# Patient Record
Sex: MALE | Race: WHITE | NOT HISPANIC OR LATINO | URBAN - METROPOLITAN AREA
[De-identification: names, ages, dates, MRNs, and addresses within clinical notes are randomized per-mention and may not be internally consistent; named-entity substitution may affect disease eponyms.]

---

## 2018-03-10 ENCOUNTER — INPATIENT (INPATIENT)
Facility: HOSPITAL | Age: 63
LOS: 33 days | Discharge: NURSING, REHAB OR LONG-TERM CARE | DRG: 207 | End: 2018-04-13
Attending: HOSPITALIST | Admitting: HOSPITALIST
Payer: COMMERCIAL

## 2018-03-10 VITALS
RESPIRATION RATE: 16 BRPM | HEART RATE: 83 BPM | HEIGHT: 69 IN | DIASTOLIC BLOOD PRESSURE: 89 MMHG | SYSTOLIC BLOOD PRESSURE: 158 MMHG | WEIGHT: 164.91 LBS | OXYGEN SATURATION: 96 % | TEMPERATURE: 99 F

## 2018-03-10 DIAGNOSIS — A41.9 SEPSIS, UNSPECIFIED ORGANISM: ICD-10-CM

## 2018-03-10 DIAGNOSIS — Z98.890 OTHER SPECIFIED POSTPROCEDURAL STATES: Chronic | ICD-10-CM

## 2018-03-10 LAB
ALBUMIN SERPL ELPH-MCNC: 2.9 G/DL — LOW (ref 3.3–5.2)
ALP SERPL-CCNC: 87 U/L — SIGNIFICANT CHANGE UP (ref 40–120)
ALT FLD-CCNC: 19 U/L — SIGNIFICANT CHANGE UP
ANION GAP SERPL CALC-SCNC: 13 MMOL/L — SIGNIFICANT CHANGE UP (ref 5–17)
APPEARANCE UR: CLEAR — SIGNIFICANT CHANGE UP
AST SERPL-CCNC: 24 U/L — SIGNIFICANT CHANGE UP
BACTERIA # UR AUTO: ABNORMAL
BASOPHILS # BLD AUTO: 0 K/UL — SIGNIFICANT CHANGE UP (ref 0–0.2)
BASOPHILS NFR BLD AUTO: 0.3 % — SIGNIFICANT CHANGE UP (ref 0–2)
BILIRUB SERPL-MCNC: 0.2 MG/DL — LOW (ref 0.4–2)
BILIRUB UR-MCNC: NEGATIVE — SIGNIFICANT CHANGE UP
BUN SERPL-MCNC: 22 MG/DL — HIGH (ref 8–20)
CALCIUM SERPL-MCNC: 9.5 MG/DL — SIGNIFICANT CHANGE UP (ref 8.6–10.2)
CHLORIDE SERPL-SCNC: 99 MMOL/L — SIGNIFICANT CHANGE UP (ref 98–107)
CO2 SERPL-SCNC: 28 MMOL/L — SIGNIFICANT CHANGE UP (ref 22–29)
COLOR SPEC: YELLOW — SIGNIFICANT CHANGE UP
COMMENT - URINE: SIGNIFICANT CHANGE UP
CREAT SERPL-MCNC: 0.43 MG/DL — LOW (ref 0.5–1.3)
DIFF PNL FLD: ABNORMAL
EOSINOPHIL # BLD AUTO: 0.4 K/UL — SIGNIFICANT CHANGE UP (ref 0–0.5)
EOSINOPHIL NFR BLD AUTO: 5.4 % — HIGH (ref 0–5)
EPI CELLS # UR: SIGNIFICANT CHANGE UP
GLUCOSE SERPL-MCNC: 145 MG/DL — HIGH (ref 70–115)
GLUCOSE UR QL: NEGATIVE MG/DL — SIGNIFICANT CHANGE UP
HCT VFR BLD CALC: 36.7 % — LOW (ref 42–52)
HGB BLD-MCNC: 11.3 G/DL — LOW (ref 14–18)
KETONES UR-MCNC: NEGATIVE — SIGNIFICANT CHANGE UP
LACTATE BLDV-MCNC: 0.7 MMOL/L — SIGNIFICANT CHANGE UP (ref 0.5–2)
LACTATE BLDV-MCNC: 0.7 MMOL/L — SIGNIFICANT CHANGE UP (ref 0.5–2)
LEUKOCYTE ESTERASE UR-ACNC: ABNORMAL
LYMPHOCYTES # BLD AUTO: 1 K/UL — SIGNIFICANT CHANGE UP (ref 1–4.8)
LYMPHOCYTES # BLD AUTO: 13.1 % — LOW (ref 20–55)
MCHC RBC-ENTMCNC: 28.5 PG — SIGNIFICANT CHANGE UP (ref 27–31)
MCHC RBC-ENTMCNC: 30.8 G/DL — LOW (ref 32–36)
MCV RBC AUTO: 92.4 FL — SIGNIFICANT CHANGE UP (ref 80–94)
MONOCYTES # BLD AUTO: 0.7 K/UL — SIGNIFICANT CHANGE UP (ref 0–0.8)
MONOCYTES NFR BLD AUTO: 9 % — SIGNIFICANT CHANGE UP (ref 3–10)
NEUTROPHILS # BLD AUTO: 5.3 K/UL — SIGNIFICANT CHANGE UP (ref 1.8–8)
NEUTROPHILS NFR BLD AUTO: 71.7 % — SIGNIFICANT CHANGE UP (ref 37–73)
NITRITE UR-MCNC: POSITIVE
PH UR: 7 — SIGNIFICANT CHANGE UP (ref 5–8)
PLATELET # BLD AUTO: 281 K/UL — SIGNIFICANT CHANGE UP (ref 150–400)
POTASSIUM SERPL-MCNC: 4.4 MMOL/L — SIGNIFICANT CHANGE UP (ref 3.5–5.3)
POTASSIUM SERPL-SCNC: 4.4 MMOL/L — SIGNIFICANT CHANGE UP (ref 3.5–5.3)
PROCALCITONIN SERPL-MCNC: 0.28 NG/ML — HIGH (ref 0–0.04)
PROT SERPL-MCNC: 7 G/DL — SIGNIFICANT CHANGE UP (ref 6.6–8.7)
PROT UR-MCNC: 15 MG/DL
RBC # BLD: 3.97 M/UL — LOW (ref 4.6–6.2)
RBC # FLD: 16.1 % — HIGH (ref 11–15.6)
RBC CASTS # UR COMP ASSIST: SIGNIFICANT CHANGE UP /HPF (ref 0–4)
SODIUM SERPL-SCNC: 140 MMOL/L — SIGNIFICANT CHANGE UP (ref 135–145)
SP GR SPEC: 1 — LOW (ref 1.01–1.02)
UROBILINOGEN FLD QL: NEGATIVE MG/DL — SIGNIFICANT CHANGE UP
WBC # BLD: 7.4 K/UL — SIGNIFICANT CHANGE UP (ref 4.8–10.8)
WBC # FLD AUTO: 7.4 K/UL — SIGNIFICANT CHANGE UP (ref 4.8–10.8)
WBC UR QL: SIGNIFICANT CHANGE UP

## 2018-03-10 PROCEDURE — 71045 X-RAY EXAM CHEST 1 VIEW: CPT | Mod: 26

## 2018-03-10 PROCEDURE — 99223 1ST HOSP IP/OBS HIGH 75: CPT | Mod: GC

## 2018-03-10 PROCEDURE — 74177 CT ABD & PELVIS W/CONTRAST: CPT | Mod: 26

## 2018-03-10 PROCEDURE — 99285 EMERGENCY DEPT VISIT HI MDM: CPT

## 2018-03-10 RX ORDER — SODIUM CHLORIDE 9 MG/ML
500 INJECTION INTRAMUSCULAR; INTRAVENOUS; SUBCUTANEOUS ONCE
Qty: 0 | Refills: 0 | Status: COMPLETED | OUTPATIENT
Start: 2018-03-10 | End: 2018-03-10

## 2018-03-10 RX ORDER — PIPERACILLIN AND TAZOBACTAM 4; .5 G/20ML; G/20ML
3.38 INJECTION, POWDER, LYOPHILIZED, FOR SOLUTION INTRAVENOUS ONCE
Qty: 0 | Refills: 0 | Status: COMPLETED | OUTPATIENT
Start: 2018-03-10 | End: 2018-03-10

## 2018-03-10 RX ORDER — SODIUM CHLORIDE 9 MG/ML
1000 INJECTION, SOLUTION INTRAVENOUS
Qty: 0 | Refills: 0 | Status: COMPLETED | OUTPATIENT
Start: 2018-03-10 | End: 2018-03-10

## 2018-03-10 RX ORDER — ENOXAPARIN SODIUM 100 MG/ML
40 INJECTION SUBCUTANEOUS EVERY 24 HOURS
Qty: 0 | Refills: 0 | Status: DISCONTINUED | OUTPATIENT
Start: 2018-03-10 | End: 2018-03-11

## 2018-03-10 RX ORDER — CEFTRIAXONE 500 MG/1
1 INJECTION, POWDER, FOR SOLUTION INTRAMUSCULAR; INTRAVENOUS ONCE
Qty: 0 | Refills: 0 | Status: DISCONTINUED | OUTPATIENT
Start: 2018-03-10 | End: 2018-03-10

## 2018-03-10 RX ADMIN — SODIUM CHLORIDE 1000 MILLILITER(S): 9 INJECTION, SOLUTION INTRAVENOUS at 21:52

## 2018-03-10 RX ADMIN — PIPERACILLIN AND TAZOBACTAM 200 GRAM(S): 4; .5 INJECTION, POWDER, LYOPHILIZED, FOR SOLUTION INTRAVENOUS at 21:52

## 2018-03-10 RX ADMIN — SODIUM CHLORIDE 1000 MILLILITER(S): 9 INJECTION, SOLUTION INTRAVENOUS at 23:57

## 2018-03-10 RX ADMIN — SODIUM CHLORIDE 1000 MILLILITER(S): 9 INJECTION INTRAMUSCULAR; INTRAVENOUS; SUBCUTANEOUS at 21:52

## 2018-03-10 NOTE — ED CLERICAL - NS ED CLERK UNITS
MON /2GUL MON  Trach???/2GUL MON  TRACH/2GUL MON  TRACH/6TWR 6TWR/TRACH MON/ TRACH/5TWR 5TWR/5215-01 MON/ TRACH

## 2018-03-10 NOTE — ED ADULT NURSE REASSESSMENT NOTE - NS ED NURSE REASSESS COMMENT FT1
Assumed care of pt @ 1930. repeat rectal temp completed by previous RN and MD. per previous RN, pt placed on [place] [facility] [place]. will complete full assessment when able.

## 2018-03-10 NOTE — H&P ADULT - HISTORY OF PRESENT ILLNESS
63y/o male with pmh of PMH Transverse myelitis, Paraplegia, trach/peg, colostomy coming from [facility] presents with fever for 3 days.      Patient denies any cough, sob, runny nose, vomiting, diarrhea, abdominal pain, pain with urination.      Patient signed CPR form on 2/18/18,     Patient eats via tube feeding,. 63y/o male with pmh of PMH Transverse myelitis, Paraplegia, trach/peg, colostomy, HTN, coming from [facility] presents with fever for 3 days.      Patient denies any cough, sob, runny nose, vomiting, diarrhea, abdominal pain, pain with urination.    Patient signed MOLST REQUESTING CPR on 2/18/18.    Patient eats via tube feeding,.      Originally at [facility] in   7months ago, developed Acute transverse myelitis.    Heart Attack, stent placed, check up after.  He was given a tetanus shot, then couple days ago he developed lower extremity weakness, paralysis; ultimately developed ulcers.      [facility]--> [facility] (kept him for a few weeks) --> wife couldn't take care of him;    He is getting frequent uti's, anemia reequiring transfusions.    101 fever at nursing home today (Count includes the [facility]) 63y/o male with pmh of PMH Transverse myelitis, Paraplegia, trach/peg, colostomy, HTN, coming from [facility] presents with fever for 3 days.      Patient denies any cough, sob, runny nose, vomiting, diarrhea, abdominal pain, pain with urination.    Patient signed MOLST REQUESTING CPR on 2/18/18.    Patient eats via tube feeding,.      Originally at [facility] in   7months ago, developed Acute transverse myelitis.    Heart Attack, stent placed, check up after.  He was given a tetanus shot, then couple days ago he developed lower extremity weakness, paralysis; ultimately developed ulcers.      [facility]--> [facility] (kept him for a few weeks) --> wife couldn't take care of him;    He is getting frequent uti's, anemia reequiring transfusions.    101 fever at nursing home today ([facility])

## 2018-03-10 NOTE — ED PROVIDER NOTE - OBJECTIVE STATEMENT
62 year old male with PMh transverse myelitis, paraplegia, trach/peg, colostomy, htn presents with fever. As per the wife, the pt has had recurrent fevers over the past several months due to recurrent UTIs and C diff colitis, however this fever began several days ago with a Tmax 102. The pt denies cough, SOB, chest pain, vomiting, abd pain.

## 2018-03-10 NOTE — H&P ADULT - ASSESSMENT
63y/o male with pmh of PMH Transverse myelitis, Paraplegia, trach/peg, colostomy coming from [facility] presents with fever for 3 days. 63y/o male with pmh of PMH Transverse myelitis, DM2, HTN, Paraplegia, trach/peg, colostomy coming from [facility] presents with fever for 3 days.

## 2018-03-10 NOTE — H&P ADULT - PROBLEM SELECTOR PLAN 6
Patient has a known recent history of cdiff infection;  from february - march;  patient was on a regimen of metronidazole 500mg [id number]  3/5-3/14 Patient has a known recent history of cdiff infection;  from february - march;  patient was on a regimen of metronidazole 500mg [id number]  3/5-3/14  Pending cdiff toxin testing;

## 2018-03-10 NOTE — ED PROVIDER NOTE - PROGRESS NOTE DETAILS
CT exam with IV contrast is deemed to be medically necessary ATTENDING MD Santos: patient now meeting SIRS criteria. Has met all of code sepsis criteria except antibiotics and fluid bolus. initiating fluid bolus. WIll admit to hospitalist. CT negative. This is the time sepsis recognized.

## 2018-03-10 NOTE — H&P ADULT - NSHPLABSRESULTS_GEN_ALL_CORE
Lab Results:  CBC  CBC Full  -  ( 10 Mar 2018 17:15 )  WBC Count : 7.4 K/uL  Hemoglobin : 11.3 g/dL  Hematocrit : 36.7 %  Platelet Count - Automated : 281 K/uL  Mean Cell Volume : 92.4 fl  Mean Cell Hemoglobin : 28.5 pg  Mean Cell Hemoglobin Concentration : 30.8 g/dL  Auto Neutrophil # : 5.3 K/uL  Auto Lymphocyte # : 1.0 K/uL  Auto Monocyte # : 0.7 K/uL  Auto Eosinophil # : 0.4 K/uL  Auto Basophil # : 0.0 K/uL  Auto Neutrophil % : 71.7 %  Auto Lymphocyte % : 13.1 %  Auto Monocyte % : 9.0 %  Auto Eosinophil % : 5.4 %  Auto Basophil % : 0.3 %                            11.3   7.4   )-----------( 281      ( 10 Mar 2018 17:15 )             36.7     03-10    140  |  99  |  22.0<H>  ----------------------------<  145<H>  4.4   |  28.0  |  0.43<L>    Ca    9.5      10 Mar 2018 17:15    TPro  7.0  /  Alb  2.9<L>  /  TBili  0.2<L>  /  DBili  x   /  AST  24  /  ALT  19  /  AlkPhos  87  03-10    LIVER FUNCTIONS - ( 10 Mar 2018 17:15 )  Alb: 2.9 g/dL / Pro: 7.0 g/dL / ALK PHOS: 87 U/L / ALT: 19 U/L / AST: 24 U/L / GGT: x             Urinalysis Basic - ( 10 Mar 2018 19:16 )    Color: Yellow / Appearance: Clear / S.005 / pH: x  Gluc: x / Ketone: Negative  / Bili: Negative / Urobili: Negative mg/dL   Blood: x / Protein: 15 mg/dL / Nitrite: Positive   Leuk Esterase: Moderate / RBC: 0-2 /HPF / WBC 3-5   Sq Epi: x / Non Sq Epi: Occasional / Bacteria: Few

## 2018-03-10 NOTE — ED ADULT NURSE REASSESSMENT NOTE - NS ED NURSE REASSESS COMMENT FT1
pt status unchanged, refer to flowsheet and chart, pt safety maintained, pt hemodynamically stable, pending urine and repeat temp

## 2018-03-10 NOTE — H&P ADULT - PROBLEM SELECTOR PLAN 2
Restart patient on ophthalmic eye care with refresh lacrilube (white petroleum mieral oil);  dry eye syndromne Restart patient on ophthalmic eye care with refresh lacrilube (white petroleum mieral oil);  dry eye syndrome

## 2018-03-10 NOTE — ED ADULT NURSE REASSESSMENT NOTE - NS ED NURSE REASSESS COMMENT FT1
pt positioned for comfort, in no apparent distress or discomfort. pt to be admitted, awaiting admitting MD kamara. will continue to monitor.

## 2018-03-10 NOTE — H&P ADULT - NSHPREVIEWOFSYSTEMS_GEN_ALL_CORE
REVIEW OF SYSTEMS:    CONSTITUTIONAL:    POSITIVE FEVERS;    EYES/ENT: No throat pain   NECK: No pain or stiffness  PULMONOLOGY: No cough, wheezing, hemoptysis; No shortness of breath  CARDIOVASCULAR: No chest pain  GASTROINTESTINAL: No abdominal pain. No nausea, vomiting, or hematemesis;   GENITOURINARY: No dysuria, frequency or hematuria  NEUROLOGICAL: No numbness or weakness  DERMATOLOGY: No itching, burning, rashes, or lesions

## 2018-03-10 NOTE — H&P ADULT - PROBLEM SELECTOR PLAN 1
Admit to any floor, monitored bed and , under care of   peg tube feedings;  Patient admitted for likely Sepsis in the setting of fever, tachycardia, known previous use of antibiotics, previous cdiff infections.  Pending CBC, CMP, Mag, PHos, UA, Urine Culture, EKG Admit to any floor, monitored bed and , under care of   peg tube feedings;  Activity Bedrest;  Patient admitted for likely Sepsis in the setting of fever, tachycardia, known previous use of antibiotics, previous cdiff infections.  Pending CBC, CMP, Mag, PHos, UA, Urine Culture, EKG;  >2liters O2 via NC, keep O2 sat >92%  Cxray, CT chest;    ID consult;    Avoid any antidiarrhea agents.    Metronidazole 500mg po tid  10-14 days.    Florastor 250mg bid

## 2018-03-10 NOTE — H&P ADULT - NSHPSOURCEINFOTX_GEN_ALL_CORE
Wife  Courtney Vegas [phone]  (Emergency6 Contact) Wife  Courtney Brennan [phone]  (Emergency6 Contact) Wife  Courtney Brennan [phone]  (Emergency Contact)

## 2018-03-10 NOTE — ED ADULT NURSE REASSESSMENT NOTE - NS ED NURSE REASSESS COMMENT FT1
pt awake and alert, chronic trach and nonverbal lying comfortably in bed. pt placed on monitor, sinus tach @ 109, maintaining 02 @ 97% on trach. wound vac noted to sacral area, site clean dry and intact. colostomy bag in place, stoma pink, surrounding area clean dry and intact. pt has beltrán in place prior to arrival to ED. beltrán draining yellow urine without any difficulty. pt remains on brett joseph per MD. pt in no apparent distress or discomfort, will continue to monitor.

## 2018-03-10 NOTE — H&P ADULT - NSHPPHYSICALEXAM_GEN_ALL_CORE
PHYSICAL EXAM:  Vital Signs Last 24 Hrs  T(C): 36.8 (10 Mar 2018 21:51), Max: 37.2 (10 Mar 2018 15:15)  T(F): 98.3 (10 Mar 2018 21:51), Max: 99 (10 Mar 2018 15:15)    CURRENTLY AFEBRILE  HR: 109 (10 Mar 2018 21:51) (83 - 110)       TACHYCARDIC  BP: 140/78 (10 Mar 2018 21:51) (140/78 - 158/89)  RR: 18 (10 Mar 2018 21:51) (16 - 18)  SpO2: 97% (10 Mar 2018 21:51) (96% - 97%)    GENERAL: NAD, well-groomed, well-developed  HEAD:  Atraumatic, Normocephalic  OPTHOLMOLOGY:  EOMI, PERRLA, conjunctiva and sclera clear  ENMT: No tonsillar erythema, exudates, or enlargement; Moist mucous membranes, Good dentition, No lesions  NECK: Supple, No JVD, Normal thyroid  NEUROLOGY:  Alert & Oriented X3, Good concentration; Motor Strength 5/5 B/L upper and lower extremities; DTRs 2+ intact and symmetric  PULMONLOGY: Clear to percussion bilaterally; No rales, rhonchi, wheezing, or rubs  CARDIOLOGY: S1, S2;  Regular rate and rhythm; No murmurs, rubs, or gallops  GASTROENTEROLOGY: Soft, Nontender, Nondistended; Bowel sounds present;   COLOSTOMY BAG PRESENT IN LOWER ABDOMEN;  STOOL IS BROWN, LOOSE;    EXTREMITIES:  2+ Peripheral Pulses, No clubbing, cyanosis, or edema;  ULCER B/W 4TH AND 5TH TOES, MEDIAL 4TH TOES;     LYMPH: No lymphadenopathy noted  DERMATOLOGY:  SKIN WARM, DRY; PHYSICAL EXAM:  Vital Signs Last 24 Hrs  T(C): 36.8 (10 Mar 2018 21:51), Max: 37.2 (10 Mar 2018 15:15)  T(F): 98.3 (10 Mar 2018 21:51), Max: 99 (10 Mar 2018 15:15)    CURRENTLY AFEBRILE  HR: 109 (10 Mar 2018 21:51) (83 - 110)       TACHYCARDIC  BP: 140/78 (10 Mar 2018 21:51) (140/78 - 158/89)  RR: 18 (10 Mar 2018 21:51) (16 - 18)  SpO2: 97% (10 Mar 2018 21:51) (96% - 97%)    GENERAL: NAD, well-groomed, well-developed  HEAD:  Atraumatic, Normocephalic  OPTHOLMOLOGY:  EOMI, PERRLA, conjunctiva and sclera clear  ENMT: No tonsillar erythema, exudates, or enlargement; DRY ORAL mucous membranes, Good dentition, No lesions  NECK: Supple, No JVD, Normal thyroid  NEUROLOGY:  Alert & Oriented X3, Good concentration; Motor Strength 5/5 B/L upper and lower extremities; DTRs 2+ intact and symmetric  PULMONLOGY: Clear to percussion bilaterally; No rales, rhonchi, wheezing, or rubs  CARDIOLOGY: S1, S2;  Regular rate and rhythm; No murmurs, rubs, or gallops  GASTROENTEROLOGY: Soft, Nontender, Nondistended; Bowel sounds present;   COLOSTOMY BAG PRESENT IN LOWER ABDOMEN;  STOOL IS BROWN, LOOSE;    EXTREMITIES:  2+ Peripheral Pulses, No clubbing, cyanosis, or edema;  ULCER B/W 4TH AND 5TH TOES, MEDIAL 4TH TOES,  BY PAD;   LYMPH: No lymphadenopathy noted  DERMATOLOGY:  SKIN WARM, DRY;  SACRAL ULCERS PRESENT ON ADMISSION; PHYSICAL EXAM:  Vital Signs Last 24 Hrs  T(C): 36.8 (10 Mar 2018 21:51), Max: 37.2 (10 Mar 2018 15:15)  T(F): 98.3 (10 Mar 2018 21:51), Max: 99 (10 Mar 2018 15:15)    CURRENTLY AFEBRILE  HR: 109 (10 Mar 2018 21:51) (83 - 110)       TACHYCARDIC  BP: 140/78 (10 Mar 2018 21:51) (140/78 - 158/89)  RR: 18 (10 Mar 2018 21:51) (16 - 18)  SpO2: 97% (10 Mar 2018 21:51) (96% - 97%)    GENERAL: NAD, well-groomed, well-developed  HEAD:  Atraumatic, Normocephalic  OPTHOLMOLOGY:  EOMI, PERRLA, conjunctiva and sclera clear  ENMT: No tonsillar erythema, exudates, or enlargement; DRY ORAL mucous membranes, Good dentition, No lesions  NECK: Supple, No JVD, Normal thyroid  NEUROLOGY:  Alert & Oriented X3, Good concentration; Motor Strength 5/5 B/L upper and lower extremities; DTRs 2+ intact and symmetric  PULMONLOGY: Clear to percussion bilaterally; No rales, rhonchi, wheezing, or rubs  CARDIOLOGY: S1, S2;  Regular rate and rhythm; No murmurs, rubs, or gallops  GASTROENTEROLOGY: Soft, Nontender, Nondistended; Bowel sounds present;   COLOSTOMY BAG PRESENT IN LOWER ABDOMEN;  STOOL IS BROWN, LOOSE;    EXTREMITIES:  2+ Peripheral Pulses, No clubbing, cyanosis, or edema;  ULCER B/W 4TH AND 5TH TOES, MEDIAL 4TH TOES,  BY PAD;   LYMPH: No lymphadenopathy noted  DERMATOLOGY:  SKIN WARM, DRY;  SACRAL ULCERS PRESENT ON ADMISSION, covered by packing and wound vac;

## 2018-03-10 NOTE — H&P ADULT - NSHPOUTPATIENTPROVIDERS_GEN_ALL_CORE
[facility]  Dr.Dinesh Bronson  [phone] [facility]  Dr.Dinesh Bronson  [phone]

## 2018-03-11 DIAGNOSIS — A41.9 SEPSIS, UNSPECIFIED ORGANISM: ICD-10-CM

## 2018-03-11 DIAGNOSIS — R50.9 FEVER, UNSPECIFIED: ICD-10-CM

## 2018-03-11 DIAGNOSIS — Z29.9 ENCOUNTER FOR PROPHYLACTIC MEASURES, UNSPECIFIED: ICD-10-CM

## 2018-03-11 DIAGNOSIS — E11.9 TYPE 2 DIABETES MELLITUS WITHOUT COMPLICATIONS: ICD-10-CM

## 2018-03-11 DIAGNOSIS — G37.3 ACUTE TRANSVERSE MYELITIS IN DEMYELINATING DISEASE OF CENTRAL NERVOUS SYSTEM: ICD-10-CM

## 2018-03-11 DIAGNOSIS — I10 ESSENTIAL (PRIMARY) HYPERTENSION: ICD-10-CM

## 2018-03-11 DIAGNOSIS — Z86.19 PERSONAL HISTORY OF OTHER INFECTIOUS AND PARASITIC DISEASES: ICD-10-CM

## 2018-03-11 DIAGNOSIS — L98.429 NON-PRESSURE CHRONIC ULCER OF BACK WITH UNSPECIFIED SEVERITY: ICD-10-CM

## 2018-03-11 DIAGNOSIS — G83.9 PARALYTIC SYNDROME, UNSPECIFIED: ICD-10-CM

## 2018-03-11 LAB
C DIFF BY PCR RESULT: SIGNIFICANT CHANGE UP
C DIFF TOX GENS STL QL NAA+PROBE: SIGNIFICANT CHANGE UP
CORTIS F PM SERPL-MCNC: 17.1 UG/DL — HIGH (ref 2.7–10.5)
GLUCOSE BLDC GLUCOMTR-MCNC: 140 MG/DL — HIGH (ref 70–99)
GLUCOSE BLDC GLUCOMTR-MCNC: 185 MG/DL — HIGH (ref 70–99)
GLUCOSE BLDC GLUCOMTR-MCNC: 186 MG/DL — HIGH (ref 70–99)
GLUCOSE BLDC GLUCOMTR-MCNC: 209 MG/DL — HIGH (ref 70–99)
HBA1C BLD-MCNC: 5.6 % — SIGNIFICANT CHANGE UP (ref 4–5.6)
OB PNL STL: NEGATIVE — SIGNIFICANT CHANGE UP

## 2018-03-11 PROCEDURE — 99233 SBSQ HOSP IP/OBS HIGH 50: CPT | Mod: GC

## 2018-03-11 PROCEDURE — 99223 1ST HOSP IP/OBS HIGH 75: CPT

## 2018-03-11 RX ORDER — ALPRAZOLAM 0.25 MG
0.5 TABLET ORAL THREE TIMES A DAY
Qty: 0 | Refills: 0 | Status: DISCONTINUED | OUTPATIENT
Start: 2018-03-11 | End: 2018-03-11

## 2018-03-11 RX ORDER — ASCORBIC ACID 60 MG
500 TABLET,CHEWABLE ORAL DAILY
Qty: 0 | Refills: 0 | Status: DISCONTINUED | OUTPATIENT
Start: 2018-03-11 | End: 2018-04-13

## 2018-03-11 RX ORDER — VANCOMYCIN HCL 1 G
500 VIAL (EA) INTRAVENOUS EVERY 6 HOURS
Qty: 0 | Refills: 0 | Status: DISCONTINUED | OUTPATIENT
Start: 2018-03-11 | End: 2018-03-11

## 2018-03-11 RX ORDER — ASPIRIN/CALCIUM CARB/MAGNESIUM 324 MG
81 TABLET ORAL DAILY
Qty: 0 | Refills: 0 | Status: DISCONTINUED | OUTPATIENT
Start: 2018-03-11 | End: 2018-03-11

## 2018-03-11 RX ORDER — ASPIRIN/CALCIUM CARB/MAGNESIUM 324 MG
1 TABLET ORAL
Qty: 0 | Refills: 0 | COMMUNITY

## 2018-03-11 RX ORDER — DEXTROSE 50 % IN WATER 50 %
25 SYRINGE (ML) INTRAVENOUS ONCE
Qty: 0 | Refills: 0 | Status: DISCONTINUED | OUTPATIENT
Start: 2018-03-11 | End: 2018-04-13

## 2018-03-11 RX ORDER — OMEPRAZOLE 10 MG/1
0 CAPSULE, DELAYED RELEASE ORAL
Qty: 0 | Refills: 0 | COMMUNITY

## 2018-03-11 RX ORDER — HEPARIN SODIUM 5000 [USP'U]/ML
5000 INJECTION INTRAVENOUS; SUBCUTANEOUS EVERY 8 HOURS
Qty: 0 | Refills: 0 | Status: DISCONTINUED | OUTPATIENT
Start: 2018-03-11 | End: 2018-04-13

## 2018-03-11 RX ORDER — SODIUM CHLORIDE 9 MG/ML
1000 INJECTION, SOLUTION INTRAVENOUS
Qty: 0 | Refills: 0 | Status: DISCONTINUED | OUTPATIENT
Start: 2018-03-11 | End: 2018-04-13

## 2018-03-11 RX ORDER — ETIDRONATE DISODIUM 400 MG/1
2 TABLET ORAL
Qty: 0 | Refills: 0 | COMMUNITY

## 2018-03-11 RX ORDER — PANTOPRAZOLE SODIUM 20 MG/1
40 TABLET, DELAYED RELEASE ORAL
Qty: 0 | Refills: 0 | Status: DISCONTINUED | OUTPATIENT
Start: 2018-03-11 | End: 2018-03-11

## 2018-03-11 RX ORDER — PANTOPRAZOLE SODIUM 20 MG/1
40 TABLET, DELAYED RELEASE ORAL
Qty: 0 | Refills: 0 | Status: DISCONTINUED | OUTPATIENT
Start: 2018-03-11 | End: 2018-04-13

## 2018-03-11 RX ORDER — BACLOFEN 100 %
10 POWDER (GRAM) MISCELLANEOUS
Qty: 0 | Refills: 0 | COMMUNITY

## 2018-03-11 RX ORDER — CLOPIDOGREL BISULFATE 75 MG/1
75 TABLET, FILM COATED ORAL DAILY
Qty: 0 | Refills: 0 | Status: DISCONTINUED | OUTPATIENT
Start: 2018-03-11 | End: 2018-04-13

## 2018-03-11 RX ORDER — OXYCODONE HYDROCHLORIDE 5 MG/1
10 TABLET ORAL EVERY 6 HOURS
Qty: 0 | Refills: 0 | Status: DISCONTINUED | OUTPATIENT
Start: 2018-03-11 | End: 2018-03-18

## 2018-03-11 RX ORDER — CLOPIDOGREL BISULFATE 75 MG/1
75 TABLET, FILM COATED ORAL DAILY
Qty: 0 | Refills: 0 | Status: DISCONTINUED | OUTPATIENT
Start: 2018-03-11 | End: 2018-03-11

## 2018-03-11 RX ORDER — INSULIN GLARGINE 100 [IU]/ML
10 INJECTION, SOLUTION SUBCUTANEOUS AT BEDTIME
Qty: 0 | Refills: 0 | Status: DISCONTINUED | OUTPATIENT
Start: 2018-03-11 | End: 2018-03-11

## 2018-03-11 RX ORDER — OXYCODONE HYDROCHLORIDE 5 MG/1
1 TABLET ORAL
Qty: 0 | Refills: 0 | COMMUNITY

## 2018-03-11 RX ORDER — DEXTROSE 50 % IN WATER 50 %
12.5 SYRINGE (ML) INTRAVENOUS ONCE
Qty: 0 | Refills: 0 | Status: DISCONTINUED | OUTPATIENT
Start: 2018-03-11 | End: 2018-04-13

## 2018-03-11 RX ORDER — OMEPRAZOLE 10 MG/1
1 CAPSULE, DELAYED RELEASE ORAL
Qty: 0 | Refills: 0 | COMMUNITY

## 2018-03-11 RX ORDER — BACLOFEN 100 %
10 POWDER (GRAM) MISCELLANEOUS
Qty: 0 | Refills: 0 | Status: DISCONTINUED | OUTPATIENT
Start: 2018-03-11 | End: 2018-04-13

## 2018-03-11 RX ORDER — INSULIN GLARGINE 100 [IU]/ML
15 INJECTION, SOLUTION SUBCUTANEOUS AT BEDTIME
Qty: 0 | Refills: 0 | Status: DISCONTINUED | OUTPATIENT
Start: 2018-03-11 | End: 2018-03-11

## 2018-03-11 RX ORDER — ASPIRIN/CALCIUM CARB/MAGNESIUM 324 MG
81 TABLET ORAL DAILY
Qty: 0 | Refills: 0 | Status: DISCONTINUED | OUTPATIENT
Start: 2018-03-11 | End: 2018-04-13

## 2018-03-11 RX ORDER — INSULIN HUMAN 100 [IU]/ML
INJECTION, SOLUTION SUBCUTANEOUS EVERY 6 HOURS
Qty: 0 | Refills: 0 | Status: DISCONTINUED | OUTPATIENT
Start: 2018-03-11 | End: 2018-04-13

## 2018-03-11 RX ORDER — ASCORBIC ACID 60 MG
500 TABLET,CHEWABLE ORAL DAILY
Qty: 0 | Refills: 0 | Status: DISCONTINUED | OUTPATIENT
Start: 2018-03-11 | End: 2018-03-11

## 2018-03-11 RX ORDER — BACLOFEN 100 %
10 POWDER (GRAM) MISCELLANEOUS
Qty: 0 | Refills: 0 | Status: DISCONTINUED | OUTPATIENT
Start: 2018-03-11 | End: 2018-03-11

## 2018-03-11 RX ORDER — GLUCAGON INJECTION, SOLUTION 0.5 MG/.1ML
1 INJECTION, SOLUTION SUBCUTANEOUS ONCE
Qty: 0 | Refills: 0 | Status: DISCONTINUED | OUTPATIENT
Start: 2018-03-11 | End: 2018-04-13

## 2018-03-11 RX ORDER — METRONIDAZOLE 500 MG
500 TABLET ORAL EVERY 8 HOURS
Qty: 0 | Refills: 0 | Status: DISCONTINUED | OUTPATIENT
Start: 2018-03-11 | End: 2018-03-11

## 2018-03-11 RX ORDER — ALPRAZOLAM 0.25 MG
0.5 TABLET ORAL THREE TIMES A DAY
Qty: 0 | Refills: 0 | Status: DISCONTINUED | OUTPATIENT
Start: 2018-03-11 | End: 2018-03-16

## 2018-03-11 RX ORDER — METRONIDAZOLE 500 MG
500 TABLET ORAL EVERY 8 HOURS
Qty: 0 | Refills: 0 | Status: DISCONTINUED | OUTPATIENT
Start: 2018-03-11 | End: 2018-03-12

## 2018-03-11 RX ADMIN — INSULIN GLARGINE 10 UNIT(S): 100 INJECTION, SOLUTION SUBCUTANEOUS at 21:39

## 2018-03-11 RX ADMIN — HEPARIN SODIUM 5000 UNIT(S): 5000 INJECTION INTRAVENOUS; SUBCUTANEOUS at 06:50

## 2018-03-11 RX ADMIN — HEPARIN SODIUM 5000 UNIT(S): 5000 INJECTION INTRAVENOUS; SUBCUTANEOUS at 13:35

## 2018-03-11 RX ADMIN — OXYCODONE HYDROCHLORIDE 10 MILLIGRAM(S): 5 TABLET ORAL at 20:05

## 2018-03-11 RX ADMIN — INSULIN HUMAN 2: 100 INJECTION, SOLUTION SUBCUTANEOUS at 13:35

## 2018-03-11 RX ADMIN — INSULIN HUMAN 1: 100 INJECTION, SOLUTION SUBCUTANEOUS at 18:14

## 2018-03-11 RX ADMIN — Medication 500 MILLIGRAM(S): at 21:37

## 2018-03-11 RX ADMIN — Medication 10 MILLIGRAM(S): at 18:14

## 2018-03-11 RX ADMIN — Medication 5 MILLILITER(S): at 13:32

## 2018-03-11 RX ADMIN — Medication 500 MILLIGRAM(S): at 13:33

## 2018-03-11 RX ADMIN — Medication 0.5 MILLIGRAM(S): at 13:33

## 2018-03-11 RX ADMIN — Medication 1 APPLICATION(S): at 21:36

## 2018-03-11 RX ADMIN — OXYCODONE HYDROCHLORIDE 10 MILLIGRAM(S): 5 TABLET ORAL at 13:34

## 2018-03-11 RX ADMIN — Medication 500 MILLIGRAM(S): at 06:50

## 2018-03-11 RX ADMIN — CLOPIDOGREL BISULFATE 75 MILLIGRAM(S): 75 TABLET, FILM COATED ORAL at 13:32

## 2018-03-11 RX ADMIN — HEPARIN SODIUM 5000 UNIT(S): 5000 INJECTION INTRAVENOUS; SUBCUTANEOUS at 21:37

## 2018-03-11 RX ADMIN — OXYCODONE HYDROCHLORIDE 10 MILLIGRAM(S): 5 TABLET ORAL at 21:08

## 2018-03-11 RX ADMIN — Medication 81 MILLIGRAM(S): at 13:33

## 2018-03-11 RX ADMIN — PANTOPRAZOLE SODIUM 40 MILLIGRAM(S): 20 TABLET, DELAYED RELEASE ORAL at 16:42

## 2018-03-11 RX ADMIN — Medication 0.5 MILLIGRAM(S): at 16:42

## 2018-03-11 RX ADMIN — OXYCODONE HYDROCHLORIDE 10 MILLIGRAM(S): 5 TABLET ORAL at 14:54

## 2018-03-11 NOTE — PROGRESS NOTE ADULT - PROBLEM SELECTOR PROBLEM 3
Type 2 diabetes mellitus without complication, with long-term current use of insulin Skin ulcer of sacrum, unspecified ulcer stage

## 2018-03-11 NOTE — PROGRESS NOTE ADULT - PROBLEM SELECTOR PLAN 4
CONTINUE HOME MEDICATIONS -pt with noted sequela of trach/ peg/ colostomy in place  -c/w supportive care   -c/w baclofen  -c/w oxycdone prn for chronic pain   -c/w glucerna peg feeding to goal

## 2018-03-11 NOTE — CHART NOTE - NSCHARTNOTEFT_GEN_A_CORE
HUI Note - pt spouse req to spk to HUI - Francois (spouse of pt) requesting that someone sit with pt all night because he is afraid he is going to stop breathing (pt was trached and is now breathing on his own) explained the reasons why a pt would be assigned a 1-1. [facility] gave spouse the list of private hires. pt has bell - he can not push button but can hit bell - RN aware. pt agrees he is more anxious than anything and will keep calm.

## 2018-03-11 NOTE — PROGRESS NOTE ADULT - PROBLEM SELECTOR PROBLEM 5
History of Clostridium difficile infection Type 2 diabetes mellitus without complication, with long-term current use of insulin

## 2018-03-11 NOTE — PROGRESS NOTE ADULT - SUBJECTIVE AND OBJECTIVE BOX
Patient is a 62y old  Male who presents with a chief complaint of fevers (10 Mar 2018 23:26)      INTERVAL HPI/OVERNIGHT EVENTS:  62y  Male    HEMATOLOGIC:  aspirin  chewable 81 milliGRAM(s) Oral daily  heparin  Injectable 5000 Unit(s) SubCutaneous every 8 hours    ENDO/METABOLIC:  dextrose 50% Injectable 12.5 Gram(s) IV Push once  dextrose 50% Injectable 25 Gram(s) IV Push once  dextrose 50% Injectable 25 Gram(s) IV Push once  glucagon  Injectable 1 milliGRAM(s) IntraMuscular once PRN  insulin glargine Injectable (LANTUS) 10 Unit(s) SubCutaneous at bedtime  insulin regular  human corrective regimen sliding scale   SubCutaneous every 6 hours    IV FLUID/NUTRITION:  ascorbic acid 500 milliGRAM(s) Oral daily  dextrose 5%. 1000 milliLiter(s) IV Continuous <Continuous>  multivitamin   Solution 5 milliLiter(s) Oral daily    Vital Signs Last 24 Hrs  T(C): 36.9 (11 Mar 2018 07:10), Max: 37.9 (11 Mar 2018 03:53)  T(F): 98.5 (11 Mar 2018 07:10), Max: 100.2 (11 Mar 2018 03:53)  HR: 110 (11 Mar 2018 07:10) (83 - 112)  BP: 125/73 (11 Mar 2018 07:10) (125/73 - 158/89)  RR: 20 (11 Mar 2018 07:10) (16 - 20)  SpO2: 96% (11 Mar 2018 07:10) (96% - 98%)        REVIEW OF SYSTEMS:  No further findings on ros;        PHYSICAL EXAM:  Vital Signs Last 24 Hrs  T(C): 36.9 (11 Mar 2018 07:10), Max: 37.9 (11 Mar 2018 03:53)  T(F): 98.5 (11 Mar 2018 07:10), Max: 100.2 (11 Mar 2018 03:53)  HR: 110 (11 Mar 2018 07:10) (83 - 112)  BP: 125/73 (11 Mar 2018 07:10) (125/73 - 158/89)  RR: 20 (11 Mar 2018 07:10) (16 - 20)  SpO2: 96% (11 Mar 2018 07:10) (96% - 98%)      GENERAL: NAD, well-groomed, well-developed  	HEENT: NCAT, EOMI, PERRLA, conjunctiva and sclera clear; DRY ORAL mucous membranes,   	NECK: Supple, No Supraclavicular/submandibular lymphadenopathy;   	NEUROLOGY:  Alert & Oriented X3, Good concentration; Motor Strength 5/5 B/L upper and lower extremities; =  	PULMONLOGY: CTAB; No rales, rhonchi, wheezing, or rubs  	CARDIOLOGY: S1, S2;  Regular rate and rhythm; No murmurs, rubs, or gallops  	GASTROENTEROLOGY: Soft, Nontender, Nondistended; Bowel sounds present;   COLOSTOMY BAG PRESENT IN LOWER ABDOMEN;  STOOL IS BROWN, watery;    	EXTREMITIES:  2+ Peripheral Pulses DP, No clubbing, cyanosis, or edema;  ULCER B/W 4TH AND 5TH TOES, MEDIAL 4TH TOES,  BY PAD;     DERMATOLOGY:  SKIN WARM, DRY;  same SACRAL ULCERS PRESENT ON ADMISSION, covered by packing and       wound vac;      LABS:   CBC Full  -  ( 10 Mar 2018 17:15 )  WBC Count : 7.4 K/uL  Hemoglobin : 11.3 g/dL  Hematocrit : 36.7 %  Platelet Count - Automated : 281 K/uL  Mean Cell Volume : 92.4 fl  Mean Cell Hemoglobin : 28.5 pg  Mean Cell Hemoglobin Concentration : 30.8 g/dL  Auto Neutrophil # : 5.3 K/uL  Auto Lymphocyte # : 1.0 K/uL  Auto Monocyte # : 0.7 K/uL  Auto Eosinophil # : 0.4 K/uL  Auto Basophil # : 0.0 K/uL  Auto Neutrophil % : 71.7 %  Auto Lymphocyte % : 13.1 %  Auto Monocyte % : 9.0 %  Auto Eosinophil % : 5.4 %  Auto Basophil % : 0.3 %               11.3   7.4   )-----------( 281      ( 10 Mar 2018 17:15 )             36.7     03-10    140  |  99  |  22.0<H>  ----------------------------<  145<H>  4.4   |  28.0  |  0.43<L>    Ca    9.5      10 Mar 2018 17:15    TPro  7.0  /  Alb  2.9<L>  /  TBili  0.2<L>  /  DBili  x   /  AST  24  /  ALT  19  /  AlkPhos  87  03-10    LIVER FUNCTIONS - ( 10 Mar 2018 17:15 )  Alb: 2.9 g/dL / Pro: 7.0 g/dL / ALK PHOS: 87 U/L / ALT: 19 U/L / AST: 24 U/L / GGT: x             Urinalysis Basic - ( 10 Mar 2018 19:16 )    Color: Yellow / Appearance: Clear / S.005 / pH: x  Gluc: x / Ketone: Negative  / Bili: Negative / Urobili: Negative mg/dL   Blood: x / Protein: 15 mg/dL / Nitrite: Positive   Leuk Esterase: Moderate / RBC: 0-2 /HPF / WBC 3-5   Sq Epi: x / Non Sq Epi: Occasional / Bacteria: Few      Albumin, Serum: 2.9 g/dL (03-10 @ 17:15)    LIVER FUNCTIONS - ( 10 Mar 2018 17:15 )  Alb: 2.9 g/dL / Pro: 7.0 g/dL / ALK PHOS: 87 U/L / ALT: 19 U/L / AST: 24 U/L / GGT: x Patient is a 62y old  Male who presents with a chief complaint of fevers (10 Mar 2018 23:26)      INTERVAL HPI/OVERNIGHT EVENTS:  Patient seen and examined at bedside. No acute events overnight. Patient reports he is upset and wants to leave.     HEMATOLOGIC:  aspirin  chewable 81 milliGRAM(s) Oral daily  heparin  Injectable 5000 Unit(s) SubCutaneous every 8 hours    ENDO/METABOLIC:  dextrose 50% Injectable 12.5 Gram(s) IV Push once  dextrose 50% Injectable 25 Gram(s) IV Push once  dextrose 50% Injectable 25 Gram(s) IV Push once  glucagon  Injectable 1 milliGRAM(s) IntraMuscular once PRN  insulin glargine Injectable (LANTUS) 10 Unit(s) SubCutaneous at bedtime  insulin regular  human corrective regimen sliding scale   SubCutaneous every 6 hours    IV FLUID/NUTRITION:  ascorbic acid 500 milliGRAM(s) Oral daily  dextrose 5%. 1000 milliLiter(s) IV Continuous <Continuous>  multivitamin   Solution 5 milliLiter(s) Oral daily    Vital Signs Last 24 Hrs  T(C): 36.9 (11 Mar 2018 07:10), Max: 37.9 (11 Mar 2018 03:53)  T(F): 98.5 (11 Mar 2018 07:10), Max: 100.2 (11 Mar 2018 03:53)  HR: 110 (11 Mar 2018 07:10) (83 - 112)  BP: 125/73 (11 Mar 2018 07:10) (125/73 - 158/89)  RR: 20 (11 Mar 2018 07:10) (16 - 20)  SpO2: 96% (11 Mar 2018 07:10) (96% - 98%)        REVIEW OF SYSTEMS:  No further findings on ros;        PHYSICAL EXAM:  Vital Signs Last 24 Hrs  T(C): 36.9 (11 Mar 2018 07:10), Max: 37.9 (11 Mar 2018 03:53)  T(F): 98.5 (11 Mar 2018 07:10), Max: 100.2 (11 Mar 2018 03:53)  HR: 110 (11 Mar 2018 07:10) (83 - 112)  BP: 125/73 (11 Mar 2018 07:10) (125/73 - 158/89)  RR: 20 (11 Mar 2018 07:10) (16 - 20)  SpO2: 96% (11 Mar 2018 07:10) (96% - 98%)      GENERAL: NAD, well-groomed, well-developed  	HEENT: NCAT, EOMI, PERRLA, conjunctiva and sclera clear; DRY ORAL mucous membranes,   	NECK: Supple, No Supraclavicular/submandibular lymphadenopathy;   	NEUROLOGY:  Alert & Oriented X3, Good concentration; Motor Strength 5/5 B/L upper and lower extremities; =  	PULMONLOGY: CTAB; No rales, rhonchi, wheezing, or rubs  	CARDIOLOGY: S1, S2;  Regular rate and rhythm; No murmurs, rubs, or gallops  	GASTROENTEROLOGY: Soft, Nontender, Nondistended; Bowel sounds present;   COLOSTOMY BAG PRESENT IN LOWER ABDOMEN;  STOOL IS BROWN, watery;    	EXTREMITIES:  2+ Peripheral Pulses DP, No clubbing, cyanosis, or edema;  ULCER B/W 4TH AND 5TH TOES, MEDIAL 4TH TOES,  BY PAD;     DERMATOLOGY:  SKIN WARM, DRY;  same SACRAL ULCERS PRESENT ON ADMISSION, covered by packing and       wound vac;      LABS:   CBC Full  -  ( 10 Mar 2018 17:15 )  WBC Count : 7.4 K/uL  Hemoglobin : 11.3 g/dL  Hematocrit : 36.7 %  Platelet Count - Automated : 281 K/uL  Mean Cell Volume : 92.4 fl  Mean Cell Hemoglobin : 28.5 pg  Mean Cell Hemoglobin Concentration : 30.8 g/dL  Auto Neutrophil # : 5.3 K/uL  Auto Lymphocyte # : 1.0 K/uL  Auto Monocyte # : 0.7 K/uL  Auto Eosinophil # : 0.4 K/uL  Auto Basophil # : 0.0 K/uL  Auto Neutrophil % : 71.7 %  Auto Lymphocyte % : 13.1 %  Auto Monocyte % : 9.0 %  Auto Eosinophil % : 5.4 %  Auto Basophil % : 0.3 %               11.3   7.4   )-----------( 281      ( 10 Mar 2018 17:15 )             36.7     03-10    140  |  99  |  22.0<H>  ----------------------------<  145<H>  4.4   |  28.0  |  0.43<L>    Ca    9.5      10 Mar 2018 17:15    TPro  7.0  /  Alb  2.9<L>  /  TBili  0.2<L>  /  DBili  x   /  AST  24  /  ALT  19  /  AlkPhos  87  03-10    LIVER FUNCTIONS - ( 10 Mar 2018 17:15 )  Alb: 2.9 g/dL / Pro: 7.0 g/dL / ALK PHOS: 87 U/L / ALT: 19 U/L / AST: 24 U/L / GGT: x             Urinalysis Basic - ( 10 Mar 2018 19:16 )    Color: Yellow / Appearance: Clear / S.005 / pH: x  Gluc: x / Ketone: Negative  / Bili: Negative / Urobili: Negative mg/dL   Blood: x / Protein: 15 mg/dL / Nitrite: Positive   Leuk Esterase: Moderate / RBC: 0-2 /HPF / WBC 3-5   Sq Epi: x / Non Sq Epi: Occasional / Bacteria: Few      Albumin, Serum: 2.9 g/dL (03-10 @ 17:15)    LIVER FUNCTIONS - ( 10 Mar 2018 17:15 )  Alb: 2.9 g/dL / Pro: 7.0 g/dL / ALK PHOS: 87 U/L / ALT: 19 U/L / AST: 24 U/L / GGT: x Patient is a 62y old  Male who presents with a chief complaint of fevers (10 Mar 2018 23:26)      INTERVAL HPI/OVERNIGHT EVENTS:  Patient seen and examined at bedside. No acute events overnight. Patient reports he is upset and wants to leave. He mouths words, with his trach in place. Difficult to understand him at times for this reason. He is unable to communicate via writing due to hx hand weakness. Pt is unhappy with the staff and wants to go back to the NH, d/w him in regards to the current plan of care and need to rule out infectious source prior to sending him back. He understands this but remains upset about being in the hospital. Called his wife and d/w her via the phone in regards to the plan of care which she is in agreement for and reports that the pt can become very agitated and stubborn in a new environment. Patient denies chest pain, SOB, abd pain, N/V, fever, chills, dysuria or any other complaints. All remainder ROS negative.     HEMATOLOGIC:  aspirin  chewable 81 milliGRAM(s) Oral daily  heparin  Injectable 5000 Unit(s) SubCutaneous every 8 hours    ENDO/METABOLIC:  dextrose 50% Injectable 12.5 Gram(s) IV Push once  dextrose 50% Injectable 25 Gram(s) IV Push once  dextrose 50% Injectable 25 Gram(s) IV Push once  glucagon  Injectable 1 milliGRAM(s) IntraMuscular once PRN  insulin glargine Injectable (LANTUS) 10 Unit(s) SubCutaneous at bedtime  insulin regular  human corrective regimen sliding scale   SubCutaneous every 6 hours    IV FLUID/NUTRITION:  ascorbic acid 500 milliGRAM(s) Oral daily  dextrose 5%. 1000 milliLiter(s) IV Continuous <Continuous>  multivitamin   Solution 5 milliLiter(s) Oral daily    Vital Signs Last 24 Hrs  T(C): 36.9 (11 Mar 2018 07:10), Max: 37.9 (11 Mar 2018 03:53)  T(F): 98.5 (11 Mar 2018 07:10), Max: 100.2 (11 Mar 2018 03:53)  HR: 110 (11 Mar 2018 07:10) (83 - 112)  BP: 125/73 (11 Mar 2018 07:10) (125/73 - 158/89)  RR: 20 (11 Mar 2018 07:10) (16 - 20)  SpO2: 96% (11 Mar 2018 07:10) (96% - 98%)        REVIEW OF SYSTEMS:  No further findings on ros;        PHYSICAL EXAM:  Vital Signs Last 24 Hrs  T(C): 36.9 (11 Mar 2018 07:10), Max: 37.9 (11 Mar 2018 03:53)  T(F): 98.5 (11 Mar 2018 07:10), Max: 100.2 (11 Mar 2018 03:53)  HR: 110 (11 Mar 2018 07:10) (83 - 112)  BP: 125/73 (11 Mar 2018 07:10) (125/73 - 158/89)  RR: 20 (11 Mar 2018 07:10) (16 - 20)  SpO2: 96% (11 Mar 2018 07:10) (96% - 98%)      GENERAL: NAD, well-groomed, well-developed  	HEENT: NCAT, EOMI, PERRLA, conjunctiva and sclera clear; dry mucous membranes, trach collar in place no secretions noted   	NECK: Supple, No Supraclavicular/submandibular lymphadenopathy;   	NEUROLOGY:  Alert & Oriented x 3   	PULMONLOGY: CTA b/l No rales, rhonchi, wheezing, or rubs  	CARDIOLOGY: S1, S2;  sinus tachycardia, No murmurs, rubs, or gallops  	GASTROENTEROLOGY: Soft, Nontender, Nondistended; Bowel sounds present;   COLOSTOMY BAG PRESENT IN LOWER ABDOMEN;  STOOL IS BROWN, watery;    	EXTREMITIES:  2+ Peripheral Pulses DP, No clubbing, cyanosis, or edema;  ULCER B/W 4TH AND 5TH TOES, MEDIAL 4TH TOES,  BY PAD;     Skin: sacral ulcer with wound vac in place, leaking       LABS:   CBC Full  -  ( 10 Mar 2018 17:15 )  WBC Count : 7.4 K/uL  Hemoglobin : 11.3 g/dL  Hematocrit : 36.7 %  Platelet Count - Automated : 281 K/uL  Mean Cell Volume : 92.4 fl  Mean Cell Hemoglobin : 28.5 pg  Mean Cell Hemoglobin Concentration : 30.8 g/dL  Auto Neutrophil # : 5.3 K/uL  Auto Lymphocyte # : 1.0 K/uL  Auto Monocyte # : 0.7 K/uL  Auto Eosinophil # : 0.4 K/uL  Auto Basophil # : 0.0 K/uL  Auto Neutrophil % : 71.7 %  Auto Lymphocyte % : 13.1 %  Auto Monocyte % : 9.0 %  Auto Eosinophil % : 5.4 %  Auto Basophil % : 0.3 %               11.3   7.4   )-----------( 281      ( 10 Mar 2018 17:15 )             36.7     03-10    140  |  99  |  22.0<H>  ----------------------------<  145<H>  4.4   |  28.0  |  0.43<L>    Ca    9.5      10 Mar 2018 17:15    TPro  7.0  /  Alb  2.9<L>  /  TBili  0.2<L>  /  DBili  x   /  AST  24  /  ALT  19  /  AlkPhos  87  03-10    LIVER FUNCTIONS - ( 10 Mar 2018 17:15 )  Alb: 2.9 g/dL / Pro: 7.0 g/dL / ALK PHOS: 87 U/L / ALT: 19 U/L / AST: 24 U/L / GGT: x             Urinalysis Basic - ( 10 Mar 2018 19:16 )    Color: Yellow / Appearance: Clear / S.005 / pH: x  Gluc: x / Ketone: Negative  / Bili: Negative / Urobili: Negative mg/dL   Blood: x / Protein: 15 mg/dL / Nitrite: Positive   Leuk Esterase: Moderate / RBC: 0-2 /HPF / WBC 3-5   Sq Epi: x / Non Sq Epi: Occasional / Bacteria: Few      Albumin, Serum: 2.9 g/dL (03-10 @ 17:15)    LIVER FUNCTIONS - ( 10 Mar 2018 17:15 )  Alb: 2.9 g/dL / Pro: 7.0 g/dL / ALK PHOS: 87 U/L / ALT: 19 U/L / AST: 24 U/L / GGT: x           Imaging studies"  no new studies

## 2018-03-11 NOTE — PROGRESS NOTE ADULT - PROBLEM SELECTOR PLAN 1
Patient afebrile this morning;    Patient ultimately tested negative for cdiff;  Oral Vancomycin discontinued;  ID consult note appreciated, pending f/u of cultures; -unclear etiology at this time, tx for UTI with rocephin finished at NH on 3/11 and ongoing tx for C diff to be completed on 3/14  -pt afebrile   -episode of hypothermia   -no leukocytosis  -slight elevation in procalcitonin   -f/u Bcx   -Ua with no pyuria in setting of chronic beltrán no evidence of UTI but could have been partially tx unclear at this time   -beltrán catheter changed in the ER   -will remain off of abx at this time  -f/u esr/crp   -sacral wound with vac in place d/w NH and RNs change wound vac q 2 days and wound is healing well, unclear who is the surgeon who performed the debridement. Will obtain further information via facility in the am.   -c/w florastor 250mg po bid

## 2018-03-11 NOTE — PROGRESS NOTE ADULT - PROBLEM SELECTOR PLAN 3
Pending A1c, HISS;  Restart home regimen of Lantus  lantus 15 units sq at bed, sliding scale; Known sacral ulcer, covered by wound vac;    pending wound care consult;  -d/w RN at the [facility] and wound vac is being changed by nursing q2 days, and wound healing well   -will c/w wound care

## 2018-03-11 NOTE — CONSULT NOTE ADULT - ASSESSMENT
63 y/o M with h/o Transverse myelitis, Trach, PEG, NH resident here with episode of fever at nursing facility

## 2018-03-11 NOTE — PROGRESS NOTE ADULT - PROBLEM SELECTOR PLAN 2
Restart patient on ophthalmic eye care with refresh lacrilube (white petroleum mieral oil);  dry eye syndrome Patient has a known recent history of cdiff infection;  from february - march;  patient was on a regimen of metronidazole 500mg [id number]  3/5-3/14  cdiff toxin testing is negative   -will finish course

## 2018-03-11 NOTE — CONSULT NOTE ADULT - SUBJECTIVE AND OBJECTIVE BOX
[facility]  INFECTIOUS DISEASES AND INTERNAL MEDICINE at [place]  =======================================================  Shantanu Garcia MD  Diplomates American Board of Internal Medicine and Infectious Diseases  =======================================================      [facility]-[id number]  [facility]   History obtained from the chart, minimal communication     CC: Patient is a 62y old  Male who presents with a chief complaint of fevers (10 Mar 2018 23:26)    63y/o  Male with h/o Transverse myelitis, Trach, PEG, NH resident. Patient was sent in from NH with episode of fever and to r/o C diff per the chart. In the ER patient was afebrile, no leukocytosis, CT A/P with no infectious findings. Patient was started on PO Vancomycin for C diff. C diff is neagtive so PO Vancomycin discontinued. ID input requested.       Past Medical & Surgical Hx:  Essential hypertension  Paralysis  Transverse myelitis  History of tracheostomy      Social Hx:  Unable to obtain, No meaningful communication       FAMILY HISTORY:  No pertinent family history in first degree relatives      Allergies  No Known Allergies      Antibiotics:  vancomycin    Solution 500 milliGRAM(s) Oral every 6 hours       REVIEW OF SYSTEMS:  Unable to obtain due to patient's medical condition      Physical Exam:  Vital Signs Last 24 Hrs  T(C): 36.9 (11 Mar 2018 07:10), Max: 37.9 (11 Mar 2018 03:53)  T(F): 98.5 (11 Mar 2018 07:10), Max: 100.2 (11 Mar 2018 03:53)  HR: 110 (11 Mar 2018 07:10) (83 - 112)  BP: 125/73 (11 Mar 2018 07:10) (125/73 - 158/89)  RR: 20 (11 Mar 2018 07:10) (16 - 20)  SpO2: 96% (11 Mar 2018 07:10) (96% - 98%)  Height (cm): 175.26 (03-10 @ 15:15)  Weight (kg): 74.8 (03-10 @ 15:15)  BMI (kg/m2): 24.4 (03-10 @ 15:15)  BSA (m2): 1.9 (03-10 @ 15:15)      GEN: NAD, lethargic   HEENT: normocephalic and atraumatic.   NECK: Supple.  LUNGS: Clear to auscultation.  HEART: Regular rate and rhythm with murmur.  ABDOMEN: Soft, nontender, and nondistended.  Positive bowel sounds.  + Colostomy  : No CVA tenderness  EXTREMITIES: Without any edema.  MSK: No joint swelling  NEUROLOGIC: no meaningful communication  PSYCHIATRIC: no meaningful communication  SKIN: Decub ulcer with wound vac      Labs:  03-10    140  |  99  |  22.0<H>  ----------------------------<  145<H>  4.4   |  28.0  |  0.43<L>    Ca    9.5      10 Mar 2018 17:15    TPro  7.0  /  Alb  2.9<L>  /  TBili  0.2<L>  /  DBili  x   /  AST  24  /  ALT  19  /  AlkPhos  87  03-10                          11.3   7.4   )-----------( 281      ( 10 Mar 2018 17:15 )             36.7     Urinalysis Basic - ( 10 Mar 2018 19:16 )    Color: Yellow / Appearance: Clear / S.005 / pH: x  Gluc: x / Ketone: Negative  / Bili: Negative / Urobili: Negative mg/dL   Blood: x / Protein: 15 mg/dL / Nitrite: Positive   Leuk Esterase: Moderate / RBC: 0-2 /HPF / WBC 3-5   Sq Epi: x / Non Sq Epi: Occasional / Bacteria: Few      LIVER FUNCTIONS - ( 10 Mar 2018 17:15 )  Alb: 2.9 g/dL / Pro: 7.0 g/dL / ALK PHOS: 87 U/L / ALT: 19 U/L / AST: 24 U/L / GGT: x             Procalcitonin, Serum (03.10.18 @ 22:17)    Procalcitonin, Serum: 0.28: Procalcitonin (PCT) Interpretation (ng/mL) - Diagnosis of systemic  bacterial infection/sepsis  PCT < 0.5: Systemic infection (sepsis) is not likely and risk for  progression to severe systemic infection is low. Local bacterial  infection is possible. If early sepsis is suspected clinically, PCT  should be re-assessed in 6-24 hours.  PCT >/= 0.5 but < 2.0: Systemic infection (sepsis) is possible, but other  conditions are known to elevate PCT as well. Moderate risk for  progression to severe systemic infection. The patient should be closely  monitored both clinically and by re-assessing PCT within 6-24 hours.  PCT >/= 2.0 but < 10.0: Systemic infection (sepsis) is likely, unless  other causes are known. High risk of progression to severe systemic  infection (severe sepsis/septic shock).  PCT >/= 10.0: Important systemic inflammatory response, almost  exclusively due to severe bacterial sepsis or septic shock. High  likelihood of severe sepsis or septic shock. ng/mL          EXAM:  CT ABDOMEN AND PELVIS IC                        PROCEDURE DATE:  03/10/2018    INTERPRETATION:  CT ABDOMEN AND PELVIS WITH CONTRAST  INDICATION: . Fever and diarrhea.  TECHNIQUE: Contrast enhanced CT of the abdomen and pelvis.   90 mL of Omnipaque 350 contrast material was injected IV.  COMPARISON: None.  FINDINGS:  Lower Thorax: No consolidation or effusion. Bibasilar atelectasis.  Liver: No suspicious lesions.      Biliary: No dilatation.      Spleen: No suspicious lesions.      Pancreas: No inflammatory changes or ductal dilatation.      Adrenals: Normal.      Kidneys: No hydronephrosis or solid mass.      Vessels: Normal caliber.      GI tract: No evidence of small bowel obstruction. No wall thickening or  inflammatory changes. Percutaneous gastrostomy tube noted normal appendix.  Peritoneum/retroperitoneum and mesentery: No free air. No organized fluid   collection. No adenopathy.      Pelvic organs/Bladder: No pelvic masses. Iniguez catheter noted in the bladder.  Abdominal wall: Unremarkable.  Bones and soft tissues: No destructive lesion. Heterotopic ossification   seen around the left hip.  IMPRESSION:  No specific findings to account for the patient's symptoms.

## 2018-03-11 NOTE — PROGRESS NOTE ADULT - ASSESSMENT
61y/o male with pmh of PMH Transverse myelitis, DM2, HTN, Paraplegia, trach/peg, colostomy coming from [facility] presents with fever for 3 days. 63 y/o male with hx of transverse myelitis w/ Paraplegia, trach/peg, colostomy in place, recently with fevers at the NH and empirically tx with rocephin and finished course on 3/11/18 for presumed uti also on flagyl for c. diff to finish course on 3/14/18, currently p/w NH for recurrent fevers with tmax: 101. In the ER pt noted to be tachycardic, hypothermic, unclear etiology of prior fevers at the NH. Pt with no leukocytosis, slight elevation in procalcitonin and ID consulted. Recommended to remain off of additional abx at this time and continue to monitor the patient. CT A/P with no infectious findings, no respiratory complaints and cxr negative for any findings. UA from [place] with no pyuria to suggest UTI, beltrán catheter was changed in the er. Repeat C. Diff negative. Awaiting bcx results, could be partially tx infection due to outpt abx but unclear source at this time, sacral region with wound vac in place.

## 2018-03-11 NOTE — PROGRESS NOTE ADULT - PROBLEM SELECTOR PLAN 5
Patient has a known recent history of cdiff infection;  from february - march;  patient was on a regimen of metronidazole 500mg [id number]  3/5-3/14  cdiff toxin testing is negative on this admission; HBA1c: 5,6   -fs stable  -pt takes lantus 10 u sq qhs at nh for now will remain off of lantus   -c/w HSS  -c/w accuchecks ACHS TID   -c/w hypoglycemia protocol

## 2018-03-11 NOTE — PROGRESS NOTE ADULT - PROBLEM SELECTOR PLAN 6
Known sacral ulcer, covered by wound vac;    pending wound care consult;  surgical wound eval pending; -bp stable  -not on any medications

## 2018-03-12 ENCOUNTER — TRANSCRIPTION ENCOUNTER (OUTPATIENT)
Age: 63
End: 2018-03-12

## 2018-03-12 LAB
ANION GAP SERPL CALC-SCNC: 13 MMOL/L — SIGNIFICANT CHANGE UP (ref 5–17)
BASOPHILS # BLD AUTO: 0 K/UL — SIGNIFICANT CHANGE UP (ref 0–0.2)
BASOPHILS NFR BLD AUTO: 0.2 % — SIGNIFICANT CHANGE UP (ref 0–2)
BUN SERPL-MCNC: 17 MG/DL — SIGNIFICANT CHANGE UP (ref 8–20)
CALCIUM SERPL-MCNC: 9.2 MG/DL — SIGNIFICANT CHANGE UP (ref 8.6–10.2)
CHLORIDE SERPL-SCNC: 100 MMOL/L — SIGNIFICANT CHANGE UP (ref 98–107)
CO2 SERPL-SCNC: 28 MMOL/L — SIGNIFICANT CHANGE UP (ref 22–29)
CREAT SERPL-MCNC: 0.53 MG/DL — SIGNIFICANT CHANGE UP (ref 0.5–1.3)
EOSINOPHIL # BLD AUTO: 0.5 K/UL — SIGNIFICANT CHANGE UP (ref 0–0.5)
EOSINOPHIL NFR BLD AUTO: 6.4 % — HIGH (ref 0–5)
GLUCOSE BLDC GLUCOMTR-MCNC: 166 MG/DL — HIGH (ref 70–99)
GLUCOSE BLDC GLUCOMTR-MCNC: 176 MG/DL — HIGH (ref 70–99)
GLUCOSE BLDC GLUCOMTR-MCNC: 204 MG/DL — HIGH (ref 70–99)
GLUCOSE BLDC GLUCOMTR-MCNC: 214 MG/DL — HIGH (ref 70–99)
GLUCOSE BLDC GLUCOMTR-MCNC: 286 MG/DL — HIGH (ref 70–99)
GLUCOSE SERPL-MCNC: 184 MG/DL — HIGH (ref 70–115)
HCT VFR BLD CALC: 34.5 % — LOW (ref 42–52)
HGB BLD-MCNC: 10.5 G/DL — LOW (ref 14–18)
LYMPHOCYTES # BLD AUTO: 1.2 K/UL — SIGNIFICANT CHANGE UP (ref 1–4.8)
LYMPHOCYTES # BLD AUTO: 15.1 % — LOW (ref 20–55)
MAGNESIUM SERPL-MCNC: 1.7 MG/DL — SIGNIFICANT CHANGE UP (ref 1.6–2.6)
MCHC RBC-ENTMCNC: 28.2 PG — SIGNIFICANT CHANGE UP (ref 27–31)
MCHC RBC-ENTMCNC: 30.4 G/DL — LOW (ref 32–36)
MCV RBC AUTO: 92.7 FL — SIGNIFICANT CHANGE UP (ref 80–94)
MONOCYTES # BLD AUTO: 0.6 K/UL — SIGNIFICANT CHANGE UP (ref 0–0.8)
MONOCYTES NFR BLD AUTO: 7.7 % — SIGNIFICANT CHANGE UP (ref 3–10)
NEUTROPHILS # BLD AUTO: 5.6 K/UL — SIGNIFICANT CHANGE UP (ref 1.8–8)
NEUTROPHILS NFR BLD AUTO: 70.2 % — SIGNIFICANT CHANGE UP (ref 37–73)
PHOSPHATE SERPL-MCNC: 3.7 MG/DL — SIGNIFICANT CHANGE UP (ref 2.4–4.7)
PLATELET # BLD AUTO: 358 K/UL — SIGNIFICANT CHANGE UP (ref 150–400)
POTASSIUM SERPL-MCNC: 4.4 MMOL/L — SIGNIFICANT CHANGE UP (ref 3.5–5.3)
POTASSIUM SERPL-SCNC: 4.4 MMOL/L — SIGNIFICANT CHANGE UP (ref 3.5–5.3)
PROCALCITONIN SERPL-MCNC: 0.14 NG/ML — HIGH (ref 0–0.04)
RBC # BLD: 3.72 M/UL — LOW (ref 4.6–6.2)
RBC # FLD: 16.4 % — HIGH (ref 11–15.6)
SODIUM SERPL-SCNC: 141 MMOL/L — SIGNIFICANT CHANGE UP (ref 135–145)
WBC # BLD: 8 K/UL — SIGNIFICANT CHANGE UP (ref 4.8–10.8)
WBC # FLD AUTO: 8 K/UL — SIGNIFICANT CHANGE UP (ref 4.8–10.8)

## 2018-03-12 PROCEDURE — 99232 SBSQ HOSP IP/OBS MODERATE 35: CPT

## 2018-03-12 PROCEDURE — 99233 SBSQ HOSP IP/OBS HIGH 50: CPT | Mod: GC

## 2018-03-12 RX ORDER — ACETAMINOPHEN 500 MG
650 TABLET ORAL ONCE
Qty: 0 | Refills: 0 | Status: COMPLETED | OUTPATIENT
Start: 2018-03-12 | End: 2018-03-12

## 2018-03-12 RX ORDER — INSULIN GLARGINE 100 [IU]/ML
5 INJECTION, SOLUTION SUBCUTANEOUS AT BEDTIME
Qty: 0 | Refills: 0 | Status: DISCONTINUED | OUTPATIENT
Start: 2018-03-12 | End: 2018-03-15

## 2018-03-12 RX ADMIN — Medication 0.5 MILLIGRAM(S): at 17:54

## 2018-03-12 RX ADMIN — INSULIN HUMAN 1: 100 INJECTION, SOLUTION SUBCUTANEOUS at 00:17

## 2018-03-12 RX ADMIN — INSULIN HUMAN 2: 100 INJECTION, SOLUTION SUBCUTANEOUS at 05:48

## 2018-03-12 RX ADMIN — PANTOPRAZOLE SODIUM 40 MILLIGRAM(S): 20 TABLET, DELAYED RELEASE ORAL at 08:47

## 2018-03-12 RX ADMIN — Medication 0.5 MILLIGRAM(S): at 08:47

## 2018-03-12 RX ADMIN — Medication 500 MILLIGRAM(S): at 05:45

## 2018-03-12 RX ADMIN — Medication 81 MILLIGRAM(S): at 08:47

## 2018-03-12 RX ADMIN — Medication 10 MILLIGRAM(S): at 05:45

## 2018-03-12 RX ADMIN — HEPARIN SODIUM 5000 UNIT(S): 5000 INJECTION INTRAVENOUS; SUBCUTANEOUS at 23:41

## 2018-03-12 RX ADMIN — Medication 650 MILLIGRAM(S): at 09:17

## 2018-03-12 RX ADMIN — OXYCODONE HYDROCHLORIDE 10 MILLIGRAM(S): 5 TABLET ORAL at 04:17

## 2018-03-12 RX ADMIN — Medication 650 MILLIGRAM(S): at 08:47

## 2018-03-12 RX ADMIN — INSULIN HUMAN 1: 100 INJECTION, SOLUTION SUBCUTANEOUS at 17:55

## 2018-03-12 RX ADMIN — Medication 500 MILLIGRAM(S): at 08:47

## 2018-03-12 RX ADMIN — HEPARIN SODIUM 5000 UNIT(S): 5000 INJECTION INTRAVENOUS; SUBCUTANEOUS at 05:45

## 2018-03-12 RX ADMIN — Medication 5 MILLILITER(S): at 17:54

## 2018-03-12 RX ADMIN — OXYCODONE HYDROCHLORIDE 10 MILLIGRAM(S): 5 TABLET ORAL at 09:17

## 2018-03-12 RX ADMIN — OXYCODONE HYDROCHLORIDE 10 MILLIGRAM(S): 5 TABLET ORAL at 08:47

## 2018-03-12 RX ADMIN — OXYCODONE HYDROCHLORIDE 10 MILLIGRAM(S): 5 TABLET ORAL at 17:54

## 2018-03-12 RX ADMIN — CLOPIDOGREL BISULFATE 75 MILLIGRAM(S): 75 TABLET, FILM COATED ORAL at 08:47

## 2018-03-12 RX ADMIN — INSULIN HUMAN 3: 100 INJECTION, SOLUTION SUBCUTANEOUS at 23:42

## 2018-03-12 RX ADMIN — INSULIN GLARGINE 5 UNIT(S): 100 INJECTION, SOLUTION SUBCUTANEOUS at 23:41

## 2018-03-12 RX ADMIN — OXYCODONE HYDROCHLORIDE 10 MILLIGRAM(S): 5 TABLET ORAL at 02:06

## 2018-03-12 RX ADMIN — Medication 10 MILLIGRAM(S): at 17:54

## 2018-03-12 NOTE — SPEECH LANGUAGE PATHOLOGY EVALUATION - DESCRIBE:
pt unable to effectively use upper extremities therefore used head, oral, and eye movements to achieve this score

## 2018-03-12 NOTE — PROGRESS NOTE ADULT - PROBLEM SELECTOR PLAN 4
-Known Transverse Myelitis for last 7months;    -pt with noted sequela of trach/ peg/ colostomy in place  -c/w supportive care   -c/w baclofen  -c/w oxycdone prn for chronic pain   -c/w glucerna peg feeding to goal

## 2018-03-12 NOTE — DISCHARGE NOTE ADULT - CARE PROVIDER_API CALL
Daryl Bronson (JOSE L), Geriatric Medicine; Internal Medicine  [address] [place] [address]  Phone: [phone]  Fax: [phone]

## 2018-03-12 NOTE — PROGRESS NOTE ADULT - SUBJECTIVE AND OBJECTIVE BOX
[place]    Patient is a 62y old  Male who presents with a cc of fevers (10 Mar 2018 23:26)      INTERVAL HPI/OVERNIGHT EVENTS:  Patient seen and examined at bedside. No acute events overnight.    Communicate with patient by head nods and deciphering his mouthed words.    Patient is stooling, voiding with no difficulty.  This morning he reports HA and neck pain.    Neck pain he reports is not positional, not affected by raising incline of head up or down.    He reports he is not hungry, tolerating his feeds with no difficulty;   reports that his mouth is no longer dry.         Patient denies chest pain, SOB, abd pain, N/V, fever, dysuria or any other complaints.    TELE- Patient is not on tele/;      MEDICATIONS  (STANDING):  ascorbic acid 500 milliGRAM(s) Oral daily  aspirin  chewable 81 milliGRAM(s) Oral daily  baclofen 10 milliGRAM(s) Oral two times a day  clopidogrel Tablet 75 milliGRAM(s) Oral daily  dextrose 5%. 1000 milliLiter(s) (50 mL/Hr) IV Continuous <Continuous>  dextrose 50% Injectable 12.5 Gram(s) IV Push once  dextrose 50% Injectable 25 Gram(s) IV Push once  dextrose 50% Injectable 25 Gram(s) IV Push once  heparin  Injectable 5000 Unit(s) SubCutaneous every 8 hours  insulin regular  human corrective regimen sliding scale   SubCutaneous every 6 hours  metroNIDAZOLE    Tablet 500 milliGRAM(s) Oral every 8 hours  multivitamin   Solution 5 milliLiter(s) Oral daily  pantoprazole   Suspension 40 milliGRAM(s) Enteral Tube before breakfast  petrolatum Ophthalmic Ointment 1 Application(s) Both EYES at bedtime    MEDICATIONS  (PRN):  ALPRAZolam 0.5 milliGRAM(s) Oral three times a day PRN anxiety  glucagon  Injectable 1 milliGRAM(s) IntraMuscular once PRN Glucose <70 milliGRAM(s)/deciLiter  oxyCODONE    IR 10 milliGRAM(s) Oral every 6 hours PRN Moderate Pain (4 - 6)      Vital Signs Last 24 Hrs  Vital Signs Last 24 Hrs  T(C): 36.6 (12 Mar 2018 05:43), Max: 37.1 (11 Mar 2018 15:49)  T(F): 97.8 (12 Mar 2018 05:43), Max: 98.8 (11 Mar 2018 15:49)    NORMOTENSIVE  HR: 102 (12 Mar 2018 05:43) (101 - 110)                                  MILDLY TACHYCARDIC  BP: 158/97 (12 Mar 2018 05:43) (125/73 - 158/97)                     HYPERTENSIVE  RR: 18 (12 Mar 2018 05:43) (18 - 21)  SpO2: 98% (12 Mar 2018 05:43) (96% - 99%)      REVIEW OF SYSTEMS:  No further findings on ros;        PHYSICAL EXAM:  Vital Signs Last 24 Hrs  T(C): 36.9 (11 Mar 2018 07:10), Max: 37.9 (11 Mar 2018 03:53)  T(F): 98.5 (11 Mar 2018 07:10), Max: 100.2 (11 Mar 2018 03:53)  HR: 110 (11 Mar 2018 07:10) (83 - 112)  BP: 125/73 (11 Mar 2018 07:10) (125/73 - 158/89)  RR: 20 (11 Mar 2018 07:10) (16 - 20)  SpO2: 96% (11 Mar 2018 07:10) (96% - 98%)      GENERAL: NAD, well-groomed, well-developed  	HEENT: NCAT, EOMI, PERRLA, 3mm b/l;  conjunctiva and sclera clear; moist mucous membranes, no thrush; trach collar in place no secretions   	NECK: Trach is in place with humidified air;  Neck Supple, No Supraclavicular/submandibular lymphadenopathy;   	NEUROLOGY:  Alert & Oriented x 3;  paralyzed in b/l lower ext, and left upper ext;      Sensation inact in face and b/l upper ext to light touch, mild deficits in b/l thigh/legs, complete loss of sensation in the dorsum of b/l feet;       CN2-12 grossly intact;     	PULMONLOGY: CTAB, No rales, rhonchi, wheezing, or rubs  	CARDIOLOGY: S1, S2;  sinus tachycardia, No murmurs, rubs, or gallops  	GASTROENTEROLOGY: Soft, Nontender, Nondistended; Bowel sounds present;   COLOSTOMY BAG PRESENT IN LOWER ABDOMEN;  stool watery brown;      :  Beltrán in place;  Urine is amaya coored; not clouded;       EXTREMITIES:  2+ Peripheral Pulses DP, No clubbing, cyanosis, or edema;  ULCER B/W 4TH AND 5TH TOES,      SKIN: sacral ulcer visualized, 6x3cm, circular, stage 3 ulcer; freshly cleaned yesterday; tissue pink;              Erythematous patch in the Right Chest, 8x4cm (patient reports is a chronic rash), nontender;        LABS:    CBC Full  -  ( 10 Mar 2018 17:15 )  WBC Count : 7.4 K/uL  Hemoglobin : 11.3 g/dL                 NORMOCYTIC ANEMIA  Hematocrit : 36.7 %  Platelet Count - Automated : 281 K/uL  Mean Cell Volume : 92.4 fl  Mean Cell Hemoglobin : 28.5 pg  Mean Cell Hemoglobin Concentration : 30.8 g/dL  Auto Neutrophil # : 5.3 K/uL  Auto Lymphocyte # : 1.0 K/uL  Auto Monocyte # : 0.7 K/uL  Auto Eosinophil # : 0.4 K/uL  Auto Basophil # : 0.0 K/uL  Auto Neutrophil % : 71.7 %  Auto Lymphocyte % : 13.1 %  Auto Monocyte % : 9.0 %  Auto Eosinophil % : 5.4 %  Auto Basophil % : 0.3 %                            11.3   7.4   )-----------( 281      ( 10 Mar 2018 17:15 )             36.7     03-10    140  |  99  |  22.0<H>  ----------------------------<  145<H>  4.4   |  28.0  |  0.43<L>    Ca    9.5      10 Mar 2018 17:15    TPro  7.0  /  Alb  2.9<L>  /  TBili  0.2<L>  /  DBili  x   /  AST  24  /  ALT  19  /  AlkPhos  87  03-10    LIVER FUNCTIONS - ( 10 Mar 2018 17:15 )  Alb: 2.9 g/dL / Pro: 7.0 g/dL / ALK PHOS: 87 U/L / ALT: 19 U/L / AST: 24 U/L / GGT: x             Urinalysis Basic - ( 10 Mar 2018 19:16 )    Color: Yellow / Appearance: Clear / S.005 / pH: x  Gluc: x / Ketone: Negative  / Bili: Negative / Urobili: Negative mg/dL   Blood: x / Protein: 15 mg/dL / Nitrite: Positive   Leuk Esterase: Moderate / RBC: 0-2 /HPF / WBC 3-5   Sq Epi: x / Non Sq Epi: Occasional / Bacteria: Few        MICROBIOLOGY/CULTURES:  Culture Results:   50,000 - 99,000 CFU/mL Gram Negative Rods Identification and      BACTERIURIA in setting of Chronic beltrán, replaced on presentation to hospital  susceptibility to follow. (03-10 @ 19:16)      RADIOLOGY RESULTS:     CT Abdomen  GI tract: No evidence of small bowel obstruction. No wall thickening or   inflammatory changes. Percutaneous gastrostomy tube noted normal appendix.    Peritoneum/retroperitoneum and mesentery: No free air. No organized fluid   collection. No adenopathy.        Pelvic organs/Bladder: No pelvic masses. Beltrán catheter noted in the   bladder.    Abdominal wall: Unremarkable.  Bones and soft tissues: No destructive lesion. Heterotopic ossification   seen around the left hip. [place]    Patient is a 62y old  Male who presents with a cc of fevers (10 Mar 2018 23:26)      INTERVAL HPI/OVERNIGHT EVENTS:  Patient seen and examined at bedside. No acute events overnight.    Communicate with patient by head nods and deciphering his mouthed words.    Patient is stooling, voiding with no difficulty.  This morning he reports HA and neck pain.    Neck pain he reports is not positional, not affected by raising incline of head up or down.    He reports he is not hungry, tolerating his feeds with no difficulty;   reports that his mouth is no longer dry.         Patient denies chest pain, SOB, abd pain, N/V, fever, dysuria or any other complaints.    TELE- Patient is not on tele/;      MEDICATIONS  (STANDING):  ascorbic acid 500 milliGRAM(s) Oral daily  aspirin  chewable 81 milliGRAM(s) Oral daily  baclofen 10 milliGRAM(s) Oral two times a day  clopidogrel Tablet 75 milliGRAM(s) Oral daily  dextrose 5%. 1000 milliLiter(s) (50 mL/Hr) IV Continuous <Continuous>  dextrose 50% Injectable 12.5 Gram(s) IV Push once  dextrose 50% Injectable 25 Gram(s) IV Push once  dextrose 50% Injectable 25 Gram(s) IV Push once  heparin  Injectable 5000 Unit(s) SubCutaneous every 8 hours  insulin regular  human corrective regimen sliding scale   SubCutaneous every 6 hours  metroNIDAZOLE    Tablet 500 milliGRAM(s) Oral every 8 hours  multivitamin   Solution 5 milliLiter(s) Oral daily  pantoprazole   Suspension 40 milliGRAM(s) Enteral Tube before breakfast  petrolatum Ophthalmic Ointment 1 Application(s) Both EYES at bedtime    MEDICATIONS  (PRN):  ALPRAZolam 0.5 milliGRAM(s) Oral three times a day PRN anxiety  glucagon  Injectable 1 milliGRAM(s) IntraMuscular once PRN Glucose <70 milliGRAM(s)/deciLiter  oxyCODONE    IR 10 milliGRAM(s) Oral every 6 hours PRN Moderate Pain (4 - 6)      Vital Signs Last 24 Hrs  Vital Signs Last 24 Hrs  T(C): 36.6 (12 Mar 2018 05:43), Max: 37.1 (11 Mar 2018 15:49)  T(F): 97.8 (12 Mar 2018 05:43), Max: 98.8 (11 Mar 2018 15:49)    NORMOTENSIVE  HR: 102 (12 Mar 2018 05:43) (101 - 110)                                  MILDLY TACHYCARDIC  BP: 158/97 (12 Mar 2018 05:43) (125/73 - 158/97)                     HYPERTENSIVE  RR: 18 (12 Mar 2018 05:43) (18 - 21)  SpO2: 98% (12 Mar 2018 05:43) (96% - 99%)      REVIEW OF SYSTEMS:  No further findings on ros;        PHYSICAL EXAM:  Vital Signs Last 24 Hrs  T(C): 36.9 (11 Mar 2018 07:10), Max: 37.9 (11 Mar 2018 03:53)  T(F): 98.5 (11 Mar 2018 07:10), Max: 100.2 (11 Mar 2018 03:53)  HR: 110 (11 Mar 2018 07:10) (83 - 112)  BP: 125/73 (11 Mar 2018 07:10) (125/73 - 158/89)  RR: 20 (11 Mar 2018 07:10) (16 - 20)  SpO2: 96% (11 Mar 2018 07:10) (96% - 98%)      GENERAL: NAD, well-groomed, well-developed  	HEENT: NCAT, EOMI, PERRLA, 3mm b/l;  conjunctiva and sclera clear; moist mucous membranes, no thrush; trach collar in place no secretions   	NECK: Trach is in place with humidified air;  Neck Supple, No Supraclavicular/submandibular lymphadenopathy;   	NEUROLOGY:  Alert & Oriented x 3;  paralyzed in b/l lower ext, and left upper ext;      Sensation inact in face and b/l upper ext to light touch, mild deficits in b/l thigh/legs, complete loss of sensation in the dorsum of b/l feet;       CN2-12 grossly intact;     	PULMONLOGY: CTAB, No rales, rhonchi, wheezing, or rubs  	CARDIOLOGY: S1, S2;  sinus tachycardia, No murmurs, rubs, or gallops  	GASTROENTEROLOGY: Soft, Nontender, Nondistended; Bowel sounds present;   COLOSTOMY BAG PRESENT IN LOWER ABDOMEN;  stool watery brown;      :  Beltrán in place;  Urine is amaya coored; not clouded;       EXTREMITIES:  2+ Peripheral Pulses DP, No clubbing, cyanosis, or edema;  ULCER B/W 4TH AND 5TH TOES,      SKIN: sacral ulcer visualized, 6x3cm, circular, stage 3 ulcer; freshly cleaned yesterday; tissue pink;              Erythematous patch in the Right Chest, 8x4cm (patient reports is a chronic rash), nontender;      I/O  - @ 07:01  -  12 @ 06:57  --------------------------------------------------------  IN: 2220 mL / OUT: 2725 mL / NET: -505 mL      Free Water =250cc every 6 hours    24hour feed settings 1200cc,, Glucerna 12.5 tricia, feed rate 80cc/hr;      weight=74kg  IG=7451=1.5cc/kg/hr    LABS:    CBC Full  -  ( 10 Mar 2018 17:15 )  WBC Count : 7.4 K/uL  Hemoglobin : 11.3 g/dL                 NORMOCYTIC ANEMIA  Hematocrit : 36.7 %  Platelet Count - Automated : 281 K/uL  Mean Cell Volume : 92.4 fl  Mean Cell Hemoglobin : 28.5 pg  Mean Cell Hemoglobin Concentration : 30.8 g/dL  Auto Neutrophil # : 5.3 K/uL  Auto Lymphocyte # : 1.0 K/uL  Auto Monocyte # : 0.7 K/uL  Auto Eosinophil # : 0.4 K/uL  Auto Basophil # : 0.0 K/uL  Auto Neutrophil % : 71.7 %  Auto Lymphocyte % : 13.1 %  Auto Monocyte % : 9.0 %  Auto Eosinophil % : 5.4 %  Auto Basophil % : 0.3 %                            11.3   7.4   )-----------( 281      ( 10 Mar 2018 17:15 )             36.7     03-10    140  |  99  |  22.0<H>  ----------------------------<  145<H>  4.4   |  28.0  |  0.43<L>    Ca    9.5      10 Mar 2018 17:15    TPro  7.0  /  Alb  2.9<L>  /  TBili  0.2<L>  /  DBili  x   /  AST  24  /  ALT  19  /  AlkPhos  87  03-10    LIVER FUNCTIONS - ( 10 Mar 2018 17:15 )  Alb: 2.9 g/dL / Pro: 7.0 g/dL / ALK PHOS: 87 U/L / ALT: 19 U/L / AST: 24 U/L / GGT: x             Urinalysis Basic - ( 10 Mar 2018 19:16 )    Color: Yellow / Appearance: Clear / S.005 / pH: x  Gluc: x / Ketone: Negative  / Bili: Negative / Urobili: Negative mg/dL   Blood: x / Protein: 15 mg/dL / Nitrite: Positive   Leuk Esterase: Moderate / RBC: 0-2 /HPF / WBC 3-5   Sq Epi: x / Non Sq Epi: Occasional / Bacteria: Few        MICROBIOLOGY/CULTURES:  Culture Results:   50,000 - 99,000 CFU/mL Gram Negative Rods Identification and      BACTERIURIA in setting of Chronic beltrán, replaced on presentation to hospital  susceptibility to follow. (03-10 @ 19:16)      RADIOLOGY RESULTS:     CT Abdomen  GI tract: No evidence of small bowel obstruction. No wall thickening or   inflammatory changes. Percutaneous gastrostomy tube noted normal appendix.    Peritoneum/retroperitoneum and mesentery: No free air. No organized fluid   collection. No adenopathy.        Pelvic organs/Bladder: No pelvic masses. Beltrán catheter noted in the   bladder.    Abdominal wall: Unremarkable.  Bones and soft tissues: No destructive lesion. Heterotopic ossification   seen around the left hip. [place]    Patient is a 62y old  Male who presents with a cc of fevers (10 Mar 2018 23:26)      INTERVAL HPI/OVERNIGHT EVENTS:  Patient seen and examined at bedside. No acute events overnight. Communication with patient via by head nods and deciphering his mouthed words.    Patient is stooling, voiding with no difficulty. This morning he reports HA and neck pain.  Neck pain he reports is not positional, not affected by raising incline of head up or down.  He reports he is not hungry, tolerating his feeds with no difficulty; reports that his mouth is no longer dry.  Patient denies chest pain, SOB, abd pain, N/V, fever, dysuria or any other complaints.    TELE- Patient is not on tele/;      MEDICATIONS  (STANDING):  ascorbic acid 500 milliGRAM(s) Oral daily  aspirin  chewable 81 milliGRAM(s) Oral daily  baclofen 10 milliGRAM(s) Oral two times a day  clopidogrel Tablet 75 milliGRAM(s) Oral daily  dextrose 5%. 1000 milliLiter(s) (50 mL/Hr) IV Continuous <Continuous>  dextrose 50% Injectable 12.5 Gram(s) IV Push once  dextrose 50% Injectable 25 Gram(s) IV Push once  dextrose 50% Injectable 25 Gram(s) IV Push once  heparin  Injectable 5000 Unit(s) SubCutaneous every 8 hours  insulin regular  human corrective regimen sliding scale   SubCutaneous every 6 hours  metroNIDAZOLE    Tablet 500 milliGRAM(s) Oral every 8 hours  multivitamin   Solution 5 milliLiter(s) Oral daily  pantoprazole   Suspension 40 milliGRAM(s) Enteral Tube before breakfast  petrolatum Ophthalmic Ointment 1 Application(s) Both EYES at bedtime    MEDICATIONS  (PRN):  ALPRAZolam 0.5 milliGRAM(s) Oral three times a day PRN anxiety  glucagon  Injectable 1 milliGRAM(s) IntraMuscular once PRN Glucose <70 milliGRAM(s)/deciLiter  oxyCODONE    IR 10 milliGRAM(s) Oral every 6 hours PRN Moderate Pain (4 - 6)      Vital Signs Last 24 Hrs  Vital Signs Last 24 Hrs  T(C): 36.6 (12 Mar 2018 05:43), Max: 37.1 (11 Mar 2018 15:49)  T(F): 97.8 (12 Mar 2018 05:43), Max: 98.8 (11 Mar 2018 15:49)    NORMOTENSIVE  HR: 102 (12 Mar 2018 05:43) (101 - 110)                                  MILDLY TACHYCARDIC  BP: 158/97 (12 Mar 2018 05:43) (125/73 - 158/97)                     HYPERTENSIVE  RR: 18 (12 Mar 2018 05:43) (18 - 21)  SpO2: 98% (12 Mar 2018 05:43) (96% - 99%)      REVIEW OF SYSTEMS:  No further findings on ros;        PHYSICAL EXAM:  Vital Signs Last 24 Hrs  T(C): 36.9 (11 Mar 2018 07:10), Max: 37.9 (11 Mar 2018 03:53)  T(F): 98.5 (11 Mar 2018 07:10), Max: 100.2 (11 Mar 2018 03:53)  HR: 110 (11 Mar 2018 07:10) (83 - 112)  BP: 125/73 (11 Mar 2018 07:10) (125/73 - 158/89)  RR: 20 (11 Mar 2018 07:10) (16 - 20)  SpO2: 96% (11 Mar 2018 07:10) (96% - 98%)      GENERAL: NAD, well-groomed, well-developed  	HEENT: NCAT, EOMI, PERRLA, 3mm b/l;  conjunctiva and sclera clear; moist mucous membranes, no thrush; trach collar in place no secretions   	NECK: Trach is in place with humidified air;  Neck Supple, No Supraclavicular/submandibular lymphadenopathy;   	NEUROLOGY:  Alert & Oriented x 3;  paralyzed in b/l lower ext, and left upper ext;      Sensation inact in face and b/l upper ext to light touch, mild deficits in b/l thigh/legs, complete loss of sensation in the dorsum of b/l feet;       CN2-12 grossly intact;   	PULMONLOGY: CTAB, No rales, rhonchi, wheezing, or rubs  	CARDIOLOGY: S1, S2;  sinus tachycardia, No murmurs, rubs, or gallops  	GASTROENTEROLOGY: Soft, Nontender, Nondistended; Bowel sounds present;   COLOSTOMY BAG PRESENT IN LOWER ABDOMEN;  stool watery brown;      :  Beltrán in place;  Urine is amaya coored; not clouded;       EXTREMITIES:  2+ Peripheral Pulses DP, No clubbing, cyanosis, or edema;  ULCER B/W 4TH AND 5TH TOES,      SKIN: sacral ulcer visualized, 6x3cm, circular, stage 3 ulcer; freshly cleaned yesterday; tissue pink;              Erythematous patch in the Right Chest, 8x4cm (patient reports is a chronic rash), nontender;  no evidence of infection noted     I/O   @ 07:01  -  12 @ 06:57  --------------------------------------------------------  IN: 2220 mL / OUT: 2725 mL / NET: -505 mL      Free Water =250cc every 6 hours    24hour feed settings 1200cc,, Glucerna 12.5 tricia, feed rate 80cc/hr;      weight=74kg  RI=4639=1.5cc/kg/hr    LABS:    CBC Full  -  ( 10 Mar 2018 17:15 )  WBC Count : 7.4 K/uL  Hemoglobin : 11.3 g/dL                 NORMOCYTIC ANEMIA  Hematocrit : 36.7 %  Platelet Count - Automated : 281 K/uL  Mean Cell Volume : 92.4 fl  Mean Cell Hemoglobin : 28.5 pg  Mean Cell Hemoglobin Concentration : 30.8 g/dL  Auto Neutrophil # : 5.3 K/uL  Auto Lymphocyte # : 1.0 K/uL  Auto Monocyte # : 0.7 K/uL  Auto Eosinophil # : 0.4 K/uL  Auto Basophil # : 0.0 K/uL  Auto Neutrophil % : 71.7 %  Auto Lymphocyte % : 13.1 %  Auto Monocyte % : 9.0 %  Auto Eosinophil % : 5.4 %  Auto Basophil % : 0.3 %                            11.3   7.4   )-----------( 281      ( 10 Mar 2018 17:15 )             36.7     03-10    140  |  99  |  22.0<H>  ----------------------------<  145<H>  4.4   |  28.0  |  0.43<L>    Ca    9.5      10 Mar 2018 17:15    TPro  7.0  /  Alb  2.9<L>  /  TBili  0.2<L>  /  DBili  x   /  AST  24  /  ALT  19  /  AlkPhos  87  03-10    LIVER FUNCTIONS - ( 10 Mar 2018 17:15 )  Alb: 2.9 g/dL / Pro: 7.0 g/dL / ALK PHOS: 87 U/L / ALT: 19 U/L / AST: 24 U/L / GGT: x             Urinalysis Basic - ( 10 Mar 2018 19:16 )    Color: Yellow / Appearance: Clear / S.005 / pH: x  Gluc: x / Ketone: Negative  / Bili: Negative / Urobili: Negative mg/dL   Blood: x / Protein: 15 mg/dL / Nitrite: Positive   Leuk Esterase: Moderate / RBC: 0-2 /HPF / WBC 3-5   Sq Epi: x / Non Sq Epi: Occasional / Bacteria: Few        MICROBIOLOGY/CULTURES:  Culture Results:   50,000 - 99,000 CFU/mL Gram Negative Rods Identification and      BACTERIURIA in setting of Chronic beltrán, replaced on presentation to hospital  susceptibility to follow. (03-10 @ 19:16)      RADIOLOGY RESULTS:     CT Abdomen  GI tract: No evidence of small bowel obstruction. No wall thickening or   inflammatory changes. Percutaneous gastrostomy tube noted normal appendix.    Peritoneum/retroperitoneum and mesentery: No free air. No organized fluid   collection. No adenopathy.        Pelvic organs/Bladder: No pelvic masses. Beltrán catheter noted in the   bladder.    Abdominal wall: Unremarkable.  Bones and soft tissues: No destructive lesion. Heterotopic ossification   seen around the left hip.

## 2018-03-12 NOTE — DISCHARGE NOTE ADULT - OTHER SIGNIFICANT FINDINGS
10.5   8.0   )-----------( 358      ( 12 Mar 2018 06:56 )             34.5       Clostridium difficile Toxin by PCR (03.11.18 @ 04:25)    C Diff by PCR Result: NotDetec         EXAM:  CT ABDOMEN AND PELVIS IC                        PROCEDURE DATE:  03/10/2018    INTERPRETATION:  CT ABDOMEN AND PELVIS WITH CONTRAST  INDICATION: . Fever and diarrhea.  TECHNIQUE: Contrast enhanced CT of the abdomen and pelvis.   90 mL of Omnipaque 350 contrast material was injected IV.  COMPARISON: None.  FINDINGS:  Lower Thorax: No consolidation or effusion. Bibasilar atelectasis.  Liver: No suspicious lesions.      Biliary: No dilatation.      Spleen: No suspicious lesions.      Pancreas: No inflammatory changes or ductal dilatation.      Adrenals: Normal.      Kidneys: No hydronephrosis or solid mass.      Vessels: Normal caliber.      GI tract: No evidence of small bowel obstruction. No wall thickening or  inflammatory changes. Percutaneous gastrostomy tube noted normal appendix.  Peritoneum/retroperitoneum and mesentery: No free air. No organized fluid   collection. No adenopathy.      Pelvic organs/Bladder: No pelvic masses. Iniguez catheter noted in the   bladder.  Abdominal wall: Unremarkable.  Bones and soft tissues: No destructive lesion. Heterotopic ossification   seen around the left hip.  IMPRESSION:  No specific findings to account for the patient's symptoms.  JANEEN DOMINGUEZ M.D., ATTENDING RADIOLOGIST

## 2018-03-12 NOTE — SPEECH LANGUAGE PATHOLOGY EVALUATION - SLP DIAGNOSIS
Expressive and receptive language skills judged to be WFL. Pt able to communicate needs/wants via head nods and by mouthing words

## 2018-03-12 NOTE — PROGRESS NOTE ADULT - ASSESSMENT
61 y/o male with PMHof transverse myelitis w/ Paraplegia, trach/peg, colostomy in place, recently with fevers at the NH and empirically tx with rocephin and finished course on 3/11/18 for presumed uti also on flagyl for c. diff to finish course on 3/14/18, currently p/w NH for recurrent fevers with tmax: 101.     In the ER pt noted to be tachycardic, hypothermic, unclear etiology of prior fevers at the NH. Pt with no leukocytosis, slight elevation in procalcitonin and ID consulted.     Recommended to remain off of additional abx at this time and continue to monitor the patient. CT A/P with no infectious findings, no respiratory complaints and cxr negative for any findings. UA from Beltrán with no pyuria to suggest UTI, beltrán catheter was changed in the er. Repeat C. Diff negative. Awaiting bcx results, could be partially tx infection due to outpt abx but unclear source at this time, sacral region with wound vac in place. 63 y/o male with PMHof transverse myelitis w/ Paraplegia, trach/peg, colostomy in place, recently with fevers at the NH and empirically tx with rocephin and was to finish course on 3/11/18 for presumed uti also on flagyl for c. diff to finish course on 3/14/18, currently p/w NH for recurrent fevers with tmax: 101. In the ER pt noted to be tachycardic, hypothermic, unclear etiology of prior fevers at the NH. Pt with no leukocytosis, slight elevation in procalcitonin and ID consulted. Recommended to remain off of additional abx at this time and continue to monitor the patient. CT A/P with no infectious findings, no respiratory complaints and cxr negative for any findings. UA from Beltrán with no pyuria to suggest UTI, beltrán catheter was changed in the er. Repeat C. Diff negative. Awaiting bcx results, could be partially tx infection due to outpt abx but unclear source at this time, sacral region with wound vac in place does not look infected.

## 2018-03-12 NOTE — CONSULT NOTE ADULT - SUBJECTIVE AND OBJECTIVE BOX
: 61 y/o M paraplegic w/ Transverse myelitis with stage IV sacral decubitus wound admitted for fever or unknown origins. Pt admitted from [facility] presents with fever for 3 days. Pt has pain in back but denies any recent drainage. Per records and conversation with medicine, no evidence of drainage, erythema or signs of infection    All other review of systems negative except per HPI    Patient History:    Past Medical History:  Essential hypertension    Paralysis    Transverse myelitis.     Past Surgical History:  History of tracheostomy.  PEG    Home Medications:  aspirin 81 mg oral tablet: 1 tab(s) by gastrostomy tube once a day (11 Mar 2018 11:46)  baclofen 10 mg oral tablet: 10 milligram(s) by gastrostomy tube 2 times a day (11 Mar 2018 11:46)  etidronate 400 mg oral tablet: 2 tab(s) by gastrostomy tube 2 times a day (11 Mar 2018 11:46)  fentanyl topical 25 mcg/hr transdermal film, extended release (obsolete): transdermal every 72 hours (11 Mar 2018 14:50)  ferrous sulfate 220 mg/5 mL (44 mg elemental iron) oral elixir: 7.5 milliliter(s) orally once a day (11 Mar 2018 11:46)  Lacri-Lube S.O.P. ophthalmic ointment: 1 application to each affected eye once a day (at bedtime) (11 Mar 2018 11:46)  Lantus 100 units/mL subcutaneous solution: 15 unit(s) subcutaneous once a day (at bedtime) (11 Mar 2018 11:46)  nitroglycerin 0.4 mg sublingual spray: 1 spray(s) sublingual every 5 minutes x3 doses, As Needed (11 Mar 2018 11:46)  omeprazole 40 mg oral powder for reconstitution: 1  by gastrostomy tube once a day (11 Mar 2018 11:46)  oxyCODONE 10 mg oral tablet: 1 tab(s) by gastrostomy tube every 6 hours, As Needed (11 Mar 2018 11:46)  Plavix 75 mg oral tablet: 1 tab(s) by gastrostomy tube once a day (11 Mar 2018 11:46)  simethicone 80 mg oral tablet, chewable: 1 tab(s) orally 4 times a day (after meals and at bedtime) (11 Mar 2018 11:46)  Vitamin C 500 mg oral tablet: 1 tab(s) orally once a day (11 Mar 2018 11:46)  Xanax 0.5 mg oral tablet: 1 tab(s) by gastrostomy tube every 8 hours, As Needed (11 Mar 2018 11:46)    Allergies    No Known Allergies    Intolerances      FH Noncontributory  SH: Noncontributory    Vital Signs Last 24 Hrs  T(C): 36.6 (12 Mar 2018 05:43), Max: 36.6 (12 Mar 2018 05:43)  T(F): 97.8 (12 Mar 2018 05:43), Max: 97.8 (12 Mar 2018 05:43)  HR: 104 (12 Mar 2018 11:00) (102 - 104)  BP: 142/76 (12 Mar 2018 11:00) (142/76 - 158/97)  BP(mean): --  RR: 18 (12 Mar 2018 11:00) (18 - 18)  SpO2: 95% (12 Mar 2018 11:00) (95% - 98%)    NAD, Awake and alert, WDWN  Head NCAT, EOMI  Neck supple: Tracheostomy in place  Chest expansion symmetric  Lungs CTAB  RRR, S1S2nl  Abd soft, ND, NTTP, PEG tube in place site c/d/i  Back: [id number] Stage IV sacral decubitus ulcer, well granulated with scant fibrinous exudate, no pus, or erythema, undermining

## 2018-03-12 NOTE — PROGRESS NOTE ADULT - PROBLEM SELECTOR PLAN 3
Known sacral ulcer, covered by pad;  tissue is pink, healing with no difficulty;   -wound care clearning and dressing change every 2 days;    -will c/w wound care Known sacral ulcer stage 3 healing well   -c/w wound care cleaning and dressing change every 2 days;    -wound vac removed and needs to be replaced  -Surgery consulted for wound vac placement

## 2018-03-12 NOTE — DISCHARGE NOTE ADULT - PATIENT PORTAL LINK FT
You can access the [facility] Patient Portal, offered by [facility], by registering with the following website: [email or web address]

## 2018-03-12 NOTE — DISCHARGE NOTE ADULT - HOSPITAL COURSE
63 y/o male with PMHof transverse myelitis w/ Paraplegia, trach/peg, colostomy in place, recently with fevers at the NH and empirically tx with Rocephin and was to finish course on 3/11/18 for presumed uti also on flagyl for c. diff to finish course on 3/14/18, currently p/w NH for recurrent fevers with tmax: 101. In the ER pt noted to be tachycardic, hypothermic, unclear etiology of prior fevers at the NH. Pt with no leukocytosis, slight elevation in procalcitonin and ID consulted. Recommended to remain off of additional abx at this time and continue to monitor the patient. CT A/P with no infectious findings, no respiratory complaints and cxr negative for any findings. UA from [facility] with no pyuria with bacterial growth but no representative for bacteriuria and likely due to contamination. [facility] catheter was changed in the er. Repeat C. Diff negative. Preliminary BCx were negative, we are awaiting for final results. Sacral region with wound vac in place which does not look infected and has been managed by surgery while in the hospital. Patient has been afebrile for >24 hrs and is medically optimized for discharge if no acute events are reported.    45 min spent coordinating this discharge 62M presented from the nursing home with a three day history of fevers. The patient was admitted to the intensive care unit for further management. Antibiotics were initiated for sepsis and pneumonia. He was continued on treatment for Cdiff which was initiated in the nursing home. The patient was continued on ventilator support. The patient was seen by Infectious Disease in consultation and the patient monitored off antibiotic therapy. The patient was seen by Surgery in consultation for wound care of the decubitus ulcer. Repeat laboratory results noted leukocytosis and antibiotics were initiated for urinary tract infection. The patient was seen by Pulmonary in consultation and continued monitoring. CT of the chest noted a right sided pneumonia and antibiotics were adjusted. The patient’s condition decompensated and he required initiation of ventilator support. The patient was transferred to the intensive care unit for further management of acute on chronic respiratory failure. The patient had improvement in his condition. The gastrostomy tube was noted to be dislodged and the patient was seen by Gastroenterology in consultation for replacement of the tube. The patient was continued on tube feeds and completed the course of antibiotics and remained afebrile. The patient was followed by Pulmonary and continued on ventilator support.    35 minutes total time 62M presented from the nursing home with a three day history of fevers. The patient was admitted to the intensive care unit for further management. Antibiotics were initiated for sepsis and pneumonia. He was continued on treatment for Cdiff which was initiated in the nursing home. The patient was continued on ventilator support. The patient was seen by Infectious Disease in consultation and the patient monitored off antibiotic therapy. The patient was seen by Surgery in consultation for wound care of the decubitus ulcer. Repeat laboratory results noted leukocytosis and antibiotics were initiated for urinary tract infection. The patient was seen by Pulmonary in consultation and continued monitoring. CT of the chest noted a right sided pneumonia and antibiotics were adjusted. The patient’s condition decompensated and he required initiation of ventilator support. The patient was transferred to the intensive care unit for further management of acute on chronic respiratory failure. The patient had improvement in his condition. The gastrostomy tube was noted to be dislodged and the patient was seen by Gastroenterology in consultation for replacement of the tube. The patient was continued on tube feeds and completed the course of antibiotics and remained afebrile. The patient was followed by Pulmonary and continued on ventilator support.

## 2018-03-12 NOTE — DISCHARGE NOTE ADULT - PLAN OF CARE
. The course of antibiotics was completed. Follow up with your primary care physician at the nursing home for further management. Close blood pressure monitoring. Continue with wound care for the decubitus as per facility policy. Continue on gastrostomy tube feeds. Insulin coverage. Close glucose monitoring.

## 2018-03-12 NOTE — DISCHARGE NOTE ADULT - SECONDARY DIAGNOSIS.
Essential hypertension Paralysis Type 2 diabetes mellitus without complication, with long-term current use of insulin

## 2018-03-12 NOTE — CONSULT NOTE ADULT - ASSESSMENT
63 y/o M with noninfected Stage IV sacral decubitus wound noninfected, nontoxic, hemodynamically nl  -Wound care consult for vac appliance and care during this admission  -Can perform daily Wet to dry dressing until then  -Discussed with Dr. Rodriguez and Dr. Quinones

## 2018-03-12 NOTE — DISCHARGE NOTE ADULT - CARE PLAN
Principal Discharge DX:	Fever  Secondary Diagnosis:	Essential hypertension  Secondary Diagnosis:	Paralysis  Secondary Diagnosis:	Type 2 diabetes mellitus without complication, with long-term current use of insulin Principal Discharge DX:	Fever  Goal:	.  Assessment and plan of treatment:	The course of antibiotics was completed. Follow up with your primary care physician at the nursing home for further management.  Secondary Diagnosis:	Essential hypertension  Assessment and plan of treatment:	Close blood pressure monitoring.  Secondary Diagnosis:	Paralysis  Assessment and plan of treatment:	Continue with wound care for the decubitus as per facility policy. Continue on gastrostomy tube feeds.  Secondary Diagnosis:	Type 2 diabetes mellitus without complication, with long-term current use of insulin  Assessment and plan of treatment:	Insulin coverage. Close glucose monitoring.

## 2018-03-12 NOTE — PROGRESS NOTE ADULT - PROBLEM SELECTOR PLAN 1
-Afebrile the last 24hours;   -patient will be complete with his treatment for Cdiff on 3/14 Wednesday;    -Metronidazole regimen 500mg q8 hrs  3/5-3/14;    -Trend procalcitonin;  no clear source for his episode of fever;    -UA indicative of bacteruria, but no pyuria;  beltrán replaced on presentation in the ED;   -Sacral wound is healing, wound cleaned every 2 days;    -Rocephin Treatment for UTI finished at NH on 3/11   -Blood culture results still pending;     -c/w florastor 250mg po bid unclear etiology, may be partially tx UTI given at the facility pt was on rocephin and also was on flagyl to complete course on 3/14/18  -Afebrile in the last 24hours  -no leukocytosis    -procalcitonin trending down    -C. Diff negative   -flagyl discontinued for now will obtain information from the facility in regards to positive C. Diff or ppx measure as to why the patient was placed on flagyl. If positive result then will restart medication to finish course. If not will maintain off of flagyl.   -UA indicative of bacteruria, but no pyuria;  beltrán replaced on presentation in the ED  -Sacral wound is healing no evidence of infection   -Blood culture results preliminary negative awaiting final results   -c/w florastor 250mg po bid  -ID f/u noted and appreciated and d/w Dr. Gomez the above. Will maintain off of abx and monitor the patient.

## 2018-03-12 NOTE — PROGRESS NOTE ADULT - PROBLEM SELECTOR PLAN 2
Known recent history of cdiff infection, february - march;    -regimen of metronidazole 500mg [id number]  3/5-3/14  -cdiff toxin testing is negative on presentation to ED;  finish the course on Wednesday 3/14 Known recent history of cdiff infection, february - march;    -regimen of metronidazole 500mg [id number]  3/5-3/14  -cdiff toxin testing is negative on presentation to ED;  finish the course on Wednesday 3/14  -will obtain information in regards to if positive result vs ppx tx from the NH as stated above

## 2018-03-12 NOTE — DISCHARGE NOTE ADULT - MEDICATION SUMMARY - MEDICATIONS TO TAKE
I will START or STAY ON the medications listed below when I get home from the hospital:    aspirin 81 mg oral tablet  -- 1 tab(s) by gastrostomy tube once a day  -- Indication: For CAD    oxyCODONE 10 mg oral tablet  -- 1 tab(s) by gastrostomy tube every 6 hours, As Needed  -- Indication: For Pain    fentanyl topical 25 mcg/hr transdermal film, extended release (obsolete)  -- transdermal every 72 hours  -- Indication: For Pain    acetaminophen 325 mg oral tablet  -- 2 tab(s) by gastrostomy tube every 8 hours, As Needed  -- Indication: For Pain    Milk of Magnesia 8% oral suspension  -- 30 milliliter(s) by gastrostomy tube prn, As Needed  -- Indication: For Constipation    nitroglycerin 0.4 mg sublingual spray  -- 1 spray(s) under tongue every 5 minutes x3 doses, As Needed  -- Indication: For Chest pain    heparin 5000 units/mL injectable solution  -- 5000 unit(s) subcutaneous every 8 hours  -- Indication: For Prophylaxis    HumaLOG 100 units/mL injectable solution  -- unit(s) injectable every 6 hours  sliding scale  -- Indication: For DM    Lantus 100 units/mL subcutaneous solution  -- 15 unit(s) subcutaneous once a day (at bedtime)  -- Indication: For DM    Plavix 75 mg oral tablet  -- 1 tab(s) by gastrostomy tube once a day  -- Indication: For CAD    Xanax 0.5 mg oral tablet  -- 1 tab(s) by gastrostomy tube every 8 hours, As Needed  -- Indication: For Anxiety    nystatin 100,000 units/g topical powder  -- Apply on skin to affected area 2 times a day  -- Indication: For Rash    nystatin 100,000 units/g topical cream  -- Apply on skin to affected area 2 times a day  -- Indication: For Rash    ferrous sulfate 220 mg/5 mL (44 mg elemental iron) oral elixir  -- 7.5 milliliter(s) by gastrostomy tube once a day  -- Indication: For Supplement    bisacodyl 10 mg rectal suppository  -- 1 suppository(ies) rectally prn, As Needed  -- Indication: For Constipation    Fleet Enema 7 g-19 g rectal enema  -- 133 milliliter(s) rectally prn, As Needed  -- Indication: For Constipation    simethicone 80 mg oral tablet, chewable  -- 1 tab(s) by gastrostomy tube 4 times a day (after meals and at bedtime)  -- Indication: For Gas    baclofen 10 mg oral tablet  -- 10 milligram(s) by gastrostomy tube 2 times a day  -- Indication: For Spasicity    Artificial Tears ophthalmic solution  -- 1 drop(s) to each affected eye every 4 hours while awake, As Needed  -- Indication: For Eye drop    Lacri-Lube S.O.P. ophthalmic ointment  -- 1 application to each affected eye once a day (at bedtime)  -- Indication: For Dry eyes    lactobacillus acidophilus oral capsule  -- 1 cap(s) by gastrostomy tube 2 times a day  -- Indication: For Prophylaxis    omeprazole 40 mg oral powder for reconstitution  -- 1  by gastrostomy tube once a day  -- Indication: For Gastritis    Therapeutic Multiple Vitamins with Minerals oral tablet  -- 1 tab(s) by gastrostomy tube once a day  -- Indication: For Supplement    Vitamin C 500 mg oral tablet  -- 1 tab(s) by gastrostomy tube once a day  -- Indication: For Supplement

## 2018-03-12 NOTE — PROGRESS NOTE ADULT - ASSESSMENT
63y/o male with pmh of PMH Transverse myelitis, Paraplegia, trach/peg, colostomy, HTN, [facility]  C DIFF NEG WILL D/C FLAGYL  BLOOD CX NEG SO FAR OFF ABX   AFEBRILE  HERE.    CT ABD NEG NO OBVIOUS PNEUMONIA AT BASES  WILL FOLLOW UP

## 2018-03-12 NOTE — PROGRESS NOTE ADULT - PROBLEM SELECTOR PLAN 5
HBA1c: 5,6   -fs stable  -pt takes lantus 10 u sq qhs at nh for now will remain off of lantus   -c/w HSS  -c/w accuchecks [facility] TID   -c/w hypoglycemia protocol HBA1c: 5,6   -fs stable  -will restart lantus 5 u sq qhs   -c/w HSS  -c/w accuchecks [facility] TID   -c/w hypoglycemia protocol

## 2018-03-12 NOTE — PROGRESS NOTE ADULT - PROBLEM SELECTOR PLAN 6
-bp stable  -bp mildly elevated this morning, but patient agitated because of headache and neck pain;  reposition neck and give patient tylenol for pain;  brief ice pack for neck pain; no swelling visualized on neck; -bp stable  -bp mildly elevated this morning likely from agitation

## 2018-03-12 NOTE — SPEECH LANGUAGE PATHOLOGY EVALUATION - SLP GENERAL OBSERVATIONS
Pt alert , awake Ox3 (grossly for time) +trach with trach collar in place, cuff deflated Pt alert , awake Ox3 (grossly for time) +trach #6 with trach collar in place, cuff deflated

## 2018-03-13 LAB
-  AMIKACIN: SIGNIFICANT CHANGE UP
-  AZTREONAM: SIGNIFICANT CHANGE UP
-  CEFEPIME: SIGNIFICANT CHANGE UP
-  CEFTAZIDIME: SIGNIFICANT CHANGE UP
-  CIPROFLOXACIN: SIGNIFICANT CHANGE UP
-  GENTAMICIN: SIGNIFICANT CHANGE UP
-  IMIPENEM: SIGNIFICANT CHANGE UP
-  LEVOFLOXACIN: SIGNIFICANT CHANGE UP
-  MEROPENEM: SIGNIFICANT CHANGE UP
-  PIPERACILLIN/TAZOBACTAM: SIGNIFICANT CHANGE UP
-  TOBRAMYCIN: SIGNIFICANT CHANGE UP
APPEARANCE UR: CLEAR — SIGNIFICANT CHANGE UP
BACTERIA # UR AUTO: ABNORMAL
BASOPHILS # BLD AUTO: 0 K/UL — SIGNIFICANT CHANGE UP (ref 0–0.2)
BASOPHILS NFR BLD AUTO: 0.1 % — SIGNIFICANT CHANGE UP (ref 0–2)
BILIRUB UR-MCNC: NEGATIVE — SIGNIFICANT CHANGE UP
COLOR SPEC: YELLOW — SIGNIFICANT CHANGE UP
CULTURE RESULTS: SIGNIFICANT CHANGE UP
DIFF PNL FLD: NEGATIVE — SIGNIFICANT CHANGE UP
EOSINOPHIL # BLD AUTO: 0.3 K/UL — SIGNIFICANT CHANGE UP (ref 0–0.5)
EOSINOPHIL NFR BLD AUTO: 1.6 % — SIGNIFICANT CHANGE UP (ref 0–6)
EPI CELLS # UR: SIGNIFICANT CHANGE UP
GLUCOSE BLDC GLUCOMTR-MCNC: 192 MG/DL — HIGH (ref 70–99)
GLUCOSE BLDC GLUCOMTR-MCNC: 198 MG/DL — HIGH (ref 70–99)
GLUCOSE BLDC GLUCOMTR-MCNC: 199 MG/DL — HIGH (ref 70–99)
GLUCOSE UR QL: NEGATIVE MG/DL — SIGNIFICANT CHANGE UP
HCT VFR BLD CALC: 36.9 % — LOW (ref 42–52)
HCT VFR BLD CALC: 37.2 % — LOW (ref 42–52)
HGB BLD-MCNC: 11 G/DL — LOW (ref 14–18)
HGB BLD-MCNC: 11.3 G/DL — LOW (ref 14–18)
KETONES UR-MCNC: NEGATIVE — SIGNIFICANT CHANGE UP
LEUKOCYTE ESTERASE UR-ACNC: ABNORMAL
LYMPHOCYTES # BLD AUTO: 1.2 K/UL — SIGNIFICANT CHANGE UP (ref 1–4.8)
LYMPHOCYTES # BLD AUTO: 7 % — LOW (ref 20–55)
MCHC RBC-ENTMCNC: 28 PG — SIGNIFICANT CHANGE UP (ref 27–31)
MCHC RBC-ENTMCNC: 28.7 PG — SIGNIFICANT CHANGE UP (ref 27–31)
MCHC RBC-ENTMCNC: 29.8 G/DL — LOW (ref 32–36)
MCHC RBC-ENTMCNC: 30.4 G/DL — LOW (ref 32–36)
MCV RBC AUTO: 93.9 FL — SIGNIFICANT CHANGE UP (ref 80–94)
MCV RBC AUTO: 94.4 FL — HIGH (ref 80–94)
METHOD TYPE: SIGNIFICANT CHANGE UP
MONOCYTES # BLD AUTO: 1.1 K/UL — HIGH (ref 0–0.8)
MONOCYTES NFR BLD AUTO: 6.6 % — SIGNIFICANT CHANGE UP (ref 3–10)
NEUTROPHILS # BLD AUTO: 14.1 K/UL — HIGH (ref 1.8–8)
NEUTROPHILS NFR BLD AUTO: 84.4 % — HIGH (ref 37–73)
NITRITE UR-MCNC: NEGATIVE — SIGNIFICANT CHANGE UP
ORGANISM # SPEC MICROSCOPIC CNT: SIGNIFICANT CHANGE UP
ORGANISM # SPEC MICROSCOPIC CNT: SIGNIFICANT CHANGE UP
PH UR: 7 — SIGNIFICANT CHANGE UP (ref 5–8)
PLATELET # BLD AUTO: 473 K/UL — HIGH (ref 150–400)
PLATELET # BLD AUTO: 473 K/UL — HIGH (ref 150–400)
PROCALCITONIN SERPL-MCNC: 0.21 NG/ML — HIGH (ref 0–0.04)
PROT UR-MCNC: NEGATIVE MG/DL — SIGNIFICANT CHANGE UP
RBC # BLD: 3.93 M/UL — LOW (ref 4.6–6.2)
RBC # BLD: 3.94 M/UL — LOW (ref 4.6–6.2)
RBC # FLD: 16.8 % — HIGH (ref 11–15.6)
RBC # FLD: 16.9 % — HIGH (ref 11–15.6)
RBC CASTS # UR COMP ASSIST: SIGNIFICANT CHANGE UP /HPF (ref 0–4)
SP GR SPEC: 1.01 — SIGNIFICANT CHANGE UP (ref 1.01–1.02)
SPECIMEN SOURCE: SIGNIFICANT CHANGE UP
UROBILINOGEN FLD QL: NEGATIVE MG/DL — SIGNIFICANT CHANGE UP
WBC # BLD: 15.1 K/UL — HIGH (ref 4.8–10.8)
WBC # BLD: 16.7 K/UL — HIGH (ref 4.8–10.8)
WBC # FLD AUTO: 15.1 K/UL — HIGH (ref 4.8–10.8)
WBC # FLD AUTO: 16.7 K/UL — HIGH (ref 4.8–10.8)
WBC UR QL: SIGNIFICANT CHANGE UP

## 2018-03-13 PROCEDURE — 99232 SBSQ HOSP IP/OBS MODERATE 35: CPT

## 2018-03-13 PROCEDURE — 71045 X-RAY EXAM CHEST 1 VIEW: CPT | Mod: 26

## 2018-03-13 PROCEDURE — 99233 SBSQ HOSP IP/OBS HIGH 50: CPT | Mod: GC

## 2018-03-13 RX ORDER — SACCHAROMYCES BOULARDII 250 MG
250 POWDER IN PACKET (EA) ORAL
Qty: 0 | Refills: 0 | Status: DISCONTINUED | OUTPATIENT
Start: 2018-03-13 | End: 2018-03-26

## 2018-03-13 RX ORDER — ACETAMINOPHEN 500 MG
650 TABLET ORAL ONCE
Qty: 0 | Refills: 0 | Status: COMPLETED | OUTPATIENT
Start: 2018-03-13 | End: 2018-03-13

## 2018-03-13 RX ORDER — ACETAMINOPHEN 500 MG
650 TABLET ORAL EVERY 6 HOURS
Qty: 0 | Refills: 0 | Status: DISCONTINUED | OUTPATIENT
Start: 2018-03-13 | End: 2018-04-13

## 2018-03-13 RX ORDER — CEFEPIME 1 G/1
INJECTION, POWDER, FOR SOLUTION INTRAMUSCULAR; INTRAVENOUS
Qty: 0 | Refills: 0 | Status: DISCONTINUED | OUTPATIENT
Start: 2018-03-13 | End: 2018-03-15

## 2018-03-13 RX ORDER — CEFEPIME 1 G/1
1000 INJECTION, POWDER, FOR SOLUTION INTRAMUSCULAR; INTRAVENOUS ONCE
Qty: 0 | Refills: 0 | Status: COMPLETED | OUTPATIENT
Start: 2018-03-13 | End: 2018-03-13

## 2018-03-13 RX ORDER — CEFEPIME 1 G/1
1000 INJECTION, POWDER, FOR SOLUTION INTRAMUSCULAR; INTRAVENOUS EVERY 12 HOURS
Qty: 0 | Refills: 0 | Status: DISCONTINUED | OUTPATIENT
Start: 2018-03-14 | End: 2018-03-15

## 2018-03-13 RX ADMIN — PANTOPRAZOLE SODIUM 40 MILLIGRAM(S): 20 TABLET, DELAYED RELEASE ORAL at 06:41

## 2018-03-13 RX ADMIN — CLOPIDOGREL BISULFATE 75 MILLIGRAM(S): 75 TABLET, FILM COATED ORAL at 13:17

## 2018-03-13 RX ADMIN — CEFEPIME 100 MILLIGRAM(S): 1 INJECTION, POWDER, FOR SOLUTION INTRAMUSCULAR; INTRAVENOUS at 17:58

## 2018-03-13 RX ADMIN — INSULIN HUMAN 1: 100 INJECTION, SOLUTION SUBCUTANEOUS at 06:42

## 2018-03-13 RX ADMIN — INSULIN HUMAN 1: 100 INJECTION, SOLUTION SUBCUTANEOUS at 16:59

## 2018-03-13 RX ADMIN — Medication 250 MILLIGRAM(S): at 17:58

## 2018-03-13 RX ADMIN — Medication 10 MILLIGRAM(S): at 06:41

## 2018-03-13 RX ADMIN — OXYCODONE HYDROCHLORIDE 10 MILLIGRAM(S): 5 TABLET ORAL at 09:18

## 2018-03-13 RX ADMIN — Medication 650 MILLIGRAM(S): at 22:40

## 2018-03-13 RX ADMIN — Medication 650 MILLIGRAM(S): at 14:15

## 2018-03-13 RX ADMIN — OXYCODONE HYDROCHLORIDE 10 MILLIGRAM(S): 5 TABLET ORAL at 17:47

## 2018-03-13 RX ADMIN — Medication 81 MILLIGRAM(S): at 13:17

## 2018-03-13 RX ADMIN — HEPARIN SODIUM 5000 UNIT(S): 5000 INJECTION INTRAVENOUS; SUBCUTANEOUS at 21:45

## 2018-03-13 RX ADMIN — HEPARIN SODIUM 5000 UNIT(S): 5000 INJECTION INTRAVENOUS; SUBCUTANEOUS at 13:17

## 2018-03-13 RX ADMIN — OXYCODONE HYDROCHLORIDE 10 MILLIGRAM(S): 5 TABLET ORAL at 16:57

## 2018-03-13 RX ADMIN — Medication 5 MILLILITER(S): at 13:17

## 2018-03-13 RX ADMIN — Medication 650 MILLIGRAM(S): at 13:17

## 2018-03-13 RX ADMIN — Medication 500 MILLIGRAM(S): at 13:17

## 2018-03-13 RX ADMIN — Medication 10 MILLIGRAM(S): at 16:59

## 2018-03-13 RX ADMIN — Medication 0.5 MILLIGRAM(S): at 21:45

## 2018-03-13 RX ADMIN — OXYCODONE HYDROCHLORIDE 10 MILLIGRAM(S): 5 TABLET ORAL at 01:57

## 2018-03-13 RX ADMIN — OXYCODONE HYDROCHLORIDE 10 MILLIGRAM(S): 5 TABLET ORAL at 10:10

## 2018-03-13 RX ADMIN — Medication 1 APPLICATION(S): at 21:46

## 2018-03-13 RX ADMIN — HEPARIN SODIUM 5000 UNIT(S): 5000 INJECTION INTRAVENOUS; SUBCUTANEOUS at 06:41

## 2018-03-13 RX ADMIN — Medication 650 MILLIGRAM(S): at 01:59

## 2018-03-13 RX ADMIN — Medication 650 MILLIGRAM(S): at 21:45

## 2018-03-13 NOTE — PROGRESS NOTE ADULT - SUBJECTIVE AND OBJECTIVE BOX
[place]    Patient is a 62y old  Male who presents with a cc of fevers (10 Mar 2018 23:26)      INTERVAL HPI/OVERNIGHT EVENTS:  Patient seen and examined at bedside. No acute events overnight. Communication with patient via by head nods and deciphering his mouthed words.    Patient is stooling, voiding with no difficulty. This morning he reports HA and neck pain.  Neck pain he reports is not positional, not affected by raising incline of head up or down.  He reports he is not hungry, tolerating his feeds with no difficulty; reports that his mouth is no longer dry.  Patient denies chest pain, SOB, abd pain, N/V, fever, dysuria or any other complaints.    TELE- Patient is not on tele/;      MEDICATIONS  (STANDING):  ascorbic acid 500 milliGRAM(s) Oral daily  aspirin  chewable 81 milliGRAM(s) Oral daily  baclofen 10 milliGRAM(s) Oral two times a day  clopidogrel Tablet 75 milliGRAM(s) Oral daily  dextrose 5%. 1000 milliLiter(s) (50 mL/Hr) IV Continuous <Continuous>  dextrose 50% Injectable 12.5 Gram(s) IV Push once  dextrose 50% Injectable 25 Gram(s) IV Push once  dextrose 50% Injectable 25 Gram(s) IV Push once  heparin  Injectable 5000 Unit(s) SubCutaneous every 8 hours  insulin glargine Injectable (LANTUS) 5 Unit(s) SubCutaneous at bedtime  insulin regular  human corrective regimen sliding scale   SubCutaneous every 6 hours  multivitamin   Solution 5 milliLiter(s) Oral daily  pantoprazole   Suspension 40 milliGRAM(s) Enteral Tube before breakfast  petrolatum Ophthalmic Ointment 1 Application(s) Both EYES at bedtime    MEDICATIONS  (PRN):  ALPRAZolam 0.5 milliGRAM(s) Oral three times a day PRN anxiety  glucagon  Injectable 1 milliGRAM(s) IntraMuscular once PRN Glucose <70 milliGRAM(s)/deciLiter  oxyCODONE    IR 10 milliGRAM(s) Oral every 6 hours PRN Moderate Pain (4 - 6)        Vital Signs Last 24 Hrs  Vital Signs Last 24 Hrs  T(C): 36.7 (13 Mar 2018 06:32), Max: 36.7 (13 Mar 2018 06:32)  T(F): 98.1 (13 Mar 2018 06:32), Max: 98.1 (13 Mar 2018 06:32)  HR: 116 (13 Mar 2018 06:32) (102 - 116)           Tachycardic  BP: 138/80 (13 Mar 2018 06:32) (138/80 - 144/76)  RR: 18 (13 Mar 2018 06:32) (18 - 18)  SpO2: 97% (13 Mar 2018 06:32) (95% - 97%)    REVIEW OF SYSTEMS:  No further findings on ros;        PHYSICAL EXAM:    GENERAL: NAD, well-groomed, well-developed  	HEENT: NCAT, EOMI, PERRLA, 3mm b/l; moist mucous membranes, no thrush; trach collar in place   	NECK:  Neck Supple, No Supraclavicular/submandibular lymphadenopathy;   	NEUROLOGY:  Alert & Oriented x 3;  paralyzed in b/l lower ext, and left upper ext;     	PULMONLOGY: mild coarse breath sounds b/l anterior lung, No rales, rhonchi, wheezing, or rubs  	CARDIOLOGY: S1, S2;  sinus tachycardia, No murmurs, rubs, or gallops  	GASTROENTEROLOGY: Soft, Nontender, Nondistended; Bowel sounds present;     COLOSTOMY BAG PRESENT IN LOWER ABDOMEN;  stool watery brown;      :  Iniguez in place;  Urine is amaya colored; not clouded;       EXTREMITIES:  2+ Peripheral Pulses DP, No clubbing, cyanosis, or edema;      SKIN: known sacral ulcer ; Erythematous patch in the Right Chest, 8x4cm (patient reports is a chronic rash), nontender;  no evidence of infection        noted     I/O    03-12 @ 07:01  -  03-13 @ 07:00  --------------------------------------------------------  IN: 560 mL / OUT: 1900 mL / NET: -1340 mL    Free Water =250cc every 6 hours    24hour feed settings 1200cc,, Glucerna 12.5 tricia, feed rate 80cc/hr;      weight=74kg  SN=[id number]/74/24=     cc/kg/hr    LABS:  Lab Results:  CBC  CBC Full  -  ( 12 Mar 2018 06:56 )  WBC Count : 8.0 K/uL  Hemoglobin : 10.5 g/dL  Hematocrit : 34.5 %  Platelet Count - Automated : 358 K/uL  Mean Cell Volume : 92.7 fl  Mean Cell Hemoglobin : 28.2 pg  Mean Cell Hemoglobin Concentration : 30.4 g/dL  Auto Neutrophil # : 5.6 K/uL  Auto Lymphocyte # : 1.2 K/uL  Auto Monocyte # : 0.6 K/uL  Auto Eosinophil # : 0.5 K/uL  Auto Basophil # : 0.0 K/uL  Auto Neutrophil % : 70.2 %  Auto Lymphocyte % : 15.1 %  Auto Monocyte % : 7.7 %  Auto Eosinophil % : 6.4 %               10.5   8.0   )-----------( 358      ( 12 Mar 2018 06:56 )             34.5     03-12    141  |  100  |  17.0  ----------------------------<  184<H>  4.4   |  28.0  |  0.53    Ca    9.2      12 Mar 2018 06:56  Phos  3.7     03-12  Mg     1.7     03-12      MICROBIOLOGY/CULTURES:  Culture Results:   Culture in progress (03-11 @ 04:25)  Culture Results:   50,000 - 99,000 CFU/mL Gram Negative Rods Identification and  susceptibility to follow.  Culture in progress (03-10 @ 19:16)  Culture Results:   No growth at 48 hours (03-10 @ 17:17)  Culture Results:   No growth at 48 hours (03-10 @ 17:16)    RADIOLOGY RESULTS:     CT Abdomen  GI tract: No evidence of small bowel obstruction. No wall thickening or   inflammatory changes. Percutaneous gastrostomy tube noted normal appendix.    Peritoneum/retroperitoneum and mesentery: No free air. No organized fluid   collection. No adenopathy.        Pelvic organs/Bladder: No pelvic masses. Iniguez catheter noted in the   bladder.    Abdominal wall: Unremarkable.  Bones and soft tissues: No destructive lesion. Heterotopic ossification   seen around the left hip. Patient is a 62y old  Male who presents with a cc of fevers (10 Mar 2018 23:26)      INTERVAL HPI/OVERNIGHT EVENTS:  Patient seen and examined at bedside. No acute events overnight. Communication with patient via by head nods and deciphering his mouthed words.  Patient is stooling, voiding with no difficulty. This morning he reports HA and neck pain.  Neck pain he reports is not positional, not affected by raising incline of head up or down.  He reports he is not hungry, tolerating his feeds with no difficulty; reports that his mouth is no longer dry.  He continues to voice that he wants to leave. Patient denies chest pain, SOB, abd pain, N/V, fever, dysuria or any other complaints.      MEDICATIONS  (STANDING):  ascorbic acid 500 milliGRAM(s) Oral daily  aspirin  chewable 81 milliGRAM(s) Oral daily  baclofen 10 milliGRAM(s) Oral two times a day  clopidogrel Tablet 75 milliGRAM(s) Oral daily  dextrose 5%. 1000 milliLiter(s) (50 mL/Hr) IV Continuous <Continuous>  dextrose 50% Injectable 12.5 Gram(s) IV Push once  dextrose 50% Injectable 25 Gram(s) IV Push once  dextrose 50% Injectable 25 Gram(s) IV Push once  heparin  Injectable 5000 Unit(s) SubCutaneous every 8 hours  insulin glargine Injectable (LANTUS) 5 Unit(s) SubCutaneous at bedtime  insulin regular  human corrective regimen sliding scale   SubCutaneous every 6 hours  multivitamin   Solution 5 milliLiter(s) Oral daily  pantoprazole   Suspension 40 milliGRAM(s) Enteral Tube before breakfast  petrolatum Ophthalmic Ointment 1 Application(s) Both EYES at bedtime    MEDICATIONS  (PRN):  ALPRAZolam 0.5 milliGRAM(s) Oral three times a day PRN anxiety  glucagon  Injectable 1 milliGRAM(s) IntraMuscular once PRN Glucose <70 milliGRAM(s)/deciLiter  oxyCODONE    IR 10 milliGRAM(s) Oral every 6 hours PRN Moderate Pain (4 - 6)        Vital Signs Last 24 Hrs  Vital Signs Last 24 Hrs  T(C): 36.7 (13 Mar 2018 06:32), Max: 36.7 (13 Mar 2018 06:32)  T(F): 98.1 (13 Mar 2018 06:32), Max: 98.1 (13 Mar 2018 06:32)  HR: 116 (13 Mar 2018 06:32) (102 - 116)           Tachycardic  BP: 138/80 (13 Mar 2018 06:32) (138/80 - 144/76)  RR: 18 (13 Mar 2018 06:32) (18 - 18)  SpO2: 97% (13 Mar 2018 06:32) (95% - 97%)    REVIEW OF SYSTEMS:  No further findings on ros;        PHYSICAL EXAM:    GENERAL: NAD, well-groomed, well-developed  	HEENT: NCAT, EOMI, PERRLA, 3mm b/l; moist mucous membranes, no thrush; trach collar in place   	NECK:  Neck Supple, No Supraclavicular/submandibular lymphadenopathy;   	NEUROLOGY:  Alert & Oriented x 3;  paralyzed in b/l lower ext, and left upper ext;     	PULMONLOGY: mild coarse breath sounds b/l anterior lung, No rales, rhonchi, wheezing, or rubs  	CARDIOLOGY: S1, S2;  sinus tachycardia, No murmurs, rubs, or gallops  	GASTROENTEROLOGY: Soft, Nontender, Nondistended; Bowel sounds present;     COLOSTOMY BAG PRESENT IN LOWER ABDOMEN;  stool watery brown;      :  Iniguez in place;  Urine is amaya colored; not clouded;       EXTREMITIES:  2+ Peripheral Pulses DP, No clubbing, cyanosis, or edema;      SKIN: known sacral ulcer ; Erythematous patch in the Right Chest, 8x4cm (patient reports is a chronic rash), nontender;  no evidence of infection        noted     I/O    03-12 @ 07:01  -  03-13 @ 07:00  --------------------------------------------------------  IN: 560 mL / OUT: 1900 mL / NET: -1340 mL    Free Water =250cc every 6 hours    24hour feed settings 1200cc,, Glucerna 12.5 tricia, feed rate 80cc/hr;      weight=74kg  ED=[id number]/74/24=     cc/kg/hr    LABS:  Lab Results:  CBC  CBC Full  -  ( 12 Mar 2018 06:56 )  WBC Count : 8.0 K/uL  Hemoglobin : 10.5 g/dL  Hematocrit : 34.5 %  Platelet Count - Automated : 358 K/uL  Mean Cell Volume : 92.7 fl  Mean Cell Hemoglobin : 28.2 pg  Mean Cell Hemoglobin Concentration : 30.4 g/dL  Auto Neutrophil # : 5.6 K/uL  Auto Lymphocyte # : 1.2 K/uL  Auto Monocyte # : 0.6 K/uL  Auto Eosinophil # : 0.5 K/uL  Auto Basophil # : 0.0 K/uL  Auto Neutrophil % : 70.2 %  Auto Lymphocyte % : 15.1 %  Auto Monocyte % : 7.7 %  Auto Eosinophil % : 6.4 %               10.5   8.0   )-----------( 358      ( 12 Mar 2018 06:56 )             34.5     03-12    141  |  100  |  17.0  ----------------------------<  184<H>  4.4   |  28.0  |  0.53    Ca    9.2      12 Mar 2018 06:56  Phos  3.7     03-12  Mg     1.7     03-12      MICROBIOLOGY/CULTURES:  Culture Results:   Culture in progress (03-11 @ 04:25)  Culture Results:   50,000 - 99,000 CFU/mL Gram Negative Rods Identification and  susceptibility to follow.  Culture in progress (03-10 @ 19:16)  Culture Results:   No growth at 48 hours (03-10 @ 17:17)  Culture Results:   No growth at 48 hours (03-10 @ 17:16)    RADIOLOGY RESULTS:  CT Abdomen  GI tract: No evidence of small bowel obstruction. No wall thickening or   inflammatory changes. Percutaneous gastrostomy tube noted normal appendix.    Peritoneum/retroperitoneum and mesentery: No free air. No organized fluid   collection. No adenopathy.        Pelvic organs/Bladder: No pelvic masses. Iniguez catheter noted in the   bladder.    Abdominal wall: Unremarkable.  Bones and soft tissues: No destructive lesion. Heterotopic ossification   seen around the left hip.

## 2018-03-13 NOTE — PROGRESS NOTE ADULT - ASSESSMENT
61 y/o male with PMHof transverse myelitis w/ Paraplegia, trach/peg, colostomy in place, recently with fevers at the NH and empirically tx with rocephin and was to finish course on 3/11/18 for presumed uti also on flagyl for c. diff to finish course on 3/14/18, currently p/w NH for recurrent fevers with tmax: 101. In the ER pt noted to be tachycardic, hypothermic, unclear etiology of prior fevers at the NH. Pt with no leukocytosis, slight elevation in procalcitonin and ID consulted. Recommended to remain off of additional abx at this time and continue to monitor the patient. CT A/P with no infectious findings, no respiratory complaints and cxr negative for any findings. UA from [facility] with no pyuria to suggest UTI, [facility] catheter was changed in the er.     -Repeat C. Diff negative; contacted patient's nursing home and medical facility where he was hospitalized, but couldn't get any specific records saying when he tested positive for cdiff;  patient's oral metro regimen discontinued;   -Stool cultures pending;     -3/10 Blood Cultures no growth >48hours;  -sacral ulcer with clean margins, wound vac kit at bedside, will be replaced today; 63 y/o male with PMHof transverse myelitis w/ Paraplegia, trach/peg, colostomy in place, recently with fevers at the NH and empirically tx with rocephin and was to finish course on 3/11/18 for presumed uti also on flagyl for c. diff to finish course on 3/14/18, currently p/w NH for recurrent fevers with tmax: 101. In the ER pt noted to be tachycardic, hypothermic, unclear etiology of prior fevers at the NH but concern for partially tx uti. Pt with no leukocytosis, slight elevation in procalcitonin and ID consulted. Recommended to remain off of additional abx and continue to monitor the patient. CT A/P with no infectious findings, no respiratory complaints and cxr negative for any findings. UA from [facility] with no pyuria to suggest UTI, [facility] catheter was changed in the er. Today with uptrending leukocytosis and prior U cx resulting with carbapenem resistant pseudomonas so abx started with cefepime. Prior Bcx resulted negative. Repeat pan cx ordered.

## 2018-03-13 NOTE — PROGRESS NOTE ADULT - PROBLEM SELECTOR PLAN 3
Known sacral ulcer stage 3 healing well   -c/w wound care cleaning and dressing change every 2 days;    -wound vac removed and needs to be replaced  -Surgery consulted for wound vac placement Known sacral ulcer stage 3 healing well   -c/w wound care cleaning and dressing change every 2 days;    -wound vac removed and needs to be replaced  -Surgery/ wound care RN consulted for wound vac placement

## 2018-03-13 NOTE — DIETITIAN INITIAL EVALUATION ADULT. - PROBLEM SELECTOR PLAN 2
Restart patient on ophthalmic eye care with refresh lacrilube (white petroleum mieral oil);  dry eye syndrome

## 2018-03-13 NOTE — PROGRESS NOTE ADULT - ASSESSMENT
61y/o male with pmh of PMH Transverse myelitis, Paraplegia, trach/peg, colostomy, HTN, [facility]  C DIFF NEG   WILL D/C FLAGYL  BLOOD CX NEG SO FAR OFF ABX   AFEBRILE  HERE.    CT ABD NEG NO OBVIOUS PNEUMONIA AT BASES  SACRAL DECUBITUS CLEAN  WILL CONTINUE TO OBSERVE OFF ABX

## 2018-03-13 NOTE — PROVIDER CONTACT NOTE (OTHER) - SITUATION
per surgical team they are not placing wound vac; wet to dry dressing until wound care nurse assesses patient. Left message on Enio's (wound care nurse) voice mail.

## 2018-03-13 NOTE — PROGRESS NOTE ADULT - ATTENDING COMMENTS
Note addended where needed Dispo: Patient to return to [facility] when stable, PT consulted for return.     Note addended where needed

## 2018-03-13 NOTE — PROGRESS NOTE ADULT - PROBLEM SELECTOR PLAN 2
Known recent history of cdiff infection, february - march;    -regimen of metronidazole 500mg [id number]  3/5-3/14;  stopped early on 3/12;   tested negative for cdiff during this hospitalization; Known recent history of cdiff infection, february - march;    -regimen of metronidazole 500mg [id number] was started 3/5 and stopped early on 3/12;   tested negative for cdiff during this hospitalization  -c/w probiotic while on abx to prevent future episodes

## 2018-03-13 NOTE — PROGRESS NOTE ADULT - PROBLEM SELECTOR PLAN 5
HBA1c: 5,6   -fs stable  -Patient on lantus 5 u sq qhs yesterday;  increase dose to 7units lantus tonight;      ss  2un/       /1un  214/      /176  -c/w HSS  -c/w accuchecks ACHS TID   -c/w hypoglycemia protocol HBA1c: 5,6   -fs stable  -c/w lantus 5 u sq qhs    -c/w HSS  -c/w accuchecks ACHS TID   -c/w hypoglycemia protocol

## 2018-03-13 NOTE — PROGRESS NOTE ADULT - PROBLEM SELECTOR PLAN 1
Afebrile, WBC wnl;    unclear etiology, may be partially tx UTI given at the facility pt was on rocephin and also was on flagyl to complete course on 3/14/18   -C. Diff negative   -flagyl discontinued for now will obtain information from the facility in regards to positive C. Diff or ppx measure as to why the patient was placed on flagyl. If positive result then will restart medication to finish course. If not will maintain off of flagyl.   -UA indicative of bacteruria, but no pyuria;  beltrán replaced on presentation in the ED  -Sacral wound is healing no evidence of infection   -Blood culture results preliminary negative awaiting final results   -c/w florastor 250mg po bid  -ID f/u noted and appreciated and d/w Dr. Gomez the above. Will maintain off of abx and monitor the patient. -Afebrile since admission    -leukocytosis trended up to 16 and procalcitonin increase   unclear etiology initially but now though to be secondary to partially tx UTI given at the facility pt was on rocephin for uti and flagyl for c. diff      -C. Diff negative and flagyl was discontinued   -UA indicative of bacteruria, but no pyuria;  beltrán replaced on presentation in the ED but U cx resulted with carbapenem resistant pseudomonas so empiric tx with cefepime initiated   -Sacral wound is healing no evidence of infection, d/w RN and ACS to have wound vac placed   -Blood culture negative    -c/w florastor 250mg po bid  -ID f/u noted and appreciated and d/w Dr. Gomez the above plan of care.

## 2018-03-13 NOTE — PROGRESS NOTE ADULT - SUBJECTIVE AND OBJECTIVE BOX
KAMAR BELLAMY is a 62y Male with HPI:  61y/o male with pmh of PMH Transverse myelitis, Paraplegia, trach/peg, colostomy, HTN, coming from [facility] presents with fever for 3 days.      Patient denies any cough, sob, runny nose, vomiting, diarrhea, abdominal pain, pain with urination.    Patient signed [facility] REQUESTING CPR on 2/18/18.    Patient eats via tube feeding,.      Originally at [facility] in   7months ago, developed Acute transverse myelitis.    Heart Attack, stent placed, check up after.  He was given a tetanus shot, then couple days ago he developed lower extremity weakness, paralysis; ultimately developed ulcers.      [facility]--> [facility] (kept him for a few weeks) --> wife couldn't take care of him;    He is getting frequent uti's, anemia reequiring transfusions.    101 fever at [facility]   BLOOD CX SO FAR NEG  C DIFF NEG  DECUBITUS ULCER CLEAN      Allergies:  No Known Allergies      Medications:  ALPRAZolam 0.5 milliGRAM(s) Oral three times a day PRN  ascorbic acid 500 milliGRAM(s) Oral daily  aspirin  chewable 81 milliGRAM(s) Oral daily  baclofen 10 milliGRAM(s) Oral two times a day  clopidogrel Tablet 75 milliGRAM(s) Oral daily  dextrose 5%. 1000 milliLiter(s) IV Continuous <Continuous>  dextrose 50% Injectable 12.5 Gram(s) IV Push once  dextrose 50% Injectable 25 Gram(s) IV Push once  dextrose 50% Injectable 25 Gram(s) IV Push once  glucagon  Injectable 1 milliGRAM(s) IntraMuscular once PRN  heparin  Injectable 5000 Unit(s) SubCutaneous every 8 hours  insulin regular  human corrective regimen sliding scale   SubCutaneous every 6 hours  metroNIDAZOLE    Tablet 500 milliGRAM(s) Oral every 8 hours  multivitamin   Solution 5 milliLiter(s) Oral daily  oxyCODONE    IR 10 milliGRAM(s) Oral every 6 hours PRN  pantoprazole   Suspension 40 milliGRAM(s) Enteral Tube before breakfast  petrolatum Ophthalmic Ointment 1 Application(s) Both EYES at bedtime      ANTIBIOTICS:         Review of Systems: - Negative except as mentioned above     Physical Exam:   Vital Signs Last 24 Hrs  T(C): 36.9 (13 Mar 2018 09:13), Max: 36.9 (13 Mar 2018 09:13)  T(F): 98.4 (13 Mar 2018 09:13), Max: 98.4 (13 Mar 2018 09:13)  HR: 115 (13 Mar 2018 09:13) (102 - 116)  BP: 136/80 (13 Mar 2018 09:13) (136/80 - 144/76)  BP(mean): --  RR: 20 (13 Mar 2018 09:13) (18 - 20)  SpO2: 96% (13 Mar 2018 09:13) (95% - 97%)    GEN: NAD, pleasant  HEENT: normocephalic and atraumatic. EOMI. JAMESON...  NECK: Supple. No carotid bruits.  No lymphadenopathy or thyromegaly.  LUNGS: Clear to auscultation.  HEART: Regular rate and rhythm without murmur.  ABDOMEN: Soft, nontender, and nondistended.  Positive bowel sounds.  No hepatosplenomegaly was noted.  NO REBOUND NO GUARDING  EXTREMITIES: Without any cyanosis, clubbing, rash, lesions or edema.  NEUROLOGIC: Cranial nerves II through XII are grossly intact.    SKIN: No ulceration or induration present.      Labs:                        11.3   15.1  )-----------( 473      ( 13 Mar 2018 08:28 )             37.2

## 2018-03-13 NOTE — DIETITIAN INITIAL EVALUATION ADULT. - PERTINENT LABORATORY DATA
03-12 Na141 mmol/L Glu 184 mg/dL<H> K+ 4.4 mmol/L Cr  0.53 mg/dL BUN 17.0 mg/dL Phos 3.7 mg/dL Alb n/a   PAB n/a

## 2018-03-13 NOTE — DIETITIAN INITIAL EVALUATION ADULT. - PROBLEM SELECTOR PLAN 1
Admit to any floor, monitored bed and , under care of   peg tube feedings;  Activity Bedrest;  Patient admitted for likely Sepsis in the setting of fever, tachycardia, known previous use of antibiotics, previous cdiff infections.  Pending CBC, CMP, Mag, PHos, UA, Urine Culture, EKG;  >2liters O2 via NC, keep O2 sat >92%  Cxray, CT chest;    ID consult;    Avoid any antidiarrhea agents.    Metronidazole 500mg po tid  10-14 days.    Florastor 250mg bid

## 2018-03-14 LAB
ANION GAP SERPL CALC-SCNC: 10 MMOL/L — SIGNIFICANT CHANGE UP (ref 5–17)
BASE EXCESS BLDA CALC-SCNC: 7.5 MMOL/L — HIGH (ref -2–2)
BASOPHILS # BLD AUTO: 0 K/UL — SIGNIFICANT CHANGE UP (ref 0–0.2)
BASOPHILS NFR BLD AUTO: 0.1 % — SIGNIFICANT CHANGE UP (ref 0–2)
BLOOD GAS COMMENTS ARTERIAL: SIGNIFICANT CHANGE UP
BUN SERPL-MCNC: 16 MG/DL — SIGNIFICANT CHANGE UP (ref 8–20)
CALCIUM SERPL-MCNC: 9.4 MG/DL — SIGNIFICANT CHANGE UP (ref 8.6–10.2)
CHLORIDE SERPL-SCNC: 100 MMOL/L — SIGNIFICANT CHANGE UP (ref 98–107)
CO2 SERPL-SCNC: 28 MMOL/L — SIGNIFICANT CHANGE UP (ref 22–29)
CREAT SERPL-MCNC: 0.49 MG/DL — LOW (ref 0.5–1.3)
CRP SERPL-MCNC: 9.8 MG/DL — HIGH (ref 0–0.4)
CULTURE RESULTS: SIGNIFICANT CHANGE UP
EOSINOPHIL # BLD AUTO: 0.4 K/UL — SIGNIFICANT CHANGE UP (ref 0–0.5)
EOSINOPHIL NFR BLD AUTO: 2.3 % — SIGNIFICANT CHANGE UP (ref 0–5)
GAS PNL BLDA: SIGNIFICANT CHANGE UP
GLUCOSE BLDC GLUCOMTR-MCNC: 180 MG/DL — HIGH (ref 70–99)
GLUCOSE BLDC GLUCOMTR-MCNC: 184 MG/DL — HIGH (ref 70–99)
GLUCOSE BLDC GLUCOMTR-MCNC: 205 MG/DL — HIGH (ref 70–99)
GLUCOSE BLDC GLUCOMTR-MCNC: 218 MG/DL — HIGH (ref 70–99)
GLUCOSE BLDC GLUCOMTR-MCNC: 219 MG/DL — HIGH (ref 70–99)
GLUCOSE SERPL-MCNC: 198 MG/DL — HIGH (ref 70–115)
HCO3 BLDA-SCNC: 31 MMOL/L — HIGH (ref 20–26)
HCT VFR BLD CALC: 36.2 % — LOW (ref 42–52)
HGB BLD-MCNC: 11.2 G/DL — LOW (ref 14–18)
HOROWITZ INDEX BLDA+IHG-RTO: SIGNIFICANT CHANGE UP
LYMPHOCYTES # BLD AUTO: 1 K/UL — SIGNIFICANT CHANGE UP (ref 1–4.8)
LYMPHOCYTES # BLD AUTO: 6.4 % — LOW (ref 20–55)
MCHC RBC-ENTMCNC: 29 PG — SIGNIFICANT CHANGE UP (ref 27–31)
MCHC RBC-ENTMCNC: 30.9 G/DL — LOW (ref 32–36)
MCV RBC AUTO: 93.8 FL — SIGNIFICANT CHANGE UP (ref 80–94)
MONOCYTES # BLD AUTO: 1.1 K/UL — HIGH (ref 0–0.8)
MONOCYTES NFR BLD AUTO: 6.6 % — SIGNIFICANT CHANGE UP (ref 3–10)
NEUTROPHILS # BLD AUTO: 13.8 K/UL — HIGH (ref 1.8–8)
NEUTROPHILS NFR BLD AUTO: 84.3 % — HIGH (ref 37–73)
PCO2 BLDA: 50 MMHG — HIGH (ref 35–45)
PH BLDA: 7.43 — SIGNIFICANT CHANGE UP (ref 7.35–7.45)
PLATELET # BLD AUTO: 468 K/UL — HIGH (ref 150–400)
PO2 BLDA: 73 MMHG — LOW (ref 83–108)
POTASSIUM SERPL-MCNC: 4.8 MMOL/L — SIGNIFICANT CHANGE UP (ref 3.5–5.3)
POTASSIUM SERPL-SCNC: 4.8 MMOL/L — SIGNIFICANT CHANGE UP (ref 3.5–5.3)
RBC # BLD: 3.86 M/UL — LOW (ref 4.6–6.2)
RBC # FLD: 17.2 % — HIGH (ref 11–15.6)
SAO2 % BLDA: 95 % — SIGNIFICANT CHANGE UP (ref 95–99)
SODIUM SERPL-SCNC: 138 MMOL/L — SIGNIFICANT CHANGE UP (ref 135–145)
SPECIMEN SOURCE: SIGNIFICANT CHANGE UP
WBC # BLD: 16.4 K/UL — HIGH (ref 4.8–10.8)
WBC # FLD AUTO: 16.4 K/UL — HIGH (ref 4.8–10.8)

## 2018-03-14 PROCEDURE — 99222 1ST HOSP IP/OBS MODERATE 55: CPT

## 2018-03-14 PROCEDURE — 99233 SBSQ HOSP IP/OBS HIGH 50: CPT | Mod: GC

## 2018-03-14 RX ORDER — ALBUTEROL 90 UG/1
2.5 AEROSOL, METERED ORAL EVERY 6 HOURS
Qty: 0 | Refills: 0 | Status: DISCONTINUED | OUTPATIENT
Start: 2018-03-14 | End: 2018-04-13

## 2018-03-14 RX ORDER — FUROSEMIDE 40 MG
20 TABLET ORAL ONCE
Qty: 0 | Refills: 0 | Status: COMPLETED | OUTPATIENT
Start: 2018-03-14 | End: 2018-03-14

## 2018-03-14 RX ADMIN — Medication 650 MILLIGRAM(S): at 18:01

## 2018-03-14 RX ADMIN — OXYCODONE HYDROCHLORIDE 10 MILLIGRAM(S): 5 TABLET ORAL at 23:00

## 2018-03-14 RX ADMIN — INSULIN HUMAN 1: 100 INJECTION, SOLUTION SUBCUTANEOUS at 23:39

## 2018-03-14 RX ADMIN — Medication 500 MILLIGRAM(S): at 09:13

## 2018-03-14 RX ADMIN — Medication 10 MILLIGRAM(S): at 17:13

## 2018-03-14 RX ADMIN — Medication 81 MILLIGRAM(S): at 09:13

## 2018-03-14 RX ADMIN — OXYCODONE HYDROCHLORIDE 10 MILLIGRAM(S): 5 TABLET ORAL at 09:13

## 2018-03-14 RX ADMIN — HEPARIN SODIUM 5000 UNIT(S): 5000 INJECTION INTRAVENOUS; SUBCUTANEOUS at 22:03

## 2018-03-14 RX ADMIN — CLOPIDOGREL BISULFATE 75 MILLIGRAM(S): 75 TABLET, FILM COATED ORAL at 09:13

## 2018-03-14 RX ADMIN — OXYCODONE HYDROCHLORIDE 10 MILLIGRAM(S): 5 TABLET ORAL at 02:00

## 2018-03-14 RX ADMIN — Medication 0.5 MILLIGRAM(S): at 09:13

## 2018-03-14 RX ADMIN — Medication 650 MILLIGRAM(S): at 07:19

## 2018-03-14 RX ADMIN — INSULIN HUMAN 1: 100 INJECTION, SOLUTION SUBCUTANEOUS at 17:12

## 2018-03-14 RX ADMIN — INSULIN HUMAN 2: 100 INJECTION, SOLUTION SUBCUTANEOUS at 01:16

## 2018-03-14 RX ADMIN — Medication 0.5 MILLIGRAM(S): at 17:13

## 2018-03-14 RX ADMIN — HEPARIN SODIUM 5000 UNIT(S): 5000 INJECTION INTRAVENOUS; SUBCUTANEOUS at 12:16

## 2018-03-14 RX ADMIN — Medication 20 MILLIGRAM(S): at 18:07

## 2018-03-14 RX ADMIN — HEPARIN SODIUM 5000 UNIT(S): 5000 INJECTION INTRAVENOUS; SUBCUTANEOUS at 06:17

## 2018-03-14 RX ADMIN — OXYCODONE HYDROCHLORIDE 10 MILLIGRAM(S): 5 TABLET ORAL at 22:03

## 2018-03-14 RX ADMIN — OXYCODONE HYDROCHLORIDE 10 MILLIGRAM(S): 5 TABLET ORAL at 15:09

## 2018-03-14 RX ADMIN — Medication 1 APPLICATION(S): at 22:03

## 2018-03-14 RX ADMIN — OXYCODONE HYDROCHLORIDE 10 MILLIGRAM(S): 5 TABLET ORAL at 10:07

## 2018-03-14 RX ADMIN — Medication 650 MILLIGRAM(S): at 17:13

## 2018-03-14 RX ADMIN — Medication 650 MILLIGRAM(S): at 06:53

## 2018-03-14 RX ADMIN — INSULIN GLARGINE 5 UNIT(S): 100 INJECTION, SOLUTION SUBCUTANEOUS at 23:39

## 2018-03-14 RX ADMIN — Medication 10 MILLIGRAM(S): at 06:17

## 2018-03-14 RX ADMIN — OXYCODONE HYDROCHLORIDE 10 MILLIGRAM(S): 5 TABLET ORAL at 16:00

## 2018-03-14 RX ADMIN — CEFEPIME 100 MILLIGRAM(S): 1 INJECTION, POWDER, FOR SOLUTION INTRAMUSCULAR; INTRAVENOUS at 06:22

## 2018-03-14 RX ADMIN — INSULIN HUMAN 2: 100 INJECTION, SOLUTION SUBCUTANEOUS at 12:14

## 2018-03-14 RX ADMIN — Medication 5 MILLILITER(S): at 09:15

## 2018-03-14 RX ADMIN — INSULIN HUMAN 2: 100 INJECTION, SOLUTION SUBCUTANEOUS at 06:39

## 2018-03-14 RX ADMIN — CEFEPIME 100 MILLIGRAM(S): 1 INJECTION, POWDER, FOR SOLUTION INTRAMUSCULAR; INTRAVENOUS at 17:12

## 2018-03-14 RX ADMIN — Medication 0.5 MILLIGRAM(S): at 22:03

## 2018-03-14 RX ADMIN — PANTOPRAZOLE SODIUM 40 MILLIGRAM(S): 20 TABLET, DELAYED RELEASE ORAL at 06:17

## 2018-03-14 RX ADMIN — OXYCODONE HYDROCHLORIDE 10 MILLIGRAM(S): 5 TABLET ORAL at 01:07

## 2018-03-14 RX ADMIN — Medication 250 MILLIGRAM(S): at 17:13

## 2018-03-14 RX ADMIN — Medication 250 MILLIGRAM(S): at 06:17

## 2018-03-14 RX ADMIN — INSULIN GLARGINE 5 UNIT(S): 100 INJECTION, SOLUTION SUBCUTANEOUS at 01:08

## 2018-03-14 NOTE — PROGRESS NOTE ADULT - SUBJECTIVE AND OBJECTIVE BOX
Patient is a 62y old  Male who presents with a cc of fevers (10 Mar 2018 23:26)      Patient seen and examined at bedside. No acute events overnight. Communication with patient via by head nods and deciphering his mouthed words.  Patient states he is feeling "not good", but was unable to elaborate further.  he denied pain      MEDICATIONS  (STANDING):  ascorbic acid 500 milliGRAM(s) Oral daily  aspirin  chewable 81 milliGRAM(s) Oral daily  baclofen 10 milliGRAM(s) Oral two times a day  cefepime  IVPB      cefepime  IVPB 1000 milliGRAM(s) IV Intermittent every 12 hours  clopidogrel Tablet 75 milliGRAM(s) Oral daily  dextrose 5%. 1000 milliLiter(s) (50 mL/Hr) IV Continuous <Continuous>  dextrose 50% Injectable 12.5 Gram(s) IV Push once  dextrose 50% Injectable 25 Gram(s) IV Push once  dextrose 50% Injectable 25 Gram(s) IV Push once  heparin  Injectable 5000 Unit(s) SubCutaneous every 8 hours  insulin glargine Injectable (LANTUS) 5 Unit(s) SubCutaneous at bedtime  insulin regular  human corrective regimen sliding scale   SubCutaneous every 6 hours  multivitamin   Solution 5 milliLiter(s) Oral daily  pantoprazole   Suspension 40 milliGRAM(s) Enteral Tube before breakfast  petrolatum Ophthalmic Ointment 1 Application(s) Both EYES at bedtime  saccharomyces boulardii 250 milliGRAM(s) Oral two times a day    MEDICATIONS  (PRN):  acetaminophen   Tablet. 650 milliGRAM(s) Oral every 6 hours PRN Moderate Pain (4 - 6) and headache  ALPRAZolam 0.5 milliGRAM(s) Oral three times a day PRN anxiety  glucagon  Injectable 1 milliGRAM(s) IntraMuscular once PRN Glucose <70 milliGRAM(s)/deciLiter  oxyCODONE    IR 10 milliGRAM(s) Oral every 6 hours PRN Moderate Pain (4 - 6)      Vital Signs Last 24 Hrs  T(C): 37.2 (14 Mar 2018 06:12), Max: 37.2 (14 Mar 2018 06:12)  T(F): 99 (14 Mar 2018 06:12), Max: 99 (14 Mar 2018 06:12)  HR: 120 (14 Mar 2018 06:12) (104 - 120)  BP: 157/96 (14 Mar 2018 06:12) (132/78 - 159/91)  BP(mean): --  RR: 20 (13 Mar 2018 23:33) (20 - 21)  SpO2: 99% (14 Mar 2018 06:12) (93% - 99%)    I&O's Detail    13 Mar 2018 07:01  -  14 Mar 2018 07:00  --------------------------------------------------------  IN:    Enteral Tube Flush: 1000 mL    Glucerna: 1280 mL    IV PiggyBack: 50 mL  Total IN: 2330 mL    OUT:    Colostomy: 150 mL    Indwelling Catheter - Urethral: 1250 mL  Total OUT: 1400 mL    Total NET: 930 mL                                11.2   16.4  )-----------( 468      ( 14 Mar 2018 06:34 )             36.2     03-14    138  |  100  |  16.0  ----------------------------<  198<H>  4.8   |  28.0  |  0.49<L>    Ca    9.4      14 Mar 2018 06:34            CAPILLARY BLOOD GLUCOSE      POCT Blood Glucose.: 219 mg/dL (14 Mar 2018 06:30)  POCT Blood Glucose.: 218 mg/dL (14 Mar 2018 01:04)  POCT Blood Glucose.: 192 mg/dL (13 Mar 2018 16:53)  POCT Blood Glucose.: 199 mg/dL (13 Mar 2018 11:40)            PHYSICAL EXAM:   Gen: adult male lying supine in NAD  HEENT: NCAT, EOMI, PERRLA, 3mm b/l; moist mucous membranes, no thrush; trach collar in place   Neck:  Supple, No Supraclavicular/submandibular lymphadenopathy;   Neuro:  Alert & Oriented x 3;  paralyzed in b/l lower ext, and left upper ext;    Resp: coarse breath sounds b/l  CV: S1, S2;  sinus tachycardia, No murmurs appreciated  GI: Soft, Nontender, Nondistended; Bowel sounds present;    COLOSTOMY BAG PRESENT IN LOWER ABDOMEN;  stool watery brown;    :  Iniguez in place;  Urine is amaya colored; not clouded;    Ext:  2+ Peripheral Pulses DP, No clubbing, cyanosis, or edema;   Skin:  Erythematous violacious patch in the Right Chest, nontender Patient is a 62y old  Male who presents with a cc of fevers (10 Mar 2018 23:26)      Patient seen and examined at bedside. No acute events overnight. Communication with patient via by head nods and deciphering his mouthed words.  Patient states he is feeling "not good", but was unable to elaborate further. Patient  just kept saying he does not feel well. Patient denies chest pain, SOB, abd pain, N/V, fever, chills or any other complaints. All remainder ROS negative.       MEDICATIONS  (STANDING):  ascorbic acid 500 milliGRAM(s) Oral daily  aspirin  chewable 81 milliGRAM(s) Oral daily  baclofen 10 milliGRAM(s) Oral two times a day  cefepime  IVPB      cefepime  IVPB 1000 milliGRAM(s) IV Intermittent every 12 hours  clopidogrel Tablet 75 milliGRAM(s) Oral daily  dextrose 5%. 1000 milliLiter(s) (50 mL/Hr) IV Continuous <Continuous>  dextrose 50% Injectable 12.5 Gram(s) IV Push once  dextrose 50% Injectable 25 Gram(s) IV Push once  dextrose 50% Injectable 25 Gram(s) IV Push once  heparin  Injectable 5000 Unit(s) SubCutaneous every 8 hours  insulin glargine Injectable (LANTUS) 5 Unit(s) SubCutaneous at bedtime  insulin regular  human corrective regimen sliding scale   SubCutaneous every 6 hours  multivitamin   Solution 5 milliLiter(s) Oral daily  pantoprazole   Suspension 40 milliGRAM(s) Enteral Tube before breakfast  petrolatum Ophthalmic Ointment 1 Application(s) Both EYES at bedtime  saccharomyces boulardii 250 milliGRAM(s) Oral two times a day    MEDICATIONS  (PRN):  acetaminophen   Tablet. 650 milliGRAM(s) Oral every 6 hours PRN Moderate Pain (4 - 6) and headache  ALPRAZolam 0.5 milliGRAM(s) Oral three times a day PRN anxiety  glucagon  Injectable 1 milliGRAM(s) IntraMuscular once PRN Glucose <70 milliGRAM(s)/deciLiter  oxyCODONE    IR 10 milliGRAM(s) Oral every 6 hours PRN Moderate Pain (4 - 6)      Vital Signs Last 24 Hrs  T(C): 37.2 (14 Mar 2018 06:12), Max: 37.2 (14 Mar 2018 06:12)  T(F): 99 (14 Mar 2018 06:12), Max: 99 (14 Mar 2018 06:12)  HR: 120 (14 Mar 2018 06:12) (104 - 120)  BP: 157/96 (14 Mar 2018 06:12) (132/78 - 159/91)  BP(mean): --  RR: 20 (13 Mar 2018 23:33) (20 - 21)  SpO2: 99% (14 Mar 2018 06:12) (93% - 99%)    I&O's Detail    13 Mar 2018 07:01  -  14 Mar 2018 07:00  --------------------------------------------------------  IN:    Enteral Tube Flush: 1000 mL    Glucerna: 1280 mL    IV PiggyBack: 50 mL  Total IN: 2330 mL    OUT:    Colostomy: 150 mL    Indwelling Catheter - Urethral: 1250 mL  Total OUT: 1400 mL    Total NET: 930 mL                                11.2   16.4  )-----------( 468      ( 14 Mar 2018 06:34 )             36.2     03-14    138  |  100  |  16.0  ----------------------------<  198<H>  4.8   |  28.0  |  0.49<L>    Ca    9.4      14 Mar 2018 06:34      CAPILLARY BLOOD GLUCOSE  POCT Blood Glucose.: 219 mg/dL (14 Mar 2018 06:30)  POCT Blood Glucose.: 218 mg/dL (14 Mar 2018 01:04)  POCT Blood Glucose.: 192 mg/dL (13 Mar 2018 16:53)  POCT Blood Glucose.: 199 mg/dL (13 Mar 2018 11:40)            PHYSICAL EXAM:   Gen: adult male lying supine in NAD  HEENT: NCAT, EOMI, PERRLA, 3mm b/l; moist mucous membranes, no thrush; trach collar in place   Neck:  Supple, No Supraclavicular/submandibular lymphadenopathy;   Neuro:  Alert & Oriented x 3;  paralyzed in b/l lower ext, and left upper ext;    Resp: coarse breath sounds b/l  CV: S1, S2;  sinus tachycardia, No murmurs appreciated  GI: Soft, Nontender, Nondistended; Bowel sounds present;    colostomy bag with brown stool, peg in place     :  Iniguez in place     Ext:  2+ Peripheral Pulses DP, No clubbing, cyanosis, or edema;   Skin:  Erythematous violaceous patch in the Right Chest, nontender

## 2018-03-14 NOTE — CONSULT NOTE ADULT - SUBJECTIVE AND OBJECTIVE BOX
PULMONARY CONSULT NOTE      KAMAR BELLAMY  MRN-[id number]    Patient is a 62y old  Male who presents with a chief complaint of fevers (12 Mar 2018 18:28)      HISTORY OF PRESENT ILLNESS:  63y/o male with pmh of PMH Transverse myelitis, Paraplegia, trach/peg, colostomy, CAD s/p stent, frequent UTI, HTN, coming from [facility] with fever for 3 days.  It is unclear whether he requires nocturnal vent support as he has been doing well off the vent on TC alone since admission on 3/10/18. He is awake and alert and tries to answer questions but unable to phonate.    MEDICATIONS  (STANDING):  ascorbic acid 500 milliGRAM(s) Oral daily  aspirin  chewable 81 milliGRAM(s) Oral daily  baclofen 10 milliGRAM(s) Oral two times a day  cefepime  IVPB      cefepime  IVPB 1000 milliGRAM(s) IV Intermittent every 12 hours  clopidogrel Tablet 75 milliGRAM(s) Oral daily  dextrose 5%. 1000 milliLiter(s) (50 mL/Hr) IV Continuous <Continuous>  dextrose 50% Injectable 12.5 Gram(s) IV Push once  dextrose 50% Injectable 25 Gram(s) IV Push once  dextrose 50% Injectable 25 Gram(s) IV Push once  heparin  Injectable 5000 Unit(s) SubCutaneous every 8 hours  insulin glargine Injectable (LANTUS) 5 Unit(s) SubCutaneous at bedtime  insulin regular  human corrective regimen sliding scale   SubCutaneous every 6 hours  multivitamin   Solution 5 milliLiter(s) Oral daily  pantoprazole   Suspension 40 milliGRAM(s) Enteral Tube before breakfast  petrolatum Ophthalmic Ointment 1 Application(s) Both EYES at bedtime  saccharomyces boulardii 250 milliGRAM(s) Oral two times a day      MEDICATIONS  (PRN):  acetaminophen   Tablet. 650 milliGRAM(s) Oral every 6 hours PRN Moderate Pain (4 - 6) and headache  ALPRAZolam 0.5 milliGRAM(s) Oral three times a day PRN anxiety  glucagon  Injectable 1 milliGRAM(s) IntraMuscular once PRN Glucose <70 milliGRAM(s)/deciLiter  oxyCODONE    IR 10 milliGRAM(s) Oral every 6 hours PRN Moderate Pain (4 - 6)      Allergies    No Known Allergies    Intolerances        PAST MEDICAL & SURGICAL HISTORY:  Essential hypertension  Paralysis  Transverse myelitis  History of tracheostomy      FAMILY HISTORY:  No pertinent family history in first degree relatives      SOCIAL HISTORY  Smoking History: Not a smoker    REVIEW OF SYSTEMS: Unable to obtain as he cannot phonate    Vital Signs Last 24 Hrs  T(C): 36.9 (14 Mar 2018 12:35), Max: 37.2 (14 Mar 2018 06:12)  T(F): 98.5 (14 Mar 2018 12:35), Max: 99 (14 Mar 2018 06:12)  HR: 109 (14 Mar 2018 12:35) (104 - 120)  BP: 141/88 (14 Mar 2018 12:35) (132/78 - 159/91)  BP(mean): --  RR: 19 (14 Mar 2018 12:35) (19 - 21)  SpO2: 96% (14 Mar 2018 12:35) (93% - 99%)    PHYSICAL EXAMINATION:    GENERAL: The patient is a well-developed, well-nourished male in no apparent distress.     HEENT: Head is normocephalic and atraumatic. Extraocular muscles are intact. Mucous membranes are moist.     NECK: Supple. + trach.    LUNGS: Mild b/l rhonchi otherwise CTA without wheezing or rales. Respirations unlabored    HEART: Regular rate and rhythm without murmur.    ABDOMEN: Soft, nontender, and nondistended.  No hepatosplenomegaly is noted.    EXTREMITIES: Without any cyanosis, clubbing, rash, lesions or edema.    NEUROLOGIC: LE paraplegia.      LABS:                        11.2   16.4  )-----------( 468      ( 14 Mar 2018 06:34 )             36.2     03-14    138  |  100  |  16.0  ----------------------------<  198<H>  4.8   |  28.0  |  0.49<L>    Ca    9.4      14 Mar 2018 06:34        Urinalysis Basic - ( 13 Mar 2018 17:28 )    Color: Yellow / Appearance: Clear / S.010 / pH: x  Gluc: x / Ketone: Negative  / Bili: Negative / Urobili: Negative mg/dL   Blood: x / Protein: Negative mg/dL / Nitrite: Negative   Leuk Esterase: Trace / RBC: 0-2 /HPF / WBC 3-5   Sq Epi: x / Non Sq Epi: Occasional / Bacteria: Occasional      Procalcitonin, Serum: 0.21 ng/mL (18 @ 13:36)  Procalcitonin, Serum: 0.14 ng/mL (18 @ 07:26)      RADIOLOGY & ADDITIONAL STUDIES:       EXAM:  XR CHEST PORTABLE URGENT 1V                          PROCEDURE DATE:  2018          INTERPRETATION:  CHEST AP PORTABLE:    History: r.o pna. Fever.    Date and time of exam: 3/13/2018 3:19 PM.    Technique: A single AP view of the chest was obtained.    Comparison exam: 3/10/2018 6:10 PM.    Findings:  The lungs are clear. No evidence of parenchymal infiltrate. The heart and   mediastinum are unremarkable. Again noted is a tracheostomy tube..    Impression:  Clear lungs.      GINNA LEÓN M.D., ATTENDING RADIOLOGIST  This document has been electronically signed. Mar 13 2018  4:05PM PULMONARY CONSULT NOTE      KAMAR BELLAMY  MRN-[id number]    Patient is a 62y old  Male who presents with a chief complaint of fevers (12 Mar 2018 18:28)      HISTORY OF PRESENT ILLNESS:  63y/o male with pmh of PMH Transverse myelitis, Paraplegia, trach/peg, colostomy, CAD s/p stent, frequent UTI, HTN, coming from [facility] with fever for 3 days.  It is unclear whether he requires nocturnal vent support as he has been doing well off the vent on TC alone since admission on 3/10/18. He is awake and alert and tries to answer questions but unable to phonate.    MEDICATIONS  (STANDING):  ascorbic acid 500 milliGRAM(s) Oral daily  aspirin  chewable 81 milliGRAM(s) Oral daily  baclofen 10 milliGRAM(s) Oral two times a day  cefepime  IVPB      cefepime  IVPB 1000 milliGRAM(s) IV Intermittent every 12 hours  clopidogrel Tablet 75 milliGRAM(s) Oral daily  dextrose 5%. 1000 milliLiter(s) (50 mL/Hr) IV Continuous <Continuous>  dextrose 50% Injectable 12.5 Gram(s) IV Push once  dextrose 50% Injectable 25 Gram(s) IV Push once  dextrose 50% Injectable 25 Gram(s) IV Push once  heparin  Injectable 5000 Unit(s) SubCutaneous every 8 hours  insulin glargine Injectable (LANTUS) 5 Unit(s) SubCutaneous at bedtime  insulin regular  human corrective regimen sliding scale   SubCutaneous every 6 hours  multivitamin   Solution 5 milliLiter(s) Oral daily  pantoprazole   Suspension 40 milliGRAM(s) Enteral Tube before breakfast  petrolatum Ophthalmic Ointment 1 Application(s) Both EYES at bedtime  saccharomyces boulardii 250 milliGRAM(s) Oral two times a day      MEDICATIONS  (PRN):  acetaminophen   Tablet. 650 milliGRAM(s) Oral every 6 hours PRN Moderate Pain (4 - 6) and headache  ALPRAZolam 0.5 milliGRAM(s) Oral three times a day PRN anxiety  glucagon  Injectable 1 milliGRAM(s) IntraMuscular once PRN Glucose <70 milliGRAM(s)/deciLiter  oxyCODONE    IR 10 milliGRAM(s) Oral every 6 hours PRN Moderate Pain (4 - 6)      Allergies    No Known Allergies    Intolerances        PAST MEDICAL & SURGICAL HISTORY:  Essential hypertension  Paralysis  Transverse myelitis  History of tracheostomy      FAMILY HISTORY:  No pertinent family history in first degree relatives      SOCIAL HISTORY  Smoking History: Not a smoker; denies smoking hx    REVIEW OF SYSTEMS: Unable to obtain as he cannot phonate    Vital Signs Last 24 Hrs  T(C): 36.9 (14 Mar 2018 12:35), Max: 37.2 (14 Mar 2018 06:12)  T(F): 98.5 (14 Mar 2018 12:35), Max: 99 (14 Mar 2018 06:12)  HR: 109 (14 Mar 2018 12:35) (104 - 120)  BP: 141/88 (14 Mar 2018 12:35) (132/78 - 159/91)  BP(mean): --  RR: 19 (14 Mar 2018 12:35) (19 - 21)  SpO2: 96% (14 Mar 2018 12:35) (93% - 99%)    PHYSICAL EXAMINATION:    GENERAL: The patient is a well-developed, well-nourished male in no apparent distress.     HEENT: Head is normocephalic and atraumatic. Extraocular muscles are intact. Mucous membranes are moist.     NECK: Supple. + trach.    LUNGS: Mild b/l rhonchi otherwise CTA without wheezing or rales. Respirations unlabored    HEART: Regular rate and rhythm without murmur.    ABDOMEN: Soft, nontender, and nondistended.  No hepatosplenomegaly is noted.    EXTREMITIES: Without any cyanosis, clubbing, rash, lesions or edema.    NEUROLOGIC:  LE and UE paraplegia; can move hands      LABS:                        11.2   16.4  )-----------( 468      ( 14 Mar 2018 06:34 )             36.2     03-14    138  |  100  |  16.0  ----------------------------<  198<H>  4.8   |  28.0  |  0.49<L>    Ca    9.4      14 Mar 2018 06:34        Urinalysis Basic - ( 13 Mar 2018 17:28 )    Color: Yellow / Appearance: Clear / S.010 / pH: x  Gluc: x / Ketone: Negative  / Bili: Negative / Urobili: Negative mg/dL   Blood: x / Protein: Negative mg/dL / Nitrite: Negative   Leuk Esterase: Trace / RBC: 0-2 /HPF / WBC 3-5   Sq Epi: x / Non Sq Epi: Occasional / Bacteria: Occasional      Procalcitonin, Serum: 0.21 ng/mL (18 @ 13:36)  Procalcitonin, Serum: 0.14 ng/mL (18 @ 07:26)      RADIOLOGY & ADDITIONAL STUDIES:       EXAM:  XR CHEST PORTABLE URGENT 1V                          PROCEDURE DATE:  2018          INTERPRETATION:  CHEST AP PORTABLE:    History: r.o pna. Fever.    Date and time of exam: 3/13/2018 3:19 PM.    Technique: A single AP view of the chest was obtained.    Comparison exam: 3/10/2018 6:10 PM.    Findings:  The lungs are clear. No evidence of parenchymal infiltrate. The heart and   mediastinum are unremarkable. Again noted is a tracheostomy tube..    Impression:  Clear lungs.      GINNA LEÓN M.D., ATTENDING RADIOLOGIST  This document has been electronically signed. Mar 13 2018  4:05PM

## 2018-03-14 NOTE — PROGRESS NOTE ADULT - PROBLEM SELECTOR PLAN 3
Known sacral ulcer stage 3 healing well   -c/w wound care cleaning and dressing change every 2 days;    -wound vac removed and needs to be replaced  -Surgery/ wound care RN consulted for wound vac placement Known sacral ulcer stage 3 healing well   -c/w wound care cleaning and dressing change q bid   -wound vac removed and needs to be replaced but as per wound care RN should c/w dakins bid x 1 week and then consider placing wound vac back on b/c of greenish serous fluid   -wound care consult noted and appreciated and d/w wound care RN the plan of care

## 2018-03-14 NOTE — PROGRESS NOTE ADULT - PROBLEM SELECTOR PLAN 6
-bp stable  -bp mildly elevated this morning likely from agitation -bp stable  -bp mildly elevated  intermittently but likely from agitation

## 2018-03-14 NOTE — PROGRESS NOTE ADULT - ASSESSMENT
61y/o male with pmh of PMH Transverse myelitis, Paraplegia, trach/peg, colostomy, HTN, coming from [facility] presents with fever for 3 days  C DIFF NEG    FLAGYL D/C'D  BLOOD CX NEG SO FAR OFF ABX   AFEBRILE  HERE.    CT ABD NEG NO OBVIOUS PNEUMONIA AT BASES  SACRAL DECUBITUS CLEAN  URINE CX 50-90K PSEUDOMONAS UNCLEAR SIGNIFICANCE   BUT WITH ELEVATED WBC AGREE WITH SHORT COURSE OF IV ABX WILL FOLLOW UP   D/W DR MARCELO

## 2018-03-14 NOTE — CONSULT NOTE ADULT - ASSESSMENT
Transverse myelitis  Paraplegia  Chronic trach   Unclear if requires ventilatory support  Chronic hypoxia    Plan:    Complete abx  TC O2 - titrate as tolerates  GI/DVT prophylaxis  Check ABG to evaluate long term need for vent  Albuterol as needed  Trach care  Pulm hygiene    D/w medicine Transverse myelitis  Paraplegia  Chronic trach   Unclear if requires ventilatory support  Chronic hypoxia  Leukocytosis    Plan:    Complete abx  TC O2 - titrate as tolerates  GI/DVT prophylaxis  Check ABG to evaluate long term need for vent  Albuterol as needed  Trach care  Follow wbc  Pulm hygiene    D/w medicine

## 2018-03-14 NOTE — PROGRESS NOTE ADULT - PROBLEM SELECTOR PLAN 2
Known recent history of cdiff infection, february - march;    -regimen of metronidazole 500mg [id number] was started 3/5 and stopped early on 3/12;   tested negative for cdiff during this hospitalization  -c/w probiotic while on abx to prevent future episodes

## 2018-03-14 NOTE — PROGRESS NOTE ADULT - SUBJECTIVE AND OBJECTIVE BOX
KAMAR BELLAMY is a 62y Male with HPI:  63y/o male with pmh of PMH Transverse myelitis, Paraplegia, trach/peg, colostomy, HTN, coming from [facility] presents with fever for 3 days.      Patient denies any cough, sob, runny nose, vomiting, diarrhea, abdominal pain, pain with urination.    Patient signed [facility] REQUESTING CPR on 2/18/18.    Patient eats via tube feeding,.      Originally at [facility] in   7months ago, developed Acute transverse myelitis.    Heart Attack, stent placed, check up after.  He was given a tetanus shot, then couple days ago he developed lower extremity weakness, paralysis; ultimately developed ulcers.      [facility]--> [facility] (kept him for a few weeks) --> wife couldn't take care of him;    He is getting frequent uti's, anemia reequiring transfusions.    101 fever at [facility]   BLOOD CX SO FAR NEG  C DIFF NEG  DECUBITUS ULCER CLEAN  URINE CX WITH 50-90K CRE       Allergies:  No Known Allergies      Medications:  ALPRAZolam 0.5 milliGRAM(s) Oral three times a day PRN  ascorbic acid 500 milliGRAM(s) Oral daily  aspirin  chewable 81 milliGRAM(s) Oral daily  baclofen 10 milliGRAM(s) Oral two times a day  clopidogrel Tablet 75 milliGRAM(s) Oral daily  dextrose 5%. 1000 milliLiter(s) IV Continuous <Continuous>  dextrose 50% Injectable 12.5 Gram(s) IV Push once  dextrose 50% Injectable 25 Gram(s) IV Push once  dextrose 50% Injectable 25 Gram(s) IV Push once  glucagon  Injectable 1 milliGRAM(s) IntraMuscular once PRN  heparin  Injectable 5000 Unit(s) SubCutaneous every 8 hours  insulin regular  human corrective regimen sliding scale   SubCutaneous every 6 hours  metroNIDAZOLE    Tablet 500 milliGRAM(s) Oral every 8 hours  multivitamin   Solution 5 milliLiter(s) Oral daily  oxyCODONE    IR 10 milliGRAM(s) Oral every 6 hours PRN  pantoprazole   Suspension 40 milliGRAM(s) Enteral Tube before breakfast  petrolatum Ophthalmic Ointment 1 Application(s) Both EYES at bedtime      ANTIBIOTICS:         Review of Systems: - Negative except as mentioned above     Physical Exam:   Vital Signs Last 24 Hrs  T(C): 37.2 (14 Mar 2018 06:12), Max: 37.2 (14 Mar 2018 06:12)  T(F): 99 (14 Mar 2018 06:12), Max: 99 (14 Mar 2018 06:12)  HR: 120 (14 Mar 2018 06:12) (104 - 120)  BP: 157/96 (14 Mar 2018 06:12) (132/78 - 159/91)  BP(mean): --  RR: 20 (13 Mar 2018 23:33) (20 - 21)  SpO2: 99% (14 Mar 2018 06:12) (93% - 99%)    GEN: NAD, pleasant  HEENT: normocephalic and atraumatic. EOMI. JAMESON...  NECK: Supple. No carotid bruits.  No lymphadenopathy or thyromegaly.  LUNGS: Clear to auscultation.  HEART: Regular rate and rhythm without murmur.  ABDOMEN: Soft, nontender, and nondistended.  Positive bowel sounds.  No hepatosplenomegaly was noted.  NO REBOUND NO GUARDING  EXTREMITIES: Without any cyanosis, clubbing, rash, lesions or edema.  NEUROLOGIC: Cranial nerves II through XII are grossly intact.    SKIN: No ulceration or induration present.      Labs:                                  11.2   16.4  )-----------( 468      ( 14 Mar 2018 06:34 )             36.2

## 2018-03-14 NOTE — PROGRESS NOTE ADULT - ASSESSMENT
61 y/o male with PMHof transverse myelitis w/ Paraplegia, trach/peg, colostomy in place, recently with fevers at the NH and empirically tx with rocephin and was to finish course on 3/11/18 for presumed uti also on flagyl for c. diff to finish course on 3/14/18, currently p/w NH for recurrent fevers with tmax: 101. In the ER pt noted to be tachycardic, hypothermic, unclear etiology of prior fevers at the NH but concern for partially tx uti. Pt with no leukocytosis, slight elevation in procalcitonin and ID consulted. Recommended to remain off of additional abx and continue to monitor the patient. CT A/P with no infectious findings, no respiratory complaints and cxr negative for any findings. UA from [facility] with no pyuria to suggest UTI, [place] catheter was changed in the er. Today with uptrending leukocytosis and prior U cx resulting with carbapenem resistant pseudomonas so abx started with cefepime. Prior Bcx resulted negative. Repeat pan cx ordered.

## 2018-03-14 NOTE — PROGRESS NOTE ADULT - PROBLEM SELECTOR PLAN 1
-Afebrile since admission    -leukocytosis trended up to 16 and procalcitonin increase   unclear etiology initially but now though to be secondary to partially tx UTI given at the facility pt was on rocephin for uti and flagyl for c. diff      -C. Diff negative and flagyl was discontinued   -UA indicative of bacteruria, but no pyuria;  beltrán replaced on presentation in the ED but U cx resulted with carbapenem resistant pseudomonas so empiric tx with cefepime initiated   -Sacral wound is healing no evidence of infection, d/w RN and ACS to have wound vac placed   -Blood culture negative    -c/w florastor 250mg po bid  -ID f/u noted and appreciated and d/w Dr. Gomez the above plan of care. -Afebrile since admission    -leukocytosis trended up to 16 and persists today and procalcitonin increased unclear etiology initially but now thought to be secondary to partially tx UTI given at the facility pt was on rocephin for uti and flagyl for c. diff     -C. Diff negative and flagyl was discontinued   -UA indicative of bacteruria, but no pyuria;  beltrán replaced on presentation in the ED but U cx resulted with carbapenem resistant pseudomonas so empiric tx with cefepime initiated   -Sacral wound is healing no evidence of infection  -Blood culture negative    -c/w florastor 250mg po bid  -c/w cefepime 1g IVPB Q12hrs D#2   -ID f/u noted and appreciated and d/w Dr. Gomez the above plan of care.

## 2018-03-14 NOTE — ADVANCED PRACTICE NURSE CONSULT - RECOMMEDATIONS
Wound care recommendation:  1. clean the wound with normal saline and pad dry,  2. apply 3M no sting barrier film to periwound skin,  3. pack the wound with 1/4 strength Dakin's solution moist gauze,   4. apply abdominal pad or foam dressing to cover the wound site.   5. recommended dressing change twice a day.   Discussed pt's wound status and wound care recommendation with YARA Casey and Dr Rajput, please contact wound care nurse if further follow up is needed.

## 2018-03-14 NOTE — PROGRESS NOTE ADULT - PROBLEM SELECTOR PLAN 5
HBA1c: 5,6   -fs stable  -c/w lantus 5 u sq qhs    -c/w HSS  -c/w accuchecks ACHS TID   -c/w hypoglycemia protocol

## 2018-03-14 NOTE — PROGRESS NOTE ADULT - PROBLEM SELECTOR PLAN 4
-Known Transverse Myelitis for last 7months;    -pt with noted sequela of trach/ peg/ colostomy in place  -c/w supportive care   -c/w baclofen  -c/w oxycdone prn for chronic pain   -c/w glucerna peg feeding to goal -Known Transverse Myelitis   -pt with noted sequela of trach/ peg/ colostomy in place  -c/w supportive care   -c/w baclofen  -c/w oxycdone prn for chronic pain   -c/w glucerna peg feeding to goal

## 2018-03-14 NOTE — ADVANCED PRACTICE NURSE CONSULT - ASSESSMENT
Patient is 63 y/o male, on trache, alert. Pt able to respond to commands with nodding the heads. Pt had indwelling beltrán and colostomy. Pt needs full assistance to turn and reposition. Reviewed pt's medical record, pt is paraplegic, pt at high risk for skin breakdown and pressure injury, last Alfredo score was 12. Assessed pt's wound with nursing students, open wound noted at sacrum area, wound measured 8cm (L) x5cm (W) x1cm (D), 100% wound bed covered with red granulation tissue, wound bed draining large amount of greenish purulent wound exudate, no slough, no eschar noted at wound site, periwound skin intact, possible wound infection.   Finding: possible infected stage 4 pressure injury noted at sacrum.

## 2018-03-15 DIAGNOSIS — D72.829 ELEVATED WHITE BLOOD CELL COUNT, UNSPECIFIED: ICD-10-CM

## 2018-03-15 LAB
-  AMPICILLIN: SIGNIFICANT CHANGE UP
-  CIPROFLOXACIN: SIGNIFICANT CHANGE UP
-  NITROFURANTOIN: SIGNIFICANT CHANGE UP
-  TETRACYCLINE: SIGNIFICANT CHANGE UP
-  VANCOMYCIN: SIGNIFICANT CHANGE UP
ANISOCYTOSIS BLD QL: SLIGHT — SIGNIFICANT CHANGE UP
C DIFF BY PCR RESULT: SIGNIFICANT CHANGE UP
C DIFF TOX GENS STL QL NAA+PROBE: SIGNIFICANT CHANGE UP
CULTURE RESULTS: SIGNIFICANT CHANGE UP
CULTURE RESULTS: SIGNIFICANT CHANGE UP
EOSINOPHIL NFR BLD AUTO: 1 % — SIGNIFICANT CHANGE UP (ref 0–6)
GLUCOSE BLDC GLUCOMTR-MCNC: 187 MG/DL — HIGH (ref 70–99)
GLUCOSE BLDC GLUCOMTR-MCNC: 202 MG/DL — HIGH (ref 70–99)
GLUCOSE BLDC GLUCOMTR-MCNC: 212 MG/DL — HIGH (ref 70–99)
GLUCOSE BLDC GLUCOMTR-MCNC: 246 MG/DL — HIGH (ref 70–99)
HCT VFR BLD CALC: 37.4 % — LOW (ref 42–52)
HGB BLD-MCNC: 11.6 G/DL — LOW (ref 14–18)
HYPOCHROMIA BLD QL: SLIGHT — SIGNIFICANT CHANGE UP
LACTATE BLDV-MCNC: 1.5 MMOL/L — SIGNIFICANT CHANGE UP (ref 0.5–2)
LYMPHOCYTES # BLD AUTO: 5 % — LOW (ref 20–55)
MACROCYTES BLD QL: SLIGHT — SIGNIFICANT CHANGE UP
MCHC RBC-ENTMCNC: 28.9 PG — SIGNIFICANT CHANGE UP (ref 27–31)
MCHC RBC-ENTMCNC: 31 G/DL — LOW (ref 32–36)
MCV RBC AUTO: 93 FL — SIGNIFICANT CHANGE UP (ref 80–94)
METHOD TYPE: SIGNIFICANT CHANGE UP
MICROCYTES BLD QL: SLIGHT — SIGNIFICANT CHANGE UP
MONOCYTES NFR BLD AUTO: 5 % — SIGNIFICANT CHANGE UP (ref 3–10)
NEUTROPHILS NFR BLD AUTO: 87 % — HIGH (ref 37–73)
NEUTS BAND # BLD: 2 % — SIGNIFICANT CHANGE UP (ref 0–8)
PLAT MORPH BLD: NORMAL — SIGNIFICANT CHANGE UP
PLATELET # BLD AUTO: 476 K/UL — HIGH (ref 150–400)
POIKILOCYTOSIS BLD QL AUTO: SLIGHT — SIGNIFICANT CHANGE UP
RBC # BLD: 4.02 M/UL — LOW (ref 4.6–6.2)
RBC # FLD: 16.8 % — HIGH (ref 11–15.6)
RBC BLD AUTO: ABNORMAL
SPECIMEN SOURCE: SIGNIFICANT CHANGE UP
SPECIMEN SOURCE: SIGNIFICANT CHANGE UP
WBC # BLD: 21.6 K/UL — HIGH (ref 4.8–10.8)
WBC # FLD AUTO: 21.6 K/UL — HIGH (ref 4.8–10.8)

## 2018-03-15 PROCEDURE — 93010 ELECTROCARDIOGRAM REPORT: CPT

## 2018-03-15 PROCEDURE — 71045 X-RAY EXAM CHEST 1 VIEW: CPT | Mod: 26

## 2018-03-15 PROCEDURE — 71250 CT THORAX DX C-: CPT | Mod: 26

## 2018-03-15 PROCEDURE — 74176 CT ABD & PELVIS W/O CONTRAST: CPT | Mod: 26

## 2018-03-15 PROCEDURE — 99233 SBSQ HOSP IP/OBS HIGH 50: CPT | Mod: GC

## 2018-03-15 PROCEDURE — 99233 SBSQ HOSP IP/OBS HIGH 50: CPT

## 2018-03-15 RX ORDER — ACETAMINOPHEN 500 MG
650 TABLET ORAL EVERY 6 HOURS
Qty: 0 | Refills: 0 | Status: DISCONTINUED | OUTPATIENT
Start: 2018-03-15 | End: 2018-04-13

## 2018-03-15 RX ORDER — INSULIN GLARGINE 100 [IU]/ML
7 INJECTION, SOLUTION SUBCUTANEOUS AT BEDTIME
Qty: 0 | Refills: 0 | Status: DISCONTINUED | OUTPATIENT
Start: 2018-03-15 | End: 2018-03-19

## 2018-03-15 RX ORDER — MEROPENEM 1 G/30ML
1000 INJECTION INTRAVENOUS EVERY 8 HOURS
Qty: 0 | Refills: 0 | Status: DISCONTINUED | OUTPATIENT
Start: 2018-03-15 | End: 2018-03-19

## 2018-03-15 RX ORDER — CEFEPIME 1 G/1
1000 INJECTION, POWDER, FOR SOLUTION INTRAMUSCULAR; INTRAVENOUS EVERY 12 HOURS
Qty: 0 | Refills: 0 | Status: DISCONTINUED | OUTPATIENT
Start: 2018-03-15 | End: 2018-03-15

## 2018-03-15 RX ORDER — IBUPROFEN 200 MG
400 TABLET ORAL EVERY 6 HOURS
Qty: 0 | Refills: 0 | Status: DISCONTINUED | OUTPATIENT
Start: 2018-03-15 | End: 2018-03-16

## 2018-03-15 RX ORDER — ACETAMINOPHEN 500 MG
1000 TABLET ORAL ONCE
Qty: 0 | Refills: 0 | Status: COMPLETED | OUTPATIENT
Start: 2018-03-15 | End: 2018-03-15

## 2018-03-15 RX ADMIN — Medication 250 MILLIGRAM(S): at 17:29

## 2018-03-15 RX ADMIN — Medication 1 APPLICATION(S): at 23:29

## 2018-03-15 RX ADMIN — Medication 0.5 MILLIGRAM(S): at 15:04

## 2018-03-15 RX ADMIN — Medication 500 MILLIGRAM(S): at 11:53

## 2018-03-15 RX ADMIN — Medication 650 MILLIGRAM(S): at 18:32

## 2018-03-15 RX ADMIN — Medication 0.5 MILLIGRAM(S): at 05:14

## 2018-03-15 RX ADMIN — Medication 0.5 MILLIGRAM(S): at 23:30

## 2018-03-15 RX ADMIN — OXYCODONE HYDROCHLORIDE 10 MILLIGRAM(S): 5 TABLET ORAL at 05:14

## 2018-03-15 RX ADMIN — OXYCODONE HYDROCHLORIDE 10 MILLIGRAM(S): 5 TABLET ORAL at 23:30

## 2018-03-15 RX ADMIN — OXYCODONE HYDROCHLORIDE 10 MILLIGRAM(S): 5 TABLET ORAL at 06:10

## 2018-03-15 RX ADMIN — Medication 400 MILLIGRAM(S): at 23:30

## 2018-03-15 RX ADMIN — Medication 5 MILLILITER(S): at 11:53

## 2018-03-15 RX ADMIN — MEROPENEM 100 MILLIGRAM(S): 1 INJECTION INTRAVENOUS at 17:23

## 2018-03-15 RX ADMIN — Medication 400 MILLIGRAM(S): at 17:06

## 2018-03-15 RX ADMIN — HEPARIN SODIUM 5000 UNIT(S): 5000 INJECTION INTRAVENOUS; SUBCUTANEOUS at 13:33

## 2018-03-15 RX ADMIN — PANTOPRAZOLE SODIUM 40 MILLIGRAM(S): 20 TABLET, DELAYED RELEASE ORAL at 05:14

## 2018-03-15 RX ADMIN — OXYCODONE HYDROCHLORIDE 10 MILLIGRAM(S): 5 TABLET ORAL at 15:23

## 2018-03-15 RX ADMIN — HEPARIN SODIUM 5000 UNIT(S): 5000 INJECTION INTRAVENOUS; SUBCUTANEOUS at 23:29

## 2018-03-15 RX ADMIN — Medication 81 MILLIGRAM(S): at 11:53

## 2018-03-15 RX ADMIN — INSULIN GLARGINE 7 UNIT(S): 100 INJECTION, SOLUTION SUBCUTANEOUS at 23:29

## 2018-03-15 RX ADMIN — Medication 10 MILLIGRAM(S): at 17:29

## 2018-03-15 RX ADMIN — INSULIN HUMAN 1: 100 INJECTION, SOLUTION SUBCUTANEOUS at 17:47

## 2018-03-15 RX ADMIN — Medication 250 MILLIGRAM(S): at 05:14

## 2018-03-15 RX ADMIN — MEROPENEM 100 MILLIGRAM(S): 1 INJECTION INTRAVENOUS at 23:29

## 2018-03-15 RX ADMIN — CEFEPIME 100 MILLIGRAM(S): 1 INJECTION, POWDER, FOR SOLUTION INTRAMUSCULAR; INTRAVENOUS at 05:14

## 2018-03-15 RX ADMIN — Medication 10 MILLIGRAM(S): at 05:14

## 2018-03-15 RX ADMIN — INSULIN HUMAN 2: 100 INJECTION, SOLUTION SUBCUTANEOUS at 11:53

## 2018-03-15 RX ADMIN — INSULIN HUMAN 2: 100 INJECTION, SOLUTION SUBCUTANEOUS at 23:30

## 2018-03-15 RX ADMIN — ALBUTEROL 2.5 MILLIGRAM(S): 90 AEROSOL, METERED ORAL at 03:27

## 2018-03-15 RX ADMIN — INSULIN HUMAN 2: 100 INJECTION, SOLUTION SUBCUTANEOUS at 05:26

## 2018-03-15 RX ADMIN — HEPARIN SODIUM 5000 UNIT(S): 5000 INJECTION INTRAVENOUS; SUBCUTANEOUS at 05:14

## 2018-03-15 RX ADMIN — OXYCODONE HYDROCHLORIDE 10 MILLIGRAM(S): 5 TABLET ORAL at 16:00

## 2018-03-15 RX ADMIN — Medication 650 MILLIGRAM(S): at 11:00

## 2018-03-15 RX ADMIN — CLOPIDOGREL BISULFATE 75 MILLIGRAM(S): 75 TABLET, FILM COATED ORAL at 11:53

## 2018-03-15 NOTE — PROGRESS NOTE ADULT - PROBLEM SELECTOR PLAN 1
-Afebrile and hemodynamically stablesince admission        3/13 patient had uptrending leukocytosis and prior U cx with carbapenem resistant pseudomonas, prompting initiation of cefepime.  Cefepime Antibiotic Day 3  -unclear etiology initially but now thought to be secondary to partially tx UTI given at the facility pt was on rocephin for uti and flagyl for c. diff     -C. Diff negative and flagyl was discontinued   -UA indicative of bacteruria, but no pyuria;  beltrán replaced on presentation in the ED but U cx resulted with carbapenem resistant pseudomonas so empiric tx with cefepime initiated   -Sacral wound is healing no evidence of infection  -Blood culture negative    -c/w florastor 250mg po bid  -c/w cefepime 1g IVPB Q12hrs D#2   -ID f/u noted and appreciated and d/w Dr. Gomez the above plan of care. -Initial fever at NH with unclear etiology concern for partially tx UTI thereafter no true fever but low grade 100.2 today unclear if multiple infections at this time including partially treated UTI/ sacral infection and possible lung/abd infectious source  -On 3/13 patient had up trending leukocytosis to 16 and today up to 21   -prior U cx with carbapenem resistant pseudomonas, prompting initiation of cefepime.  -repeat cxr with findings of marked elevation of right hemidiaphragm versus subpulmonic effusion. Subdiaphragmatic air filled bowel noted . findings new compared to prior 3/10/2018 exam  -f/u ct chest and ct abd/pelvis for better evaluation of alternate sites of infection, prior ct abd negative and prior cxr negative    -c/w Cefepime Antibiotic Day 3  -beltrán replaced on presentation in the ED   -Sacral wound is healing no evidence of infection but wound drainage changed from serosanguinous to greenish -pt was on wound vac prior to arrival but will now be using dakins bid to cleanse wound for one week prior to placing wound vac back   -Blood culture negative    -repeat Bcx from 3/13 testing   -C diff negative but repeat ordered and denied by lab b/c formed   -c/w florastor 250mg po bid  -c/w cefepime 1g IVPB Q12hrs D#2   -ID f/u noted and appreciated and d/w Dr. Gomez the above plan of care.

## 2018-03-15 NOTE — PROGRESS NOTE ADULT - PROBLEM SELECTOR PLAN 3
Known sacral ulcer stage 3 healing well   -c/w wound care cleaning and dressing change q bid   -wound vac removed and needs to be replaced but as per wound care RN should c/w dakins bid x 1 week and then consider placing wound vac back on b/c of greenish serous fluid   -wound care consult noted and appreciated and d/w wound care RN the plan of care Known sacral ulcer stage 3 healing well but now with changed drainage from serosanguinous to greenish in color; unclear if infectious   -c/w wound care cleaning and dressing change q bid with dakins   -wound vac removed and needs to be replaced but as per wound care RN should c/w dakins bid x 1 week and then consider placing wound vac back on thereafter  -wound care consult noted and appreciated and d/w wound care RN the plan of care

## 2018-03-15 NOTE — PROGRESS NOTE ADULT - SUBJECTIVE AND OBJECTIVE BOX
[place]    Patient is a 62y old  Male who presents with a cc of fevers (10 Mar 2018 23:26)    LAST 24HRS/THIS AM  Patient seen and examined at bedside. No acute events overnight.     Communication with patient via by head nods and deciphering his mouthed words.    Patient denies chest pain, SOB, abd pain, N/V, fever, chills or any other complaints.   He says there is no neck pain this am.      MEDICATIONS  (STANDING):  ascorbic acid 500 milliGRAM(s) Oral daily  aspirin  chewable 81 milliGRAM(s) Oral daily  baclofen 10 milliGRAM(s) Oral two times a day  cefepime  IVPB      cefepime  IVPB 1000 milliGRAM(s) IV Intermittent every 12 hours     started on 3/13;   Antibiotic Day 3  clopidogrel Tablet 75 milliGRAM(s) Oral daily  dextrose 5%. 1000 milliLiter(s) (50 mL/Hr) IV Continuous <Continuous>  dextrose 50% Injectable 12.5 Gram(s) IV Push once  dextrose 50% Injectable 25 Gram(s) IV Push once  dextrose 50% Injectable 25 Gram(s) IV Push once  heparin  Injectable 5000 Unit(s) SubCutaneous every 8 hours  insulin glargine Injectable (LANTUS) 5 Unit(s) SubCutaneous at bedtime  insulin regular  human corrective regimen sliding scale   SubCutaneous every 6 hours  multivitamin   Solution 5 milliLiter(s) Oral daily  pantoprazole   Suspension 40 milliGRAM(s) Enteral Tube before breakfast  petrolatum Ophthalmic Ointment 1 Application(s) Both EYES at bedtime  saccharomyces boulardii 250 milliGRAM(s) Oral two times a day    MEDICATIONS  (PRN):  acetaminophen   Tablet. 650 milliGRAM(s) Oral every 6 hours PRN Moderate Pain (4 - 6) and headache  ALBUTerol    0.083% 2.5 milliGRAM(s) Nebulizer every 6 hours PRN Shortness of Breath and/or Wheezing  ALPRAZolam 0.5 milliGRAM(s) Oral three times a day PRN anxiety  glucagon  Injectable 1 milliGRAM(s) IntraMuscular once PRN Glucose <70 milliGRAM(s)/deciLiter  oxyCODONE    IR 10 milliGRAM(s) Oral every 6 hours PRN Moderate Pain (4 - 6)      Vital Signs Last 24 Hrs  T(C): 37.2 (14 Mar 2018 06:12), Max: 37.2 (14 Mar 2018 06:12)  T(F): 99 (14 Mar 2018 06:12), Max: 99 (14 Mar 2018 06:12)  HR: 120 (14 Mar 2018 06:12) (104 - 120)  BP: 157/96 (14 Mar 2018 06:12) (132/78 - 159/91)  BP(mean): --  RR: 20 (13 Mar 2018 23:33) (20 - 21)  SpO2: 99% (14 Mar 2018 06:12) (93% - 99%)    I&O's Detail    13 Mar 2018 07:01  -  14 Mar 2018 07:00  --------------------------------------------------------  IN:    Enteral Tube Flush: 1000 mL    Glucerna: 1280 mL    IV PiggyBack: 50 mL  Total IN: 2330 mL    OUT:    Colostomy: 150 mL    Indwelling Catheter - Urethral: 1250 mL  Total OUT: 1400 mL    Total NET: 930 mL                                11.2   16.4  )-----------( 468      ( 14 Mar 2018 06:34 )             36.2     03-14    138  |  100  |  16.0  ----------------------------<  198<H>  4.8   |  28.0  |  0.49<L>    Ca    9.4      14 Mar 2018 06:34      CAPILLARY BLOOD GLUCOSE  POCT Blood Glucose.: 219 mg/dL (14 Mar 2018 06:30)  POCT Blood Glucose.: 218 mg/dL (14 Mar 2018 01:04)  POCT Blood Glucose.: 192 mg/dL (13 Mar 2018 16:53)  POCT Blood Glucose.: 199 mg/dL (13 Mar 2018 11:40)            PHYSICAL EXAM:   Gen: adult male lying supine in NAD  HEENT: NCAT, EOMI, PERRLA, 3mm b/l; moist mucous membranes, no thrush; trach collar in place   Neck:  Supple, No Supraclavicular/submandibular lymphadenopathy;   Neuro:  Alert & Oriented x 3;  paralyzed in b/l lower ext, and left upper ext;    Resp: coarse breath sounds b/l  CV: S1, S2;  sinus tachycardia, No murmurs appreciated  GI: Soft, Nontender, Nondistended; Bowel sounds present;    colostomy bag with brown stool, peg in place     :  Iniguez in place     Ext:  2+ Peripheral Pulses DP, No clubbing, cyanosis, or edema;   Skin:  Erythematous violaceous patch in the Right Chest, nontender [place]    Patient is a 62y old  Male who presents with a cc of fevers (10 Mar 2018 23:26)    LAST 24HRS/THIS AM  Patient seen and examined at bedside. No acute events overnight.     Communication with patient via by head nods and deciphering his mouthed words.  Pt reports that he is feeling worse, he does not know what it is but he feels he needs to be on his ventilator.   Patient denies chest pain, SOB, abd pain, N/V, fever, chills or any other complaints.   He says there is no neck pain this am.      MEDICATIONS  (STANDING):  ascorbic acid 500 milliGRAM(s) Oral daily  aspirin  chewable 81 milliGRAM(s) Oral daily  baclofen 10 milliGRAM(s) Oral two times a day  cefepime  IVPB      cefepime  IVPB 1000 milliGRAM(s) IV Intermittent every 12 hours     started on 3/13;   Antibiotic Day 3  clopidogrel Tablet 75 milliGRAM(s) Oral daily  dextrose 5%. 1000 milliLiter(s) (50 mL/Hr) IV Continuous <Continuous>  dextrose 50% Injectable 12.5 Gram(s) IV Push once  dextrose 50% Injectable 25 Gram(s) IV Push once  dextrose 50% Injectable 25 Gram(s) IV Push once  heparin  Injectable 5000 Unit(s) SubCutaneous every 8 hours  insulin glargine Injectable (LANTUS) 5 Unit(s) SubCutaneous at bedtime  insulin regular  human corrective regimen sliding scale   SubCutaneous every 6 hours  multivitamin   Solution 5 milliLiter(s) Oral daily  pantoprazole   Suspension 40 milliGRAM(s) Enteral Tube before breakfast  petrolatum Ophthalmic Ointment 1 Application(s) Both EYES at bedtime  saccharomyces boulardii 250 milliGRAM(s) Oral two times a day    MEDICATIONS  (PRN):  acetaminophen   Tablet. 650 milliGRAM(s) Oral every 6 hours PRN Moderate Pain (4 - 6) and headache  ALBUTerol    0.083% 2.5 milliGRAM(s) Nebulizer every 6 hours PRN Shortness of Breath and/or Wheezing  ALPRAZolam 0.5 milliGRAM(s) Oral three times a day PRN anxiety  glucagon  Injectable 1 milliGRAM(s) IntraMuscular once PRN Glucose <70 milliGRAM(s)/deciLiter  oxyCODONE    IR 10 milliGRAM(s) Oral every 6 hours PRN Moderate Pain (4 - 6)      Vital Signs Last 24 Hrs  T(C): 37.7 (15 Mar 2018 13:09), Max: 37.9 (15 Mar 2018 10:41)  T(F): 99.9 (15 Mar 2018 13:09), Max: 100.2 (15 Mar 2018 10:41)  HR: 122 (15 Mar 2018 12:47) (98 - 133)  BP: 157/86 (15 Mar 2018 07:43) (146/86 - 162/67)  BP(mean): --  RR: 19 (15 Mar 2018 07:43) (18 - 20)  SpO2: 96% (15 Mar 2018 12:47) (93% - 98%)Vital Signs Last 24 Hrs    I&O's Detail    13 Mar 2018 07:01  -  14 Mar 2018 07:00  --------------------------------------------------------  IN:    Enteral Tube Flush: 1000 mL    Glucerna: 1280 mL    IV PiggyBack: 50 mL  Total IN: 2330 mL    OUT:    Colostomy: 150 mL    Indwelling Catheter - Urethral: 1250 mL  Total OUT: 1400 mL    Total NET: 930 mL                                11.2   16.4  )-----------( 468      ( 14 Mar 2018 06:34 )             36.2     03-14    138  |  100  |  16.0  ----------------------------<  198<H>  4.8   |  28.0  |  0.49<L>    Ca    9.4      14 Mar 2018 06:34      CAPILLARY BLOOD GLUCOSE  POCT Blood Glucose.: 219 mg/dL (14 Mar 2018 06:30)  POCT Blood Glucose.: 218 mg/dL (14 Mar 2018 01:04)  POCT Blood Glucose.: 192 mg/dL (13 Mar 2018 16:53)  POCT Blood Glucose.: 199 mg/dL (13 Mar 2018 11:40)            PHYSICAL EXAM:   Gen: adult male lying supine in NAD  HEENT: NCAT, EOMI, PERRLA, 3mm b/l; moist mucous membranes, no thrush; trach collar in place   Neck:  Supple, No Supraclavicular/submandibular lymphadenopathy;   Neuro:  Alert & Oriented x 3;  paralyzed in b/l lower ext, and left upper ext;    Resp: coarse breath sounds b/l  CV: S1, S2;  sinus tachycardia, No murmurs appreciated  GI: Soft, Nontender, Nondistended; Bowel sounds present;    colostomy bag with brown stool, peg in place     :  Iniguez in place     Ext:  No clubbing, cyanosis, or edema;   Skin:  Erythematous violaceous patch in the Right Chest, nontender

## 2018-03-15 NOTE — PROGRESS NOTE ADULT - PROBLEM SELECTOR PLAN 4
-Known Transverse Myelitis   -pt with noted sequela of trach/ peg/ colostomy in place  -c/w supportive care   -c/w baclofen  -c/w oxycdone prn for chronic pain   -c/w glucerna peg feeding to goal -Known Transverse Myelitis   -pt with noted sequela of trach/ peg/ colostomy in place  -c/w supportive care   -c/w baclofen  -c/w oxycdone prn for chronic pain   -c/w glucerna peg feeding to goal  -pt usually on trach collar but at times may need ventilator at night, today with increasing sob and placed on ventilator as per vent settings at the facility   -pulm consulted

## 2018-03-15 NOTE — PROGRESS NOTE ADULT - ASSESSMENT
Transverse myelitis  Paraplegia  Chronic trach   Unclear if requires ventilatory support  Chronic hypoxia  Worsening leukocytosis  Tachycardia  ABG with mild chronic hypercarbia with normal pH    Plan:    Complete abx  TC O2 - titrate as tolerates  GI/DVT prophylaxis  Albuterol as needed  Trach care  Follow wbc  Pulm hygiene  Check EKG  Place back on vent AC 12/500/40%/+5 to start and evaluate response    D/w medicine Transverse myelitis  Paraplegia  Chronic trach   Unclear if requires ventilatory support  Chronic hypoxia  Worsening leukocytosis  Tachycardia  ABG with mild chronic hypercarbia with normal pH    Plan:    Complete abx  TC O2 - titrate as tolerates  GI/DVT prophylaxis  Albuterol as needed  Trach care  Follow wbc  Pulm hygiene  Check EKG  Place back on vent AC 12/500/40%/+5 to start and evaluate response  Repeat CXR with elevated R hemidiaphragm vs effusion vs developing infiltrate; consider chest and abd CT for further evaluation    D/w medicine

## 2018-03-15 NOTE — PROGRESS NOTE ADULT - PROBLEM SELECTOR PLAN 5
HBA1c: 5,6   -fs stable  -c/w lantus 5 u sq qhs    -c/w HSS  -c/w accuchecks ACHS TID   -c/w hypoglycemia protocol HBA1c: 5,6   -fs stable  -c/w increased lantus to 7 u sq qhs   -c/w HSS  -c/w accuchecks ACHS TID   -c/w hypoglycemia protocol

## 2018-03-15 NOTE — PROGRESS NOTE ADULT - ASSESSMENT
61 y/o male with PMHof transverse myelitis w/ Paraplegia, trach/peg, colostomy in place, recently with fevers at the NH and empirically tx with rocephin and was to finish course on 3/11/18 for presumed uti also on flagyl for c. diff to finish course on 3/14/18, currently p/w NH for recurrent fevers with tmax: 101. In the ER pt noted to be tachycardic, hypothermic, unclear etiology of prior fevers at the NH but concern for partially tx uti. Pt with no leukocytosis, slight elevation in procalcitonin and ID consulted. Recommended to remain off of additional abx and continue to monitor the patient. CT A/P with no infectious findings, no respiratory complaints and cxr negative for any findings. UA from Beltrán with no pyuria to suggest UTI, beltrán catheter was changed in the ER.    3/13 patient had uptrending leukocytosis and prior U cx with carbapenem resistant pseudomonas, prompting initiation of cefepime.  Cefepime Antibiotic Day 3     Prior Bcx resulted negative. Repeat pan cx ordered. 63 y/o male with PMHof transverse myelitis w/ Paraplegia, trach/peg, colostomy in place, recently with fevers at the NH and empirically tx with rocephin and was to finish course on 3/11/18 for presumed uti also on flagyl for c. diff to finish course on 3/14/18, currently p/w NH for recurrent fevers with tmax: 101. In the ER pt noted to be tachycardic, hypothermic, unclear etiology of prior fevers at the NH b/c pt did not spike any fevers in the hospital initially; there was clinical concern for partially tx uti. Pt with initially no leukocytosis then up trended on 3/13 to 16 and today 21, slight elevation in procalcitonin and ID continues to follow the pt. Initially recommended to remain off of abx and continue to monitor the patient but given current up trending leukocytosis was empirically placed on cefepime for positive U cx for carbapenem resistant pseudomona uti 50-90,000, B cx negative; initial ct abd/pelvis and cxr negative, C. Diff negative. Also clinical concern that sacral stage 3 wound although tissue does not look infected, was initially draining serosanguinous fluid and now greenish in color with concern for pseudomonas Repeat pan cx sent, cxr showing marked elevation of right hemidiaphragm versus subpulmonic effusion and subdiaphragmatic air filled bowel noted which are new from prior studies. Ct chest and repeat ct abd/pelvis ordered. 61 y/o male with PMHof transverse myelitis w/ Paraplegia, trach/peg, colostomy in place, recently with fevers at the NH and empirically tx with rocephin and was to finish course on 3/11/18 for presumed uti also on flagyl for c. diff to finish course on 3/14/18, currently p/w NH for recurrent fevers with tmax: 101. In the ER pt noted to be tachycardic, hypothermic, unclear etiology of prior fevers at the NH b/c pt did not spike any fevers in the hospital initially; there was clinical concern for partially tx uti. Pt with initially no leukocytosis then up trended on 3/13 to 16 and today 21, slight elevation in procalcitonin and ID continues to follow the pt. Initially recommended to remain off of abx and continue to monitor the patient but given current up trending leukocytosis was empirically placed on cefepime for positive U cx for carbapenem resistant pseudomona uti 50-90,000, B cx negative; initial ct abd/pelvis and cxr negative, C. Diff negative. Also clinical concern that sacral stage 3 wound although tissue does not look infected, was initially draining serosanguinous fluid and now greenish in color with concern for pseudomonas Repeat pan cx sent, cxr showing marked elevation of right hemidiaphragm versus subpulmonic effusion and subdiaphragmatic air filled bowel noted which are new from prior studies. Ct chest and repeat ct abd/pelvis ordered.  Pulm consulted b/c pt requiring vent support.

## 2018-03-15 NOTE — PROGRESS NOTE ADULT - SUBJECTIVE AND OBJECTIVE BOX
PULMONARY PROGRESS NOTE      KAMAR BELLAMY  MRN-[id number]    Patient is a 62y old  Male who presents with a chief complaint of fevers (12 Mar 2018 18:28)      INTERVAL HPI/OVERNIGHT EVENTS:    Patient appears comfortable but tachycardic on monitor  C/o SOB; no cough    MEDICATIONS  (STANDING):  ascorbic acid 500 milliGRAM(s) Oral daily  aspirin  chewable 81 milliGRAM(s) Oral daily  baclofen 10 milliGRAM(s) Oral two times a day  cefepime  IVPB      cefepime  IVPB 1000 milliGRAM(s) IV Intermittent every 12 hours  clopidogrel Tablet 75 milliGRAM(s) Oral daily  dextrose 5%. 1000 milliLiter(s) (50 mL/Hr) IV Continuous <Continuous>  dextrose 50% Injectable 12.5 Gram(s) IV Push once  dextrose 50% Injectable 25 Gram(s) IV Push once  dextrose 50% Injectable 25 Gram(s) IV Push once  heparin  Injectable 5000 Unit(s) SubCutaneous every 8 hours  insulin glargine Injectable (LANTUS) 5 Unit(s) SubCutaneous at bedtime  insulin regular  human corrective regimen sliding scale   SubCutaneous every 6 hours  multivitamin   Solution 5 milliLiter(s) Oral daily  pantoprazole   Suspension 40 milliGRAM(s) Enteral Tube before breakfast  petrolatum Ophthalmic Ointment 1 Application(s) Both EYES at bedtime  saccharomyces boulardii 250 milliGRAM(s) Oral two times a day      MEDICATIONS  (PRN):  acetaminophen   Tablet. 650 milliGRAM(s) Oral every 6 hours PRN Moderate Pain (4 - 6) and headache  ALBUTerol    0.083% 2.5 milliGRAM(s) Nebulizer every 6 hours PRN Shortness of Breath and/or Wheezing  ALPRAZolam 0.5 milliGRAM(s) Oral three times a day PRN anxiety  glucagon  Injectable 1 milliGRAM(s) IntraMuscular once PRN Glucose <70 milliGRAM(s)/deciLiter  oxyCODONE    IR 10 milliGRAM(s) Oral every 6 hours PRN Moderate Pain (4 - 6)      Allergies    No Known Allergies    Intolerances        PAST MEDICAL & SURGICAL HISTORY:  Essential hypertension  Paralysis  Transverse myelitis  History of tracheostomy        REVIEW OF SYSTEMS: Unable to obtain as unable to phonate though does c/o SOB    Vital Signs Last 24 Hrs  T(C): 37.1 (15 Mar 2018 07:43), Max: 37.2 (14 Mar 2018 17:01)  T(F): 98.8 (15 Mar 2018 07:43), Max: 98.9 (14 Mar 2018 17:01)  HR: 126 (15 Mar 2018 07:43) (98 - 126)  BP: 157/86 (15 Mar 2018 07:43) (141/88 - 162/67)  BP(mean): --  RR: 19 (15 Mar 2018 07:43) (18 - 20)  SpO2: 93% (15 Mar 2018 07:43) (93% - 98%)    PHYSICAL EXAMINATION:    GENERAL: The patient is awake and alert in no apparent distress.     HEENT: Head is normocephalic and atraumatic. Extraocular muscles are intact. Mucous membranes are moist.    NECK: Supple. + trach    LUNGS: Clear to auscultation without wheezing, rales or rhonchi; respirations unlabored    HEART: Tachycardia without murmur.    ABDOMEN: Soft, nontender, and nondistended.  + PEG    EXTREMITIES: Without any cyanosis, clubbing, rash, lesions or edema.    NEUROLOGIC: LE and UE paraplegia; can move hands    LABS:                        11.6   21.6  )-----------( 476      ( 15 Mar 2018 08:44 )             37.4     03-14    138  |  100  |  16.0  ----------------------------<  198<H>  4.8   |  28.0  |  0.49<L>    Ca    9.4      14 Mar 2018 06:34        Urinalysis Basic - ( 13 Mar 2018 17:28 )    Color: Yellow / Appearance: Clear / S.010 / pH: x  Gluc: x / Ketone: Negative  / Bili: Negative / Urobili: Negative mg/dL   Blood: x / Protein: Negative mg/dL / Nitrite: Negative   Leuk Esterase: Trace / RBC: 0-2 /HPF / WBC 3-5   Sq Epi: x / Non Sq Epi: Occasional / Bacteria: Occasional      ABG - ( 14 Mar 2018 22:05 )  pH: 7.43  /  pCO2: 50    /  pO2: 73    / HCO3: 31    / Base Excess: 7.5   /  SaO2: 95          Procalcitonin, Serum: 0.21 ng/mL (18 @ 13:36)      MICROBIOLOGY:    Culture - Urine (18 @ 17:27)    Specimen Source: .Urine Clean Catch (Midstream)    Culture Results:   10,000 - 49,000 CFU/mL Gram Negative Rods Identification and  susceptibility to follow.  50,000 - 99,000 CFU/mL Enterococcus species Identification and  susceptibility to follow.  Culture in progress        RADIOLOGY & ADDITIONAL STUDIES:       EXAM:  XR CHEST PORTABLE URGENT 1V                          PROCEDURE DATE:  2018          INTERPRETATION:  CHEST AP PORTABLE:    History: r.o pna. Fever.    Date and time of exam: 3/13/2018 3:19 PM.    Technique: A single AP view of the chest was obtained.    Comparison exam: 3/10/2018 6:10 PM.    Findings:  The lungs are clear. No evidence of parenchymal infiltrate. The heart and   mediastinum are unremarkable. Again noted is a tracheostomy tube..    Impression:  Clear lungs.        GINNA LEÓN M.D., ATTENDING RADIOLOGIST  This document has been electronically signed. Mar 13 2018  4:05PM

## 2018-03-15 NOTE — PROGRESS NOTE ADULT - PROBLEM SELECTOR PLAN 2
Known recent history of cdiff infection, february - march;    -regimen of metronidazole 500mg [id number] was started 3/5 and stopped early on 3/12;   tested negative for cdiff during this hospitalization  -c/w probiotic while on abx to prevent future episodes Known recent history of cdiff infection, february - march;    -regimen of metronidazole 500mg [id number] was started 3/5 and stopped early on 3/12;   tested negative for cdiff during this hospitalization but given leukocytosis up trending to 21 concern for recurrence after being placed on abx   -c. diff sent but denied by lab to be performed   -c/w probiotic while on abx for prevention

## 2018-03-16 DIAGNOSIS — J96.21 ACUTE AND CHRONIC RESPIRATORY FAILURE WITH HYPOXIA: ICD-10-CM

## 2018-03-16 DIAGNOSIS — G93.40 ENCEPHALOPATHY, UNSPECIFIED: ICD-10-CM

## 2018-03-16 LAB
-  AMIKACIN: SIGNIFICANT CHANGE UP
-  AZTREONAM: SIGNIFICANT CHANGE UP
-  CEFEPIME: SIGNIFICANT CHANGE UP
-  CEFTAZIDIME: SIGNIFICANT CHANGE UP
-  CIPROFLOXACIN: SIGNIFICANT CHANGE UP
-  GENTAMICIN: SIGNIFICANT CHANGE UP
-  IMIPENEM: SIGNIFICANT CHANGE UP
-  LEVOFLOXACIN: SIGNIFICANT CHANGE UP
-  MEROPENEM: SIGNIFICANT CHANGE UP
-  PIPERACILLIN/TAZOBACTAM: SIGNIFICANT CHANGE UP
-  TOBRAMYCIN: SIGNIFICANT CHANGE UP
ALBUMIN SERPL ELPH-MCNC: 2.4 G/DL — LOW (ref 3.3–5.2)
ALP SERPL-CCNC: 72 U/L — SIGNIFICANT CHANGE UP (ref 40–120)
ALT FLD-CCNC: 13 U/L — SIGNIFICANT CHANGE UP
ANION GAP SERPL CALC-SCNC: 11 MMOL/L — SIGNIFICANT CHANGE UP (ref 5–17)
ANION GAP SERPL CALC-SCNC: 13 MMOL/L — SIGNIFICANT CHANGE UP (ref 5–17)
AST SERPL-CCNC: 14 U/L — SIGNIFICANT CHANGE UP
BASOPHILS # BLD AUTO: 0 K/UL — SIGNIFICANT CHANGE UP (ref 0–0.2)
BASOPHILS # BLD AUTO: 0 K/UL — SIGNIFICANT CHANGE UP (ref 0–0.2)
BASOPHILS NFR BLD AUTO: 0.1 % — SIGNIFICANT CHANGE UP (ref 0–2)
BASOPHILS NFR BLD AUTO: 0.1 % — SIGNIFICANT CHANGE UP (ref 0–2)
BILIRUB SERPL-MCNC: 0.5 MG/DL — SIGNIFICANT CHANGE UP (ref 0.4–2)
BUN SERPL-MCNC: 25 MG/DL — HIGH (ref 8–20)
BUN SERPL-MCNC: 27 MG/DL — HIGH (ref 8–20)
CALCIUM SERPL-MCNC: 8.8 MG/DL — SIGNIFICANT CHANGE UP (ref 8.6–10.2)
CALCIUM SERPL-MCNC: 8.9 MG/DL — SIGNIFICANT CHANGE UP (ref 8.6–10.2)
CHLORIDE SERPL-SCNC: 89 MMOL/L — LOW (ref 98–107)
CHLORIDE SERPL-SCNC: 91 MMOL/L — LOW (ref 98–107)
CO2 SERPL-SCNC: 26 MMOL/L — SIGNIFICANT CHANGE UP (ref 22–29)
CO2 SERPL-SCNC: 27 MMOL/L — SIGNIFICANT CHANGE UP (ref 22–29)
CREAT SERPL-MCNC: 1.07 MG/DL — SIGNIFICANT CHANGE UP (ref 0.5–1.3)
CREAT SERPL-MCNC: 1.08 MG/DL — SIGNIFICANT CHANGE UP (ref 0.5–1.3)
CRP SERPL-MCNC: 20 MG/DL — HIGH (ref 0–0.4)
CULTURE RESULTS: SIGNIFICANT CHANGE UP
EOSINOPHIL # BLD AUTO: 0 K/UL — SIGNIFICANT CHANGE UP (ref 0–0.5)
EOSINOPHIL # BLD AUTO: 0 K/UL — SIGNIFICANT CHANGE UP (ref 0–0.5)
EOSINOPHIL NFR BLD AUTO: 0 % — SIGNIFICANT CHANGE UP (ref 0–5)
EOSINOPHIL NFR BLD AUTO: 0.2 % — SIGNIFICANT CHANGE UP (ref 0–5)
GAS PNL BLDA: SIGNIFICANT CHANGE UP
GAS PNL BLDA: SIGNIFICANT CHANGE UP
GLUCOSE BLDC GLUCOMTR-MCNC: 178 MG/DL — HIGH (ref 70–99)
GLUCOSE BLDC GLUCOMTR-MCNC: 180 MG/DL — HIGH (ref 70–99)
GLUCOSE BLDC GLUCOMTR-MCNC: 185 MG/DL — HIGH (ref 70–99)
GLUCOSE BLDC GLUCOMTR-MCNC: 248 MG/DL — HIGH (ref 70–99)
GLUCOSE SERPL-MCNC: 222 MG/DL — HIGH (ref 70–115)
GLUCOSE SERPL-MCNC: 225 MG/DL — HIGH (ref 70–115)
GRAM STN FLD: SIGNIFICANT CHANGE UP
HCT VFR BLD CALC: 31.6 % — LOW (ref 42–52)
HCT VFR BLD CALC: 32.5 % — LOW (ref 42–52)
HGB BLD-MCNC: 10.3 G/DL — LOW (ref 14–18)
HGB BLD-MCNC: 9.9 G/DL — LOW (ref 14–18)
LACTATE BLDV-MCNC: 0.7 MMOL/L — SIGNIFICANT CHANGE UP (ref 0.5–2)
LYMPHOCYTES # BLD AUTO: 0.4 K/UL — LOW (ref 1–4.8)
LYMPHOCYTES # BLD AUTO: 1 K/UL — SIGNIFICANT CHANGE UP (ref 1–4.8)
LYMPHOCYTES # BLD AUTO: 1.8 % — LOW (ref 20–55)
LYMPHOCYTES # BLD AUTO: 4.2 % — LOW (ref 20–55)
MCHC RBC-ENTMCNC: 28.5 PG — SIGNIFICANT CHANGE UP (ref 27–31)
MCHC RBC-ENTMCNC: 28.9 PG — SIGNIFICANT CHANGE UP (ref 27–31)
MCHC RBC-ENTMCNC: 31.3 G/DL — LOW (ref 32–36)
MCHC RBC-ENTMCNC: 31.7 G/DL — LOW (ref 32–36)
MCV RBC AUTO: 91 FL — SIGNIFICANT CHANGE UP (ref 80–94)
MCV RBC AUTO: 91.1 FL — SIGNIFICANT CHANGE UP (ref 80–94)
METHOD TYPE: SIGNIFICANT CHANGE UP
MONOCYTES # BLD AUTO: 1.8 K/UL — HIGH (ref 0–0.8)
MONOCYTES # BLD AUTO: 1.9 K/UL — HIGH (ref 0–0.8)
MONOCYTES NFR BLD AUTO: 7.8 % — SIGNIFICANT CHANGE UP (ref 3–10)
MONOCYTES NFR BLD AUTO: 7.9 % — SIGNIFICANT CHANGE UP (ref 3–10)
NEUTROPHILS # BLD AUTO: 19.7 K/UL — HIGH (ref 1.8–8)
NEUTROPHILS # BLD AUTO: 22.2 K/UL — HIGH (ref 1.8–8)
NEUTROPHILS NFR BLD AUTO: 87.3 % — HIGH (ref 37–73)
NEUTROPHILS NFR BLD AUTO: 89.8 % — HIGH (ref 37–73)
ORGANISM # SPEC MICROSCOPIC CNT: SIGNIFICANT CHANGE UP
PLATELET # BLD AUTO: 409 K/UL — HIGH (ref 150–400)
PLATELET # BLD AUTO: 424 K/UL — HIGH (ref 150–400)
POTASSIUM SERPL-MCNC: 4.6 MMOL/L — SIGNIFICANT CHANGE UP (ref 3.5–5.3)
POTASSIUM SERPL-MCNC: 5 MMOL/L — SIGNIFICANT CHANGE UP (ref 3.5–5.3)
POTASSIUM SERPL-SCNC: 4.6 MMOL/L — SIGNIFICANT CHANGE UP (ref 3.5–5.3)
POTASSIUM SERPL-SCNC: 5 MMOL/L — SIGNIFICANT CHANGE UP (ref 3.5–5.3)
PROCALCITONIN SERPL-MCNC: 1.03 NG/ML — HIGH (ref 0–0.04)
PROT SERPL-MCNC: 6.5 G/DL — LOW (ref 6.6–8.7)
RBC # BLD: 3.47 M/UL — LOW (ref 4.6–6.2)
RBC # BLD: 3.57 M/UL — LOW (ref 4.6–6.2)
RBC # FLD: 17.2 % — HIGH (ref 11–15.6)
RBC # FLD: 17.2 % — HIGH (ref 11–15.6)
SODIUM SERPL-SCNC: 127 MMOL/L — LOW (ref 135–145)
SODIUM SERPL-SCNC: 130 MMOL/L — LOW (ref 135–145)
SPECIMEN SOURCE: SIGNIFICANT CHANGE UP
SPECIMEN SOURCE: SIGNIFICANT CHANGE UP
WBC # BLD: 22.6 K/UL — HIGH (ref 4.8–10.8)
WBC # BLD: 24.7 K/UL — HIGH (ref 4.8–10.8)
WBC # FLD AUTO: 22.6 K/UL — HIGH (ref 4.8–10.8)
WBC # FLD AUTO: 24.7 K/UL — HIGH (ref 4.8–10.8)

## 2018-03-16 PROCEDURE — 99291 CRITICAL CARE FIRST HOUR: CPT

## 2018-03-16 PROCEDURE — 70450 CT HEAD/BRAIN W/O DYE: CPT | Mod: 26

## 2018-03-16 PROCEDURE — 93010 ELECTROCARDIOGRAM REPORT: CPT

## 2018-03-16 PROCEDURE — 99233 SBSQ HOSP IP/OBS HIGH 50: CPT | Mod: GC

## 2018-03-16 PROCEDURE — 71045 X-RAY EXAM CHEST 1 VIEW: CPT | Mod: 26

## 2018-03-16 PROCEDURE — 99233 SBSQ HOSP IP/OBS HIGH 50: CPT

## 2018-03-16 RX ORDER — SODIUM CHLORIDE 9 MG/ML
1000 INJECTION, SOLUTION INTRAVENOUS ONCE
Qty: 0 | Refills: 0 | Status: COMPLETED | OUTPATIENT
Start: 2018-03-16 | End: 2018-03-16

## 2018-03-16 RX ORDER — LACTOBACILLUS ACIDOPHILUS 100MM CELL
1 CAPSULE ORAL
Qty: 0 | Refills: 0 | COMMUNITY

## 2018-03-16 RX ORDER — NALOXONE HYDROCHLORIDE 4 MG/.1ML
0.4 SPRAY NASAL ONCE
Qty: 0 | Refills: 0 | Status: DISCONTINUED | OUTPATIENT
Start: 2018-03-16 | End: 2018-03-16

## 2018-03-16 RX ORDER — SODIUM CHLORIDE 9 MG/ML
1000 INJECTION INTRAMUSCULAR; INTRAVENOUS; SUBCUTANEOUS ONCE
Qty: 0 | Refills: 0 | Status: COMPLETED | OUTPATIENT
Start: 2018-03-16 | End: 2018-03-16

## 2018-03-16 RX ORDER — VANCOMYCIN HCL 1 G
1250 VIAL (EA) INTRAVENOUS EVERY 12 HOURS
Qty: 0 | Refills: 0 | Status: DISCONTINUED | OUTPATIENT
Start: 2018-03-16 | End: 2018-03-17

## 2018-03-16 RX ORDER — LIDOCAINE 4 G/100G
1 CREAM TOPICAL DAILY
Qty: 0 | Refills: 0 | Status: COMPLETED | OUTPATIENT
Start: 2018-03-16 | End: 2018-03-18

## 2018-03-16 RX ORDER — SODIUM CHLORIDE 9 MG/ML
1000 INJECTION INTRAMUSCULAR; INTRAVENOUS; SUBCUTANEOUS
Qty: 0 | Refills: 0 | Status: DISCONTINUED | OUTPATIENT
Start: 2018-03-16 | End: 2018-03-18

## 2018-03-16 RX ORDER — FENTANYL CITRATE 50 UG/ML
25 INJECTION INTRAVENOUS ONCE
Qty: 0 | Refills: 0 | Status: DISCONTINUED | OUTPATIENT
Start: 2018-03-16 | End: 2018-03-16

## 2018-03-16 RX ORDER — CHLORHEXIDINE GLUCONATE 213 G/1000ML
15 SOLUTION TOPICAL
Qty: 0 | Refills: 0 | Status: DISCONTINUED | OUTPATIENT
Start: 2018-03-16 | End: 2018-04-13

## 2018-03-16 RX ORDER — IPRATROPIUM/ALBUTEROL SULFATE 18-103MCG
3 AEROSOL WITH ADAPTER (GRAM) INHALATION EVERY 6 HOURS
Qty: 0 | Refills: 0 | Status: COMPLETED | OUTPATIENT
Start: 2018-03-16 | End: 2018-03-17

## 2018-03-16 RX ORDER — SODIUM CHLORIDE 9 MG/ML
300 INJECTION, SOLUTION INTRAVENOUS ONCE
Qty: 0 | Refills: 0 | Status: DISCONTINUED | OUTPATIENT
Start: 2018-03-16 | End: 2018-03-16

## 2018-03-16 RX ADMIN — OXYCODONE HYDROCHLORIDE 10 MILLIGRAM(S): 5 TABLET ORAL at 21:42

## 2018-03-16 RX ADMIN — Medication 400 MILLIGRAM(S): at 06:19

## 2018-03-16 RX ADMIN — MEROPENEM 100 MILLIGRAM(S): 1 INJECTION INTRAVENOUS at 21:54

## 2018-03-16 RX ADMIN — FENTANYL CITRATE 25 MICROGRAM(S): 50 INJECTION INTRAVENOUS at 23:12

## 2018-03-16 RX ADMIN — Medication 250 MILLIGRAM(S): at 05:53

## 2018-03-16 RX ADMIN — Medication 166.67 MILLIGRAM(S): at 17:57

## 2018-03-16 RX ADMIN — LIDOCAINE 1 PATCH: 4 CREAM TOPICAL at 16:46

## 2018-03-16 RX ADMIN — Medication 1 DROP(S): at 21:44

## 2018-03-16 RX ADMIN — Medication 3 MILLILITER(S): at 21:10

## 2018-03-16 RX ADMIN — HEPARIN SODIUM 5000 UNIT(S): 5000 INJECTION INTRAVENOUS; SUBCUTANEOUS at 21:45

## 2018-03-16 RX ADMIN — HEPARIN SODIUM 5000 UNIT(S): 5000 INJECTION INTRAVENOUS; SUBCUTANEOUS at 14:00

## 2018-03-16 RX ADMIN — Medication 650 MILLIGRAM(S): at 21:45

## 2018-03-16 RX ADMIN — SODIUM CHLORIDE 4000 MILLILITER(S): 9 INJECTION INTRAMUSCULAR; INTRAVENOUS; SUBCUTANEOUS at 11:24

## 2018-03-16 RX ADMIN — Medication 5 MILLILITER(S): at 17:52

## 2018-03-16 RX ADMIN — HEPARIN SODIUM 5000 UNIT(S): 5000 INJECTION INTRAVENOUS; SUBCUTANEOUS at 05:53

## 2018-03-16 RX ADMIN — MEROPENEM 100 MILLIGRAM(S): 1 INJECTION INTRAVENOUS at 16:39

## 2018-03-16 RX ADMIN — INSULIN HUMAN 1: 100 INJECTION, SOLUTION SUBCUTANEOUS at 16:47

## 2018-03-16 RX ADMIN — FENTANYL CITRATE 25 MICROGRAM(S): 50 INJECTION INTRAVENOUS at 23:27

## 2018-03-16 RX ADMIN — CHLORHEXIDINE GLUCONATE 15 MILLILITER(S): 213 SOLUTION TOPICAL at 19:59

## 2018-03-16 RX ADMIN — SODIUM CHLORIDE 2000 MILLILITER(S): 9 INJECTION, SOLUTION INTRAVENOUS at 13:00

## 2018-03-16 RX ADMIN — Medication 500 MILLIGRAM(S): at 16:38

## 2018-03-16 RX ADMIN — PANTOPRAZOLE SODIUM 40 MILLIGRAM(S): 20 TABLET, DELAYED RELEASE ORAL at 05:53

## 2018-03-16 RX ADMIN — Medication 10 MILLIGRAM(S): at 05:52

## 2018-03-16 RX ADMIN — OXYCODONE HYDROCHLORIDE 10 MILLIGRAM(S): 5 TABLET ORAL at 00:25

## 2018-03-16 RX ADMIN — INSULIN HUMAN 1: 100 INJECTION, SOLUTION SUBCUTANEOUS at 23:54

## 2018-03-16 RX ADMIN — OXYCODONE HYDROCHLORIDE 10 MILLIGRAM(S): 5 TABLET ORAL at 20:51

## 2018-03-16 RX ADMIN — Medication 3 MILLILITER(S): at 08:20

## 2018-03-16 RX ADMIN — Medication 81 MILLIGRAM(S): at 16:38

## 2018-03-16 RX ADMIN — Medication 10 MILLIGRAM(S): at 17:56

## 2018-03-16 RX ADMIN — Medication 250 MILLIGRAM(S): at 18:04

## 2018-03-16 RX ADMIN — MEROPENEM 100 MILLIGRAM(S): 1 INJECTION INTRAVENOUS at 05:53

## 2018-03-16 RX ADMIN — SODIUM CHLORIDE 100 MILLILITER(S): 9 INJECTION INTRAMUSCULAR; INTRAVENOUS; SUBCUTANEOUS at 21:44

## 2018-03-16 RX ADMIN — CLOPIDOGREL BISULFATE 75 MILLIGRAM(S): 75 TABLET, FILM COATED ORAL at 16:39

## 2018-03-16 RX ADMIN — INSULIN HUMAN 1: 100 INJECTION, SOLUTION SUBCUTANEOUS at 06:06

## 2018-03-16 RX ADMIN — Medication 166.67 MILLIGRAM(S): at 05:53

## 2018-03-16 RX ADMIN — Medication 3 MILLILITER(S): at 13:42

## 2018-03-16 RX ADMIN — INSULIN GLARGINE 7 UNIT(S): 100 INJECTION, SOLUTION SUBCUTANEOUS at 21:45

## 2018-03-16 RX ADMIN — INSULIN HUMAN 1: 100 INJECTION, SOLUTION SUBCUTANEOUS at 18:42

## 2018-03-16 NOTE — CHART NOTE - NSCHARTNOTEFT_GEN_A_CORE
Rapid Response PGY 2 Note      PGY1    PGY2   PGY3 Dr. Francis  Patient is a  61 y/o male with PMHof transverse myelitis w/ Paraplegia, trach/peg, colostomy in place, presented with fevers of unknown origin.    Rapid response team called because patient was unresponsive had clear secretions from his nose with no air exchange, positive pulse.      Allergies  No Known Allergies      PAST MEDICAL & SURGICAL HISTORY:  Essential hypertension  Paralysis  Transverse myelitis  History of tracheostomy      Vital Signs Last 24 Hrs  T(C): 37.6 (16 Mar 2018 08:18), Max: 40.1 (15 Mar 2018 22:45)  T(F): 99.7 (16 Mar 2018 08:18), Max: 104.2 (15 Mar 2018 22:45)  HR: 118 (16 Mar 2018 08:18) (115 - 138)  BP: 139/85 (16 Mar 2018 08:18) (114/73 - 141/84)  RR: 18 (16 Mar 2018 08:18) (14 - 18)  SpO2: 96% (16 Mar 2018 08:20) (94% - 99%)      PHYSICAL EXAM:   Gen: adult male lying supine in NAD  HEENT: NCAT, EOMI, PERRLA, 3mm b/l; moist mucous membranes, no thrush; trach collar in place   Neck:  Supple, No Supraclavicular/submandibular lymphadenopathy;   Neuro:  Unresponsive;  paralyzed in b/l lower ext, and left upper ext;    Resp: once ventilator on, patient had rales and crackles on right, course dull breath sounds on left;     CV: S1, S2;  sinus tachycardia, No murmurs appreciated  GI: Soft, Nontender, Nondistended; Bowel sounds present;    colostomy bag with brown stool, peg in place     :  Iniguez in place     Ext:  No clubbing, cyanosis, or edema;   Skin:  Erythematous violaceous patch in the Right Chest, nontender      03-15 @ 07:01  -  03-16 @ 07:00  --------------------------------------------------------  IN: 1200 mL / OUT: 400 mL / NET: 800 mL                              10.3   22.6  )-----------( 409      ( 16 Mar 2018 08:28 )             32.5     03-16    127<L>  |  89<L>  |  25.0<H>  ----------------------------<  225<H>  4.6   |  27.0  |  1.08    Ca    8.9      16 Mar 2018 08:01    TPro  6.5<L>  /  Alb  2.4<L>  /  TBili  0.5  /  DBili  x   /  AST  14  /  ALT  13  /  AlkPhos  72  03-16    ABG - ( 16 Mar 2018 11:08 )  pH: 7.39  /  pCO2: 49    /  pO2: 80    / HCO3: 28    / Base Excess: 3.8   /  SaO2: 96                   LIVER FUNCTIONS - ( 16 Mar 2018 08:01 )  Alb: 2.4 g/dL / Pro: 6.5 g/dL / ALK PHOS: 72 U/L / ALT: 13 U/L / AST: 14 U/L / GGT: x                  Vital Signs Last 24 Hrs*       Assessment- Patient is a  61 y/o male with PMHof transverse myelitis w/ Paraplegia, trach/peg, colostomy in place, presented with fevers of unknown origin.    Rapid response team called because patient was unresponsive had clear secretions from his nose with no air exchange, positive pulse.      Plan-  Patient put back on ventilator, stat abg, cmp, cbc, stat portable cxray, stat ct head, ekg sinus tachycardia.  Concern for patients rapid respiratory decompensation, while on meropenem on vancomycin, with new onset fevers overnight, CT chest overnight indicative of Right sided PNA.    Ordered stat abg,     Discussed with Attending  and ICU [facility]IN[address] Code [place] PGY 2 Note  PGY1    PGY2   PGY3 Dr. Francis    Patient is a  61 y/o male with PMHof transverse myelitis w/ Paraplegia, trach/peg, colostomy in place, presented with fevers of unknown origin.    Rapid response team called because patient was unresponsive had clear secretions from his nose with no air exchange, positive pulse.      Allergies  No Known Allergies      PAST MEDICAL & SURGICAL HISTORY:  Essential hypertension  Paralysis  Transverse myelitis  History of tracheostomy      Vital Signs Last 24 Hrs  T(C): 37.6 (16 Mar 2018 08:18), Max: 40.1 (15 Mar 2018 22:45)  T(F): 99.7 (16 Mar 2018 08:18), Max: 104.2 (15 Mar 2018 22:45)  HR: 118 (16 Mar 2018 08:18) (115 - 138)  BP: 139/85 (16 Mar 2018 08:18) (114/73 - 141/84)  RR: 18 (16 Mar 2018 08:18) (14 - 18)  SpO2: 96% (16 Mar 2018 08:20) (94% - 99%)      PHYSICAL EXAM:  Gen: adult male lying supine in NAD  HEENT: NCAT, EOMI, PERRLA, 3mm b/l; moist mucous membranes, no thrush; trach collar in place   Neck:  Supple, No Supraclavicular/submandibular lymphadenopathy;   Neuro:  Unresponsive;  paralyzed in b/l lower ext, and left upper ext;    Resp: once ventilator on, patient had rales and crackles on right, course dull breath sounds on left;     CV: S1, S2;  sinus tachycardia, No murmurs appreciated  GI: Soft, Nontender, Nondistended; Bowel sounds present;    colostomy bag with brown stool, peg in place     :  Iniguez in place     Ext:  No clubbing, cyanosis, or edema;   Skin:  Erythematous violaceous patch in the Right Chest, nontender      03-15 @ 07:01  -  03-16 @ 07:00  --------------------------------------------------------  IN: 1200 mL / OUT: 400 mL / NET: 800 mL                          10.3   22.6  )-----------( 409      ( 16 Mar 2018 08:28 )             32.5     03-16    127<L>  |  89<L>  |  25.0<H>  ----------------------------<  225<H>  4.6   |  27.0  |  1.08    Ca    8.9      16 Mar 2018 08:01    TPro  6.5<L>  /  Alb  2.4<L>  /  TBili  0.5  /  DBili  x   /  AST  14  /  ALT  13  /  AlkPhos  72  03-16    ABG - ( 16 Mar 2018 11:08 )  pH: 7.39  /  pCO2: 49    /  pO2: 80    / HCO3: 28    / Base Excess: 3.8   /  SaO2: 96           LIVER FUNCTIONS - ( 16 Mar 2018 08:01 )  Alb: 2.4 g/dL / Pro: 6.5 g/dL / ALK PHOS: 72 U/L / ALT: 13 U/L / AST: 14 U/L / GGT: x              Assessment- Patient is a  61 y/o male with PMHof transverse myelitis w/ Paraplegia, trach/peg, colostomy in place, presented with fevers of unknown origin.    Sepsis team called because patient was unresponsive had clear secretions from his nose with no air exchange, positive pulse.      Plan-  Patient put back on ventilator, stat abg, cmp, cbc, stat portable cxray, stat ct head, ekg sinus tachycardia.  Concern for patients rapid respiratory decompensation, while on meropenem on vancomycin, with new onset fevers overnight, CT chest overnight indicative of Right sided PNA.    Ordered stat abg, Patient    Discussed with Attending  and ICU [address]

## 2018-03-16 NOTE — PROGRESS NOTE ADULT - ATTENDING COMMENTS
61 y/o male with PMHof transverse myelitis w/ Paraplegia, trach/peg, colostomy in place, recently with fevers at the NH and empirically tx with rocephin and was to finish course on 3/11/18 for presumed uti also on flagyl for c. diff to finish course on 3/14/18, currently p/w NH for recurrent fevers with tmax: 101. In the ER pt noted to be tachycardic, hypothermic, unclear etiology of prior fevers at the NH b/c pt did not spike any fevers in the hospital initially; there was clinical concern for partially tx uti. Pt with initially no leukocytosis then up trended on 3/13 to 16 and today 21, slight elevation in procalcitonin and ID continues to follow the pt. Initially recommended to remain off of abx and continue to monitor the patient but given current up trending leukocytosis was empirically placed on cefepime for positive U cx for carbapenem resistant pseudomona uti 50-90,000, B cx negative; initial ct abd/pelvis and cxr negative, C. Diff negative. Also clinical concern that sacral stage 3 wound although tissue does not look infected, was initially draining serosanguinous fluid and now greenish in color with concern for pseudomonas, Repeat pan cx sent, cxr showing marked elevation of right hemidiaphragm versus subpulmonic effusion and subdiaphragmatic air filled bowel noted which are new from prior studies. Pulm consulted b/c pt requiring vent support due to acute respiratory failure with hypoxia and and pt remained on the vent now for over 48 hrs. Overnight patient had persisting fevers tmax: 103, Lactate wnl, CT scan chest showed extensive pna on the right side and abx coverage expanded to vanco and meropenem. ID continues to follow the pt and recommended to f/u sputum and if MRSA negative then to d/c vanco. When patient was examined during rounds pt found in the bed, unresponsive, excessive secretions was not on the ventilator. Called rapid response for unresponsiveness and acute respiratory failure with hypoxia. Placed the pt back on the ventilator with noted abg with resp alkalosis and slight hypoxia with borderline low pa02. Patient suctioned and stat labs completed. VS with low bp and fs stable, given NS bolus 1 l x 1 dose. CXR with no new changes from overnight ct chest. CT head completed and negative for any findings. ICU consulted and d/w the ICU the plan of care. Patient transferred to the ICU for further management and care. All care to be resumed as per the ICU team please call the medicine team when and if the patient is down graded.

## 2018-03-16 NOTE — PROGRESS NOTE ADULT - SUBJECTIVE AND OBJECTIVE BOX
KAMAR BELLAMY is a 62y Male with HPI:  61y/o male with pmh of PMH Transverse myelitis, Paraplegia, trach/peg, colostomy, HTN, coming from [facility] presents with fever for 3 days.       .    Patient signed [facility] REQUESTING CPR on 2/18/18.    Patient eats via tube feeding,.      Originally at [facility] in   7months ago, developed Acute transverse myelitis.    Heart Attack, stent placed, check up after.  He was given a tetanus shot, then couple days ago he developed lower extremity weakness, paralysis; ultimately developed ulcers.      [facility]--> [facility] (kept him for a few weeks) --> wife couldn't take care of him;    He is getting frequent uti's, anemia reequiring transfusions.    101 fever at [facility]   BLOOD CX SO FAR NEG  C DIFF NEG  DECUBITUS ULCER CLEAN  URINE CX WITH 50-90K CRE   PT WITH CONTINUED FEVERS AND CT SCAN DONE WHICH SHOW EXTENSIVE PNEUMONIA   ABX CHANGED TO MERREM VANCO      Allergies:  No Known Allergies      Medications:  ALPRAZolam 0.5 milliGRAM(s) Oral three times a day PRN  ascorbic acid 500 milliGRAM(s) Oral daily  aspirin  chewable 81 milliGRAM(s) Oral daily  baclofen 10 milliGRAM(s) Oral two times a day  clopidogrel Tablet 75 milliGRAM(s) Oral daily  dextrose 5%. 1000 milliLiter(s) IV Continuous <Continuous>  dextrose 50% Injectable 12.5 Gram(s) IV Push once  dextrose 50% Injectable 25 Gram(s) IV Push once  dextrose 50% Injectable 25 Gram(s) IV Push once  glucagon  Injectable 1 milliGRAM(s) IntraMuscular once PRN  heparin  Injectable 5000 Unit(s) SubCutaneous every 8 hours  insulin regular  human corrective regimen sliding scale   SubCutaneous every 6 hours  metroNIDAZOLE    Tablet 500 milliGRAM(s) Oral every 8 hours  multivitamin   Solution 5 milliLiter(s) Oral daily  oxyCODONE    IR 10 milliGRAM(s) Oral every 6 hours PRN  pantoprazole   Suspension 40 milliGRAM(s) Enteral Tube before breakfast  petrolatum Ophthalmic Ointment 1 Application(s) Both EYES at bedtime      ANTIBIOTICS:         Review of Systems: - Negative except as mentioned above     Physical Exam:     Vital Signs Last 24 Hrs  T(C): 37.6 (16 Mar 2018 08:18), Max: 40.1 (15 Mar 2018 22:45)  T(F): 99.7 (16 Mar 2018 08:18), Max: 104.2 (15 Mar 2018 22:45)  HR: 118 (16 Mar 2018 08:18) (115 - 138)  BP: 139/85 (16 Mar 2018 08:18) (114/73 - 141/84)  BP(mean): --  ABP: --  ABP(mean): --  RR: 18 (16 Mar 2018 08:18) (14 - 18)  SpO2: 98% (16 Mar 2018 08:18) (94% - 99%)      GEN: NAD, pleasant  HEENT: normocephalic and atraumatic. EOMI. JAMESON... TRACH  NECK: Supple. No carotid bruits.  No lymphadenopathy or thyromegaly.  LUNGS: Clear to auscultation.  HEART: Regular rate and rhythm without murmur.  ABDOMEN: Soft, nontender, and nondistended.  Positive bowel sounds.  No hepatosplenomegaly was noted.  NO REBOUND NO GUARDING  EXTREMITIES: Without any cyanosis, clubbing, rash, lesions or edema.  NEUROLOGIC: Cranial nerves II through XII are grossly intact.    SKIN: No ulceration or induration present.      Labs:                                                      10.3   22.6  )-----------( 409      ( 16 Mar 2018 08:28 )             32.5   03-16    127<L>  |  89<L>  |  25.0<H>  ----------------------------<  225<H>  4.6   |  27.0  |  1.08    Ca    8.9      16 Mar 2018 08:01    TPro  6.5<L>  /  Alb  2.4<L>  /  TBili  0.5  /  DBili  x   /  AST  14  /  ALT  13  /  AlkPhos  72  03-16

## 2018-03-16 NOTE — CHART NOTE - NSCHARTNOTEFT_GEN_A_CORE
CT Scan: There is extensive   consolidation in the right lung. There is some patchy infiltrate in the   right upper lobe with dense right lower lobe and right middle lobe   consolidation.     Vanco added to Meropenem for MRSA coverage.   Pt continued to have fever spikes at night.  Ibuprofen added to alternate with Tylenol.   Case discussed with Dr. Miller and in agreement with plan.  Alex, PGY 3

## 2018-03-16 NOTE — PROGRESS NOTE ADULT - PROBLEM SELECTOR PLAN 2
Known recent history of cdiff infection, february - march;    -regimen of metronidazole 500mg [id number] was started 3/5 and stopped early on 3/12;   tested negative for cdiff during this hospitalization but given leukocytosis up trending to 21 concern for recurrence after being placed on abx   -c. diff sent but denied by lab to be performed   -c/w probiotic while on abx for prevention

## 2018-03-16 NOTE — PROGRESS NOTE ADULT - ASSESSMENT
63 y/o male with PMHof transverse myelitis w/ Paraplegia, trach/peg, colostomy in place, recently with fevers at the NH and empirically tx with rocephin and was to finish course on 3/11/18 for presumed uti also on flagyl for c. diff to finish course on 3/14/18, currently p/w NH for recurrent fevers with tmax: 101. In the ER pt noted to be tachycardic, hypothermic, unclear etiology of prior fevers at the NH b/c pt did not spike any fevers in the hospital initially; there was clinical concern for partially tx uti. Pt with initially no leukocytosis then up trended on 3/13 to 16 and today 21, slight elevation in procalcitonin and ID continues to follow the pt. Initially recommended to remain off of abx and continue to monitor the patient but given current up trending leukocytosis was empirically placed on cefepime for positive U cx for carbapenem resistant pseudomona uti 50-90,000, B cx negative; initial ct abd/pelvis and cxr negative, C. Diff negative. Also clinical concern that sacral stage 3 wound although tissue does not look infected, was initially draining serosanguinous fluid and now greenish in color with concern for pseudomonas Repeat pan cx sent, cxr showing marked elevation of right hemidiaphragm versus subpulmonic effusion and subdiaphragmatic air filled bowel noted which are new from prior studies. .  Pulm consulted b/c pt requiring vent support.         Overnight patient had persisting fevers, Lactate wnl, CT scan chest showed extensive pna, patient now on Vanco and meropenem for extended coverage. 61 y/o male with PMHof transverse myelitis w/ Paraplegia, trach/peg, colostomy in place, recently with fevers at the NH and empirically tx with rocephin and was to finish course on 3/11/18 for presumed uti also on flagyl for c. diff to finish course on 3/14/18, currently p/w NH for recurrent fevers with tmax: 101. In the ER pt noted to be tachycardic, hypothermic, unclear etiology of prior fevers at the NH b/c pt did not spike any fevers in the hospital initially; there was clinical concern for partially tx uti. Pt with initially no leukocytosis then up trended on 3/13 to 16 and today 21, slight elevation in procalcitonin and ID continues to follow the pt. Initially recommended to remain off of abx and continue to monitor the patient but given current up trending leukocytosis was empirically placed on cefepime for positive U cx for carbapenem resistant pseudomona uti 50-90,000, B cx negative; initial ct abd/pelvis and cxr negative, C. Diff negative. Also clinical concern that sacral stage 3 wound although tissue does not look infected, was initially draining serosanguinous fluid and now greenish in color with concern for pseudomonas Repeat pan cx sent, cxr showing marked elevation of right hemidiaphragm versus subpulmonic effusion and subdiaphragmatic air filled bowel noted which are new from prior studies. .  Pulm consulted b/c pt requiring vent support.         Overnight patient had persisting fevers, Lactate wnl, CT scan chest showed extensive pna, patient now on Vanco and meropenem for extended coverage.  Pending procalcitonin, crp. 61 y/o male with PMHof transverse myelitis w/ Paraplegia, trach/peg, colostomy in place, recently with fevers at the NH and empirically tx with rocephin and was to finish course on 3/11/18 for presumed uti also on flagyl for c. diff to finish course on 3/14/18, currently p/w NH for recurrent fevers with tmax: 101. In the ER pt noted to be tachycardic, hypothermic, unclear etiology of prior fevers at the NH b/c pt did not spike any fevers in the hospital initially; there was clinical concern for partially tx uti. Pt with initially no leukocytosis then up trended on 3/13 to 16 and today 21, slight elevation in procalcitonin and ID continues to follow the pt. Initially recommended to remain off of abx and continue to monitor the patient but given current up trending leukocytosis was empirically placed on cefepime for positive U cx for carbapenem resistant pseudomona uti 50-90,000, B cx negative; initial ct abd/pelvis and cxr negative, C. Diff negative. Also clinical concern that sacral stage 3 wound although tissue does not look infected, was initially draining serosanguinous fluid and now greenish in color with concern for pseudomonas Repeat pan cx sent, cxr showing marked elevation of right hemidiaphragm versus subpulmonic effusion and subdiaphragmatic air filled bowel noted which are new from prior studies. .  Pulm consulted b/c pt requiring vent support.         Overnight patient had persisting fevers, Lactate wnl, CT scan chest showed extensive pna, patient now on Vanco and meropenem for extended coverage.  Pending procalcitonin, crp.  Duoneb [id number] standing order for the next 24 hours. 63 y/o male with PMHof transverse myelitis w/ Paraplegia, trach/peg, colostomy in place, recently with fevers at the NH and empirically tx with rocephin and was to finish course on 3/11/18 for presumed uti also on flagyl for c. diff to finish course on 3/14/18, currently p/w NH for recurrent fevers with tmax: 101. In the ER pt noted to be tachycardic, hypothermic, unclear etiology of prior fevers at the NH b/c pt did not spike any fevers in the hospital initially; there was clinical concern for partially tx uti. Pt with initially no leukocytosis then up trended on 3/13 to 16 and today 21, slight elevation in procalcitonin and ID continues to follow the pt. Initially recommended to remain off of abx and continue to monitor the patient but given current up trending leukocytosis was empirically placed on cefepime for positive U cx for carbapenem resistant pseudomona uti 50-90,000, B cx negative; initial ct abd/pelvis and cxr negative, C. Diff negative. Also clinical concern that sacral stage 3 wound although tissue does not look infected, was initially draining serosanguinous fluid and now greenish in color with concern for pseudomonas Repeat pan cx sent, cxr showing marked elevation of right hemidiaphragm versus subpulmonic effusion and subdiaphragmatic air filled bowel noted which are new from prior studies. Pulm consulted b/c pt requiring vent support and pt remained on the vent now for over 48 hrs. Overnight patient had persisting fevers, Lactate wnl, CT scan chest showed extensive pna, patient now on Vanco and meropenem for extended coverage.

## 2018-03-16 NOTE — PROGRESS NOTE ADULT - SUBJECTIVE AND OBJECTIVE BOX
PULMONARY PROGRESS NOTE      KAMAR BELLAMYMILES-[id number]    Patient is a 62y old  Male who presents with a chief complaint of fevers (12 Mar 2018 18:28)      INTERVAL HPI/OVERNIGHT EVENTS:  trnasferred to ICU fro progressive respiratory failure  now vented in NAD  unable to communicate    MEDICATIONS  (STANDING):  ALBUTerol/ipratropium for Nebulization 3 milliLiter(s) Nebulizer every 6 hours  ascorbic acid 500 milliGRAM(s) Oral daily  aspirin  chewable 81 milliGRAM(s) Oral daily  baclofen 10 milliGRAM(s) Oral two times a day  clopidogrel Tablet 75 milliGRAM(s) Oral daily  dextrose 5%. 1000 milliLiter(s) (50 mL/Hr) IV Continuous <Continuous>  dextrose 50% Injectable 12.5 Gram(s) IV Push once  dextrose 50% Injectable 25 Gram(s) IV Push once  dextrose 50% Injectable 25 Gram(s) IV Push once  heparin  Injectable 5000 Unit(s) SubCutaneous every 8 hours  insulin glargine Injectable (LANTUS) 7 Unit(s) SubCutaneous at bedtime  insulin regular  human corrective regimen sliding scale   SubCutaneous every 6 hours  lidocaine   Patch 1 Patch Transdermal daily  lidocaine   Patch 1 Patch Transdermal daily  meropenem  IVPB 1000 milliGRAM(s) IV Intermittent every 8 hours  multiple electrolytes Injection Type 1 Bolus 1000 milliLiter(s) IV Bolus once  multivitamin   Solution 5 milliLiter(s) Oral daily  naloxone Injectable 0.4 milliGRAM(s) IV Push once  pantoprazole   Suspension 40 milliGRAM(s) Enteral Tube before breakfast  petrolatum Ophthalmic Ointment 1 Application(s) Both EYES at bedtime  saccharomyces boulardii 250 milliGRAM(s) Oral two times a day  sodium chloride 0.9% Bolus 1000 milliLiter(s) IV Bolus once  vancomycin  IVPB 1250 milliGRAM(s) IV Intermittent every 12 hours      MEDICATIONS  (PRN):  acetaminophen   Tablet 650 milliGRAM(s) Oral every 6 hours PRN For Temp greater than 38 C (100.4 F)  acetaminophen   Tablet. 650 milliGRAM(s) Oral every 6 hours PRN Moderate Pain (4 - 6) and headache  ALBUTerol    0.083% 2.5 milliGRAM(s) Nebulizer every 6 hours PRN Shortness of Breath and/or Wheezing  ALPRAZolam 0.5 milliGRAM(s) Oral three times a day PRN anxiety  glucagon  Injectable 1 milliGRAM(s) IntraMuscular once PRN Glucose <70 milliGRAM(s)/deciLiter  ibuprofen  Suspension 400 milliGRAM(s) Enteral Tube every 6 hours PRN temp >100.4  oxyCODONE    IR 10 milliGRAM(s) Oral every 6 hours PRN Moderate Pain (4 - 6)      Allergies    No Known Allergies    Intolerances        PAST MEDICAL & SURGICAL HISTORY:  Essential hypertension  Paralysis  Transverse myelitis  History of tracheostomy      SOCIAL HISTORY  Smoking History:       REVIEW OF SYSTEMS:    unavailable    Vital Signs Last 24 Hrs  T(C): 38.9 (16 Mar 2018 11:44), Max: 40.1 (15 Mar 2018 22:45)  T(F): 102 (16 Mar 2018 11:44), Max: 104.2 (15 Mar 2018 22:45)  HR: 110 (16 Mar 2018 13:43) (110 - 138)  BP: 110/77 (16 Mar 2018 13:05) (101/58 - 141/84)  BP(mean): 89 (16 Mar 2018 13:05) (73 - 94)  RR: 18 (16 Mar 2018 13:05) (14 - 22)  SpO2: 94% (16 Mar 2018 13:43) (94% - 99%)      03-15 @ 07:01  -  03-16 @ 07:00  --------------------------------------------------------  IN: 1200 mL / OUT: 400 mL / NET: 800 mL        Mode: AC/ CMV (Assist Control/ Continuous Mandatory Ventilation), RR (machine): 18, TV (machine): 500, FiO2: 60, PEEP: 5, MAP: 12, PIP: 29    T(C): 38.9 (03-16-18 @ 11:44), Max: 40.1 (03-15-18 @ 22:45)  HR: 110 (03-16-18 @ 13:43) (110 - 138)  BP: 110/77 (03-16-18 @ 13:05) (101/58 - 141/84)  RR: 18 (03-16-18 @ 13:05) (14 - 22)  SpO2: 94% (03-16-18 @ 13:43) (94% - 99%)  Wt(kg): --    PHYSICAL EXAMINATION:    GENERAL: The patient is awake and alert in no apparent distress.     HEENT: Head is normocephalic and atraumatic. Extraocular muscles are intact. Mucous membranes are moist.    NECK: Supple. trach    LUNGS: Clear to auscultation without wheezing, rales or rhonchi; respirations unlabored    HEART: Regular rate and rhythm without murmur.    ABDOMEN: Soft, nontender, and nondistended.      EXTREMITIES: Without any cyanosis, clubbing, rash, lesions or edema.    NEUROLOGIC: Grossly intact.    LABS:                        9.9    24.7  )-----------( 424      ( 16 Mar 2018 11:35 )             31.6     03-16    130<L>  |  91<L>  |  27.0<H>  ----------------------------<  222<H>  5.0   |  26.0  |  1.07    Ca    8.8      16 Mar 2018 11:35    TPro  6.5<L>  /  Alb  2.4<L>  /  TBili  0.5  /  DBili  x   /  AST  14  /  ALT  13  /  AlkPhos  72  03-16        ABG - ( 16 Mar 2018 11:08 )  pH: 7.39  /  pCO2: 49    /  pO2: 80    / HCO3: 28    / Base Excess: 3.8   /  SaO2: 96                          Procalcitonin, Serum: 1.03 ng/mL (03-16-18 @ 08:01)      MICROBIOLOGY:    RADIOLOGY & ADDITIONAL STUDIES:  < from: CT Head No Cont (03.16.18 @ 12:22) >     EXAM:  CT BRAIN                          PROCEDURE DATE:  03/16/2018          INTERPRETATION:  CLINICAL HISTORY: Alteration of consciousness, rapid   response    COMPARISON: None.    TECHNIQUE: Noncontrast CT of the head. Multiplanar reformations are   submitted.    FINDINGS:  There is periventricular and subcortical white matter hypodensity without   mass effect, nonspecific, likely representing mild chronic microvascular   ischemic changes. There is no compelling evidence for an acute   transcortical infarction. There is no evidence of mass, mass effect,   midline shift or extra-axial fluid collection. The lateral ventricles and   cortical sulci are age-appropriate in size and configuration. The orbits,   mastoid air cells and visualizedparanasal sinuses are unremarkable. The   calvarium is intact.    IMPRESSION:  Mild chronic microvascular changes without evidence of an   acute transcortical infarction or hemorrhage. MR is a more sensitive   imaging modality for the evaluation of an acute infarction.                 MABLE SÁNCHEZ M.D., ATTENDING RADIOLOGIST  This document has been electronically signed. Mar 16 2018 12:13PM    < end of copied text >  < from: Xray Chest 1 View-PORTABLE IMMEDIATE (03.16.18 @ 11:40) >     EXAM:  XR CHEST PORTABLE IMMED 1V                          PROCEDURE DATE:  03/16/2018          INTERPRETATION:  CHEST AP PORTABLE:    History: rapid response.     Date and time of exam: 3/16/2018 11:15 AM.    Technique: A single AP view of the chest was obtained.    Comparison exam: 3/15/2018 10:50 AM.    Findings:  There is now an acute infiltrate in the right mid and lower lung not seen   on the prior day. Findings suggest aspiration. A tracheostomy tube is   noted. The left lung remains clear. The heart and mediastinum   unremarkable..    Impression:  There is a new right lung infiltrate suggesting aspiration..                GINNA LÓEN M.D., ATTENDING RADIOLOGIST  This document has been electronically signed. Mar 16 2018 11:41AM    < end of copied text >

## 2018-03-16 NOTE — PROGRESS NOTE ADULT - PROBLEM SELECTOR PLAN 5
HBA1c: 5,6   -fs stable  -c/w increased lantus to 7 u sq qhs   -c/w HSS  -c/w accuchecks ACHS TID   -c/w hypoglycemia protocol

## 2018-03-16 NOTE — PROGRESS NOTE ADULT - ASSESSMENT
Acute on chronic resp failure secondary to aspiration or HCAP  prognosis dismal    Plan:  -vent   -Abx per ID  -per micu team

## 2018-03-16 NOTE — PROGRESS NOTE ADULT - PROBLEM SELECTOR PLAN 4
-Known Transverse Myelitis   -pt with noted sequela of trach/ peg/ colostomy in place  -c/w supportive care   -c/w baclofen  -c/w oxycdone prn for chronic pain   -c/w glucerna peg feeding to goal  -pt usually on trach collar but at times may need ventilator at night, today with increasing sob and placed on ventilator as per vent settings at the facility   -pulm consulted -Known Transverse Myelitis   -pt with noted sequela of trach/ peg/ colostomy in place  -c/w supportive care   -c/w baclofen  -c/w oxycdone prn for chronic pain   -c/w glucerna peg feeding to goal  -pt usually on trach collar but at times but has needed vent support for the past 2 days   -pulm consulted

## 2018-03-16 NOTE — PROGRESS NOTE ADULT - SUBJECTIVE AND OBJECTIVE BOX
[place]    Patient is a 62y old  Male who presents with a cc of fevers (10 Mar 2018 23:26)    LAST 24HRS/THIS AM  Patient seen and examined at bedside.    Overnight patient had fevers, CT scan chest showed extensive pna, patient now on Vanco and meropenem for extended coverage.      Communication with patient via by head nods and deciphering his mouthed words.    Patient denies chest pain, SOB, abd pain, N/V, fever, chills or any other complaints.       Vital Signs Last 24 Hrs  T(C): 38.9 (16 Mar 2018 06:19), Max: 40.1 (15 Mar 2018 22:45)  T(F): 102.1 (16 Mar 2018 06:19), Max: 104.2 (15 Mar 2018 22:45)          FEBRILE  HR: 128 (16 Mar 2018 04:03) (115 - 138)  BP: 114/73 (15 Mar 2018 23:44) (114/73 - 157/86)  RR: 18 (15 Mar 2018 23:44) (14 - 19)  SpO2: 99% (16 Mar 2018 04:03) (93% - 99%)        PHYSICAL EXAM:   Gen: adult male lying supine in NAD  HEENT: NCAT, EOMI, PERRLA, 3mm b/l; moist mucous membranes, no thrush; trach collar in place   Neck:  Supple, No Supraclavicular/submandibular lymphadenopathy;   Neuro:  Alert & Oriented x 3;  paralyzed in b/l lower ext, and left upper ext;    Resp: coarse breath sounds b/l, right upper load increased rales and crackles in posterior lung;   CV: S1, S2;  sinus tachycardia, No murmurs appreciated  GI: Soft, Nontender, Nondistended; Bowel sounds present;    colostomy bag with brown stool, peg in place     :  Iniguez in place     Ext:  No clubbing, cyanosis, or edema;   Skin:  Erythematous violaceous patch in the Right Chest, nontender            I&O's Detail    03-14 @ 07:01  -  03-15 @ 07:00  --------------------------------------------------------  IN: 1250 mL / OUT: 1050 mL / NET: 200 mL    03-15 @ 07:01  -  03-16 @ 06:38  --------------------------------------------------------  IN: 1200 mL / OUT: 400 mL / NET: 800 mL    14 Mar 2018 07:01  -  15 Mar 2018 07:00  --------------------------------------------------------  IN:    Enteral Tube Flush: 710 mL    Free Water: 250 mL    Glucerna: 240 mL    Solution: 50 mL  Total IN: 1250 mL    OUT:    Colostomy: 600 mL    Indwelling Catheter - Urethral: 450 mL  Total OUT: 1050 mL    Total NET: 200 mL      15 Mar 2018 07:01  -  16 Mar 2018 06:39  --------------------------------------------------------      Lab Results:  CBC Full  -  ( 15 Mar 2018 08:44 )  WBC Count : 21.6 K/uL                               Inc Leukocytosis  Hemoglobin : 11.6 g/dL                              Normocytic Anemia  Hematocrit : 37.4 %  Platelet Count - Automated : 476 K/uL  Mean Cell Volume : 93.0 fl  Mean Cell Hemoglobin : 28.9 pg  Mean Cell Hemoglobin Concentration : 31.0 g/dL  Auto Neutrophil % : 87.0 %  Auto Lymphocyte % : 5.0 %  Auto Monocyte % : 5.0 %  Auto Eosinophil % : 1.0 %                        11.6   21.6  )-----------( 476      ( 15 Mar 2018 08:44 )             37.4       ABG - ( 14 Mar 2018 22:05 )  pH: 7.43  /  pCO2: 50    /  pO2: 73    / HCO3: 31    / Base Excess: 7.5   /  SaO2: 95          MICROBIOLOGY/CULTURES:  Culture Results:   50,000 - 99,000 CFU/mL Enterococcus faecium (vancomycin resistant)  10,000 - 49,000 CFU/mL Gram Negative Rods Identification and  susceptibility to follow.  Culture in progress (03-13 @ 17:27)  Culture Results:   No growth at 48 hours (03-13 @ 15:07)  Culture Results:   No growth at 48 hours (03-13 @ 15:07)  Culture Results:   No enteric pathogens isolated.  (Stool culture examined for Salmonella,  Shigella, Campylobacter, Aeromonas, Plesiomonas,  Vibrio, E.coli O157 and Yersinia) (03-11 @ 04:25)  Culture Results:   50,000 - 99,000 CFU/mL Pseudomonas aeruginosa (Carbapenem Resistant)  .  TYPE: (C=Critical, N=Notification, A=Abnormal) C  TESTS:  _ MDRO  DATE/TIME CALLED: _ 03/13/2018 15:20:04  CALLED TO: Johnathan vera  READ BACK (2 Patient Identifiers)(Y/N): _ y  READ BACK VALUES (Y/N): _ y  CALLED BY: Johnathan hinojosa  send a copy to ic and logistics (03-10 @ 19:16)  Culture Results:   No growth at 5 days. (03-10 @ 17:17)  Culture Results:   No growth at 5 days. (03-10 @ 17:16)      RADIOLOGY RESULTS:  CT chest & Ct Abd  Small free-flowing right pleural effusionThere is linear atelectasis at the left lung base. There is extensive consolidation in the right lung. There is some patchy infiltrate in the right upper lobe with dense right lower lobe and right middle lobe consolidation. No evidence of pneumothorax. No pulmonary nodules identified. There is a small free-flowing right pleural effusion. There is elevation of the right diaphragm.    Degenerative changes of the spine and old right clavicular fracture;  Cholelithiasis; PEG tube in the stomach;  Left lower quadrant loop colostomy [place]    Patient is a 62y old  Male who presents with a cc of fevers (10 Mar 2018 23:26)    LAST 24HRS/THIS AM  Patient seen and examined at bedside.    Overnight patient had fevers, CT scan chest showed extensive pna, patient now on Vanco and meropenem for extended coverage.   Patient complaining of neck and upper back pain. Right and left sided chest pain and sob.         Communication with patient via by head nods and deciphering his mouthed words.    Patient denies abd pain, N/V.      Vital Signs Last 24 Hrs  T(C): 38.9 (16 Mar 2018 06:19), Max: 40.1 (15 Mar 2018 22:45)  T(F): 102.1 (16 Mar 2018 06:19), Max: 104.2 (15 Mar 2018 22:45)          FEBRILE, currently 102.1  HR: 128 (16 Mar 2018 04:03) (115 - 138)  BP: 114/73 (15 Mar 2018 23:44) (114/73 - 157/86)  RR: 18 (15 Mar 2018 23:44) (14 - 19)  SpO2: 99% (16 Mar 2018 04:03) (93% - 99%)        PHYSICAL EXAM:   Gen: adult male lying supine in NAD  HEENT: NCAT, EOMI, PERRLA, 3mm b/l; moist mucous membranes, no thrush; trach collar in place   Neck:  Supple, No Supraclavicular/submandibular lymphadenopathy;   Neuro:  Alert & Oriented x 3;  paralyzed in b/l lower ext, and left upper ext;    Resp: coarse breath sounds b/l, right upper load increased rales and crackles in posterior lung;   CV: S1, S2;  sinus tachycardia, No murmurs appreciated  GI: Soft, Nontender, Nondistended; Bowel sounds present;    colostomy bag with brown stool, peg in place     :  Iniguez in place     Ext:  No clubbing, cyanosis, or edema;   Skin:  Erythematous violaceous patch in the Right Chest, nontender            I&O's Detail    03-14 @ 07:01  -  03-15 @ 07:00  --------------------------------------------------------  IN: 1250 mL / OUT: 1050 mL / NET: 200 mL    03-15 @ 07:01  -  03-16 @ 06:38  --------------------------------------------------------  IN: 1200 mL / OUT: 400 mL / NET: 800 mL    14 Mar 2018 07:01  -  15 Mar 2018 07:00  --------------------------------------------------------  IN:    Enteral Tube Flush: 710 mL    Free Water: 250 mL    Glucerna: 240 mL    Solution: 50 mL  Total IN: 1250 mL    OUT:    Colostomy: 600 mL    Indwelling Catheter - Urethral: 450 mL  Total OUT: 1050 mL    Total NET: 200 mL      15 Mar 2018 07:01  -  16 Mar 2018 06:39  --------------------------------------------------------      Lab Results:  CBC Full  -  ( 15 Mar 2018 08:44 )  WBC Count : 21.6 K/uL                               Inc Leukocytosis  Hemoglobin : 11.6 g/dL                              Normocytic Anemia  Hematocrit : 37.4 %  Platelet Count - Automated : 476 K/uL  Mean Cell Volume : 93.0 fl  Mean Cell Hemoglobin : 28.9 pg  Mean Cell Hemoglobin Concentration : 31.0 g/dL  Auto Neutrophil % : 87.0 %  Auto Lymphocyte % : 5.0 %  Auto Monocyte % : 5.0 %  Auto Eosinophil % : 1.0 %                        11.6   21.6  )-----------( 476      ( 15 Mar 2018 08:44 )             37.4       ABG - ( 14 Mar 2018 22:05 )  pH: 7.43  /  pCO2: 50    /  pO2: 73    / HCO3: 31    / Base Excess: 7.5   /  SaO2: 95          MICROBIOLOGY/CULTURES:  Culture Results:   50,000 - 99,000 CFU/mL Enterococcus faecium (vancomycin resistant)  10,000 - 49,000 CFU/mL Gram Negative Rods Identification and  susceptibility to follow.  Culture in progress (03-13 @ 17:27)  Culture Results:   No growth at 48 hours (03-13 @ 15:07)  Culture Results:   No growth at 48 hours (03-13 @ 15:07)  Culture Results:   No enteric pathogens isolated.  (Stool culture examined for Salmonella,  Shigella, Campylobacter, Aeromonas, Plesiomonas,  Vibrio, E.coli O157 and Yersinia) (03-11 @ 04:25)  Culture Results:   50,000 - 99,000 CFU/mL Pseudomonas aeruginosa (Carbapenem Resistant)  .  TYPE: (C=Critical, N=Notification, A=Abnormal) C  TESTS:  _ MDRO  DATE/TIME CALLED: _ 03/13/2018 15:20:04  CALLED TO: Johnathan vera  READ BACK (2 Patient Identifiers)(Y/N): _ y  READ BACK VALUES (Y/N): _ y  CALLED BY: _ kb  send a copy to ic and logistics (03-10 @ 19:16)  Culture Results:   No growth at 5 days. (03-10 @ 17:17)  Culture Results:   No growth at 5 days. (03-10 @ 17:16)      RADIOLOGY RESULTS:  CT chest & Ct Abd  Small free-flowing right pleural effusionThere is linear atelectasis at the left lung base. There is extensive consolidation in the right lung. There is some patchy infiltrate in the right upper lobe with dense right lower lobe and right middle lobe consolidation. No evidence of pneumothorax. No pulmonary nodules identified. There is a small free-flowing right pleural effusion. There is elevation of the right diaphragm.    Degenerative changes of the spine and old right clavicular fracture;  Cholelithiasis; PEG tube in the stomach;  Left lower quadrant loop colostomy

## 2018-03-16 NOTE — PROGRESS NOTE ADULT - ASSESSMENT
61y/o male with pmh of PMH Transverse myelitis, Paraplegia, trach/peg, colostomy, HTN, coming from [facility] presents with fever for 3 days  C DIFF NEG    FLAGYL D/C'D  BLOOD CX NEG SO FAR OFF ABX   AFEBRILE  HERE.    CT ABD NEG   SACRAL DECUBITUS CLEAN  URINE CX 50-90K PSEUDOMONAS UNCLEAR SIGNIFICANCE MAY BE COLONIZED WITH CRE    WBC INCREASED CT SCAN OF CHEST WITH EXTENSIVE PNEUMONIA  NOW ON MERREM /VANCO CHECK SPUTUM CX IF NEG MRSA CAN D/.C VANCO

## 2018-03-16 NOTE — CONSULT NOTE ADULT - ASSESSMENT
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

## 2018-03-16 NOTE — PROGRESS NOTE ADULT - PROBLEM SELECTOR PLAN 1
-Initial fever at NH with unclear etiology concern for partially tx UTI thereafter no true fever but low grade 100.2 today unclear if multiple infections at this time including partially treated UTI/ sacral infection and possible lung/abd infectious source  -On 3/13 patient had up trending leukocytosis to 16 and today up to 21   -prior U cx with carbapenem resistant pseudomonas, prompting initiation of cefepime.  -repeat cxr with findings of marked elevation of right hemidiaphragm versus subpulmonic effusion. Subdiaphragmatic air filled bowel noted . findings new compared to prior 3/10/2018 exam  -f/u ct chest and ct abd/pelvis for better evaluation of alternate sites of infection, prior ct abd negative and prior cxr negative    -beltrán replaced on presentation in the ED   -Sacral wound is healing no evidence of infection but wound drainage changed from serosanguinous to greenish -pt was on wound vac prior to arrival but will now be using dakins bid to cleanse wound for one week prior to placing wound vac back   -Blood culture negative    -repeat Bcx from 3/13 testing   -C diff negative but repeat ordered and denied by lab b/c formed   -c/w florastor 250mg po bid  -c/w cefepime 1g IVPB Q12hrs D#2   -ID f/u noted and appreciated and d/w Dr. Gomez the above plan of care.

## 2018-03-16 NOTE — CONSULT NOTE ADULT - SUBJECTIVE AND OBJECTIVE BOX
Pt is a 62 YOM h/o chronic trach/PEG/colostomy since an episode of transverse myelitis years ago.  He has h/o multiple infections, some of them being  resistant as well as recent Cdiff.  He was admitted with fevers.  He was treated for possible PNA.  He was doing better off ABx over last few days.  As per RN/doctor he was awake, quadraperetic and chronically bedbound, but was able to communicate well and was awake. He had been on trach collar during day and vent nocturnally at  but for last 3 days has been able to tolerate trach collar ATC until this afternoon. Today RRT called when Dr Rajput found pt to be unresponsive in bed.  Pt had normal accucheck and was given Narcan since he is on chronic narcotic without response.  Upon my arrival pt was unresponsive.  Found to have rectal temp 102.  He was tachycardic and his SBP where borderline in 90s.  Pt was taken to head CT which did not demonstrate stroke at this time.  Pt was upgraded to ICU.  He was not found to be hypercarbic and is now on full vent support.  Code sepsis had been called and pt antibiotics which were restarted today were upgraded to meropenum and vanco.  He had 50-90,000 VRE in urine.   He was pan cultured today.  Patient is a 62y old  Male who presents with a chief complaint of fevers (12 Mar 2018 18:28)      BRIEF HOSPITAL COURSE: as above    Events last 24 hours: unresponsive, septic, PNA    PAST MEDICAL & SURGICAL HISTORY:  Essential hypertension  Paralysis  Transverse myelitis  History of tracheostomy      Review of Systems:  Pt is unable to answer questions in ROS    Medications:  meropenem  IVPB 1000 milliGRAM(s) IV Intermittent every 8 hours  vancomycin  IVPB 1250 milliGRAM(s) IV Intermittent every 12 hours      ALBUTerol    0.083% 2.5 milliGRAM(s) Nebulizer every 6 hours PRN  ALBUTerol/ipratropium for Nebulization 3 milliLiter(s) Nebulizer every 6 hours    acetaminophen   Tablet 650 milliGRAM(s) Oral every 6 hours PRN  acetaminophen   Tablet. 650 milliGRAM(s) Oral every 6 hours PRN  ALPRAZolam 0.5 milliGRAM(s) Oral three times a day PRN  baclofen 10 milliGRAM(s) Oral two times a day  ibuprofen  Suspension 400 milliGRAM(s) Enteral Tube every 6 hours PRN  oxyCODONE    IR 10 milliGRAM(s) Oral every 6 hours PRN      aspirin  chewable 81 milliGRAM(s) Oral daily  clopidogrel Tablet 75 milliGRAM(s) Oral daily  heparin  Injectable 5000 Unit(s) SubCutaneous every 8 hours    pantoprazole   Suspension 40 milliGRAM(s) Enteral Tube before breakfast      dextrose 50% Injectable 12.5 Gram(s) IV Push once  dextrose 50% Injectable 25 Gram(s) IV Push once  dextrose 50% Injectable 25 Gram(s) IV Push once  glucagon  Injectable 1 milliGRAM(s) IntraMuscular once PRN  insulin glargine Injectable (LANTUS) 7 Unit(s) SubCutaneous at bedtime  insulin regular  human corrective regimen sliding scale   SubCutaneous every 6 hours    ascorbic acid 500 milliGRAM(s) Oral daily  dextrose 5%. 1000 milliLiter(s) IV Continuous <Continuous>  multivitamin   Solution 5 milliLiter(s) Oral daily      lidocaine   Patch 1 Patch Transdermal daily  lidocaine   Patch 1 Patch Transdermal daily  petrolatum Ophthalmic Ointment 1 Application(s) Both EYES at bedtime    naloxone Injectable 0.4 milliGRAM(s) IV Push once  saccharomyces boulardii 250 milliGRAM(s) Oral two times a day      Mode: AC/ CMV (Assist Control/ Continuous Mandatory Ventilation)  RR (machine): 181  TV (machine): 500  FiO2: 60  PEEP: 5  MAP: 11  PIP: 25      ICU Vital Signs Last 24 Hrs  T(C): 37.7 (16 Mar 2018 16:23), Max: 40.1 (15 Mar 2018 22:45)  T(F): 99.9 (16 Mar 2018 16:23), Max: 104.2 (15 Mar 2018 22:45)  HR: 109 (16 Mar 2018 16:23) (109 - 132)  BP: 122/83 (16 Mar 2018 16:00) (94/63 - 139/85)  BP(mean): 97 (16 Mar 2018 16:00) (73 - 97)  ABP: --  ABP(mean): --  RR: 21 (16 Mar 2018 16:00) (14 - 22)  SpO2: 94% (16 Mar 2018 16:23) (94% - 99%)      ABG - ( 16 Mar 2018 11:08 )  pH: 7.39  /  pCO2: 49    /  pO2: 80    / HCO3: 28    / Base Excess: 3.8   /  SaO2: 96          LABS:                        9.9    24.7  )-----------( 424      ( 16 Mar 2018 11:35 )             31.6     03-16    130<L>  |  91<L>  |  27.0<H>  ----------------------------<  222<H>  5.0   |  26.0  |  1.07    Ca    8.8      16 Mar 2018 11:35    TPro  6.5<L>  /  Alb  2.4<L>  /  TBili  0.5  /  DBili  x   /  AST  14  /  ALT  13  /  AlkPhos  72  03-16          CAPILLARY BLOOD GLUCOSE      POCT Blood Glucose.: 180 mg/dL (16 Mar 2018 16:43)        CULTURES:  C Diff by PCR Result: NotDetec (03-15 @ 17:37)  Culture Results:   50,000 - 99,000 CFU/mL Enterococcus faecium (vancomycin resistant)  10,000 - 49,000 CFU/mL Gram Negative Rods Identification and  susceptibility to follow.  Culture in progress (03-13 @ 17:27)  Culture Results:   No growth at 48 hours (03-13 @ 15:07)  Culture Results:   No growth at 48 hours (03-13 @ 15:07)  C Diff by PCR Result: NotDetec (03-11 @ 04:25)  Culture Results:   No enteric pathogens isolated.  (Stool culture examined for Salmonella,  Shigella, Campylobacter, Aeromonas, Plesiomonas,  Vibrio, E.coli O157 and Yersinia) (03-11 @ 04:25)  Culture Results:   50,000 - 99,000 CFU/mL Pseudomonas aeruginosa (Carbapenem Resistant)  .  TYPE: (C=Critical, N=Notification, A=Abnormal) C  TESTS:  _ MDRO  DATE/TIME CALLED: _ 03/13/2018 15:20:04  CALLED TO: Johnathan vera  READ BACK (2 Patient Identifiers)(Y/N): _ y  READ BACK VALUES (Y/N): _ y  CALLED BY: Johnathan hinojosa  send a copy to Minutta and Cellays (03-10 @ 19:16)  Culture Results:   No growth at 5 days. (03-10 @ 17:17)  Culture Results:   No growth at 5 days. (03-10 @ 17:16)      Physical Examination:    General: Unresponsive    HEENT: Pupils right 4mm, left 3mm both reactive to light.      PULM: diminished at bases bilaterally, thick green significant sputum production    CVS: Tachycardic/sinus, no murmurs, rubs, or gallops    ABD: Soft, nondistended,  PEG tube in place, Colostomy pink with brown stool present    EXT: No edema    SKIN;  Stage III-IV sacral decub large approx 5x5 clean base no active drainage    SKIN: Warm and well perfused, no rashes noted.    RADIOLOGY: images reviewed    CRITICAL CARE TIME SPENT: 65 min

## 2018-03-16 NOTE — PROGRESS NOTE ADULT - PROBLEM SELECTOR PLAN 3
Known sacral ulcer stage 3 healing well but now with changed drainage from serosanguinous to greenish in color; unclear if infectious   -c/w wound care cleaning and dressing change q bid with dakins   -wound vac removed and needs to be replaced but as per wound care RN should c/w dakins bid x 1 week and then consider placing wound vac back on thereafter  -wound care consult noted and appreciated and d/w wound care RN the plan of care

## 2018-03-17 DIAGNOSIS — J18.9 PNEUMONIA, UNSPECIFIED ORGANISM: ICD-10-CM

## 2018-03-17 LAB
ANION GAP SERPL CALC-SCNC: 11 MMOL/L — SIGNIFICANT CHANGE UP (ref 5–17)
BASOPHILS # BLD AUTO: 0 K/UL — SIGNIFICANT CHANGE UP (ref 0–0.2)
BASOPHILS NFR BLD AUTO: 0.1 % — SIGNIFICANT CHANGE UP (ref 0–2)
BUN SERPL-MCNC: 31 MG/DL — HIGH (ref 8–20)
CALCIUM SERPL-MCNC: 8.4 MG/DL — LOW (ref 8.6–10.2)
CHLORIDE SERPL-SCNC: 91 MMOL/L — LOW (ref 98–107)
CO2 SERPL-SCNC: 27 MMOL/L — SIGNIFICANT CHANGE UP (ref 22–29)
CREAT SERPL-MCNC: 0.81 MG/DL — SIGNIFICANT CHANGE UP (ref 0.5–1.3)
EOSINOPHIL # BLD AUTO: 0.1 K/UL — SIGNIFICANT CHANGE UP (ref 0–0.5)
EOSINOPHIL NFR BLD AUTO: 0.6 % — SIGNIFICANT CHANGE UP (ref 0–5)
GLUCOSE BLDC GLUCOMTR-MCNC: 176 MG/DL — HIGH (ref 70–99)
GLUCOSE BLDC GLUCOMTR-MCNC: 186 MG/DL — HIGH (ref 70–99)
GLUCOSE BLDC GLUCOMTR-MCNC: 193 MG/DL — HIGH (ref 70–99)
GLUCOSE BLDC GLUCOMTR-MCNC: 238 MG/DL — HIGH (ref 70–99)
GLUCOSE SERPL-MCNC: 205 MG/DL — HIGH (ref 70–115)
HCT VFR BLD CALC: 28.8 % — LOW (ref 42–52)
HGB BLD-MCNC: 8.9 G/DL — LOW (ref 14–18)
LYMPHOCYTES # BLD AUTO: 0.6 K/UL — LOW (ref 1–4.8)
LYMPHOCYTES # BLD AUTO: 3.9 % — LOW (ref 20–55)
MAGNESIUM SERPL-MCNC: 1.9 MG/DL — SIGNIFICANT CHANGE UP (ref 1.6–2.6)
MCHC RBC-ENTMCNC: 28 PG — SIGNIFICANT CHANGE UP (ref 27–31)
MCHC RBC-ENTMCNC: 30.9 G/DL — LOW (ref 32–36)
MCV RBC AUTO: 90.6 FL — SIGNIFICANT CHANGE UP (ref 80–94)
MONOCYTES # BLD AUTO: 1.2 K/UL — HIGH (ref 0–0.8)
MONOCYTES NFR BLD AUTO: 7.2 % — SIGNIFICANT CHANGE UP (ref 3–10)
NEUTROPHILS # BLD AUTO: 14.6 K/UL — HIGH (ref 1.8–8)
NEUTROPHILS NFR BLD AUTO: 87.8 % — HIGH (ref 37–73)
ORGANISM # SPEC MICROSCOPIC CNT: SIGNIFICANT CHANGE UP
ORGANISM # SPEC MICROSCOPIC CNT: SIGNIFICANT CHANGE UP
PHOSPHATE SERPL-MCNC: 2.7 MG/DL — SIGNIFICANT CHANGE UP (ref 2.4–4.7)
PLATELET # BLD AUTO: 437 K/UL — HIGH (ref 150–400)
POTASSIUM SERPL-MCNC: 4.6 MMOL/L — SIGNIFICANT CHANGE UP (ref 3.5–5.3)
POTASSIUM SERPL-SCNC: 4.6 MMOL/L — SIGNIFICANT CHANGE UP (ref 3.5–5.3)
RBC # BLD: 3.18 M/UL — LOW (ref 4.6–6.2)
RBC # FLD: 16.9 % — HIGH (ref 11–15.6)
SODIUM SERPL-SCNC: 129 MMOL/L — LOW (ref 135–145)
VANCOMYCIN TROUGH SERPL-MCNC: 23.1 UG/ML — HIGH (ref 10–20)
WBC # BLD: 16.6 K/UL — HIGH (ref 4.8–10.8)
WBC # FLD AUTO: 16.6 K/UL — HIGH (ref 4.8–10.8)

## 2018-03-17 PROCEDURE — 99291 CRITICAL CARE FIRST HOUR: CPT

## 2018-03-17 RX ORDER — ACETAMINOPHEN 500 MG
1000 TABLET ORAL ONCE
Qty: 0 | Refills: 0 | Status: COMPLETED | OUTPATIENT
Start: 2018-03-17 | End: 2018-03-17

## 2018-03-17 RX ORDER — MAGNESIUM SULFATE 500 MG/ML
2 VIAL (ML) INJECTION ONCE
Qty: 0 | Refills: 0 | Status: COMPLETED | OUTPATIENT
Start: 2018-03-17 | End: 2018-03-17

## 2018-03-17 RX ADMIN — OXYCODONE HYDROCHLORIDE 10 MILLIGRAM(S): 5 TABLET ORAL at 14:53

## 2018-03-17 RX ADMIN — OXYCODONE HYDROCHLORIDE 10 MILLIGRAM(S): 5 TABLET ORAL at 06:20

## 2018-03-17 RX ADMIN — OXYCODONE HYDROCHLORIDE 10 MILLIGRAM(S): 5 TABLET ORAL at 14:52

## 2018-03-17 RX ADMIN — LIDOCAINE 1 PATCH: 4 CREAM TOPICAL at 13:26

## 2018-03-17 RX ADMIN — Medication 166.67 MILLIGRAM(S): at 17:45

## 2018-03-17 RX ADMIN — OXYCODONE HYDROCHLORIDE 10 MILLIGRAM(S): 5 TABLET ORAL at 15:07

## 2018-03-17 RX ADMIN — Medication 500 MILLIGRAM(S): at 13:25

## 2018-03-17 RX ADMIN — Medication 250 MILLIGRAM(S): at 06:20

## 2018-03-17 RX ADMIN — LIDOCAINE 1 PATCH: 4 CREAM TOPICAL at 03:06

## 2018-03-17 RX ADMIN — INSULIN HUMAN 2: 100 INJECTION, SOLUTION SUBCUTANEOUS at 06:20

## 2018-03-17 RX ADMIN — MEROPENEM 100 MILLIGRAM(S): 1 INJECTION INTRAVENOUS at 15:04

## 2018-03-17 RX ADMIN — INSULIN HUMAN 1: 100 INJECTION, SOLUTION SUBCUTANEOUS at 13:26

## 2018-03-17 RX ADMIN — Medication 10 MILLIGRAM(S): at 06:20

## 2018-03-17 RX ADMIN — CHLORHEXIDINE GLUCONATE 15 MILLILITER(S): 213 SOLUTION TOPICAL at 17:45

## 2018-03-17 RX ADMIN — Medication 250 MILLIGRAM(S): at 17:45

## 2018-03-17 RX ADMIN — HEPARIN SODIUM 5000 UNIT(S): 5000 INJECTION INTRAVENOUS; SUBCUTANEOUS at 21:41

## 2018-03-17 RX ADMIN — Medication 650 MILLIGRAM(S): at 15:39

## 2018-03-17 RX ADMIN — MEROPENEM 100 MILLIGRAM(S): 1 INJECTION INTRAVENOUS at 21:41

## 2018-03-17 RX ADMIN — PANTOPRAZOLE SODIUM 40 MILLIGRAM(S): 20 TABLET, DELAYED RELEASE ORAL at 06:20

## 2018-03-17 RX ADMIN — MEROPENEM 100 MILLIGRAM(S): 1 INJECTION INTRAVENOUS at 06:20

## 2018-03-17 RX ADMIN — Medication 400 MILLIGRAM(S): at 03:52

## 2018-03-17 RX ADMIN — CLOPIDOGREL BISULFATE 75 MILLIGRAM(S): 75 TABLET, FILM COATED ORAL at 13:25

## 2018-03-17 RX ADMIN — OXYCODONE HYDROCHLORIDE 10 MILLIGRAM(S): 5 TABLET ORAL at 22:18

## 2018-03-17 RX ADMIN — INSULIN HUMAN 1: 100 INJECTION, SOLUTION SUBCUTANEOUS at 17:45

## 2018-03-17 RX ADMIN — Medication 50 GRAM(S): at 09:32

## 2018-03-17 RX ADMIN — Medication 10 MILLIGRAM(S): at 17:45

## 2018-03-17 RX ADMIN — Medication 5 MILLILITER(S): at 13:26

## 2018-03-17 RX ADMIN — Medication 166.67 MILLIGRAM(S): at 06:20

## 2018-03-17 RX ADMIN — OXYCODONE HYDROCHLORIDE 10 MILLIGRAM(S): 5 TABLET ORAL at 21:41

## 2018-03-17 RX ADMIN — INSULIN GLARGINE 7 UNIT(S): 100 INJECTION, SOLUTION SUBCUTANEOUS at 21:41

## 2018-03-17 RX ADMIN — OXYCODONE HYDROCHLORIDE 10 MILLIGRAM(S): 5 TABLET ORAL at 06:53

## 2018-03-17 RX ADMIN — HEPARIN SODIUM 5000 UNIT(S): 5000 INJECTION INTRAVENOUS; SUBCUTANEOUS at 06:21

## 2018-03-17 RX ADMIN — Medication 81 MILLIGRAM(S): at 13:25

## 2018-03-17 RX ADMIN — CHLORHEXIDINE GLUCONATE 15 MILLILITER(S): 213 SOLUTION TOPICAL at 06:12

## 2018-03-17 RX ADMIN — Medication 3 MILLILITER(S): at 03:45

## 2018-03-17 RX ADMIN — SODIUM CHLORIDE 100 MILLILITER(S): 9 INJECTION INTRAMUSCULAR; INTRAVENOUS; SUBCUTANEOUS at 22:19

## 2018-03-17 RX ADMIN — HEPARIN SODIUM 5000 UNIT(S): 5000 INJECTION INTRAVENOUS; SUBCUTANEOUS at 15:04

## 2018-03-17 RX ADMIN — OXYCODONE HYDROCHLORIDE 10 MILLIGRAM(S): 5 TABLET ORAL at 16:07

## 2018-03-17 RX ADMIN — SODIUM CHLORIDE 100 MILLILITER(S): 9 INJECTION INTRAMUSCULAR; INTRAVENOUS; SUBCUTANEOUS at 06:41

## 2018-03-17 NOTE — PROGRESS NOTE ADULT - SUBJECTIVE AND OBJECTIVE BOX
Patient is a 62y old  Male who presents with a chief complaint of fevers (12 Mar 2018 18:28)      BRIEF HOSPITAL COURSE:  61 yo male, PMHx chronic trach/PEG/colostomy since an episode of transverse myelitis years ago.  He has h/o multiple infections, some of them being  resistant as well as recent Cdiff.  He was admitted with fevers, and was treated for possible PNA.  RRT called 3/16/18 when pt was found to be unresponsive in bed.  Pt had normal accucheck and was given Narcan since he is on chronic narcotic without response. Found to have rectal temp 102, tachycardic, with SBP in 90's.  CT head negative for acute CVA. Pt was upgraded to ICU on full ventilatory support.    Events last 24 hours: still having fevers, hemodynamics improved, unable to wean off vent as pt had hypoxic episode     PAST MEDICAL & SURGICAL HISTORY:  Essential hypertension  Paralysis  Transverse myelitis  History of tracheostomy      Review of Systems:  Cannot be obtained pt trached to vent       Medications:  meropenem  IVPB 1000 milliGRAM(s) IV Intermittent every 8 hours  vancomycin  IVPB 1250 milliGRAM(s) IV Intermittent every 12 hours      ALBUTerol    0.083% 2.5 milliGRAM(s) Nebulizer every 6 hours PRN    acetaminophen   Tablet 650 milliGRAM(s) Oral every 6 hours PRN  acetaminophen   Tablet. 650 milliGRAM(s) Oral every 6 hours PRN  baclofen 10 milliGRAM(s) Oral two times a day  oxyCODONE    IR 10 milliGRAM(s) Oral every 6 hours PRN      aspirin  chewable 81 milliGRAM(s) Oral daily  clopidogrel Tablet 75 milliGRAM(s) Oral daily  heparin  Injectable 5000 Unit(s) SubCutaneous every 8 hours    pantoprazole   Suspension 40 milliGRAM(s) Enteral Tube before breakfast      dextrose 50% Injectable 12.5 Gram(s) IV Push once  dextrose 50% Injectable 25 Gram(s) IV Push once  dextrose 50% Injectable 25 Gram(s) IV Push once  glucagon  Injectable 1 milliGRAM(s) IntraMuscular once PRN  insulin glargine Injectable (LANTUS) 7 Unit(s) SubCutaneous at bedtime  insulin regular  human corrective regimen sliding scale   SubCutaneous every 6 hours    ascorbic acid 500 milliGRAM(s) Oral daily  dextrose 5%. 1000 milliLiter(s) IV Continuous <Continuous>  multivitamin   Solution 5 milliLiter(s) Oral daily  sodium chloride 0.9%. 1000 milliLiter(s) IV Continuous <Continuous>      artificial  tears Solution 1 Drop(s) Both EYES every 4 hours PRN  chlorhexidine 0.12% Liquid 15 milliLiter(s) Swish and Spit two times a day  lidocaine   Patch 1 Patch Transdermal daily  lidocaine   Patch 1 Patch Transdermal daily  petrolatum Ophthalmic Ointment 1 Application(s) Both EYES at bedtime    saccharomyces boulardii 250 milliGRAM(s) Oral two times a day      Mode: CPAP with PS  FiO2: 50  PEEP: 5  PS: 12  MAP: 8  PIP: 18      ICU Vital Signs Last 24 Hrs  T(C): 37.1 (17 Mar 2018 12:00), Max: 38.7 (17 Mar 2018 04:00)  T(F): 98.8 (17 Mar 2018 12:00), Max: 101.7 (17 Mar 2018 04:00)  HR: 100 (17 Mar 2018 16:24) (90 - 122)  BP: 119/72 (17 Mar 2018 14:00) (74/45 - 150/77)  BP(mean): 91 (17 Mar 2018 14:00) (55 - 106)  ABP: --  ABP(mean): --  RR: 27 (17 Mar 2018 14:00) (13 - 28)  SpO2: 99% (17 Mar 2018 16:24) (76% - 100%)      ABG - ( 16 Mar 2018 11:08 )  pH: 7.39  /  pCO2: 49    /  pO2: 80    / HCO3: 28    / Base Excess: 3.8   /  SaO2: 96                  I&O's Detail    16 Mar 2018 07:01  -  17 Mar 2018 07:00  --------------------------------------------------------  IN:    Enteral Tube Flush: 50 mL    Free Water: 400 mL    Glucerna: 900 mL    Sodium Chloride 0.9% IV Bolus: 1000 mL    sodium chloride 0.9%.: 1900 mL    Solution: 100 mL    Solution: 200 mL    Solution: 500 mL  Total IN: 5050 mL    OUT:    Indwelling Catheter - Urethral: 1310 mL  Total OUT: 1310 mL    Total NET: 3740 mL      17 Mar 2018 07:01  -  17 Mar 2018 17:58  --------------------------------------------------------  IN:    sodium chloride 0.9%.: 800 mL    Solution: 50 mL  Total IN: 850 mL    OUT:    Indwelling Catheter - Urethral: 1600 mL  Total OUT: 1600 mL    Total NET: -750 mL            LABS:                        8.9    16.6  )-----------( 437      ( 17 Mar 2018 05:34 )             28.8     03-17    129<L>  |  91<L>  |  31.0<H>  ----------------------------<  205<H>  4.6   |  27.0  |  0.81    Ca    8.4<L>      17 Mar 2018 05:34  Phos  2.7     03-17  Mg     1.9     03-17    TPro  6.5<L>  /  Alb  2.4<L>  /  TBili  0.5  /  DBili  x   /  AST  14  /  ALT  13  /  AlkPhos  72  03-16          CAPILLARY BLOOD GLUCOSE      POCT Blood Glucose.: 186 mg/dL (17 Mar 2018 17:24)        CULTURES:  Culture Results:   Moderate Gram Negative Rods Identification and susceptibility to follow.  Culture in progress (03-16 @ 17:57)  C Diff by PCR Result: Joslyn (03-15 @ 17:37)  Culture Results:   50,000 - 99,000 CFU/mL Enterococcus faecium (vancomycin resistant)  10,000 - 49,000 CFU/mL Pseudomonas aeruginosa (Carbapenem Resistant)  (known)  send a copy to iwona mcgowan (03-13 @ 17:27)  Culture Results:   No growth at 48 hours (03-13 @ 15:07)  Culture Results:   No growth at 48 hours (03-13 @ 15:07)  C Diff by PCR Result: Joslyn (03-11 @ 04:25)  Culture Results:   No enteric pathogens isolated.  (Stool culture examined for Salmonella,  Shigella, Campylobacter, Aeromonas, Plesiomonas,  Vibrio, E.coli O157 and Yersinia) (03-11 @ 04:25)  Culture Results:   50,000 - 99,000 CFU/mL Pseudomonas aeruginosa (Carbapenem Resistant)  .  TYPE: (C=Critical, N=Notification, A=Abnormal) C  TESTS:  _ MDRO  DATE/TIME CALLED: _ 03/13/2018 15:20:04  CALLED TO: Johnathan vera  READ BACK (2 Patient Identifiers)(Y/N): _ y  READ BACK VALUES (Y/N): _ y  CALLED BY: Johnathan hinojosa  send a copy to  and logistics (03-10 @ 19:16)      Physical Examination:    General: No acute distress.      HEENT: Pupils equal, reactive to light.  Symmetric.    PULM: Coarse to auscultation bilaterally, moderate thick sputum production    CVS: Regular rate and rhythm, no murmurs, rubs, or gallops    ABD: Soft, nondistended, nontender, normoactive bowel sounds, no masses    EXT: contracted    SKIN: Warm and well perfused, stage IV sacrum.    NEURO: Alert, mouthing words, plegic and contracted at baseline     RADIOLOGY:   < from: CT Head No Cont (03.16.18 @ 12:22) >  FINDINGS:  There is periventricular and subcortical white matter hypodensity without   mass effect, nonspecific, likely representing mild chronic microvascular   ischemic changes. There is no compelling evidence for an acute   transcortical infarction. There is no evidence of mass, mass effect,   midline shift or extra-axial fluid collection. The lateral ventricles and   cortical sulci are age-appropriate in size and configuration. The orbits,   mastoid air cells and visualizedparanasal sinuses are unremarkable. The   calvarium is intact.    IMPRESSION:  Mild chronic microvascular changes without evidence of an   acute transcortical infarction or hemorrhage. MR is a more sensitive   imaging modality for the evaluation of an acute infarction.                 MABLE SÁNCHEZ M.D., ATTENDING RADIOLOGIST  This document has been electronically signed. Mar 16 2018 12:13PM    < end of copied text >  < from: Xray Chest 1 View-PORTABLE IMMEDIATE (03.16.18 @ 11:40) >    Findings:  There is now an acute infiltrate in the right mid and lower lung not seen   on the prior day. Findings suggest aspiration. A tracheostomy tube is   noted. The left lung remains clear. The heart and mediastinum   unremarkable..    Impression:  There is a new right lung infiltrate suggesting aspiration..                GINNA LEÓN M.D., ATTENDING RADIOLOGIST  This document has been electronically signed. Mar 16 2018 11:41AM    < end of copied text >    CRITICAL CARE TIME SPENT: 60

## 2018-03-17 NOTE — PROGRESS NOTE ADULT - PROBLEM SELECTOR PLAN 1
Related to aspiration. Usually on trach collar, with vent overnight, trialed on spontaneous breathing today but had episode of hypoxemia   pt was lavaged and suctioned and large amount of sputum cleared  Continue on ventilatory support, SBTs as tolerated with goal to get down to trach collar   HOB > 30 deg due to high risk for recurrent aspiration events

## 2018-03-17 NOTE — PROGRESS NOTE ADULT - ASSESSMENT
63 yo male, PMHx chronic trach/PEG/colostomy since an episode of transverse myelitis years ago, admitted with fevers, likely aspiration PNA, with RRT for episode of altered mental status, hypoxemic respiratory failure.

## 2018-03-17 NOTE — PROGRESS NOTE ADULT - SUBJECTIVE AND OBJECTIVE BOX
Patient is a 62y old  Male who presents with a chief complaint of fevers (12 Mar 2018 18:28)      BRIEF HOSPITAL COURSE: 63 yo male, PMHx chronic trach/PEG/colostomy since an episode of transverse myelitis years ago.  He has h/o multiple infections, some of them being  resistant as well as recent Cdiff.  He was admitted with fevers.  He was treated for possible PNA.  He was doing better off ABx over last few days.  As per RN/doctor he was awake, quadraperetic and chronically bedbound, but was able to communicate well and was awake. He had been on trach collar during day and vent nocturnally at  but for last 3 days has been able to tolerate trach collar ATC until this afternoon. Today RRT called when Dr Rajput found pt to be unresponsive in bed.  Pt had normal accucheck and was given Narcan since he is on chronic narcotic without response.  Upon my arrival pt was unresponsive.  Found to have rectal temp 102.  He was tachycardic and his SBP where borderline in 90s.  Pt was taken to head CT which did not demonstrate stroke at this time.  Pt was upgraded to ICU.  He was not found to be hypercarbic and is now on full vent support.  Code sepsis had been called and pt antibiotics which were restarted today were upgraded to meropenum and vanco.  He had 50-90,000 VRE in urine.   He was pan cultured today.    Events last 24 hours: ***    PAST MEDICAL & SURGICAL HISTORY:  Essential hypertension  Paralysis  Transverse myelitis  History of tracheostomy      Review of Systems:  CONSTITUTIONAL: No fever, chills, or fatigue  EYES: No eye pain, visual disturbances, or discharge  ENMT:  No difficulty hearing, tinnitus, vertigo; No sinus or throat pain  NECK: No pain or stiffness  RESPIRATORY: No cough, wheezing, chills or hemoptysis; No shortness of breath  CARDIOVASCULAR: No chest pain, palpitations, dizziness, or leg swelling  GASTROINTESTINAL: No abdominal or epigastric pain. No nausea, vomiting, or hematemesis; No diarrhea or constipation. No melena or hematochezia.  GENITOURINARY: No dysuria, frequency, hematuria, or incontinence  NEUROLOGICAL: No headaches, memory loss, loss of strength, numbness, or tremors  SKIN: No itching, burning, rashes, or lesions   MUSCULOSKELETAL: No joint pain or swelling; No muscle, back, or extremity pain  PSYCHIATRIC: No depression, anxiety, mood swings, or difficulty sleeping      Medications:  meropenem  IVPB 1000 milliGRAM(s) IV Intermittent every 8 hours  vancomycin  IVPB 1250 milliGRAM(s) IV Intermittent every 12 hours      ALBUTerol    0.083% 2.5 milliGRAM(s) Nebulizer every 6 hours PRN  ALBUTerol/ipratropium for Nebulization 3 milliLiter(s) Nebulizer every 6 hours    acetaminophen   Tablet 650 milliGRAM(s) Oral every 6 hours PRN  acetaminophen   Tablet. 650 milliGRAM(s) Oral every 6 hours PRN  baclofen 10 milliGRAM(s) Oral two times a day  oxyCODONE    IR 10 milliGRAM(s) Oral every 6 hours PRN      aspirin  chewable 81 milliGRAM(s) Oral daily  clopidogrel Tablet 75 milliGRAM(s) Oral daily  heparin  Injectable 5000 Unit(s) SubCutaneous every 8 hours    pantoprazole   Suspension 40 milliGRAM(s) Enteral Tube before breakfast      dextrose 50% Injectable 12.5 Gram(s) IV Push once  dextrose 50% Injectable 25 Gram(s) IV Push once  dextrose 50% Injectable 25 Gram(s) IV Push once  glucagon  Injectable 1 milliGRAM(s) IntraMuscular once PRN  insulin glargine Injectable (LANTUS) 7 Unit(s) SubCutaneous at bedtime  insulin regular  human corrective regimen sliding scale   SubCutaneous every 6 hours    ascorbic acid 500 milliGRAM(s) Oral daily  dextrose 5%. 1000 milliLiter(s) IV Continuous <Continuous>  multivitamin   Solution 5 milliLiter(s) Oral daily  sodium chloride 0.9%. 1000 milliLiter(s) IV Continuous <Continuous>      artificial  tears Solution 1 Drop(s) Both EYES every 4 hours PRN  chlorhexidine 0.12% Liquid 15 milliLiter(s) Swish and Spit two times a day  lidocaine   Patch 1 Patch Transdermal daily  lidocaine   Patch 1 Patch Transdermal daily  petrolatum Ophthalmic Ointment 1 Application(s) Both EYES at bedtime    saccharomyces boulardii 250 milliGRAM(s) Oral two times a day      Mode: AC/ CMV (Assist Control/ Continuous Mandatory Ventilation)  RR (machine): 18  TV (machine): 500  FiO2: 30  PEEP: 7  MAP: 11  PIP: 25      ICU Vital Signs Last 24 Hrs  T(C): 38.4 (17 Mar 2018 00:00), Max: 38.9 (16 Mar 2018 06:19)  T(F): 101.1 (17 Mar 2018 00:00), Max: 102.1 (16 Mar 2018 06:19)  HR: 115 (17 Mar 2018 00:08) (90 - 128)  BP: 117/73 (17 Mar 2018 00:00) (73/45 - 139/85)  BP(mean): 88 (17 Mar 2018 00:00) (54 - 97)  ABP: --  ABP(mean): --  RR: 19 (17 Mar 2018 00:00) (18 - 22)  SpO2: 99% (17 Mar 2018 00:08) (94% - 100%)      ABG - ( 16 Mar 2018 11:08 )  pH: 7.39  /  pCO2: 49    /  pO2: 80    / HCO3: 28    / Base Excess: 3.8   /  SaO2: 96                  I&O's Detail    15 Mar 2018 07:01  -  16 Mar 2018 07:00  --------------------------------------------------------  IN:    Enteral Tube Flush: 180 mL    Free Water: 750 mL    Glucerna: 120 mL    Solution: 100 mL    Solution: 50 mL  Total IN: 1200 mL    OUT:    Colostomy: 100 mL    Indwelling Catheter - Urethral: 300 mL  Total OUT: 400 mL    Total NET: 800 mL      16 Mar 2018 07:01  -  17 Mar 2018 02:46  --------------------------------------------------------  IN:    Free Water: 400 mL    Glucerna: 540 mL    Sodium Chloride 0.9% IV Bolus: 1000 mL    sodium chloride 0.9%.: 1300 mL    Solution: 150 mL    Solution: 250 mL  Total IN: 3640 mL    OUT:    Indwelling Catheter - Urethral: 735 mL  Total OUT: 735 mL    Total NET: 2905 mL            LABS:                        9.9    24.7  )-----------( 424      ( 16 Mar 2018 11:35 )             31.6     03-16    130<L>  |  91<L>  |  27.0<H>  ----------------------------<  222<H>  5.0   |  26.0  |  1.07    Ca    8.8      16 Mar 2018 11:35    TPro  6.5<L>  /  Alb  2.4<L>  /  TBili  0.5  /  DBili  x   /  AST  14  /  ALT  13  /  AlkPhos  72  03-16          CAPILLARY BLOOD GLUCOSE      POCT Blood Glucose.: 193 mg/dL (16 Mar 2018 23:51)        CULTURES:  C Diff by PCR Result: [place] (03-15 @ 17:37)  Culture Results:   50,000 - 99,000 CFU/mL Enterococcus faecium (vancomycin resistant)  10,000 - 49,000 CFU/mL Pseudomonas aeruginosa (Carbapenem Resistant)  (known)  send a copy to iwona mcgowan (03-13 @ 17:27)  Culture Results:   No growth at 48 hours (03-13 @ 15:07)  Culture Results:   No growth at 48 hours (03-13 @ 15:07)  C Diff by PCR Result: [place] (03-11 @ 04:25)  Culture Results:   No enteric pathogens isolated.  (Stool culture examined for Salmonella,  Shigella, Campylobacter, Aeromonas, Plesiomonas,  Vibrio, E.coli O157 and Yersinia) (03-11 @ 04:25)  Culture Results:   50,000 - 99,000 CFU/mL Pseudomonas aeruginosa (Carbapenem Resistant)  .  TYPE: (C=Critical, N=Notification, A=Abnormal) C  TESTS:  _ MDRO  DATE/TIME CALLED: _ 03/13/2018 15:20:04  CALLED TO: Johnathan vera  READ BACK (2 Patient Identifiers)(Y/N): _ y  READ BACK VALUES (Y/N): _ y  CALLED BY: _ kb  send a copy to [employer] (03-10 @ 19:16)  Culture Results:   No growth at 5 days. (03-10 @ 17:17)  Culture Results:   No growth at 5 days. (03-10 @ 17:16)      Physical Examination:    General: No acute distress.      HEENT: Pupils equal, reactive to light.  Symmetric.    PULM: Clear to auscultation bilaterally, no significant sputum production    CVS: Regular rate and rhythm, no murmurs, rubs, or gallops    ABD: Soft, nondistended, nontender, normoactive bowel sounds, no masses    EXT: No edema, nontender    SKIN: Warm and well perfused, no rashes noted.    NEURO: Alert, oriented, interactive, nonfocal    RADIOLOGY: ***    CRITICAL CARE TIME SPENT: I have spent *** minutes of critical care time evaluating and treating this patient, including reviewing charts, labs, imagining studies, and collaborating with interdisciplinary team. Patient is a 62y old  Male who presents with a chief complaint of fevers (12 Mar 2018 18:28)      BRIEF HOSPITAL COURSE: 63 yo male, PMHx chronic trach/PEG/colostomy since an episode of transverse myelitis years ago.  He has h/o multiple infections, some of them being  resistant as well as recent Cdiff.  He was admitted with fevers, and was treated for possible PNA.  RRT called 3/16/18 when pt was found to be unresponsive in bed.  Pt had normal accucheck and was given Narcan since he is on chronic narcotic without response. Found to have rectal temp 102, tachycardic, with SBP in 90's.  CT head negative for acute CVA. Pt was upgraded to ICU on full ventilatory support. CODE SEPSIS was called and antibiotics were upgraded to meropenum and vanco.  He had 50-90,000 VRE in urine.     Events last 24 hours: ***    PAST MEDICAL & SURGICAL HISTORY:  Essential hypertension  Paralysis  Transverse myelitis  History of tracheostomy      Review of Systems:  CONSTITUTIONAL: No fever, chills, or fatigue  EYES: No eye pain, visual disturbances, or discharge  ENMT:  No difficulty hearing, tinnitus, vertigo; No sinus or throat pain  NECK: No pain or stiffness  RESPIRATORY: No cough, wheezing, chills or hemoptysis; No shortness of breath  CARDIOVASCULAR: No chest pain, palpitations, dizziness, or leg swelling  GASTROINTESTINAL: No abdominal or epigastric pain. No nausea, vomiting, or hematemesis; No diarrhea or constipation. No melena or hematochezia.  GENITOURINARY: No dysuria, frequency, hematuria, or incontinence  NEUROLOGICAL: No headaches, memory loss, loss of strength, numbness, or tremors  SKIN: No itching, burning, rashes, or lesions   MUSCULOSKELETAL: No joint pain or swelling; No muscle, back, or extremity pain  PSYCHIATRIC: No depression, anxiety, mood swings, or difficulty sleeping      Medications:  meropenem  IVPB 1000 milliGRAM(s) IV Intermittent every 8 hours  vancomycin  IVPB 1250 milliGRAM(s) IV Intermittent every 12 hours      ALBUTerol    0.083% 2.5 milliGRAM(s) Nebulizer every 6 hours PRN  ALBUTerol/ipratropium for Nebulization 3 milliLiter(s) Nebulizer every 6 hours    acetaminophen   Tablet 650 milliGRAM(s) Oral every 6 hours PRN  acetaminophen   Tablet. 650 milliGRAM(s) Oral every 6 hours PRN  baclofen 10 milliGRAM(s) Oral two times a day  oxyCODONE    IR 10 milliGRAM(s) Oral every 6 hours PRN      aspirin  chewable 81 milliGRAM(s) Oral daily  clopidogrel Tablet 75 milliGRAM(s) Oral daily  heparin  Injectable 5000 Unit(s) SubCutaneous every 8 hours    pantoprazole   Suspension 40 milliGRAM(s) Enteral Tube before breakfast      dextrose 50% Injectable 12.5 Gram(s) IV Push once  dextrose 50% Injectable 25 Gram(s) IV Push once  dextrose 50% Injectable 25 Gram(s) IV Push once  glucagon  Injectable 1 milliGRAM(s) IntraMuscular once PRN  insulin glargine Injectable (LANTUS) 7 Unit(s) SubCutaneous at bedtime  insulin regular  human corrective regimen sliding scale   SubCutaneous every 6 hours    ascorbic acid 500 milliGRAM(s) Oral daily  dextrose 5%. 1000 milliLiter(s) IV Continuous <Continuous>  multivitamin   Solution 5 milliLiter(s) Oral daily  sodium chloride 0.9%. 1000 milliLiter(s) IV Continuous <Continuous>      artificial  tears Solution 1 Drop(s) Both EYES every 4 hours PRN  chlorhexidine 0.12% Liquid 15 milliLiter(s) Swish and Spit two times a day  lidocaine   Patch 1 Patch Transdermal daily  lidocaine   Patch 1 Patch Transdermal daily  petrolatum Ophthalmic Ointment 1 Application(s) Both EYES at bedtime    saccharomyces boulardii 250 milliGRAM(s) Oral two times a day      Mode: AC/ CMV (Assist Control/ Continuous Mandatory Ventilation)  RR (machine): 18  TV (machine): 500  FiO2: 30  PEEP: 7  MAP: 11  PIP: 25      ICU Vital Signs Last 24 Hrs  T(C): 38.4 (17 Mar 2018 00:00), Max: 38.9 (16 Mar 2018 06:19)  T(F): 101.1 (17 Mar 2018 00:00), Max: 102.1 (16 Mar 2018 06:19)  HR: 115 (17 Mar 2018 00:08) (90 - 128)  BP: 117/73 (17 Mar 2018 00:00) (73/45 - 139/85)  BP(mean): 88 (17 Mar 2018 00:00) (54 - 97)  ABP: --  ABP(mean): --  RR: 19 (17 Mar 2018 00:00) (18 - 22)  SpO2: 99% (17 Mar 2018 00:08) (94% - 100%)      ABG - ( 16 Mar 2018 11:08 )  pH: 7.39  /  pCO2: 49    /  pO2: 80    / HCO3: 28    / Base Excess: 3.8   /  SaO2: 96                  I&O's Detail    15 Mar 2018 07:01  -  16 Mar 2018 07:00  --------------------------------------------------------  IN:    Enteral Tube Flush: 180 mL    Free Water: 750 mL    Glucerna: 120 mL    Solution: 100 mL    Solution: 50 mL  Total IN: 1200 mL    OUT:    Colostomy: 100 mL    Indwelling Catheter - Urethral: 300 mL  Total OUT: 400 mL    Total NET: 800 mL      16 Mar 2018 07:01  -  17 Mar 2018 02:46  --------------------------------------------------------  IN:    Free Water: 400 mL    Glucerna: 540 mL    Sodium Chloride 0.9% IV Bolus: 1000 mL    sodium chloride 0.9%.: 1300 mL    Solution: 150 mL    Solution: 250 mL  Total IN: 3640 mL    OUT:    Indwelling Catheter - Urethral: 735 mL  Total OUT: 735 mL    Total NET: 2905 mL            LABS:                        9.9    24.7  )-----------( 424      ( 16 Mar 2018 11:35 )             31.6     03-16    130<L>  |  91<L>  |  27.0<H>  ----------------------------<  222<H>  5.0   |  26.0  |  1.07    Ca    8.8      16 Mar 2018 11:35    TPro  6.5<L>  /  Alb  2.4<L>  /  TBili  0.5  /  DBili  x   /  AST  14  /  ALT  13  /  AlkPhos  72  03-16          CAPILLARY BLOOD GLUCOSE      POCT Blood Glucose.: 193 mg/dL (16 Mar 2018 23:51)        CULTURES:  C Diff by PCR Result: NotDetec (03-15 @ 17:37)  Culture Results:   50,000 - 99,000 CFU/mL Enterococcus faecium (vancomycin resistant)  10,000 - 49,000 CFU/mL Pseudomonas aeruginosa (Carbapenem Resistant)  (known)  send a copy to iwona mcgowan (03-13 @ 17:27)  Culture Results:   No growth at 48 hours (03-13 @ 15:07)  Culture Results:   No growth at 48 hours (03-13 @ 15:07)  C Diff by PCR Result: NotDetec (03-11 @ 04:25)  Culture Results:   No enteric pathogens isolated.  (Stool culture examined for Salmonella,  Shigella, Campylobacter, Aeromonas, Plesiomonas,  Vibrio, E.coli O157 and Yersinia) (03-11 @ 04:25)  Culture Results:   50,000 - 99,000 CFU/mL Pseudomonas aeruginosa (Carbapenem Resistant)  .  TYPE: (C=Critical, N=Notification, A=Abnormal) C  TESTS:  _ MDRO  DATE/TIME CALLED: _ 03/13/2018 15:20:04  CALLED TO: Johnathan vera  READ BACK (2 Patient Identifiers)(Y/N): _ y  READ BACK VALUES (Y/N): _ y  CALLED BY: _ kb  send a copy to  and [facility] (03-10 @ 19:16)  Culture Results:   No growth at 5 days. (03-10 @ 17:17)  Culture Results:   No growth at 5 days. (03-10 @ 17:16)      Physical Examination:    General: No acute distress.      HEENT: Pupils equal, reactive to light.  Symmetric.    PULM: Clear to auscultation bilaterally, no significant sputum production    CVS: Regular rate and rhythm, no murmurs, rubs, or gallops    ABD: Soft, nondistended, nontender, normoactive bowel sounds, no masses    EXT: No edema, nontender    SKIN: Warm and well perfused, no rashes noted.    NEURO: Alert, oriented, interactive, nonfocal    RADIOLOGY: ***    CRITICAL CARE TIME SPENT: I have spent *** minutes of critical care time evaluating and treating this patient, including reviewing charts, labs, imagining studies, and collaborating with interdisciplinary team. Patient is a 62y old  Male who presents with a chief complaint of fevers (12 Mar 2018 18:28)      BRIEF HOSPITAL COURSE: 61 yo male, PMHx chronic trach/PEG/colostomy since an episode of transverse myelitis years ago.  He has h/o multiple infections, some of them being  resistant as well as recent Cdiff.  He was admitted with fevers, and was treated for possible PNA.  RRT called 3/16/18 when pt was found to be unresponsive in bed.  Pt had normal accucheck and was given Narcan since he is on chronic narcotic without response. Found to have rectal temp 102, tachycardic, with SBP in 90's.  CT head negative for acute CVA. Pt was upgraded to ICU on full ventilatory support. CODE SEPSIS was called and antibiotics were upgraded to meropenum and vanco. Found to have 50-90,000 VRE in urine.     Events last 24 hours: On vent overnight, mental status improved. Awake and communicative, complains of dry eyes and neck pain. Febrile Tmax 101.7'F overnight, sinus tachycardia 110-120's.    PAST MEDICAL & SURGICAL HISTORY:  Essential hypertension  Paralysis  Transverse myelitis  History of tracheostomy      Review of Systems:  CONSTITUTIONAL: No fever, chills, or fatigue  EYES: +DRY EYES  ENMT:  No difficulty hearing, tinnitus, vertigo; No sinus or throat pain  NECK: No pain or stiffness  RESPIRATORY: No cough, wheezing, chills or hemoptysis; No shortness of breath  CARDIOVASCULAR: No chest pain, palpitations, dizziness, or leg swelling  GASTROINTESTINAL: No abdominal or epigastric pain. No nausea, vomiting, or hematemesis; No diarrhea or constipation. No melena or hematochezia.  GENITOURINARY: No dysuria, frequency, hematuria, or incontinence  NEUROLOGICAL: No headaches, memory loss, loss of strength, numbness, or tremors  SKIN: No itching, burning, rashes, or lesions   MUSCULOSKELETAL: +NECK PAIN  PSYCHIATRIC: No depression, anxiety, mood swings, or difficulty sleeping      Medications:  meropenem  IVPB 1000 milliGRAM(s) IV Intermittent every 8 hours  vancomycin  IVPB 1250 milliGRAM(s) IV Intermittent every 12 hours  ALBUTerol    0.083% 2.5 milliGRAM(s) Nebulizer every 6 hours PRN  ALBUTerol/ipratropium for Nebulization 3 milliLiter(s) Nebulizer every 6 hours  acetaminophen   Tablet 650 milliGRAM(s) Oral every 6 hours PRN  acetaminophen   Tablet. 650 milliGRAM(s) Oral every 6 hours PRN  baclofen 10 milliGRAM(s) Oral two times a day  oxyCODONE    IR 10 milliGRAM(s) Oral every 6 hours PRN  aspirin  chewable 81 milliGRAM(s) Oral daily  clopidogrel Tablet 75 milliGRAM(s) Oral daily  heparin  Injectable 5000 Unit(s) SubCutaneous every 8 hours  pantoprazole   Suspension 40 milliGRAM(s) Enteral Tube before breakfast  dextrose 50% Injectable 12.5 Gram(s) IV Push once  dextrose 50% Injectable 25 Gram(s) IV Push once  dextrose 50% Injectable 25 Gram(s) IV Push once  glucagon  Injectable 1 milliGRAM(s) IntraMuscular once PRN  insulin glargine Injectable (LANTUS) 7 Unit(s) SubCutaneous at bedtime  insulin regular  human corrective regimen sliding scale   SubCutaneous every 6 hours  ascorbic acid 500 milliGRAM(s) Oral daily  dextrose 5%. 1000 milliLiter(s) IV Continuous <Continuous>  multivitamin   Solution 5 milliLiter(s) Oral daily  sodium chloride 0.9%. 1000 milliLiter(s) IV Continuous <Continuous>  artificial  tears Solution 1 Drop(s) Both EYES every 4 hours PRN  chlorhexidine 0.12% Liquid 15 milliLiter(s) Swish and Spit two times a day  lidocaine   Patch 1 Patch Transdermal daily  lidocaine   Patch 1 Patch Transdermal daily  petrolatum Ophthalmic Ointment 1 Application(s) Both EYES at bedtime  saccharomyces boulardii 250 milliGRAM(s) Oral two times a day      Mode: AC/ CMV (Assist Control/ Continuous Mandatory Ventilation)  RR (machine): 18  TV (machine): 500  FiO2: 30  PEEP: 7  MAP: 11  PIP: 25      ICU Vital Signs Last 24 Hrs  T(C): 38.4 (17 Mar 2018 00:00), Max: 38.9 (16 Mar 2018 06:19)  T(F): 101.1 (17 Mar 2018 00:00), Max: 102.1 (16 Mar 2018 06:19)  HR: 115 (17 Mar 2018 00:08) (90 - 128)  BP: 117/73 (17 Mar 2018 00:00) (73/45 - 139/85)  BP(mean): 88 (17 Mar 2018 00:00) (54 - 97)  ABP: --  ABP(mean): --  RR: 19 (17 Mar 2018 00:00) (18 - 22)  SpO2: 99% (17 Mar 2018 00:08) (94% - 100%)      ABG - ( 16 Mar 2018 11:08 )  pH: 7.39  /  pCO2: 49    /  pO2: 80    / HCO3: 28    / Base Excess: 3.8   /  SaO2: 96                  I&O's Detail    15 Mar 2018 07:01  -  16 Mar 2018 07:00  --------------------------------------------------------  IN:    Enteral Tube Flush: 180 mL    Free Water: 750 mL    Glucerna: 120 mL    Solution: 100 mL    Solution: 50 mL  Total IN: 1200 mL    OUT:    Colostomy: 100 mL    Indwelling Catheter - Urethral: 300 mL  Total OUT: 400 mL    Total NET: 800 mL      16 Mar 2018 07:01  -  17 Mar 2018 02:46  --------------------------------------------------------  IN:    Free Water: 400 mL    Glucerna: 540 mL    Sodium Chloride 0.9% IV Bolus: 1000 mL    sodium chloride 0.9%.: 1300 mL    Solution: 150 mL    Solution: 250 mL  Total IN: 3640 mL    OUT:    Indwelling Catheter - Urethral: 735 mL  Total OUT: 735 mL    Total NET: 2905 mL            LABS:                        9.9    24.7  )-----------( 424      ( 16 Mar 2018 11:35 )             31.6     03-16    130<L>  |  91<L>  |  27.0<H>  ----------------------------<  222<H>  5.0   |  26.0  |  1.07    Ca    8.8      16 Mar 2018 11:35    TPro  6.5<L>  /  Alb  2.4<L>  /  TBili  0.5  /  DBili  x   /  AST  14  /  ALT  13  /  AlkPhos  72  03-16          CAPILLARY BLOOD GLUCOSE      POCT Blood Glucose.: 193 mg/dL (16 Mar 2018 23:51)        CULTURES:  C Diff by PCR Result: NotDet[place]ona (03-15 @ 17:37)  Culture Results:   50,000 - 99,000 CFU/mL Enterococcus faecium (vancomycin resistant)  10,000 - 49,000 CFU/mL Pseudomonas aeruginosa (Carbapenem Resistant)  (known)  send a copy to iwona mcgowan (03-13 @ 17:27)  Culture Results:   No growth at 48 hours (03-13 @ 15:07)  Culture Results:   No growth at 48 hours (03-13 @ 15:07)  C Diff by PCR Result: NotDetec (03-11 @ 04:25)  Culture Results:   No enteric pathogens isolated.  (Stool culture examined for Salmonella,  Shigella, Campylobacter, Aeromonas, Plesiomonas,  Vibrio, E.coli O157 and Yersinia) (03-11 @ 04:25)  Culture Results:   50,000 - 99,000 CFU/mL Pseudomonas aeruginosa (Carbapenem Resistant)  .  TYPE: (C=Critical, N=Notification, A=Abnormal) C  TESTS:  _ MDRO  DATE/TIME CALLED: _ 03/13/2018 15:20:04  CALLED TO: _ anna vera  READ BACK (2 Patient Identifiers)(Y/N): _ y  READ BACK VALUES (Y/N): _ y  CALLED BY: _ ashish  send a copy to [employer] and [email or web address] (03-10 @ 19:16)  Culture Results:   No growth at 5 days. (03-10 @ 17:17)  Culture Results:   No growth at 5 days. (03-10 @ 17:16)      Physical Examination:    General: No acute distress.      HEENT: Pupils equal, reactive to light.  Symmetric.    PULM: On vent. Clear to auscultation bilaterally, no significant sputum production    CVS: Sinus tachycardia, no murmurs, rubs, or gallops    ABD: Soft, nondistended, nontender, normoactive bowel sounds, no masses. PEG site c/d/i; ostomy with stool in bag    EXT: No edema, nontender    SKIN: Warm and well perfused, no rashes noted.    NEURO: Alert, oriented, interactive, nonfocal    RADIOLOGY: < from: CT Head No Cont (03.16.18 @ 12:22) >  EXAM:  CT BRAIN                          PROCEDURE DATE:  03/16/2018          INTERPRETATION:  CLINICAL HISTORY: Alteration of consciousness, rapid   response    COMPARISON: None.    TECHNIQUE: Noncontrast CT of the head. Multiplanar reformations are   submitted.    FINDINGS:  There is periventricular and subcortical white matter hypodensity without   mass effect, nonspecific, likely representing mild chronic microvascular   ischemic changes. There is no compelling evidence for an acute   transcortical infarction. There is no evidence of mass, mass effect,   midline shift or extra-axial fluid collection. The lateral ventricles and   cortical sulci are age-appropriate in size and configuration. The orbits,   mastoid air cells and visualizedparanasal sinuses are unremarkable. The   calvarium is intact.    IMPRESSION:  Mild chronic microvascular changes without evidence of an   acute transcortical infarction or hemorrhage. MR is a more sensitive   imaging modality for the evaluation of an acute infarction.     MABLE SÁNCHEZ M.D., ATTENDING RADIOLOGIST  This document has been electronically signed. Mar 16 2018 12:13PM    < end of copied text >      < from: Xray Chest 1 View-PORTABLE IMMEDIATE (03.16.18 @ 11:40) >  EXAM:  XR CHEST PORTABLE IMMED 1V                          PROCEDURE DATE:  03/16/2018          INTERPRETATION:  CHEST AP PORTABLE:    History: rapid response.     Date and time of exam: 3/16/2018 11:15 AM.    Technique: A single AP view of the chest was obtained.    Comparison exam: 3/15/2018 10:50 AM.    Findings:  There is now an acute infiltrate in the right mid and lower lung not seen   on the prior day. Findings suggest aspiration. A tracheostomy tube is   noted. The left lung remains clear. The heart and mediastinum   unremarkable..    Impression:  There is a new right lung infiltrate suggesting aspiration..    GINNA LEÓN M.D., ATTENDING RADIOLOGIST  This document has been electronically signed. Mar 16 2018 11:41AM    < end of copied text >      CRITICAL CARE TIME SPENT: I have spent 30 minutes of critical care time evaluating and treating this patient, including reviewing charts, labs, imagining studies, and collaborating with interdisciplinary team. Patient is a 62y old  Male who presents with a chief complaint of fevers (12 Mar 2018 18:28)      BRIEF HOSPITAL COURSE: 63 yo male, PMHx chronic trach/PEG/colostomy since an episode of transverse myelitis years ago.  He has h/o multiple infections, some of them being  resistant as well as recent Cdiff.  He was admitted with fevers, and was treated for possible PNA.  RRT called 3/16/18 when pt was found to be unresponsive in bed.  Pt had normal accucheck and was given Narcan since he is on chronic narcotic without response. Found to have rectal temp 102, tachycardic, with SBP in 90's.  CT head negative for acute CVA. Pt was upgraded to ICU on full ventilatory support. CODE SEPSIS was called and antibiotics were upgraded to meropenum and vanco. Found to have 50-90,000 VRE in urine.     Events last 24 hours: On vent overnight, mental status improved. Awake and communicative, complains of dry eyes and neck pain. Febrile Tmax 101.7'F overnight, sinus tachycardia 110-120's.    PAST MEDICAL & SURGICAL HISTORY:  Essential hypertension  Paralysis  Transverse myelitis  History of tracheostomy      Review of Systems:  CONSTITUTIONAL: No fever, chills, or fatigue  EYES: +DRY EYES  ENMT:  No difficulty hearing, tinnitus, vertigo; No sinus or throat pain  NECK: No pain or stiffness  RESPIRATORY: No cough, wheezing, chills or hemoptysis; No shortness of breath  CARDIOVASCULAR: No chest pain, palpitations, dizziness, or leg swelling  GASTROINTESTINAL: No abdominal or epigastric pain. No nausea, vomiting, or hematemesis; No diarrhea or constipation. No melena or hematochezia.  GENITOURINARY: No dysuria, frequency, hematuria, or incontinence  NEUROLOGICAL: No headaches, memory loss, loss of strength, numbness, or tremors  SKIN: No itching, burning, rashes, or lesions   MUSCULOSKELETAL: +NECK PAIN  PSYCHIATRIC: No depression, anxiety, mood swings, or difficulty sleeping      Medications:  meropenem  IVPB 1000 milliGRAM(s) IV Intermittent every 8 hours  vancomycin  IVPB 1250 milliGRAM(s) IV Intermittent every 12 hours  ALBUTerol    0.083% 2.5 milliGRAM(s) Nebulizer every 6 hours PRN  ALBUTerol/ipratropium for Nebulization 3 milliLiter(s) Nebulizer every 6 hours  acetaminophen   Tablet 650 milliGRAM(s) Oral every 6 hours PRN  acetaminophen   Tablet. 650 milliGRAM(s) Oral every 6 hours PRN  baclofen 10 milliGRAM(s) Oral two times a day  oxyCODONE    IR 10 milliGRAM(s) Oral every 6 hours PRN  aspirin  chewable 81 milliGRAM(s) Oral daily  clopidogrel Tablet 75 milliGRAM(s) Oral daily  heparin  Injectable 5000 Unit(s) SubCutaneous every 8 hours  pantoprazole   Suspension 40 milliGRAM(s) Enteral Tube before breakfast  dextrose 50% Injectable 12.5 Gram(s) IV Push once  dextrose 50% Injectable 25 Gram(s) IV Push once  dextrose 50% Injectable 25 Gram(s) IV Push once  glucagon  Injectable 1 milliGRAM(s) IntraMuscular once PRN  insulin glargine Injectable (LANTUS) 7 Unit(s) SubCutaneous at bedtime  insulin regular  human corrective regimen sliding scale   SubCutaneous every 6 hours  ascorbic acid 500 milliGRAM(s) Oral daily  dextrose 5%. 1000 milliLiter(s) IV Continuous <Continuous>  multivitamin   Solution 5 milliLiter(s) Oral daily  sodium chloride 0.9%. 1000 milliLiter(s) IV Continuous <Continuous>  artificial  tears Solution 1 Drop(s) Both EYES every 4 hours PRN  chlorhexidine 0.12% Liquid 15 milliLiter(s) Swish and Spit two times a day  lidocaine   Patch 1 Patch Transdermal daily  lidocaine   Patch 1 Patch Transdermal daily  petrolatum Ophthalmic Ointment 1 Application(s) Both EYES at bedtime  saccharomyces boulardii 250 milliGRAM(s) Oral two times a day      Mode: AC/ CMV (Assist Control/ Continuous Mandatory Ventilation)  RR (machine): 18  TV (machine): 500  FiO2: 30  PEEP: 7  MAP: 11  PIP: 25      ICU Vital Signs Last 24 Hrs  T(C): 38.4 (17 Mar 2018 00:00), Max: 38.9 (16 Mar 2018 06:19)  T(F): 101.1 (17 Mar 2018 00:00), Max: 102.1 (16 Mar 2018 06:19)  HR: 115 (17 Mar 2018 00:08) (90 - 128)  BP: 117/73 (17 Mar 2018 00:00) (73/45 - 139/85)  BP(mean): 88 (17 Mar 2018 00:00) (54 - 97)  ABP: --  ABP(mean): --  RR: 19 (17 Mar 2018 00:00) (18 - 22)  SpO2: 99% (17 Mar 2018 00:08) (94% - 100%)      ABG - ( 16 Mar 2018 11:08 )  pH: 7.39  /  pCO2: 49    /  pO2: 80    / HCO3: 28    / Base Excess: 3.8   /  SaO2: 96                  I&O's Detail    15 Mar 2018 07:01  -  16 Mar 2018 07:00  --------------------------------------------------------  IN:    Enteral Tube Flush: 180 mL    Free Water: 750 mL    Glucerna: 120 mL    Solution: 100 mL    Solution: 50 mL  Total IN: 1200 mL    OUT:    Colostomy: 100 mL    Indwelling Catheter - Urethral: 300 mL  Total OUT: 400 mL    Total NET: 800 mL      16 Mar 2018 07:01  -  17 Mar 2018 02:46  --------------------------------------------------------  IN:    Free Water: 400 mL    Glucerna: 540 mL    Sodium Chloride 0.9% IV Bolus: 1000 mL    sodium chloride 0.9%.: 1300 mL    Solution: 150 mL    Solution: 250 mL  Total IN: 3640 mL    OUT:    Indwelling Catheter - Urethral: 735 mL  Total OUT: 735 mL    Total NET: 2905 mL            LABS:                        9.9    24.7  )-----------( 424      ( 16 Mar 2018 11:35 )             31.6     03-16    130<L>  |  91<L>  |  27.0<H>  ----------------------------<  222<H>  5.0   |  26.0  |  1.07    Ca    8.8      16 Mar 2018 11:35    TPro  6.5<L>  /  Alb  2.4<L>  /  TBili  0.5  /  DBili  x   /  AST  14  /  ALT  13  /  AlkPhos  72  03-16          CAPILLARY BLOOD GLUCOSE      POCT Blood Glucose.: 193 mg/dL (16 Mar 2018 23:51)        CULTURES:  C Diff by PCR Result: NotDeteiwona (03-15 @ 17:37)  Culture Results:   50,000 - 99,000 CFU/mL Enterococcus faecium (vancomycin resistant)  10,000 - 49,000 CFU/mL Pseudomonas aeruginosa (Carbapenem Resistant)  (known)  send a copy to iwona mcgowan (03-13 @ 17:27)  Culture Results:   No growth at 48 hours (03-13 @ 15:07)  Culture Results:   No growth at 48 hours (03-13 @ 15:07)  C Diff by PCR Result: NotDetec (03-11 @ 04:25)  Culture Results:   No enteric pathogens isolated.  (Stool culture examined for Salmonella,  Shigella, Campylobacter, Aeromonas, Plesiomonas,  Vibrio, E.coli O157 and Yersinia) (03-11 @ 04:25)  Culture Results:   50,000 - 99,000 CFU/mL Pseudomonas aeruginosa (Carbapenem Resistant)  .  TYPE: (C=Critical, N=Notification, A=Abnormal) C  TESTS:  _ MDRO  DATE/TIME CALLED: _ 03/13/2018 15:20:04  CALLED TO: _ anna vera  READ BACK (2 Patient Identifiers)(Y/N): _ y  READ BACK VALUES (Y/N): _ y  CALLED BY: _ ashish  send a copy to [facility] [facility] [employer] (03-10 @ 19:16)  Culture Results:   No growth at 5 days. (03-10 @ 17:17)  Culture Results:   No growth at 5 days. (03-10 @ 17:16)      Physical Examination:    General: No acute distress.      HEENT: Pupils equal, reactive to light.  Symmetric.    PULM: On vent. Clear to auscultation bilaterally, no significant sputum production    CVS: Sinus tachycardia, no murmurs, rubs, or gallops    ABD: Soft, nondistended, nontender, normoactive bowel sounds, no masses. PEG site c/d/i; ostomy with stool in bag    EXT: No edema, nontender    SKIN: Warm and well perfused, no rashes noted.    NEURO: Alert, oriented, interactive, plegic at baseline, extremities contracted    RADIOLOGY: < from: CT Head No Cont (03.16.18 @ 12:22) >  EXAM:  CT BRAIN                          PROCEDURE DATE:  03/16/2018          INTERPRETATION:  CLINICAL HISTORY: Alteration of consciousness, rapid   response    COMPARISON: None.    TECHNIQUE: Noncontrast CT of the head. Multiplanar reformations are   submitted.    FINDINGS:  There is periventricular and subcortical white matter hypodensity without   mass effect, nonspecific, likely representing mild chronic microvascular   ischemic changes. There is no compelling evidence for an acute   transcortical infarction. There is no evidence of mass, mass effect,   midline shift or extra-axial fluid collection. The lateral ventricles and   cortical sulci are age-appropriate in size and configuration. The orbits,   mastoid air cells and visualizedparanasal sinuses are unremarkable. The   calvarium is intact.    IMPRESSION:  Mild chronic microvascular changes without evidence of an   acute transcortical infarction or hemorrhage. MR is a more sensitive   imaging modality for the evaluation of an acute infarction.     MABLE SÁNCHEZ M.D., ATTENDING RADIOLOGIST  This document has been electronically signed. Mar 16 2018 12:13PM    < end of copied text >      < from: Xray Chest 1 View-PORTABLE IMMEDIATE (03.16.18 @ 11:40) >  EXAM:  XR CHEST PORTABLE IMMED 1V                          PROCEDURE DATE:  03/16/2018          INTERPRETATION:  CHEST AP PORTABLE:    History: rapid response.     Date and time of exam: 3/16/2018 11:15 AM.    Technique: A single AP view of the chest was obtained.    Comparison exam: 3/15/2018 10:50 AM.    Findings:  There is now an acute infiltrate in the right mid and lower lung not seen   on the prior day. Findings suggest aspiration. A tracheostomy tube is   noted. The left lung remains clear. The heart and mediastinum   unremarkable..    Impression:  There is a new right lung infiltrate suggesting aspiration..    GINNA LEÓN M.D., ATTENDING RADIOLOGIST  This document has been electronically signed. Mar 16 2018 11:41AM    < end of copied text >      CRITICAL CARE TIME SPENT: I have spent 30 minutes of critical care time evaluating and treating this patient, including reviewing charts, labs, imagining studies, and collaborating with interdisciplinary team.

## 2018-03-17 NOTE — PROGRESS NOTE ADULT - ASSESSMENT
63 yo male, PMHx chronic trach/PEG/colostomy since an episode of transverse myelitis years ago, admitted with fevers, likely aspiration PNA, with RRT for episode of altered mental status, hypoxemic respiratory failure.        Hypoxemic Respiratory Failure- Related to aspiration. Usually on trach collar, however on vent for episode of hypoxemia related to underlying pneumonia. Continue on ventilatory support, and augment vent settings to maintain SPO2 > 90% and pH > 7.35. Low tidal volume lung protective ventilation strategy. VAP ppx, PPI stress ulcer ppx. Bronchodilators. HOB > 30 deg due to high risk for recurrent aspiration events    Pneumonia- Likely aspiration PNA, Persistently febrile 101.7'F leukocytosis downtrending, continue meropenem and vancomycin for HCAP due to propensity for resistant organisms    Encephalopathy- Likely related to sepsis, hypoxia, now improved. CT head neg for acute CVA. Continue to monitor

## 2018-03-18 LAB
-  AMIKACIN: SIGNIFICANT CHANGE UP
-  AMPICILLIN/SULBACTAM: SIGNIFICANT CHANGE UP
-  CEFEPIME: SIGNIFICANT CHANGE UP
-  CEFTAZIDIME: SIGNIFICANT CHANGE UP
-  CIPROFLOXACIN: SIGNIFICANT CHANGE UP
-  GENTAMICIN: SIGNIFICANT CHANGE UP
-  LEVOFLOXACIN: SIGNIFICANT CHANGE UP
-  MEROPENEM: SIGNIFICANT CHANGE UP
-  TOBRAMYCIN: SIGNIFICANT CHANGE UP
ANION GAP SERPL CALC-SCNC: 11 MMOL/L — SIGNIFICANT CHANGE UP (ref 5–17)
BUN SERPL-MCNC: 22 MG/DL — HIGH (ref 8–20)
CALCIUM SERPL-MCNC: 8.7 MG/DL — SIGNIFICANT CHANGE UP (ref 8.6–10.2)
CHLORIDE SERPL-SCNC: 99 MMOL/L — SIGNIFICANT CHANGE UP (ref 98–107)
CO2 SERPL-SCNC: 28 MMOL/L — SIGNIFICANT CHANGE UP (ref 22–29)
CREAT SERPL-MCNC: 0.51 MG/DL — SIGNIFICANT CHANGE UP (ref 0.5–1.3)
CULTURE RESULTS: SIGNIFICANT CHANGE UP
CULTURE RESULTS: SIGNIFICANT CHANGE UP
GLUCOSE BLDC GLUCOMTR-MCNC: 164 MG/DL — HIGH (ref 70–99)
GLUCOSE BLDC GLUCOMTR-MCNC: 188 MG/DL — HIGH (ref 70–99)
GLUCOSE BLDC GLUCOMTR-MCNC: 193 MG/DL — HIGH (ref 70–99)
GLUCOSE BLDC GLUCOMTR-MCNC: 205 MG/DL — HIGH (ref 70–99)
GLUCOSE BLDC GLUCOMTR-MCNC: 226 MG/DL — HIGH (ref 70–99)
GLUCOSE SERPL-MCNC: 203 MG/DL — HIGH (ref 70–115)
HCT VFR BLD CALC: 30.8 % — LOW (ref 42–52)
HGB BLD-MCNC: 9.4 G/DL — LOW (ref 14–18)
MAGNESIUM SERPL-MCNC: 2.1 MG/DL — SIGNIFICANT CHANGE UP (ref 1.6–2.6)
MCHC RBC-ENTMCNC: 28.2 PG — SIGNIFICANT CHANGE UP (ref 27–31)
MCHC RBC-ENTMCNC: 30.5 G/DL — LOW (ref 32–36)
MCV RBC AUTO: 92.5 FL — SIGNIFICANT CHANGE UP (ref 80–94)
METHOD TYPE: SIGNIFICANT CHANGE UP
PHOSPHATE SERPL-MCNC: 2.3 MG/DL — LOW (ref 2.4–4.7)
PLATELET # BLD AUTO: 450 K/UL — HIGH (ref 150–400)
POTASSIUM SERPL-MCNC: 4.7 MMOL/L — SIGNIFICANT CHANGE UP (ref 3.5–5.3)
POTASSIUM SERPL-SCNC: 4.7 MMOL/L — SIGNIFICANT CHANGE UP (ref 3.5–5.3)
RBC # BLD: 3.33 M/UL — LOW (ref 4.6–6.2)
RBC # FLD: 16.7 % — HIGH (ref 11–15.6)
SODIUM SERPL-SCNC: 138 MMOL/L — SIGNIFICANT CHANGE UP (ref 135–145)
SPECIMEN SOURCE: SIGNIFICANT CHANGE UP
SPECIMEN SOURCE: SIGNIFICANT CHANGE UP
WBC # BLD: 18.8 K/UL — HIGH (ref 4.8–10.8)
WBC # FLD AUTO: 18.8 K/UL — HIGH (ref 4.8–10.8)

## 2018-03-18 PROCEDURE — 71045 X-RAY EXAM CHEST 1 VIEW: CPT | Mod: 26,76

## 2018-03-18 PROCEDURE — 99291 CRITICAL CARE FIRST HOUR: CPT

## 2018-03-18 RX ORDER — ALPRAZOLAM 0.25 MG
0.5 TABLET ORAL EVERY 8 HOURS
Qty: 0 | Refills: 0 | Status: DISCONTINUED | OUTPATIENT
Start: 2018-03-18 | End: 2018-03-23

## 2018-03-18 RX ADMIN — LIDOCAINE 1 PATCH: 4 CREAM TOPICAL at 11:43

## 2018-03-18 RX ADMIN — OXYCODONE HYDROCHLORIDE 10 MILLIGRAM(S): 5 TABLET ORAL at 21:00

## 2018-03-18 RX ADMIN — Medication 1 DROP(S): at 20:57

## 2018-03-18 RX ADMIN — PANTOPRAZOLE SODIUM 40 MILLIGRAM(S): 20 TABLET, DELAYED RELEASE ORAL at 05:34

## 2018-03-18 RX ADMIN — MEROPENEM 100 MILLIGRAM(S): 1 INJECTION INTRAVENOUS at 05:34

## 2018-03-18 RX ADMIN — INSULIN HUMAN 1: 100 INJECTION, SOLUTION SUBCUTANEOUS at 23:56

## 2018-03-18 RX ADMIN — Medication 250 MILLIGRAM(S): at 17:44

## 2018-03-18 RX ADMIN — OXYCODONE HYDROCHLORIDE 10 MILLIGRAM(S): 5 TABLET ORAL at 05:35

## 2018-03-18 RX ADMIN — HEPARIN SODIUM 5000 UNIT(S): 5000 INJECTION INTRAVENOUS; SUBCUTANEOUS at 21:00

## 2018-03-18 RX ADMIN — MEROPENEM 100 MILLIGRAM(S): 1 INJECTION INTRAVENOUS at 14:48

## 2018-03-18 RX ADMIN — LIDOCAINE 1 PATCH: 4 CREAM TOPICAL at 00:40

## 2018-03-18 RX ADMIN — Medication 5 MILLILITER(S): at 11:44

## 2018-03-18 RX ADMIN — ALBUTEROL 2.5 MILLIGRAM(S): 90 AEROSOL, METERED ORAL at 08:45

## 2018-03-18 RX ADMIN — Medication 0.5 MILLIGRAM(S): at 17:44

## 2018-03-18 RX ADMIN — OXYCODONE HYDROCHLORIDE 10 MILLIGRAM(S): 5 TABLET ORAL at 05:50

## 2018-03-18 RX ADMIN — LIDOCAINE 1 PATCH: 4 CREAM TOPICAL at 23:32

## 2018-03-18 RX ADMIN — OXYCODONE HYDROCHLORIDE 10 MILLIGRAM(S): 5 TABLET ORAL at 20:07

## 2018-03-18 RX ADMIN — INSULIN HUMAN 2: 100 INJECTION, SOLUTION SUBCUTANEOUS at 17:44

## 2018-03-18 RX ADMIN — OXYCODONE HYDROCHLORIDE 10 MILLIGRAM(S): 5 TABLET ORAL at 12:32

## 2018-03-18 RX ADMIN — Medication 500 MILLIGRAM(S): at 11:44

## 2018-03-18 RX ADMIN — Medication 62.5 MILLIMOLE(S): at 11:43

## 2018-03-18 RX ADMIN — Medication 81 MILLIGRAM(S): at 11:44

## 2018-03-18 RX ADMIN — INSULIN HUMAN 2: 100 INJECTION, SOLUTION SUBCUTANEOUS at 12:32

## 2018-03-18 RX ADMIN — HEPARIN SODIUM 5000 UNIT(S): 5000 INJECTION INTRAVENOUS; SUBCUTANEOUS at 05:34

## 2018-03-18 RX ADMIN — Medication 10 MILLIGRAM(S): at 17:43

## 2018-03-18 RX ADMIN — SODIUM CHLORIDE 100 MILLILITER(S): 9 INJECTION INTRAMUSCULAR; INTRAVENOUS; SUBCUTANEOUS at 05:34

## 2018-03-18 RX ADMIN — INSULIN HUMAN 1: 100 INJECTION, SOLUTION SUBCUTANEOUS at 00:06

## 2018-03-18 RX ADMIN — ALBUTEROL 2.5 MILLIGRAM(S): 90 AEROSOL, METERED ORAL at 21:04

## 2018-03-18 RX ADMIN — HEPARIN SODIUM 5000 UNIT(S): 5000 INJECTION INTRAVENOUS; SUBCUTANEOUS at 14:48

## 2018-03-18 RX ADMIN — CHLORHEXIDINE GLUCONATE 15 MILLILITER(S): 213 SOLUTION TOPICAL at 05:32

## 2018-03-18 RX ADMIN — Medication 10 MILLIGRAM(S): at 05:35

## 2018-03-18 RX ADMIN — MEROPENEM 100 MILLIGRAM(S): 1 INJECTION INTRAVENOUS at 21:00

## 2018-03-18 RX ADMIN — INSULIN GLARGINE 7 UNIT(S): 100 INJECTION, SOLUTION SUBCUTANEOUS at 21:00

## 2018-03-18 RX ADMIN — INSULIN HUMAN 1: 100 INJECTION, SOLUTION SUBCUTANEOUS at 05:35

## 2018-03-18 RX ADMIN — CHLORHEXIDINE GLUCONATE 15 MILLILITER(S): 213 SOLUTION TOPICAL at 17:44

## 2018-03-18 RX ADMIN — CLOPIDOGREL BISULFATE 75 MILLIGRAM(S): 75 TABLET, FILM COATED ORAL at 11:44

## 2018-03-18 RX ADMIN — OXYCODONE HYDROCHLORIDE 10 MILLIGRAM(S): 5 TABLET ORAL at 13:32

## 2018-03-18 RX ADMIN — Medication 250 MILLIGRAM(S): at 05:34

## 2018-03-18 NOTE — PROGRESS NOTE ADULT - PROBLEM SELECTOR PLAN 4
long standing   continue supportive care for all ADLs  pt previously had been able to move arms and no longer can   Palliative care consult

## 2018-03-18 NOTE — PROGRESS NOTE ADULT - PROBLEM SELECTOR PLAN 3
improved to baseline per family    pt responsive communicating by mouthing words  pt appears frustrated and anxious, was on Xanax at home which had not been given in some days will resume

## 2018-03-18 NOTE — PROGRESS NOTE ADULT - SUBJECTIVE AND OBJECTIVE BOX
Patient is a 62y old  Male who presents with a chief complaint of fevers (12 Mar 2018 18:28)      BRIEF HOSPITAL COURSE: 63 yo male, PMHx chronic trach/PEG/colostomy since an episode of transverse myelitis years ago.  He has h/o multiple infections, some of them being  resistant as well as recent Cdiff.  He was admitted with fevers, and was treated for possible PNA.  RRT called 3/16/18 when pt was found to be unresponsive in bed.  Pt had normal accucheck and was given Narcan since he is on chronic narcotic without response. Found to have rectal temp 102, tachycardic, with SBP in 90's.  CT head negative for acute CVA. Pt was upgraded to ICU on full ventilatory support.    Events last 24 hours: defervesced, on AC vent overnight    PAST MEDICAL & SURGICAL HISTORY:  Essential hypertension  Paralysis  Transverse myelitis  History of tracheostomy      Review of Systems: able to mouth words, keeps saying "help" but denies pain, diff breathing, anxiety or anything we can help him with   CONSTITUTIONAL: No fever, chills, or fatigue  EYES: No eye pain, visual disturbances, or discharge  ENMT:  No difficulty hearing, tinnitus, vertigo; No sinus or throat pain  NECK: No pain or stiffness  RESPIRATORY: No cough, wheezing, chills or hemoptysis; No shortness of breath  CARDIOVASCULAR: No chest pain, palpitations, dizziness, or leg swelling  GASTROINTESTINAL: No abdominal or epigastric pain. No nausea, vomiting, or hematemesis; No diarrhea or constipation. No melena or hematochezia.  GENITOURINARY: No dysuria, frequency, hematuria, or incontinence  NEUROLOGICAL: No headaches, memory loss, loss of strength, numbness, or tremors  SKIN: No itching, burning, rashes, or lesions   MUSCULOSKELETAL: No joint pain or swelling; No muscle, back, or extremity pain  PSYCHIATRIC: No depression, anxiety, mood swings, or difficulty sleeping      Medications:  meropenem  IVPB 1000 milliGRAM(s) IV Intermittent every 8 hours      ALBUTerol    0.083% 2.5 milliGRAM(s) Nebulizer every 6 hours PRN    acetaminophen   Tablet 650 milliGRAM(s) Oral every 6 hours PRN  acetaminophen   Tablet. 650 milliGRAM(s) Oral every 6 hours PRN  ALPRAZolam 0.5 milliGRAM(s) Oral every 8 hours PRN  baclofen 10 milliGRAM(s) Oral two times a day  oxyCODONE    IR 10 milliGRAM(s) Oral every 6 hours PRN      aspirin  chewable 81 milliGRAM(s) Oral daily  clopidogrel Tablet 75 milliGRAM(s) Oral daily  heparin  Injectable 5000 Unit(s) SubCutaneous every 8 hours    pantoprazole   Suspension 40 milliGRAM(s) Enteral Tube before breakfast      dextrose 50% Injectable 12.5 Gram(s) IV Push once  dextrose 50% Injectable 25 Gram(s) IV Push once  dextrose 50% Injectable 25 Gram(s) IV Push once  glucagon  Injectable 1 milliGRAM(s) IntraMuscular once PRN  insulin glargine Injectable (LANTUS) 7 Unit(s) SubCutaneous at bedtime  insulin regular  human corrective regimen sliding scale   SubCutaneous every 6 hours    ascorbic acid 500 milliGRAM(s) Oral daily  dextrose 5%. 1000 milliLiter(s) IV Continuous <Continuous>  multivitamin   Solution 5 milliLiter(s) Oral daily      artificial  tears Solution 1 Drop(s) Both EYES every 4 hours PRN  chlorhexidine 0.12% Liquid 15 milliLiter(s) Swish and Spit two times a day  petrolatum Ophthalmic Ointment 1 Application(s) Both EYES at bedtime    saccharomyces boulardii 250 milliGRAM(s) Oral two times a day      Mode: AC/ CMV (Assist Control/ Continuous Mandatory Ventilation)  RR (machine): 12  TV (machine): 500  FiO2: 60  PEEP: 5  MAP: 10  PIP: 29      ICU Vital Signs Last 24 Hrs  T(C): 37.5 (18 Mar 2018 12:00), Max: 37.5 (18 Mar 2018 12:00)  T(F): 99.5 (18 Mar 2018 12:00), Max: 99.5 (18 Mar 2018 12:00)  HR: 113 (18 Mar 2018 12:30) (92 - 113)  BP: 124/67 (18 Mar 2018 12:30) (117/72 - 191/93)  BP(mean): 89 (18 Mar 2018 12:30) (88 - 133)  ABP: --  ABP(mean): --  RR: 19 (18 Mar 2018 12:30) (13 - 27)  SpO2: 93% (18 Mar 2018 12:30) (90% - 100%)          I&O's Detail    17 Mar 2018 07:01  -  18 Mar 2018 07:00  --------------------------------------------------------  IN:    Enteral Tube Flush: 50 mL    Glucerna: 960 mL    sodium chloride 0.9%: 2300 mL    Solution: 100 mL  Total IN: 3410 mL    OUT:    Indwelling Catheter - Urethral: 2795 mL  Total OUT: 2795 mL    Total NET: 615 mL      18 Mar 2018 07:01  -  18 Mar 2018 13:48  --------------------------------------------------------  IN:    Enteral Tube Flush: 150 mL    Glucerna: 180 mL    Solution: 250 mL  Total IN: 580 mL    OUT:    Indwelling Catheter - Urethral: 745 mL  Total OUT: 745 mL    Total NET: -165 mL            LABS:                        9.4    18.8  )-----------( 450      ( 18 Mar 2018 05:43 )             30.8     03-18    138  |  99  |  22.0<H>  ----------------------------<  203<H>  4.7   |  28.0  |  0.51    Ca    8.7      18 Mar 2018 05:43  Phos  2.3     03-18  Mg     2.1     03-18            CAPILLARY BLOOD GLUCOSE      POCT Blood Glucose.: 226 mg/dL (18 Mar 2018 11:40)        CULTURES:  Culture Results:   Moderate Gram Negative Rods Identification and susceptibility to follow.  Culture in progress (03-16 @ 17:57)  Culture Results:   No growth at 48 hours (03-16 @ 11:35)  Culture Results:   No growth at 48 hours (03-16 @ 11:35)  Culture Results:   No growth at 48 hours (03-15 @ 22:10)  Culture Results:   No growth at 48 hours (03-15 @ 22:10)  C Diff by PCR Result: NotDeteiwona (03-15 @ 17:37)  Culture Results:   50,000 - 99,000 CFU/mL Enterococcus faecium (vancomycin resistant)  10,000 - 49,000 CFU/mL Pseudomonas aeruginosa (Carbapenem Resistant)  (known)  send a copy to iwona mcgowan (03-13 @ 17:27)  Culture Results:   No growth at 48 hours (03-13 @ 15:07)  Culture Results:   No growth at 48 hours (03-13 @ 15:07)      Physical Examination:    General: No acute distress.      HEENT: Pupils equal, reactive to light.  Symmetric.    PULM: Coarse to auscultation bilaterally, moderate thick sputum production from around trach site     CVS: Regular rate and rhythm, no murmurs, rubs, or gallops    ABD: Soft, nondistended, nontender, normoactive bowel sounds, ostomy functioning, PEG in place    EXT: contracted    SKIN: Warm and well perfused, sacral wound clean based, no slough    NEURO: Alert, mouthing words, plegic and contracted at baseline       RADIOLOGY: < from: Xray Chest 1 View- PORTABLE-Routine (03.18.18 @ 04:51) >  FINDINGS:    LINES/TUBES: Unchanged tracheostomy tube.  LUNGS/PLEURA: Small right pleural effusion with basilar consolidation. No   pneumothorax.  MEDIASTINUM: Heart size cannot adequately be assessed on this projection.  OTHER: None.    IMPRESSION:     Small right pleural effusion with basilar consolidation.                 GT LOPEZ M.D., ATTENDING RADIOLOGIST  This document has been electronically signed. Mar 18 2018  9:58AM    < end of copied text >      CRITICAL CARE TIME SPENT: 60

## 2018-03-18 NOTE — PROGRESS NOTE ADULT - SUBJECTIVE AND OBJECTIVE BOX
Patient is a 62y old  Male who presents with a chief complaint of fevers (12 Mar 2018 18:28)      BRIEF HOSPITAL COURSE: 61 yo male, PMHx chronic trach/PEG/colostomy since an episode of transverse myelitis years ago.  He has h/o multiple infections, some of them being  resistant as well as recent Cdiff.  He was admitted with fevers, and was treated for possible PNA.  RRT called 3/16/18 when pt was found to be unresponsive in bed.  Pt had normal accucheck and was given Narcan since he is on chronic narcotic without response. Found to have rectal temp 102, tachycardic, with SBP in 90's.  CT head negative for acute CVA. Pt was upgraded to ICU on full ventilatory support. CODE SEPSIS was called and antibiotics were upgraded to meropenum and vanco. Found to have 50-90,000 VRE in urine.     Events last 24 hours: Had episode of hypoxemia secondary to mucous plug during day, no further episodes of hypoxemia overnight. On full vent support overnight. Afebrile    PAST MEDICAL & SURGICAL HISTORY:  Essential hypertension  Paralysis  Transverse myelitis  History of tracheostomy      Review of Systems:  CONSTITUTIONAL: No fever, chills, or fatigue  EYES: No eye pain, visual disturbances, or discharge  ENMT:  No difficulty hearing, tinnitus, vertigo; No sinus or throat pain  NECK: ++PAIN  RESPIRATORY: No cough, wheezing, chills or hemoptysis; No shortness of breath  CARDIOVASCULAR: No chest pain, palpitations, dizziness, or leg swelling  GASTROINTESTINAL: No abdominal or epigastric pain. No nausea, vomiting, or hematemesis; No diarrhea or constipation. No melena or hematochezia.  GENITOURINARY: No dysuria, frequency, hematuria, or incontinence  NEUROLOGICAL: No headaches, memory loss, loss of strength, numbness, or tremors  SKIN: No itching, burning, rashes, or lesions   MUSCULOSKELETAL: No joint pain or swelling; No muscle, back, or extremity pain  PSYCHIATRIC: No depression, anxiety, mood swings, or difficulty sleeping      Medications:  meropenem  IVPB 1000 milliGRAM(s) IV Intermittent every 8 hours  ALBUTerol    0.083% 2.5 milliGRAM(s) Nebulizer every 6 hours PRN  acetaminophen   Tablet 650 milliGRAM(s) Oral every 6 hours PRN  acetaminophen   Tablet. 650 milliGRAM(s) Oral every 6 hours PRN  baclofen 10 milliGRAM(s) Oral two times a day  oxyCODONE    IR 10 milliGRAM(s) Oral every 6 hours PRN  aspirin  chewable 81 milliGRAM(s) Oral daily  clopidogrel Tablet 75 milliGRAM(s) Oral daily  heparin  Injectable 5000 Unit(s) SubCutaneous every 8 hours  pantoprazole   Suspension 40 milliGRAM(s) Enteral Tube before breakfast  dextrose 50% Injectable 12.5 Gram(s) IV Push once  dextrose 50% Injectable 25 Gram(s) IV Push once  dextrose 50% Injectable 25 Gram(s) IV Push once  glucagon  Injectable 1 milliGRAM(s) IntraMuscular once PRN  insulin glargine Injectable (LANTUS) 7 Unit(s) SubCutaneous at bedtime  insulin regular  human corrective regimen sliding scale   SubCutaneous every 6 hours  ascorbic acid 500 milliGRAM(s) Oral daily  dextrose 5%. 1000 milliLiter(s) IV Continuous <Continuous>  multivitamin   Solution 5 milliLiter(s) Oral daily  sodium chloride 0.9%. 1000 milliLiter(s) IV Continuous <Continuous>  artificial  tears Solution 1 Drop(s) Both EYES every 4 hours PRN  chlorhexidine 0.12% Liquid 15 milliLiter(s) Swish and Spit two times a day  lidocaine   Patch 1 Patch Transdermal daily  lidocaine   Patch 1 Patch Transdermal daily  petrolatum Ophthalmic Ointment 1 Application(s) Both EYES at bedtime  saccharomyces boulardii 250 milliGRAM(s) Oral two times a day      Mode: AC/ CMV (Assist Control/ Continuous Mandatory Ventilation)  RR (machine): 12  TV (machine): 500  FiO2: 30  PEEP: 5  MAP: 8  PIP: 27      ICU Vital Signs Last 24 Hrs  T(C): 37.1 (18 Mar 2018 00:00), Max: 38.7 (17 Mar 2018 04:00)  T(F): 98.8 (18 Mar 2018 00:00), Max: 101.7 (17 Mar 2018 04:00)  HR: 102 (18 Mar 2018 02:00) (93 - 118)  BP: 133/77 (18 Mar 2018 02:00) (88/52 - 153/82)  BP(mean): 98 (18 Mar 2018 02:00) (63 - 111)  ABP: --  ABP(mean): --  RR: 19 (18 Mar 2018 02:00) (13 - 28)  SpO2: 97% (18 Mar 2018 02:00) (76% - 99%)      ABG - ( 16 Mar 2018 11:08 )  pH: 7.39  /  pCO2: 49    /  pO2: 80    / HCO3: 28    / Base Excess: 3.8   /  SaO2: 96                  I&O's Detail    16 Mar 2018 07:01  -  17 Mar 2018 07:00  --------------------------------------------------------  IN:    Enteral Tube Flush: 50 mL    Free Water: 400 mL    Glucerna: 900 mL    Sodium Chloride 0.9% IV Bolus: 1000 mL    sodium chloride 0.9%.: 1900 mL    Solution: 200 mL    Solution: 500 mL    Solution: 100 mL  Total IN: 5050 mL    OUT:    Indwelling Catheter - Urethral: 1310 mL  Total OUT: 1310 mL    Total NET: 3740 mL      17 Mar 2018 07:01  -  18 Mar 2018 03:50  --------------------------------------------------------  IN:    Glucerna: 720 mL    sodium chloride 0.9%.: 1900 mL    Solution: 100 mL  Total IN: 2720 mL    OUT:    Indwelling Catheter - Urethral: 2550 mL  Total OUT: 2550 mL    Total NET: 170 mL            LABS:                        8.9    16.6  )-----------( 437      ( 17 Mar 2018 05:34 )             28.8     03-17    129<L>  |  91<L>  |  31.0<H>  ----------------------------<  205<H>  4.6   |  27.0  |  0.81    Ca    8.4<L>      17 Mar 2018 05:34  Phos  2.7     03-17  Mg     1.9     03-17    TPro  6.5<L>  /  Alb  2.4<L>  /  TBili  0.5  /  DBili  x   /  AST  14  /  ALT  13  /  AlkPhos  72  03-16          CAPILLARY BLOOD GLUCOSE      POCT Blood Glucose.: 188 mg/dL (17 Mar 2018 23:59)        CULTURES:  Culture Results:   Moderate Gram Negative Rods Identification and susceptibility to follow.  Culture in progress (03-16 @ 17:57)  Culture Results:   No growth at 48 hours (03-15 @ 22:10)  Culture Results:   No growth at 48 hours (03-15 @ 22:10)  C Diff by PCR Result: NotDetec (03-15 @ 17:37)  Culture Results:   50,000 - 99,000 CFU/mL Enterococcus faecium (vancomycin resistant)  10,000 - 49,000 CFU/mL Pseudomonas aeruginosa (Carbapenem Resistant)  (known)  send a copy to iwona mcgowan (03-13 @ 17:27)  Culture Results:   No growth at 48 hours (03-13 @ 15:07)  Culture Results:   No growth at 48 hours (03-13 @ 15:07)  C Diff by PCR Result: NotDetec (03-11 @ 04:25)  Culture Results:   No enteric pathogens isolated.  (Stool culture examined for Salmonella,  Shigella, Campylobacter, Aeromonas, Plesiomonas,  Vibrio, E.coli O157 and Yersinia) (03-11 @ 04:25)      Physical Examination:    General: No acute distress.      HEENT: Pupils equal, reactive to light.  Symmetric.    PULM: On vent. Clear to auscultation bilaterally, no significant sputum production    CVS: Sinus tachycardia, no murmurs, rubs, or gallops    ABD: Soft, nondistended, nontender, normoactive bowel sounds, no masses. PEG site c/d/i; ostomy with stool in bag    EXT: No edema, nontender, plegic, contracted    SKIN: Warm and well perfused, no rashes noted.    NEURO: Alert, oriented, interactive, able to make needs known by mouthing words, plegic, contracted    RADIOLOGY: CXR improved aeration of RLL    I have spent 30 minutes evaluating and treating this patient, including reviewing charts, labs, imagining studies, and collaborating with interdisciplinary team.

## 2018-03-18 NOTE — PROGRESS NOTE ADULT - PROBLEM SELECTOR PLAN 2
continue Meropenem, vanco d/c for high trough   f/u sputum cultures official report- prelim moderate GNR  no fever since yesterday morning

## 2018-03-18 NOTE — PROGRESS NOTE ADULT - PROBLEM SELECTOR PLAN 1
Related to aspiration.   AC vent overnight  periods of desaturation linked to mucus plugging events while on SBTs, hasn't been able to maintain to go to trach collar as yet   Continue to wean down to trach collar as tolerated with vent back up at night   HOB > 30 deg due to high risk for recurrent aspiration events

## 2018-03-18 NOTE — PROGRESS NOTE ADULT - ASSESSMENT
61 yo male, PMHx chronic trach/PEG/colostomy since an episode of transverse myelitis years ago, admitted with fevers, likely aspiration PNA, with RRT for episode of altered mental status, hypoxemic respiratory failure.        Hypoxemic Respiratory Failure- Related to aspiration, mucous plug. On full vent support overnight, SBT and wean to trach collar as tolerated in AM d/w RT. Augment vent settings to maintain SPO2 > 90% and pH > 7.35. Low tidal volume lung protective ventilation strategy. VAP ppx, PPI stress ulcer ppx. Bronchodilators, chest PT. HOB > 30 deg due to high risk for recurrent aspiration events    Pneumonia- Likely aspiration PNA, Afebrile overnight, leukocytosis downtrending, continue meropenem and vancomycin for HCAP due to propensity for resistant organisms    Encephalopathy- Likely related to sepsis, hypoxia, now improved. CT head neg for acute CVA. Continue to monitor

## 2018-03-19 DIAGNOSIS — R53.2 FUNCTIONAL QUADRIPLEGIA: ICD-10-CM

## 2018-03-19 DIAGNOSIS — Z51.5 ENCOUNTER FOR PALLIATIVE CARE: ICD-10-CM

## 2018-03-19 LAB
ANION GAP SERPL CALC-SCNC: 6 MMOL/L — SIGNIFICANT CHANGE UP (ref 5–17)
BUN SERPL-MCNC: 19 MG/DL — SIGNIFICANT CHANGE UP (ref 8–20)
CALCIUM SERPL-MCNC: 8.7 MG/DL — SIGNIFICANT CHANGE UP (ref 8.6–10.2)
CHLORIDE SERPL-SCNC: 97 MMOL/L — LOW (ref 98–107)
CO2 SERPL-SCNC: 32 MMOL/L — HIGH (ref 22–29)
CREAT SERPL-MCNC: 0.48 MG/DL — LOW (ref 0.5–1.3)
CULTURE RESULTS: SIGNIFICANT CHANGE UP
GLUCOSE BLDC GLUCOMTR-MCNC: 181 MG/DL — HIGH (ref 70–99)
GLUCOSE BLDC GLUCOMTR-MCNC: 190 MG/DL — HIGH (ref 70–99)
GLUCOSE BLDC GLUCOMTR-MCNC: 221 MG/DL — HIGH (ref 70–99)
GLUCOSE BLDC GLUCOMTR-MCNC: 225 MG/DL — HIGH (ref 70–99)
GLUCOSE SERPL-MCNC: 174 MG/DL — HIGH (ref 70–115)
HCT VFR BLD CALC: 28.5 % — LOW (ref 42–52)
HGB BLD-MCNC: 8.6 G/DL — LOW (ref 14–18)
MAGNESIUM SERPL-MCNC: 1.8 MG/DL — SIGNIFICANT CHANGE UP (ref 1.6–2.6)
MCHC RBC-ENTMCNC: 28 PG — SIGNIFICANT CHANGE UP (ref 27–31)
MCHC RBC-ENTMCNC: 30.2 G/DL — LOW (ref 32–36)
MCV RBC AUTO: 92.8 FL — SIGNIFICANT CHANGE UP (ref 80–94)
ORGANISM # SPEC MICROSCOPIC CNT: SIGNIFICANT CHANGE UP
ORGANISM # SPEC MICROSCOPIC CNT: SIGNIFICANT CHANGE UP
PHOSPHATE SERPL-MCNC: 2.2 MG/DL — LOW (ref 2.4–4.7)
PLATELET # BLD AUTO: 392 K/UL — SIGNIFICANT CHANGE UP (ref 150–400)
POTASSIUM SERPL-MCNC: 4.8 MMOL/L — SIGNIFICANT CHANGE UP (ref 3.5–5.3)
POTASSIUM SERPL-SCNC: 4.8 MMOL/L — SIGNIFICANT CHANGE UP (ref 3.5–5.3)
RBC # BLD: 3.07 M/UL — LOW (ref 4.6–6.2)
RBC # FLD: 16.6 % — HIGH (ref 11–15.6)
SODIUM SERPL-SCNC: 135 MMOL/L — SIGNIFICANT CHANGE UP (ref 135–145)
SPECIMEN SOURCE: SIGNIFICANT CHANGE UP
WBC # BLD: 13.3 K/UL — HIGH (ref 4.8–10.8)
WBC # FLD AUTO: 13.3 K/UL — HIGH (ref 4.8–10.8)

## 2018-03-19 PROCEDURE — 99233 SBSQ HOSP IP/OBS HIGH 50: CPT

## 2018-03-19 PROCEDURE — 99291 CRITICAL CARE FIRST HOUR: CPT

## 2018-03-19 PROCEDURE — 99254 IP/OBS CNSLTJ NEW/EST MOD 60: CPT

## 2018-03-19 RX ORDER — AMPICILLIN SODIUM AND SULBACTAM SODIUM 250; 125 MG/ML; MG/ML
3 INJECTION, POWDER, FOR SUSPENSION INTRAMUSCULAR; INTRAVENOUS EVERY 6 HOURS
Qty: 0 | Refills: 0 | Status: COMPLETED | OUTPATIENT
Start: 2018-03-19 | End: 2018-03-28

## 2018-03-19 RX ORDER — AMPICILLIN SODIUM AND SULBACTAM SODIUM 250; 125 MG/ML; MG/ML
INJECTION, POWDER, FOR SUSPENSION INTRAMUSCULAR; INTRAVENOUS
Qty: 0 | Refills: 0 | Status: COMPLETED | OUTPATIENT
Start: 2018-03-19 | End: 2018-03-28

## 2018-03-19 RX ORDER — INSULIN GLARGINE 100 [IU]/ML
10 INJECTION, SOLUTION SUBCUTANEOUS AT BEDTIME
Qty: 0 | Refills: 0 | Status: DISCONTINUED | OUTPATIENT
Start: 2018-03-19 | End: 2018-03-27

## 2018-03-19 RX ORDER — AMPICILLIN SODIUM AND SULBACTAM SODIUM 250; 125 MG/ML; MG/ML
3 INJECTION, POWDER, FOR SUSPENSION INTRAMUSCULAR; INTRAVENOUS ONCE
Qty: 0 | Refills: 0 | Status: COMPLETED | OUTPATIENT
Start: 2018-03-19 | End: 2018-03-19

## 2018-03-19 RX ADMIN — ALBUTEROL 2.5 MILLIGRAM(S): 90 AEROSOL, METERED ORAL at 10:05

## 2018-03-19 RX ADMIN — ALBUTEROL 2.5 MILLIGRAM(S): 90 AEROSOL, METERED ORAL at 15:14

## 2018-03-19 RX ADMIN — AMPICILLIN SODIUM AND SULBACTAM SODIUM 200 GRAM(S): 250; 125 INJECTION, POWDER, FOR SUSPENSION INTRAMUSCULAR; INTRAVENOUS at 15:03

## 2018-03-19 RX ADMIN — Medication 10 MILLIGRAM(S): at 05:36

## 2018-03-19 RX ADMIN — CHLORHEXIDINE GLUCONATE 15 MILLILITER(S): 213 SOLUTION TOPICAL at 17:47

## 2018-03-19 RX ADMIN — Medication 250 MILLIGRAM(S): at 17:47

## 2018-03-19 RX ADMIN — Medication 500 MILLIGRAM(S): at 12:54

## 2018-03-19 RX ADMIN — HEPARIN SODIUM 5000 UNIT(S): 5000 INJECTION INTRAVENOUS; SUBCUTANEOUS at 05:34

## 2018-03-19 RX ADMIN — Medication 5 MILLILITER(S): at 12:54

## 2018-03-19 RX ADMIN — Medication 0.5 MILLIGRAM(S): at 21:01

## 2018-03-19 RX ADMIN — CHLORHEXIDINE GLUCONATE 15 MILLILITER(S): 213 SOLUTION TOPICAL at 05:23

## 2018-03-19 RX ADMIN — Medication 0.5 MILLIGRAM(S): at 08:56

## 2018-03-19 RX ADMIN — HEPARIN SODIUM 5000 UNIT(S): 5000 INJECTION INTRAVENOUS; SUBCUTANEOUS at 21:06

## 2018-03-19 RX ADMIN — Medication 0.5 MILLIGRAM(S): at 01:38

## 2018-03-19 RX ADMIN — INSULIN GLARGINE 10 UNIT(S): 100 INJECTION, SOLUTION SUBCUTANEOUS at 21:06

## 2018-03-19 RX ADMIN — HEPARIN SODIUM 5000 UNIT(S): 5000 INJECTION INTRAVENOUS; SUBCUTANEOUS at 12:55

## 2018-03-19 RX ADMIN — Medication 250 MILLIGRAM(S): at 05:36

## 2018-03-19 RX ADMIN — INSULIN HUMAN 2: 100 INJECTION, SOLUTION SUBCUTANEOUS at 12:55

## 2018-03-19 RX ADMIN — INSULIN HUMAN 1: 100 INJECTION, SOLUTION SUBCUTANEOUS at 05:35

## 2018-03-19 RX ADMIN — Medication 81 MILLIGRAM(S): at 12:54

## 2018-03-19 RX ADMIN — Medication 10 MILLIGRAM(S): at 17:48

## 2018-03-19 RX ADMIN — INSULIN HUMAN 1: 100 INJECTION, SOLUTION SUBCUTANEOUS at 17:46

## 2018-03-19 RX ADMIN — CLOPIDOGREL BISULFATE 75 MILLIGRAM(S): 75 TABLET, FILM COATED ORAL at 12:54

## 2018-03-19 RX ADMIN — Medication 1 APPLICATION(S): at 21:13

## 2018-03-19 RX ADMIN — AMPICILLIN SODIUM AND SULBACTAM SODIUM 200 GRAM(S): 250; 125 INJECTION, POWDER, FOR SUSPENSION INTRAMUSCULAR; INTRAVENOUS at 21:06

## 2018-03-19 RX ADMIN — INSULIN HUMAN 2: 100 INJECTION, SOLUTION SUBCUTANEOUS at 23:44

## 2018-03-19 RX ADMIN — MEROPENEM 100 MILLIGRAM(S): 1 INJECTION INTRAVENOUS at 05:34

## 2018-03-19 RX ADMIN — PANTOPRAZOLE SODIUM 40 MILLIGRAM(S): 20 TABLET, DELAYED RELEASE ORAL at 05:36

## 2018-03-19 RX ADMIN — Medication 1 DROP(S): at 21:07

## 2018-03-19 RX ADMIN — AMPICILLIN SODIUM AND SULBACTAM SODIUM 200 GRAM(S): 250; 125 INJECTION, POWDER, FOR SUSPENSION INTRAMUSCULAR; INTRAVENOUS at 09:39

## 2018-03-19 NOTE — PROGRESS NOTE ADULT - ASSESSMENT
61 yo male, PMHx chronic trach/PEG/colostomy since an episode of transverse myelitis years ago, admitted with fevers, Acinetobacter PNA, with RRT for episode of altered mental status, hypoxemic respiratory failure

## 2018-03-19 NOTE — CONSULT NOTE ADULT - PROBLEM SELECTOR PROBLEM 3
Functional quadriplegia
Sepsis, due to unspecified organism
History of Clostridium difficile infection

## 2018-03-19 NOTE — CONSULT NOTE ADULT - PROBLEM SELECTOR RECOMMENDATION 3
-full assist  -PT, engage OT as patient is able to minimally move his right hand  - speech therapy to help patient with mouthing words and communication  -find ways to help with communication for patient - even simple things that he cannot do like clicking a call bell, ask for assistance with other mechanisms for folks with functional quadriplegia.
pan culture  ID following note appreciated  Phyllis and Vanco for now
Stool for C Diff is negative

## 2018-03-19 NOTE — CONSULT NOTE ADULT - SUBJECTIVE AND OBJECTIVE BOX
HPI: 62M with PMH as listed admitted 3/10 from [facility] with fever.     PERTINENT PMH REVIEWED: Yes     PAST MEDICAL & SURGICAL HISTORY:  Essential hypertension  Paralysis  Transverse myelitis  History of tracheostomy    SOCIAL HISTORY:                                     Admitted from:  home      Surrogate - Francois Amin     FAMILY HISTORY:  No pertinent family history in first degree relatives    Baseline ADLs (prior to admission):  Independent/ Dependent      Allergies    No Known Allergies    Present Symptoms:     Dyspnea: 0 1 2 3   Nausea/Vomiting: Yes No  Anxiety:  Yes No  Depression: Yes No  Fatigue: Yes No  Loss of appetite: Yes No    Pain:             Character-            Duration-            Effect-            Factors-            Frequency-            Location-            Severity-    Review of Systems: Reviewed                     Negative:                     Positive:  Unable to obtain due to poor mentation   All others negative    MEDICATIONS  (STANDING):  ampicillin/sulbactam  IVPB      ampicillin/sulbactam  IVPB 3 Gram(s) IV Intermittent every 6 hours  ascorbic acid 500 milliGRAM(s) Oral daily  aspirin  chewable 81 milliGRAM(s) Oral daily  baclofen 10 milliGRAM(s) Oral two times a day  chlorhexidine 0.12% Liquid 15 milliLiter(s) Swish and Spit two times a day  clopidogrel Tablet 75 milliGRAM(s) Oral daily  dextrose 5%. 1000 milliLiter(s) (50 mL/Hr) IV Continuous <Continuous>  dextrose 50% Injectable 12.5 Gram(s) IV Push once  dextrose 50% Injectable 25 Gram(s) IV Push once  dextrose 50% Injectable 25 Gram(s) IV Push once  heparin  Injectable 5000 Unit(s) SubCutaneous every 8 hours  insulin glargine Injectable (LANTUS) 10 Unit(s) SubCutaneous at bedtime  insulin regular  human corrective regimen sliding scale   SubCutaneous every 6 hours  multivitamin   Solution 5 milliLiter(s) Oral daily  pantoprazole   Suspension 40 milliGRAM(s) Enteral Tube before breakfast  petrolatum Ophthalmic Ointment 1 Application(s) Both EYES at bedtime  saccharomyces boulardii 250 milliGRAM(s) Oral two times a day    MEDICATIONS  (PRN):  acetaminophen   Tablet 650 milliGRAM(s) Oral every 6 hours PRN For Temp greater than 38 C (100.4 F)  acetaminophen   Tablet. 650 milliGRAM(s) Oral every 6 hours PRN Moderate Pain (4 - 6) and headache  ALBUTerol    0.083% 2.5 milliGRAM(s) Nebulizer every 6 hours PRN Shortness of Breath and/or Wheezing  ALPRAZolam 0.5 milliGRAM(s) Oral every 8 hours PRN anxiety/agitation  artificial  tears Solution 1 Drop(s) Both EYES every 4 hours PRN Dry Eyes  glucagon  Injectable 1 milliGRAM(s) IntraMuscular once PRN Glucose <70 milliGRAM(s)/deciLiter    PHYSICAL EXAM:    Vital Signs Last 24 Hrs  T(C): 37 (19 Mar 2018 11:00), Max: 37.6 (18 Mar 2018 16:00)  T(F): 98.6 (19 Mar 2018 11:00), Max: 99.7 (18 Mar 2018 16:00)  HR: 95 (19 Mar 2018 13:03) (81 - 109)  BP: 126/72 (19 Mar 2018 13:00) (104/56 - 156/88)  BP(mean): 94 (19 Mar 2018 13:00) (75 - 112)  RR: 17 (19 Mar 2018 13:00) (12 - 32)  SpO2: 100% (19 Mar 2018 13:03) (92% - 100%)    General: alert  oriented x ____ lethargic agitated                  cachexia  nonverbal  coma    Karnofsky:  %    HEENT: normal  dry mouth  ET tube/trach    Lungs: comfortable tachypnea/labored breathing  excessive secretions    CV: normal  tachycardia    GI: normal  distended  tender  no BS               PEG/NG/OG tube  constipation  last BM:     : normal  incontinent  oliguria/anuria  beltrán    MSK: normal  weakness  edema             ambulatory  bedbound/wheelchair bound    Skin: normal  pressure ulcers- Stage_____  no rash    LABS:                      8.6    13.3  )-----------( 392      ( 19 Mar 2018 05:23 )             28.5     03-19    135  |  97<L>  |  19.0  ----------------------------<  174<H>  4.8   |  32.0<H>  |  0.48<L>    Ca    8.7      19 Mar 2018 05:23  Phos  2.2     03-19  Mg     1.8     03-19          I&O's Summary    18 Mar 2018 07:01  -  19 Mar 2018 07:00  --------------------------------------------------------  IN: 1735 mL / OUT: 1910 mL / NET: -175 mL    19 Mar 2018 07:01  -  19 Mar 2018 13:24  --------------------------------------------------------  IN: 540 mL / OUT: 630 mL / NET: -90 mL        RADIOLOGY & ADDITIONAL STUDIES:    ADVANCE DIRECTIVES:   DNR YES NO  Completed on:                     MOLST  YES NO   Completed on:  Living Will  YES NO   Completed on: HPI: 62M with PMH as listed admitted 3/10 from [facility] with fever found to have pneumonia on CT chest. code sepsis/RRT 3/16, found unresponsive, febrile, and hypoxic/hypercapneic respiratory failure requiring full vent support and transfer to ICU level care. He is now being treated for recurrent aspiration and acinetobacter in sputum.     PERTINENT PMH REVIEWED: Yes     PAST MEDICAL & SURGICAL HISTORY:  Essential hypertension  Paralysis  Transverse myelitis - 7 months   History of tracheostomy/ PEG    SOCIAL HISTORY:  non smoker                                    Admitted from:  home      Surrogate - Francois Amin - wife     FAMILY HISTORY:  No pertinent family history in first degree relatives    Baseline ADLs (prior to admission):  Dependent      Allergies    No Known Allergies    Present Symptoms:     Dyspnea: 0  Nausea/Vomiting: No  Anxiety:  No  Depression: No  Fatigue: Yes   Loss of appetite: No    Pain: none             Character-            Duration-            Effect-            Factors-            Frequency-            Location-            Severity-    Review of Systems: Reviewed                                Positive: no chest pain   All others negative    MEDICATIONS  (STANDING):  ampicillin/sulbactam  IVPB      ampicillin/sulbactam  IVPB 3 Gram(s) IV Intermittent every 6 hours  ascorbic acid 500 milliGRAM(s) Oral daily  aspirin  chewable 81 milliGRAM(s) Oral daily  baclofen 10 milliGRAM(s) Oral two times a day  chlorhexidine 0.12% Liquid 15 milliLiter(s) Swish and Spit two times a day  clopidogrel Tablet 75 milliGRAM(s) Oral daily  dextrose 5%. 1000 milliLiter(s) (50 mL/Hr) IV Continuous <Continuous>  dextrose 50% Injectable 12.5 Gram(s) IV Push once  dextrose 50% Injectable 25 Gram(s) IV Push once  dextrose 50% Injectable 25 Gram(s) IV Push once  heparin  Injectable 5000 Unit(s) SubCutaneous every 8 hours  insulin glargine Injectable (LANTUS) 10 Unit(s) SubCutaneous at bedtime  insulin regular  human corrective regimen sliding scale   SubCutaneous every 6 hours  multivitamin   Solution 5 milliLiter(s) Oral daily  pantoprazole   Suspension 40 milliGRAM(s) Enteral Tube before breakfast  petrolatum Ophthalmic Ointment 1 Application(s) Both EYES at bedtime  saccharomyces boulardii 250 milliGRAM(s) Oral two times a day    MEDICATIONS  (PRN):  acetaminophen   Tablet 650 milliGRAM(s) Oral every 6 hours PRN For Temp greater than 38 C (100.4 F)  acetaminophen   Tablet. 650 milliGRAM(s) Oral every 6 hours PRN Moderate Pain (4 - 6) and headache  ALBUTerol    0.083% 2.5 milliGRAM(s) Nebulizer every 6 hours PRN Shortness of Breath and/or Wheezing  ALPRAZolam 0.5 milliGRAM(s) Oral every 8 hours PRN anxiety/agitation  artificial  tears Solution 1 Drop(s) Both EYES every 4 hours PRN Dry Eyes  glucagon  Injectable 1 milliGRAM(s) IntraMuscular once PRN Glucose <70 milliGRAM(s)/deciLiter    PHYSICAL EXAM:    Vital Signs Last 24 Hrs  T(C): 37 (19 Mar 2018 11:00), Max: 37.6 (18 Mar 2018 16:00)  T(F): 98.6 (19 Mar 2018 11:00), Max: 99.7 (18 Mar 2018 16:00)  HR: 95 (19 Mar 2018 13:03) (81 - 109)  BP: 126/72 (19 Mar 2018 13:00) (104/56 - 156/88)  BP(mean): 94 (19 Mar 2018 13:00) (75 - 112)  RR: 17 (19 Mar 2018 13:00) (12 - 32)  SpO2: 100% (19 Mar 2018 13:03) (92% - 100%)    General: alert  mouths words     Karnofsky:  20-30 %    HEENT: trach on vent     Lungs: excessive secretions    CV: normal      GI: PEG    : beltrán    MSK: weakness     Skin: no rash; sacral decub     LABS:                      8.6    13.3  )-----------( 392      ( 19 Mar 2018 05:23 )             28.5     03-19    135  |  97<L>  |  19.0  ----------------------------<  174<H>  4.8   |  32.0<H>  |  0.48<L>    Ca    8.7      19 Mar 2018 05:23  Phos  2.2     03-19  Mg     1.8     03-19    I&O's Summary    18 Mar 2018 07:01  -  19 Mar 2018 07:00  --------------------------------------------------------  IN: 1735 mL / OUT: 1910 mL / NET: -175 mL    19 Mar 2018 07:01  -  19 Mar 2018 13:24  --------------------------------------------------------  IN: 540 mL / OUT: 630 mL / NET: -90 mL    RADIOLOGY & ADDITIONAL STUDIES:    < from: CT Head No Cont (03.16.18 @ 12:22) >   Mild chronic microvascular changes without evidence of an   acute transcortical infarction or hemorrhage. MR is a more sensitive   imaging modality for the evaluation of an acute infarction.    < from: Xray Chest 1 View AP/PA. (03.18.18 @ 14:26) >  Small right pleural effusion with consolidation in the right lower lung.     < from: CT Chest No Cont (03.15.18 @ 20:30) >  Tracheostomy tube  Small free-flowing right pleural effusion  Extensive right sided  Degenerative changes of the spine and old right clavicular fracture  Cholelithiasis    PEG tube in the stomach  Left lower quadrant loop colostomy    ADVANCE DIRECTIVES: Full Code

## 2018-03-19 NOTE — CONSULT NOTE ADULT - PROBLEM SELECTOR RECOMMENDATION 9
chronic trach was on TC now requiring full ventilatory support due to hypoxia and likely PNA on CT scan  Upgrade to ICU  Lung protective stratagies  VAP prophylaxis
No fevers in the hospital  No leukocytosis  Procalcitonin 0.28, not highly positive for sepsis  CT A/P with no infectious findings  No hypoxia to suggest respiratory infection  UA from [facility] from [facility] with no pyuria to suggest UTI  Stool for C Diff is negative  Will D/C PO Vancomycin  Monitor off antibiotics  Follow up blood cultures  Trend temperatures  Trend WBC
- patient is bedbound and a full assist with all ADLs
6

## 2018-03-19 NOTE — CONSULT NOTE ADULT - ATTENDING COMMENTS
Thank you for the opportunity to assist with the care of this patient.   [facility] Consult [facility] [phone].
Will Follow
Pt seen and evaluated by [facility] PA, I have seen and evaluated this patient independently and agree with the above findings except as follows: 62 male with transverse myelitis chronic trach peg and ostomy who was on trach collar using pessy juan valve to communicate became more lethargic, hypoxic,  and tachycardic today with concerns for sepsis. Pt has thick purrulent secretions at trach site and large decubitus ulcer. Pan culture, broadened Abx coverage - ID following. Pt placed on vent and transferred to MICU for further care and work up. Wife updated at bedside

## 2018-03-19 NOTE — PROGRESS NOTE ADULT - PROBLEM SELECTOR PLAN 1
Usually on trach collar, with vent overnight, trialed on spontaneous breathing today but had episode of hypoxemia   pt was lavaged and suctioned and large amount of sputum cleared  Continue on ventilatory support, SBTs as tolerated with goal to get down to trach collar   HOB > 30 deg due to high risk for recurrent aspiration events  VAP prophylaxis  Pulmonary following

## 2018-03-19 NOTE — PROGRESS NOTE ADULT - SUBJECTIVE AND OBJECTIVE BOX
Patient is a 62y old  Male who presents with a chief complaint of fevers (12 Mar 2018 18:28)      BRIEF HOSPITAL COURSE:  63 yo male, PMHx chronic trach/PEG/colostomy since an episode of transverse myelitis years ago.  He has h/o multiple infections, some of them being  resistant as well as recent Cdiff.  He was admitted with fevers, and was treated for possible PNA.  RRT called 3/16/18 when pt was found to be unresponsive in bed.  Pt had normal accucheck and was given Narcan since he is on chronic narcotic without response. Found to have rectal temp 102, tachycardic, with SBP in 90's.  CT head negative for acute CVA. Pt was upgraded to ICU on full ventilatory support.    Events last 24 hours: low grade fevers, found to have carbapenem resistant acinetobacter in sputum.  Had another episode of plugging with hypoxia this am requiring AMBU/lavage and suctioning for desaturations.  Otherwise awake and interactive.    PAST MEDICAL & SURGICAL HISTORY:  Essential hypertension  Paralysis  Transverse myelitis  History of tracheostomy      Review of Systems:  Cannot be obtained pt trached to vent     MEDICATIONS  (STANDING):  ampicillin/sulbactam  IVPB      ampicillin/sulbactam  IVPB 3 Gram(s) IV Intermittent every 6 hours  ascorbic acid 500 milliGRAM(s) Oral daily  aspirin  chewable 81 milliGRAM(s) Oral daily  baclofen 10 milliGRAM(s) Oral two times a day  chlorhexidine 0.12% Liquid 15 milliLiter(s) Swish and Spit two times a day  clopidogrel Tablet 75 milliGRAM(s) Oral daily  dextrose 5%. 1000 milliLiter(s) (50 mL/Hr) IV Continuous <Continuous>  dextrose 50% Injectable 12.5 Gram(s) IV Push once  dextrose 50% Injectable 25 Gram(s) IV Push once  dextrose 50% Injectable 25 Gram(s) IV Push once  heparin  Injectable 5000 Unit(s) SubCutaneous every 8 hours  insulin glargine Injectable (LANTUS) 10 Unit(s) SubCutaneous at bedtime  insulin regular  human corrective regimen sliding scale   SubCutaneous every 6 hours  multivitamin   Solution 5 milliLiter(s) Oral daily  pantoprazole   Suspension 40 milliGRAM(s) Enteral Tube before breakfast  petrolatum Ophthalmic Ointment 1 Application(s) Both EYES at bedtime  saccharomyces boulardii 250 milliGRAM(s) Oral two times a day                 8.6    13.3   )----------(  392       ( 19 Mar 2018 05:23 )               28.5      135    |  97     |  19.0   ----------------------------<  174        ( 19 Mar 2018 05:23 )  4.8     |  32.0   |  0.48     Ca    8.7        ( 19 Mar 2018 05:23 )  Phos  2.2       ( 19 Mar 2018 05:23 )  Mg     1.8       ( 19 Mar 2018 05:23 )          ICU Vital Signs Last 24 Hrs  T(C): 37 (19 Mar 2018 11:00), Max: 37.6 (18 Mar 2018 16:00)  T(F): 98.6 (19 Mar 2018 11:00), Max: 99.7 (18 Mar 2018 16:00)  HR: 93 (19 Mar 2018 11:00) (81 - 113)  BP: 152/72 (19 Mar 2018 11:00) (104/56 - 191/93)  BP(mean): 104 (19 Mar 2018 11:00) (75 - 133)  ABP: --  ABP(mean): --  RR: 15 (19 Mar 2018 11:00) (12 - 32)  SpO2: 100% (19 Mar 2018 11:00) (90% - 100%)    RECENT CULTURES:  03-16 .Sputum Sputum Acinetobacter baumannii/haemolyticus (Carbapenem Resistant)   Moderate White blood cells  No organisms seen   Moderate Acinetobacter baumannii/haemolyticus (Carbapenem Resistant)  .  TYPE: (C=Critical, N=Notification, A=Abnormal) C  TESTS:  _ MDRO  DATE/TIME CALLED: _ 03/18/2018 16:01:15  CALLED TO: Johnathan vega  READ BACK (2 Patient Identifiers)(Y/N): _ y  READ BACK VALUES (Y/N): _ y  CALLED BY: Johnathan hinojosa  send a copy to logistics and i.c    03-16 .Blood Blood XXXX XXXX   No growth at 48 hours    03-15 .Blood Blood XXXX XXXX   No growth at 48 hours    03-13 .Urine Clean Catch (Midstream) Enterococcus faecium (vancomycin resistant)  Pseudomonas aeruginosa (Carbapenem Resistant) XXXX   50,000 - 99,000 CFU/mL Enterococcus faecium (vancomycin resistant)  10,000 - 49,000 CFU/mL Pseudomonas aeruginosa (Carbapenem Resistant)  (known)  send a copy to iwona mcgowan    03-13 .Blood Blood-Peripheral XXXX XXXX   No growth at 5 days.    I&O's Detail    18 Mar 2018 07:01  -  19 Mar 2018 07:00  --------------------------------------------------------  IN:    Enteral Tube Flush: 225 mL    Glucerna: 1260 mL    Solution: 250 mL  Total IN: 1735 mL    OUT:    Colostomy: 100 mL    Indwelling Catheter - Urethral: 1810 mL  Total OUT: 1910 mL    Total NET: -175 mL      19 Mar 2018 07:01  -  19 Mar 2018 11:45  --------------------------------------------------------  IN:    Enteral Tube Flush: 80 mL    Glucerna: 240 mL    Solution: 100 mL  Total IN: 420 mL    OUT:    Colostomy: 125 mL    Indwelling Catheter - Urethral: 305 mL  Total OUT: 430 mL    Total NET: -10 mL          Physical Examination:    General: No acute distress, trach/vented    HEENT: Pupils equal, reactive to light.  Symmetric.    PULM: Coarse with diffuse rhonci b/l, moderate thick sputum production    CVS: Regular rate and rhythm, no murmurs, rubs, or gallops    ABD: Soft, nondistended, nontender, normoactive bowel sounds, no masses    EXT: contracted    SKIN: Warm and well perfused, stage IV sacrum.    NEURO: Alert, mouthing words, plegic and contracted at baseline     RADIOLOGY:   < from: CT Head No Cont (03.16.18 @ 12:22) >  FINDINGS:  There is periventricular and subcortical white matter hypodensity without   mass effect, nonspecific, likely representing mild chronic microvascular   ischemic changes. There is no compelling evidence for an acute   transcortical infarction. There is no evidence of mass, mass effect,   midline shift or extra-axial fluid collection. The lateral ventricles and   cortical sulci are age-appropriate in size and configuration. The orbits,   mastoid air cells and visualizedparanasal sinuses are unremarkable. The   calvarium is intact.    IMPRESSION:  Mild chronic microvascular changes without evidence of an   acute transcortical infarction or hemorrhage. MR is a more sensitive   imaging modality for the evaluation of an acute infarction.                 MABLE SÁNCHEZ M.D., ATTENDING RADIOLOGIST  This document has been electronically signed. Mar 16 2018 12:13PM    < end of copied text >  < from: Xray Chest 1 View-PORTABLE IMMEDIATE (03.16.18 @ 11:40) >    Findings:  There is now an acute infiltrate in the right mid and lower lung not seen   on the prior day. Findings suggest aspiration. A tracheostomy tube is   noted. The left lung remains clear. The heart and mediastinum   unremarkable..    Impression:  There is a new right lung infiltrate suggesting aspiration..                GINNA LEÓN M.D., ATTENDING RADIOLOGIST  This document has been electronically signed. Mar 16 2018 11:41AM    < end of copied text >    CRITICAL CARE TIME SPENT: 45 min

## 2018-03-19 NOTE — CONSULT NOTE ADULT - ASSESSMENT
62M with transverse myelitis, with trach/peg, in ICU on full vent support being treated for MDR acinetobacter PNA.

## 2018-03-19 NOTE — CONSULT NOTE ADULT - PROBLEM SELECTOR RECOMMENDATION 2
-currently with MDR PNA. on antibiotics.  -weaning as tolerated
Head CT done initially negative  Treat underlying infection if no improvement will need f/u CT or MRI
Has wound vac  Would have surgical eval

## 2018-03-19 NOTE — PROGRESS NOTE ADULT - ASSESSMENT
63y/o male with pmh of PMH Transverse myelitis, Paraplegia, trach/peg, colostomy, HTN[facility]  C DIFF NEG    FLAGYL D/C'D  BLOOD CX NEG SO FAR OFF ABX    SACRAL DECUBITUS CLEAN  URINE CX 50-90K PSEUDOMONAS UNCLEAR SIGNIFICANCE MAY BE COLONIZED WITH CRE    WBC INCREASED CT SCAN OF CHEST WITH EXTENSIVE PNEUMONIA  NOW ON MERREM /VANCO   PROGNOSIS GUARDED

## 2018-03-19 NOTE — PROGRESS NOTE ADULT - ATTENDING COMMENTS
I have seen and evaluated the patietn independently and agree with the above plan with the following additions:    VAP - MDR acinetobactor - unasyn, pulm. toilet,     transverse myelitis - supportive care, OT, PT, speech    Pallia[facility] care consult

## 2018-03-19 NOTE — CONSULT NOTE ADULT - PROBLEM SELECTOR RECOMMENDATION 4
-able to read patient's lips - patients states he feels his quality of life would be much better if he was able to effectively communicate with others. Will try to enlist help from as many staff members to see what we can do to help with this. will continue to follow, unfortunately patient's prognosis is poor. Will also reach out to wife to support her.
supportive care

## 2018-03-19 NOTE — PROGRESS NOTE ADULT - SUBJECTIVE AND OBJECTIVE BOX
KAMAR BELLAMY is a 62y Male with HPI:  61y/o male with pmh of PMH Transverse myelitis, Paraplegia, trach/peg, colostomy, HTN, coming from [facility] presents with fever for 3 days.     Originally at [facility] in   7months ago, developed Acute transverse myelitis.    Heart Attack, stent placed, check up after.  He was given a tetanus shot, then couple days ago he developed lower extremity weakness, paralysis; ultimately developed ulcers.    SBU--> [facility] (kept him for a few weeks) --> wife couldn't take care of him;    He is getting frequent uti's, anemia reequiring transfusions.    101 fever at [facility]   BLOOD CX SO FAR NEG  C DIFF NEG  DECUBITUS ULCER CLEAN  URINE CX WITH 50-90K CRE   PT WITH CONTINUED FEVERS AND CT SCAN DONE WHICH SHOW EXTENSIVE PNEUMONIA   ABX CHANGED TO MERREM VANCO  PT INTUBATED IN ICU      Allergies:  No Known Allergies      Medications:  ALPRAZolam 0.5 milliGRAM(s) Oral three times a day PRN  ascorbic acid 500 milliGRAM(s) Oral daily  aspirin  chewable 81 milliGRAM(s) Oral daily  baclofen 10 milliGRAM(s) Oral two times a day  clopidogrel Tablet 75 milliGRAM(s) Oral daily  dextrose 5%. 1000 milliLiter(s) IV Continuous <Continuous>  dextrose 50% Injectable 12.5 Gram(s) IV Push once  dextrose 50% Injectable 25 Gram(s) IV Push once  dextrose 50% Injectable 25 Gram(s) IV Push once  glucagon  Injectable 1 milliGRAM(s) IntraMuscular once PRN  heparin  Injectable 5000 Unit(s) SubCutaneous every 8 hours  insulin regular  human corrective regimen sliding scale   SubCutaneous every 6 hours  metroNIDAZOLE    Tablet 500 milliGRAM(s) Oral every 8 hours  multivitamin   Solution 5 milliLiter(s) Oral daily  oxyCODONE    IR 10 milliGRAM(s) Oral every 6 hours PRN  pantoprazole   Suspension 40 milliGRAM(s) Enteral Tube before breakfast  petrolatum Ophthalmic Ointment 1 Application(s) Both EYES at bedtime      ANTIBIOTICS:         Review of Systems: - Negative except as mentioned above     Physical Exam:      Vital Signs Last 24 Hrs  T(C): 37.3 (19 Mar 2018 15:35), Max: 37.6 (18 Mar 2018 16:48)  T(F): 99.1 (19 Mar 2018 15:35), Max: 99.7 (18 Mar 2018 16:48)  HR: 96 (19 Mar 2018 15:00) (81 - 109)  BP: 133/71 (19 Mar 2018 15:00) (104/56 - 156/88)  BP(mean): 97 (19 Mar 2018 15:00) (75 - 112)  RR: 14 (19 Mar 2018 15:00) (12 - 32)  SpO2: 100% (19 Mar 2018 15:00) (92% - 100%)      GEN:  AWAKE ON VENT  HEENT: normocephalic and atraumatic. EOMI. JAMESON... TRACH  NECK: Supple. No carotid bruits.  No lymphadenopathy or thyromegaly.  LUNGS: Clear to auscultation.  HEART: Regular rate and rhythm without murmur.  ABDOMEN: Soft, nontender, and nondistended.  Positive bowel sounds.  No hepatosplenomegaly was noted.  NO REBOUND NO GUARDING  EXTREMITIES: Without any cyanosis, clubbing, rash, lesions or edema.  NEUROLOGIC: AWAKE ON VENT    SKIN: No ulceration or induration present.      Labs:                                                        8.6    13.3  )-----------( 392      ( 19 Mar 2018 05:23 )             28.5   03-19    135  |  97<L>  |  19.0  ----------------------------<  174<H>  4.8   |  32.0<H>  |  0.48<L>    Ca    8.7      19 Mar 2018 05:23  Phos  2.2     03-19  Mg     1.8     03-19    16

## 2018-03-19 NOTE — CONSULT NOTE ADULT - PROBLEM SELECTOR PROBLEM 2
Encephalopathy, unspecified
Skin ulcer of sacrum, unspecified ulcer stage
Acute on chronic respiratory failure with hypoxia

## 2018-03-20 LAB
ANION GAP SERPL CALC-SCNC: 9 MMOL/L — SIGNIFICANT CHANGE UP (ref 5–17)
BUN SERPL-MCNC: 17 MG/DL — SIGNIFICANT CHANGE UP (ref 8–20)
CALCIUM SERPL-MCNC: 9 MG/DL — SIGNIFICANT CHANGE UP (ref 8.6–10.2)
CHLORIDE SERPL-SCNC: 97 MMOL/L — LOW (ref 98–107)
CO2 SERPL-SCNC: 33 MMOL/L — HIGH (ref 22–29)
CREAT SERPL-MCNC: 0.37 MG/DL — LOW (ref 0.5–1.3)
GLUCOSE BLDC GLUCOMTR-MCNC: 178 MG/DL — HIGH (ref 70–99)
GLUCOSE BLDC GLUCOMTR-MCNC: 193 MG/DL — HIGH (ref 70–99)
GLUCOSE BLDC GLUCOMTR-MCNC: 195 MG/DL — HIGH (ref 70–99)
GLUCOSE BLDC GLUCOMTR-MCNC: 246 MG/DL — HIGH (ref 70–99)
GLUCOSE SERPL-MCNC: 178 MG/DL — HIGH (ref 70–115)
HCT VFR BLD CALC: 30.6 % — LOW (ref 42–52)
HGB BLD-MCNC: 9.2 G/DL — LOW (ref 14–18)
LACTATE BLDV-MCNC: 1.7 MMOL/L — SIGNIFICANT CHANGE UP (ref 0.5–2)
MAGNESIUM SERPL-MCNC: 1.9 MG/DL — SIGNIFICANT CHANGE UP (ref 1.6–2.6)
MCHC RBC-ENTMCNC: 28 PG — SIGNIFICANT CHANGE UP (ref 27–31)
MCHC RBC-ENTMCNC: 30.1 G/DL — LOW (ref 32–36)
MCV RBC AUTO: 93 FL — SIGNIFICANT CHANGE UP (ref 80–94)
PHOSPHATE SERPL-MCNC: 2.3 MG/DL — LOW (ref 2.4–4.7)
PLATELET # BLD AUTO: 433 K/UL — HIGH (ref 150–400)
POTASSIUM SERPL-MCNC: 4.8 MMOL/L — SIGNIFICANT CHANGE UP (ref 3.5–5.3)
POTASSIUM SERPL-SCNC: 4.8 MMOL/L — SIGNIFICANT CHANGE UP (ref 3.5–5.3)
RBC # BLD: 3.29 M/UL — LOW (ref 4.6–6.2)
RBC # FLD: 16.4 % — HIGH (ref 11–15.6)
SODIUM SERPL-SCNC: 139 MMOL/L — SIGNIFICANT CHANGE UP (ref 135–145)
WBC # BLD: 15.4 K/UL — HIGH (ref 4.8–10.8)
WBC # FLD AUTO: 15.4 K/UL — HIGH (ref 4.8–10.8)

## 2018-03-20 PROCEDURE — 99233 SBSQ HOSP IP/OBS HIGH 50: CPT

## 2018-03-20 RX ORDER — OXYCODONE HYDROCHLORIDE 5 MG/1
10 TABLET ORAL ONCE
Qty: 0 | Refills: 0 | Status: DISCONTINUED | OUTPATIENT
Start: 2018-03-20 | End: 2018-03-20

## 2018-03-20 RX ORDER — MAGNESIUM SULFATE 500 MG/ML
2 VIAL (ML) INJECTION ONCE
Qty: 0 | Refills: 0 | Status: COMPLETED | OUTPATIENT
Start: 2018-03-20 | End: 2018-03-20

## 2018-03-20 RX ADMIN — HEPARIN SODIUM 5000 UNIT(S): 5000 INJECTION INTRAVENOUS; SUBCUTANEOUS at 20:58

## 2018-03-20 RX ADMIN — HEPARIN SODIUM 5000 UNIT(S): 5000 INJECTION INTRAVENOUS; SUBCUTANEOUS at 05:30

## 2018-03-20 RX ADMIN — Medication 1 DROP(S): at 17:25

## 2018-03-20 RX ADMIN — INSULIN HUMAN 1: 100 INJECTION, SOLUTION SUBCUTANEOUS at 06:58

## 2018-03-20 RX ADMIN — INSULIN HUMAN 1: 100 INJECTION, SOLUTION SUBCUTANEOUS at 17:25

## 2018-03-20 RX ADMIN — INSULIN HUMAN 1: 100 INJECTION, SOLUTION SUBCUTANEOUS at 22:17

## 2018-03-20 RX ADMIN — Medication 81 MILLIGRAM(S): at 11:36

## 2018-03-20 RX ADMIN — OXYCODONE HYDROCHLORIDE 10 MILLIGRAM(S): 5 TABLET ORAL at 20:58

## 2018-03-20 RX ADMIN — HEPARIN SODIUM 5000 UNIT(S): 5000 INJECTION INTRAVENOUS; SUBCUTANEOUS at 15:16

## 2018-03-20 RX ADMIN — Medication 0.5 MILLIGRAM(S): at 11:37

## 2018-03-20 RX ADMIN — Medication 10 MILLIGRAM(S): at 17:24

## 2018-03-20 RX ADMIN — Medication 650 MILLIGRAM(S): at 12:36

## 2018-03-20 RX ADMIN — Medication 1 APPLICATION(S): at 20:58

## 2018-03-20 RX ADMIN — Medication 250 MILLIGRAM(S): at 05:30

## 2018-03-20 RX ADMIN — Medication 650 MILLIGRAM(S): at 11:36

## 2018-03-20 RX ADMIN — OXYCODONE HYDROCHLORIDE 10 MILLIGRAM(S): 5 TABLET ORAL at 21:45

## 2018-03-20 RX ADMIN — Medication 62.5 MILLIMOLE(S): at 10:16

## 2018-03-20 RX ADMIN — AMPICILLIN SODIUM AND SULBACTAM SODIUM 200 GRAM(S): 250; 125 INJECTION, POWDER, FOR SUSPENSION INTRAMUSCULAR; INTRAVENOUS at 02:52

## 2018-03-20 RX ADMIN — CHLORHEXIDINE GLUCONATE 15 MILLILITER(S): 213 SOLUTION TOPICAL at 05:30

## 2018-03-20 RX ADMIN — CLOPIDOGREL BISULFATE 75 MILLIGRAM(S): 75 TABLET, FILM COATED ORAL at 11:37

## 2018-03-20 RX ADMIN — Medication 250 MILLIGRAM(S): at 17:24

## 2018-03-20 RX ADMIN — AMPICILLIN SODIUM AND SULBACTAM SODIUM 200 GRAM(S): 250; 125 INJECTION, POWDER, FOR SUSPENSION INTRAMUSCULAR; INTRAVENOUS at 15:16

## 2018-03-20 RX ADMIN — Medication 500 MILLIGRAM(S): at 11:36

## 2018-03-20 RX ADMIN — PANTOPRAZOLE SODIUM 40 MILLIGRAM(S): 20 TABLET, DELAYED RELEASE ORAL at 06:58

## 2018-03-20 RX ADMIN — INSULIN GLARGINE 10 UNIT(S): 100 INJECTION, SOLUTION SUBCUTANEOUS at 22:05

## 2018-03-20 RX ADMIN — INSULIN HUMAN 2: 100 INJECTION, SOLUTION SUBCUTANEOUS at 12:10

## 2018-03-20 RX ADMIN — Medication 650 MILLIGRAM(S): at 17:24

## 2018-03-20 RX ADMIN — Medication 5 MILLILITER(S): at 11:35

## 2018-03-20 RX ADMIN — Medication 50 GRAM(S): at 11:38

## 2018-03-20 RX ADMIN — AMPICILLIN SODIUM AND SULBACTAM SODIUM 200 GRAM(S): 250; 125 INJECTION, POWDER, FOR SUSPENSION INTRAMUSCULAR; INTRAVENOUS at 10:15

## 2018-03-20 RX ADMIN — Medication 650 MILLIGRAM(S): at 19:30

## 2018-03-20 RX ADMIN — Medication 10 MILLIGRAM(S): at 05:30

## 2018-03-20 RX ADMIN — AMPICILLIN SODIUM AND SULBACTAM SODIUM 200 GRAM(S): 250; 125 INJECTION, POWDER, FOR SUSPENSION INTRAMUSCULAR; INTRAVENOUS at 22:05

## 2018-03-20 RX ADMIN — CHLORHEXIDINE GLUCONATE 15 MILLILITER(S): 213 SOLUTION TOPICAL at 17:24

## 2018-03-20 NOTE — PROGRESS NOTE ADULT - ATTENDING COMMENTS
I have seen and evaluated the patient independently and agree with the above assessment and plan with the following additions    VAP acinetobacter unasyn ID following, pulm toilet  MV - wean as tolerated pulm to follow  PT/OT, speech anf swallow    stable for transfer to the floor

## 2018-03-20 NOTE — PHYSICAL THERAPY INITIAL EVALUATION ADULT - ADDITIONAL COMMENTS
As per ER: pt. from long term placement , non ambulatory, dependent with all transfers and activities.

## 2018-03-20 NOTE — PROGRESS NOTE ADULT - PROBLEM SELECTOR PLAN 1
Usually on trach collar, with vent overnight, tolerating CPAP 10/5 this am  Continue on ventilatory support, SBTs as tolerated with goal to get down to trach collar   HOB > 30 deg due to high risk for recurrent aspiration events  VAP prophylaxis  Pulmonary following

## 2018-03-20 NOTE — PROGRESS NOTE ADULT - ASSESSMENT
61y/o male with pmh of PMH Transverse myelitis, Paraplegia, trach/peg, colostomy, HTN, [facility]  C DIFF NEG    FLAGYL D/C'D  BLOOD CX NEG SO FAR OFF ABX    SACRAL DECUBITUS CLEAN  URINE CX 50-90K PSEUDOMONAS UNCLEAR SIGNIFICANCE MAY BE COLONIZED WITH CRE    WBC INCREASED CT SCAN OF CHEST WITH EXTENSIVE PNEUMONIA  NOW ON MERREM /VANCO   PROGNOSIS GUARDED  WILL FOLLOW UP WITH FURTHER RECOMMENDATIONS

## 2018-03-20 NOTE — CHART NOTE - NSCHARTNOTEFT_GEN_A_CORE
Source: Patient [ ]  Family [ ]   other [X]: EMR    Current Diet:   Diet, NPO:   Glucerna 1.5 Beau    Tube Feeding Modality: Gastrostomy  Total Volume for 24 Hours (mL): 1440  Continuous  Starting Tube Feed Rate {mL per Hour}: 60  Until Goal Tube Feed Rate (mL per Hour): 60  Tube Feed Duration (in Hours): 24  Tube Feed Start Time: 10:00 (03-18-18 @ 09:14)    Current Weight:   164.9 (03-20-18)  174.6 (03-19-18)  173.2 (03-18-18)    % Weight Change     Pertinent Medications: MEDICATIONS  (STANDING):  ampicillin/sulbactam  IVPB      ampicillin/sulbactam  IVPB 3 Gram(s) IV Intermittent every 6 hours  ascorbic acid 500 milliGRAM(s) Oral daily  aspirin  chewable 81 milliGRAM(s) Oral daily  baclofen 10 milliGRAM(s) Oral two times a day  chlorhexidine 0.12% Liquid 15 milliLiter(s) Swish and Spit two times a day  clopidogrel Tablet 75 milliGRAM(s) Oral daily  dextrose 5%. 1000 milliLiter(s) (50 mL/Hr) IV Continuous <Continuous>  dextrose 50% Injectable 12.5 Gram(s) IV Push once  dextrose 50% Injectable 25 Gram(s) IV Push once  dextrose 50% Injectable 25 Gram(s) IV Push once  heparin  Injectable 5000 Unit(s) SubCutaneous every 8 hours  insulin glargine Injectable (LANTUS) 10 Unit(s) SubCutaneous at bedtime  insulin regular  human corrective regimen sliding scale   SubCutaneous every 6 hours  multivitamin   Solution 5 milliLiter(s) Oral daily  pantoprazole   Suspension 40 milliGRAM(s) Enteral Tube before breakfast  petrolatum Ophthalmic Ointment 1 Application(s) Both EYES at bedtime  saccharomyces boulardii 250 milliGRAM(s) Oral two times a day    MEDICATIONS  (PRN):  acetaminophen   Tablet 650 milliGRAM(s) Oral every 6 hours PRN For Temp greater than 38 C (100.4 F)  acetaminophen   Tablet. 650 milliGRAM(s) Oral every 6 hours PRN Moderate Pain (4 - 6) and headache  ALBUTerol    0.083% 2.5 milliGRAM(s) Nebulizer every 6 hours PRN Shortness of Breath and/or Wheezing  ALPRAZolam 0.5 milliGRAM(s) Oral every 8 hours PRN anxiety/agitation  artificial  tears Solution 1 Drop(s) Both EYES every 4 hours PRN Dry Eyes  glucagon  Injectable 1 milliGRAM(s) IntraMuscular once PRN Glucose <70 milliGRAM(s)/deciLiter    Pertinent Labs: CBC Full  -  ( 20 Mar 2018 05:19 )  WBC Count : 15.4 K/uL  Hemoglobin : 9.2 g/dL  Hematocrit : 30.6 %  Platelet Count - Automated : 433 K/uL  Mean Cell Volume : 93.0 fl  Mean Cell Hemoglobin : 28.0 pg  Mean Cell Hemoglobin Concentration : 30.1 g/dL  Auto Neutrophil # : x  Auto Lymphocyte # : x  Auto Monocyte # : x  Auto Eosinophil # : x  Auto Basophil # : x  Auto Neutrophil % : x  Auto Lymphocyte % : x  Auto Monocyte % : x  Auto Eosinophil % : x  Auto Basophil % : x    Skin:   Stage 4 Sacral Pressure Ulcer    Nutrition focused physical exam conducted - found signs of malnutrition [X]absent [ ]present    Subcutaneous fat loss: [ ] Orbital fat pads region, [ ]Buccal fat region, [ ]Triceps region,  [ ]Ribs region    Muscle wasting: [ ]Temples region, [ ]Clavicle region, [ ]Shoulder region, [ ]Scapula region, [ ]Interosseous region,  [ ]thigh region, [ ]Calf region    Estimated Needs:   [X] no change since previous assessment  [ ] recalculated:     Pt with paraplegia and with trach. Pt nutrition dx of increased nutrient needs resolved as enteral feeds have since increased. Pt currently on continuous enteral feeds of Glucerna at 60ml/hr over 24 hrs providing 1440ml (2160kcal, 118g protein).      Recommendations:   1) Change goal rate of Glucerna to 72ml/hr over 20 hr period  2) Monitor enteral feeding tolerance    Monitoring and Evaluation:   [X] PO intake [X] Tolerance to diet prescription [X] Weights  [X] Follow up per protocol [X] Labs:

## 2018-03-20 NOTE — PROGRESS NOTE ADULT - ATTENDING COMMENTS
62 male with transverse myelitis chronic trach peg and ostomy who was on trach collar using valve to communicate became more lethargic, hypoxic,  and tachycardic with sepsis. Pt has thick purulent secretions at trach site and large decubitus ulcer. Pan culture, broadened Abx coverage - ID following. Pt placed on vent

## 2018-03-20 NOTE — PROGRESS NOTE ADULT - SUBJECTIVE AND OBJECTIVE BOX
Patient is a 62y old  Male who presents with a chief complaint of fevers (12 Mar 2018 18:28)      BRIEF HOSPITAL COURSE:  63 yo male, PMHx chronic trach/PEG/colostomy since an episode of transverse myelitis years ago.  He has h/o multiple infections, some of them being  resistant as well as recent Cdiff.  He was admitted with fevers, and was treated for possible PNA.  RRT called 3/16/18 when pt was found to be unresponsive in bed.  Pt had normal accucheck and was given Narcan since he is on chronic narcotic without response. Found to have rectal temp 102, tachycardic, with SBP in 90's.  CT head negative for acute CVA. Pt was upgraded to ICU on full ventilatory support.    Events last 24 hours: low grade fevers, found to have carbapenem resistant acinetobacter in sputum.  Improved oxygenation this am was able to wean the fi02 from 50 to 30 this am still with 02 sat 100%.  Otherwise awake and interactive.  Will downgrade to vent unit today.    PAST MEDICAL & SURGICAL HISTORY:  Essential hypertension  Paralysis  Transverse myelitis  History of tracheostomy      Review of Systems:  Cannot be obtained pt trached to vent     MEDICATIONS  (STANDING):  ampicillin/sulbactam  IVPB      ampicillin/sulbactam  IVPB 3 Gram(s) IV Intermittent every 6 hours  ascorbic acid 500 milliGRAM(s) Oral daily  aspirin  chewable 81 milliGRAM(s) Oral daily  baclofen 10 milliGRAM(s) Oral two times a day  chlorhexidine 0.12% Liquid 15 milliLiter(s) Swish and Spit two times a day  clopidogrel Tablet 75 milliGRAM(s) Oral daily  dextrose 5%. 1000 milliLiter(s) (50 mL/Hr) IV Continuous <Continuous>  dextrose 50% Injectable 12.5 Gram(s) IV Push once  dextrose 50% Injectable 25 Gram(s) IV Push once  dextrose 50% Injectable 25 Gram(s) IV Push once  heparin  Injectable 5000 Unit(s) SubCutaneous every 8 hours  insulin glargine Injectable (LANTUS) 10 Unit(s) SubCutaneous at bedtime  insulin regular  human corrective regimen sliding scale   SubCutaneous every 6 hours  magnesium sulfate  IVPB 2 Gram(s) IV Intermittent once  multivitamin   Solution 5 milliLiter(s) Oral daily  pantoprazole   Suspension 40 milliGRAM(s) Enteral Tube before breakfast  petrolatum Ophthalmic Ointment 1 Application(s) Both EYES at bedtime  saccharomyces boulardii 250 milliGRAM(s) Oral two times a day  sodium phosphate IVPB 15 milliMole(s) IV Intermittent once                 9.2    15.4   )----------(  433       ( 20 Mar 2018 05:19 )               30.6      139    |  97     |  17.0   ----------------------------<  178        ( 20 Mar 2018 05:19 )  4.8     |  33.0   |  0.37     Ca    9.0        ( 20 Mar 2018 05:19 )  Phos  2.3       ( 20 Mar 2018 05:19 )  Mg     1.9       ( 20 Mar 2018 05:19 )                         9.2    15.4   )----------(  433       ( 20 Mar 2018 05:19 )               30.6      139    |  97     |  17.0   ----------------------------<  178        ( 20 Mar 2018 05:19 )  4.8     |  33.0   |  0.37     Ca    9.0        ( 20 Mar 2018 05:19 )  Phos  2.3       ( 20 Mar 2018 05:19 )  Mg     1.9       ( 20 Mar 2018 05:19 )      ICU Vital Signs Last 24 Hrs  T(C): 35.7 (20 Mar 2018 08:01), Max: 37.3 (19 Mar 2018 15:35)  T(F): 96.3 (20 Mar 2018 08:01), Max: 99.1 (19 Mar 2018 15:35)  HR: 71 (20 Mar 2018 07:00) (69 - 103)  BP: 128/70 (20 Mar 2018 07:00) (102/60 - 152/72)  BP(mean): 94 (20 Mar 2018 07:00) (76 - 112)  ABP: --  ABP(mean): --  RR: 16 (20 Mar 2018 07:00) (13 - 21)  SpO2: 100% (20 Mar 2018 07:00) (70% - 100%)    I&O's Detail    19 Mar 2018 07:01  -  20 Mar 2018 07:00  --------------------------------------------------------  IN:    Enteral Tube Flush: 80 mL    Glucerna: 1200 mL    Solution: 200 mL    Solution: 50 mL  Total IN: 1530 mL    OUT:    Colostomy: 300 mL    Indwelling Catheter - Urethral: 2130 mL  Total OUT: 2430 mL    Total NET: -900 mL      Physical Examination:    General: No acute distress, trach/vented    HEENT: Pupils equal, reactive to light.  Symmetric.    PULM: Coarse slightly improved from yesterday, moderate thick sputum production    CVS: Regular rate and rhythm, no murmurs, rubs, or gallops    ABD: Soft, nondistended, nontender, normoactive bowel sounds, no masses    EXT: contracted    SKIN: Warm and well perfused, stage IV sacrum.    NEURO: Alert, mouthing words, plegic and contracted at baseline     RADIOLOGY:   < from: CT Head No Cont (03.16.18 @ 12:22) >  FINDINGS:  There is periventricular and subcortical white matter hypodensity without   mass effect, nonspecific, likely representing mild chronic microvascular   ischemic changes. There is no compelling evidence for an acute   transcortical infarction. There is no evidence of mass, mass effect,   midline shift or extra-axial fluid collection. The lateral ventricles and   cortical sulci are age-appropriate in size and configuration. The orbits,   mastoid air cells and visualizedparanasal sinuses are unremarkable. The   calvarium is intact.    IMPRESSION:  Mild chronic microvascular changes without evidence of an   acute transcortical infarction or hemorrhage. MR is a more sensitive   imaging modality for the evaluation of an acute infarction.                 MABLE SÁNCHEZ M.D., ATTENDING RADIOLOGIST  This document has been electronically signed. Mar 16 2018 12:13PM    < end of copied text >  < from: Xray Chest 1 View-PORTABLE IMMEDIATE (03.16.18 @ 11:40) >    Findings:  There is now an acute infiltrate in the right mid and lower lung not seen   on the prior day. Findings suggest aspiration. A tracheostomy tube is   noted. The left lung remains clear. The heart and mediastinum   unremarkable..    Impression:  There is a new right lung infiltrate suggesting aspiration..                GINNA LEÓN M.D., ATTENDING RADIOLOGIST  This document has been electronically signed. Mar 16 2018 11:41AM    < end of copied text >    CRITICAL CARE TIME SPENT: 35 min

## 2018-03-20 NOTE — PROGRESS NOTE ADULT - SUBJECTIVE AND OBJECTIVE BOX
KAMAR BELLAMY Patient is a 62y old  Male who presents with a chief complaint of fevers (12 Mar 2018 18:28)     HPI:  61y/o male with pmh of PMH Transverse myelitis, Paraplegia, trach/peg, colostomy, HTN, coming from [facility] presents with fever for 3 days.      Patient denies any cough, sob, runny nose, vomiting, diarrhea, abdominal pain, pain with urination.    Patient signed MOLST REQUESTING CPR on 2/18/18.    Patient eats via tube feeding,.      Originally at [facility] in   7months ago, developed Acute transverse myelitis.    Heart Attack, stent placed, check up after.  He was given a tetanus shot, then couple days ago he developed lower extremity weakness, paralysis; ultimately developed ulcers.      SBU--> [facility] (kept him for a few weeks) --> wife couldn't take care of him;    He is getting frequent uti's, anemia reequiring transfusions.    101 fever at nursing home today ([facility] [place]) (10 Mar 2018 23:26)    The patient was seen and evaluated   The patient is in no acute distress.  Denied any fever chest pain, palpitations, shortness of breath, abdominal pain, fever, dysuria, cough, edema   Complains of     Mode: CPAP with PS, FiO2: 30, PEEP: 5, PS: 5, MAP: 7I&O's Summary    19 Mar 2018 07:01  -  20 Mar 2018 07:00  --------------------------------------------------------  IN: 1530 mL / OUT: 2430 mL / NET: -900 mL    20 Mar 2018 07:01  -  20 Mar 2018 15:20  --------------------------------------------------------  IN: 820 mL / OUT: 700 mL / NET: 120 mL      Allergies    No Known Allergies    Intolerances      HEALTH ISSUES - PROBLEM Dx:  Palliative care encounter: Palliative care encounter  Functional quadriplegia: Functional quadriplegia  Pneumonia: Pneumonia  Encephalopathy, unspecified: Encephalopathy, unspecified  Acute on chronic respiratory failure with hypoxia: Acute on chronic respiratory failure with hypoxia  Leukocytosis: Leukocytosis  Fever, unspecified fever cause: Fever, unspecified fever cause  Fever: Fever  Skin ulcer of sacrum, unspecified ulcer stage: Skin ulcer of sacrum, unspecified ulcer stage  History of Clostridium difficile infection: History of Clostridium difficile infection  Type 2 diabetes mellitus without complication, with long-term current use of insulin: Type 2 diabetes mellitus without complication, with long-term current use of insulin  Need for prophylactic measure: Need for prophylactic measure  Paralysis: Paralysis  Essential hypertension: Essential hypertension  Transverse myelitis: Transverse myelitis  Sepsis, due to unspecified organism: Sepsis, due to unspecified organism        PAST MEDICAL & SURGICAL HISTORY:  Essential hypertension  Paralysis  Transverse myelitis  History of tracheostomy          Vital Signs Last 24 Hrs  T(C): 36.3 (20 Mar 2018 15:00), Max: 37.3 (19 Mar 2018 15:35)  T(F): 97.3 (20 Mar 2018 15:00), Max: 99.1 (19 Mar 2018 15:35)  HR: 93 (20 Mar 2018 15:00) (69 - 96)  BP: 159/85 (20 Mar 2018 15:00) (102/60 - 159/85)  BP(mean): 116 (20 Mar 2018 15:00) (76 - 116)  RR: 25 (20 Mar 2018 15:00) (13 - 25)  SpO2: 100% (20 Mar 2018 15:00) (70% - 100%)T(C): 36.3 (03-20-18 @ 15:00), Max: 37.3 (03-19-18 @ 15:35)  HR: 93 (03-20-18 @ 15:00) (69 - 96)  BP: 159/85 (03-20-18 @ 15:00) (102/60 - 159/85)  RR: 25 (03-20-18 @ 15:00) (13 - 25)  SpO2: 100% (03-20-18 @ 15:00) (70% - 100%)  Wt(kg): --    PHYSICAL EXAM:    GENERAL: NAD, ill appearing clearly mouths "please go away and shut the lights"  HEAD:  Atraumatic, Normocephalic  NECK: trach  NERVOUS SYSTEM:  Alert & Oriented X3, cant Move upper and lower extremities; CNS-II-XII  CHEST/LUNG: Clear to auscultation bilaterally; No rales, rhonchi, wheezing,   HEART: Regular rate and rhythm; No murmurs,   ABDOMEN: Soft, Nontender, Nondistended; Bowel sounds present  EXTREMITIES:  Peripheral edema  psychiatry- mood and affect approprite    acetaminophen   Tablet 650 milliGRAM(s) Oral every 6 hours PRN  acetaminophen   Tablet. 650 milliGRAM(s) Oral every 6 hours PRN  ALBUTerol    0.083% 2.5 milliGRAM(s) Nebulizer every 6 hours PRN  ALPRAZolam 0.5 milliGRAM(s) Oral every 8 hours PRN  ampicillin/sulbactam  IVPB      ampicillin/sulbactam  IVPB 3 Gram(s) IV Intermittent every 6 hours  artificial  tears Solution 1 Drop(s) Both EYES every 4 hours PRN  ascorbic acid 500 milliGRAM(s) Oral daily  aspirin  chewable 81 milliGRAM(s) Oral daily  baclofen 10 milliGRAM(s) Oral two times a day  chlorhexidine 0.12% Liquid 15 milliLiter(s) Swish and Spit two times a day  clopidogrel Tablet 75 milliGRAM(s) Oral daily  dextrose 5%. 1000 milliLiter(s) IV Continuous <Continuous>  dextrose 50% Injectable 12.5 Gram(s) IV Push once  dextrose 50% Injectable 25 Gram(s) IV Push once  dextrose 50% Injectable 25 Gram(s) IV Push once  glucagon  Injectable 1 milliGRAM(s) IntraMuscular once PRN  heparin  Injectable 5000 Unit(s) SubCutaneous every 8 hours  insulin glargine Injectable (LANTUS) 10 Unit(s) SubCutaneous at bedtime  insulin regular  human corrective regimen sliding scale   SubCutaneous every 6 hours  multivitamin   Solution 5 milliLiter(s) Oral daily  pantoprazole   Suspension 40 milliGRAM(s) Enteral Tube before breakfast  petrolatum Ophthalmic Ointment 1 Application(s) Both EYES at bedtime  saccharomyces boulardii 250 milliGRAM(s) Oral two times a day      LABS:                          9.2    15.4  )-----------( 433      ( 20 Mar 2018 05:19 )             30.6     03-20    139  |  97<L>  |  17.0  ----------------------------<  178<H>  4.8   |  33.0<H>  |  0.37<L>    Ca    9.0      20 Mar 2018 05:19  Phos  2.3     03-20  Mg     1.9     03-20                CAPILLARY BLOOD GLUCOSE      POCT Blood Glucose.: 246 mg/dL (20 Mar 2018 11:54)  POCT Blood Glucose.: 193 mg/dL (20 Mar 2018 06:53)  POCT Blood Glucose.: 221 mg/dL (19 Mar 2018 23:40)  POCT Blood Glucose.: 181 mg/dL (19 Mar 2018 17:23)      RADIOLOGY & ADDITIONAL TESTS:      Consultant notes reviewed    Case discussed with consultant/provider/ family /patient

## 2018-03-20 NOTE — PROGRESS NOTE ADULT - ASSESSMENT
63 yo male, PMHx chronic trach/PEG/colostomy since an episode of transverse myelitis years ago, admitted with fevers, Acinetobacter PNA, with RRT for episode of altered mental status, hypoxemic respiratory failure

## 2018-03-20 NOTE — PROGRESS NOTE ADULT - SUBJECTIVE AND OBJECTIVE BOX
KAMAR BELLAMY is a 62y Male with HPI:  61y/o male with pmh of PMH Transverse myelitis, Paraplegia, trach/peg, colostomy, HTN, coming from [facility] presents with fever for 3 days.     Originally at [facility] in   7months ago, developed Acute transverse myelitis.    Heart Attack, stent placed, check up after.  He was given a tetanus shot, then couple days ago he developed lower extremity weakness, paralysis; ultimately developed ulcers.    S[facility]--> [facility] (kept him for a few weeks) --> wife couldn't take care of him;    He is getting frequent uti's, anemia reequiring transfusions.    101 fever at [facility]   BLOOD CX SO FAR NEG  C DIFF NEG  DECUBITUS ULCER CLEAN  URINE CX WITH 50-90K CRE     CT SCAN DONE WHICH SHOW EXTENSIVE PNEUMONIA   ABX CHANGED TO MERREM VANCO  PT INTUBATED IN ICU   AWAKE      Allergies:  No Known Allergies      Medications:  ALPRAZolam 0.5 milliGRAM(s) Oral three times a day PRN  ascorbic acid 500 milliGRAM(s) Oral daily  aspirin  chewable 81 milliGRAM(s) Oral daily  baclofen 10 milliGRAM(s) Oral two times a day  clopidogrel Tablet 75 milliGRAM(s) Oral daily  dextrose 5%. 1000 milliLiter(s) IV Continuous <Continuous>  dextrose 50% Injectable 12.5 Gram(s) IV Push once  dextrose 50% Injectable 25 Gram(s) IV Push once  dextrose 50% Injectable 25 Gram(s) IV Push once  glucagon  Injectable 1 milliGRAM(s) IntraMuscular once PRN  heparin  Injectable 5000 Unit(s) SubCutaneous every 8 hours  insulin regular  human corrective regimen sliding scale   SubCutaneous every 6 hours  metroNIDAZOLE    Tablet 500 milliGRAM(s) Oral every 8 hours  multivitamin   Solution 5 milliLiter(s) Oral daily  oxyCODONE    IR 10 milliGRAM(s) Oral every 6 hours PRN  pantoprazole   Suspension 40 milliGRAM(s) Enteral Tube before breakfast  petrolatum Ophthalmic Ointment 1 Application(s) Both EYES at bedtime      ANTIBIOTICS:         Review of Systems: - Negative except as mentioned above     Physical Exam:      Vital Signs Last 24 Hrs  T Vital Signs Last 24 Hrs  T(C): 35.7 (20 Mar 2018 08:01), Max: 37.3 (19 Mar 2018 15:35)  T(F): 96.3 (20 Mar 2018 08:01), Max: 99.1 (19 Mar 2018 15:35)  HR: 79 (20 Mar 2018 10:00) (69 - 103)  BP: 127/72 (20 Mar 2018 10:00) (102/60 - 148/71)  BP(mean): 95 (20 Mar 2018 10:00) (76 - 102)  RR: 18 (20 Mar 2018 10:00) (13 - 23)  SpO2: 99% (20 Mar 2018 10:00) (70% - 100%)    GEN:  AWAKE ON VENT  HEENT: normocephalic and atraumatic. EOMI. JAMESON... TRACH  NECK: Supple. No carotid bruits.  No lymphadenopathy or thyromegaly.  LUNGS: Clear to auscultation.  HEART: Regular rate and rhythm without murmur.  ABDOMEN: Soft, nontender, and nondistended.  Positive bowel sounds.  No hepatosplenomegaly was noted.  NO REBOUND NO GUARDING  EXTREMITIES: Without any cyanosis, clubbing, rash, lesions or edema.  NEUROLOGIC: AWAKE ON VENT    SKIN: No ulceration or induration present.      Labs:                                             9.2    15.4  )-----------( 433      ( 20 Mar 2018 05:19 )             30.6

## 2018-03-21 LAB
CULTURE RESULTS: SIGNIFICANT CHANGE UP
GLUCOSE BLDC GLUCOMTR-MCNC: 170 MG/DL — HIGH (ref 70–99)
GLUCOSE BLDC GLUCOMTR-MCNC: 184 MG/DL — HIGH (ref 70–99)
GLUCOSE BLDC GLUCOMTR-MCNC: 187 MG/DL — HIGH (ref 70–99)
GLUCOSE BLDC GLUCOMTR-MCNC: 213 MG/DL — HIGH (ref 70–99)
SPECIMEN SOURCE: SIGNIFICANT CHANGE UP

## 2018-03-21 PROCEDURE — 99233 SBSQ HOSP IP/OBS HIGH 50: CPT

## 2018-03-21 PROCEDURE — 99232 SBSQ HOSP IP/OBS MODERATE 35: CPT

## 2018-03-21 RX ORDER — OXYCODONE HYDROCHLORIDE 5 MG/1
10 TABLET ORAL ONCE
Qty: 0 | Refills: 0 | Status: DISCONTINUED | OUTPATIENT
Start: 2018-03-21 | End: 2018-03-21

## 2018-03-21 RX ORDER — OXYCODONE HYDROCHLORIDE 5 MG/1
10 TABLET ORAL EVERY 6 HOURS
Qty: 0 | Refills: 0 | Status: DISCONTINUED | OUTPATIENT
Start: 2018-03-21 | End: 2018-03-22

## 2018-03-21 RX ADMIN — CLOPIDOGREL BISULFATE 75 MILLIGRAM(S): 75 TABLET, FILM COATED ORAL at 12:56

## 2018-03-21 RX ADMIN — OXYCODONE HYDROCHLORIDE 10 MILLIGRAM(S): 5 TABLET ORAL at 21:30

## 2018-03-21 RX ADMIN — OXYCODONE HYDROCHLORIDE 10 MILLIGRAM(S): 5 TABLET ORAL at 20:56

## 2018-03-21 RX ADMIN — Medication 250 MILLIGRAM(S): at 05:48

## 2018-03-21 RX ADMIN — AMPICILLIN SODIUM AND SULBACTAM SODIUM 200 GRAM(S): 250; 125 INJECTION, POWDER, FOR SUSPENSION INTRAMUSCULAR; INTRAVENOUS at 20:56

## 2018-03-21 RX ADMIN — HEPARIN SODIUM 5000 UNIT(S): 5000 INJECTION INTRAVENOUS; SUBCUTANEOUS at 15:57

## 2018-03-21 RX ADMIN — Medication 500 MILLIGRAM(S): at 12:56

## 2018-03-21 RX ADMIN — CHLORHEXIDINE GLUCONATE 15 MILLILITER(S): 213 SOLUTION TOPICAL at 05:48

## 2018-03-21 RX ADMIN — PANTOPRAZOLE SODIUM 40 MILLIGRAM(S): 20 TABLET, DELAYED RELEASE ORAL at 05:49

## 2018-03-21 RX ADMIN — AMPICILLIN SODIUM AND SULBACTAM SODIUM 200 GRAM(S): 250; 125 INJECTION, POWDER, FOR SUSPENSION INTRAMUSCULAR; INTRAVENOUS at 03:25

## 2018-03-21 RX ADMIN — AMPICILLIN SODIUM AND SULBACTAM SODIUM 200 GRAM(S): 250; 125 INJECTION, POWDER, FOR SUSPENSION INTRAMUSCULAR; INTRAVENOUS at 09:47

## 2018-03-21 RX ADMIN — HEPARIN SODIUM 5000 UNIT(S): 5000 INJECTION INTRAVENOUS; SUBCUTANEOUS at 21:11

## 2018-03-21 RX ADMIN — OXYCODONE HYDROCHLORIDE 10 MILLIGRAM(S): 5 TABLET ORAL at 06:51

## 2018-03-21 RX ADMIN — Medication 250 MILLIGRAM(S): at 17:27

## 2018-03-21 RX ADMIN — INSULIN HUMAN 1: 100 INJECTION, SOLUTION SUBCUTANEOUS at 23:30

## 2018-03-21 RX ADMIN — Medication 0.5 MILLIGRAM(S): at 15:59

## 2018-03-21 RX ADMIN — INSULIN GLARGINE 10 UNIT(S): 100 INJECTION, SOLUTION SUBCUTANEOUS at 23:30

## 2018-03-21 RX ADMIN — Medication 10 MILLIGRAM(S): at 17:27

## 2018-03-21 RX ADMIN — AMPICILLIN SODIUM AND SULBACTAM SODIUM 200 GRAM(S): 250; 125 INJECTION, POWDER, FOR SUSPENSION INTRAMUSCULAR; INTRAVENOUS at 15:58

## 2018-03-21 RX ADMIN — INSULIN HUMAN 1: 100 INJECTION, SOLUTION SUBCUTANEOUS at 12:20

## 2018-03-21 RX ADMIN — INSULIN HUMAN 1: 100 INJECTION, SOLUTION SUBCUTANEOUS at 06:15

## 2018-03-21 RX ADMIN — Medication 10 MILLIGRAM(S): at 05:48

## 2018-03-21 RX ADMIN — OXYCODONE HYDROCHLORIDE 10 MILLIGRAM(S): 5 TABLET ORAL at 06:15

## 2018-03-21 RX ADMIN — Medication 650 MILLIGRAM(S): at 03:25

## 2018-03-21 RX ADMIN — Medication 650 MILLIGRAM(S): at 04:00

## 2018-03-21 RX ADMIN — Medication 0.5 MILLIGRAM(S): at 03:25

## 2018-03-21 RX ADMIN — CHLORHEXIDINE GLUCONATE 15 MILLILITER(S): 213 SOLUTION TOPICAL at 17:27

## 2018-03-21 RX ADMIN — Medication 81 MILLIGRAM(S): at 12:56

## 2018-03-21 RX ADMIN — INSULIN HUMAN 2: 100 INJECTION, SOLUTION SUBCUTANEOUS at 17:26

## 2018-03-21 RX ADMIN — HEPARIN SODIUM 5000 UNIT(S): 5000 INJECTION INTRAVENOUS; SUBCUTANEOUS at 05:48

## 2018-03-21 RX ADMIN — Medication 5 MILLILITER(S): at 12:56

## 2018-03-21 NOTE — PROGRESS NOTE ADULT - ATTENDING COMMENTS
62 male with transverse myelitis chronic trach peg and ostomy who was on trach collar using valve to communicate became more lethargic, hypoxic,  and tachycardic with sepsis. Pt has thick purulent secretions at trach site and large decubitus ulcer. Pan culture, broadened Abx coverage - ID following. Pt  on vent

## 2018-03-21 NOTE — PROGRESS NOTE ADULT - SUBJECTIVE AND OBJECTIVE BOX
PULMONARY PROGRESS NOTE      [id number]    Patient is a 62y old  Male who presents with a chief complaint of fevers (12 Mar 2018 18:28)      INTERVAL HPI/OVERNIGHT EVENTS:    Awake alert in NAD on Vent  Peep 5, PSV 5 40%    MEDICATIONS  (STANDING):  ampicillin/sulbactam  IVPB      ampicillin/sulbactam  IVPB 3 Gram(s) IV Intermittent every 6 hours  ascorbic acid 500 milliGRAM(s) Oral daily  aspirin  chewable 81 milliGRAM(s) Oral daily  baclofen 10 milliGRAM(s) Oral two times a day  chlorhexidine 0.12% Liquid 15 milliLiter(s) Swish and Spit two times a day  clopidogrel Tablet 75 milliGRAM(s) Oral daily  dextrose 5%. 1000 milliLiter(s) (50 mL/Hr) IV Continuous <Continuous>  dextrose 50% Injectable 12.5 Gram(s) IV Push once  dextrose 50% Injectable 25 Gram(s) IV Push once  dextrose 50% Injectable 25 Gram(s) IV Push once  heparin  Injectable 5000 Unit(s) SubCutaneous every 8 hours  insulin glargine Injectable (LANTUS) 10 Unit(s) SubCutaneous at bedtime  insulin regular  human corrective regimen sliding scale   SubCutaneous every 6 hours  multivitamin   Solution 5 milliLiter(s) Oral daily  pantoprazole   Suspension 40 milliGRAM(s) Enteral Tube before breakfast  petrolatum Ophthalmic Ointment 1 Application(s) Both EYES at bedtime  saccharomyces boulardii 250 milliGRAM(s) Oral two times a day      MEDICATIONS  (PRN):  acetaminophen   Tablet 650 milliGRAM(s) Oral every 6 hours PRN For Temp greater than 38 C (100.4 F)  acetaminophen   Tablet. 650 milliGRAM(s) Oral every 6 hours PRN Moderate Pain (4 - 6) and headache  ALBUTerol    0.083% 2.5 milliGRAM(s) Nebulizer every 6 hours PRN Shortness of Breath and/or Wheezing  ALPRAZolam 0.5 milliGRAM(s) Oral every 8 hours PRN anxiety/agitation  artificial  tears Solution 1 Drop(s) Both EYES every 4 hours PRN Dry Eyes  glucagon  Injectable 1 milliGRAM(s) IntraMuscular once PRN Glucose <70 milliGRAM(s)/deciLiter      Allergies    No Known Allergies    Intolerances        PAST MEDICAL & SURGICAL HISTORY:  Essential hypertension  Paralysis  Transverse myelitis  History of tracheostomy      SOCIAL HISTORY  Smoking History:       REVIEW OF SYSTEMS:    CONSTITUTIONAL:  No distress    HEENT:  Eyes:  No diplopia or blurred vision. ENT:  No earache, sore throat or runny nose.    CARDIOVASCULAR:  No pressure, squeezing, tightness, heaviness or aching about the chest; no palpitations.    RESPIRATORY: above    GASTROINTESTINAL:  No nausea, vomiting or diarrhea.    GENITOURINARY:  No dysuria, frequency or urgency.    NEUROLOGIC:  No paresthesias, fasciculations, seizures or weakness.    Extremities: No cyanosis, clubbing or edema    PSYCHIATRIC:  No disorder of thought or mood.    Vital Signs Last 24 Hrs  T(C): 36.7 (21 Mar 2018 06:00), Max: 37 (20 Mar 2018 23:33)  T(F): 98.1 (21 Mar 2018 06:00), Max: 98.6 (20 Mar 2018 23:33)  HR: 93 (21 Mar 2018 07:00) (85 - 95)  BP: 142/76 (21 Mar 2018 07:00) (130/70 - 159/85)  BP(mean): 116 (20 Mar 2018 15:00) (95 - 116)  RR: 20 (21 Mar 2018 07:00) (17 - 31)  SpO2: 100% (21 Mar 2018 07:00) (98% - 100%)    PHYSICAL EXAMINATION:    GENERAL: The patient is awake and alert in no apparent distress.     HEENT: Head is normocephalic and atraumatic. Extraocular muscles are intact. Mucous membranes are moist.    NECK: Supple.trach    LUNGS: Clear to auscultation without wheezing, rales or rhonchi; respirations unlabored    HEART: Regular rate and rhythm without murmur.    ABDOMEN: Soft, nontender, and nondistended.      EXTREMITIES: Without any cyanosis, clubbing, rash, lesions or edema.    NEUROLOGIC: Grossly intact.    LABS:                        9.2    15.4  )-----------( 433      ( 20 Mar 2018 05:19 )             30.6     03-20    139  |  97<L>  |  17.0  ----------------------------<  178<H>  4.8   |  33.0<H>  |  0.37<L>    Ca    9.0      20 Mar 2018 05:19  Phos  2.3     03-20  Mg     1.9     03-20                          MICROBIOLOGY:    RADIOLOGY & ADDITIONAL STUDIES:  < from: Xray Chest 1 View AP/PA. (03.18.18 @ 14:26) >   EXAM:  XR CHEST AP OR PA 1V                          PROCEDURE DATE:  03/18/2018          INTERPRETATION:  CLINICAL INFORMATION: Pneumonia.    TECHNIQUE: Frontal view of the chest   COMPARISON: March 18, 2018.    FINDINGS:    LINES/TUBES: Unchanged tracheostomy tube.  LUNGS/PLEURA: Small right pleural effusion with consolidation in the   right lower lung. No pneumothorax.  MEDIASTINUM: Heart size cannot adequately be assessed on this projection.  OTHER: None.    IMPRESSION:     Small right pleural effusion with consolidation in the right lower lung.                 GT LOPEZ M.D., ATTENDING RADIOLOGIST  This document has been electronically signed. Mar 18 2018  2:37PM    < end of copied text >

## 2018-03-21 NOTE — PROGRESS NOTE ADULT - SUBJECTIVE AND OBJECTIVE BOX
KAMAR BELLAMY Patient is a 62y old  Male who presents with a chief complaint of fevers (12 Mar 2018 18:28)     HPI:  61y/o male with pmh of PMH Transverse myelitis, Paraplegia, trach/peg, colostomy, HTN, coming from [facility] presents with fever for 3 days.      Patient denies any cough, sob, runny nose, vomiting, diarrhea, abdominal pain, pain with urination.    Patient signed MOLST REQUESTING CPR on 2/18/18.    Patient eats via tube feeding,.      Originally at [facility] in   7months ago, developed Acute transverse myelitis.    Heart Attack, stent placed, check up after.  He was given a tetanus shot, then couple days ago he developed lower extremity weakness, paralysis; ultimately developed ulcers.      S[facility]--> [facility] (kept him for a few weeks) --> wife couldn't take care of him;    He is getting frequent uti's, anemia reequiring transfusions.    101 fever at nursing home today ([facility] [place]) (10 Mar 2018 23:26)    The patient was seen and evaluated   The patient is in no acute distress.  Denied any fever chest pain, palpitations, shortness of breath, abdominal pain, fever, dysuria, cough, edema   Complains of "please wet my lips" but hen after i wet his lips he closed his eyes and said he didn't want to be bothered     Mode: CPAP with PS, FiO2: 30, PEEP: 5, PS: 5, MAP: 7I&O's Summary    20 Mar 2018 07:01  -  21 Mar 2018 07:00  --------------------------------------------------------  IN: 2760 mL / OUT: 3300 mL / NET: -540 mL    21 Mar 2018 07:01  -  21 Mar 2018 14:11  --------------------------------------------------------  IN: 0 mL / OUT: 350 mL / NET: -350 mL      Allergies    No Known Allergies    Intolerances      HEALTH ISSUES - PROBLEM Dx:  Palliative care encounter: Palliative care encounter  Functional quadriplegia: Functional quadriplegia  Pneumonia: Pneumonia  Encephalopathy, unspecified: Encephalopathy, unspecified  Acute on chronic respiratory failure with hypoxia: Acute on chronic respiratory failure with hypoxia  Leukocytosis: Leukocytosis  Fever, unspecified fever cause: Fever, unspecified fever cause  Fever: Fever  Skin ulcer of sacrum, unspecified ulcer stage: Skin ulcer of sacrum, unspecified ulcer stage  History of Clostridium difficile infection: History of Clostridium difficile infection  Type 2 diabetes mellitus without complication, with long-term current use of insulin: Type 2 diabetes mellitus without complication, with long-term current use of insulin  Need for prophylactic measure: Need for prophylactic measure  Paralysis: Paralysis  Essential hypertension: Essential hypertension  Transverse myelitis: Transverse myelitis  Sepsis, due to unspecified organism: Sepsis, due to unspecified organism        PAST MEDICAL & SURGICAL HISTORY:  Essential hypertension  Paralysis  Transverse myelitis  History of tracheostomy          Vital Signs Last 24 Hrs  T(C): 36.7 (21 Mar 2018 06:00), Max: 37 (20 Mar 2018 23:33)  T(F): 98.1 (21 Mar 2018 06:00), Max: 98.6 (20 Mar 2018 23:33)  HR: 93 (21 Mar 2018 07:00) (86 - 95)  BP: 142/76 (21 Mar 2018 07:00) (130/70 - 159/85)  BP(mean): 116 (20 Mar 2018 15:00) (116 - 116)  RR: 20 (21 Mar 2018 07:00) (18 - 31)  SpO2: 100% (21 Mar 2018 07:00) (98% - 100%)T(C): 36.7 (03-21-18 @ 06:00), Max: 37 (03-20-18 @ 23:33)  HR: 93 (03-21-18 @ 07:00) (86 - 95)  BP: 142/76 (03-21-18 @ 07:00) (130/70 - 159/85)  RR: 20 (03-21-18 @ 07:00) (18 - 31)  SpO2: 100% (03-21-18 @ 07:00) (98% - 100%)  Wt(kg): --    PHYSICAL EXAM:    GENERAL: NAD, trach, PEG   HEAD:  Atraumatic, Normocephalic  EYES: EOMI, PERRL  NERVOUS SYSTEM:  Alert & cant Move upper and lower extremities; CNS-II-XII  CHEST/LUNG: Clear to auscultation bilaterally; No rales, rhonchi, wheezing,   HEART: Regular rate and rhythm; No murmurs,   ABDOMEN: Soft, Nontender, Nondistended; Bowel sounds present  EXTREMITIES:  Peripheral Pulses, No  cyanosis, or edema  SKIN: No rashes or lesions  psychiatry- mood and affect flat    acetaminophen   Tablet 650 milliGRAM(s) Oral every 6 hours PRN  acetaminophen   Tablet. 650 milliGRAM(s) Oral every 6 hours PRN  ALBUTerol    0.083% 2.5 milliGRAM(s) Nebulizer every 6 hours PRN  ALPRAZolam 0.5 milliGRAM(s) Oral every 8 hours PRN  ampicillin/sulbactam  IVPB      ampicillin/sulbactam  IVPB 3 Gram(s) IV Intermittent every 6 hours  artificial  tears Solution 1 Drop(s) Both EYES every 4 hours PRN  ascorbic acid 500 milliGRAM(s) Oral daily  aspirin  chewable 81 milliGRAM(s) Oral daily  baclofen 10 milliGRAM(s) Oral two times a day  chlorhexidine 0.12% Liquid 15 milliLiter(s) Swish and Spit two times a day  clopidogrel Tablet 75 milliGRAM(s) Oral daily  dextrose 5%. 1000 milliLiter(s) IV Continuous <Continuous>  dextrose 50% Injectable 12.5 Gram(s) IV Push once  dextrose 50% Injectable 25 Gram(s) IV Push once  dextrose 50% Injectable 25 Gram(s) IV Push once  glucagon  Injectable 1 milliGRAM(s) IntraMuscular once PRN  heparin  Injectable 5000 Unit(s) SubCutaneous every 8 hours  insulin glargine Injectable (LANTUS) 10 Unit(s) SubCutaneous at bedtime  insulin regular  human corrective regimen sliding scale   SubCutaneous every 6 hours  multivitamin   Solution 5 milliLiter(s) Oral daily  pantoprazole   Suspension 40 milliGRAM(s) Enteral Tube before breakfast  petrolatum Ophthalmic Ointment 1 Application(s) Both EYES at bedtime  saccharomyces boulardii 250 milliGRAM(s) Oral two times a day      LABS:                          9.2    15.4  )-----------( 433      ( 20 Mar 2018 05:19 )             30.6     03-20    139  |  97<L>  |  17.0  ----------------------------<  178<H>  4.8   |  33.0<H>  |  0.37<L>    Ca    9.0      20 Mar 2018 05:19  Phos  2.3     03-20  Mg     1.9     03-20                CAPILLARY BLOOD GLUCOSE      POCT Blood Glucose.: 170 mg/dL (21 Mar 2018 12:19)  POCT Blood Glucose.: 184 mg/dL (21 Mar 2018 06:13)  POCT Blood Glucose.: 178 mg/dL (20 Mar 2018 22:03)  POCT Blood Glucose.: 195 mg/dL (20 Mar 2018 17:21)      RADIOLOGY & ADDITIONAL TESTS:      Consultant notes reviewed    Case discussed with consultant/provider/ family /patient

## 2018-03-21 NOTE — PROGRESS NOTE ADULT - ASSESSMENT
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, now with sepsis, PNA requiring  being placed back on ventilator, AMS/encephalopathy   on IV abx  Transverse myelitis- with quadriplegia  IDDM- insulin with HISS  DVT ppx- heparin  GERD- protonix

## 2018-03-22 DIAGNOSIS — R52 PAIN, UNSPECIFIED: ICD-10-CM

## 2018-03-22 DIAGNOSIS — F41.9 ANXIETY DISORDER, UNSPECIFIED: ICD-10-CM

## 2018-03-22 LAB
GLUCOSE BLDC GLUCOMTR-MCNC: 142 MG/DL — HIGH (ref 70–99)
GLUCOSE BLDC GLUCOMTR-MCNC: 176 MG/DL — HIGH (ref 70–99)
GLUCOSE BLDC GLUCOMTR-MCNC: 186 MG/DL — HIGH (ref 70–99)

## 2018-03-22 PROCEDURE — 99232 SBSQ HOSP IP/OBS MODERATE 35: CPT

## 2018-03-22 PROCEDURE — 99233 SBSQ HOSP IP/OBS HIGH 50: CPT

## 2018-03-22 RX ORDER — OXYCODONE HYDROCHLORIDE 5 MG/1
20 TABLET ORAL EVERY 4 HOURS
Qty: 0 | Refills: 0 | Status: DISCONTINUED | OUTPATIENT
Start: 2018-03-22 | End: 2018-03-23

## 2018-03-22 RX ORDER — OXYCODONE HYDROCHLORIDE 5 MG/1
10 TABLET ORAL EVERY 6 HOURS
Qty: 0 | Refills: 0 | Status: DISCONTINUED | OUTPATIENT
Start: 2018-03-22 | End: 2018-03-23

## 2018-03-22 RX ORDER — OXYCODONE HYDROCHLORIDE 5 MG/1
10 TABLET ORAL EVERY 6 HOURS
Qty: 0 | Refills: 0 | Status: DISCONTINUED | OUTPATIENT
Start: 2018-03-22 | End: 2018-03-22

## 2018-03-22 RX ADMIN — OXYCODONE HYDROCHLORIDE 10 MILLIGRAM(S): 5 TABLET ORAL at 18:02

## 2018-03-22 RX ADMIN — PANTOPRAZOLE SODIUM 40 MILLIGRAM(S): 20 TABLET, DELAYED RELEASE ORAL at 05:38

## 2018-03-22 RX ADMIN — HEPARIN SODIUM 5000 UNIT(S): 5000 INJECTION INTRAVENOUS; SUBCUTANEOUS at 22:12

## 2018-03-22 RX ADMIN — Medication 1 APPLICATION(S): at 22:12

## 2018-03-22 RX ADMIN — AMPICILLIN SODIUM AND SULBACTAM SODIUM 200 GRAM(S): 250; 125 INJECTION, POWDER, FOR SUSPENSION INTRAMUSCULAR; INTRAVENOUS at 22:12

## 2018-03-22 RX ADMIN — AMPICILLIN SODIUM AND SULBACTAM SODIUM 200 GRAM(S): 250; 125 INJECTION, POWDER, FOR SUSPENSION INTRAMUSCULAR; INTRAVENOUS at 03:00

## 2018-03-22 RX ADMIN — Medication 250 MILLIGRAM(S): at 05:38

## 2018-03-22 RX ADMIN — AMPICILLIN SODIUM AND SULBACTAM SODIUM 200 GRAM(S): 250; 125 INJECTION, POWDER, FOR SUSPENSION INTRAMUSCULAR; INTRAVENOUS at 15:11

## 2018-03-22 RX ADMIN — AMPICILLIN SODIUM AND SULBACTAM SODIUM 200 GRAM(S): 250; 125 INJECTION, POWDER, FOR SUSPENSION INTRAMUSCULAR; INTRAVENOUS at 09:29

## 2018-03-22 RX ADMIN — Medication 10 MILLIGRAM(S): at 05:38

## 2018-03-22 RX ADMIN — Medication 0.5 MILLIGRAM(S): at 05:53

## 2018-03-22 RX ADMIN — OXYCODONE HYDROCHLORIDE 10 MILLIGRAM(S): 5 TABLET ORAL at 03:00

## 2018-03-22 RX ADMIN — INSULIN HUMAN 1: 100 INJECTION, SOLUTION SUBCUTANEOUS at 13:17

## 2018-03-22 RX ADMIN — CHLORHEXIDINE GLUCONATE 15 MILLILITER(S): 213 SOLUTION TOPICAL at 18:03

## 2018-03-22 RX ADMIN — Medication 10 MILLIGRAM(S): at 18:03

## 2018-03-22 RX ADMIN — Medication 0.5 MILLIGRAM(S): at 22:18

## 2018-03-22 RX ADMIN — INSULIN HUMAN 1: 100 INJECTION, SOLUTION SUBCUTANEOUS at 16:40

## 2018-03-22 RX ADMIN — OXYCODONE HYDROCHLORIDE 10 MILLIGRAM(S): 5 TABLET ORAL at 04:00

## 2018-03-22 RX ADMIN — CHLORHEXIDINE GLUCONATE 15 MILLILITER(S): 213 SOLUTION TOPICAL at 05:38

## 2018-03-22 RX ADMIN — CLOPIDOGREL BISULFATE 75 MILLIGRAM(S): 75 TABLET, FILM COATED ORAL at 13:16

## 2018-03-22 RX ADMIN — OXYCODONE HYDROCHLORIDE 10 MILLIGRAM(S): 5 TABLET ORAL at 19:02

## 2018-03-22 RX ADMIN — Medication 250 MILLIGRAM(S): at 18:02

## 2018-03-22 RX ADMIN — Medication 500 MILLIGRAM(S): at 13:16

## 2018-03-22 RX ADMIN — Medication 81 MILLIGRAM(S): at 13:16

## 2018-03-22 RX ADMIN — HEPARIN SODIUM 5000 UNIT(S): 5000 INJECTION INTRAVENOUS; SUBCUTANEOUS at 13:17

## 2018-03-22 RX ADMIN — Medication 5 MILLILITER(S): at 13:16

## 2018-03-22 RX ADMIN — HEPARIN SODIUM 5000 UNIT(S): 5000 INJECTION INTRAVENOUS; SUBCUTANEOUS at 05:38

## 2018-03-22 NOTE — PROGRESS NOTE ADULT - ATTENDING COMMENTS
62 male with transverse myelitis chronic trach peg and ostomy who was on trach collar using valve to communicate became more lethargic, hypoxic,  and tachycardic with sepsis. Pt has thick purulent secretions at trach site and large decubitus ulcer. Pan culture, broadened Abx coverage - ID following. Pt  on vent  family meeting with palliative team

## 2018-03-22 NOTE — PROGRESS NOTE ADULT - ASSESSMENT
62M with transverse myelitis, with trach/peg, on full vent support being treated for MDR acinetobacter PNA.

## 2018-03-22 NOTE — PROGRESS NOTE ADULT - ASSESSMENT
61y/o male with pmh of PMH Transverse myelitis, Paraplegia, trach/peg, colostomy, HTN, coming from [facility] presents with fever for 3 days  C DIFF NEG       WBC INCREASED CT SCAN OF CHEST WITH EXTENSIVE PNEUMONIA  TRANSFERRED TO ICU NOW OUT OF ICU   SPUTUM ACINETOBACTER  ON  UNASYN SINCE 3/19  WOULD CONTINUE PRESENT IV ABX REGIMEN

## 2018-03-22 NOTE — PROGRESS NOTE ADULT - ATTENDING COMMENTS
Thank you for the opportunity to assist with the care of this patient.   [facility] Consult [facility] [phone].

## 2018-03-22 NOTE — PROGRESS NOTE ADULT - PROBLEM SELECTOR PLAN 3
- altered his pain regimen because he is still in pain, will continue to see how much medicine he is requiring and then will add fentanyl patch.

## 2018-03-22 NOTE — PROGRESS NOTE ADULT - SUBJECTIVE AND OBJECTIVE BOX
OVERNIGHT EVENTS:     Present Symptoms:     Dyspnea: 0 1 2 3   Nausea/Vomiting: Yes No  Anxiety:  Yes No  Depression: Yes No  Fatigue: Yes No  Loss of appetite: Yes No    Pain:             Character-            Duration-            Effect-            Factors-            Frequency-            Location-            Severity-    Review of Systems: Reviewed                     Negative:                     Positive:  Unable to obtain due to poor mentation   All others negative    MEDICATIONS  (STANDING):  ampicillin/sulbactam  IVPB      ampicillin/sulbactam  IVPB 3 Gram(s) IV Intermittent every 6 hours  ascorbic acid 500 milliGRAM(s) Oral daily  aspirin  chewable 81 milliGRAM(s) Oral daily  baclofen 10 milliGRAM(s) Oral two times a day  chlorhexidine 0.12% Liquid 15 milliLiter(s) Swish and Spit two times a day  clopidogrel Tablet 75 milliGRAM(s) Oral daily  dextrose 5%. 1000 milliLiter(s) (50 mL/Hr) IV Continuous <Continuous>  dextrose 50% Injectable 12.5 Gram(s) IV Push once  dextrose 50% Injectable 25 Gram(s) IV Push once  dextrose 50% Injectable 25 Gram(s) IV Push once  heparin  Injectable 5000 Unit(s) SubCutaneous every 8 hours  insulin glargine Injectable (LANTUS) 10 Unit(s) SubCutaneous at bedtime  insulin regular  human corrective regimen sliding scale   SubCutaneous every 6 hours  multivitamin   Solution 5 milliLiter(s) Oral daily  oxyCODONE    Solution 10 milliGRAM(s) Oral every 6 hours  pantoprazole   Suspension 40 milliGRAM(s) Enteral Tube before breakfast  petrolatum Ophthalmic Ointment 1 Application(s) Both EYES at bedtime  saccharomyces boulardii 250 milliGRAM(s) Oral two times a day    MEDICATIONS  (PRN):  acetaminophen   Tablet 650 milliGRAM(s) Oral every 6 hours PRN For Temp greater than 38 C (100.4 F)  acetaminophen   Tablet. 650 milliGRAM(s) Oral every 6 hours PRN Moderate Pain (4 - 6) and headache  ALBUTerol    0.083% 2.5 milliGRAM(s) Nebulizer every 6 hours PRN Shortness of Breath and/or Wheezing  ALPRAZolam 0.5 milliGRAM(s) Oral every 8 hours PRN anxiety/agitation  artificial  tears Solution 1 Drop(s) Both EYES every 4 hours PRN Dry Eyes  glucagon  Injectable 1 milliGRAM(s) IntraMuscular once PRN Glucose <70 milliGRAM(s)/deciLiter  oxyCODONE    Solution 20 milliGRAM(s) Oral every 4 hours PRN severe pain or breakthrough pain    PHYSICAL EXAM:    Vital Signs Last 24 Hrs  T(C): 37.6 (22 Mar 2018 08:20), Max: 37.6 (22 Mar 2018 08:20)  T(F): 99.7 (22 Mar 2018 08:20), Max: 99.7 (22 Mar 2018 08:20)  HR: 87 (22 Mar 2018 12:37) (87 - 102)  BP: 125/63 (22 Mar 2018 08:20) (125/63 - 130/78)  BP(mean): --  RR: 22 (22 Mar 2018 08:20) (18 - 22)  SpO2: 100% (22 Mar 2018 12:37) (96% - 100%)    General: alert  oriented x ____ lethargic agitated                  cachexia  nonverbal  coma    Karnofsky:  %    HEENT: normal  dry mouth  ET tube/trach    Lungs: comfortable tachypnea/labored breathing  excessive secretions    CV: normal  tachycardia    GI: normal  distended  tender  no BS               PEG/NG/OG tube  constipation  last BM:     : normal  incontinent  oliguria/anuria  beltrán    MSK: normal  weakness  edema             ambulatory  bedbound/wheelchair bound    Skin: normal  pressure ulcers- Stage_____  no rash    LABS:    I&O's Summary    21 Mar 2018 07:01  -  22 Mar 2018 07:00  --------------------------------------------------------  IN: 1990 mL / OUT: 3800 mL / NET: -1810 mL    22 Mar 2018 07:01  -  22 Mar 2018 16:16  --------------------------------------------------------  IN: 480 mL / OUT: 900 mL / NET: -420 mL        RADIOLOGY & ADDITIONAL STUDIES:    ADVANCE DIRECTIVES:   DNR YES NO  Completed on:                     MOLST  YES NO   Completed on:  Living Will  YES NO   Completed on: OVERNIGHT EVENTS:     Present Symptoms:     Dyspnea: 0   Nausea/Vomiting: No  Anxiety:  Yes   Depression: No  Fatigue: Yes   Loss of appetite: No    Pain: yes neck 6/10 pressure sensation often             Character-            Duration-            Effect-            Factors-            Frequency-            Location-            Severity-    Review of Systems: Reviewed                                        Positive: neck pain, anxiety   All others negative    MEDICATIONS  (STANDING):  ampicillin/sulbactam  IVPB      ampicillin/sulbactam  IVPB 3 Gram(s) IV Intermittent every 6 hours  ascorbic acid 500 milliGRAM(s) Oral daily  aspirin  chewable 81 milliGRAM(s) Oral daily  baclofen 10 milliGRAM(s) Oral two times a day  chlorhexidine 0.12% Liquid 15 milliLiter(s) Swish and Spit two times a day  clopidogrel Tablet 75 milliGRAM(s) Oral daily  dextrose 5%. 1000 milliLiter(s) (50 mL/Hr) IV Continuous <Continuous>  dextrose 50% Injectable 12.5 Gram(s) IV Push once  dextrose 50% Injectable 25 Gram(s) IV Push once  dextrose 50% Injectable 25 Gram(s) IV Push once  heparin  Injectable 5000 Unit(s) SubCutaneous every 8 hours  insulin glargine Injectable (LANTUS) 10 Unit(s) SubCutaneous at bedtime  insulin regular  human corrective regimen sliding scale   SubCutaneous every 6 hours  multivitamin   Solution 5 milliLiter(s) Oral daily  oxyCODONE    Solution 10 milliGRAM(s) Oral every 6 hours  pantoprazole   Suspension 40 milliGRAM(s) Enteral Tube before breakfast  petrolatum Ophthalmic Ointment 1 Application(s) Both EYES at bedtime  saccharomyces boulardii 250 milliGRAM(s) Oral two times a day    MEDICATIONS  (PRN):  acetaminophen   Tablet 650 milliGRAM(s) Oral every 6 hours PRN For Temp greater than 38 C (100.4 F)  acetaminophen   Tablet. 650 milliGRAM(s) Oral every 6 hours PRN Moderate Pain (4 - 6) and headache  ALBUTerol    0.083% 2.5 milliGRAM(s) Nebulizer every 6 hours PRN Shortness of Breath and/or Wheezing  ALPRAZolam 0.5 milliGRAM(s) Oral every 8 hours PRN anxiety/agitation  artificial  tears Solution 1 Drop(s) Both EYES every 4 hours PRN Dry Eyes  glucagon  Injectable 1 milliGRAM(s) IntraMuscular once PRN Glucose <70 milliGRAM(s)/deciLiter  oxyCODONE    Solution 20 milliGRAM(s) Oral every 4 hours PRN severe pain or breakthrough pain    PHYSICAL EXAM:    Vital Signs Last 24 Hrs  T(C): 37.6 (22 Mar 2018 08:20), Max: 37.6 (22 Mar 2018 08:20)  T(F): 99.7 (22 Mar 2018 08:20), Max: 99.7 (22 Mar 2018 08:20)  HR: 87 (22 Mar 2018 12:37) (87 - 102)  BP: 125/63 (22 Mar 2018 08:20) (125/63 - 130/78)  BP(mean): --  RR: 22 (22 Mar 2018 08:20) (18 - 22)  SpO2: 100% (22 Mar 2018 12:37) (96% - 100%)    General: alert  oriented x 3    Karnofsky:  30 %    HEENT: normal; trach on ventilator     Lungs: comfortable     CV: normal      GI: normal      : beltrán    MSK: weakness      Skin:  no rash    LABS:    I&O's Summary    21 Mar 2018 07:01  -  22 Mar 2018 07:00  --------------------------------------------------------  IN: 1990 mL / OUT: 3800 mL / NET: -1810 mL    22 Mar 2018 07:01  -  22 Mar 2018 16:16  --------------------------------------------------------  IN: 480 mL / OUT: 900 mL / NET: -420 mL    RADIOLOGY & ADDITIONAL STUDIES:    ADVANCE DIRECTIVES: Full Code OVERNIGHT EVENTS: 62M with transverse myelitis with trach/PEG, on vent with pain, anxiety, functional quadraplegia, and difficulty communicating.     Present Symptoms:     Dyspnea: 0   Nausea/Vomiting: No  Anxiety:  Yes   Depression: No  Fatigue: Yes   Loss of appetite: No    Pain: yes neck 6/10 pressure sensation often             Character-            Duration-            Effect-            Factors-            Frequency-            Location-            Severity-    Review of Systems: Reviewed                                        Positive: neck pain, anxiety   All others negative    MEDICATIONS  (STANDING):  ampicillin/sulbactam  IVPB      ampicillin/sulbactam  IVPB 3 Gram(s) IV Intermittent every 6 hours  ascorbic acid 500 milliGRAM(s) Oral daily  aspirin  chewable 81 milliGRAM(s) Oral daily  baclofen 10 milliGRAM(s) Oral two times a day  chlorhexidine 0.12% Liquid 15 milliLiter(s) Swish and Spit two times a day  clopidogrel Tablet 75 milliGRAM(s) Oral daily  dextrose 5%. 1000 milliLiter(s) (50 mL/Hr) IV Continuous <Continuous>  dextrose 50% Injectable 12.5 Gram(s) IV Push once  dextrose 50% Injectable 25 Gram(s) IV Push once  dextrose 50% Injectable 25 Gram(s) IV Push once  heparin  Injectable 5000 Unit(s) SubCutaneous every 8 hours  insulin glargine Injectable (LANTUS) 10 Unit(s) SubCutaneous at bedtime  insulin regular  human corrective regimen sliding scale   SubCutaneous every 6 hours  multivitamin   Solution 5 milliLiter(s) Oral daily  oxyCODONE    Solution 10 milliGRAM(s) Oral every 6 hours  pantoprazole   Suspension 40 milliGRAM(s) Enteral Tube before breakfast  petrolatum Ophthalmic Ointment 1 Application(s) Both EYES at bedtime  saccharomyces boulardii 250 milliGRAM(s) Oral two times a day    MEDICATIONS  (PRN):  acetaminophen   Tablet 650 milliGRAM(s) Oral every 6 hours PRN For Temp greater than 38 C (100.4 F)  acetaminophen   Tablet. 650 milliGRAM(s) Oral every 6 hours PRN Moderate Pain (4 - 6) and headache  ALBUTerol    0.083% 2.5 milliGRAM(s) Nebulizer every 6 hours PRN Shortness of Breath and/or Wheezing  ALPRAZolam 0.5 milliGRAM(s) Oral every 8 hours PRN anxiety/agitation  artificial  tears Solution 1 Drop(s) Both EYES every 4 hours PRN Dry Eyes  glucagon  Injectable 1 milliGRAM(s) IntraMuscular once PRN Glucose <70 milliGRAM(s)/deciLiter  oxyCODONE    Solution 20 milliGRAM(s) Oral every 4 hours PRN severe pain or breakthrough pain    PHYSICAL EXAM:    Vital Signs Last 24 Hrs  T(C): 37.6 (22 Mar 2018 08:20), Max: 37.6 (22 Mar 2018 08:20)  T(F): 99.7 (22 Mar 2018 08:20), Max: 99.7 (22 Mar 2018 08:20)  HR: 87 (22 Mar 2018 12:37) (87 - 102)  BP: 125/63 (22 Mar 2018 08:20) (125/63 - 130/78)  BP(mean): --  RR: 22 (22 Mar 2018 08:20) (18 - 22)  SpO2: 100% (22 Mar 2018 12:37) (96% - 100%)    General: alert  oriented x 3    Karnofsky:  30 %    HEENT: normal; trach on ventilator     Lungs: comfortable     CV: normal      GI: normal      : leigh ann    MSK: weakness      Skin:  no rash    LABS:    I&O's Summary    21 Mar 2018 07:01  -  22 Mar 2018 07:00  --------------------------------------------------------  IN: 1990 mL / OUT: 3800 mL / NET: -1810 mL    22 Mar 2018 07:01  -  22 Mar 2018 16:16  --------------------------------------------------------  IN: 480 mL / OUT: 900 mL / NET: -420 mL    RADIOLOGY & ADDITIONAL STUDIES:    ADVANCE DIRECTIVES: Full Code

## 2018-03-22 NOTE — PROGRESS NOTE ADULT - SUBJECTIVE AND OBJECTIVE BOX
KAMAR BELLAMY Patient is a 62y old  Male who presents with a chief complaint of fevers (12 Mar 2018 18:28)     HPI:  61y/o male with pmh of PMH Transverse myelitis, Paraplegia, trach/peg, colostomy, HTN, coming from [facility] presents with fever for 3 days.      Patient denies any cough, sob, runny nose, vomiting, diarrhea, abdominal pain, pain with urination.    Patient signed MOLST REQUESTING CPR on 2/18/18.    Patient eats via tube feeding,.      Originally at [facility] in   7months ago, developed Acute transverse myelitis.    Heart Attack, stent placed, check up after.  He was given a tetanus shot, then couple days ago he developed lower extremity weakness, paralysis; ultimately developed ulcers.      [facility]--> [facility] (kept him for a few weeks) --> wife couldn't take care of him;    He is getting frequent uti's, anemia reequiring transfusions.    101 fever at nursing home today [facility] [place]) (10 Mar 2018 23:26)    The patient was seen and evaluated   The patient is in no acute distress.      Mode: CPAP with PS, FiO2: 30, PEEP: 5, PS: 10, MAP: 8I&O's Summary    21 Mar 2018 07:01  -  22 Mar 2018 07:00  --------------------------------------------------------  IN: 1990 mL / OUT: 3800 mL / NET: -1810 mL    22 Mar 2018 07:01  -  22 Mar 2018 14:12  --------------------------------------------------------  IN: 0 mL / OUT: 900 mL / NET: -900 mL      Allergies    No Known Allergies    Intolerances      HEALTH ISSUES - PROBLEM Dx:  Palliative care encounter: Palliative care encounter  Functional quadriplegia: Functional quadriplegia  Pneumonia: Pneumonia  Encephalopathy, unspecified: Encephalopathy, unspecified  Acute on chronic respiratory failure with hypoxia: Acute on chronic respiratory failure with hypoxia  Leukocytosis: Leukocytosis  Fever, unspecified fever cause: Fever, unspecified fever cause  Fever: Fever  Skin ulcer of sacrum, unspecified ulcer stage: Skin ulcer of sacrum, unspecified ulcer stage  History of Clostridium difficile infection: History of Clostridium difficile infection  Type 2 diabetes mellitus without complication, with long-term current use of insulin: Type 2 diabetes mellitus without complication, with long-term current use of insulin  Need for prophylactic measure: Need for prophylactic measure  Paralysis: Paralysis  Essential hypertension: Essential hypertension  Transverse myelitis: Transverse myelitis  Sepsis, due to unspecified organism: Sepsis, due to unspecified organism        PAST MEDICAL & SURGICAL HISTORY:  Essential hypertension  Paralysis  Transverse myelitis  History of tracheostomy          Vital Signs Last 24 Hrs  T(C): 37.6 (22 Mar 2018 08:20), Max: 37.6 (22 Mar 2018 08:20)  T(F): 99.7 (22 Mar 2018 08:20), Max: 99.7 (22 Mar 2018 08:20)  HR: 87 (22 Mar 2018 12:37) (87 - 103)  BP: 125/63 (22 Mar 2018 08:20) (125/63 - 163/93)  BP(mean): --  RR: 22 (22 Mar 2018 08:20) (18 - 22)  SpO2: 100% (22 Mar 2018 12:37) (96% - 100%)T(C): 37.6 (03-22-18 @ 08:20), Max: 37.6 (03-22-18 @ 08:20)  HR: 87 (03-22-18 @ 12:37) (87 - 103)  BP: 125/63 (03-22-18 @ 08:20) (125/63 - 163/93)  RR: 22 (03-22-18 @ 08:20) (18 - 22)  SpO2: 100% (03-22-18 @ 12:37) (96% - 100%)  Wt(kg): --    PHYSICAL EXAM:    GENERAL: NAD,  ENMT: trach PEG   NECK: Supple,  NERVOUS SYSTEM:  Alert & cant Move upper and lower extremities; CNS-II-XII  CHEST/LUNG: Clear to auscultation bilaterally; No rales, rhonchi, wheezing,   HEART: Regular rate and rhythm; No murmurs,   ABDOMEN: Soft, Nontender, Nondistended; Bowel sounds present  EXTREMITIES:  Peripheral Pulses, No  cyanosis, or edema  SKIN: No rashes or lesions  psychiatry- mood and affect depressed     acetaminophen   Tablet 650 milliGRAM(s) Oral every 6 hours PRN  acetaminophen   Tablet. 650 milliGRAM(s) Oral every 6 hours PRN  ALBUTerol    0.083% 2.5 milliGRAM(s) Nebulizer every 6 hours PRN  ALPRAZolam 0.5 milliGRAM(s) Oral every 8 hours PRN  ampicillin/sulbactam  IVPB      ampicillin/sulbactam  IVPB 3 Gram(s) IV Intermittent every 6 hours  artificial  tears Solution 1 Drop(s) Both EYES every 4 hours PRN  ascorbic acid 500 milliGRAM(s) Oral daily  aspirin  chewable 81 milliGRAM(s) Oral daily  baclofen 10 milliGRAM(s) Oral two times a day  chlorhexidine 0.12% Liquid 15 milliLiter(s) Swish and Spit two times a day  clopidogrel Tablet 75 milliGRAM(s) Oral daily  dextrose 5%. 1000 milliLiter(s) IV Continuous <Continuous>  dextrose 50% Injectable 12.5 Gram(s) IV Push once  dextrose 50% Injectable 25 Gram(s) IV Push once  dextrose 50% Injectable 25 Gram(s) IV Push once  glucagon  Injectable 1 milliGRAM(s) IntraMuscular once PRN  heparin  Injectable 5000 Unit(s) SubCutaneous every 8 hours  insulin glargine Injectable (LANTUS) 10 Unit(s) SubCutaneous at bedtime  insulin regular  human corrective regimen sliding scale   SubCutaneous every 6 hours  multivitamin   Solution 5 milliLiter(s) Oral daily  oxyCODONE    IR 10 milliGRAM(s) Oral every 6 hours PRN  pantoprazole   Suspension 40 milliGRAM(s) Enteral Tube before breakfast  petrolatum Ophthalmic Ointment 1 Application(s) Both EYES at bedtime  saccharomyces boulardii 250 milliGRAM(s) Oral two times a day      LABS:                      CAPILLARY BLOOD GLUCOSE      POCT Blood Glucose.: 176 mg/dL (22 Mar 2018 11:40)  POCT Blood Glucose.: 142 mg/dL (22 Mar 2018 05:36)  POCT Blood Glucose.: 187 mg/dL (21 Mar 2018 23:29)  POCT Blood Glucose.: 213 mg/dL (21 Mar 2018 17:26)      RADIOLOGY & ADDITIONAL TESTS:      Consultant notes reviewed    Case discussed with consultant/provider/ family /patient

## 2018-03-22 NOTE — PROGRESS NOTE ADULT - SUBJECTIVE AND OBJECTIVE BOX
PULMONARY PROGRESS NOTE      [id number]    Patient is a 62y old  Male who presents with a chief complaint of fevers (12 Mar 2018 18:28)      INTERVAL HPI/OVERNIGHT EVENTS:  Comfortable on Psv10/peep 5 40 %  no cough wheeze or sputum    MEDICATIONS  (STANDING):  ampicillin/sulbactam  IVPB      ampicillin/sulbactam  IVPB 3 Gram(s) IV Intermittent every 6 hours  ascorbic acid 500 milliGRAM(s) Oral daily  aspirin  chewable 81 milliGRAM(s) Oral daily  baclofen 10 milliGRAM(s) Oral two times a day  chlorhexidine 0.12% Liquid 15 milliLiter(s) Swish and Spit two times a day  clopidogrel Tablet 75 milliGRAM(s) Oral daily  dextrose 5%. 1000 milliLiter(s) (50 mL/Hr) IV Continuous <Continuous>  dextrose 50% Injectable 12.5 Gram(s) IV Push once  dextrose 50% Injectable 25 Gram(s) IV Push once  dextrose 50% Injectable 25 Gram(s) IV Push once  heparin  Injectable 5000 Unit(s) SubCutaneous every 8 hours  insulin glargine Injectable (LANTUS) 10 Unit(s) SubCutaneous at bedtime  insulin regular  human corrective regimen sliding scale   SubCutaneous every 6 hours  multivitamin   Solution 5 milliLiter(s) Oral daily  pantoprazole   Suspension 40 milliGRAM(s) Enteral Tube before breakfast  petrolatum Ophthalmic Ointment 1 Application(s) Both EYES at bedtime  saccharomyces boulardii 250 milliGRAM(s) Oral two times a day      MEDICATIONS  (PRN):  acetaminophen   Tablet 650 milliGRAM(s) Oral every 6 hours PRN For Temp greater than 38 C (100.4 F)  acetaminophen   Tablet. 650 milliGRAM(s) Oral every 6 hours PRN Moderate Pain (4 - 6) and headache  ALBUTerol    0.083% 2.5 milliGRAM(s) Nebulizer every 6 hours PRN Shortness of Breath and/or Wheezing  ALPRAZolam 0.5 milliGRAM(s) Oral every 8 hours PRN anxiety/agitation  artificial  tears Solution 1 Drop(s) Both EYES every 4 hours PRN Dry Eyes  glucagon  Injectable 1 milliGRAM(s) IntraMuscular once PRN Glucose <70 milliGRAM(s)/deciLiter  oxyCODONE    IR 10 milliGRAM(s) Oral every 6 hours PRN Moderate Pain (4 - 6)      Allergies    No Known Allergies    Intolerances        PAST MEDICAL & SURGICAL HISTORY:  Essential hypertension  Paralysis  Transverse myelitis  History of tracheostomy      SOCIAL HISTORY  Smoking History:       REVIEW OF SYSTEMS:    CONSTITUTIONAL:  No distress    HEENT:  Eyes:  No diplopia or blurred vision. ENT:  No earache, sore throat or runny nose.    CARDIOVASCULAR:  No pressure, squeezing, tightness, heaviness or aching about the chest; no palpitations.    RESPIRATORY:  No cough, shortness of breath, PND or orthopnea. Mild SOBOE    GASTROINTESTINAL:  No nausea, vomiting or diarrhea.    GENITOURINARY:  No dysuria, frequency or urgency.    NEUROLOGIC:  No paresthesias, fasciculations, seizures or weakness.    Extremities: No cyanosis, clubbing or edema    PSYCHIATRIC:  No disorder of thought or mood.    Vital Signs Last 24 Hrs  T(C): 36.7 (21 Mar 2018 15:40), Max: 36.7 (21 Mar 2018 15:40)  T(F): 98 (21 Mar 2018 15:40), Max: 98 (21 Mar 2018 15:40)  HR: 100 (22 Mar 2018 03:26) (90 - 103)  BP: 130/78 (21 Mar 2018 23:00) (130/78 - 163/93)  BP(mean): --  RR: 18 (21 Mar 2018 23:00) (18 - 20)  SpO2: 100% (22 Mar 2018 03:26) (96% - 100%)    PHYSICAL EXAMINATION:    GENERAL: The patient is awake and alert in no apparent distress.     HEENT: Head is normocephalic and atraumatic. Extraocular muscles are intact. Mucous membranes are moist.    NECK: Supple.    LUNGS: Clear to auscultation without wheezing, rales or rhonchi; respirations unlabored    HEART: Regular rate and rhythm without murmur.    ABDOMEN: Soft, nontender, and nondistended.      EXTREMITIES: Without any cyanosis, clubbing, rash, lesions or edema.    NEUROLOGIC: Grossly intact.    LABS:                              MICROBIOLOGY:Culture - Sputum . 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  TYPE: (C=Critical, N=Notification, A=Abnormal) C  TESTS:  _ MDRO  DATE/TIME CALLED: _ 03/18/2018 16:01:15  CALLED TO: Johnathan vega  READ BACK (2 Patient Identifiers)(Y/N): _ y  READ BACK VALUES (Y/N): _ y  CALLED BY: Johnathan hinojosa  send a copy to logistics and i.c    Organism Identification: Acinetobacter baumannii/haemolyticus (Carbapenem Resistant)    Organism: Acinetobacter baumannii/haemolyticus (Carbapenem Resistant)    Method Type: AMIE        RADIOLOGY & ADDITIONAL STUDIES:

## 2018-03-22 NOTE — PROGRESS NOTE ADULT - SUBJECTIVE AND OBJECTIVE BOX
KAMAR BELLAMY is a 62y Male with HPI:  63y/o male with pmh of PMH Transverse myelitis, Paraplegia, trach/peg, colostomy, HTN, coming from [facility] presents with fever for 3 days.     Originally at [facility] in   7months ago, developed Acute transverse myelitis.    Heart Attack, stent placed, check up after.  He was given a tetanus shot, then couple days ago he developed lower extremity weakness, paralysis; ultimately developed ulcers.    [facility]--> [facility] (kept him for a few weeks) --> wife couldn't take care of him;         CT SCAN DONE WHICH SHOW EXTENSIVE PNEUMONIA   ABX CHANGED TO  [facility] FOR Acinetobacter  NOW OUT OF ICU   IMPROVED  Allergies:  No Known Allergies      Medications:  ALPRAZolam 0.5 milliGRAM(s) Oral three times a day PRN  ascorbic acid 500 milliGRAM(s) Oral daily  aspirin  chewable 81 milliGRAM(s) Oral daily  baclofen 10 milliGRAM(s) Oral two times a day  clopidogrel Tablet 75 milliGRAM(s) Oral daily  dextrose 5%. 1000 milliLiter(s) IV Continuous <Continuous>  dextrose 50% Injectable 12.5 Gram(s) IV Push once  dextrose 50% Injectable 25 Gram(s) IV Push once  dextrose 50% Injectable 25 Gram(s) IV Push once  glucagon  Injectable 1 milliGRAM(s) IntraMuscular once PRN  heparin  Injectable 5000 Unit(s) SubCutaneous every 8 hours  insulin regular  human corrective regimen sliding scale   SubCutaneous every 6 hours  metroNIDAZOLE    Tablet 500 milliGRAM(s) Oral every 8 hours  multivitamin   Solution 5 milliLiter(s) Oral daily  oxyCODONE    IR 10 milliGRAM(s) Oral every 6 hours PRN  pantoprazole   Suspension 40 milliGRAM(s) Enteral Tube before breakfast  petrolatum Ophthalmic Ointment 1 Application(s) Both EYES at bedtime      ANTIBIOTICS:         Review of Systems: - Negative except as mentioned above     Physical Exam:      Vital Signs Last 24 Hrs  T Vital Signs Last 24 Hrs  T(C): 35.7 (20 Mar 2018 08:01), Max: 37.3 (19 Mar 2018 15:35)  T(F): 96.3 (20 Mar 2018 08:01), Max: 99.1 (19 Mar 2018 15:35)  HR: 79 (20 Mar 2018 10:00) (69 - 103)  BP: 127/72 (20 Mar 2018 10:00) (102/60 - 148/71)  BP(mean): 95 (20 Mar 2018 10:00) (76 - 102)  RR: 18 (20 Mar 2018 10:00) (13 - 23)  SpO2: 99% (20 Mar 2018 10:00) (70% - 100%)    GEN:  AWAKE ON VENT  HEENT: normocephalic and atraumatic. EOMI. JAMESON... TRACH  NECK: Supple. No carotid bruits.  No lymphadenopathy or thyromegaly.  LUNGS: Clear to auscultation.  HEART: Regular rate and rhythm without murmur.  ABDOMEN: Soft, nontender, and nondistended.  Positive bowel sounds.  No hepatosplenomegaly was noted.  NO REBOUND NO GUARDING  EXTREMITIES: Without any cyanosis, clubbing, rash, lesions or edema.  NEUROLOGIC: AWAKE ON VENT    SKIN: No ulceration or induration present.      Labs:                                             9.2    15.4  )-----------( 433      ( 20 Mar 2018 05:19 )             30.6

## 2018-03-22 NOTE — CONSULT NOTE ADULT - SUBJECTIVE AND OBJECTIVE BOX
A lip reading consultation was performed by Ms. Marie Armstrong for Mr. Bryan Amin in Medical Intensive Care Unit (MICU). Ms. Armstrong is director of adult services at the [employer]. Present at the consultation were Ms Marie Armstrong, Eran Cr M.D. , Sandee Quinones M.D. and Mr Amin's wife Courtney. We were all gowned and gloved. A full report of the consultation by Ms. Armstrong is to follow to be included in the chart. A lip reading consultation was performed by Ms. Marie Armstrong for Mr. Bryan Amin in Medical Intensive Care Unit (MICU). Ms. Armstrong is director of adult services at the [employer]. Present at the consultation were Ms Marie Armstrong, Eran Cr M.D. , Sandee Quinones M.D. and Mr Amin's wife Courtney. We were all gowned and gloved. A full report of the consultation by Ms. Armstrong is to follow to be included in the chart. Recommendations from the lip reading consult by Ms Marie Armstrong were:A. Increase suctioning (there are a lot secretions) B: Complained of pain along the entire back.

## 2018-03-22 NOTE — PROGRESS NOTE ADULT - ASSESSMENT
Transverse myelitis with chronic resp failure  HCAP   Acinetobacter in sputum  responding well to RX    Plan:  wean as tolerated   abx per ID  chest xray in am

## 2018-03-22 NOTE — PROGRESS NOTE ADULT - PROBLEM SELECTOR PLAN 6
- met with wife and patient, will meet with them tomorrow to discuss overall goals and different options at 11am.

## 2018-03-23 LAB
GLUCOSE BLDC GLUCOMTR-MCNC: 170 MG/DL — HIGH (ref 70–99)
GLUCOSE BLDC GLUCOMTR-MCNC: 171 MG/DL — HIGH (ref 70–99)
GLUCOSE BLDC GLUCOMTR-MCNC: 183 MG/DL — HIGH (ref 70–99)
GLUCOSE BLDC GLUCOMTR-MCNC: 187 MG/DL — HIGH (ref 70–99)
GLUCOSE BLDC GLUCOMTR-MCNC: 197 MG/DL — HIGH (ref 70–99)

## 2018-03-23 PROCEDURE — 99233 SBSQ HOSP IP/OBS HIGH 50: CPT

## 2018-03-23 PROCEDURE — 71045 X-RAY EXAM CHEST 1 VIEW: CPT | Mod: 26

## 2018-03-23 RX ORDER — OXYCODONE HYDROCHLORIDE 5 MG/1
15 TABLET ORAL
Qty: 0 | Refills: 0 | Status: DISCONTINUED | OUTPATIENT
Start: 2018-03-23 | End: 2018-03-30

## 2018-03-23 RX ORDER — FENTANYL CITRATE 50 UG/ML
1 INJECTION INTRAVENOUS
Qty: 0 | Refills: 0 | Status: DISCONTINUED | OUTPATIENT
Start: 2018-03-23 | End: 2018-03-29

## 2018-03-23 RX ORDER — OXYCODONE HYDROCHLORIDE 5 MG/1
20 TABLET ORAL
Qty: 0 | Refills: 0 | Status: DISCONTINUED | OUTPATIENT
Start: 2018-03-23 | End: 2018-03-27

## 2018-03-23 RX ORDER — OXYCODONE HYDROCHLORIDE 5 MG/1
20 TABLET ORAL EVERY 4 HOURS
Qty: 0 | Refills: 0 | Status: DISCONTINUED | OUTPATIENT
Start: 2018-03-23 | End: 2018-03-24

## 2018-03-23 RX ORDER — SALICYLIC ACID 0.5 %
1 CLEANSER (GRAM) TOPICAL
Qty: 0 | Refills: 0 | Status: DISCONTINUED | OUTPATIENT
Start: 2018-03-23 | End: 2018-04-13

## 2018-03-23 RX ORDER — ALPRAZOLAM 0.25 MG
0.5 TABLET ORAL EVERY 6 HOURS
Qty: 0 | Refills: 0 | Status: DISCONTINUED | OUTPATIENT
Start: 2018-03-23 | End: 2018-03-30

## 2018-03-23 RX ADMIN — Medication 0.5 MILLIGRAM(S): at 11:03

## 2018-03-23 RX ADMIN — CHLORHEXIDINE GLUCONATE 15 MILLILITER(S): 213 SOLUTION TOPICAL at 06:36

## 2018-03-23 RX ADMIN — Medication 500 MILLIGRAM(S): at 11:01

## 2018-03-23 RX ADMIN — OXYCODONE HYDROCHLORIDE 20 MILLIGRAM(S): 5 TABLET ORAL at 19:30

## 2018-03-23 RX ADMIN — AMPICILLIN SODIUM AND SULBACTAM SODIUM 200 GRAM(S): 250; 125 INJECTION, POWDER, FOR SUSPENSION INTRAMUSCULAR; INTRAVENOUS at 08:39

## 2018-03-23 RX ADMIN — Medication 5 MILLILITER(S): at 11:03

## 2018-03-23 RX ADMIN — FENTANYL CITRATE 1 PATCH: 50 INJECTION INTRAVENOUS at 14:16

## 2018-03-23 RX ADMIN — OXYCODONE HYDROCHLORIDE 20 MILLIGRAM(S): 5 TABLET ORAL at 22:19

## 2018-03-23 RX ADMIN — CLOPIDOGREL BISULFATE 75 MILLIGRAM(S): 75 TABLET, FILM COATED ORAL at 11:02

## 2018-03-23 RX ADMIN — OXYCODONE HYDROCHLORIDE 20 MILLIGRAM(S): 5 TABLET ORAL at 10:53

## 2018-03-23 RX ADMIN — INSULIN GLARGINE 10 UNIT(S): 100 INJECTION, SOLUTION SUBCUTANEOUS at 21:21

## 2018-03-23 RX ADMIN — AMPICILLIN SODIUM AND SULBACTAM SODIUM 200 GRAM(S): 250; 125 INJECTION, POWDER, FOR SUSPENSION INTRAMUSCULAR; INTRAVENOUS at 04:34

## 2018-03-23 RX ADMIN — HEPARIN SODIUM 5000 UNIT(S): 5000 INJECTION INTRAVENOUS; SUBCUTANEOUS at 14:17

## 2018-03-23 RX ADMIN — HEPARIN SODIUM 5000 UNIT(S): 5000 INJECTION INTRAVENOUS; SUBCUTANEOUS at 21:20

## 2018-03-23 RX ADMIN — Medication 1 APPLICATION(S): at 17:28

## 2018-03-23 RX ADMIN — INSULIN HUMAN 1: 100 INJECTION, SOLUTION SUBCUTANEOUS at 11:02

## 2018-03-23 RX ADMIN — OXYCODONE HYDROCHLORIDE 20 MILLIGRAM(S): 5 TABLET ORAL at 14:20

## 2018-03-23 RX ADMIN — INSULIN HUMAN 1: 100 INJECTION, SOLUTION SUBCUTANEOUS at 17:27

## 2018-03-23 RX ADMIN — Medication 1 APPLICATION(S): at 23:16

## 2018-03-23 RX ADMIN — OXYCODONE HYDROCHLORIDE 20 MILLIGRAM(S): 5 TABLET ORAL at 14:19

## 2018-03-23 RX ADMIN — OXYCODONE HYDROCHLORIDE 10 MILLIGRAM(S): 5 TABLET ORAL at 00:15

## 2018-03-23 RX ADMIN — INSULIN HUMAN 1: 100 INJECTION, SOLUTION SUBCUTANEOUS at 00:15

## 2018-03-23 RX ADMIN — OXYCODONE HYDROCHLORIDE 10 MILLIGRAM(S): 5 TABLET ORAL at 01:15

## 2018-03-23 RX ADMIN — AMPICILLIN SODIUM AND SULBACTAM SODIUM 200 GRAM(S): 250; 125 INJECTION, POWDER, FOR SUSPENSION INTRAMUSCULAR; INTRAVENOUS at 21:20

## 2018-03-23 RX ADMIN — OXYCODONE HYDROCHLORIDE 20 MILLIGRAM(S): 5 TABLET ORAL at 21:22

## 2018-03-23 RX ADMIN — Medication 10 MILLIGRAM(S): at 06:37

## 2018-03-23 RX ADMIN — INSULIN HUMAN 1: 100 INJECTION, SOLUTION SUBCUTANEOUS at 23:16

## 2018-03-23 RX ADMIN — INSULIN HUMAN 1: 100 INJECTION, SOLUTION SUBCUTANEOUS at 06:36

## 2018-03-23 RX ADMIN — PANTOPRAZOLE SODIUM 40 MILLIGRAM(S): 20 TABLET, DELAYED RELEASE ORAL at 06:37

## 2018-03-23 RX ADMIN — HEPARIN SODIUM 5000 UNIT(S): 5000 INJECTION INTRAVENOUS; SUBCUTANEOUS at 06:37

## 2018-03-23 RX ADMIN — Medication 250 MILLIGRAM(S): at 06:37

## 2018-03-23 RX ADMIN — OXYCODONE HYDROCHLORIDE 10 MILLIGRAM(S): 5 TABLET ORAL at 06:37

## 2018-03-23 RX ADMIN — Medication 10 MILLIGRAM(S): at 17:27

## 2018-03-23 RX ADMIN — CHLORHEXIDINE GLUCONATE 15 MILLILITER(S): 213 SOLUTION TOPICAL at 17:27

## 2018-03-23 RX ADMIN — AMPICILLIN SODIUM AND SULBACTAM SODIUM 200 GRAM(S): 250; 125 INJECTION, POWDER, FOR SUSPENSION INTRAMUSCULAR; INTRAVENOUS at 14:16

## 2018-03-23 RX ADMIN — OXYCODONE HYDROCHLORIDE 10 MILLIGRAM(S): 5 TABLET ORAL at 07:27

## 2018-03-23 RX ADMIN — Medication 81 MILLIGRAM(S): at 11:02

## 2018-03-23 RX ADMIN — INSULIN GLARGINE 10 UNIT(S): 100 INJECTION, SOLUTION SUBCUTANEOUS at 00:15

## 2018-03-23 RX ADMIN — OXYCODONE HYDROCHLORIDE 20 MILLIGRAM(S): 5 TABLET ORAL at 18:41

## 2018-03-23 NOTE — PROGRESS NOTE ADULT - SUBJECTIVE AND OBJECTIVE BOX
PULMONARY PROGRESS NOTE      [id number]    Patient is a 62y old  Male who presents with a chief complaint of fevers (12 Mar 2018 18:28)      INTERVAL HPI/OVERNIGHT EVENTS: On vent; CPAP/PSV mode. Awake and alert. No resp distress.     MEDICATIONS  (STANDING):  ampicillin/sulbactam  IVPB      ampicillin/sulbactam  IVPB 3 Gram(s) IV Intermittent every 6 hours  ascorbic acid 500 milliGRAM(s) Oral daily  aspirin  chewable 81 milliGRAM(s) Oral daily  baclofen 10 milliGRAM(s) Oral two times a day  chlorhexidine 0.12% Liquid 15 milliLiter(s) Swish and Spit two times a day  clopidogrel Tablet 75 milliGRAM(s) Oral daily  dextrose 5%. 1000 milliLiter(s) (50 mL/Hr) IV Continuous <Continuous>  dextrose 50% Injectable 12.5 Gram(s) IV Push once  dextrose 50% Injectable 25 Gram(s) IV Push once  dextrose 50% Injectable 25 Gram(s) IV Push once  heparin  Injectable 5000 Unit(s) SubCutaneous every 8 hours  insulin glargine Injectable (LANTUS) 10 Unit(s) SubCutaneous at bedtime  insulin regular  human corrective regimen sliding scale   SubCutaneous every 6 hours  multivitamin   Solution 5 milliLiter(s) Oral daily  oxyCODONE    Solution 10 milliGRAM(s) Oral every 6 hours  pantoprazole   Suspension 40 milliGRAM(s) Enteral Tube before breakfast  petrolatum Ophthalmic Ointment 1 Application(s) Both EYES at bedtime  saccharomyces boulardii 250 milliGRAM(s) Oral two times a day  vitamin A &amp; D Ointment 1 Application(s) Topical two times a day      MEDICATIONS  (PRN):  acetaminophen   Tablet 650 milliGRAM(s) Oral every 6 hours PRN For Temp greater than 38 C (100.4 F)  acetaminophen   Tablet. 650 milliGRAM(s) Oral every 6 hours PRN Moderate Pain (4 - 6) and headache  ALBUTerol    0.083% 2.5 milliGRAM(s) Nebulizer every 6 hours PRN Shortness of Breath and/or Wheezing  ALPRAZolam 0.5 milliGRAM(s) Oral every 8 hours PRN anxiety/agitation  artificial  tears Solution 1 Drop(s) Both EYES every 4 hours PRN Dry Eyes  glucagon  Injectable 1 milliGRAM(s) IntraMuscular once PRN Glucose <70 milliGRAM(s)/deciLiter  oxyCODONE    Solution 20 milliGRAM(s) Oral every 4 hours PRN severe pain or breakthrough pain      Allergies    No Known Allergies    Intolerances        PAST MEDICAL & SURGICAL HISTORY:  Essential hypertension  Paralysis  Transverse myelitis  History of tracheostomy             REVIEW OF SYSTEMS:    unable to get ROS    Vital Signs Last 24 Hrs  T(C): 36.9 (23 Mar 2018 08:11), Max: 37.7 (22 Mar 2018 16:19)  T(F): 98.5 (23 Mar 2018 08:11), Max: 99.8 (22 Mar 2018 16:19)  HR: 96 (23 Mar 2018 08:42) (80 - 102)  BP: 134/77 (23 Mar 2018 08:11) (116/72 - 149/83)  BP(mean): --  RR: 22 (23 Mar 2018 08:11) (18 - 22)  SpO2: 100% (23 Mar 2018 08:42) (99% - 100%)    PHYSICAL EXAMINATION:    GENERAL: The patient is awake and alert in no apparent distress.     HEENT: Head is normocephalic and atraumatic. Extraocular muscles are intact. Mucous membranes are moist.    NECK: trach.    LUNGS: rhonchi b/l,  respirations unlabored; vent setting adequate    HEART: Regular rate and rhythm      ABDOMEN: Soft, nontender, PGE     EXTREMITIES: Without any cyanosis, clubbing, rash, lesions or edema.           MICROBIOLOGY:  Culture - Sputum . 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  TYPE: (C=Critical, N=Notification, A=Abnormal) C  TESTS:  _ MDRO  DATE/TIME CALLED: _ 03/18/2018 16:01:15  CALLED TO: Johnathan vega  READ BACK (2 Patient Identifiers)(Y/N): _ y  READ BACK VALUES (Y/N): _ y  CALLED BY: _ kb  send a copy to logistics and i.c    Organism Identification: Acinetobacter baumannii/haemolyticus (Carbapenem Resistant)    Organism: Acinetobacter baumannii/haemolyticus (Carbapenem Resistant)    Method Type: AMIE        RADIOLOGY & ADDITIONAL STUDIES:  < from: Xray Chest 1 View- PORTABLE-Routine (03.23.18 @ 04:56) >  EXAM:  XR CHEST PORTABLE ROUTINE 1V                          PROCEDURE DATE:  03/23/2018          INTERPRETATION:  CHEST AP PORTABLE:    History: VDRF.     Date and time of exam: 3/23/2018 3:52 AM.    Technique: A single AP view of the chest was obtained.    Comparison exam: 3/18/2018 2:11 PM.    Findings:  Persistent right pleural effusion, smaller since the prior exam. No   evidence of pneumothorax. The left lung remains clear. No change in   tracheostomy tube..    Impression:  Decreasing right pleural effusion..                GINNA LEÓN M.D., ATTENDING RADIOLOGIST    < end of copied text >

## 2018-03-23 NOTE — PROGRESS NOTE ADULT - ASSESSMENT
-chronic resp failure; previously was on T/C with vent at night at NH. Required full vent support at one time while here. Now weaning on CPAP/PSV  -HCAP; Acinetobacter in sputum  -Chronic hypoxia  -Pleural effusion, decreasing  -Hx transverse myelitis      RECC:   Continue CPAP/PSV. Would try T/C trials by Monday if he continues to improve. Nebs. Suction prn. Abx per ID. Follow CXR.

## 2018-03-23 NOTE — PROGRESS NOTE ADULT - PROBLEM SELECTOR PLAN 1
-very poor overall prognosis for any kind of meaningful recovery, unfortunately, patient has been declining.

## 2018-03-23 NOTE — PROGRESS NOTE ADULT - ASSESSMENT
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

## 2018-03-23 NOTE — PROGRESS NOTE ADULT - ATTENDING COMMENTS
Thank you for the opportunity to assist with the care of this patient.   [facility] [phone].

## 2018-03-23 NOTE — PROGRESS NOTE ADULT - PROBLEM SELECTOR PLAN 3
- current regimen not helping patient. will restart fentanyl patch that he was on as outpatient and increase frequency of oxycodone solution.

## 2018-03-23 NOTE — PROGRESS NOTE ADULT - SUBJECTIVE AND OBJECTIVE BOX
KAMAR BELLAMY Patient is a 62y old  Male who presents with a chief complaint of fevers (12 Mar 2018 18:28)     HPI:  63y/o male with pmh of PMH Transverse myelitis, Paraplegia, trach/peg, colostomy, HTN, [facility] presents with fever for 3 days.      Patient denies any cough, sob, runny nose, vomiting, diarrhea, abdominal pain, pain with urination.    Patient signed [facility] REQUESTING CPR on 2/18/18.    Patient eats via tube feeding,.      Originally at [facility] in   7months ago, developed Acute transverse myelitis.    Heart Attack, stent placed, check up after.  He was given a tetanus shot, then couple days ago he developed lower extremity weakness, paralysis; ultimately developed ulcers.      SBU--> [facility] (kept him for a few weeks) --> wife couldn't take care of him;    He is getting frequent uti's, anemia reequiring transfusions.    101 fever at nursing home today ([facility] [place]) (10 Mar 2018 23:26)    The patient was seen and evaluated   The patient is in no acute distress.  Denied any fever chest pain, palpitations, shortness of breath, abdominal pain, fever, dysuria, cough, edema   Complains of dry lips, doesn't really want to talk today     Mode: CPAP with PS, FiO2: 30, PEEP: 5, PS: 10, MAP: 7I&O's Summary    22 Mar 2018 07:01  -  23 Mar 2018 07:00  --------------------------------------------------------  IN: 1700 mL / OUT: 2200 mL / NET: -500 mL    23 Mar 2018 07:01  -  23 Mar 2018 14:21  --------------------------------------------------------  IN: 340 mL / OUT: 700 mL / NET: -360 mL      Allergies    No Known Allergies    Intolerances      HEALTH ISSUES - PROBLEM Dx:  Anxiety: Anxiety  Pain: Pain  Palliative care encounter: Palliative care encounter  Functional quadriplegia: Functional quadriplegia  Pneumonia: Pneumonia  Encephalopathy, unspecified: Encephalopathy, unspecified  Acute on chronic respiratory failure with hypoxia: Acute on chronic respiratory failure with hypoxia  Leukocytosis: Leukocytosis  Fever, unspecified fever cause: Fever, unspecified fever cause  Fever: Fever  Skin ulcer of sacrum, unspecified ulcer stage: Skin ulcer of sacrum, unspecified ulcer stage  History of Clostridium difficile infection: History of Clostridium difficile infection  Type 2 diabetes mellitus without complication, with long-term current use of insulin: Type 2 diabetes mellitus without complication, with long-term current use of insulin  Need for prophylactic measure: Need for prophylactic measure  Paralysis: Paralysis  Essential hypertension: Essential hypertension  Transverse myelitis: Transverse myelitis  Sepsis, due to unspecified organism: Sepsis, due to unspecified organism        PAST MEDICAL & SURGICAL HISTORY:  Essential hypertension  Paralysis  Transverse myelitis  History of tracheostomy          Vital Signs Last 24 Hrs  T(C): 36.9 (23 Mar 2018 08:11), Max: 37.7 (22 Mar 2018 16:19)  T(F): 98.5 (23 Mar 2018 08:11), Max: 99.8 (22 Mar 2018 16:19)  HR: 93 (23 Mar 2018 12:17) (80 - 102)  BP: 134/77 (23 Mar 2018 08:11) (116/72 - 149/83)  BP(mean): --  RR: 22 (23 Mar 2018 08:11) (18 - 22)  SpO2: 100% (23 Mar 2018 12:17) (99% - 100%)T(C): 36.9 (03-23-18 @ 08:11), Max: 37.7 (03-22-18 @ 16:19)  HR: 93 (03-23-18 @ 12:17) (80 - 102)  BP: 134/77 (03-23-18 @ 08:11) (116/72 - 149/83)  RR: 22 (03-23-18 @ 08:11) (18 - 22)  SpO2: 100% (03-23-18 @ 12:17) (99% - 100%)  Wt(kg): --    PHYSICAL EXAM:    GENERAL: NAD,   EYES: EOMI,conjunctiva and sclera clear  ENMT:  Moist mucous membranes,  No lesions  NERVOUS SYSTEM:  Alert & Oriented X3, cant Move upper and lower extremities; CNS-II-XII  CHEST/LUNG: Clear to auscultation bilaterally; No rales, rhonchi, wheezing,   HEART: Regular rate and rhythm; No murmurs,   ABDOMEN: Soft, Nontender, Nondistended; Bowel sounds present  EXTREMITIES:  Peripheral Pulses, No  cyanosis, or edema  SKIN: No rashes or lesions  psychiatry- mood and affect flat    acetaminophen   Tablet 650 milliGRAM(s) Oral every 6 hours PRN  acetaminophen   Tablet. 650 milliGRAM(s) Oral every 6 hours PRN  ALBUTerol    0.083% 2.5 milliGRAM(s) Nebulizer every 6 hours PRN  ALPRAZolam 0.5 milliGRAM(s) Oral every 6 hours PRN  ampicillin/sulbactam  IVPB      ampicillin/sulbactam  IVPB 3 Gram(s) IV Intermittent every 6 hours  artificial  tears Solution 1 Drop(s) Both EYES every 4 hours PRN  ascorbic acid 500 milliGRAM(s) Oral daily  aspirin  chewable 81 milliGRAM(s) Oral daily  baclofen 10 milliGRAM(s) Oral two times a day  chlorhexidine 0.12% Liquid 15 milliLiter(s) Swish and Spit two times a day  clopidogrel Tablet 75 milliGRAM(s) Oral daily  dextrose 5%. 1000 milliLiter(s) IV Continuous <Continuous>  dextrose 50% Injectable 12.5 Gram(s) IV Push once  dextrose 50% Injectable 25 Gram(s) IV Push once  dextrose 50% Injectable 25 Gram(s) IV Push once  fentaNYL   Patch  25 MICROgram(s)/Hr 1 Patch Transdermal every 72 hours  glucagon  Injectable 1 milliGRAM(s) IntraMuscular once PRN  heparin  Injectable 5000 Unit(s) SubCutaneous every 8 hours  insulin glargine Injectable (LANTUS) 10 Unit(s) SubCutaneous at bedtime  insulin regular  human corrective regimen sliding scale   SubCutaneous every 6 hours  multivitamin   Solution 5 milliLiter(s) Oral daily  oxyCODONE    Solution 20 milliGRAM(s) Oral every 4 hours  oxyCODONE    Solution 15 milliGRAM(s) Oral every 3 hours PRN  oxyCODONE    Solution 20 milliGRAM(s) Oral every 3 hours PRN  pantoprazole   Suspension 40 milliGRAM(s) Enteral Tube before breakfast  petrolatum Ophthalmic Ointment 1 Application(s) Both EYES at bedtime  saccharomyces boulardii 250 milliGRAM(s) Oral two times a day  vitamin A &amp; D Ointment 1 Application(s) Topical two times a day      LABS:                      CAPILLARY BLOOD GLUCOSE      POCT Blood Glucose.: 183 mg/dL (23 Mar 2018 10:58)  POCT Blood Glucose.: 170 mg/dL (23 Mar 2018 06:32)  POCT Blood Glucose.: 187 mg/dL (23 Mar 2018 00:12)  POCT Blood Glucose.: 186 mg/dL (22 Mar 2018 16:31)      RADIOLOGY & ADDITIONAL TESTS:      Consultant notes reviewed    Case discussed with consultant/provider/ family /patient

## 2018-03-23 NOTE — PROGRESS NOTE ADULT - SUBJECTIVE AND OBJECTIVE BOX
OVERNIGHT EVENTS:  62M with transverse myelitis with trach/PEG, on vent with pain, anxiety, functional quadriplegia, with very poor overall prognosis.     Present Symptoms:     Dyspnea: 0   Nausea/Vomiting: No  Anxiety:  Yes   Depression: No  Fatigue: Yes   Loss of appetite: No    Pain:             Character-            Duration-            Effect-            Factors-            Frequency-            Location-            Severity-    Review of Systems: Reviewed                     Negative:                     Positive:  Unable to obtain due to poor mentation   All others negative    MEDICATIONS  (STANDING):  ampicillin/sulbactam  IVPB      ampicillin/sulbactam  IVPB 3 Gram(s) IV Intermittent every 6 hours  ascorbic acid 500 milliGRAM(s) Oral daily  aspirin  chewable 81 milliGRAM(s) Oral daily  baclofen 10 milliGRAM(s) Oral two times a day  chlorhexidine 0.12% Liquid 15 milliLiter(s) Swish and Spit two times a day  clopidogrel Tablet 75 milliGRAM(s) Oral daily  dextrose 5%. 1000 milliLiter(s) (50 mL/Hr) IV Continuous <Continuous>  dextrose 50% Injectable 12.5 Gram(s) IV Push once  dextrose 50% Injectable 25 Gram(s) IV Push once  dextrose 50% Injectable 25 Gram(s) IV Push once  heparin  Injectable 5000 Unit(s) SubCutaneous every 8 hours  insulin glargine Injectable (LANTUS) 10 Unit(s) SubCutaneous at bedtime  insulin regular  human corrective regimen sliding scale   SubCutaneous every 6 hours  multivitamin   Solution 5 milliLiter(s) Oral daily  oxyCODONE    Solution 10 milliGRAM(s) Oral every 6 hours  pantoprazole   Suspension 40 milliGRAM(s) Enteral Tube before breakfast  petrolatum Ophthalmic Ointment 1 Application(s) Both EYES at bedtime  saccharomyces boulardii 250 milliGRAM(s) Oral two times a day    MEDICATIONS  (PRN):  acetaminophen   Tablet 650 milliGRAM(s) Oral every 6 hours PRN For Temp greater than 38 C (100.4 F)  acetaminophen   Tablet. 650 milliGRAM(s) Oral every 6 hours PRN Moderate Pain (4 - 6) and headache  ALBUTerol    0.083% 2.5 milliGRAM(s) Nebulizer every 6 hours PRN Shortness of Breath and/or Wheezing  ALPRAZolam 0.5 milliGRAM(s) Oral every 8 hours PRN anxiety/agitation  artificial  tears Solution 1 Drop(s) Both EYES every 4 hours PRN Dry Eyes  glucagon  Injectable 1 milliGRAM(s) IntraMuscular once PRN Glucose <70 milliGRAM(s)/deciLiter  oxyCODONE    Solution 20 milliGRAM(s) Oral every 4 hours PRN severe pain or breakthrough pain    PHYSICAL EXAM:    Vital Signs Last 24 Hrs  T(C): 36.9 (23 Mar 2018 08:11), Max: 37.7 (22 Mar 2018 16:19)  T(F): 98.5 (23 Mar 2018 08:11), Max: 99.8 (22 Mar 2018 16:19)  HR: 96 (23 Mar 2018 08:42) (80 - 102)  BP: 134/77 (23 Mar 2018 08:11) (116/72 - 149/83)  BP(mean): --  RR: 22 (23 Mar 2018 08:11) (18 - 22)  SpO2: 100% (23 Mar 2018 08:42) (99% - 100%)             General: alert  oriented x 3    Karnofsky:  30 %    HEENT: normal; trach on ventilator     Lungs: comfortable     CV: normal      GI: normal      : leigh ann    MSK: weakness      Skin:  no rash    LABS:    I&O's Summary    22 Mar 2018 07:01  -  23 Mar 2018 07:00  --------------------------------------------------------  IN: 1700 mL / OUT: 2200 mL / NET: -500 mL    23 Mar 2018 07:01  -  23 Mar 2018 11:22  --------------------------------------------------------  IN: 340 mL / OUT: 700 mL / NET: -360 mL    RADIOLOGY & ADDITIONAL STUDIES:    ADVANCE DIRECTIVES: Full Code OVERNIGHT EVENTS:  62M with transverse myelitis with trach/PEG, on vent with pain, anxiety, functional quadriplegia, with very poor overall prognosis.     Present Symptoms:     Dyspnea: 0   Nausea/Vomiting: No  Anxiety:  Yes   Depression: No  Fatigue: Yes   Loss of appetite: No    Pain: yes 10/10             Character-            Duration-            Effect-            Factors-            Frequency-            Location-            Severity-    Review of Systems: Reviewed                     Negative:                     Positive:  Unable to obtain due to poor mentation   All others negative    MEDICATIONS  (STANDING):  ampicillin/sulbactam  IVPB      ampicillin/sulbactam  IVPB 3 Gram(s) IV Intermittent every 6 hours  ascorbic acid 500 milliGRAM(s) Oral daily  aspirin  chewable 81 milliGRAM(s) Oral daily  baclofen 10 milliGRAM(s) Oral two times a day  chlorhexidine 0.12% Liquid 15 milliLiter(s) Swish and Spit two times a day  clopidogrel Tablet 75 milliGRAM(s) Oral daily  dextrose 5%. 1000 milliLiter(s) (50 mL/Hr) IV Continuous <Continuous>  dextrose 50% Injectable 12.5 Gram(s) IV Push once  dextrose 50% Injectable 25 Gram(s) IV Push once  dextrose 50% Injectable 25 Gram(s) IV Push once  heparin  Injectable 5000 Unit(s) SubCutaneous every 8 hours  insulin glargine Injectable (LANTUS) 10 Unit(s) SubCutaneous at bedtime  insulin regular  human corrective regimen sliding scale   SubCutaneous every 6 hours  multivitamin   Solution 5 milliLiter(s) Oral daily  oxyCODONE    Solution 10 milliGRAM(s) Oral every 6 hours  pantoprazole   Suspension 40 milliGRAM(s) Enteral Tube before breakfast  petrolatum Ophthalmic Ointment 1 Application(s) Both EYES at bedtime  saccharomyces boulardii 250 milliGRAM(s) Oral two times a day    MEDICATIONS  (PRN):  acetaminophen   Tablet 650 milliGRAM(s) Oral every 6 hours PRN For Temp greater than 38 C (100.4 F)  acetaminophen   Tablet. 650 milliGRAM(s) Oral every 6 hours PRN Moderate Pain (4 - 6) and headache  ALBUTerol    0.083% 2.5 milliGRAM(s) Nebulizer every 6 hours PRN Shortness of Breath and/or Wheezing  ALPRAZolam 0.5 milliGRAM(s) Oral every 8 hours PRN anxiety/agitation  artificial  tears Solution 1 Drop(s) Both EYES every 4 hours PRN Dry Eyes  glucagon  Injectable 1 milliGRAM(s) IntraMuscular once PRN Glucose <70 milliGRAM(s)/deciLiter  oxyCODONE    Solution 20 milliGRAM(s) Oral every 4 hours PRN severe pain or breakthrough pain    PHYSICAL EXAM:    Vital Signs Last 24 Hrs  T(C): 36.9 (23 Mar 2018 08:11), Max: 37.7 (22 Mar 2018 16:19)  T(F): 98.5 (23 Mar 2018 08:11), Max: 99.8 (22 Mar 2018 16:19)  HR: 96 (23 Mar 2018 08:42) (80 - 102)  BP: 134/77 (23 Mar 2018 08:11) (116/72 - 149/83)  BP(mean): --  RR: 22 (23 Mar 2018 08:11) (18 - 22)  SpO2: 100% (23 Mar 2018 08:42) (99% - 100%)             General: alert  oriented x 3    Karnofsky:  30 %    HEENT: normal; trach on ventilator     Lungs: comfortable     CV: normal      GI: normal      : leigh ann    MSK: weakness      Skin:  no rash    LABS:    I&O's Summary    22 Mar 2018 07:01  -  23 Mar 2018 07:00  --------------------------------------------------------  IN: 1700 mL / OUT: 2200 mL / NET: -500 mL    23 Mar 2018 07:01  -  23 Mar 2018 11:22  --------------------------------------------------------  IN: 340 mL / OUT: 700 mL / NET: -360 mL    RADIOLOGY & ADDITIONAL STUDIES:    ADVANCE DIRECTIVES: Full Code OVERNIGHT EVENTS:  62M with transverse myelitis with trach/PEG, on vent with pain, anxiety, functional quadriplegia, with very poor overall prognosis.     Present Symptoms:     Dyspnea: 0   Nausea/Vomiting: No  Anxiety:  Yes   Depression: No  Fatigue: Yes   Loss of appetite: No    Pain: yes 10/10 constant, neck and backside, pressure and stabbing sensation             Review of Systems: Reviewed                                      Positive: neck pain   All others negative    MEDICATIONS  (STANDING):  ampicillin/sulbactam  IVPB      ampicillin/sulbactam  IVPB 3 Gram(s) IV Intermittent every 6 hours  ascorbic acid 500 milliGRAM(s) Oral daily  aspirin  chewable 81 milliGRAM(s) Oral daily  baclofen 10 milliGRAM(s) Oral two times a day  chlorhexidine 0.12% Liquid 15 milliLiter(s) Swish and Spit two times a day  clopidogrel Tablet 75 milliGRAM(s) Oral daily  dextrose 5%. 1000 milliLiter(s) (50 mL/Hr) IV Continuous <Continuous>  dextrose 50% Injectable 12.5 Gram(s) IV Push once  dextrose 50% Injectable 25 Gram(s) IV Push once  dextrose 50% Injectable 25 Gram(s) IV Push once  heparin  Injectable 5000 Unit(s) SubCutaneous every 8 hours  insulin glargine Injectable (LANTUS) 10 Unit(s) SubCutaneous at bedtime  insulin regular  human corrective regimen sliding scale   SubCutaneous every 6 hours  multivitamin   Solution 5 milliLiter(s) Oral daily  oxyCODONE    Solution 10 milliGRAM(s) Oral every 6 hours  pantoprazole   Suspension 40 milliGRAM(s) Enteral Tube before breakfast  petrolatum Ophthalmic Ointment 1 Application(s) Both EYES at bedtime  saccharomyces boulardii 250 milliGRAM(s) Oral two times a day    MEDICATIONS  (PRN):  acetaminophen   Tablet 650 milliGRAM(s) Oral every 6 hours PRN For Temp greater than 38 C (100.4 F)  acetaminophen   Tablet. 650 milliGRAM(s) Oral every 6 hours PRN Moderate Pain (4 - 6) and headache  ALBUTerol    0.083% 2.5 milliGRAM(s) Nebulizer every 6 hours PRN Shortness of Breath and/or Wheezing  ALPRAZolam 0.5 milliGRAM(s) Oral every 8 hours PRN anxiety/agitation  artificial  tears Solution 1 Drop(s) Both EYES every 4 hours PRN Dry Eyes  glucagon  Injectable 1 milliGRAM(s) IntraMuscular once PRN Glucose <70 milliGRAM(s)/deciLiter  oxyCODONE    Solution 20 milliGRAM(s) Oral every 4 hours PRN severe pain or breakthrough pain    PHYSICAL EXAM:    Vital Signs Last 24 Hrs  T(C): 36.9 (23 Mar 2018 08:11), Max: 37.7 (22 Mar 2018 16:19)  T(F): 98.5 (23 Mar 2018 08:11), Max: 99.8 (22 Mar 2018 16:19)  HR: 96 (23 Mar 2018 08:42) (80 - 102)  BP: 134/77 (23 Mar 2018 08:11) (116/72 - 149/83)  BP(mean): --  RR: 22 (23 Mar 2018 08:11) (18 - 22)  SpO2: 100% (23 Mar 2018 08:42) (99% - 100%)             General: alert  oriented x 3    Karnofsky:  30 %    HEENT: normal; trach on ventilator     Lungs: comfortable     CV: normal      GI: normal      : leigh ann    MSK: weakness      Skin:  no rash    LABS:    I&O's Summary    22 Mar 2018 07:01  -  23 Mar 2018 07:00  --------------------------------------------------------  IN: 1700 mL / OUT: 2200 mL / NET: -500 mL    23 Mar 2018 07:01  -  23 Mar 2018 11:22  --------------------------------------------------------  IN: 340 mL / OUT: 700 mL / NET: -360 mL    RADIOLOGY & ADDITIONAL STUDIES:    ADVANCE DIRECTIVES: Full Code

## 2018-03-23 NOTE — PROGRESS NOTE ADULT - PROBLEM SELECTOR PLAN 5
- lengthy conversation with patient and his wife Courtney at bedside. Explored all different options regarding his future care. At this time he would like to continue all aggressive care, full code, remain on ventilator, and the most important things to him are pain management, being able to better communicate with others to make his needs know. I explained to patient that he always has the option to say that medical therapies are not longer benefitting him but rather more of a burden and we can shift focus to comfort only and forego aggressive measures. He is not ready for that option at this time. Trying to enlist help from all resources possible to see how I can help this unfortunate gentlemen communicate better and make his needs known better.

## 2018-03-24 LAB
ANION GAP SERPL CALC-SCNC: 10 MMOL/L — SIGNIFICANT CHANGE UP (ref 5–17)
BUN SERPL-MCNC: 27 MG/DL — HIGH (ref 8–20)
CALCIUM SERPL-MCNC: 9.5 MG/DL — SIGNIFICANT CHANGE UP (ref 8.6–10.2)
CHLORIDE SERPL-SCNC: 94 MMOL/L — LOW (ref 98–107)
CK SERPL-CCNC: 10 U/L — LOW (ref 30–200)
CO2 SERPL-SCNC: 32 MMOL/L — HIGH (ref 22–29)
CREAT SERPL-MCNC: 0.61 MG/DL — SIGNIFICANT CHANGE UP (ref 0.5–1.3)
GLUCOSE BLDC GLUCOMTR-MCNC: 169 MG/DL — HIGH (ref 70–99)
GLUCOSE BLDC GLUCOMTR-MCNC: 170 MG/DL — HIGH (ref 70–99)
GLUCOSE BLDC GLUCOMTR-MCNC: 176 MG/DL — HIGH (ref 70–99)
GLUCOSE BLDC GLUCOMTR-MCNC: 199 MG/DL — HIGH (ref 70–99)
GLUCOSE SERPL-MCNC: 164 MG/DL — HIGH (ref 70–115)
POTASSIUM SERPL-MCNC: 4.6 MMOL/L — SIGNIFICANT CHANGE UP (ref 3.5–5.3)
POTASSIUM SERPL-SCNC: 4.6 MMOL/L — SIGNIFICANT CHANGE UP (ref 3.5–5.3)
SODIUM SERPL-SCNC: 136 MMOL/L — SIGNIFICANT CHANGE UP (ref 135–145)
TROPONIN T SERPL-MCNC: 0.04 NG/ML — SIGNIFICANT CHANGE UP (ref 0–0.06)

## 2018-03-24 PROCEDURE — 99233 SBSQ HOSP IP/OBS HIGH 50: CPT

## 2018-03-24 PROCEDURE — 93010 ELECTROCARDIOGRAM REPORT: CPT

## 2018-03-24 PROCEDURE — 99232 SBSQ HOSP IP/OBS MODERATE 35: CPT

## 2018-03-24 RX ADMIN — AMPICILLIN SODIUM AND SULBACTAM SODIUM 200 GRAM(S): 250; 125 INJECTION, POWDER, FOR SUSPENSION INTRAMUSCULAR; INTRAVENOUS at 09:23

## 2018-03-24 RX ADMIN — Medication 250 MILLIGRAM(S): at 05:55

## 2018-03-24 RX ADMIN — INSULIN HUMAN 1: 100 INJECTION, SOLUTION SUBCUTANEOUS at 11:47

## 2018-03-24 RX ADMIN — OXYCODONE HYDROCHLORIDE 20 MILLIGRAM(S): 5 TABLET ORAL at 11:49

## 2018-03-24 RX ADMIN — Medication 81 MILLIGRAM(S): at 11:48

## 2018-03-24 RX ADMIN — Medication 0.5 MILLIGRAM(S): at 22:01

## 2018-03-24 RX ADMIN — Medication 0.5 MILLIGRAM(S): at 01:53

## 2018-03-24 RX ADMIN — CLOPIDOGREL BISULFATE 75 MILLIGRAM(S): 75 TABLET, FILM COATED ORAL at 11:47

## 2018-03-24 RX ADMIN — INSULIN HUMAN 1: 100 INJECTION, SOLUTION SUBCUTANEOUS at 17:00

## 2018-03-24 RX ADMIN — AMPICILLIN SODIUM AND SULBACTAM SODIUM 200 GRAM(S): 250; 125 INJECTION, POWDER, FOR SUSPENSION INTRAMUSCULAR; INTRAVENOUS at 22:48

## 2018-03-24 RX ADMIN — Medication 10 MILLIGRAM(S): at 05:55

## 2018-03-24 RX ADMIN — Medication 10 MILLIGRAM(S): at 17:32

## 2018-03-24 RX ADMIN — Medication 500 MILLIGRAM(S): at 11:47

## 2018-03-24 RX ADMIN — AMPICILLIN SODIUM AND SULBACTAM SODIUM 200 GRAM(S): 250; 125 INJECTION, POWDER, FOR SUSPENSION INTRAMUSCULAR; INTRAVENOUS at 02:03

## 2018-03-24 RX ADMIN — INSULIN HUMAN 1: 100 INJECTION, SOLUTION SUBCUTANEOUS at 05:58

## 2018-03-24 RX ADMIN — PANTOPRAZOLE SODIUM 40 MILLIGRAM(S): 20 TABLET, DELAYED RELEASE ORAL at 05:55

## 2018-03-24 RX ADMIN — HEPARIN SODIUM 5000 UNIT(S): 5000 INJECTION INTRAVENOUS; SUBCUTANEOUS at 05:54

## 2018-03-24 RX ADMIN — OXYCODONE HYDROCHLORIDE 20 MILLIGRAM(S): 5 TABLET ORAL at 01:53

## 2018-03-24 RX ADMIN — Medication 250 MILLIGRAM(S): at 17:32

## 2018-03-24 RX ADMIN — OXYCODONE HYDROCHLORIDE 20 MILLIGRAM(S): 5 TABLET ORAL at 04:00

## 2018-03-24 RX ADMIN — CHLORHEXIDINE GLUCONATE 15 MILLILITER(S): 213 SOLUTION TOPICAL at 17:32

## 2018-03-24 RX ADMIN — OXYCODONE HYDROCHLORIDE 20 MILLIGRAM(S): 5 TABLET ORAL at 12:49

## 2018-03-24 RX ADMIN — HEPARIN SODIUM 5000 UNIT(S): 5000 INJECTION INTRAVENOUS; SUBCUTANEOUS at 22:48

## 2018-03-24 RX ADMIN — Medication 5 MILLILITER(S): at 11:47

## 2018-03-24 RX ADMIN — Medication 1 APPLICATION(S): at 05:55

## 2018-03-24 RX ADMIN — Medication 1 APPLICATION(S): at 22:49

## 2018-03-24 RX ADMIN — INSULIN GLARGINE 10 UNIT(S): 100 INJECTION, SOLUTION SUBCUTANEOUS at 22:48

## 2018-03-24 RX ADMIN — HEPARIN SODIUM 5000 UNIT(S): 5000 INJECTION INTRAVENOUS; SUBCUTANEOUS at 13:50

## 2018-03-24 RX ADMIN — AMPICILLIN SODIUM AND SULBACTAM SODIUM 200 GRAM(S): 250; 125 INJECTION, POWDER, FOR SUSPENSION INTRAMUSCULAR; INTRAVENOUS at 15:00

## 2018-03-24 RX ADMIN — CHLORHEXIDINE GLUCONATE 15 MILLILITER(S): 213 SOLUTION TOPICAL at 05:54

## 2018-03-24 RX ADMIN — Medication 1 APPLICATION(S): at 17:32

## 2018-03-24 NOTE — PROGRESS NOTE ADULT - ASSESSMENT
-chronic resp failure; previously was on T/C with vent at night at NH. Required full vent support at one time while here. Now weaning on CPAP/PSV  -HCAP; Acinetobacter in sputum  -Chronic hypoxia  -Pleural effusion, decreasing  -Hx transverse myelitis   Continue CPAP/PSV. Would try T/C trials by Monday if he continues to improve. Nebs. Suction prn. Abx per ID. Follow CXR.     Note CXR has shown some improvement in infiltrate and effusion.   Upon admission, his CXR appeared clear-- rapid development in pneumonitis and effusion.     Afebrile now on Unasyn

## 2018-03-24 NOTE — CHART NOTE - NSCHARTNOTEFT_GEN_A_CORE
Called by RN- reports walked in room and found that pt's PEG tube had come out.  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  When I came to see pt he is telling me he is having a heart attack that he just started having chest pain.  When I asked him where in chest he says everywhere.  He is on the vent but can mouth words.  His vitals are stable.  He was just seen by Respiratory therapy.  Staff is reporting that pt is usually very anxious and recieves Xanax prn.  He is on antbx, ASA/plavix/ heparin.  I placed a beltrán in PEG opening without difficulty and 5cc fluid in balloon.    Vitals stable  124/72  O2 Sat 100%  NAD on vent.  Lungs:  CTA- b/l no wheeze  Heart S1 S2- tachy RRR  Abd:  soft, NT, NT, colostomy with stool in bag, PEG site - well healed now with beltrán in place    EKG=Sinus tach at 103 bpm no acute change compared with EKG from 3/16/18    Chest pain- R/o cardiac cause vs anxiety  EKG no change, cardiac enzymes ordered  Ativan for anxiety IVP - no peg in place for meds    GI to be called in AM for replacement PEG    D/w Dr. Bennett - agrees with plan

## 2018-03-24 NOTE — PROGRESS NOTE ADULT - SUBJECTIVE AND OBJECTIVE BOX
PULMONARY PROGRESS NOTE      [id number]    Patient is a 62y old  Male who presents with a chief complaint of fevers (12 Mar 2018 18:28)    Fevers and right infiltrate, effusion  INTERVAL HPI/OVERNIGHT EVENTS:    MEDICATIONS  (STANDING):  ampicillin/sulbactam  IVPB      ampicillin/sulbactam  IVPB 3 Gram(s) IV Intermittent every 6 hours  ascorbic acid 500 milliGRAM(s) Oral daily  aspirin  chewable 81 milliGRAM(s) Oral daily  baclofen 10 milliGRAM(s) Oral two times a day  chlorhexidine 0.12% Liquid 15 milliLiter(s) Swish and Spit two times a day  clopidogrel Tablet 75 milliGRAM(s) Oral daily  dextrose 5%. 1000 milliLiter(s) (50 mL/Hr) IV Continuous <Continuous>  dextrose 50% Injectable 12.5 Gram(s) IV Push once  dextrose 50% Injectable 25 Gram(s) IV Push once  dextrose 50% Injectable 25 Gram(s) IV Push once  fentaNYL   Patch  25 MICROgram(s)/Hr 1 Patch Transdermal every 72 hours  heparin  Injectable 5000 Unit(s) SubCutaneous every 8 hours  insulin glargine Injectable (LANTUS) 10 Unit(s) SubCutaneous at bedtime  insulin regular  human corrective regimen sliding scale   SubCutaneous every 6 hours  multivitamin   Solution 5 milliLiter(s) Oral daily  pantoprazole   Suspension 40 milliGRAM(s) Enteral Tube before breakfast  petrolatum Ophthalmic Ointment 1 Application(s) Both EYES at bedtime  saccharomyces boulardii 250 milliGRAM(s) Oral two times a day  vitamin A &amp; D Ointment 1 Application(s) Topical two times a day      MEDICATIONS  (PRN):  acetaminophen   Tablet 650 milliGRAM(s) Oral every 6 hours PRN For Temp greater than 38 C (100.4 F)  acetaminophen   Tablet. 650 milliGRAM(s) Oral every 6 hours PRN Moderate Pain (4 - 6) and headache  ALBUTerol    0.083% 2.5 milliGRAM(s) Nebulizer every 6 hours PRN Shortness of Breath and/or Wheezing  ALPRAZolam 0.5 milliGRAM(s) Oral every 6 hours PRN anxiety/agitation  artificial  tears Solution 1 Drop(s) Both EYES every 4 hours PRN Dry Eyes  glucagon  Injectable 1 milliGRAM(s) IntraMuscular once PRN Glucose <70 milliGRAM(s)/deciLiter  oxyCODONE    Solution 15 milliGRAM(s) Oral every 3 hours PRN mild to moderate pain  oxyCODONE    Solution 20 milliGRAM(s) Oral every 3 hours PRN severe pain or breakthrough pain      Allergies    No Known Allergies    Intolerances        PAST MEDICAL & SURGICAL HISTORY:  Essential hypertension  Paralysis  Transverse myelitis  History of tracheostomy      SOCIAL HISTORY  Smoking History:       REVIEW OF SYSTEMS:    CONSTITUTIONAL:  No distress    HEENT:  Eyes:  No diplopia or blurred vision. ENT:  No earache, sore throat or runny nose.    CARDIOVASCULAR:  No pressure, squeezing, tightness, heaviness or aching about the chest; no palpitations.    RESPIRATORY:  No cough, shortness of breath, PND or orthopnea. Mild SOBOE    GASTROINTESTINAL:  No nausea, vomiting or diarrhea.    GENITOURINARY:  No dysuria, frequency or urgency.    MUSCULOSKELETAL:  No joint pain    SKIN:  No new lesions.    NEUROLOGIC:  No paresthesias, fasciculations, seizures or weakness.    PSYCHIATRIC:  No disorder of thought or mood.    ENDOCRINE:  No heat or cold intolerance, polyuria or polydipsia.    HEMATOLOGICAL:  No easy bruising or bleeding.     Vital Signs Last 24 Hrs  T(C): 36.7 (24 Mar 2018 07:56), Max: 37.2 (23 Mar 2018 15:58)  T(F): 98.1 (24 Mar 2018 07:56), Max: 99 (23 Mar 2018 15:58)  HR: 101 (24 Mar 2018 12:26) (85 - 113)  BP: 151/77 (24 Mar 2018 07:56) (125/79 - 151/77)  BP(mean): --  RR: 22 (24 Mar 2018 07:56) (18 - 22)  SpO2: 100% (24 Mar 2018 12:26) (95% - 100%)    PHYSICAL EXAMINATION:    GENERAL: The patient is awake and alert in no apparent distress.     HEENT: Head is normocephalic and atraumatic. Extraocular muscles are intact. Mucous membranes are moist.    NECK: Supple.    LUNGS: Clear to auscultation without wheezing, rales or rhonchi; respirations unlabored    HEART: Regular rate and rhythm without murmur.    ABDOMEN: Soft, nontender, and nondistended.      EXTREMITIES: Without any cyanosis, clubbing, rash, lesions or edema.    NEUROLOGIC: Grossly intact.    SKIN: No ulceration or induration present.      LABS:                              MICROBIOLOGY:    RADIOLOGY & ADDITIONAL STUDIES:< from: Xray Chest 1 View- PORTABLE-Routine (03.23.18 @ 04:56) >  INTERPRETATION:  CHEST AP PORTABLE:    History: VDRF.     Date and time of exam: 3/23/2018 3:52 AM.    Technique: A single AP view of the chest was obtained.    Comparison exam: 3/18/2018 2:11 PM.    Findings:  Persistent right pleural effusion, smaller since the prior exam. No   evidence of pneumothorax. The left lung remains clear. No change in   tracheostomy tube..    Impression:  Decreasing right pleural effusion..        < end of copied text >

## 2018-03-24 NOTE — PROGRESS NOTE ADULT - ASSESSMENT
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 ID following.    pleural effusion: decreaing    chronic resp failure; previously was on T/C with vent at night at NH. Required full vent support at one time while here. Now weaning on CPAP/PSV.  Continue CPAP/PSV. plan is for T/C trials by Monday if he continues to improve. Nebs. Suction prn. Abx per ID. Follow CXR.  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 ID following.    pleural effusion: decreaing    acute and chronic resp failure; previously was on T/C with vent at night at NH. Required full vent support at one time while here. Now weaning on CPAP/PSV.  Continue CPAP/PSV. plan is for T/C trials by Monday if he continues to improve. Nebs. Suction prn. Abx per ID. Follow CXR.  pulmonary following    Transverse myelitis- with quadriplegia-beltrán allowing for help healing the decubitus and comfort - palliative following     IDDM- insulin with HISS    GERD- protonix      DVT ppx- heparin

## 2018-03-24 NOTE — PROGRESS NOTE ADULT - SUBJECTIVE AND OBJECTIVE BOX
KAMAR BELLAMY Patient is a 62y old  Male who presents with a chief complaint of fevers (12 Mar 2018 18:28)     HPI:  63y/o male with pmh of PMH Transverse myelitis, Paraplegia, trach/peg, colostomy, HTN, coming from [facility] presents with fever for 3 days.    Patient denies any cough, sob, runny nose, vomiting, diarrhea, abdominal pain, pain with urination.    Patient signed MOLST REQUESTING CPR on 2/18/18..  [facility], developed Acute transverse myelitis.    Heart Attack, stent placed, check up after.  He was given a tetanus shot, then couple days ago he developed lower extremity weakness, paralysis; ultimately developed ulcers.    SBU--> [facility] (kept him for a few weeks) --> wife couldn't take care of him;    He is getting frequent uti's, anemia reequiring transfusions.    101 fever at nursing home today ([facility]) (10 Mar 2018 23:26)    Interval:  The patient was seen and evaluated   The patient is in no acute distress.  Denied any fever chest pain, palpitations, shortness of breath, abdominal pain, fever, dysuria, cough, edema   fatigue+    I&O's Summary    23 Mar 2018 07:01  -  24 Mar 2018 07:00  --------------------------------------------------------  IN: 1500 mL / OUT: 2100 mL / NET: -600 mL    24 Mar 2018 07:01  -  24 Mar 2018 15:18  --------------------------------------------------------  IN: 0 mL / OUT: 900 mL / NET: -900 mL        Allergies    No Known Allergies    Intolerances      HEALTH ISSUES - PROBLEM Dx:  Anxiety: Anxiety  Pain: Pain  Palliative care encounter: Palliative care encounter  Functional quadriplegia: Functional quadriplegia  Pneumonia: Pneumonia  Encephalopathy, unspecified: Encephalopathy, unspecified  Acute on chronic respiratory failure with hypoxia: Acute on chronic respiratory failure with hypoxia  Leukocytosis: Leukocytosis  Fever, unspecified fever cause: Fever, unspecified fever cause  Fever: Fever  Skin ulcer of sacrum, unspecified ulcer stage: Skin ulcer of sacrum, unspecified ulcer stage  History of Clostridium difficile infection: History of Clostridium difficile infection  Type 2 diabetes mellitus without complication, with long-term current use of insulin: Type 2 diabetes mellitus without complication, with long-term current use of insulin  Need for prophylactic measure: Need for prophylactic measure  Paralysis: Paralysis  Essential hypertension: Essential hypertension  Transverse myelitis: Transverse myelitis  Sepsis, due to unspecified organism: Sepsis, due to unspecified organism        PAST MEDICAL & SURGICAL HISTORY:  Essential hypertension  Paralysis  Transverse myelitis  History of tracheostomy        PHYSICAL EXAM:    Vital Signs Last 24 Hrs  T(C): 36.7 (24 Mar 2018 07:56), Max: 37.2 (23 Mar 2018 15:58)  T(F): 98.1 (24 Mar 2018 07:56), Max: 99 (23 Mar 2018 15:58)  HR: 101 (24 Mar 2018 12:26) (85 - 113)  BP: 151/77 (24 Mar 2018 07:56) (125/79 - 151/77)  BP(mean): --  RR: 22 (24 Mar 2018 07:56) (18 - 22)  SpO2: 100% (24 Mar 2018 12:26) (95% - 100%)    CAPILLARY BLOOD GLUCOSE      POCT Blood Glucose.: 169 mg/dL (24 Mar 2018 11:35)  POCT Blood Glucose.: 176 mg/dL (24 Mar 2018 05:51)  POCT Blood Glucose.: 171 mg/dL (23 Mar 2018 23:13)  POCT Blood Glucose.: 197 mg/dL (23 Mar 2018 17:27)      GENERAL: NAD,   EYES: EOMI,conjunctiva and sclera clear  ENMT:  Moist mucous membranes,  No lesions  NERVOUS SYSTEM:  Alert & Oriented X3, cant Move upper and lower extremities; CNS-II-XII  CHEST/LUNG: Clear to auscultation bilaterally; No rales, rhonchi, wheezing,   HEART: Regular rate and rhythm; No murmurs,   ABDOMEN: Soft, Nontender, Nondistended; Bowel sounds present. PEG, colostomy  EXTREMITIES:  Peripheral Pulses, No  cyanosis, or edema  SKIN: No rashes or lesions  psychiatry- mood and affect flat    acetaminophen   Tablet 650 milliGRAM(s) Oral every 6 hours PRN  acetaminophen   Tablet. 650 milliGRAM(s) Oral every 6 hours PRN  ALBUTerol    0.083% 2.5 milliGRAM(s) Nebulizer every 6 hours PRN  ALPRAZolam 0.5 milliGRAM(s) Oral every 6 hours PRN  ampicillin/sulbactam  IVPB      ampicillin/sulbactam  IVPB 3 Gram(s) IV Intermittent every 6 hours  artificial  tears Solution 1 Drop(s) Both EYES every 4 hours PRN  ascorbic acid 500 milliGRAM(s) Oral daily  aspirin  chewable 81 milliGRAM(s) Oral daily  baclofen 10 milliGRAM(s) Oral two times a day  chlorhexidine 0.12% Liquid 15 milliLiter(s) Swish and Spit two times a day  clopidogrel Tablet 75 milliGRAM(s) Oral daily  dextrose 5%. 1000 milliLiter(s) IV Continuous <Continuous>  dextrose 50% Injectable 12.5 Gram(s) IV Push once  dextrose 50% Injectable 25 Gram(s) IV Push once  dextrose 50% Injectable 25 Gram(s) IV Push once  fentaNYL   Patch  25 MICROgram(s)/Hr 1 Patch Transdermal every 72 hours  glucagon  Injectable 1 milliGRAM(s) IntraMuscular once PRN  heparin  Injectable 5000 Unit(s) SubCutaneous every 8 hours  insulin glargine Injectable (LANTUS) 10 Unit(s) SubCutaneous at bedtime  insulin regular  human corrective regimen sliding scale   SubCutaneous every 6 hours  multivitamin   Solution 5 milliLiter(s) Oral daily  oxyCODONE    Solution 20 milliGRAM(s) Oral every 4 hours  oxyCODONE    Solution 15 milliGRAM(s) Oral every 3 hours PRN  oxyCODONE    Solution 20 milliGRAM(s) Oral every 3 hours PRN  pantoprazole   Suspension 40 milliGRAM(s) Enteral Tube before breakfast  petrolatum Ophthalmic Ointment 1 Application(s) Both EYES at bedtime  saccharomyces boulardii 250 milliGRAM(s) Oral two times a day  vitamin A &amp; D Ointment 1 Application(s) Topical two times a day      LABS:      no labs today  RADIOLOGY & ADDITIONAL TESTS:      Consultant notes reviewed    Case discussed with consultant/provider/ family /patient     < from: Xray Chest 1 View- PORTABLE-Routine (03.23.18 @ 04:56) >    Impression:  Decreasing right pleural effusion..    < end of copied text >

## 2018-03-25 LAB
ALBUMIN SERPL ELPH-MCNC: 2.7 G/DL — LOW (ref 3.3–5.2)
ALP SERPL-CCNC: 84 U/L — SIGNIFICANT CHANGE UP (ref 40–120)
ALT FLD-CCNC: 22 U/L — SIGNIFICANT CHANGE UP
ANION GAP SERPL CALC-SCNC: 9 MMOL/L — SIGNIFICANT CHANGE UP (ref 5–17)
AST SERPL-CCNC: 11 U/L — SIGNIFICANT CHANGE UP
BILIRUB SERPL-MCNC: 0.3 MG/DL — LOW (ref 0.4–2)
BUN SERPL-MCNC: 27 MG/DL — HIGH (ref 8–20)
CALCIUM SERPL-MCNC: 9.7 MG/DL — SIGNIFICANT CHANGE UP (ref 8.6–10.2)
CHLORIDE SERPL-SCNC: 96 MMOL/L — LOW (ref 98–107)
CO2 SERPL-SCNC: 34 MMOL/L — HIGH (ref 22–29)
CREAT SERPL-MCNC: 0.58 MG/DL — SIGNIFICANT CHANGE UP (ref 0.5–1.3)
GLUCOSE BLDC GLUCOMTR-MCNC: 160 MG/DL — HIGH (ref 70–99)
GLUCOSE BLDC GLUCOMTR-MCNC: 167 MG/DL — HIGH (ref 70–99)
GLUCOSE BLDC GLUCOMTR-MCNC: 197 MG/DL — HIGH (ref 70–99)
GLUCOSE BLDC GLUCOMTR-MCNC: 241 MG/DL — HIGH (ref 70–99)
GLUCOSE SERPL-MCNC: 153 MG/DL — HIGH (ref 70–115)
HCT VFR BLD CALC: 34.1 % — LOW (ref 42–52)
HGB BLD-MCNC: 10.3 G/DL — LOW (ref 14–18)
MAGNESIUM SERPL-MCNC: 1.9 MG/DL — SIGNIFICANT CHANGE UP (ref 1.6–2.6)
MCHC RBC-ENTMCNC: 28.1 PG — SIGNIFICANT CHANGE UP (ref 27–31)
MCHC RBC-ENTMCNC: 30.2 G/DL — LOW (ref 32–36)
MCV RBC AUTO: 93.2 FL — SIGNIFICANT CHANGE UP (ref 80–94)
PLATELET # BLD AUTO: 568 K/UL — HIGH (ref 150–400)
POTASSIUM SERPL-MCNC: 4.1 MMOL/L — SIGNIFICANT CHANGE UP (ref 3.5–5.3)
POTASSIUM SERPL-SCNC: 4.1 MMOL/L — SIGNIFICANT CHANGE UP (ref 3.5–5.3)
PROT SERPL-MCNC: 7 G/DL — SIGNIFICANT CHANGE UP (ref 6.6–8.7)
RBC # BLD: 3.66 M/UL — LOW (ref 4.6–6.2)
RBC # FLD: 16.8 % — HIGH (ref 11–15.6)
SODIUM SERPL-SCNC: 139 MMOL/L — SIGNIFICANT CHANGE UP (ref 135–145)
TROPONIN T SERPL-MCNC: 0.03 NG/ML — SIGNIFICANT CHANGE UP (ref 0–0.06)
TROPONIN T SERPL-MCNC: 0.04 NG/ML — SIGNIFICANT CHANGE UP (ref 0–0.06)
TROPONIN T SERPL-MCNC: 0.05 NG/ML — SIGNIFICANT CHANGE UP (ref 0–0.06)
WBC # BLD: 15 K/UL — HIGH (ref 4.8–10.8)
WBC # FLD AUTO: 15 K/UL — HIGH (ref 4.8–10.8)

## 2018-03-25 PROCEDURE — 74018 RADEX ABDOMEN 1 VIEW: CPT | Mod: 26

## 2018-03-25 PROCEDURE — 99233 SBSQ HOSP IP/OBS HIGH 50: CPT

## 2018-03-25 PROCEDURE — 93010 ELECTROCARDIOGRAM REPORT: CPT | Mod: 76

## 2018-03-25 PROCEDURE — 99254 IP/OBS CNSLTJ NEW/EST MOD 60: CPT

## 2018-03-25 RX ORDER — MORPHINE SULFATE 50 MG/1
2 CAPSULE, EXTENDED RELEASE ORAL ONCE
Qty: 0 | Refills: 0 | Status: DISCONTINUED | OUTPATIENT
Start: 2018-03-25 | End: 2018-03-25

## 2018-03-25 RX ADMIN — INSULIN HUMAN 1: 100 INJECTION, SOLUTION SUBCUTANEOUS at 06:18

## 2018-03-25 RX ADMIN — CHLORHEXIDINE GLUCONATE 15 MILLILITER(S): 213 SOLUTION TOPICAL at 05:43

## 2018-03-25 RX ADMIN — Medication 1 APPLICATION(S): at 21:13

## 2018-03-25 RX ADMIN — HEPARIN SODIUM 5000 UNIT(S): 5000 INJECTION INTRAVENOUS; SUBCUTANEOUS at 14:59

## 2018-03-25 RX ADMIN — INSULIN HUMAN 2: 100 INJECTION, SOLUTION SUBCUTANEOUS at 20:21

## 2018-03-25 RX ADMIN — MORPHINE SULFATE 2 MILLIGRAM(S): 50 CAPSULE, EXTENDED RELEASE ORAL at 05:00

## 2018-03-25 RX ADMIN — Medication 81 MILLIGRAM(S): at 11:33

## 2018-03-25 RX ADMIN — Medication 0.5 MILLIGRAM(S): at 21:12

## 2018-03-25 RX ADMIN — OXYCODONE HYDROCHLORIDE 20 MILLIGRAM(S): 5 TABLET ORAL at 17:29

## 2018-03-25 RX ADMIN — Medication 5 MILLILITER(S): at 11:33

## 2018-03-25 RX ADMIN — Medication 1 APPLICATION(S): at 17:29

## 2018-03-25 RX ADMIN — INSULIN GLARGINE 10 UNIT(S): 100 INJECTION, SOLUTION SUBCUTANEOUS at 21:12

## 2018-03-25 RX ADMIN — INSULIN HUMAN 1: 100 INJECTION, SOLUTION SUBCUTANEOUS at 11:33

## 2018-03-25 RX ADMIN — HEPARIN SODIUM 5000 UNIT(S): 5000 INJECTION INTRAVENOUS; SUBCUTANEOUS at 21:13

## 2018-03-25 RX ADMIN — Medication 500 MILLIGRAM(S): at 11:33

## 2018-03-25 RX ADMIN — INSULIN HUMAN 1: 100 INJECTION, SOLUTION SUBCUTANEOUS at 23:08

## 2018-03-25 RX ADMIN — OXYCODONE HYDROCHLORIDE 20 MILLIGRAM(S): 5 TABLET ORAL at 11:34

## 2018-03-25 RX ADMIN — INSULIN HUMAN 1: 100 INJECTION, SOLUTION SUBCUTANEOUS at 00:18

## 2018-03-25 RX ADMIN — Medication 0.5 MILLIGRAM(S): at 02:40

## 2018-03-25 RX ADMIN — AMPICILLIN SODIUM AND SULBACTAM SODIUM 200 GRAM(S): 250; 125 INJECTION, POWDER, FOR SUSPENSION INTRAMUSCULAR; INTRAVENOUS at 07:44

## 2018-03-25 RX ADMIN — AMPICILLIN SODIUM AND SULBACTAM SODIUM 200 GRAM(S): 250; 125 INJECTION, POWDER, FOR SUSPENSION INTRAMUSCULAR; INTRAVENOUS at 03:11

## 2018-03-25 RX ADMIN — CLOPIDOGREL BISULFATE 75 MILLIGRAM(S): 75 TABLET, FILM COATED ORAL at 11:33

## 2018-03-25 RX ADMIN — CHLORHEXIDINE GLUCONATE 15 MILLILITER(S): 213 SOLUTION TOPICAL at 17:28

## 2018-03-25 RX ADMIN — OXYCODONE HYDROCHLORIDE 20 MILLIGRAM(S): 5 TABLET ORAL at 14:57

## 2018-03-25 RX ADMIN — HEPARIN SODIUM 5000 UNIT(S): 5000 INJECTION INTRAVENOUS; SUBCUTANEOUS at 05:43

## 2018-03-25 RX ADMIN — Medication 10 MILLIGRAM(S): at 17:28

## 2018-03-25 RX ADMIN — MORPHINE SULFATE 2 MILLIGRAM(S): 50 CAPSULE, EXTENDED RELEASE ORAL at 04:13

## 2018-03-25 RX ADMIN — AMPICILLIN SODIUM AND SULBACTAM SODIUM 200 GRAM(S): 250; 125 INJECTION, POWDER, FOR SUSPENSION INTRAMUSCULAR; INTRAVENOUS at 21:12

## 2018-03-25 RX ADMIN — AMPICILLIN SODIUM AND SULBACTAM SODIUM 200 GRAM(S): 250; 125 INJECTION, POWDER, FOR SUSPENSION INTRAMUSCULAR; INTRAVENOUS at 14:58

## 2018-03-25 RX ADMIN — Medication 1 APPLICATION(S): at 05:43

## 2018-03-25 NOTE — CONSULT NOTE ADULT - ASSESSMENT
Hx as above.  Iniguez catheter removed from PEG site, site cleaned,  and replaced with a 20 Wolof replacement G Tube.  Atraumatically.  Balloon inflated with 10 cc of sterile water.  Secured in place by external bumper.    KUB with gastrograffin injection confirms adequate placement of the PEG tube within the gastric lumen.  No leakage of contrast.  Contrast seen in the duodenum.    Replacement G tube is ready for full use.    For routine G tube nursing care.  Keep site clean/ dry.  Reconsult prn.

## 2018-03-25 NOTE — CONSULT NOTE ADULT - SUBJECTIVE AND OBJECTIVE BOX
HPI:  61y/o male with pmh of PMH Transverse myelitis, Paraplegia, trach/peg, colostomy, HTN, coming from [facility] presents with fever for 3 days.  Sacral decubiti and scrotal decubitae.    Currently admitted for sepsis and has been treated with antibiotics for MDR Acinetobacter pneumonias.  Recent CT chest:  extensive right rided infiltrates.    Patient gets all nutrition and meds via the PEG tube.  Currently PEG became dislodged overnight and a 16 frwench beltrán catheter was placed into the peg sit overnight but not used.  ,.      Recent Heart Attack, stent placed, check up after. Currently on aspirin, Plavix and heparin        PAST MEDICAL & SURGICAL HISTORY:  Essential hypertension  Paralysis  Transverse myelitis  History of tracheostomy, peg, diverting colostomy elsewhere.       ROS:  No Heartburn, regurgitation, dysphagia, odynophagia.  No dyspepsia  No abdominal pain.    No Nausea, vomiting.  No Bleeding.  No hematemesis.   No diarrhea.    No hematochesia.  No weight loss, anorexia.  No edema.      MEDICATIONS  (STANDING):  ampicillin/sulbactam  IVPB      ampicillin/sulbactam  IVPB 3 Gram(s) IV Intermittent every 6 hours  ascorbic acid 500 milliGRAM(s) Oral daily  aspirin  chewable 81 milliGRAM(s) Oral daily  baclofen 10 milliGRAM(s) Oral two times a day  chlorhexidine 0.12% Liquid 15 milliLiter(s) Swish and Spit two times a day  clopidogrel Tablet 75 milliGRAM(s) Oral daily  dextrose 5%. 1000 milliLiter(s) (50 mL/Hr) IV Continuous <Continuous>  dextrose 50% Injectable 12.5 Gram(s) IV Push once  dextrose 50% Injectable 25 Gram(s) IV Push once  dextrose 50% Injectable 25 Gram(s) IV Push once  fentaNYL   Patch  25 MICROgram(s)/Hr 1 Patch Transdermal every 72 hours  heparin  Injectable 5000 Unit(s) SubCutaneous every 8 hours  insulin glargine Injectable (LANTUS) 10 Unit(s) SubCutaneous at bedtime  insulin regular  human corrective regimen sliding scale   SubCutaneous every 6 hours  multivitamin   Solution 5 milliLiter(s) Oral daily  pantoprazole   Suspension 40 milliGRAM(s) Enteral Tube before breakfast  petrolatum Ophthalmic Ointment 1 Application(s) Both EYES at bedtime  saccharomyces boulardii 250 milliGRAM(s) Oral two times a day  vitamin A &amp; D Ointment 1 Application(s) Topical two times a day    MEDICATIONS  (PRN):  acetaminophen   Tablet 650 milliGRAM(s) Oral every 6 hours PRN For Temp greater than 38 C (100.4 F)  acetaminophen   Tablet. 650 milliGRAM(s) Oral every 6 hours PRN Moderate Pain (4 - 6) and headache  ALBUTerol    0.083% 2.5 milliGRAM(s) Nebulizer every 6 hours PRN Shortness of Breath and/or Wheezing  ALPRAZolam 0.5 milliGRAM(s) Oral every 6 hours PRN anxiety/agitation  artificial  tears Solution 1 Drop(s) Both EYES every 4 hours PRN Dry Eyes  glucagon  Injectable 1 milliGRAM(s) IntraMuscular once PRN Glucose <70 milliGRAM(s)/deciLiter  oxyCODONE    Solution 15 milliGRAM(s) Oral every 3 hours PRN mild to moderate pain  oxyCODONE    Solution 20 milliGRAM(s) Oral every 3 hours PRN severe pain or breakthrough pain      Allergies  No Known Allergies    SOCIAL HISTORY:  NC    FAMILY HISTORY:  No pertinent family history in first degree relatives      Vital Signs Last 24 Hrs  T(C): 36.9 (25 Mar 2018 07:33), Max: 37.7 (24 Mar 2018 21:36)  T(F): 98.4 (25 Mar 2018 07:33), Max: 99.9 (24 Mar 2018 21:36)  HR: 93 (25 Mar 2018 09:19) (93 - 107)  BP: 143/86 (25 Mar 2018 07:33) (124/72 - 147/88)  BP(mean): --  RR: 18 (25 Mar 2018 07:33) (14 - 18)  SpO2: 100% (25 Mar 2018 09:19) (98% - 100%)    PHYSICAL EXAM:    GENERAL: NAD, well-groomed, well-developed  HEAD:  Atraumatic, Normocephalic  EYES: EOMI, PERRLA, conjunctiva and sclera clear  ENMT: No tonsillar erythema, exudates, or enlargement; Moist mucous membranes, Good dentition, No lesions  NECK: Supple, No JVD, Normal thyroid  CHEST/LUNG: Clear to percussion bilaterally; No rales, rhonchi, wheezing, or rubs  HEART: Regular rate and rhythm; No murmurs, rubs, or gallops  ABDOMEN: Soft, Nontender, Nondistended; Bowel sounds present  EXTREMITIES:  2+ Peripheral Pulses, No clubbing, cyanosis, or edema  LYMPH: No lymphadenopathy noted  SKIN: No rashes or lesions      LABS:                        10.3   15.0  )-----------( 568      ( 25 Mar 2018 08:03 )             34.1     03-25    139  |  96<L>  |  27.0<H>  ----------------------------<  153<H>  4.1   |  34.0<H>  |  0.58    Ca    9.7      25 Mar 2018 08:03  Mg     1.9     03-25    TPro  7.0  /  Alb  2.7<L>  /  TBili  0.3<L>  /  DBili  x   /  AST  11  /  ALT  22  /  AlkPhos  84  03-25           LIVER FUNCTIONS - ( 25 Mar 2018 08:03 )  Alb: 2.7 g/dL / Pro: 7.0 g/dL / ALK PHOS: 84 U/L / ALT: 22 U/L / AST: 11 U/L / GGT: x               RADIOLOGY & ADDITIONAL STUDIES:

## 2018-03-25 NOTE — CONSULT NOTE ADULT - PROVIDER SPECIALTY LIST ADULT
Allergy/Immunology
Gastroenterology
Infectious Disease
Palliative Care
Pulmonology
Surgery
Critical Care

## 2018-03-25 NOTE — PROGRESS NOTE ADULT - SUBJECTIVE AND OBJECTIVE BOX
KAMAR BELLAMY Patient is a 62y old  Male who presents with a chief complaint of fevers (12 Mar 2018 18:28)     HPI:  63y/o male with pmh of PMH Transverse myelitis, Paraplegia, trach/peg, colostomy, HTN, coming from [facility] presents with fever for 3 days.    Patient denies any cough, sob, runny nose, vomiting, diarrhea, abdominal pain, pain with urination.    Patient signed MOLST REQUESTING CPR on 2/18/18..  Originally at [facility] in 7months ago, developed Acute transverse myelitis.    Heart Attack, stent placed, check up after.  He was given a tetanus shot, then couple days ago he developed lower extremity weakness, paralysis; ultimately developed ulcers.    SBU--> [facility] (kept him for a few weeks) --> wife couldn't take care of him;    He is getting frequent uti's, anemia reequiring transfusions.    [facility] [place] (10 Mar 2018 23:26)    Interval:  The patient was seen and evaluated   The patient is in no acute distress.  Denied any fever chest pain, palpitations, shortness of breath, abdominal pain, fever, dysuria, cough, edema   fatigue+  PEG tube was dislodged overnight and beltrán was placed on site. PEG replaced by GI in am      Allergies    No Known Allergies    Intolerances      PHYSICAL EXAM:            ICU Vital Signs Last 24 Hrs  T(C): 36.8 (25 Mar 2018 15:19), Max: 37.7 (24 Mar 2018 21:36)  T(F): 98.3 (25 Mar 2018 15:19), Max: 99.9 (24 Mar 2018 21:36)  HR: 103 (25 Mar 2018 15:44) (91 - 107)  BP: 118/75 (25 Mar 2018 15:19) (118/75 - 147/88)  BP(mean): --  ABP: --  ABP(mean): --  RR: 18 (25 Mar 2018 15:19) (15 - 18)  SpO2: 100% (25 Mar 2018 15:44) (99% - 100%)    CAPILLARY BLOOD GLUCOSE      POCT Blood Glucose.: 160 mg/dL (25 Mar 2018 11:30)  POCT Blood Glucose.: 167 mg/dL (25 Mar 2018 06:08)  POCT Blood Glucose.: 170 mg/dL (24 Mar 2018 23:44)  POCT Blood Glucose.: 199 mg/dL (24 Mar 2018 16:57)        I&O's Summary    24 Mar 2018 07:01  -  25 Mar 2018 07:00  --------------------------------------------------------  IN: 540 mL / OUT: 3050 mL / NET: -2510 mL      GENERAL: NAD,   EYES: EOMI,conjunctiva and sclera clear  ENMT:  Moist mucous membranes,  No lesions  NERVOUS SYSTEM:  Alert & Oriented X3, cant Move upper and lower extremities; CNS-II-XII  CHEST/LUNG: Clear to auscultation bilaterally; No rales, rhonchi, wheezing,   HEART: Regular rate and rhythm; No murmurs,   ABDOMEN: Soft, Nontender, Nondistended; Bowel sounds present. PEG, colostomy  EXTREMITIES:  Peripheral Pulses, No  cyanosis, or edema  SKIN: No rashes or lesions  psychiatry- mood and affect flat      MEDICATIONS  (STANDING):  ampicillin/sulbactam  IVPB      ampicillin/sulbactam  IVPB 3 Gram(s) IV Intermittent every 6 hours  ascorbic acid 500 milliGRAM(s) Oral daily  aspirin  chewable 81 milliGRAM(s) Oral daily  baclofen 10 milliGRAM(s) Oral two times a day  chlorhexidine 0.12% Liquid 15 milliLiter(s) Swish and Spit two times a day  clopidogrel Tablet 75 milliGRAM(s) Oral daily  dextrose 5%. 1000 milliLiter(s) (50 mL/Hr) IV Continuous <Continuous>  dextrose 50% Injectable 12.5 Gram(s) IV Push once  dextrose 50% Injectable 25 Gram(s) IV Push once  dextrose 50% Injectable 25 Gram(s) IV Push once  fentaNYL   Patch  25 MICROgram(s)/Hr 1 Patch Transdermal every 72 hours  heparin  Injectable 5000 Unit(s) SubCutaneous every 8 hours  insulin glargine Injectable (LANTUS) 10 Unit(s) SubCutaneous at bedtime  insulin regular  human corrective regimen sliding scale   SubCutaneous every 6 hours  multivitamin   Solution 5 milliLiter(s) Oral daily  pantoprazole   Suspension 40 milliGRAM(s) Enteral Tube before breakfast  petrolatum Ophthalmic Ointment 1 Application(s) Both EYES at bedtime  saccharomyces boulardii 250 milliGRAM(s) Oral two times a day  vitamin A &amp; D Ointment 1 Application(s) Topical two times a day    MEDICATIONS  (PRN):  acetaminophen   Tablet 650 milliGRAM(s) Oral every 6 hours PRN For Temp greater than 38 C (100.4 F)  acetaminophen   Tablet. 650 milliGRAM(s) Oral every 6 hours PRN Moderate Pain (4 - 6) and headache  ALBUTerol    0.083% 2.5 milliGRAM(s) Nebulizer every 6 hours PRN Shortness of Breath and/or Wheezing  ALPRAZolam 0.5 milliGRAM(s) Oral every 6 hours PRN anxiety/agitation  artificial  tears Solution 1 Drop(s) Both EYES every 4 hours PRN Dry Eyes  glucagon  Injectable 1 milliGRAM(s) IntraMuscular once PRN Glucose <70 milliGRAM(s)/deciLiter  oxyCODONE    Solution 15 milliGRAM(s) Oral every 3 hours PRN mild to moderate pain  oxyCODONE    Solution 20 milliGRAM(s) Oral every 3 hours PRN severe pain or breakthrough pain        LABS:      no labs today  RADIOLOGY & ADDITIONAL TESTS:      Consultant notes reviewed    Case discussed with consultant/provider/ family /patient     < from: Xray Chest 1 View- PORTABLE-Routine (03.23.18 @ 04:56) >    Impression:  Decreasing right pleural effusion..    < end of copied text > KAMAR BELLAMY Patient is a 62y old  Male who presents with a chief complaint of fevers (12 Mar 2018 18:28)     HPI:  61y/o male with pmh of PMH Transverse myelitis, Paraplegia, trach/peg, colostomy, HTN, coming from [facility] presents with fever for 3 days.    Patient denies any cough, sob, runny nose, vomiting, diarrhea, abdominal pain, pain with urination.    Patient signed MOLST REQUESTING CPR on 2/18/18..  Originally at [facility] in 7months ago, developed Acute transverse myelitis.    Heart Attack, stent placed, check up after.  He was given a tetanus shot, then couple days ago he developed lower extremity weakness, paralysis; ultimately developed ulcers.    SBU--> [facility] (kept him for a few weeks) --> wife couldn't take care of him;    He is getting frequent uti's, anemia reequiring transfusions.    101 fever at nursing home today ([facility]) (10 Mar 2018 23:26)    Interval:  The patient was seen and evaluated   The patient is in no acute distress.  Denied any fever chest pain, palpitations, shortness of breath, abdominal pain, fever, dysuria, cough, edema   fatigue+  PEG tube was dislodged overnight and beltrán was placed on site. PEG replaced by GI in am  reported chest pain last night. Ce and EKg*2 neg. refused 3rd EkG this am.       Allergies    No Known Allergies    Intolerances      PHYSICAL EXAM:            ICU Vital Signs Last 24 Hrs  T(C): 36.8 (25 Mar 2018 15:19), Max: 37.7 (24 Mar 2018 21:36)  T(F): 98.3 (25 Mar 2018 15:19), Max: 99.9 (24 Mar 2018 21:36)  HR: 103 (25 Mar 2018 15:44) (91 - 107)  BP: 118/75 (25 Mar 2018 15:19) (118/75 - 147/88)  BP(mean): --  ABP: --  ABP(mean): --  RR: 18 (25 Mar 2018 15:19) (15 - 18)  SpO2: 100% (25 Mar 2018 15:44) (99% - 100%)    CAPILLARY BLOOD GLUCOSE      POCT Blood Glucose.: 160 mg/dL (25 Mar 2018 11:30)  POCT Blood Glucose.: 167 mg/dL (25 Mar 2018 06:08)  POCT Blood Glucose.: 170 mg/dL (24 Mar 2018 23:44)  POCT Blood Glucose.: 199 mg/dL (24 Mar 2018 16:57)        I&O's Summary    24 Mar 2018 07:01  -  25 Mar 2018 07:00  --------------------------------------------------------  IN: 540 mL / OUT: 3050 mL / NET: -2510 mL      GENERAL: NAD,   EYES: EOMI,conjunctiva and sclera clear  ENMT:  Moist mucous membranes,  No lesions  NERVOUS SYSTEM:  Alert & Oriented X3, cant Move upper and lower extremities; CNS-II-XII  CHEST/LUNG: Clear to auscultation bilaterally; No rales, rhonchi, wheezing,   HEART: Regular rate and rhythm; No murmurs,   ABDOMEN: Soft, Nontender, Nondistended; Bowel sounds present. PEG, colostomy  EXTREMITIES:  Peripheral Pulses, No  cyanosis, or edema  SKIN: No rashes or lesions  psychiatry- mood and affect flat      MEDICATIONS  (STANDING):  ampicillin/sulbactam  IVPB      ampicillin/sulbactam  IVPB 3 Gram(s) IV Intermittent every 6 hours  ascorbic acid 500 milliGRAM(s) Oral daily  aspirin  chewable 81 milliGRAM(s) Oral daily  baclofen 10 milliGRAM(s) Oral two times a day  chlorhexidine 0.12% Liquid 15 milliLiter(s) Swish and Spit two times a day  clopidogrel Tablet 75 milliGRAM(s) Oral daily  dextrose 5%. 1000 milliLiter(s) (50 mL/Hr) IV Continuous <Continuous>  dextrose 50% Injectable 12.5 Gram(s) IV Push once  dextrose 50% Injectable 25 Gram(s) IV Push once  dextrose 50% Injectable 25 Gram(s) IV Push once  fentaNYL   Patch  25 MICROgram(s)/Hr 1 Patch Transdermal every 72 hours  heparin  Injectable 5000 Unit(s) SubCutaneous every 8 hours  insulin glargine Injectable (LANTUS) 10 Unit(s) SubCutaneous at bedtime  insulin regular  human corrective regimen sliding scale   SubCutaneous every 6 hours  multivitamin   Solution 5 milliLiter(s) Oral daily  pantoprazole   Suspension 40 milliGRAM(s) Enteral Tube before breakfast  petrolatum Ophthalmic Ointment 1 Application(s) Both EYES at bedtime  saccharomyces boulardii 250 milliGRAM(s) Oral two times a day  vitamin A &amp; D Ointment 1 Application(s) Topical two times a day    MEDICATIONS  (PRN):  acetaminophen   Tablet 650 milliGRAM(s) Oral every 6 hours PRN For Temp greater than 38 C (100.4 F)  acetaminophen   Tablet. 650 milliGRAM(s) Oral every 6 hours PRN Moderate Pain (4 - 6) and headache  ALBUTerol    0.083% 2.5 milliGRAM(s) Nebulizer every 6 hours PRN Shortness of Breath and/or Wheezing  ALPRAZolam 0.5 milliGRAM(s) Oral every 6 hours PRN anxiety/agitation  artificial  tears Solution 1 Drop(s) Both EYES every 4 hours PRN Dry Eyes  glucagon  Injectable 1 milliGRAM(s) IntraMuscular once PRN Glucose <70 milliGRAM(s)/deciLiter  oxyCODONE    Solution 15 milliGRAM(s) Oral every 3 hours PRN mild to moderate pain  oxyCODONE    Solution 20 milliGRAM(s) Oral every 3 hours PRN severe pain or breakthrough pain        LABS:      no labs today  RADIOLOGY & ADDITIONAL TESTS:      Consultant notes reviewed    Case discussed with consultant/provider/ family /patient     < from: Xray Chest 1 View- PORTABLE-Routine (03.23.18 @ 04:56) >    Impression:  Decreasing right pleural effusion..    < end of copied text >

## 2018-03-25 NOTE — CONSULT NOTE ADULT - CONSULT REQUESTED DATE/TIME
11-Mar-2018 09:02
12-Mar-2018 18:09
14-Mar-2018 16:26
18-Mar-2018 17:59
19-Mar-2018 13:23
25-Mar-2018 11:41
16-Mar-2018 17:09

## 2018-03-25 NOTE — CONSULT NOTE ADULT - CONSULT REQUESTED BY NAME
Cruz
Dr Rajput
Dr. Quinones Hospitalist
Dr. Rajput
Dr. Ruiz
Eran Cr M.D. and Sandee Quinones M.D.
Atiya/Hospitalist

## 2018-03-25 NOTE — PROGRESS NOTE ADULT - ASSESSMENT
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 ID following.    pleural effusion: decreaing    acute and chronic resp failure; previously was on T/C with vent at night at NH. Required full vent support at one time while here. Now weaning on CPAP/PSV.  Continue CPAP/PSV. plan is for T/C trials by Monday if he continues to improve. Nebs. Suction prn. Abx per ID. Follow CXR.  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 ID following.    pleural effusion: decreaing    acute and chronic resp failure; previously was on T/C with vent at night at NH. Required full vent support at one time while here. Now weaning on CPAP/PSV.  Continue CPAP/PSV. plan is for T/C trials by Monday if he continues to improve. Nebs. Suction prn. Abx per ID. Follow CXR.  pulmonary following    Transverse myelitis- with quadriplegia-beltrán allowing for help healing the decubitus and comfort - palliative following     IDDM- insulin with HISS    GERD- protonix      dislodged PEG: replaced by GI this am without complication. working good    chest pain: improved with xanax. ce*3 and EkG neg. 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 ID following.    pleural effusion: decreaing    acute and chronic resp failure; previously was on T/C with vent at night at NH. Required full vent support at one time while here. Now weaning on CPAP/PSV.  Continue CPAP/PSV. plan is for T/C trials by Monday if he continues to improve. Nebs. Suction prn. Abx per ID. Follow CXR.  pulmonary following    Transverse myelitis- with quadriplegia-beltrán allowing for help healing the decubitus and comfort - palliative following     IDDM- insulin with HISS    GERD- protonix      dislodged PEG: replaced by GI this am without complication. working good    chest pain: improved with xanax. ce*3 and EkG neg. refused Ekg this am    sacral decubitus stage4: on wound vac. wound team following    DVT ppx- heparin

## 2018-03-25 NOTE — CONSULT NOTE ADULT - CONSULT REASON
Chronic respiratory failure
Dislodged G Tube.
Fever
Lip Reading Consultation
Wound vac change
transverse myelitis
Unresponsive

## 2018-03-26 DIAGNOSIS — A49.9 BACTERIAL INFECTION, UNSPECIFIED: ICD-10-CM

## 2018-03-26 LAB
GLUCOSE BLDC GLUCOMTR-MCNC: 207 MG/DL — HIGH (ref 70–99)
GLUCOSE BLDC GLUCOMTR-MCNC: 239 MG/DL — HIGH (ref 70–99)
GLUCOSE BLDC GLUCOMTR-MCNC: 240 MG/DL — HIGH (ref 70–99)

## 2018-03-26 PROCEDURE — 99233 SBSQ HOSP IP/OBS HIGH 50: CPT

## 2018-03-26 PROCEDURE — 99232 SBSQ HOSP IP/OBS MODERATE 35: CPT

## 2018-03-26 RX ORDER — LACTOBACILLUS ACIDOPHILUS 100MM CELL
1 CAPSULE ORAL EVERY 12 HOURS
Qty: 0 | Refills: 0 | Status: DISCONTINUED | OUTPATIENT
Start: 2018-03-26 | End: 2018-04-10

## 2018-03-26 RX ADMIN — INSULIN HUMAN 2: 100 INJECTION, SOLUTION SUBCUTANEOUS at 05:55

## 2018-03-26 RX ADMIN — Medication 1 APPLICATION(S): at 16:41

## 2018-03-26 RX ADMIN — Medication 500 MILLIGRAM(S): at 12:54

## 2018-03-26 RX ADMIN — AMPICILLIN SODIUM AND SULBACTAM SODIUM 200 GRAM(S): 250; 125 INJECTION, POWDER, FOR SUSPENSION INTRAMUSCULAR; INTRAVENOUS at 07:45

## 2018-03-26 RX ADMIN — Medication 1 APPLICATION(S): at 05:53

## 2018-03-26 RX ADMIN — Medication 10 MILLIGRAM(S): at 05:52

## 2018-03-26 RX ADMIN — OXYCODONE HYDROCHLORIDE 15 MILLIGRAM(S): 5 TABLET ORAL at 23:21

## 2018-03-26 RX ADMIN — INSULIN HUMAN 2: 100 INJECTION, SOLUTION SUBCUTANEOUS at 23:20

## 2018-03-26 RX ADMIN — AMPICILLIN SODIUM AND SULBACTAM SODIUM 200 GRAM(S): 250; 125 INJECTION, POWDER, FOR SUSPENSION INTRAMUSCULAR; INTRAVENOUS at 23:19

## 2018-03-26 RX ADMIN — CHLORHEXIDINE GLUCONATE 15 MILLILITER(S): 213 SOLUTION TOPICAL at 16:41

## 2018-03-26 RX ADMIN — HEPARIN SODIUM 5000 UNIT(S): 5000 INJECTION INTRAVENOUS; SUBCUTANEOUS at 15:13

## 2018-03-26 RX ADMIN — FENTANYL CITRATE 1 PATCH: 50 INJECTION INTRAVENOUS at 15:13

## 2018-03-26 RX ADMIN — Medication 1 TABLET(S): at 23:20

## 2018-03-26 RX ADMIN — CHLORHEXIDINE GLUCONATE 15 MILLILITER(S): 213 SOLUTION TOPICAL at 05:52

## 2018-03-26 RX ADMIN — INSULIN GLARGINE 10 UNIT(S): 100 INJECTION, SOLUTION SUBCUTANEOUS at 23:20

## 2018-03-26 RX ADMIN — HEPARIN SODIUM 5000 UNIT(S): 5000 INJECTION INTRAVENOUS; SUBCUTANEOUS at 05:52

## 2018-03-26 RX ADMIN — Medication 10 MILLIGRAM(S): at 16:41

## 2018-03-26 RX ADMIN — Medication 0.5 MILLIGRAM(S): at 23:21

## 2018-03-26 RX ADMIN — AMPICILLIN SODIUM AND SULBACTAM SODIUM 200 GRAM(S): 250; 125 INJECTION, POWDER, FOR SUSPENSION INTRAMUSCULAR; INTRAVENOUS at 15:14

## 2018-03-26 RX ADMIN — OXYCODONE HYDROCHLORIDE 15 MILLIGRAM(S): 5 TABLET ORAL at 23:40

## 2018-03-26 RX ADMIN — AMPICILLIN SODIUM AND SULBACTAM SODIUM 200 GRAM(S): 250; 125 INJECTION, POWDER, FOR SUSPENSION INTRAMUSCULAR; INTRAVENOUS at 03:11

## 2018-03-26 RX ADMIN — Medication 5 MILLILITER(S): at 12:53

## 2018-03-26 RX ADMIN — Medication 81 MILLIGRAM(S): at 12:54

## 2018-03-26 RX ADMIN — HEPARIN SODIUM 5000 UNIT(S): 5000 INJECTION INTRAVENOUS; SUBCUTANEOUS at 23:20

## 2018-03-26 RX ADMIN — CLOPIDOGREL BISULFATE 75 MILLIGRAM(S): 75 TABLET, FILM COATED ORAL at 12:46

## 2018-03-26 RX ADMIN — Medication 0.5 MILLIGRAM(S): at 05:52

## 2018-03-26 RX ADMIN — INSULIN HUMAN 2: 100 INJECTION, SOLUTION SUBCUTANEOUS at 16:47

## 2018-03-26 NOTE — PROGRESS NOTE ADULT - ASSESSMENT
63 y/o M with h/o Transverse myelitis, Paraplegia, trach/peg, colostomy, HTN here with VAP with Acinetobacter baumannii

## 2018-03-26 NOTE — PROGRESS NOTE ADULT - SUBJECTIVE AND OBJECTIVE BOX
[facility]  INFECTIOUS DISEASES AND INTERNAL MEDICINE at [facility]  =======================================================  Shantanu Garcia MD  Diplomates American Board of Internal Medicine and Infectious Diseases  =======================================================    KAMAR BELALMY [id number]    Follow up: VAP with Acinetobacter       Allergies:  No Known Allergies      Antibiotics:  ampicillin/sulbactam  IVPB 3 Gram(s) IV Intermittent every 6 hours        REVIEW OF SYSTEMS:  unable to obtain, patient is drowsy      Physical Exam:  Vital Signs Last 24 Hrs  T(C): 36.9 (26 Mar 2018 16:24), Max: 36.9 (26 Mar 2018 16:24)  T(F): 98.4 (26 Mar 2018 16:24), Max: 98.4 (26 Mar 2018 16:24)  HR: 80 (26 Mar 2018 16:31) (80 - 110)  BP: 151/84 (26 Mar 2018 16:24) (135/81 - 151/84)  RR: 18 (26 Mar 2018 16:24) (18 - 21)  SpO2: 100% (26 Mar 2018 16:31) (98% - 100%)      GEN: NAD, drowsy   HEENT: normocephalic and atraumatic.  NECK: Supple.   LUNGS: Coarse BS B/L  HEART: Regular rate and rhythm  ABDOMEN: Soft, nontender, and nondistended.  Positive bowel sounds.  + PEG  : + Iniguez  EXTREMITIES: trace edema.  MSK: no joint swelling  NEUROLOGIC: Drowsy       Labs:  03-25    139  |  96<L>  |  27.0<H>  ----------------------------<  153<H>  4.1   |  34.0<H>  |  0.58    Ca    9.7      25 Mar 2018 08:03  Mg     1.9     03-25    TPro  7.0  /  Alb  2.7<L>  /  TBili  0.3<L>  /  DBili  x   /  AST  11  /  ALT  22  /  AlkPhos  84  03-25                          10.3   15.0  )-----------( 568      ( 25 Mar 2018 08:03 )             34.1     LIVER FUNCTIONS - ( 25 Mar 2018 08:03 )  Alb: 2.7 g/dL / Pro: 7.0 g/dL / ALK PHOS: 84 U/L / ALT: 22 U/L / AST: 11 U/L / GGT: x           CARDIAC MARKERS ( 25 Mar 2018 14:03 )  x     / 0.03 ng/mL / x     / x     / x      CARDIAC MARKERS ( 25 Mar 2018 08:03 )  x     / 0.05 ng/mL / x     / x     / x      CARDIAC MARKERS ( 25 Mar 2018 02:23 )  x     / 0.04 ng/mL / x     / x     / x      CARDIAC MARKERS ( 24 Mar 2018 22:17 )  x     / 0.04 ng/mL / 10 U/L / x     / x          RECENT CULTURES:  03-16 @ 17:57 .Sputum Sputum Acinetobacter baumannii/haemolyticus (Carbapenem Resistant)    Moderate Acinetobacter baumannii/haemolyticus (Carbapenem Resistant)  Moderate White blood cells  No organisms seen      03-16 @ 11:35 .Blood Blood     No growth at 5 days.      03-15 @ 22:10 .Blood Blood     No growth at 5 days.      03-13 @ 17:27 .Urine Clean Catch (Midstream) Enterococcus faecium (vancomycin resistant)  Pseudomonas aeruginosa (Carbapenem Resistant)    50,000 - 99,000 CFU/mL Enterococcus faecium (vancomycin resistant)  10,000 - 49,000 CFU/mL Pseudomonas aeruginosa (Carbapenem Resistant)  (known)  send a copy to iwona [email or web address]      03-13 @ 15:07 .Blood Blood-Peripheral     No growth at 5 days.

## 2018-03-26 NOTE — PROGRESS NOTE ADULT - PROBLEM SELECTOR PLAN 1
Sputum cx with Acinetobacter baumannii  CT chest with pneumonia  Has trach and is on Vent  Will continue Unasyn  Afebrile  Plan for 10 day course with end date 3/28/18  Is at risk for re-current pneumonia

## 2018-03-26 NOTE — PROGRESS NOTE ADULT - ASSESSMENT
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 ID following.    pleural effusion: decreaing    acute and chronic resp failure; previously was on T/C with vent at night at NH. Required full vent support at one time while here. Now weaning on CPAP/PSV.  Continue CPAP/PSV. plan is for T/C trials by today if he continues to improve. Nebs. Suction prn. Abx per ID.  pulmonary following    Transverse myelitis- with quadriplegia-beltrán allowing for help healing the decubitus and comfort - palliative following     IDDM- insulin with HISS    GERD- protonix      dislodged PEG: replaced by GI this am without complication. working good    chest pain: improved with xanax. ce*3 and EkG neg.     sacral decubitus stage4: on wound vac. and colostomy. wound team following    DVT ppx- heparin

## 2018-03-26 NOTE — PROGRESS NOTE ADULT - SUBJECTIVE AND OBJECTIVE BOX
KAMAR BELLAMY Patient is a 62y old  Male who presents with a chief complaint of fevers (12 Mar 2018 18:28)     HPI:  61y/o male with pmh of PMH Transverse myelitis, Paraplegia, trach/peg, colostomy, HTN, coming from [facility] beds at the [facility] presents with fever for 3 days.    Patient denies any cough, sob, runny nose, vomiting, diarrhea, abdominal pain, pain with urination.    Patient signed [facility] REQUESTING CPR on 2/18/18..  [facility] developed Acute transverse myelitis.    Heart Attack, stent placed, check up after.  He was given a tetanus shot, then couple days ago he developed lower extremity weakness, paralysis; ultimately developed ulcers.    [facility]--> [facility] (kept him for a few weeks) --> wife couldn't take care of him;    He is getting frequent uti's, anemia reequiring transfusions.    101 fever at nursing home today ([facility] [place]) (10 Mar 2018 23:26)    Interval:  The patient was seen and evaluated   The patient is in no acute distress.  Denied any fever chest pain, palpitations, shortness of breath, abdominal pain, fever, dysuria, cough, edema   fatigue+        Allergies    No Known Allergies    Intolerances      PHYSICAL EXAM:    ICU Vital Signs Last 24 Hrs  T(C): 36.8 (26 Mar 2018 07:59), Max: 36.8 (25 Mar 2018 15:19)  T(F): 98.3 (26 Mar 2018 07:59), Max: 98.3 (25 Mar 2018 15:19)  HR: 98 (26 Mar 2018 08:12) (98 - 110)  BP: 135/81 (26 Mar 2018 07:59) (118/75 - 141/81)  BP(mean): --  ABP: --  ABP(mean): --  RR: 18 (26 Mar 2018 07:59) (18 - 21)  SpO2: 100% (26 Mar 2018 08:12) (98% - 100%)      CAPILLARY BLOOD GLUCOSE      POCT Blood Glucose.: 240 mg/dL (26 Mar 2018 05:52)  POCT Blood Glucose.: 197 mg/dL (25 Mar 2018 23:04)  POCT Blood Glucose.: 241 mg/dL (25 Mar 2018 20:18)      I&O's Summary    25 Mar 2018 07:01  -  26 Mar 2018 07:00  --------------------------------------------------------  IN: 1480 mL / OUT: 1975 mL / NET: -495 mL          GENERAL: NAD,   EYES: EOMI,conjunctiva and sclera clear  ENMT:  Moist mucous membranes,  No lesions  NERVOUS SYSTEM:  Alert & Oriented X3, cant Move upper and lower extremities; CNS-II-XII  CHEST/LUNG: Clear to auscultation bilaterally; No rales, rhonchi, wheezing,   HEART: Regular rate and rhythm; No murmurs,   ABDOMEN: Soft, Nontender, Nondistended; Bowel sounds present. PEG, colostomy  EXTREMITIES:  Peripheral Pulses, No  cyanosis, or edema  SKIN: No rashes or lesions  psychiatry- mood and affect flat    MEDICATIONS  (STANDING):  ampicillin/sulbactam  IVPB      ampicillin/sulbactam  IVPB 3 Gram(s) IV Intermittent every 6 hours  ascorbic acid 500 milliGRAM(s) Oral daily  aspirin  chewable 81 milliGRAM(s) Oral daily  baclofen 10 milliGRAM(s) Oral two times a day  chlorhexidine 0.12% Liquid 15 milliLiter(s) Swish and Spit two times a day  clopidogrel Tablet 75 milliGRAM(s) Oral daily  dextrose 5%. 1000 milliLiter(s) (50 mL/Hr) IV Continuous <Continuous>  dextrose 50% Injectable 12.5 Gram(s) IV Push once  dextrose 50% Injectable 25 Gram(s) IV Push once  dextrose 50% Injectable 25 Gram(s) IV Push once  fentaNYL   Patch  25 MICROgram(s)/Hr 1 Patch Transdermal every 72 hours  heparin  Injectable 5000 Unit(s) SubCutaneous every 8 hours  insulin glargine Injectable (LANTUS) 10 Unit(s) SubCutaneous at bedtime  insulin regular  human corrective regimen sliding scale   SubCutaneous every 6 hours  multivitamin   Solution 5 milliLiter(s) Oral daily  pantoprazole   Suspension 40 milliGRAM(s) Enteral Tube before breakfast  petrolatum Ophthalmic Ointment 1 Application(s) Both EYES at bedtime  saccharomyces boulardii 250 milliGRAM(s) Oral two times a day  vitamin A &amp; D Ointment 1 Application(s) Topical two times a day    MEDICATIONS  (PRN):  acetaminophen   Tablet 650 milliGRAM(s) Oral every 6 hours PRN For Temp greater than 38 C (100.4 F)  acetaminophen   Tablet. 650 milliGRAM(s) Oral every 6 hours PRN Moderate Pain (4 - 6) and headache  ALBUTerol    0.083% 2.5 milliGRAM(s) Nebulizer every 6 hours PRN Shortness of Breath and/or Wheezing  ALPRAZolam 0.5 milliGRAM(s) Oral every 6 hours PRN anxiety/agitation  artificial  tears Solution 1 Drop(s) Both EYES every 4 hours PRN Dry Eyes  glucagon  Injectable 1 milliGRAM(s) IntraMuscular once PRN Glucose <70 milliGRAM(s)/deciLiter  oxyCODONE    Solution 15 milliGRAM(s) Oral every 3 hours PRN mild to moderate pain  oxyCODONE    Solution 20 milliGRAM(s) Oral every 3 hours PRN severe pain or breakthrough pain          LABS:      no labs today  RADIOLOGY & ADDITIONAL TESTS:      Consultant notes reviewed    Case discussed with consultant/provider/ family /patient     < from: Xray Chest 1 View- PORTABLE-Routine (03.23.18 @ 04:56) >    Impression:  Decreasing right pleural effusion..    < end of copied text >

## 2018-03-26 NOTE — CHART NOTE - NSCHARTNOTEFT_GEN_A_CORE
Source: Patient [ x]  Family [ ]   other [ ]    Current Diet: NPO    Patient reports [ ] nausea  [ ] vomiting [ ] diarrhea [ ] constipation  [ ]chewing problems [ ] swallowing issues  [ ] other:     PO intake:  < 50% [ ]   50-75%  [ ]   %  [ ]  other :    Source for PO intake [ ] Patient [ ] family [ ] chart [ ] staff [ ] other    Enteral /Parenteral Nutrition: Glucerna @60 ml/ hr    Current Weight: 74.8 kg    % Weight Change     Pertinent Medications: MEDICATIONS  (STANDING):  ampicillin/sulbactam  IVPB      ampicillin/sulbactam  IVPB 3 Gram(s) IV Intermittent every 6 hours  ascorbic acid 500 milliGRAM(s) Oral daily  aspirin  chewable 81 milliGRAM(s) Oral daily  baclofen 10 milliGRAM(s) Oral two times a day  chlorhexidine 0.12% Liquid 15 milliLiter(s) Swish and Spit two times a day  clopidogrel Tablet 75 milliGRAM(s) Oral daily  dextrose 5%. 1000 milliLiter(s) (50 mL/Hr) IV Continuous <Continuous>  dextrose 50% Injectable 12.5 Gram(s) IV Push once  dextrose 50% Injectable 25 Gram(s) IV Push once  dextrose 50% Injectable 25 Gram(s) IV Push once  fentaNYL   Patch  25 MICROgram(s)/Hr 1 Patch Transdermal every 72 hours  heparin  Injectable 5000 Unit(s) SubCutaneous every 8 hours  insulin glargine Injectable (LANTUS) 10 Unit(s) SubCutaneous at bedtime  insulin regular  human corrective regimen sliding scale   SubCutaneous every 6 hours  multivitamin   Solution 5 milliLiter(s) Oral daily  pantoprazole   Suspension 40 milliGRAM(s) Enteral Tube before breakfast  petrolatum Ophthalmic Ointment 1 Application(s) Both EYES at bedtime  saccharomyces boulardii 250 milliGRAM(s) Oral two times a day  vitamin A &amp; D Ointment 1 Application(s) Topical two times a day    MEDICATIONS  (PRN):  acetaminophen   Tablet 650 milliGRAM(s) Oral every 6 hours PRN For Temp greater than 38 C (100.4 F)  acetaminophen   Tablet. 650 milliGRAM(s) Oral every 6 hours PRN Moderate Pain (4 - 6) and headache  ALBUTerol    0.083% 2.5 milliGRAM(s) Nebulizer every 6 hours PRN Shortness of Breath and/or Wheezing  ALPRAZolam 0.5 milliGRAM(s) Oral every 6 hours PRN anxiety/agitation  artificial  tears Solution 1 Drop(s) Both EYES every 4 hours PRN Dry Eyes  glucagon  Injectable 1 milliGRAM(s) IntraMuscular once PRN Glucose <70 milliGRAM(s)/deciLiter  oxyCODONE    Solution 15 milliGRAM(s) Oral every 3 hours PRN mild to moderate pain  oxyCODONE    Solution 20 milliGRAM(s) Oral every 3 hours PRN severe pain or breakthrough pain    Pertinent Labs: CBC Full  -  ( 25 Mar 2018 08:03 )  WBC Count : 15.0 K/uL  Hemoglobin : 10.3 g/dL  Hematocrit : 34.1 %  Platelet Count - Automated : 568 K/uL  Mean Cell Volume : 93.2 fl  Mean Cell Hemoglobin : 28.1 pg  Mean Cell Hemoglobin Concentration : 30.2 g/dL  Auto Neutrophil # : x  Auto Lymphocyte # : x  Auto Monocyte # : x  Auto Eosinophil # : x  Auto Basophil # : x  Auto Neutrophil % : x  Auto Lymphocyte % : x  Auto Monocyte % : x  Auto Eosinophil % : x  Auto Basophil % : x          Skin: Stage IV sacrum    Nutrition focused physical exam conducted - found signs of malnutrition [ ]absent [ ]present    Subcutaneous fat loss: [ ] Orbital fat pads region, [ ]Buccal fat region, [ ]Triceps region,  [ ]Ribs region    Muscle wasting: [ ]Temples region, [ ]Clavicle region, [ ]Shoulder region, [ ]Scapula region, [ ]Interosseous region,  [ ]thigh region, [ ]Calf region    Estimated Needs:   [x ] no change since previous assessment   [ ] recalculated:     Current Nutrition Diagnosis: Pt presents at risk secondary to increased protein calorie needs, related to increased  physiologic demand of healing, as evidenced by stage IV wound and infection.    Recommendations: 1) increase glucerna to 70 ml/ hr ( 2100 kcal, 90 g pro)    Monitoring and Evaluation:   [ ] PO intake [ ] Tolerance to diet prescription [X] Weights  [X] Follow up per protocol [X] Labs:

## 2018-03-27 LAB
ANION GAP SERPL CALC-SCNC: 10 MMOL/L — SIGNIFICANT CHANGE UP (ref 5–17)
BUN SERPL-MCNC: 23 MG/DL — HIGH (ref 8–20)
CALCIUM SERPL-MCNC: 9.3 MG/DL — SIGNIFICANT CHANGE UP (ref 8.6–10.2)
CHLORIDE SERPL-SCNC: 96 MMOL/L — LOW (ref 98–107)
CO2 SERPL-SCNC: 32 MMOL/L — HIGH (ref 22–29)
CREAT SERPL-MCNC: 0.5 MG/DL — SIGNIFICANT CHANGE UP (ref 0.5–1.3)
GLUCOSE BLDC GLUCOMTR-MCNC: 194 MG/DL — HIGH (ref 70–99)
GLUCOSE BLDC GLUCOMTR-MCNC: 203 MG/DL — HIGH (ref 70–99)
GLUCOSE BLDC GLUCOMTR-MCNC: 218 MG/DL — HIGH (ref 70–99)
GLUCOSE SERPL-MCNC: 173 MG/DL — HIGH (ref 70–115)
HCT VFR BLD CALC: 30 % — LOW (ref 42–52)
HGB BLD-MCNC: 9 G/DL — LOW (ref 14–18)
MCHC RBC-ENTMCNC: 27.7 PG — SIGNIFICANT CHANGE UP (ref 27–31)
MCHC RBC-ENTMCNC: 30 G/DL — LOW (ref 32–36)
MCV RBC AUTO: 92.3 FL — SIGNIFICANT CHANGE UP (ref 80–94)
PLATELET # BLD AUTO: 468 K/UL — HIGH (ref 150–400)
POTASSIUM SERPL-MCNC: 4.4 MMOL/L — SIGNIFICANT CHANGE UP (ref 3.5–5.3)
POTASSIUM SERPL-SCNC: 4.4 MMOL/L — SIGNIFICANT CHANGE UP (ref 3.5–5.3)
RBC # BLD: 3.25 M/UL — LOW (ref 4.6–6.2)
RBC # FLD: 16.6 % — HIGH (ref 11–15.6)
SODIUM SERPL-SCNC: 138 MMOL/L — SIGNIFICANT CHANGE UP (ref 135–145)
WBC # BLD: 11.4 K/UL — HIGH (ref 4.8–10.8)
WBC # FLD AUTO: 11.4 K/UL — HIGH (ref 4.8–10.8)

## 2018-03-27 PROCEDURE — 99233 SBSQ HOSP IP/OBS HIGH 50: CPT

## 2018-03-27 RX ORDER — SODIUM HYPOCHLORITE 0.125 %
1 SOLUTION, NON-ORAL MISCELLANEOUS DAILY
Qty: 0 | Refills: 0 | Status: COMPLETED | OUTPATIENT
Start: 2018-03-27 | End: 2018-03-28

## 2018-03-27 RX ORDER — INSULIN GLARGINE 100 [IU]/ML
15 INJECTION, SOLUTION SUBCUTANEOUS AT BEDTIME
Qty: 0 | Refills: 0 | Status: DISCONTINUED | OUTPATIENT
Start: 2018-03-27 | End: 2018-03-29

## 2018-03-27 RX ADMIN — Medication 0.5 MILLIGRAM(S): at 10:38

## 2018-03-27 RX ADMIN — Medication 1 TABLET(S): at 06:06

## 2018-03-27 RX ADMIN — OXYCODONE HYDROCHLORIDE 20 MILLIGRAM(S): 5 TABLET ORAL at 00:00

## 2018-03-27 RX ADMIN — Medication 10 MILLIGRAM(S): at 06:06

## 2018-03-27 RX ADMIN — INSULIN HUMAN 1: 100 INJECTION, SOLUTION SUBCUTANEOUS at 11:26

## 2018-03-27 RX ADMIN — Medication 1 TABLET(S): at 17:32

## 2018-03-27 RX ADMIN — Medication 5 MILLILITER(S): at 10:40

## 2018-03-27 RX ADMIN — INSULIN HUMAN 2: 100 INJECTION, SOLUTION SUBCUTANEOUS at 17:33

## 2018-03-27 RX ADMIN — INSULIN GLARGINE 15 UNIT(S): 100 INJECTION, SOLUTION SUBCUTANEOUS at 22:09

## 2018-03-27 RX ADMIN — Medication 10 MILLIGRAM(S): at 17:33

## 2018-03-27 RX ADMIN — INSULIN HUMAN 2: 100 INJECTION, SOLUTION SUBCUTANEOUS at 06:06

## 2018-03-27 RX ADMIN — Medication 1 APPLICATION(S): at 16:19

## 2018-03-27 RX ADMIN — CHLORHEXIDINE GLUCONATE 15 MILLILITER(S): 213 SOLUTION TOPICAL at 16:41

## 2018-03-27 RX ADMIN — Medication 650 MILLIGRAM(S): at 10:40

## 2018-03-27 RX ADMIN — OXYCODONE HYDROCHLORIDE 20 MILLIGRAM(S): 5 TABLET ORAL at 19:05

## 2018-03-27 RX ADMIN — HEPARIN SODIUM 5000 UNIT(S): 5000 INJECTION INTRAVENOUS; SUBCUTANEOUS at 06:06

## 2018-03-27 RX ADMIN — AMPICILLIN SODIUM AND SULBACTAM SODIUM 200 GRAM(S): 250; 125 INJECTION, POWDER, FOR SUSPENSION INTRAMUSCULAR; INTRAVENOUS at 22:05

## 2018-03-27 RX ADMIN — HEPARIN SODIUM 5000 UNIT(S): 5000 INJECTION INTRAVENOUS; SUBCUTANEOUS at 22:06

## 2018-03-27 RX ADMIN — Medication 1 APPLICATION(S): at 06:03

## 2018-03-27 RX ADMIN — AMPICILLIN SODIUM AND SULBACTAM SODIUM 200 GRAM(S): 250; 125 INJECTION, POWDER, FOR SUSPENSION INTRAMUSCULAR; INTRAVENOUS at 14:58

## 2018-03-27 RX ADMIN — HEPARIN SODIUM 5000 UNIT(S): 5000 INJECTION INTRAVENOUS; SUBCUTANEOUS at 13:18

## 2018-03-27 RX ADMIN — CLOPIDOGREL BISULFATE 75 MILLIGRAM(S): 75 TABLET, FILM COATED ORAL at 10:39

## 2018-03-27 RX ADMIN — Medication 650 MILLIGRAM(S): at 11:18

## 2018-03-27 RX ADMIN — Medication 500 MILLIGRAM(S): at 10:39

## 2018-03-27 RX ADMIN — CHLORHEXIDINE GLUCONATE 15 MILLILITER(S): 213 SOLUTION TOPICAL at 06:03

## 2018-03-27 RX ADMIN — AMPICILLIN SODIUM AND SULBACTAM SODIUM 200 GRAM(S): 250; 125 INJECTION, POWDER, FOR SUSPENSION INTRAMUSCULAR; INTRAVENOUS at 09:04

## 2018-03-27 RX ADMIN — Medication 81 MILLIGRAM(S): at 10:39

## 2018-03-27 RX ADMIN — AMPICILLIN SODIUM AND SULBACTAM SODIUM 200 GRAM(S): 250; 125 INJECTION, POWDER, FOR SUSPENSION INTRAMUSCULAR; INTRAVENOUS at 02:43

## 2018-03-27 NOTE — PROGRESS NOTE ADULT - ASSESSMENT
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 ID following.    pleural effusion: decreaing    acute and chronic resp failure; previously was on T/C with vent at night at NH. Required full vent support at one time while here. Now weaning on CPAP/PSV.  Continue CPAP/PSV. plan is for T/C trials by today if he continues to improve. Nebs. Suction prn. Abx per ID.  pulmonary following    Transverse myelitis- with quadriplegia-beltrán allowing for help healing the decubitus and comfort - palliative following     IDDM- insulin with HISS    GERD- protonix      dislodged PEG: replaced by GI this am without complication. working good    chest pain: improved with xanax. ce*3 and EkG neg.     sacral decubitus stage4: on wound vac. and colostomy. 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 ID following.    pleural effusion: decreaing    acute and chronic resp failure; previously was on T/C with vent at night at NH. Required full vent support at one time while here. Now weaning on CPAP/PSV.  Continue CPAP/PSV. plan is for T/C trials if he continues to improve. Nebs. Suction prn. Abx per ID.  pulmonary following. needs pulmonary follow up for DC planning    Transverse myelitis- with quadriplegia-beltrán allowing for help healing the decubitus and comfort - palliative following. needs palliative f/u for DC planning    IDDM- insulin with HISS    GERD- protonix      dislodged PEG: replaced by GI yesterday without complication. working good. stool looks dark. will do FOBT after cleaning bag and taking stool sample from cleaned bag. Check CBC    chest pain: improved with xanax. ce*3 and EkG neg.     sacral decubitus stage4: on wound vac. and colostomy. wound team following. colostomy    DVT ppx- heparin

## 2018-03-27 NOTE — PROGRESS NOTE ADULT - SUBJECTIVE AND OBJECTIVE BOX
KAMAR BELLAMY Patient is a 62y old  Male who presents with a chief complaint of fevers (12 Mar 2018 18:28)     HPI:  61y/o male with pmh of PMH Transverse myelitis, Paraplegia, trach/peg, colostomy, HTN, coming from [facility] presents with fever for 3 days.    Patient denies any cough, sob, runny nose, vomiting, diarrhea, abdominal pain, pain with urination.    Patient signed MOLST REQUESTING CPR on 2/18/18..  Originally at [facility] in 7months ago, developed Acute transverse myelitis.    Heart Attack, stent placed, check up after.  He was given a tetanus shot, then couple days ago he developed lower extremity weakness, paralysis; ultimately developed ulcers.    S[facility]--> [facility] (kept him for a few weeks) --> wife couldn't take care of him;    He is getting frequent uti's, anemia reequiring transfusions.    101 fever at nursing home today ([facility] [place]) (10 Mar 2018 23:26)    Interval:  The patient was seen and evaluated   The patient is in no acute distress.  Denied any fever chest pain, palpitations, shortness of breath, abdominal pain, fever, dysuria, cough, edema ,nausea,vomitting  fatigue+  stool liquid, dark color. RN not sure whether colostomy bag was emptied after PEG replacement yesterday.        Allergies    No Known Allergies    Intolerances      PHYSICAL EXAM:    Vital Signs Last 24 Hrs  T(C): 37.1 (27 Mar 2018 09:02), Max: 37.1 (26 Mar 2018 22:18)  T(F): 98.7 (27 Mar 2018 09:02), Max: 98.7 (26 Mar 2018 22:18)  HR: 95 (27 Mar 2018 09:02) (80 - 95)  BP: 160/83 (27 Mar 2018 09:02) (150/82 - 160/83)  BP(mean): --  RR: 20 (27 Mar 2018 09:02) (18 - 20)  SpO2: 100% (27 Mar 2018 09:02) (98% - 100%)    CAPILLARY BLOOD GLUCOSE      POCT Blood Glucose.: 194 mg/dL (27 Mar 2018 11:22)  POCT Blood Glucose.: 218 mg/dL (27 Mar 2018 06:05)  POCT Blood Glucose.: 207 mg/dL (26 Mar 2018 23:14)  POCT Blood Glucose.: 239 mg/dL (26 Mar 2018 16:39)        I&O's Summary    26 Mar 2018 07:01  -  27 Mar 2018 07:00  --------------------------------------------------------  IN: 1580 mL / OUT: 1800 mL / NET: -220 mL    27 Mar 2018 07:01  -  27 Mar 2018 14:45  --------------------------------------------------------  IN: 620 mL / OUT: 500 mL / NET: 120 mL      GENERAL: NAD,   EYES: EOMI,conjunctiva and sclera clear  ENMT:  Moist mucous membranes,  No lesions  NERVOUS SYSTEM:  Alert & Oriented X3, cant Move upper and lower extremities; CNS-II-XII  CHEST/LUNG: Clear to auscultation bilaterally; No rales, rhonchi, wheezing,   HEART: Regular rate and rhythm; No murmurs,   ABDOMEN: Soft, Nontender, Nondistended; Bowel sounds present. PEG, colostomy  EXTREMITIES:  Peripheral Pulses, No  cyanosis, or edema  SKIN: No rashes or lesions  psychiatry- mood and affect flat    MEDICATIONS  (STANDING):  ampicillin/sulbactam  IVPB      ampicillin/sulbactam  IVPB 3 Gram(s) IV Intermittent every 6 hours  ascorbic acid 500 milliGRAM(s) Oral daily  aspirin  chewable 81 milliGRAM(s) Oral daily  baclofen 10 milliGRAM(s) Oral two times a day  chlorhexidine 0.12% Liquid 15 milliLiter(s) Swish and Spit two times a day  clopidogrel Tablet 75 milliGRAM(s) Oral daily  dextrose 5%. 1000 milliLiter(s) (50 mL/Hr) IV Continuous <Continuous>  dextrose 50% Injectable 12.5 Gram(s) IV Push once  dextrose 50% Injectable 25 Gram(s) IV Push once  dextrose 50% Injectable 25 Gram(s) IV Push once  fentaNYL   Patch  25 MICROgram(s)/Hr 1 Patch Transdermal every 72 hours  heparin  Injectable 5000 Unit(s) SubCutaneous every 8 hours  insulin glargine Injectable (LANTUS) 10 Unit(s) SubCutaneous at bedtime  insulin regular  human corrective regimen sliding scale   SubCutaneous every 6 hours  multivitamin   Solution 5 milliLiter(s) Oral daily  pantoprazole   Suspension 40 milliGRAM(s) Enteral Tube before breakfast  petrolatum Ophthalmic Ointment 1 Application(s) Both EYES at bedtime  saccharomyces boulardii 250 milliGRAM(s) Oral two times a day  vitamin A &amp; D Ointment 1 Application(s) Topical two times a day    MEDICATIONS  (PRN):  acetaminophen   Tablet 650 milliGRAM(s) Oral every 6 hours PRN For Temp greater than 38 C (100.4 F)  acetaminophen   Tablet. 650 milliGRAM(s) Oral every 6 hours PRN Moderate Pain (4 - 6) and headache  ALBUTerol    0.083% 2.5 milliGRAM(s) Nebulizer every 6 hours PRN Shortness of Breath and/or Wheezing  ALPRAZolam 0.5 milliGRAM(s) Oral every 6 hours PRN anxiety/agitation  artificial  tears Solution 1 Drop(s) Both EYES every 4 hours PRN Dry Eyes  glucagon  Injectable 1 milliGRAM(s) IntraMuscular once PRN Glucose <70 milliGRAM(s)/deciLiter  oxyCODONE    Solution 15 milliGRAM(s) Oral every 3 hours PRN mild to moderate pain  oxyCODONE    Solution 20 milliGRAM(s) Oral every 3 hours PRN severe pain or breakthrough pain          LABS:      no labs today  RADIOLOGY & ADDITIONAL TESTS:      Consultant notes reviewed    Case discussed with consultant/provider/ family /patient     < from: Xray Chest 1 View- PORTABLE-Routine (03.23.18 @ 04:56) >    Impression:  Decreasing right pleural effusion..    < end of copied text > KAMAR BELLAMY Patient is a 62y old  Male who presents with a chief complaint of fevers (12 Mar 2018 18:28)     HPI:  63y/o male with pmh of PMH Transverse myelitis, Paraplegia, trach/peg, colostomy, HTN, coming from [facility] presents with fever for 3 days.    Patient denies any cough, sob, runny nose, vomiting, diarrhea, abdominal pain, pain with urination.    Patient signed MOLST REQUESTING CPR on 2/18/18..  Originally at [facility] in 7months ago, developed Acute transverse myelitis.    Heart Attack, stent placed, check up after.  He was given a tetanus shot, then couple days ago he developed lower extremity weakness, paralysis; ultimately developed ulcers.    [facility]--> [facility] (kept him for a few weeks) --> wife couldn't take care of him;    He is getting frequent uti's, anemia reequiring transfusions.    101 fever at nursing home today ([facility] [place]) (10 Mar 2018 23:26)    Interval:  The patient was seen and evaluated   The patient is in no acute distress.  Denied any fever chest pain, palpitations, shortness of breath, abdominal pain, fever, dysuria, cough, edema ,nausea,vomitting  fatigue+  stool liquid, dark color. RN not sure whether colostomy bag was emptied after PEG replacement yesterday.        Allergies    No Known Allergies    Intolerances      PHYSICAL EXAM:    Vital Signs Last 24 Hrs  T(C): 37.1 (27 Mar 2018 09:02), Max: 37.1 (26 Mar 2018 22:18)  T(F): 98.7 (27 Mar 2018 09:02), Max: 98.7 (26 Mar 2018 22:18)  HR: 95 (27 Mar 2018 09:02) (80 - 95)  BP: 160/83 (27 Mar 2018 09:02) (150/82 - 160/83)  BP(mean): --  RR: 20 (27 Mar 2018 09:02) (18 - 20)  SpO2: 100% (27 Mar 2018 09:02) (98% - 100%)    CAPILLARY BLOOD GLUCOSE      POCT Blood Glucose.: 194 mg/dL (27 Mar 2018 11:22)  POCT Blood Glucose.: 218 mg/dL (27 Mar 2018 06:05)  POCT Blood Glucose.: 207 mg/dL (26 Mar 2018 23:14)  POCT Blood Glucose.: 239 mg/dL (26 Mar 2018 16:39)        I&O's Summary    26 Mar 2018 07:01  -  27 Mar 2018 07:00  --------------------------------------------------------  IN: 1580 mL / OUT: 1800 mL / NET: -220 mL    27 Mar 2018 07:01  -  27 Mar 2018 14:45  --------------------------------------------------------  IN: 620 mL / OUT: 500 mL / NET: 120 mL      GENERAL: NAD,   EYES: EOMI,conjunctiva and sclera clear  ENMT:  Moist mucous membranes,  No lesions  NERVOUS SYSTEM:  Alert & Oriented X3, cant Move upper and lower extremities; CNS-II-XII  CHEST/LUNG: Clear to auscultation bilaterally; No rales, rhonchi, wheezing,   HEART: Regular rate and rhythm; No murmurs,   ABDOMEN: Soft, Nontender, Nondistended; Bowel sounds present. PEG, colostomy  EXTREMITIES:  Peripheral Pulses, No  cyanosis, or edema  SKIN: No rashes or lesions  psychiatry- mood and affect flat    MEDICATIONS  (STANDING):  ampicillin/sulbactam  IVPB      ampicillin/sulbactam  IVPB 3 Gram(s) IV Intermittent every 6 hours  ascorbic acid 500 milliGRAM(s) Oral daily  aspirin  chewable 81 milliGRAM(s) Oral daily  baclofen 10 milliGRAM(s) Oral two times a day  chlorhexidine 0.12% Liquid 15 milliLiter(s) Swish and Spit two times a day  clopidogrel Tablet 75 milliGRAM(s) Oral daily  dextrose 5%. 1000 milliLiter(s) (50 mL/Hr) IV Continuous <Continuous>  dextrose 50% Injectable 12.5 Gram(s) IV Push once  dextrose 50% Injectable 25 Gram(s) IV Push once  dextrose 50% Injectable 25 Gram(s) IV Push once  fentaNYL   Patch  25 MICROgram(s)/Hr 1 Patch Transdermal every 72 hours  heparin  Injectable 5000 Unit(s) SubCutaneous every 8 hours  insulin glargine Injectable (LANTUS) 10 Unit(s) SubCutaneous at bedtime  insulin regular  human corrective regimen sliding scale   SubCutaneous every 6 hours  multivitamin   Solution 5 milliLiter(s) Oral daily  pantoprazole   Suspension 40 milliGRAM(s) Enteral Tube before breakfast  petrolatum Ophthalmic Ointment 1 Application(s) Both EYES at bedtime  saccharomyces boulardii 250 milliGRAM(s) Oral two times a day  vitamin A &amp; D Ointment 1 Application(s) Topical two times a day    MEDICATIONS  (PRN):  acetaminophen   Tablet 650 milliGRAM(s) Oral every 6 hours PRN For Temp greater than 38 C (100.4 F)  acetaminophen   Tablet. 650 milliGRAM(s) Oral every 6 hours PRN Moderate Pain (4 - 6) and headache  ALBUTerol    0.083% 2.5 milliGRAM(s) Nebulizer every 6 hours PRN Shortness of Breath and/or Wheezing  ALPRAZolam 0.5 milliGRAM(s) Oral every 6 hours PRN anxiety/agitation  artificial  tears Solution 1 Drop(s) Both EYES every 4 hours PRN Dry Eyes  glucagon  Injectable 1 milliGRAM(s) IntraMuscular once PRN Glucose <70 milliGRAM(s)/deciLiter  oxyCODONE    Solution 15 milliGRAM(s) Oral every 3 hours PRN mild to moderate pain  oxyCODONE    Solution 20 milliGRAM(s) Oral every 3 hours PRN severe pain or breakthrough pain      LABS:  pending    no labs today  RADIOLOGY & ADDITIONAL TESTS:      Consultant notes reviewed    Case discussed with consultant/provider/ family /patient     < from: Xray Chest 1 View- PORTABLE-Routine (03.23.18 @ 04:56) >    Impression:  Decreasing right pleural effusion..    < end of copied text >

## 2018-03-28 LAB
ANION GAP SERPL CALC-SCNC: 8 MMOL/L — SIGNIFICANT CHANGE UP (ref 5–17)
BUN SERPL-MCNC: 18 MG/DL — SIGNIFICANT CHANGE UP (ref 8–20)
CALCIUM SERPL-MCNC: 9.4 MG/DL — SIGNIFICANT CHANGE UP (ref 8.6–10.2)
CHLORIDE SERPL-SCNC: 98 MMOL/L — SIGNIFICANT CHANGE UP (ref 98–107)
CO2 SERPL-SCNC: 32 MMOL/L — HIGH (ref 22–29)
CREAT SERPL-MCNC: 0.43 MG/DL — LOW (ref 0.5–1.3)
GLUCOSE BLDC GLUCOMTR-MCNC: 116 MG/DL — HIGH (ref 70–99)
GLUCOSE BLDC GLUCOMTR-MCNC: 157 MG/DL — HIGH (ref 70–99)
GLUCOSE BLDC GLUCOMTR-MCNC: 165 MG/DL — HIGH (ref 70–99)
GLUCOSE BLDC GLUCOMTR-MCNC: 207 MG/DL — HIGH (ref 70–99)
GLUCOSE BLDC GLUCOMTR-MCNC: 224 MG/DL — HIGH (ref 70–99)
GLUCOSE BLDC GLUCOMTR-MCNC: 243 MG/DL — HIGH (ref 70–99)
GLUCOSE SERPL-MCNC: 153 MG/DL — HIGH (ref 70–115)
HCT VFR BLD CALC: 31.3 % — LOW (ref 42–52)
HGB BLD-MCNC: 9.6 G/DL — LOW (ref 14–18)
MAGNESIUM SERPL-MCNC: 1.9 MG/DL — SIGNIFICANT CHANGE UP (ref 1.6–2.6)
MCHC RBC-ENTMCNC: 28.2 PG — SIGNIFICANT CHANGE UP (ref 27–31)
MCHC RBC-ENTMCNC: 30.7 G/DL — LOW (ref 32–36)
MCV RBC AUTO: 92.1 FL — SIGNIFICANT CHANGE UP (ref 80–94)
PHOSPHATE SERPL-MCNC: 3.8 MG/DL — SIGNIFICANT CHANGE UP (ref 2.4–4.7)
PLATELET # BLD AUTO: 482 K/UL — HIGH (ref 150–400)
POTASSIUM SERPL-MCNC: 4.3 MMOL/L — SIGNIFICANT CHANGE UP (ref 3.5–5.3)
POTASSIUM SERPL-SCNC: 4.3 MMOL/L — SIGNIFICANT CHANGE UP (ref 3.5–5.3)
RBC # BLD: 3.4 M/UL — LOW (ref 4.6–6.2)
RBC # FLD: 16.5 % — HIGH (ref 11–15.6)
SODIUM SERPL-SCNC: 138 MMOL/L — SIGNIFICANT CHANGE UP (ref 135–145)
WBC # BLD: 11.9 K/UL — HIGH (ref 4.8–10.8)
WBC # FLD AUTO: 11.9 K/UL — HIGH (ref 4.8–10.8)

## 2018-03-28 PROCEDURE — 99233 SBSQ HOSP IP/OBS HIGH 50: CPT

## 2018-03-28 PROCEDURE — 71045 X-RAY EXAM CHEST 1 VIEW: CPT | Mod: 26

## 2018-03-28 RX ORDER — MAGNESIUM OXIDE 400 MG ORAL TABLET 241.3 MG
400 TABLET ORAL
Qty: 0 | Refills: 0 | Status: DISCONTINUED | OUTPATIENT
Start: 2018-03-28 | End: 2018-04-13

## 2018-03-28 RX ADMIN — Medication 1 APPLICATION(S): at 17:46

## 2018-03-28 RX ADMIN — OXYCODONE HYDROCHLORIDE 15 MILLIGRAM(S): 5 TABLET ORAL at 15:20

## 2018-03-28 RX ADMIN — Medication 5 MILLILITER(S): at 12:09

## 2018-03-28 RX ADMIN — Medication 81 MILLIGRAM(S): at 12:07

## 2018-03-28 RX ADMIN — Medication 1 TABLET(S): at 05:40

## 2018-03-28 RX ADMIN — CHLORHEXIDINE GLUCONATE 15 MILLILITER(S): 213 SOLUTION TOPICAL at 05:40

## 2018-03-28 RX ADMIN — Medication 0.5 MILLIGRAM(S): at 19:47

## 2018-03-28 RX ADMIN — INSULIN GLARGINE 15 UNIT(S): 100 INJECTION, SOLUTION SUBCUTANEOUS at 21:38

## 2018-03-28 RX ADMIN — Medication 1 TABLET(S): at 18:08

## 2018-03-28 RX ADMIN — OXYCODONE HYDROCHLORIDE 15 MILLIGRAM(S): 5 TABLET ORAL at 18:20

## 2018-03-28 RX ADMIN — AMPICILLIN SODIUM AND SULBACTAM SODIUM 200 GRAM(S): 250; 125 INJECTION, POWDER, FOR SUSPENSION INTRAMUSCULAR; INTRAVENOUS at 03:00

## 2018-03-28 RX ADMIN — OXYCODONE HYDROCHLORIDE 15 MILLIGRAM(S): 5 TABLET ORAL at 05:41

## 2018-03-28 RX ADMIN — CHLORHEXIDINE GLUCONATE 15 MILLILITER(S): 213 SOLUTION TOPICAL at 18:05

## 2018-03-28 RX ADMIN — OXYCODONE HYDROCHLORIDE 15 MILLIGRAM(S): 5 TABLET ORAL at 09:43

## 2018-03-28 RX ADMIN — Medication 10 MILLIGRAM(S): at 05:40

## 2018-03-28 RX ADMIN — INSULIN HUMAN 2: 100 INJECTION, SOLUTION SUBCUTANEOUS at 00:08

## 2018-03-28 RX ADMIN — OXYCODONE HYDROCHLORIDE 15 MILLIGRAM(S): 5 TABLET ORAL at 22:19

## 2018-03-28 RX ADMIN — OXYCODONE HYDROCHLORIDE 15 MILLIGRAM(S): 5 TABLET ORAL at 07:00

## 2018-03-28 RX ADMIN — HEPARIN SODIUM 5000 UNIT(S): 5000 INJECTION INTRAVENOUS; SUBCUTANEOUS at 21:20

## 2018-03-28 RX ADMIN — Medication 1 APPLICATION(S): at 21:39

## 2018-03-28 RX ADMIN — AMPICILLIN SODIUM AND SULBACTAM SODIUM 200 GRAM(S): 250; 125 INJECTION, POWDER, FOR SUSPENSION INTRAMUSCULAR; INTRAVENOUS at 14:01

## 2018-03-28 RX ADMIN — HEPARIN SODIUM 5000 UNIT(S): 5000 INJECTION INTRAVENOUS; SUBCUTANEOUS at 05:40

## 2018-03-28 RX ADMIN — Medication 0.5 MILLIGRAM(S): at 05:41

## 2018-03-28 RX ADMIN — OXYCODONE HYDROCHLORIDE 15 MILLIGRAM(S): 5 TABLET ORAL at 12:14

## 2018-03-28 RX ADMIN — AMPICILLIN SODIUM AND SULBACTAM SODIUM 200 GRAM(S): 250; 125 INJECTION, POWDER, FOR SUSPENSION INTRAMUSCULAR; INTRAVENOUS at 21:18

## 2018-03-28 RX ADMIN — CLOPIDOGREL BISULFATE 75 MILLIGRAM(S): 75 TABLET, FILM COATED ORAL at 12:08

## 2018-03-28 RX ADMIN — INSULIN HUMAN 1: 100 INJECTION, SOLUTION SUBCUTANEOUS at 12:11

## 2018-03-28 RX ADMIN — Medication 1 APPLICATION(S): at 05:40

## 2018-03-28 RX ADMIN — OXYCODONE HYDROCHLORIDE 20 MILLIGRAM(S): 5 TABLET ORAL at 12:14

## 2018-03-28 RX ADMIN — Medication 1 DROP(S): at 21:19

## 2018-03-28 RX ADMIN — AMPICILLIN SODIUM AND SULBACTAM SODIUM 200 GRAM(S): 250; 125 INJECTION, POWDER, FOR SUSPENSION INTRAMUSCULAR; INTRAVENOUS at 09:33

## 2018-03-28 RX ADMIN — Medication 1 APPLICATION(S): at 12:10

## 2018-03-28 RX ADMIN — INSULIN HUMAN 2: 100 INJECTION, SOLUTION SUBCUTANEOUS at 23:59

## 2018-03-28 RX ADMIN — OXYCODONE HYDROCHLORIDE 15 MILLIGRAM(S): 5 TABLET ORAL at 21:19

## 2018-03-28 RX ADMIN — HEPARIN SODIUM 5000 UNIT(S): 5000 INJECTION INTRAVENOUS; SUBCUTANEOUS at 13:53

## 2018-03-28 RX ADMIN — INSULIN HUMAN 1: 100 INJECTION, SOLUTION SUBCUTANEOUS at 18:02

## 2018-03-28 RX ADMIN — Medication 10 MILLIGRAM(S): at 18:08

## 2018-03-28 RX ADMIN — Medication 500 MILLIGRAM(S): at 12:07

## 2018-03-28 NOTE — PROGRESS NOTE ADULT - ASSESSMENT
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 ID following. on contact isolation    pleural effusion: decreaing    acute and chronic resp failure; previously was on T/C with vent at night at NH. Required full vent support at one time while here. Now weaning on CPAP/PSV. however weaning trial hold off today due to resp distress. Continue CPAP/PSV. plan is for T/C trials if he continues to improve. Nebs. Suction prn. Abx per ID.  pulmonary following.     Transverse myelitis- with quadriplegia-beltrán allowing for help healing the decubitus and comfort - palliative following. needs palliative f/u for DC planning    IDDM- insulin with HISS    GERD- protonix      dislodged PEG: replaced by GI yesterday without complication. working good. stool looks greenigh today. neg FOBT. wife reported patient looks weak. patient/RN reported same. Check CBC/BMP/mg    chest pain: improved with xanax. ce*3 and EkG neg.     sacral decubitus stage4: on wound vac. and colostomy. wound team following. colostomy    DVT ppx- heparin

## 2018-03-28 NOTE — PROGRESS NOTE ADULT - SUBJECTIVE AND OBJECTIVE BOX
PULMONARY PROGRESS NOTE      [id number]    Patient is a 62y old  Male who presents with a chief complaint of fevers (12 Mar 2018 18:28)      INTERVAL HPI/OVERNIGHT EVENTS: On vent via trach; c/o unable to breath. On CPAP/PSV.     MEDICATIONS  (STANDING):  ampicillin/sulbactam  IVPB      ampicillin/sulbactam  IVPB 3 Gram(s) IV Intermittent every 6 hours  ascorbic acid 500 milliGRAM(s) Oral daily  aspirin  chewable 81 milliGRAM(s) Oral daily  baclofen 10 milliGRAM(s) Oral two times a day  chlorhexidine 0.12% Liquid 15 milliLiter(s) Swish and Spit two times a day  clopidogrel Tablet 75 milliGRAM(s) Oral daily  Dakins Solution - 1/4 Strength 1 Application(s) Topical daily  dextrose 5%. 1000 milliLiter(s) (50 mL/Hr) IV Continuous <Continuous>  dextrose 50% Injectable 12.5 Gram(s) IV Push once  dextrose 50% Injectable 25 Gram(s) IV Push once  dextrose 50% Injectable 25 Gram(s) IV Push once  fentaNYL   Patch  25 MICROgram(s)/Hr 1 Patch Transdermal every 72 hours  heparin  Injectable 5000 Unit(s) SubCutaneous every 8 hours  insulin glargine Injectable (LANTUS) 15 Unit(s) SubCutaneous at bedtime  insulin regular  human corrective regimen sliding scale   SubCutaneous every 6 hours  lactobacillus acidophilus 1 Tablet(s) Oral every 12 hours  multivitamin   Solution 5 milliLiter(s) Oral daily  pantoprazole   Suspension 40 milliGRAM(s) Enteral Tube before breakfast  petrolatum Ophthalmic Ointment 1 Application(s) Both EYES at bedtime  vitamin A &amp; D Ointment 1 Application(s) Topical two times a day      MEDICATIONS  (PRN):  acetaminophen   Tablet 650 milliGRAM(s) Oral every 6 hours PRN For Temp greater than 38 C (100.4 F)  acetaminophen   Tablet. 650 milliGRAM(s) Oral every 6 hours PRN Moderate Pain (4 - 6) and headache  ALBUTerol    0.083% 2.5 milliGRAM(s) Nebulizer every 6 hours PRN Shortness of Breath and/or Wheezing  ALPRAZolam 0.5 milliGRAM(s) Oral every 6 hours PRN anxiety/agitation  artificial  tears Solution 1 Drop(s) Both EYES every 4 hours PRN Dry Eyes  glucagon  Injectable 1 milliGRAM(s) IntraMuscular once PRN Glucose <70 milliGRAM(s)/deciLiter  oxyCODONE    Solution 15 milliGRAM(s) Oral every 3 hours PRN mild to moderate pain  oxyCODONE    Solution 20 milliGRAM(s) Oral every 3 hours PRN severe pain or breakthrough pain      Allergies    No Known Allergies    Intolerances        PAST MEDICAL & SURGICAL HISTORY:  Essential hypertension  Paralysis  Transverse myelitis  History of tracheostomy            REVIEW OF SYSTEMS:    unavailable    Vital Signs Last 24 Hrs  T(C): 37.2 (28 Mar 2018 07:39), Max: 37.2 (28 Mar 2018 07:39)  T(F): 98.9 (28 Mar 2018 07:39), Max: 98.9 (28 Mar 2018 07:39)  HR: 90 (28 Mar 2018 07:39) (77 - 91)  BP: 123/76 (28 Mar 2018 07:39) (123/76 - 130/76)  BP(mean): --  RR: 18 (28 Mar 2018 07:39) (18 - 18)  SpO2: 98% (28 Mar 2018 07:39) (98% - 100%)    PHYSICAL EXAMINATION:    GENERAL: The patient is awake and alert in no apparent distress.     HEENT: Head is normocephalic and atraumatic. Extraocular muscles are intact. Mucous membranes are moist.    NECK: trach    LUNGS: Clear to auscultation without wheezing, rales or rhonchi; respirations unlabored    HEART: Regular rate and rhythm     ABDOMEN: Soft, nontender, and nondistended.      EXTREMITIES: Without any cyanosis, clubbing, rash, lesions or edema.    NEUROLOGIC: awake, alert    LABS:                        9.0    11.4  )-----------( 468      ( 27 Mar 2018 15:40 )             30.0     03-27    138  |  96<L>  |  23.0<H>  ----------------------------<  173<H>  4.4   |  32.0<H>  |  0.50    Ca    9.3      27 Mar 2018 15:40

## 2018-03-28 NOTE — PROGRESS NOTE ADULT - ATTENDING COMMENTS
DC planing: needs pulmonary clearance and palliative f/u for dispo planning. wife was updated yesterday

## 2018-03-28 NOTE — PROGRESS NOTE ADULT - SUBJECTIVE AND OBJECTIVE BOX
KAMAR BELLAMY Patient is a 62y old  Male who presents with a chief complaint of fevers (12 Mar 2018 18:28)     HPI:  61y/o male with pmh of PMH Transverse myelitis, Paraplegia, trach/peg, colostomy, HTN, coming from [facility] presents with fever for 3 days.    Patient denies any cough, sob, runny nose, vomiting, diarrhea, abdominal pain, pain with urination.    Patient signed MOLST REQUESTING CPR on 2/18/18..  [facility] ago, developed Acute transverse myelitis.    Heart Attack, stent placed, check up after.  He was given a tetanus shot, then couple days ago he developed lower extremity weakness, paralysis; ultimately developed ulcers.    [facility]--> [facility] (kept him for a few weeks) --> wife couldn't take care of him;    He is getting frequent uti's, anemia reequiring transfusions.    101 fever at nursing home today ([facility] [place]) (10 Mar 2018 23:26)    Interval:  The patient was seen and evaluated   The patient is in no acute distress.  SOB in am, seen by pulmonary. CPAP/PSV was increased. TLC trial on hold. currently comfortable  Denied any fever chest pain, palpitations, shortness of breath, abdominal pain, fever, dysuria, cough, edema ,nausea,vomitting  fatigue+. stool liquid, greenish. FOBT neg.        Allergies    No Known Allergies    Intolerances      PHYSICAL EXAM:    Vital Signs Last 24 Hrs  T(C): 37.2 (28 Mar 2018 07:39), Max: 37.2 (28 Mar 2018 07:39)  T(F): 98.9 (28 Mar 2018 07:39), Max: 98.9 (28 Mar 2018 07:39)  HR: 82 (28 Mar 2018 12:20) (76 - 91)  BP: 123/76 (28 Mar 2018 07:39) (123/76 - 130/76)  BP(mean): --  RR: 18 (28 Mar 2018 07:39) (18 - 18)  SpO2: 100% (28 Mar 2018 12:20) (98% - 100%)    CAPILLARY BLOOD GLUCOSE      POCT Blood Glucose.: 165 mg/dL (28 Mar 2018 11:23)  POCT Blood Glucose.: 116 mg/dL (28 Mar 2018 05:39)  POCT Blood Glucose.: 207 mg/dL (28 Mar 2018 00:07)  POCT Blood Glucose.: 203 mg/dL (27 Mar 2018 16:48)      I&O's Detail    27 Mar 2018 07:01  -  28 Mar 2018 07:00  --------------------------------------------------------  IN:    Enteral Tube Flush: 600 mL    Glucerna: 660 mL  Total IN: 1260 mL    OUT:    Colostomy: 200 mL    Indwelling Catheter - Urethral: 1850 mL    Voided: 500 mL  Total OUT: 2550 mL    Total NET: -1290 mL      28 Mar 2018 07:01  -  28 Mar 2018 14:15  --------------------------------------------------------  IN:    Glucerna: 420 mL  Total IN: 420 mL    OUT:  Total OUT: 0 mL    Total NET: 420 mL            GENERAL: NAD,   EYES: EOMI,conjunctiva and sclera clear  ENMT:  Moist mucous membranes,  No lesions  NERVOUS SYSTEM:  Alert & Oriented X3, cant Move upper and lower extremities; CNS-II-XII  CHEST/LUNG: Clear to auscultation bilaterally; No rales, rhonchi, wheezing,   HEART: Regular rate and rhythm; No murmurs,   ABDOMEN: Soft, Nontender, Nondistended; Bowel sounds present. PEG, colostomy  EXTREMITIES:  Peripheral Pulses, No  cyanosis, or edema  SKIN: No rashes or lesions  psychiatry- mood and affect flat    MEDICATIONS  (STANDING):  ampicillin/sulbactam  IVPB      ampicillin/sulbactam  IVPB 3 Gram(s) IV Intermittent every 6 hours  ascorbic acid 500 milliGRAM(s) Oral daily  aspirin  chewable 81 milliGRAM(s) Oral daily  baclofen 10 milliGRAM(s) Oral two times a day  chlorhexidine 0.12% Liquid 15 milliLiter(s) Swish and Spit two times a day  clopidogrel Tablet 75 milliGRAM(s) Oral daily  dextrose 5%. 1000 milliLiter(s) (50 mL/Hr) IV Continuous <Continuous>  dextrose 50% Injectable 12.5 Gram(s) IV Push once  dextrose 50% Injectable 25 Gram(s) IV Push once  dextrose 50% Injectable 25 Gram(s) IV Push once  fentaNYL   Patch  25 MICROgram(s)/Hr 1 Patch Transdermal every 72 hours  heparin  Injectable 5000 Unit(s) SubCutaneous every 8 hours  insulin glargine Injectable (LANTUS) 10 Unit(s) SubCutaneous at bedtime  insulin regular  human corrective regimen sliding scale   SubCutaneous every 6 hours  multivitamin   Solution 5 milliLiter(s) Oral daily  pantoprazole   Suspension 40 milliGRAM(s) Enteral Tube before breakfast  petrolatum Ophthalmic Ointment 1 Application(s) Both EYES at bedtime  saccharomyces boulardii 250 milliGRAM(s) Oral two times a day  vitamin A &amp; D Ointment 1 Application(s) Topical two times a day    MEDICATIONS  (PRN):  acetaminophen   Tablet 650 milliGRAM(s) Oral every 6 hours PRN For Temp greater than 38 C (100.4 F)  acetaminophen   Tablet. 650 milliGRAM(s) Oral every 6 hours PRN Moderate Pain (4 - 6) and headache  ALBUTerol    0.083% 2.5 milliGRAM(s) Nebulizer every 6 hours PRN Shortness of Breath and/or Wheezing  ALPRAZolam 0.5 milliGRAM(s) Oral every 6 hours PRN anxiety/agitation  artificial  tears Solution 1 Drop(s) Both EYES every 4 hours PRN Dry Eyes  glucagon  Injectable 1 milliGRAM(s) IntraMuscular once PRN Glucose <70 milliGRAM(s)/deciLiter  oxyCODONE    Solution 15 milliGRAM(s) Oral every 3 hours PRN mild to moderate pain  oxyCODONE    Solution 20 milliGRAM(s) Oral every 3 hours PRN severe pain or breakthrough pain      LABS:  pending    no labs today  RADIOLOGY & ADDITIONAL TESTS:      Consultant notes reviewed    Case discussed with consultant/provider/ family /patient     < from: Xray Chest 1 View- PORTABLE-Routine (03.23.18 @ 04:56) >    Impression:  Decreasing right pleural effusion..    < end of copied text >

## 2018-03-28 NOTE — PROGRESS NOTE ADULT - ASSESSMENT
-chronic resp failure; previously was on T/C with vent at night at NH. Required full vent support at one time while here. Now weaning on CPAP/PSV; c/o SOB. TV borderline on current settings  -HCAP; Acinetobacter in sputum  -Chronic hypoxia  -Pleural effusion, decreasing  -Hx transverse myelitis  -Anxiety      RECC:   Continue CPAP/PSV. Increase PSV - increased to 12. Hold off on T/C trials.  Nebs. Suction prn. Abx per ID. Follow CXR. Anxiolytic. -chronic resp failure; previously was on T/C with vent at night at NH. Required full vent support at one time while here. Now weaning on CPAP/PSV; c/o SOB. TV borderline on current settings  -HCAP; Acinetobacter in sputum  -Chronic hypoxia  -Pleural effusion, decreasing  -Hx transverse myelitis  -Anxiety  -Anemia      RECC:   Continue CPAP/PSV. Increase PSV - increased to 12. Hold off on T/C trials.  Nebs. Suction prn. Abx per ID. Follow CXR. Anxiolytic. Anemia per PMT.

## 2018-03-29 LAB
GLUCOSE BLDC GLUCOMTR-MCNC: 140 MG/DL — HIGH (ref 70–99)
GLUCOSE BLDC GLUCOMTR-MCNC: 154 MG/DL — HIGH (ref 70–99)
GLUCOSE BLDC GLUCOMTR-MCNC: 190 MG/DL — HIGH (ref 70–99)
GLUCOSE BLDC GLUCOMTR-MCNC: 236 MG/DL — HIGH (ref 70–99)

## 2018-03-29 PROCEDURE — 99232 SBSQ HOSP IP/OBS MODERATE 35: CPT

## 2018-03-29 RX ORDER — INSULIN GLARGINE 100 [IU]/ML
20 INJECTION, SOLUTION SUBCUTANEOUS AT BEDTIME
Qty: 0 | Refills: 0 | Status: DISCONTINUED | OUTPATIENT
Start: 2018-03-29 | End: 2018-04-13

## 2018-03-29 RX ADMIN — FENTANYL CITRATE 1 PATCH: 50 INJECTION INTRAVENOUS at 15:14

## 2018-03-29 RX ADMIN — MAGNESIUM OXIDE 400 MG ORAL TABLET 400 MILLIGRAM(S): 241.3 TABLET ORAL at 12:45

## 2018-03-29 RX ADMIN — MAGNESIUM OXIDE 400 MG ORAL TABLET 400 MILLIGRAM(S): 241.3 TABLET ORAL at 17:13

## 2018-03-29 RX ADMIN — FENTANYL CITRATE 1 PATCH: 50 INJECTION INTRAVENOUS at 15:12

## 2018-03-29 RX ADMIN — HEPARIN SODIUM 5000 UNIT(S): 5000 INJECTION INTRAVENOUS; SUBCUTANEOUS at 05:19

## 2018-03-29 RX ADMIN — CLOPIDOGREL BISULFATE 75 MILLIGRAM(S): 75 TABLET, FILM COATED ORAL at 12:47

## 2018-03-29 RX ADMIN — INSULIN HUMAN 1: 100 INJECTION, SOLUTION SUBCUTANEOUS at 23:35

## 2018-03-29 RX ADMIN — INSULIN HUMAN 1: 100 INJECTION, SOLUTION SUBCUTANEOUS at 19:20

## 2018-03-29 RX ADMIN — CHLORHEXIDINE GLUCONATE 15 MILLILITER(S): 213 SOLUTION TOPICAL at 05:19

## 2018-03-29 RX ADMIN — Medication 10 MILLIGRAM(S): at 17:13

## 2018-03-29 RX ADMIN — HEPARIN SODIUM 5000 UNIT(S): 5000 INJECTION INTRAVENOUS; SUBCUTANEOUS at 21:19

## 2018-03-29 RX ADMIN — CHLORHEXIDINE GLUCONATE 15 MILLILITER(S): 213 SOLUTION TOPICAL at 17:15

## 2018-03-29 RX ADMIN — INSULIN HUMAN 2: 100 INJECTION, SOLUTION SUBCUTANEOUS at 05:56

## 2018-03-29 RX ADMIN — PANTOPRAZOLE SODIUM 40 MILLIGRAM(S): 20 TABLET, DELAYED RELEASE ORAL at 05:19

## 2018-03-29 RX ADMIN — Medication 1 APPLICATION(S): at 05:20

## 2018-03-29 RX ADMIN — Medication 1 TABLET(S): at 17:15

## 2018-03-29 RX ADMIN — Medication 1 APPLICATION(S): at 17:13

## 2018-03-29 RX ADMIN — HEPARIN SODIUM 5000 UNIT(S): 5000 INJECTION INTRAVENOUS; SUBCUTANEOUS at 17:15

## 2018-03-29 RX ADMIN — Medication 81 MILLIGRAM(S): at 12:44

## 2018-03-29 RX ADMIN — Medication 500 MILLIGRAM(S): at 12:44

## 2018-03-29 RX ADMIN — MAGNESIUM OXIDE 400 MG ORAL TABLET 400 MILLIGRAM(S): 241.3 TABLET ORAL at 09:27

## 2018-03-29 RX ADMIN — Medication 1 APPLICATION(S): at 21:19

## 2018-03-29 RX ADMIN — Medication 0.5 MILLIGRAM(S): at 05:19

## 2018-03-29 RX ADMIN — INSULIN GLARGINE 20 UNIT(S): 100 INJECTION, SOLUTION SUBCUTANEOUS at 23:35

## 2018-03-29 RX ADMIN — Medication 5 MILLILITER(S): at 12:44

## 2018-03-29 RX ADMIN — Medication 10 MILLIGRAM(S): at 05:19

## 2018-03-29 RX ADMIN — Medication 1 DROP(S): at 05:19

## 2018-03-29 RX ADMIN — Medication 1 TABLET(S): at 05:19

## 2018-03-29 NOTE — PROGRESS NOTE ADULT - ATTENDING COMMENTS
DC planing: needs pulmonary clearance and palliative f/u for dispo planning. as per palliative RN, patient wants full code now and everything that can be done

## 2018-03-29 NOTE — PROGRESS NOTE ADULT - SUBJECTIVE AND OBJECTIVE BOX
KAMAR BELLAMY Patient is a 62y old  Male who presents with a chief complaint of fevers (12 Mar 2018 18:28)     HPI:  63y/o male with pmh of PMH Transverse myelitis, Paraplegia, trach/peg, colostomy, HTN, coming from [facility] presents with fever for 3 days.    Patient denies any cough, sob, runny nose, vomiting, diarrhea, abdominal pain, pain with urination.    Patient signed MOLST REQUESTING CPR on 2/18/18..  Originally at [facility] in 7months ago, developed Acute transverse myelitis.    Heart Attack, stent placed, check up after.  He was given a tetanus shot, then couple days ago he developed lower extremity weakness, paralysis; ultimately developed ulcers.    S[facility]--> [facility] (kept him for a few weeks) --> wife couldn't take care of him;    He is getting frequent uti's, anemia reequiring transfusions.    101 fever at nursing home today ([facility] [place]) (10 Mar 2018 23:26)    Interval:  The patient was seen and evaluated   The patient is in no acute distress.  SOB yesterday, seen by pulmonary. CPAP/PSV was increased. TLC trial on hold. currently comfortable  Denied any fever chest pain, palpitations, shortness of breath, abdominal pain, fever, dysuria, cough, edema ,nausea,vomitting  fatigue+. stool liquid, greenish. FOBT pending        Allergies    No Known Allergies    Intolerances      PHYSICAL EXAM:  Vital Signs Last 24 Hrs  T(C): 37.2 (29 Mar 2018 07:33), Max: 37.2 (29 Mar 2018 07:33)  T(F): 98.9 (29 Mar 2018 07:33), Max: 98.9 (29 Mar 2018 07:33)  HR: 96 (29 Mar 2018 10:32) (72 - 96)  BP: 154/89 (29 Mar 2018 07:33) (125/78 - 154/89)  BP(mean): --  RR: 17 (29 Mar 2018 07:33) (14 - 18)  SpO2: 98% (29 Mar 2018 10:32) (97% - 100%)  CAPILLARY BLOOD GLUCOSE    CAPILLARY BLOOD GLUCOSE      POCT Blood Glucose.: 236 mg/dL (29 Mar 2018 05:28)  POCT Blood Glucose.: 243 mg/dL (28 Mar 2018 23:54)  POCT Blood Glucose.: 224 mg/dL (28 Mar 2018 21:36)  POCT Blood Glucose.: 157 mg/dL (28 Mar 2018 17:55)    I&O's Summary    28 Mar 2018 07:01  -  29 Mar 2018 07:00  --------------------------------------------------------  IN: 1200 mL / OUT: 2400 mL / NET: -1200 mL      GENERAL: NAD,   EYES: EOMI,conjunctiva and sclera clear  ENMT:  Moist mucous membranes,  No lesions  NERVOUS SYSTEM:  Alert & Oriented X3, cant Move upper and lower extremities; CNS-II-XII  CHEST/LUNG: Clear to auscultation bilaterally; No rales, rhonchi, wheezing,   HEART: Regular rate and rhythm; No murmurs,   ABDOMEN: Soft, Nontender, Nondistended; Bowel sounds present. PEG, colostomy  EXTREMITIES:  Peripheral Pulses, No  cyanosis, or edema  SKIN: No rashes or lesions  psychiatry- mood and affect flat    MEDICATIONS  (STANDING):  ascorbic acid 500 milliGRAM(s) Oral daily  aspirin  chewable 81 milliGRAM(s) Oral daily  baclofen 10 milliGRAM(s) Oral two times a day  chlorhexidine 0.12% Liquid 15 milliLiter(s) Swish and Spit two times a day  clopidogrel Tablet 75 milliGRAM(s) Oral daily  dextrose 5%. 1000 milliLiter(s) (50 mL/Hr) IV Continuous <Continuous>  dextrose 50% Injectable 12.5 Gram(s) IV Push once  dextrose 50% Injectable 25 Gram(s) IV Push once  dextrose 50% Injectable 25 Gram(s) IV Push once  fentaNYL   Patch  25 MICROgram(s)/Hr 1 Patch Transdermal every 72 hours  heparin  Injectable 5000 Unit(s) SubCutaneous every 8 hours  insulin glargine Injectable (LANTUS) 20 Unit(s) SubCutaneous at bedtime  insulin regular  human corrective regimen sliding scale   SubCutaneous every 6 hours  lactobacillus acidophilus 1 Tablet(s) Oral every 12 hours  magnesium oxide 400 milliGRAM(s) Oral three times a day with meals  multivitamin   Solution 5 milliLiter(s) Oral daily  pantoprazole   Suspension 40 milliGRAM(s) Enteral Tube before breakfast  petrolatum Ophthalmic Ointment 1 Application(s) Both EYES at bedtime  vitamin A &amp; D Ointment 1 Application(s) Topical two times a day    MEDICATIONS  (PRN):  acetaminophen   Tablet 650 milliGRAM(s) Oral every 6 hours PRN For Temp greater than 38 C (100.4 F)  acetaminophen   Tablet. 650 milliGRAM(s) Oral every 6 hours PRN Moderate Pain (4 - 6) and headache  ALBUTerol    0.083% 2.5 milliGRAM(s) Nebulizer every 6 hours PRN Shortness of Breath and/or Wheezing  ALPRAZolam 0.5 milliGRAM(s) Oral every 6 hours PRN anxiety/agitation  artificial  tears Solution 1 Drop(s) Both EYES every 4 hours PRN Dry Eyes  glucagon  Injectable 1 milliGRAM(s) IntraMuscular once PRN Glucose <70 milliGRAM(s)/deciLiter  oxyCODONE    Solution 15 milliGRAM(s) Oral every 3 hours PRN mild to moderate pain  oxyCODONE    Solution 20 milliGRAM(s) Oral every 3 hours PRN severe pain or breakthrough pain        LABS:                        9.6    11.9  )-----------( 482      ( 28 Mar 2018 15:26 )             31.3       03-28    138  |  98  |  18.0  ----------------------------<  153<H>  4.3   |  32.0<H>  |  0.43<L>    Ca    9.4      28 Mar 2018 15:26  Phos  3.8     03-28  Mg     1.9     03-28        no labs today  RADIOLOGY & ADDITIONAL TESTS:      Consultant notes reviewed    Case discussed with consultant/provider/ family /patient     < from: Xray Chest 1 View- PORTABLE-Routine (03.23.18 @ 04:56) >    Impression:  Decreasing right pleural effusion..    < end of copied text >

## 2018-03-29 NOTE — PROGRESS NOTE ADULT - ASSESSMENT
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 ID following. on contact isolation    pleural effusion: decreaing    acute and chronic resp failure; previously was on T/C with vent at night at NH. Required full vent support at one time while here. Now weaning on CPAP/PSV. however weaning trial hold off today due to resp distress. Continue CPAP/PSV. plan is for T/C trials if he continues to improve. Nebs. Suction prn. Abx per ID.  pulmonary following.     Transverse myelitis- with quadriplegia-beltrán allowing for help healing the decubitus and comfort - palliative following. needs palliative f/u for DC planning    IDDM- insulin with HISS    GERD- protonix      dislodged PEG: replaced by GI without complication. working good. stool looks greenigh today. pending FOBT. wife reported patient looks weak. patient/RN reported same. h/h stable    chest pain: improved with xanax. ce*3 and EkG neg.     sacral decubitus stage4: on wound vac. and colostomy. wound team following. colostomy    DVT ppx- heparin

## 2018-03-30 LAB
GLUCOSE BLDC GLUCOMTR-MCNC: 185 MG/DL — HIGH (ref 70–99)
GLUCOSE BLDC GLUCOMTR-MCNC: 185 MG/DL — HIGH (ref 70–99)
GLUCOSE BLDC GLUCOMTR-MCNC: 192 MG/DL — HIGH (ref 70–99)
OB PNL STL IA: NEGATIVE — SIGNIFICANT CHANGE UP

## 2018-03-30 PROCEDURE — 99233 SBSQ HOSP IP/OBS HIGH 50: CPT

## 2018-03-30 PROCEDURE — 99232 SBSQ HOSP IP/OBS MODERATE 35: CPT

## 2018-03-30 RX ADMIN — OXYCODONE HYDROCHLORIDE 15 MILLIGRAM(S): 5 TABLET ORAL at 12:47

## 2018-03-30 RX ADMIN — CLOPIDOGREL BISULFATE 75 MILLIGRAM(S): 75 TABLET, FILM COATED ORAL at 12:27

## 2018-03-30 RX ADMIN — OXYCODONE HYDROCHLORIDE 15 MILLIGRAM(S): 5 TABLET ORAL at 05:08

## 2018-03-30 RX ADMIN — CHLORHEXIDINE GLUCONATE 15 MILLILITER(S): 213 SOLUTION TOPICAL at 16:42

## 2018-03-30 RX ADMIN — Medication 0.5 MILLIGRAM(S): at 12:27

## 2018-03-30 RX ADMIN — Medication 1 TABLET(S): at 05:08

## 2018-03-30 RX ADMIN — MAGNESIUM OXIDE 400 MG ORAL TABLET 400 MILLIGRAM(S): 241.3 TABLET ORAL at 12:29

## 2018-03-30 RX ADMIN — HEPARIN SODIUM 5000 UNIT(S): 5000 INJECTION INTRAVENOUS; SUBCUTANEOUS at 20:45

## 2018-03-30 RX ADMIN — CHLORHEXIDINE GLUCONATE 15 MILLILITER(S): 213 SOLUTION TOPICAL at 05:07

## 2018-03-30 RX ADMIN — OXYCODONE HYDROCHLORIDE 15 MILLIGRAM(S): 5 TABLET ORAL at 16:32

## 2018-03-30 RX ADMIN — Medication 1 TABLET(S): at 16:42

## 2018-03-30 RX ADMIN — Medication 5 MILLILITER(S): at 12:28

## 2018-03-30 RX ADMIN — INSULIN HUMAN 1: 100 INJECTION, SOLUTION SUBCUTANEOUS at 12:30

## 2018-03-30 RX ADMIN — Medication 1 DROP(S): at 05:07

## 2018-03-30 RX ADMIN — Medication 1 APPLICATION(S): at 05:08

## 2018-03-30 RX ADMIN — Medication 10 MILLIGRAM(S): at 16:42

## 2018-03-30 RX ADMIN — Medication 10 MILLIGRAM(S): at 05:07

## 2018-03-30 RX ADMIN — MAGNESIUM OXIDE 400 MG ORAL TABLET 400 MILLIGRAM(S): 241.3 TABLET ORAL at 16:35

## 2018-03-30 RX ADMIN — Medication 500 MILLIGRAM(S): at 12:27

## 2018-03-30 RX ADMIN — Medication 81 MILLIGRAM(S): at 12:29

## 2018-03-30 RX ADMIN — Medication 1 APPLICATION(S): at 12:28

## 2018-03-30 RX ADMIN — PANTOPRAZOLE SODIUM 40 MILLIGRAM(S): 20 TABLET, DELAYED RELEASE ORAL at 05:09

## 2018-03-30 RX ADMIN — HEPARIN SODIUM 5000 UNIT(S): 5000 INJECTION INTRAVENOUS; SUBCUTANEOUS at 05:07

## 2018-03-30 RX ADMIN — OXYCODONE HYDROCHLORIDE 15 MILLIGRAM(S): 5 TABLET ORAL at 12:27

## 2018-03-30 RX ADMIN — Medication 0.5 MILLIGRAM(S): at 19:37

## 2018-03-30 RX ADMIN — OXYCODONE HYDROCHLORIDE 15 MILLIGRAM(S): 5 TABLET ORAL at 16:50

## 2018-03-30 RX ADMIN — INSULIN HUMAN 1: 100 INJECTION, SOLUTION SUBCUTANEOUS at 16:42

## 2018-03-30 RX ADMIN — OXYCODONE HYDROCHLORIDE 15 MILLIGRAM(S): 5 TABLET ORAL at 06:08

## 2018-03-30 RX ADMIN — HEPARIN SODIUM 5000 UNIT(S): 5000 INJECTION INTRAVENOUS; SUBCUTANEOUS at 12:27

## 2018-03-30 RX ADMIN — INSULIN HUMAN 1: 100 INJECTION, SOLUTION SUBCUTANEOUS at 06:47

## 2018-03-30 RX ADMIN — Medication 1 APPLICATION(S): at 20:46

## 2018-03-30 NOTE — PROGRESS NOTE ADULT - SUBJECTIVE AND OBJECTIVE BOX
KAMAR BELLAMY Patient is a 62y old  Male who presents with a chief complaint of fevers (12 Mar 2018 18:28)     HPI:  61y/o male with pmh of PMH Transverse myelitis, Paraplegia, trach/peg, colostomy, HTN, coming from [facility] presents with fever for 3 days.    Patient denies any cough, sob, runny nose, vomiting, diarrhea, abdominal pain, pain with urination.    Patient signed MOLST REQUESTING CPR on 2/18/18..  Originally at [facility] in 7months ago, developed Acute transverse myelitis.    Heart Attack, stent placed, check up after.  He was given a tetanus shot, then couple days ago he developed lower extremity weakness, paralysis; ultimately developed ulcers.    [facility]--> [facility] (kept him for a few weeks) --> wife couldn't take care of him;    He is getting frequent uti's, anemia reequiring transfusions.    101 fever at nursing home today ([facility] [place]) (10 Mar 2018 23:26)    Interval:  The patient was seen and evaluated   The patient is in no acute distress.  CPAP/PSV was increased. TLC trial on hold. currently comfortable  Denied any fever chest pain, palpitations, shortness of breath, abdominal pain,dysuria, cough, edema ,nausea,vomitting  fatigue+. stool liquid, greenish.         Allergies    No Known Allergies    Intolerances        ICU Vital Signs Last 24 Hrs  T(C): 37.1 (30 Mar 2018 10:40), Max: 37.2 (30 Mar 2018 08:30)  T(F): 98.7 (30 Mar 2018 10:40), Max: 99 (30 Mar 2018 08:30)  HR: 103 (30 Mar 2018 12:28) (84 - 108)  BP: 138/82 (30 Mar 2018 10:40) (138/82 - 150/88)  BP(mean): --  ABP: --  ABP(mean): --  RR: 16 (30 Mar 2018 10:40) (16 - 18)  SpO2: 97% (30 Mar 2018 12:28) (96% - 99%)    CAPILLARY BLOOD GLUCOSE      POCT Blood Glucose.: 192 mg/dL (30 Mar 2018 11:28)  POCT Blood Glucose.: 185 mg/dL (30 Mar 2018 06:41)  POCT Blood Glucose.: 190 mg/dL (29 Mar 2018 23:33)  POCT Blood Glucose.: 154 mg/dL (29 Mar 2018 19:06)      I&O's Detail    29 Mar 2018 07:01  -  30 Mar 2018 07:00  --------------------------------------------------------  IN:    Enteral Tube Flush: 200 mL    Glucerna: 960 mL  Total IN: 1160 mL    OUT:    Indwelling Catheter - Urethral: 2000 mL  Total OUT: 2000 mL    Total NET: -840 mL      GENERAL: NAD,   EYES: EOMI,conjunctiva and sclera clear  ENMT:  Moist mucous membranes,  No lesions  NERVOUS SYSTEM:  Alert & Oriented X3, cant Move upper and lower extremities; CNS-II-XII  CHEST/LUNG: Clear to auscultation bilaterally; No rales, rhonchi, wheezing,   HEART: Regular rate and rhythm; No murmurs,   ABDOMEN: Soft, Nontender, Nondistended; Bowel sounds present. PEG, colostomy  EXTREMITIES:  Peripheral Pulses, No  cyanosis, or edema  SKIN: No rashes or lesions  psychiatry- mood and affect flat    MEDICATIONS  (STANDING):  ascorbic acid 500 milliGRAM(s) Oral daily  aspirin  chewable 81 milliGRAM(s) Oral daily  baclofen 10 milliGRAM(s) Oral two times a day  chlorhexidine 0.12% Liquid 15 milliLiter(s) Swish and Spit two times a day  clopidogrel Tablet 75 milliGRAM(s) Oral daily  dextrose 5%. 1000 milliLiter(s) (50 mL/Hr) IV Continuous <Continuous>  dextrose 50% Injectable 12.5 Gram(s) IV Push once  dextrose 50% Injectable 25 Gram(s) IV Push once  dextrose 50% Injectable 25 Gram(s) IV Push once  fentaNYL   Patch  25 MICROgram(s)/Hr 1 Patch Transdermal every 72 hours  heparin  Injectable 5000 Unit(s) SubCutaneous every 8 hours  insulin glargine Injectable (LANTUS) 20 Unit(s) SubCutaneous at bedtime  insulin regular  human corrective regimen sliding scale   SubCutaneous every 6 hours  lactobacillus acidophilus 1 Tablet(s) Oral every 12 hours  magnesium oxide 400 milliGRAM(s) Oral three times a day with meals  multivitamin   Solution 5 milliLiter(s) Oral daily  pantoprazole   Suspension 40 milliGRAM(s) Enteral Tube before breakfast  petrolatum Ophthalmic Ointment 1 Application(s) Both EYES at bedtime  vitamin A &amp; D Ointment 1 Application(s) Topical two times a day    MEDICATIONS  (PRN):  acetaminophen   Tablet 650 milliGRAM(s) Oral every 6 hours PRN For Temp greater than 38 C (100.4 F)  acetaminophen   Tablet. 650 milliGRAM(s) Oral every 6 hours PRN Moderate Pain (4 - 6) and headache  ALBUTerol    0.083% 2.5 milliGRAM(s) Nebulizer every 6 hours PRN Shortness of Breath and/or Wheezing  ALPRAZolam 0.5 milliGRAM(s) Oral every 6 hours PRN anxiety/agitation  artificial  tears Solution 1 Drop(s) Both EYES every 4 hours PRN Dry Eyes  glucagon  Injectable 1 milliGRAM(s) IntraMuscular once PRN Glucose <70 milliGRAM(s)/deciLiter  oxyCODONE    Solution 15 milliGRAM(s) Oral every 3 hours PRN mild to moderate pain  oxyCODONE    Solution 20 milliGRAM(s) Oral every 3 hours PRN severe pain or breakthrough pain        LABS:                                   9.6    11.9  )-----------( 482      ( 28 Mar 2018 15:26 )             31.3       03-28    138  |  98  |  18.0  ----------------------------<  153<H>  4.3   |  32.0<H>  |  0.43<L>    Ca    9.4      28 Mar 2018 15:26  Phos  3.8     03-28  Mg     1.9     03-28        RADIOLOGY & ADDITIONAL TESTS:      Consultant notes reviewed    Case discussed with consultant/provider/ family /patient     < from: Xray Chest 1 View- PORTABLE-Routine (03.23.18 @ 04:56) >    Impression:  Decreasing right pleural effusion..    < end of copied text >

## 2018-03-30 NOTE — PROGRESS NOTE ADULT - ASSESSMENT
Tolerating CPAP PSV   Improved radiologic picture    Plan:  Had been on TC previously>begin trach collar trials.

## 2018-03-30 NOTE — PROGRESS NOTE ADULT - SUBJECTIVE AND OBJECTIVE BOX
PULMONARY PROGRESS NOTE      [id number]    Patient is a 62y old  Male who presents with a chief complaint of fevers (12 Mar 2018 18:28)      INTERVAL HPI/OVERNIGHT EVENTS:  Toleating CPAP 5 PSV 10  VT average observed is 350    MEDICATIONS  (STANDING):  ascorbic acid 500 milliGRAM(s) Oral daily  aspirin  chewable 81 milliGRAM(s) Oral daily  baclofen 10 milliGRAM(s) Oral two times a day  chlorhexidine 0.12% Liquid 15 milliLiter(s) Swish and Spit two times a day  clopidogrel Tablet 75 milliGRAM(s) Oral daily  dextrose 5%. 1000 milliLiter(s) (50 mL/Hr) IV Continuous <Continuous>  dextrose 50% Injectable 12.5 Gram(s) IV Push once  dextrose 50% Injectable 25 Gram(s) IV Push once  dextrose 50% Injectable 25 Gram(s) IV Push once  heparin  Injectable 5000 Unit(s) SubCutaneous every 8 hours  insulin glargine Injectable (LANTUS) 20 Unit(s) SubCutaneous at bedtime  insulin regular  human corrective regimen sliding scale   SubCutaneous every 6 hours  lactobacillus acidophilus 1 Tablet(s) Oral every 12 hours  magnesium oxide 400 milliGRAM(s) Oral three times a day with meals  multivitamin   Solution 5 milliLiter(s) Oral daily  pantoprazole   Suspension 40 milliGRAM(s) Enteral Tube before breakfast  petrolatum Ophthalmic Ointment 1 Application(s) Both EYES at bedtime  vitamin A &amp; D Ointment 1 Application(s) Topical two times a day      MEDICATIONS  (PRN):  acetaminophen   Tablet 650 milliGRAM(s) Oral every 6 hours PRN For Temp greater than 38 C (100.4 F)  acetaminophen   Tablet. 650 milliGRAM(s) Oral every 6 hours PRN Moderate Pain (4 - 6) and headache  ALBUTerol    0.083% 2.5 milliGRAM(s) Nebulizer every 6 hours PRN Shortness of Breath and/or Wheezing  ALPRAZolam 0.5 milliGRAM(s) Oral every 6 hours PRN anxiety/agitation  artificial  tears Solution 1 Drop(s) Both EYES every 4 hours PRN Dry Eyes  glucagon  Injectable 1 milliGRAM(s) IntraMuscular once PRN Glucose <70 milliGRAM(s)/deciLiter  oxyCODONE    Solution 15 milliGRAM(s) Oral every 3 hours PRN mild to moderate pain  oxyCODONE    Solution 20 milliGRAM(s) Oral every 3 hours PRN severe pain or breakthrough pain      Allergies    No Known Allergies    Intolerances        PAST MEDICAL & SURGICAL HISTORY:  Essential hypertension  Paralysis  Transverse myelitis  History of tracheostomy      SOCIAL HISTORY  Smoking History:       REVIEW OF SYSTEMS:    CONSTITUTIONAL:  No distress    HEENT:  Eyes:  No diplopia or blurred vision. ENT:  No earache, sore throat or runny nose.    CARDIOVASCULAR:  No pressure, squeezing, tightness, heaviness or aching about the chest; no palpitations.    RESPIRATORY:  +dyspnea    GASTROINTESTINAL:  No nausea, vomiting or diarrhea.    GENITOURINARY:  No dysuria, frequency or urgency.    MUSCULOSKELETAL:  No joint pain    SKIN:  No new lesions.    NEUROLOGIC:  Quadraplegic    PSYCHIATRIC:  No disorder of thought or mood.    ENDOCRINE:  No heat or cold intolerance, polyuria or polydipsia.    HEMATOLOGICAL:  No easy bruising or bleeding.     Vital Signs Last 24 Hrs  T(C): 37.4 (30 Mar 2018 15:53), Max: 37.4 (30 Mar 2018 15:53)  T(F): 99.3 (30 Mar 2018 15:53), Max: 99.3 (30 Mar 2018 15:53)  HR: 98 (30 Mar 2018 15:53) (84 - 108)  BP: 139/87 (30 Mar 2018 15:53) (138/82 - 150/88)  BP(mean): --  RR: 18 (30 Mar 2018 15:53) (16 - 18)  SpO2: 97% (30 Mar 2018 15:53) (96% - 99%)    PHYSICAL EXAMINATION:    GENERAL: The patient is awake and alert in no apparent distress.     HEENT: Head is normocephalic and atraumatic. Extraocular muscles are intact. Mucous membranes are moist.    NECK: Trach site clear    LUNGS: Clear to auscultation without wheezing, rales or rhonchi; respirations unlabored>on PSV    HEART: Regular rate and rhythm without murmur.    ABDOMEN: Soft, nontender, and nondistended.      EXTREMITIES: Without any cyanosis, clubbing, rash, lesions or edema.    NEUROLOGIC: Quadraplegic    SKIN: No ulceration or induration present.      LABS:    < from: Xray Chest 1 View- PORTABLE-Routine (03.28.18 @ 11:06) >     EXAM:  XR CHEST PORTABLE ROUTINE 1V                          PROCEDURE DATE:  03/28/2018          INTERPRETATION:  Chest, single portable view    HISTORY: Dyspnea    Comparison: 3/23    IMPRESSION:    Support Devices:  Unchanged  Heart:  Unremarkable  Mediastinum:  Unremarkable  Lungs/Airways: Decreased right pleural effusion.  Bones/Soft tissues: Unremarkable    < end of copied text >                            MICROBIOLOGY:    RADIOLOGY & ADDITIONAL STUDIES:

## 2018-03-30 NOTE — PROGRESS NOTE ADULT - ASSESSMENT
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 ID following. on contact isolation    pleural effusion: decreaing    acute and chronic resp failure; previously was on T/C with vent at night at NH. Required full vent support at one time while here. Now weaning on CPAP/PSV. however weaning trial hold off  due to resp distress. Continue CPAP/PSV. plan is for T/C trials if he continues to improve. Nebs. Suction prn. Abx per ID.  pulmonary following.     Transverse myelitis- with quadriplegia-beltrán allowing for help healing the decubitus and comfort - palliative following. needs palliative f/u for DC planning    IDDM- insulin with HISS    GERD- protonix      dislodged PEG: replaced by GI without complication. working good. stool looks greenigh today. pending FOBT. wife reported patient looks weak. patient/RN reported same. h/h stable    chest pain: improved with xanax. ce*3 and EkG neg.     sacral decubitus stage4: on wound vac. and colostomy. wound team following. colostomy    DVT ppx- heparin

## 2018-03-31 LAB
ALBUMIN SERPL ELPH-MCNC: 2.8 G/DL — LOW (ref 3.3–5.2)
ALP SERPL-CCNC: 98 U/L — SIGNIFICANT CHANGE UP (ref 40–120)
ALT FLD-CCNC: 31 U/L — SIGNIFICANT CHANGE UP
ANION GAP SERPL CALC-SCNC: 10 MMOL/L — SIGNIFICANT CHANGE UP (ref 5–17)
AST SERPL-CCNC: 24 U/L — SIGNIFICANT CHANGE UP
BILIRUB SERPL-MCNC: <0.2 MG/DL — LOW (ref 0.4–2)
BUN SERPL-MCNC: 24 MG/DL — HIGH (ref 8–20)
CALCIUM SERPL-MCNC: 9.6 MG/DL — SIGNIFICANT CHANGE UP (ref 8.6–10.2)
CHLORIDE SERPL-SCNC: 96 MMOL/L — LOW (ref 98–107)
CO2 SERPL-SCNC: 30 MMOL/L — HIGH (ref 22–29)
CREAT SERPL-MCNC: 0.55 MG/DL — SIGNIFICANT CHANGE UP (ref 0.5–1.3)
GLUCOSE BLDC GLUCOMTR-MCNC: 144 MG/DL — HIGH (ref 70–99)
GLUCOSE BLDC GLUCOMTR-MCNC: 157 MG/DL — HIGH (ref 70–99)
GLUCOSE BLDC GLUCOMTR-MCNC: 164 MG/DL — HIGH (ref 70–99)
GLUCOSE BLDC GLUCOMTR-MCNC: 187 MG/DL — HIGH (ref 70–99)
GLUCOSE SERPL-MCNC: 192 MG/DL — HIGH (ref 70–115)
HCT VFR BLD CALC: 34.5 % — LOW (ref 42–52)
HGB BLD-MCNC: 10.4 G/DL — LOW (ref 14–18)
MAGNESIUM SERPL-MCNC: 1.9 MG/DL — SIGNIFICANT CHANGE UP (ref 1.6–2.6)
MCHC RBC-ENTMCNC: 27.4 PG — SIGNIFICANT CHANGE UP (ref 27–31)
MCHC RBC-ENTMCNC: 30.1 G/DL — LOW (ref 32–36)
MCV RBC AUTO: 91 FL — SIGNIFICANT CHANGE UP (ref 80–94)
PHOSPHATE SERPL-MCNC: 3.7 MG/DL — SIGNIFICANT CHANGE UP (ref 2.4–4.7)
PLATELET # BLD AUTO: 503 K/UL — HIGH (ref 150–400)
POTASSIUM SERPL-MCNC: 4.8 MMOL/L — SIGNIFICANT CHANGE UP (ref 3.5–5.3)
POTASSIUM SERPL-SCNC: 4.8 MMOL/L — SIGNIFICANT CHANGE UP (ref 3.5–5.3)
PROT SERPL-MCNC: 7 G/DL — SIGNIFICANT CHANGE UP (ref 6.6–8.7)
RBC # BLD: 3.79 M/UL — LOW (ref 4.6–6.2)
RBC # FLD: 17.1 % — HIGH (ref 11–15.6)
SODIUM SERPL-SCNC: 136 MMOL/L — SIGNIFICANT CHANGE UP (ref 135–145)
WBC # BLD: 15.4 K/UL — HIGH (ref 4.8–10.8)
WBC # FLD AUTO: 15.4 K/UL — HIGH (ref 4.8–10.8)

## 2018-03-31 PROCEDURE — 99233 SBSQ HOSP IP/OBS HIGH 50: CPT

## 2018-03-31 RX ADMIN — Medication 500 MILLIGRAM(S): at 13:10

## 2018-03-31 RX ADMIN — CHLORHEXIDINE GLUCONATE 15 MILLILITER(S): 213 SOLUTION TOPICAL at 17:10

## 2018-03-31 RX ADMIN — Medication 1 TABLET(S): at 17:10

## 2018-03-31 RX ADMIN — CHLORHEXIDINE GLUCONATE 15 MILLILITER(S): 213 SOLUTION TOPICAL at 04:51

## 2018-03-31 RX ADMIN — Medication 5 MILLILITER(S): at 13:11

## 2018-03-31 RX ADMIN — INSULIN GLARGINE 20 UNIT(S): 100 INJECTION, SOLUTION SUBCUTANEOUS at 22:45

## 2018-03-31 RX ADMIN — HEPARIN SODIUM 5000 UNIT(S): 5000 INJECTION INTRAVENOUS; SUBCUTANEOUS at 22:45

## 2018-03-31 RX ADMIN — INSULIN HUMAN 1: 100 INJECTION, SOLUTION SUBCUTANEOUS at 00:53

## 2018-03-31 RX ADMIN — MAGNESIUM OXIDE 400 MG ORAL TABLET 400 MILLIGRAM(S): 241.3 TABLET ORAL at 13:11

## 2018-03-31 RX ADMIN — INSULIN HUMAN 1: 100 INJECTION, SOLUTION SUBCUTANEOUS at 06:23

## 2018-03-31 RX ADMIN — Medication 10 MILLIGRAM(S): at 04:51

## 2018-03-31 RX ADMIN — INSULIN HUMAN 1: 100 INJECTION, SOLUTION SUBCUTANEOUS at 22:46

## 2018-03-31 RX ADMIN — CLOPIDOGREL BISULFATE 75 MILLIGRAM(S): 75 TABLET, FILM COATED ORAL at 13:11

## 2018-03-31 RX ADMIN — Medication 1 APPLICATION(S): at 04:51

## 2018-03-31 RX ADMIN — Medication 10 MILLIGRAM(S): at 17:10

## 2018-03-31 RX ADMIN — Medication 81 MILLIGRAM(S): at 13:10

## 2018-03-31 RX ADMIN — HEPARIN SODIUM 5000 UNIT(S): 5000 INJECTION INTRAVENOUS; SUBCUTANEOUS at 04:51

## 2018-03-31 RX ADMIN — MAGNESIUM OXIDE 400 MG ORAL TABLET 400 MILLIGRAM(S): 241.3 TABLET ORAL at 17:10

## 2018-03-31 RX ADMIN — Medication 1 TABLET(S): at 04:52

## 2018-03-31 RX ADMIN — HEPARIN SODIUM 5000 UNIT(S): 5000 INJECTION INTRAVENOUS; SUBCUTANEOUS at 13:11

## 2018-03-31 RX ADMIN — INSULIN GLARGINE 20 UNIT(S): 100 INJECTION, SOLUTION SUBCUTANEOUS at 00:00

## 2018-03-31 RX ADMIN — PANTOPRAZOLE SODIUM 40 MILLIGRAM(S): 20 TABLET, DELAYED RELEASE ORAL at 04:52

## 2018-03-31 RX ADMIN — Medication 1 APPLICATION(S): at 17:10

## 2018-03-31 NOTE — PROGRESS NOTE ADULT - SUBJECTIVE AND OBJECTIVE BOX
KAMAR BELLAMY Patient is a 62y old  Male who presents with a chief complaint of fevers (12 Mar 2018 18:28)     HPI:  63y/o male with pmh of PMH Transverse myelitis, Paraplegia, trach/peg, colostomy, HTN, coming from [facility] presents with fever for 3 days.    Patient denies any cough, sob, runny nose, vomiting, diarrhea, abdominal pain, pain with urination.    Patient signed MOLST REQUESTING CPR on 2/18/18..  Originally at [facility] in 7months ago, developed Acute transverse myelitis.    Heart Attack, stent placed, check up after.  He was given a tetanus shot, then couple days ago he developed lower extremity weakness, paralysis; ultimately developed ulcers.    [facility]--> [facility] (kept him for a few weeks) --> wife couldn't take care of him;    He is getting frequent uti's, anemia reequiring transfusions.    101 fever at nursing home today ([facility] [place]) (10 Mar 2018 23:26)    Interval:  The patient was seen and evaluated   The patient is in no acute distress.  pulmonary suggested TC trial. resp better this am  Denied any fever chest pain, palpitations, shortness of breath, abdominal pain,dysuria, cough, edema ,nausea,vomitting  fatigue+. stool liquid, greenish.         Allergies    No Known Allergies    Intolerances    Vital Signs Last 24 Hrs  T(C): 37.1 (31 Mar 2018 08:17), Max: 37.4 (30 Mar 2018 15:53)  T(F): 98.8 (31 Mar 2018 08:17), Max: 99.3 (30 Mar 2018 15:53)  HR: 94 (31 Mar 2018 12:12) (82 - 111)  BP: 141/77 (31 Mar 2018 08:17) (121/80 - 141/77)  BP(mean): --  RR: 20 (31 Mar 2018 08:17) (17 - 20)  SpO2: 100% (31 Mar 2018 12:12) (97% - 100%)    CAPILLARY BLOOD GLUCOSE      POCT Blood Glucose.: 187 mg/dL (31 Mar 2018 06:22)  POCT Blood Glucose.: 164 mg/dL (31 Mar 2018 00:50)  POCT Blood Glucose.: 185 mg/dL (30 Mar 2018 16:40)      I&O's Detail    30 Mar 2018 07:01  -  31 Mar 2018 07:00  --------------------------------------------------------  IN:    Enteral Tube Flush: 400 mL    Glucerna: 1260 mL  Total IN: 1660 mL    OUT:    Colostomy: 100 mL    Indwelling Catheter - Urethral: 2200 mL  Total OUT: 2300 mL    Total NET: -640 mL        GENERAL: NAD,   EYES: EOMI,conjunctiva and sclera clear  ENMT:  Moist mucous membranes,  No lesions  NERVOUS SYSTEM:  Alert & Oriented X3, cant Move upper and lower extremities;   CHEST/LUNG: Clear to auscultation bilaterally; No rales, rhonchi, wheezing,   HEART: Regular rate and rhythm; No murmurs,   ABDOMEN: Soft, Nontender, Nondistended; Bowel sounds present. PEG, colostomy  EXTREMITIES:  Peripheral Pulses, No  cyanosis, or edema  SKIN: No rashes or lesions, stage 4 sacral decubitus  psychiatry- mood and affect flat    LABS:                                   10.4   15.4  )-----------( 503      ( 31 Mar 2018 09:03 )             34.5       03-31    136  |  96<L>  |  24.0<H>  ----------------------------<  192<H>  4.8   |  30.0<H>  |  0.55    Ca    9.6      31 Mar 2018 09:03  Phos  3.7     03-31  Mg     1.9     03-31    TPro  7.0  /  Alb  2.8<L>  /  TBili  <0.2<L>  /  DBili  x   /  AST  24  /  ALT  31  /  AlkPhos  98  03-31    RADIOLOGY & ADDITIONAL TESTS:      Consultant notes reviewed    Case discussed with consultant/provider/ family /patient     < from: Xray Chest 1 View- PORTABLE-Routine (03.23.18 @ 04:56) >    Impression:  Decreasing right pleural effusion..    < end of copied text >

## 2018-03-31 NOTE — PROGRESS NOTE ADULT - ASSESSMENT
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 ID following. on contact isolation    pleural effusion: decreaing    acute and chronic resp failure; previously was on T/C with vent at night at NH. Required full vent support at one time while here. weaning on CPAP/PSV. however weaning trial hold off  due to resp distress. Continued CPAP/PSV. currently doing better. pulmonary suggested weaning trial to TC. Nebs. Suction prn. Abx per ID.  pulmonary following.     Transverse myelitis- with quadriplegia-beltrán allowing for help healing the decubitus and comfort - palliative following. needs palliative f/u for DC planning    IDDM- insulin with HISS    GERD- protonix      dislodged PEG: replaced by GI without complication. working good. stool looks greenigh today. pending FOBT. wife reported patient looks weak. patient/RN reported same. h/h stable    chest pain: improved with xanax. ce*3 and EkG neg.     sacral decubitus stage4: on wound vac. and colostomy. wound team following. colostomy    DVT ppx- heparin

## 2018-04-01 PROBLEM — Z00.00 ENCOUNTER FOR PREVENTIVE HEALTH EXAMINATION: Status: ACTIVE | Noted: 2018-04-01

## 2018-04-01 LAB
GLUCOSE BLDC GLUCOMTR-MCNC: 146 MG/DL — HIGH (ref 70–99)
GLUCOSE BLDC GLUCOMTR-MCNC: 181 MG/DL — HIGH (ref 70–99)
GLUCOSE BLDC GLUCOMTR-MCNC: 193 MG/DL — HIGH (ref 70–99)
GLUCOSE BLDC GLUCOMTR-MCNC: 206 MG/DL — HIGH (ref 70–99)

## 2018-04-01 PROCEDURE — 99233 SBSQ HOSP IP/OBS HIGH 50: CPT

## 2018-04-01 RX ORDER — OXYCODONE HYDROCHLORIDE 5 MG/1
15 TABLET ORAL
Qty: 0 | Refills: 0 | Status: DISCONTINUED | OUTPATIENT
Start: 2018-04-01 | End: 2018-04-08

## 2018-04-01 RX ADMIN — Medication 1 TABLET(S): at 05:13

## 2018-04-01 RX ADMIN — Medication 1 APPLICATION(S): at 05:13

## 2018-04-01 RX ADMIN — Medication 10 MILLIGRAM(S): at 05:13

## 2018-04-01 RX ADMIN — OXYCODONE HYDROCHLORIDE 15 MILLIGRAM(S): 5 TABLET ORAL at 18:10

## 2018-04-01 RX ADMIN — HEPARIN SODIUM 5000 UNIT(S): 5000 INJECTION INTRAVENOUS; SUBCUTANEOUS at 05:13

## 2018-04-01 RX ADMIN — MAGNESIUM OXIDE 400 MG ORAL TABLET 400 MILLIGRAM(S): 241.3 TABLET ORAL at 10:13

## 2018-04-01 RX ADMIN — Medication 1 APPLICATION(S): at 17:50

## 2018-04-01 RX ADMIN — Medication 500 MILLIGRAM(S): at 11:26

## 2018-04-01 RX ADMIN — Medication 5 MILLILITER(S): at 11:26

## 2018-04-01 RX ADMIN — OXYCODONE HYDROCHLORIDE 15 MILLIGRAM(S): 5 TABLET ORAL at 12:30

## 2018-04-01 RX ADMIN — INSULIN GLARGINE 20 UNIT(S): 100 INJECTION, SOLUTION SUBCUTANEOUS at 23:21

## 2018-04-01 RX ADMIN — HEPARIN SODIUM 5000 UNIT(S): 5000 INJECTION INTRAVENOUS; SUBCUTANEOUS at 11:53

## 2018-04-01 RX ADMIN — INSULIN HUMAN 2: 100 INJECTION, SOLUTION SUBCUTANEOUS at 17:43

## 2018-04-01 RX ADMIN — CLOPIDOGREL BISULFATE 75 MILLIGRAM(S): 75 TABLET, FILM COATED ORAL at 11:27

## 2018-04-01 RX ADMIN — CHLORHEXIDINE GLUCONATE 15 MILLILITER(S): 213 SOLUTION TOPICAL at 17:44

## 2018-04-01 RX ADMIN — MAGNESIUM OXIDE 400 MG ORAL TABLET 400 MILLIGRAM(S): 241.3 TABLET ORAL at 11:52

## 2018-04-01 RX ADMIN — HEPARIN SODIUM 5000 UNIT(S): 5000 INJECTION INTRAVENOUS; SUBCUTANEOUS at 23:21

## 2018-04-01 RX ADMIN — INSULIN HUMAN 1: 100 INJECTION, SOLUTION SUBCUTANEOUS at 23:21

## 2018-04-01 RX ADMIN — OXYCODONE HYDROCHLORIDE 15 MILLIGRAM(S): 5 TABLET ORAL at 11:53

## 2018-04-01 RX ADMIN — CHLORHEXIDINE GLUCONATE 15 MILLILITER(S): 213 SOLUTION TOPICAL at 05:13

## 2018-04-01 RX ADMIN — Medication 81 MILLIGRAM(S): at 11:26

## 2018-04-01 RX ADMIN — INSULIN HUMAN 1: 100 INJECTION, SOLUTION SUBCUTANEOUS at 12:24

## 2018-04-01 RX ADMIN — FENTANYL CITRATE 1 PATCH: 50 INJECTION INTRAVENOUS at 13:59

## 2018-04-01 RX ADMIN — PANTOPRAZOLE SODIUM 40 MILLIGRAM(S): 20 TABLET, DELAYED RELEASE ORAL at 05:13

## 2018-04-01 RX ADMIN — MAGNESIUM OXIDE 400 MG ORAL TABLET 400 MILLIGRAM(S): 241.3 TABLET ORAL at 17:43

## 2018-04-01 RX ADMIN — OXYCODONE HYDROCHLORIDE 15 MILLIGRAM(S): 5 TABLET ORAL at 17:48

## 2018-04-01 RX ADMIN — Medication 1 TABLET(S): at 17:44

## 2018-04-01 RX ADMIN — Medication 10 MILLIGRAM(S): at 17:44

## 2018-04-01 NOTE — PROGRESS NOTE ADULT - SUBJECTIVE AND OBJECTIVE BOX
KAMAR BELLAMY  ----------------------------------------  The patient was seen and evaluated for respiratory failure.  The patient is in no acute distress.  On ventilator support.    Vital Signs Last 24 Hrs  T(C): 36.7 (01 Apr 2018 08:35), Max: 37 (31 Mar 2018 16:00)  T(F): 98 (01 Apr 2018 08:35), Max: 98.6 (31 Mar 2018 16:00)  HR: 97 (01 Apr 2018 10:20) (85 - 100)  BP: 130/80 (01 Apr 2018 08:35) (130/80 - 133/91)  BP(mean): --  RR: 16 (01 Apr 2018 08:35) (16 - 20)  SpO2: 99% (01 Apr 2018 10:21) (86% - 99%)    CAPILLARY BLOOD GLUCOSE  POCT Blood Glucose.: 181 mg/dL (01 Apr 2018 12:23)  POCT Blood Glucose.: 146 mg/dL (01 Apr 2018 05:14)  POCT Blood Glucose.: 157 mg/dL (31 Mar 2018 22:44)  POCT Blood Glucose.: 144 mg/dL (31 Mar 2018 17:09)    PHYSICAL EXAMINATION:  ----------------------------------------  General appearance: NAD, Awake, Alert  HEENT: NCAT, Conjunctiva clear, EOMI  Neck: Supple, No JVD, No tenderness, Tracheostomy in place  Lungs: Clear to auscultation, Breath sound equal bilaterally, No wheezes, No rales  Cardiovascular: S1S2, Regular rhythm  Abdomen: Soft, Nontender, Nondistended, No guarding/rebound, Positive bowel sounds, Gastrostomy in place  Extremities: No clubbing, No cyanosis, No edema, No calf tenderness  Neuro: No tremors, Quadraplegia  Psychiatric: Appropriate mood, Normal affect    LABORATORY STUDIES:  ----------------------------------------             10.4   15.4  )-----------( 503      ( 31 Mar 2018 09:03 )             34.5     03-31    136  |  96<L>  |  24.0<H>  ----------------------------<  192<H>  4.8   |  30.0<H>  |  0.55    Ca    9.6      31 Mar 2018 09:03  Phos  3.7     03-31  Mg     1.9     03-31    TPro  7.0  /  Alb  2.8<L>  /  TBili  <0.2<L>  /  DBili  x   /  AST  24  /  ALT  31  /  AlkPhos  98  03-31    LIVER FUNCTIONS - ( 31 Mar 2018 09:03 )  Alb: 2.8 g/dL / Pro: 7.0 g/dL / ALK PHOS: 98 U/L / ALT: 31 U/L / AST: 24 U/L / GGT: x           MEDICATIONS  (STANDING):  ascorbic acid 500 milliGRAM(s) Oral daily  aspirin  chewable 81 milliGRAM(s) Oral daily  baclofen 10 milliGRAM(s) Oral two times a day  chlorhexidine 0.12% Liquid 15 milliLiter(s) Swish and Spit two times a day  clopidogrel Tablet 75 milliGRAM(s) Oral daily  dextrose 5%. 1000 milliLiter(s) (50 mL/Hr) IV Continuous <Continuous>  dextrose 50% Injectable 12.5 Gram(s) IV Push once  dextrose 50% Injectable 25 Gram(s) IV Push once  dextrose 50% Injectable 25 Gram(s) IV Push once  heparin  Injectable 5000 Unit(s) SubCutaneous every 8 hours  insulin glargine Injectable (LANTUS) 20 Unit(s) SubCutaneous at bedtime  insulin regular  human corrective regimen sliding scale   SubCutaneous every 6 hours  lactobacillus acidophilus 1 Tablet(s) Oral every 12 hours  magnesium oxide 400 milliGRAM(s) Oral three times a day with meals  multivitamin   Solution 5 milliLiter(s) Oral daily  pantoprazole   Suspension 40 milliGRAM(s) Enteral Tube before breakfast  petrolatum Ophthalmic Ointment 1 Application(s) Both EYES at bedtime  vitamin A &amp; D Ointment 1 Application(s) Topical two times a day    MEDICATIONS  (PRN):  acetaminophen   Tablet 650 milliGRAM(s) Oral every 6 hours PRN For Temp greater than 38 C (100.4 F)  acetaminophen   Tablet. 650 milliGRAM(s) Oral every 6 hours PRN Moderate Pain (4 - 6) and headache  ALBUTerol    0.083% 2.5 milliGRAM(s) Nebulizer every 6 hours PRN Shortness of Breath and/or Wheezing  artificial  tears Solution 1 Drop(s) Both EYES every 4 hours PRN Dry Eyes  glucagon  Injectable 1 milliGRAM(s) IntraMuscular once PRN Glucose <70 milliGRAM(s)/deciLiter  oxyCODONE    Solution 15 milliGRAM(s) Oral every 3 hours PRN mild to moderate pain      ASSESSMENT / PLAN:  ----------------------------------------  Acute on chronic respiratory failure - On ventilator support via tracheostomy. Pulmonary follow up noted. Wean as tolerated.    Pneumonia - Completed the course of antibiotics.    Transverse myelitis - On gastrostomy tube feeds.    Decubitus ulcer - Wound care with colostomy and urine catheter to divert from the decubitus site. Analgesic medications as needed.    Diabetes - Insulin coverage, close monitoring of blood glucose levels.    Gastroesophageal reflux disease - On pantoprazole. KAMAR BELLAMY  ----------------------------------------  The patient was seen and evaluated for respiratory failure.  The patient is in no acute distress.  On ventilator support.    Vital Signs Last 24 Hrs  T(C): 36.7 (01 Apr 2018 08:35), Max: 37 (31 Mar 2018 16:00)  T(F): 98 (01 Apr 2018 08:35), Max: 98.6 (31 Mar 2018 16:00)  HR: 97 (01 Apr 2018 10:20) (85 - 100)  BP: 130/80 (01 Apr 2018 08:35) (130/80 - 133/91)  BP(mean): --  RR: 16 (01 Apr 2018 08:35) (16 - 20)  SpO2: 99% (01 Apr 2018 10:21) (86% - 99%)    CAPILLARY BLOOD GLUCOSE  POCT Blood Glucose.: 181 mg/dL (01 Apr 2018 12:23)  POCT Blood Glucose.: 146 mg/dL (01 Apr 2018 05:14)  POCT Blood Glucose.: 157 mg/dL (31 Mar 2018 22:44)  POCT Blood Glucose.: 144 mg/dL (31 Mar 2018 17:09)    PHYSICAL EXAMINATION:  ----------------------------------------  General appearance: NAD, Awake, Alert  HEENT: NCAT, Conjunctiva clear, EOMI  Neck: Supple, No JVD, No tenderness, Tracheostomy in place  Lungs: Clear to auscultation, Breath sound equal bilaterally, No wheezes, No rales  Cardiovascular: S1S2, Regular rhythm  Abdomen: Soft, Nontender, Nondistended, No guarding/rebound, Positive bowel sounds, Gastrostomy in place, Colostomy in place  Extremities: No clubbing, No cyanosis, No edema, No calf tenderness  Neuro: No tremors, Quadraplegia  Psychiatric: Appropriate mood, Normal affect    LABORATORY STUDIES:  ----------------------------------------             10.4   15.4  )-----------( 503      ( 31 Mar 2018 09:03 )             34.5     03-31    136  |  96<L>  |  24.0<H>  ----------------------------<  192<H>  4.8   |  30.0<H>  |  0.55    Ca    9.6      31 Mar 2018 09:03  Phos  3.7     03-31  Mg     1.9     03-31    TPro  7.0  /  Alb  2.8<L>  /  TBili  <0.2<L>  /  DBili  x   /  AST  24  /  ALT  31  /  AlkPhos  98  03-31    LIVER FUNCTIONS - ( 31 Mar 2018 09:03 )  Alb: 2.8 g/dL / Pro: 7.0 g/dL / ALK PHOS: 98 U/L / ALT: 31 U/L / AST: 24 U/L / GGT: x           MEDICATIONS  (STANDING):  ascorbic acid 500 milliGRAM(s) Oral daily  aspirin  chewable 81 milliGRAM(s) Oral daily  baclofen 10 milliGRAM(s) Oral two times a day  chlorhexidine 0.12% Liquid 15 milliLiter(s) Swish and Spit two times a day  clopidogrel Tablet 75 milliGRAM(s) Oral daily  dextrose 5%. 1000 milliLiter(s) (50 mL/Hr) IV Continuous <Continuous>  dextrose 50% Injectable 12.5 Gram(s) IV Push once  dextrose 50% Injectable 25 Gram(s) IV Push once  dextrose 50% Injectable 25 Gram(s) IV Push once  heparin  Injectable 5000 Unit(s) SubCutaneous every 8 hours  insulin glargine Injectable (LANTUS) 20 Unit(s) SubCutaneous at bedtime  insulin regular  human corrective regimen sliding scale   SubCutaneous every 6 hours  lactobacillus acidophilus 1 Tablet(s) Oral every 12 hours  magnesium oxide 400 milliGRAM(s) Oral three times a day with meals  multivitamin   Solution 5 milliLiter(s) Oral daily  pantoprazole   Suspension 40 milliGRAM(s) Enteral Tube before breakfast  petrolatum Ophthalmic Ointment 1 Application(s) Both EYES at bedtime  vitamin A &amp; D Ointment 1 Application(s) Topical two times a day    MEDICATIONS  (PRN):  acetaminophen   Tablet 650 milliGRAM(s) Oral every 6 hours PRN For Temp greater than 38 C (100.4 F)  acetaminophen   Tablet. 650 milliGRAM(s) Oral every 6 hours PRN Moderate Pain (4 - 6) and headache  ALBUTerol    0.083% 2.5 milliGRAM(s) Nebulizer every 6 hours PRN Shortness of Breath and/or Wheezing  artificial  tears Solution 1 Drop(s) Both EYES every 4 hours PRN Dry Eyes  glucagon  Injectable 1 milliGRAM(s) IntraMuscular once PRN Glucose <70 milliGRAM(s)/deciLiter  oxyCODONE    Solution 15 milliGRAM(s) Oral every 3 hours PRN mild to moderate pain      ASSESSMENT / PLAN:  ----------------------------------------  Acute on chronic respiratory failure - On ventilator support via tracheostomy. Pulmonary follow up noted. Wean as tolerated.    Pneumonia - Completed the course of antibiotics.    Transverse myelitis - On gastrostomy tube feeds. Monitor colostomy output.    Decubitus ulcer - Wound care with colostomy and urine catheter to divert from the decubitus site. Analgesic medications as needed.    Diabetes - Insulin coverage, close monitoring of blood glucose levels.    Gastroesophageal reflux disease - On pantoprazole.

## 2018-04-02 LAB
GLUCOSE BLDC GLUCOMTR-MCNC: 154 MG/DL — HIGH (ref 70–99)
GLUCOSE BLDC GLUCOMTR-MCNC: 164 MG/DL — HIGH (ref 70–99)
GLUCOSE BLDC GLUCOMTR-MCNC: 174 MG/DL — HIGH (ref 70–99)
GLUCOSE BLDC GLUCOMTR-MCNC: 199 MG/DL — HIGH (ref 70–99)

## 2018-04-02 PROCEDURE — 99233 SBSQ HOSP IP/OBS HIGH 50: CPT

## 2018-04-02 RX ORDER — METRONIDAZOLE 500 MG
1 TABLET ORAL
Qty: 0 | Refills: 0 | COMMUNITY

## 2018-04-02 RX ORDER — ALPRAZOLAM 0.25 MG
0.5 TABLET ORAL THREE TIMES A DAY
Qty: 0 | Refills: 0 | Status: DISCONTINUED | OUTPATIENT
Start: 2018-04-02 | End: 2018-04-09

## 2018-04-02 RX ORDER — FERROUS SULFATE 325(65) MG
7.5 TABLET ORAL
Qty: 0 | Refills: 0 | COMMUNITY

## 2018-04-02 RX ORDER — LACTOBACILLUS ACIDOPHILUS 100MM CELL
1 CAPSULE ORAL
Qty: 0 | Refills: 0 | COMMUNITY

## 2018-04-02 RX ORDER — ACETAMINOPHEN 500 MG
2 TABLET ORAL
Qty: 0 | Refills: 0 | COMMUNITY

## 2018-04-02 RX ORDER — MULTIVIT-MIN/FERROUS GLUCONATE 9 MG/15 ML
1 LIQUID (ML) ORAL
Qty: 0 | Refills: 0 | COMMUNITY

## 2018-04-02 RX ORDER — SIMETHICONE 80 MG/1
1 TABLET, CHEWABLE ORAL
Qty: 0 | Refills: 0 | COMMUNITY

## 2018-04-02 RX ORDER — ETIDRONATE DISODIUM 400 MG/1
2 TABLET ORAL
Qty: 0 | Refills: 0 | COMMUNITY

## 2018-04-02 RX ORDER — MAGNESIUM HYDROXIDE 400 MG/1
30 TABLET, CHEWABLE ORAL
Qty: 0 | Refills: 0 | COMMUNITY

## 2018-04-02 RX ORDER — ASCORBIC ACID 60 MG
1 TABLET,CHEWABLE ORAL
Qty: 0 | Refills: 0 | COMMUNITY

## 2018-04-02 RX ADMIN — PANTOPRAZOLE SODIUM 40 MILLIGRAM(S): 20 TABLET, DELAYED RELEASE ORAL at 05:29

## 2018-04-02 RX ADMIN — Medication 0.5 MILLIGRAM(S): at 09:00

## 2018-04-02 RX ADMIN — CHLORHEXIDINE GLUCONATE 15 MILLILITER(S): 213 SOLUTION TOPICAL at 14:20

## 2018-04-02 RX ADMIN — MAGNESIUM OXIDE 400 MG ORAL TABLET 400 MILLIGRAM(S): 241.3 TABLET ORAL at 08:14

## 2018-04-02 RX ADMIN — INSULIN HUMAN 1: 100 INJECTION, SOLUTION SUBCUTANEOUS at 12:42

## 2018-04-02 RX ADMIN — INSULIN GLARGINE 20 UNIT(S): 100 INJECTION, SOLUTION SUBCUTANEOUS at 22:52

## 2018-04-02 RX ADMIN — Medication 10 MILLIGRAM(S): at 05:25

## 2018-04-02 RX ADMIN — INSULIN HUMAN 1: 100 INJECTION, SOLUTION SUBCUTANEOUS at 22:53

## 2018-04-02 RX ADMIN — MAGNESIUM OXIDE 400 MG ORAL TABLET 400 MILLIGRAM(S): 241.3 TABLET ORAL at 09:01

## 2018-04-02 RX ADMIN — Medication 1 TABLET(S): at 17:18

## 2018-04-02 RX ADMIN — Medication 1 APPLICATION(S): at 14:20

## 2018-04-02 RX ADMIN — Medication 10 MILLIGRAM(S): at 17:18

## 2018-04-02 RX ADMIN — CHLORHEXIDINE GLUCONATE 15 MILLILITER(S): 213 SOLUTION TOPICAL at 05:25

## 2018-04-02 RX ADMIN — HEPARIN SODIUM 5000 UNIT(S): 5000 INJECTION INTRAVENOUS; SUBCUTANEOUS at 22:53

## 2018-04-02 RX ADMIN — CLOPIDOGREL BISULFATE 75 MILLIGRAM(S): 75 TABLET, FILM COATED ORAL at 09:01

## 2018-04-02 RX ADMIN — Medication 1 APPLICATION(S): at 05:26

## 2018-04-02 RX ADMIN — HEPARIN SODIUM 5000 UNIT(S): 5000 INJECTION INTRAVENOUS; SUBCUTANEOUS at 05:25

## 2018-04-02 RX ADMIN — Medication 1 TABLET(S): at 05:29

## 2018-04-02 RX ADMIN — Medication 500 MILLIGRAM(S): at 09:01

## 2018-04-02 RX ADMIN — Medication 81 MILLIGRAM(S): at 09:01

## 2018-04-02 RX ADMIN — OXYCODONE HYDROCHLORIDE 15 MILLIGRAM(S): 5 TABLET ORAL at 15:00

## 2018-04-02 RX ADMIN — OXYCODONE HYDROCHLORIDE 15 MILLIGRAM(S): 5 TABLET ORAL at 14:19

## 2018-04-02 RX ADMIN — INSULIN HUMAN 1: 100 INJECTION, SOLUTION SUBCUTANEOUS at 18:15

## 2018-04-02 RX ADMIN — MAGNESIUM OXIDE 400 MG ORAL TABLET 400 MILLIGRAM(S): 241.3 TABLET ORAL at 17:18

## 2018-04-02 RX ADMIN — OXYCODONE HYDROCHLORIDE 15 MILLIGRAM(S): 5 TABLET ORAL at 06:12

## 2018-04-02 RX ADMIN — INSULIN HUMAN 1: 100 INJECTION, SOLUTION SUBCUTANEOUS at 05:28

## 2018-04-02 RX ADMIN — OXYCODONE HYDROCHLORIDE 15 MILLIGRAM(S): 5 TABLET ORAL at 05:26

## 2018-04-02 RX ADMIN — HEPARIN SODIUM 5000 UNIT(S): 5000 INJECTION INTRAVENOUS; SUBCUTANEOUS at 12:42

## 2018-04-02 RX ADMIN — Medication 5 MILLILITER(S): at 09:01

## 2018-04-02 RX ADMIN — Medication 0.5 MILLIGRAM(S): at 17:18

## 2018-04-02 NOTE — PROGRESS NOTE ADULT - SUBJECTIVE AND OBJECTIVE BOX
KAMAR BELLAMY  ----------------------------------------  The patient was seen and evaluated for respiratory failure.  The patient is in no acute distress.  Offers no complaints.    Vital Signs Last 24 Hrs  T(C): 36.9 (02 Apr 2018 08:08), Max: 37 (01 Apr 2018 23:41)  T(F): 98.5 (02 Apr 2018 08:08), Max: 98.6 (01 Apr 2018 23:41)  HR: 94 (02 Apr 2018 08:23) (87 - 102)  BP: 124/75 (02 Apr 2018 08:08) (124/75 - 137/83)  BP(mean): --  RR: 18 (02 Apr 2018 08:08) (18 - 19)  SpO2: 98% (02 Apr 2018 08:23) (98% - 99%)    CAPILLARY BLOOD GLUCOSE  POCT Blood Glucose.: 164 mg/dL (02 Apr 2018 05:28)  POCT Blood Glucose.: 193 mg/dL (01 Apr 2018 23:20)  POCT Blood Glucose.: 206 mg/dL (01 Apr 2018 17:42)  POCT Blood Glucose.: 181 mg/dL (01 Apr 2018 12:23)    PHYSICAL EXAMINATION:  ----------------------------------------  General appearance: NAD, Awake, Alert  HEENT: NCAT, Conjunctiva clear, EOMI  Neck: Supple, No JVD, No tenderness, Tracheostomy in place  Lungs: Clear to auscultation, Breath sound equal bilaterally, No wheezes, No rales  Cardiovascular: S1S2, Regular rhythm  Abdomen: Soft, Nontender, Nondistended, No guarding/rebound, Positive bowel sounds, Gastrostomy in place, Colostomy in place  Extremities: No clubbing, No cyanosis, No edema, No calf tenderness  Neuro: No tremors, Extremity weakness  Psychiatric: Appropriate mood, Normal affect    MEDICATIONS  (STANDING):  ascorbic acid 500 milliGRAM(s) Oral daily  aspirin  chewable 81 milliGRAM(s) Oral daily  baclofen 10 milliGRAM(s) Oral two times a day  chlorhexidine 0.12% Liquid 15 milliLiter(s) Swish and Spit two times a day  clopidogrel Tablet 75 milliGRAM(s) Oral daily  dextrose 5%. 1000 milliLiter(s) (50 mL/Hr) IV Continuous <Continuous>  dextrose 50% Injectable 12.5 Gram(s) IV Push once  dextrose 50% Injectable 25 Gram(s) IV Push once  dextrose 50% Injectable 25 Gram(s) IV Push once  heparin  Injectable 5000 Unit(s) SubCutaneous every 8 hours  insulin glargine Injectable (LANTUS) 20 Unit(s) SubCutaneous at bedtime  insulin regular  human corrective regimen sliding scale   SubCutaneous every 6 hours  lactobacillus acidophilus 1 Tablet(s) Oral every 12 hours  magnesium oxide 400 milliGRAM(s) Oral three times a day with meals  multivitamin   Solution 5 milliLiter(s) Oral daily  pantoprazole   Suspension 40 milliGRAM(s) Enteral Tube before breakfast  petrolatum Ophthalmic Ointment 1 Application(s) Both EYES at bedtime  vitamin A &amp; D Ointment 1 Application(s) Topical two times a day    MEDICATIONS  (PRN):  acetaminophen   Tablet 650 milliGRAM(s) Oral every 6 hours PRN For Temp greater than 38 C (100.4 F)  acetaminophen   Tablet. 650 milliGRAM(s) Oral every 6 hours PRN Moderate Pain (4 - 6) and headache  ALBUTerol    0.083% 2.5 milliGRAM(s) Nebulizer every 6 hours PRN Shortness of Breath and/or Wheezing  ALPRAZolam 0.5 milliGRAM(s) Oral three times a day PRN anxiety  artificial  tears Solution 1 Drop(s) Both EYES every 4 hours PRN Dry Eyes  glucagon  Injectable 1 milliGRAM(s) IntraMuscular once PRN Glucose <70 milliGRAM(s)/deciLiter  oxyCODONE    Solution 15 milliGRAM(s) Oral every 3 hours PRN mild to moderate pain      ASSESSMENT / PLAN:  ----------------------------------------  Acute on chronic respiratory failure - On ventilator support via tracheostomy.    Pneumonia - Completed the course of antibiotics. Afebrile.    Transverse myelitis - On gastrostomy tube feeds. Monitor colostomy output.    Decubitus ulcer - Wound care with colostomy and urine catheter to divert from the decubitus site. Analgesic medications as needed.    Diabetes - Insulin coverage, close monitoring of blood glucose levels.    Gastroesophageal reflux disease - On pantoprazole. KAMAR BELLAMY  ----------------------------------------  The patient was seen and evaluated for respiratory failure.  The patient is in no acute distress.  Offers no complaints.    Vital Signs Last 24 Hrs  T(C): 36.9 (02 Apr 2018 08:08), Max: 37 (01 Apr 2018 23:41)  T(F): 98.5 (02 Apr 2018 08:08), Max: 98.6 (01 Apr 2018 23:41)  HR: 94 (02 Apr 2018 08:23) (87 - 102)  BP: 124/75 (02 Apr 2018 08:08) (124/75 - 137/83)  BP(mean): --  RR: 18 (02 Apr 2018 08:08) (18 - 19)  SpO2: 98% (02 Apr 2018 08:23) (98% - 99%)    CAPILLARY BLOOD GLUCOSE  POCT Blood Glucose.: 164 mg/dL (02 Apr 2018 05:28)  POCT Blood Glucose.: 193 mg/dL (01 Apr 2018 23:20)  POCT Blood Glucose.: 206 mg/dL (01 Apr 2018 17:42)  POCT Blood Glucose.: 181 mg/dL (01 Apr 2018 12:23)    PHYSICAL EXAMINATION:  ----------------------------------------  General appearance: NAD, Awake, Alert  HEENT: NCAT, Conjunctiva clear, EOMI  Neck: Supple, No JVD, No tenderness, Tracheostomy in place  Lungs: Clear to auscultation, Breath sound equal bilaterally, No wheezes, No rales  Cardiovascular: S1S2, Regular rhythm  Abdomen: Soft, Nontender, Nondistended, No guarding/rebound, Positive bowel sounds, Gastrostomy in place, Colostomy in place  Extremities: No clubbing, No cyanosis, No edema, No calf tenderness  Neuro: No tremors, Extremity weakness  Psychiatric: Appropriate mood, Normal affect    MEDICATIONS  (STANDING):  ascorbic acid 500 milliGRAM(s) Oral daily  aspirin  chewable 81 milliGRAM(s) Oral daily  baclofen 10 milliGRAM(s) Oral two times a day  chlorhexidine 0.12% Liquid 15 milliLiter(s) Swish and Spit two times a day  clopidogrel Tablet 75 milliGRAM(s) Oral daily  dextrose 5%. 1000 milliLiter(s) (50 mL/Hr) IV Continuous <Continuous>  dextrose 50% Injectable 12.5 Gram(s) IV Push once  dextrose 50% Injectable 25 Gram(s) IV Push once  dextrose 50% Injectable 25 Gram(s) IV Push once  heparin  Injectable 5000 Unit(s) SubCutaneous every 8 hours  insulin glargine Injectable (LANTUS) 20 Unit(s) SubCutaneous at bedtime  insulin regular  human corrective regimen sliding scale   SubCutaneous every 6 hours  lactobacillus acidophilus 1 Tablet(s) Oral every 12 hours  magnesium oxide 400 milliGRAM(s) Oral three times a day with meals  multivitamin   Solution 5 milliLiter(s) Oral daily  pantoprazole   Suspension 40 milliGRAM(s) Enteral Tube before breakfast  petrolatum Ophthalmic Ointment 1 Application(s) Both EYES at bedtime  vitamin A &amp; D Ointment 1 Application(s) Topical two times a day    MEDICATIONS  (PRN):  acetaminophen   Tablet 650 milliGRAM(s) Oral every 6 hours PRN For Temp greater than 38 C (100.4 F)  acetaminophen   Tablet. 650 milliGRAM(s) Oral every 6 hours PRN Moderate Pain (4 - 6) and headache  ALBUTerol    0.083% 2.5 milliGRAM(s) Nebulizer every 6 hours PRN Shortness of Breath and/or Wheezing  ALPRAZolam 0.5 milliGRAM(s) Oral three times a day PRN anxiety  artificial  tears Solution 1 Drop(s) Both EYES every 4 hours PRN Dry Eyes  glucagon  Injectable 1 milliGRAM(s) IntraMuscular once PRN Glucose <70 milliGRAM(s)/deciLiter  oxyCODONE    Solution 15 milliGRAM(s) Oral every 3 hours PRN mild to moderate pain      ASSESSMENT / PLAN:  ----------------------------------------  Acute on chronic respiratory failure - On ventilator support via tracheostomy.    Pneumonia - Completed the course of antibiotics. Afebrile.    Transverse myelitis - On gastrostomy tube feeds. Monitor colostomy output.    Decubitus ulcer - Wound care with colostomy and urine catheter to divert from the decubitus site. Analgesic medications as needed.    Diabetes - Insulin coverage, close monitoring of blood glucose levels.    Gastroesophageal reflux disease - On pantoprazole.    Discuss with the patient wife on the telephone [phone]. Questions answered.

## 2018-04-03 LAB
GLUCOSE BLDC GLUCOMTR-MCNC: 157 MG/DL — HIGH (ref 70–99)
GLUCOSE BLDC GLUCOMTR-MCNC: 159 MG/DL — HIGH (ref 70–99)
GLUCOSE BLDC GLUCOMTR-MCNC: 167 MG/DL — HIGH (ref 70–99)
GLUCOSE BLDC GLUCOMTR-MCNC: 172 MG/DL — HIGH (ref 70–99)

## 2018-04-03 PROCEDURE — 99233 SBSQ HOSP IP/OBS HIGH 50: CPT

## 2018-04-03 PROCEDURE — 99232 SBSQ HOSP IP/OBS MODERATE 35: CPT

## 2018-04-03 RX ADMIN — PANTOPRAZOLE SODIUM 40 MILLIGRAM(S): 20 TABLET, DELAYED RELEASE ORAL at 05:42

## 2018-04-03 RX ADMIN — Medication 10 MILLIGRAM(S): at 05:33

## 2018-04-03 RX ADMIN — Medication 500 MILLIGRAM(S): at 10:52

## 2018-04-03 RX ADMIN — INSULIN HUMAN 1: 100 INJECTION, SOLUTION SUBCUTANEOUS at 17:21

## 2018-04-03 RX ADMIN — Medication 10 MILLIGRAM(S): at 17:11

## 2018-04-03 RX ADMIN — OXYCODONE HYDROCHLORIDE 15 MILLIGRAM(S): 5 TABLET ORAL at 11:13

## 2018-04-03 RX ADMIN — HEPARIN SODIUM 5000 UNIT(S): 5000 INJECTION INTRAVENOUS; SUBCUTANEOUS at 05:33

## 2018-04-03 RX ADMIN — MAGNESIUM OXIDE 400 MG ORAL TABLET 400 MILLIGRAM(S): 241.3 TABLET ORAL at 10:52

## 2018-04-03 RX ADMIN — Medication 1 TABLET(S): at 05:33

## 2018-04-03 RX ADMIN — HEPARIN SODIUM 5000 UNIT(S): 5000 INJECTION INTRAVENOUS; SUBCUTANEOUS at 23:19

## 2018-04-03 RX ADMIN — Medication 1 DROP(S): at 23:19

## 2018-04-03 RX ADMIN — Medication 1 APPLICATION(S): at 17:12

## 2018-04-03 RX ADMIN — OXYCODONE HYDROCHLORIDE 15 MILLIGRAM(S): 5 TABLET ORAL at 11:59

## 2018-04-03 RX ADMIN — Medication 1 TABLET(S): at 17:11

## 2018-04-03 RX ADMIN — INSULIN HUMAN 1: 100 INJECTION, SOLUTION SUBCUTANEOUS at 05:41

## 2018-04-03 RX ADMIN — OXYCODONE HYDROCHLORIDE 15 MILLIGRAM(S): 5 TABLET ORAL at 23:20

## 2018-04-03 RX ADMIN — INSULIN HUMAN 1: 100 INJECTION, SOLUTION SUBCUTANEOUS at 23:19

## 2018-04-03 RX ADMIN — Medication 81 MILLIGRAM(S): at 10:52

## 2018-04-03 RX ADMIN — Medication 0.5 MILLIGRAM(S): at 17:11

## 2018-04-03 RX ADMIN — Medication 5 MILLILITER(S): at 10:53

## 2018-04-03 RX ADMIN — Medication 1 APPLICATION(S): at 23:21

## 2018-04-03 RX ADMIN — MAGNESIUM OXIDE 400 MG ORAL TABLET 400 MILLIGRAM(S): 241.3 TABLET ORAL at 17:11

## 2018-04-03 RX ADMIN — INSULIN GLARGINE 20 UNIT(S): 100 INJECTION, SOLUTION SUBCUTANEOUS at 23:19

## 2018-04-03 RX ADMIN — CHLORHEXIDINE GLUCONATE 15 MILLILITER(S): 213 SOLUTION TOPICAL at 17:11

## 2018-04-03 RX ADMIN — Medication 1 APPLICATION(S): at 05:34

## 2018-04-03 RX ADMIN — HEPARIN SODIUM 5000 UNIT(S): 5000 INJECTION INTRAVENOUS; SUBCUTANEOUS at 11:22

## 2018-04-03 RX ADMIN — INSULIN HUMAN 1: 100 INJECTION, SOLUTION SUBCUTANEOUS at 11:22

## 2018-04-03 RX ADMIN — MAGNESIUM OXIDE 400 MG ORAL TABLET 400 MILLIGRAM(S): 241.3 TABLET ORAL at 08:28

## 2018-04-03 RX ADMIN — Medication 0.5 MILLIGRAM(S): at 05:33

## 2018-04-03 RX ADMIN — CLOPIDOGREL BISULFATE 75 MILLIGRAM(S): 75 TABLET, FILM COATED ORAL at 10:52

## 2018-04-03 RX ADMIN — CHLORHEXIDINE GLUCONATE 15 MILLILITER(S): 213 SOLUTION TOPICAL at 05:33

## 2018-04-03 NOTE — PROGRESS NOTE ADULT - ASSESSMENT
Assess    Transverse Myelitis  S/P pneumonia  Vent Dependent respiratory failure    Plan  To [facility] soon?  Vent care  If stays could try further CPAP/PSV with goal of getting back to TC trials  Support

## 2018-04-03 NOTE — PROGRESS NOTE ADULT - SUBJECTIVE AND OBJECTIVE BOX
KAMAR BELLAMY  ----------------------------------------  The patient was seen and evaluated for respiratory failure.  The patient is in no acute distress.  Appears comfortable.    Vital Signs Last 24 Hrs  T(C): 37.5 (03 Apr 2018 07:21), Max: 37.5 (03 Apr 2018 07:21)  T(F): 99.5 (03 Apr 2018 07:21), Max: 99.5 (03 Apr 2018 07:21)  HR: 87 (03 Apr 2018 09:22) (80 - 96)  BP: 131/82 (03 Apr 2018 07:21) (121/80 - 137/79)  BP(mean): --  RR: 18 (03 Apr 2018 07:21) (18 - 18)  SpO2: 100% (03 Apr 2018 09:22) (98% - 100%)    CAPILLARY BLOOD GLUCOSE  POCT Blood Glucose.: 172 mg/dL (03 Apr 2018 05:41)  POCT Blood Glucose.: 199 mg/dL (02 Apr 2018 22:52)  POCT Blood Glucose.: 154 mg/dL (02 Apr 2018 18:05)  POCT Blood Glucose.: 174 mg/dL (02 Apr 2018 12:36)    PHYSICAL EXAMINATION:  ----------------------------------------  General appearance: NAD, Sleeping  HEENT: NCAT, Conjunctiva clear  Neck: Supple, No JVD, No tenderness, Tracheostomy in place  Lungs: Clear to auscultation, Breath sound equal bilaterally, No wheezes, No rales  Cardiovascular: S1S2, Regular rhythm  Abdomen: Soft, Nontender, Nondistended, No guarding/rebound, Positive bowel sounds, Gastrostomy & colostomy  Extremities: No clubbing, No cyanosis, No edema, No calf tenderness  Neuro: No tremors, Extremity weakness    MEDICATIONS  (STANDING):  ascorbic acid 500 milliGRAM(s) Oral daily  aspirin  chewable 81 milliGRAM(s) Oral daily  baclofen 10 milliGRAM(s) Oral two times a day  chlorhexidine 0.12% Liquid 15 milliLiter(s) Swish and Spit two times a day  clopidogrel Tablet 75 milliGRAM(s) Oral daily  dextrose 5%. 1000 milliLiter(s) (50 mL/Hr) IV Continuous <Continuous>  dextrose 50% Injectable 12.5 Gram(s) IV Push once  dextrose 50% Injectable 25 Gram(s) IV Push once  dextrose 50% Injectable 25 Gram(s) IV Push once  heparin  Injectable 5000 Unit(s) SubCutaneous every 8 hours  insulin glargine Injectable (LANTUS) 20 Unit(s) SubCutaneous at bedtime  insulin regular  human corrective regimen sliding scale   SubCutaneous every 6 hours  lactobacillus acidophilus 1 Tablet(s) Oral every 12 hours  magnesium oxide 400 milliGRAM(s) Oral three times a day with meals  multivitamin   Solution 5 milliLiter(s) Oral daily  pantoprazole   Suspension 40 milliGRAM(s) Enteral Tube before breakfast  petrolatum Ophthalmic Ointment 1 Application(s) Both EYES at bedtime  vitamin A &amp; D Ointment 1 Application(s) Topical two times a day    MEDICATIONS  (PRN):  acetaminophen   Tablet 650 milliGRAM(s) Oral every 6 hours PRN For Temp greater than 38 C (100.4 F)  acetaminophen   Tablet. 650 milliGRAM(s) Oral every 6 hours PRN Moderate Pain (4 - 6) and headache  ALBUTerol    0.083% 2.5 milliGRAM(s) Nebulizer every 6 hours PRN Shortness of Breath and/or Wheezing  ALPRAZolam 0.5 milliGRAM(s) Oral three times a day PRN anxiety  artificial  tears Solution 1 Drop(s) Both EYES every 4 hours PRN Dry Eyes  glucagon  Injectable 1 milliGRAM(s) IntraMuscular once PRN Glucose <70 milliGRAM(s)/deciLiter  oxyCODONE    Solution 15 milliGRAM(s) Oral every 3 hours PRN mild to moderate pain      ASSESSMENT / PLAN:  ----------------------------------------  Acute on chronic respiratory failure - On ventilator support via tracheostomy. For weaning trials if tolerated.    Pneumonia - Completed the course of antibiotics. Afebrile today.    Transverse myelitis - On gastrostomy tube feeds. Colostomy functioning.    Decubitus ulcer - Wound care. Colostomy and urine catheter to divert from the decubitus site. Analgesic medications as needed.    Diabetes - Insulin coverage, close monitoring of blood glucose levels.    Gastroesophageal reflux disease - On pantoprazole.

## 2018-04-03 NOTE — CHART NOTE - NSCHARTNOTEFT_GEN_A_CORE
Source: Patient [ ]  Family [ ]   other [X]: EMR    Current Diet:   Diet, NPO:   Glucerna 1.5 Beau    Tube Feeding Modality: Gastrostomy  Total Volume for 24 Hours (mL): 1440  Continuous  Starting Tube Feed Rate {mL per Hour}: 60  Until Goal Tube Feed Rate (mL per Hour): 60  Tube Feed Duration (in Hours): 24  Tube Feed Start Time: 10:00 (03-18-18 @ 09:14)    Current Weight:   164.9 (03-20-18)  174.6 (03-19-18)  173.2 (03-18-18)    % Weight Change     Pertinent Medications: MEDICATIONS  (STANDING):  ascorbic acid 500 milliGRAM(s) Oral daily  aspirin  chewable 81 milliGRAM(s) Oral daily  baclofen 10 milliGRAM(s) Oral two times a day  chlorhexidine 0.12% Liquid 15 milliLiter(s) Swish and Spit two times a day  clopidogrel Tablet 75 milliGRAM(s) Oral daily  dextrose 5%. 1000 milliLiter(s) (50 mL/Hr) IV Continuous <Continuous>  dextrose 50% Injectable 12.5 Gram(s) IV Push once  dextrose 50% Injectable 25 Gram(s) IV Push once  dextrose 50% Injectable 25 Gram(s) IV Push once  heparin  Injectable 5000 Unit(s) SubCutaneous every 8 hours  insulin glargine Injectable (LANTUS) 20 Unit(s) SubCutaneous at bedtime  insulin regular  human corrective regimen sliding scale   SubCutaneous every 6 hours  lactobacillus acidophilus 1 Tablet(s) Oral every 12 hours  magnesium oxide 400 milliGRAM(s) Oral three times a day with meals  multivitamin   Solution 5 milliLiter(s) Oral daily  pantoprazole   Suspension 40 milliGRAM(s) Enteral Tube before breakfast  petrolatum Ophthalmic Ointment 1 Application(s) Both EYES at bedtime  vitamin A &amp; D Ointment 1 Application(s) Topical two times a day    MEDICATIONS  (PRN):  acetaminophen   Tablet 650 milliGRAM(s) Oral every 6 hours PRN For Temp greater than 38 C (100.4 F)  acetaminophen   Tablet. 650 milliGRAM(s) Oral every 6 hours PRN Moderate Pain (4 - 6) and headache  ALBUTerol    0.083% 2.5 milliGRAM(s) Nebulizer every 6 hours PRN Shortness of Breath and/or Wheezing  ALPRAZolam 0.5 milliGRAM(s) Oral three times a day PRN anxiety  artificial  tears Solution 1 Drop(s) Both EYES every 4 hours PRN Dry Eyes  glucagon  Injectable 1 milliGRAM(s) IntraMuscular once PRN Glucose <70 milliGRAM(s)/deciLiter  oxyCODONE    Solution 15 milliGRAM(s) Oral every 3 hours PRN mild to moderate pain    Pertinent Labs:   H/H: 10.4/34.5    Skin: Stage IV Sacral Pressure Ulcer, Stage II Right Heel Ulcer    Nutrition focused physical exam conducted - found signs of malnutrition [X]absent [ ]present    Subcutaneous fat loss: [ ] Orbital fat pads region, [ ]Buccal fat region, [ ]Triceps region,  [ ]Ribs region    Muscle wasting: [ ]Temples region, [ ]Clavicle region, [ ]Shoulder region, [ ]Scapula region, [ ]Interosseous region,  [ ]thigh region, [ ]Calf region    Estimated Needs:   [X] no change since previous assessment  [ ] recalculated:     Current Nutrition Diagnosis: Pt presents at risk secondary to increased protein calorie needs, related to increased  physiologic demand of healing, as evidenced by stage IV sacral pressure ulcer and stage II right heel ulcer.    Recommendations:   1) Increase Glucerna to 70 ml/ hr (2100 kcal, 90 g pro), pending order placed    Monitoring and Evaluation:   [ ] PO intake [X] Tolerance to diet prescription [X] Weights  [X] Follow up per protocol [X] Labs:

## 2018-04-03 NOTE — PROGRESS NOTE ADULT - SUBJECTIVE AND OBJECTIVE BOX
PULMONARY PROGRESS NOTE      [id number]    Patient is a 62y old  Male who presents with a chief complaint of fevers (12 Mar 2018 18:28)  Transverse myelitis  Trach and PEG  Completed ABx for pneumonia      INTERVAL HPI/OVERNIGHT EVENTS:    NAD  Not tolerating weaning apart from occasional CPAP/PSV trials  From [facility]    MEDICATIONS  (STANDING):  ascorbic acid 500 milliGRAM(s) Oral daily  aspirin  chewable 81 milliGRAM(s) Oral daily  baclofen 10 milliGRAM(s) Oral two times a day  chlorhexidine 0.12% Liquid 15 milliLiter(s) Swish and Spit two times a day  clopidogrel Tablet 75 milliGRAM(s) Oral daily  dextrose 5%. 1000 milliLiter(s) (50 mL/Hr) IV Continuous <Continuous>  dextrose 50% Injectable 12.5 Gram(s) IV Push once  dextrose 50% Injectable 25 Gram(s) IV Push once  dextrose 50% Injectable 25 Gram(s) IV Push once  heparin  Injectable 5000 Unit(s) SubCutaneous every 8 hours  insulin glargine Injectable (LANTUS) 20 Unit(s) SubCutaneous at bedtime  insulin regular  human corrective regimen sliding scale   SubCutaneous every 6 hours  lactobacillus acidophilus 1 Tablet(s) Oral every 12 hours  magnesium oxide 400 milliGRAM(s) Oral three times a day with meals  multivitamin   Solution 5 milliLiter(s) Oral daily  pantoprazole   Suspension 40 milliGRAM(s) Enteral Tube before breakfast  petrolatum Ophthalmic Ointment 1 Application(s) Both EYES at bedtime  vitamin A &amp; D Ointment 1 Application(s) Topical two times a day      MEDICATIONS  (PRN):  acetaminophen   Tablet 650 milliGRAM(s) Oral every 6 hours PRN For Temp greater than 38 C (100.4 F)  acetaminophen   Tablet. 650 milliGRAM(s) Oral every 6 hours PRN Moderate Pain (4 - 6) and headache  ALBUTerol    0.083% 2.5 milliGRAM(s) Nebulizer every 6 hours PRN Shortness of Breath and/or Wheezing  ALPRAZolam 0.5 milliGRAM(s) Oral three times a day PRN anxiety  artificial  tears Solution 1 Drop(s) Both EYES every 4 hours PRN Dry Eyes  glucagon  Injectable 1 milliGRAM(s) IntraMuscular once PRN Glucose <70 milliGRAM(s)/deciLiter  oxyCODONE    Solution 15 milliGRAM(s) Oral every 3 hours PRN mild to moderate pain      Allergies    No Known Allergies    Intolerances        PAST MEDICAL & SURGICAL HISTORY:  Essential hypertension  Paralysis  Transverse myelitis  History of tracheostomy      SOCIAL HISTORY  Smoking History:       REVIEW OF SYSTEMS:    CONSTITUTIONAL:  No distress    HEENT:  Eyes:  No diplopia or blurred vision. ENT:  No earache, sore throat or runny nose.    CARDIOVASCULAR:  No pressure, squeezing, tightness, heaviness or aching about the chest; no palpitations.    RESPIRATORY:  No cough, shortness of breath, PND or orthopnea. Mild SOBOE    GASTROINTESTINAL:  No nausea, vomiting or diarrhea.    GENITOURINARY:  No dysuria, frequency or urgency.    NEUROLOGIC:  No paresthesias, fasciculations, seizures or weakness.    PSYCHIATRIC:  No disorder of thought or mood.    Vital Signs Last 24 Hrs  T(C): 37.5 (03 Apr 2018 07:21), Max: 37.5 (03 Apr 2018 07:21)  T(F): 99.5 (03 Apr 2018 07:21), Max: 99.5 (03 Apr 2018 07:21)  HR: 87 (03 Apr 2018 09:22) (80 - 96)  BP: 131/82 (03 Apr 2018 07:21) (121/80 - 137/79)  BP(mean): --  RR: 18 (03 Apr 2018 07:21) (18 - 18)  SpO2: 100% (03 Apr 2018 09:22) (98% - 100%)    PHYSICAL EXAMINATION:    GENERAL: The patient is awake and alert in no apparent distress.     HEENT: Head is normocephalic and atraumatic. Extraocular muscles are intact. Mucous membranes are moist.    NECK: Supple.    LUNGS: Clear to auscultation without wheezing, rales or rhonchi; respirations unlabored    HEART: Regular rate and rhythm without murmur.    ABDOMEN: Soft, nontender, and nondistended.      EXTREMITIES: Without any cyanosis, clubbing, rash, lesions or edema.    NEUROLOGIC: Grossly intact.    LABS:    No recent labs    MICROBIOLOGY: Currently unrevealing    RADIOLOGY & ADDITIONAL STUDIES:         EXAM:  XR CHEST PORTABLE ROUTINE 1V                          PROCEDURE DATE:  03/28/2018      INTERPRETATION:  Chest, single portable view    HISTORY: Dyspnea    Comparison: 3/23    IMPRESSION:    Support Devices:  Unchanged  Heart:  Unremarkable  Mediastinum:  Unremarkable  Lungs/Airways: Decreased right pleural effusion.  Bones/Soft tissues: Unremarkable    SASKIA NORIEGA M.D., ATTENDING RADIOLOGIST  This document has been electronically signed. Mar 28 2018 11:33AM

## 2018-04-04 LAB
ANION GAP SERPL CALC-SCNC: 10 MMOL/L — SIGNIFICANT CHANGE UP (ref 5–17)
BUN SERPL-MCNC: 24 MG/DL — HIGH (ref 8–20)
CALCIUM SERPL-MCNC: 9.1 MG/DL — SIGNIFICANT CHANGE UP (ref 8.6–10.2)
CHLORIDE SERPL-SCNC: 94 MMOL/L — LOW (ref 98–107)
CO2 SERPL-SCNC: 28 MMOL/L — SIGNIFICANT CHANGE UP (ref 22–29)
CREAT SERPL-MCNC: 0.61 MG/DL — SIGNIFICANT CHANGE UP (ref 0.5–1.3)
GLUCOSE BLDC GLUCOMTR-MCNC: 134 MG/DL — HIGH (ref 70–99)
GLUCOSE BLDC GLUCOMTR-MCNC: 94 MG/DL — SIGNIFICANT CHANGE UP (ref 70–99)
GLUCOSE SERPL-MCNC: 99 MG/DL — SIGNIFICANT CHANGE UP (ref 70–115)
HCT VFR BLD CALC: 32.4 % — LOW (ref 42–52)
HGB BLD-MCNC: 10.1 G/DL — LOW (ref 14–18)
MCHC RBC-ENTMCNC: 27.9 PG — SIGNIFICANT CHANGE UP (ref 27–31)
MCHC RBC-ENTMCNC: 31.2 G/DL — LOW (ref 32–36)
MCV RBC AUTO: 89.5 FL — SIGNIFICANT CHANGE UP (ref 80–94)
PLATELET # BLD AUTO: 434 K/UL — HIGH (ref 150–400)
POTASSIUM SERPL-MCNC: 4.1 MMOL/L — SIGNIFICANT CHANGE UP (ref 3.5–5.3)
POTASSIUM SERPL-SCNC: 4.1 MMOL/L — SIGNIFICANT CHANGE UP (ref 3.5–5.3)
RBC # BLD: 3.62 M/UL — LOW (ref 4.6–6.2)
RBC # FLD: 16.7 % — HIGH (ref 11–15.6)
SODIUM SERPL-SCNC: 132 MMOL/L — LOW (ref 135–145)
WBC # BLD: 11.9 K/UL — HIGH (ref 4.8–10.8)
WBC # FLD AUTO: 11.9 K/UL — HIGH (ref 4.8–10.8)

## 2018-04-04 PROCEDURE — 99233 SBSQ HOSP IP/OBS HIGH 50: CPT

## 2018-04-04 RX ADMIN — OXYCODONE HYDROCHLORIDE 15 MILLIGRAM(S): 5 TABLET ORAL at 23:45

## 2018-04-04 RX ADMIN — HEPARIN SODIUM 5000 UNIT(S): 5000 INJECTION INTRAVENOUS; SUBCUTANEOUS at 05:09

## 2018-04-04 RX ADMIN — Medication 1 TABLET(S): at 16:51

## 2018-04-04 RX ADMIN — INSULIN GLARGINE 20 UNIT(S): 100 INJECTION, SOLUTION SUBCUTANEOUS at 23:48

## 2018-04-04 RX ADMIN — Medication 0.5 MILLIGRAM(S): at 08:26

## 2018-04-04 RX ADMIN — CHLORHEXIDINE GLUCONATE 15 MILLILITER(S): 213 SOLUTION TOPICAL at 05:08

## 2018-04-04 RX ADMIN — MAGNESIUM OXIDE 400 MG ORAL TABLET 400 MILLIGRAM(S): 241.3 TABLET ORAL at 11:10

## 2018-04-04 RX ADMIN — Medication 1 TABLET(S): at 05:08

## 2018-04-04 RX ADMIN — OXYCODONE HYDROCHLORIDE 15 MILLIGRAM(S): 5 TABLET ORAL at 08:26

## 2018-04-04 RX ADMIN — Medication 500 MILLIGRAM(S): at 11:09

## 2018-04-04 RX ADMIN — OXYCODONE HYDROCHLORIDE 15 MILLIGRAM(S): 5 TABLET ORAL at 00:15

## 2018-04-04 RX ADMIN — CLOPIDOGREL BISULFATE 75 MILLIGRAM(S): 75 TABLET, FILM COATED ORAL at 11:10

## 2018-04-04 RX ADMIN — OXYCODONE HYDROCHLORIDE 15 MILLIGRAM(S): 5 TABLET ORAL at 16:51

## 2018-04-04 RX ADMIN — Medication 1 APPLICATION(S): at 05:09

## 2018-04-04 RX ADMIN — Medication 1 APPLICATION(S): at 23:44

## 2018-04-04 RX ADMIN — OXYCODONE HYDROCHLORIDE 15 MILLIGRAM(S): 5 TABLET ORAL at 03:52

## 2018-04-04 RX ADMIN — Medication 81 MILLIGRAM(S): at 11:09

## 2018-04-04 RX ADMIN — HEPARIN SODIUM 5000 UNIT(S): 5000 INJECTION INTRAVENOUS; SUBCUTANEOUS at 14:16

## 2018-04-04 RX ADMIN — Medication 5 MILLILITER(S): at 11:10

## 2018-04-04 RX ADMIN — CHLORHEXIDINE GLUCONATE 15 MILLILITER(S): 213 SOLUTION TOPICAL at 16:51

## 2018-04-04 RX ADMIN — OXYCODONE HYDROCHLORIDE 15 MILLIGRAM(S): 5 TABLET ORAL at 04:45

## 2018-04-04 RX ADMIN — Medication 0.5 MILLIGRAM(S): at 23:45

## 2018-04-04 RX ADMIN — MAGNESIUM OXIDE 400 MG ORAL TABLET 400 MILLIGRAM(S): 241.3 TABLET ORAL at 16:51

## 2018-04-04 RX ADMIN — PANTOPRAZOLE SODIUM 40 MILLIGRAM(S): 20 TABLET, DELAYED RELEASE ORAL at 05:08

## 2018-04-04 RX ADMIN — OXYCODONE HYDROCHLORIDE 15 MILLIGRAM(S): 5 TABLET ORAL at 11:11

## 2018-04-04 RX ADMIN — MAGNESIUM OXIDE 400 MG ORAL TABLET 400 MILLIGRAM(S): 241.3 TABLET ORAL at 11:00

## 2018-04-04 RX ADMIN — Medication 10 MILLIGRAM(S): at 05:08

## 2018-04-04 RX ADMIN — Medication 10 MILLIGRAM(S): at 16:52

## 2018-04-04 RX ADMIN — Medication 1 APPLICATION(S): at 16:51

## 2018-04-04 RX ADMIN — Medication 650 MILLIGRAM(S): at 00:18

## 2018-04-04 RX ADMIN — HEPARIN SODIUM 5000 UNIT(S): 5000 INJECTION INTRAVENOUS; SUBCUTANEOUS at 23:44

## 2018-04-04 NOTE — PROGRESS NOTE ADULT - SUBJECTIVE AND OBJECTIVE BOX
KAMAR BELLAMY  ----------------------------------------  The patient was seen and evaluated for respiratory failure.  The patient is in no acute distress.  Denied any chest pain, palpitations, dyspnea, or abdominal pain.    Vital Signs Last 24 Hrs  T(C): 37.2 (04 Apr 2018 08:00), Max: 38.2 (04 Apr 2018 00:18)  T(F): 98.9 (04 Apr 2018 08:00), Max: 100.7 (04 Apr 2018 00:18)  HR: 91 (04 Apr 2018 08:00) (77 - 98)  BP: 128/87 (04 Apr 2018 08:00) (115/79 - 135/76)  BP(mean): --  RR: 18 (04 Apr 2018 08:00) (14 - 18)  SpO2: 100% (04 Apr 2018 08:00) (98% - 100%)    CAPILLARY BLOOD GLUCOSE  POCT Blood Glucose.: 94 mg/dL (04 Apr 2018 05:54)  POCT Blood Glucose.: 167 mg/dL (03 Apr 2018 23:18)  POCT Blood Glucose.: 159 mg/dL (03 Apr 2018 17:21)  POCT Blood Glucose.: 157 mg/dL (03 Apr 2018 11:21)    PHYSICAL EXAMINATION:  ----------------------------------------  General appearance: NAD, Sleeping  HEENT: NCAT, Conjunctiva clear  Neck: Supple, No JVD, No tenderness, Tracheostomy in place  Lungs: Clear to auscultation, Breath sound equal bilaterally, No wheezes, No rales  Cardiovascular: S1S2, Regular rhythm  Abdomen: Soft, Nontender, Nondistended, No guarding/rebound, Positive bowel sounds, Gastrostomy & colostomy  Extremities: No clubbing, No cyanosis, No edema, No calf tenderness  Neuro: No tremors, Extremity weakness    MEDICATIONS  (STANDING):  ascorbic acid 500 milliGRAM(s) Oral daily  aspirin  chewable 81 milliGRAM(s) Oral daily  baclofen 10 milliGRAM(s) Oral two times a day  chlorhexidine 0.12% Liquid 15 milliLiter(s) Swish and Spit two times a day  clopidogrel Tablet 75 milliGRAM(s) Oral daily  dextrose 5%. 1000 milliLiter(s) (50 mL/Hr) IV Continuous <Continuous>  dextrose 50% Injectable 12.5 Gram(s) IV Push once  dextrose 50% Injectable 25 Gram(s) IV Push once  dextrose 50% Injectable 25 Gram(s) IV Push once  heparin  Injectable 5000 Unit(s) SubCutaneous every 8 hours  insulin glargine Injectable (LANTUS) 20 Unit(s) SubCutaneous at bedtime  insulin regular  human corrective regimen sliding scale   SubCutaneous every 6 hours  lactobacillus acidophilus 1 Tablet(s) Oral every 12 hours  magnesium oxide 400 milliGRAM(s) Oral three times a day with meals  multivitamin   Solution 5 milliLiter(s) Oral daily  pantoprazole   Suspension 40 milliGRAM(s) Enteral Tube before breakfast  petrolatum Ophthalmic Ointment 1 Application(s) Both EYES at bedtime  vitamin A &amp; D Ointment 1 Application(s) Topical two times a day    MEDICATIONS  (PRN):  acetaminophen   Tablet 650 milliGRAM(s) Oral every 6 hours PRN For Temp greater than 38 C (100.4 F)  acetaminophen   Tablet. 650 milliGRAM(s) Oral every 6 hours PRN Moderate Pain (4 - 6) and headache  ALBUTerol    0.083% 2.5 milliGRAM(s) Nebulizer every 6 hours PRN Shortness of Breath and/or Wheezing  ALPRAZolam 0.5 milliGRAM(s) Oral three times a day PRN anxiety  artificial  tears Solution 1 Drop(s) Both EYES every 4 hours PRN Dry Eyes  glucagon  Injectable 1 milliGRAM(s) IntraMuscular once PRN Glucose <70 milliGRAM(s)/deciLiter  oxyCODONE    Solution 15 milliGRAM(s) Oral every 3 hours PRN mild to moderate pain      ASSESSMENT / PLAN:  ----------------------------------------  Acute on chronic respiratory failure - On ventilator support via tracheostomy.    Pneumonia - Completed the course of antibiotics. Monitoring clinically.    Transverse myelitis - On gastrostomy tube feeds. Colostomy functioning.    Decubitus ulcer - Wound care. Colostomy and urine catheter to divert from the decubitus site. Analgesic medications as needed.    Diabetes - Insulin coverage, close monitoring of blood glucose levels.    Gastroesophageal reflux disease - On pantoprazole.

## 2018-04-05 LAB
GLUCOSE BLDC GLUCOMTR-MCNC: 111 MG/DL — HIGH (ref 70–99)
GLUCOSE BLDC GLUCOMTR-MCNC: 121 MG/DL — HIGH (ref 70–99)
GLUCOSE BLDC GLUCOMTR-MCNC: 97 MG/DL — SIGNIFICANT CHANGE UP (ref 70–99)

## 2018-04-05 PROCEDURE — 99233 SBSQ HOSP IP/OBS HIGH 50: CPT

## 2018-04-05 PROCEDURE — 99232 SBSQ HOSP IP/OBS MODERATE 35: CPT

## 2018-04-05 RX ADMIN — OXYCODONE HYDROCHLORIDE 15 MILLIGRAM(S): 5 TABLET ORAL at 17:34

## 2018-04-05 RX ADMIN — Medication 10 MILLIGRAM(S): at 17:35

## 2018-04-05 RX ADMIN — HEPARIN SODIUM 5000 UNIT(S): 5000 INJECTION INTRAVENOUS; SUBCUTANEOUS at 15:15

## 2018-04-05 RX ADMIN — HEPARIN SODIUM 5000 UNIT(S): 5000 INJECTION INTRAVENOUS; SUBCUTANEOUS at 05:02

## 2018-04-05 RX ADMIN — MAGNESIUM OXIDE 400 MG ORAL TABLET 400 MILLIGRAM(S): 241.3 TABLET ORAL at 17:35

## 2018-04-05 RX ADMIN — HEPARIN SODIUM 5000 UNIT(S): 5000 INJECTION INTRAVENOUS; SUBCUTANEOUS at 23:31

## 2018-04-05 RX ADMIN — OXYCODONE HYDROCHLORIDE 15 MILLIGRAM(S): 5 TABLET ORAL at 06:02

## 2018-04-05 RX ADMIN — OXYCODONE HYDROCHLORIDE 15 MILLIGRAM(S): 5 TABLET ORAL at 09:45

## 2018-04-05 RX ADMIN — OXYCODONE HYDROCHLORIDE 15 MILLIGRAM(S): 5 TABLET ORAL at 14:58

## 2018-04-05 RX ADMIN — Medication 1 TABLET(S): at 17:35

## 2018-04-05 RX ADMIN — Medication 1 APPLICATION(S): at 23:30

## 2018-04-05 RX ADMIN — OXYCODONE HYDROCHLORIDE 15 MILLIGRAM(S): 5 TABLET ORAL at 05:02

## 2018-04-05 RX ADMIN — Medication 1 APPLICATION(S): at 05:02

## 2018-04-05 RX ADMIN — MAGNESIUM OXIDE 400 MG ORAL TABLET 400 MILLIGRAM(S): 241.3 TABLET ORAL at 14:57

## 2018-04-05 RX ADMIN — OXYCODONE HYDROCHLORIDE 15 MILLIGRAM(S): 5 TABLET ORAL at 00:40

## 2018-04-05 RX ADMIN — Medication 81 MILLIGRAM(S): at 09:44

## 2018-04-05 RX ADMIN — Medication 500 MILLIGRAM(S): at 09:44

## 2018-04-05 RX ADMIN — CHLORHEXIDINE GLUCONATE 15 MILLILITER(S): 213 SOLUTION TOPICAL at 05:02

## 2018-04-05 RX ADMIN — INSULIN GLARGINE 20 UNIT(S): 100 INJECTION, SOLUTION SUBCUTANEOUS at 23:30

## 2018-04-05 RX ADMIN — OXYCODONE HYDROCHLORIDE 15 MILLIGRAM(S): 5 TABLET ORAL at 18:34

## 2018-04-05 RX ADMIN — Medication 0.5 MILLIGRAM(S): at 09:45

## 2018-04-05 RX ADMIN — Medication 0.5 MILLIGRAM(S): at 17:35

## 2018-04-05 RX ADMIN — Medication 1 TABLET(S): at 05:02

## 2018-04-05 RX ADMIN — Medication 0.5 MILLIGRAM(S): at 23:30

## 2018-04-05 RX ADMIN — OXYCODONE HYDROCHLORIDE 15 MILLIGRAM(S): 5 TABLET ORAL at 07:35

## 2018-04-05 RX ADMIN — MAGNESIUM OXIDE 400 MG ORAL TABLET 400 MILLIGRAM(S): 241.3 TABLET ORAL at 09:44

## 2018-04-05 RX ADMIN — Medication 1 APPLICATION(S): at 17:35

## 2018-04-05 RX ADMIN — OXYCODONE HYDROCHLORIDE 15 MILLIGRAM(S): 5 TABLET ORAL at 23:30

## 2018-04-05 RX ADMIN — Medication 10 MILLIGRAM(S): at 05:01

## 2018-04-05 RX ADMIN — Medication 5 MILLILITER(S): at 09:45

## 2018-04-05 RX ADMIN — PANTOPRAZOLE SODIUM 40 MILLIGRAM(S): 20 TABLET, DELAYED RELEASE ORAL at 05:01

## 2018-04-05 RX ADMIN — CHLORHEXIDINE GLUCONATE 15 MILLILITER(S): 213 SOLUTION TOPICAL at 17:35

## 2018-04-05 RX ADMIN — CLOPIDOGREL BISULFATE 75 MILLIGRAM(S): 75 TABLET, FILM COATED ORAL at 09:44

## 2018-04-05 RX ADMIN — OXYCODONE HYDROCHLORIDE 15 MILLIGRAM(S): 5 TABLET ORAL at 15:15

## 2018-04-05 NOTE — PROGRESS NOTE ADULT - SUBJECTIVE AND OBJECTIVE BOX
KAMAR BELLAMY  ----------------------------------------  The patient was seen and evaluated for respiratory failure.  The patient is in no acute distress.  On ventilator support.    Vital Signs Last 24 Hrs  T(C): 36.9 (05 Apr 2018 07:49), Max: 37.1 (05 Apr 2018 00:10)  T(F): 98.5 (05 Apr 2018 07:49), Max: 98.8 (05 Apr 2018 00:10)  HR: 85 (05 Apr 2018 07:49) (73 - 90)  BP: 120/78 (05 Apr 2018 07:49) (115/78 - 120/78)  BP(mean): --  RR: 14 (05 Apr 2018 07:49) (14 - 14)  SpO2: 100% (05 Apr 2018 07:49) (97% - 100%)    CAPILLARY BLOOD GLUCOSE  POCT Blood Glucose.: 97 mg/dL (05 Apr 2018 05:07)  POCT Blood Glucose.: 134 mg/dL (04 Apr 2018 23:47)    PHYSICAL EXAMINATION:  ----------------------------------------  General appearance: NAD, Sleeping  HEENT: NCAT, Conjunctiva clear  Neck: Supple, No JVD, No tenderness, Tracheostomy in place  Lungs: Clear to auscultation, Breath sound equal bilaterally, No wheezes, No rales  Cardiovascular: S1S2, Regular rhythm  Abdomen: Soft, Nontender, Nondistended, No guarding/rebound, Positive bowel sounds, Gastrostomy & colostomy  Extremities: No clubbing, No cyanosis, No edema, No calf tenderness  Neuro: No tremors, Extremity weakness most profound in the lower extremities    LABORATORY STUDIES:  ----------------------------------------             10.1   11.9  )-----------( 434      ( 04 Apr 2018 11:05 )             32.4     04-04    132<L>  |  94<L>  |  24.0<H>  ----------------------------<  99  4.1   |  28.0  |  0.61    Ca    9.1      04 Apr 2018 11:05    MEDICATIONS  (STANDING):  ascorbic acid 500 milliGRAM(s) Oral daily  aspirin  chewable 81 milliGRAM(s) Oral daily  baclofen 10 milliGRAM(s) Oral two times a day  chlorhexidine 0.12% Liquid 15 milliLiter(s) Swish and Spit two times a day  clopidogrel Tablet 75 milliGRAM(s) Oral daily  dextrose 5%. 1000 milliLiter(s) (50 mL/Hr) IV Continuous <Continuous>  dextrose 50% Injectable 12.5 Gram(s) IV Push once  dextrose 50% Injectable 25 Gram(s) IV Push once  dextrose 50% Injectable 25 Gram(s) IV Push once  heparin  Injectable 5000 Unit(s) SubCutaneous every 8 hours  insulin glargine Injectable (LANTUS) 20 Unit(s) SubCutaneous at bedtime  insulin regular  human corrective regimen sliding scale   SubCutaneous every 6 hours  lactobacillus acidophilus 1 Tablet(s) Oral every 12 hours  magnesium oxide 400 milliGRAM(s) Oral three times a day with meals  multivitamin   Solution 5 milliLiter(s) Oral daily  pantoprazole   Suspension 40 milliGRAM(s) Enteral Tube before breakfast  petrolatum Ophthalmic Ointment 1 Application(s) Both EYES at bedtime  vitamin A &amp; D Ointment 1 Application(s) Topical two times a day    MEDICATIONS  (PRN):  acetaminophen   Tablet 650 milliGRAM(s) Oral every 6 hours PRN For Temp greater than 38 C (100.4 F)  acetaminophen   Tablet. 650 milliGRAM(s) Oral every 6 hours PRN Moderate Pain (4 - 6) and headache  ALBUTerol    0.083% 2.5 milliGRAM(s) Nebulizer every 6 hours PRN Shortness of Breath and/or Wheezing  ALPRAZolam 0.5 milliGRAM(s) Oral three times a day PRN anxiety  artificial  tears Solution 1 Drop(s) Both EYES every 4 hours PRN Dry Eyes  glucagon  Injectable 1 milliGRAM(s) IntraMuscular once PRN Glucose <70 milliGRAM(s)/deciLiter  oxyCODONE    Solution 15 milliGRAM(s) Oral every 3 hours PRN mild to moderate pain      ASSESSMENT / PLAN:  ----------------------------------------  Acute on chronic respiratory failure - On ventilator support via tracheostomy. To follow up with Pulmonary.    Pneumonia - Completed the course of antibiotics. Monitoring clinically. Leukocytosis improved.    Transverse myelitis - On gastrostomy tube feeds. Monitoring colostomy.    Decubitus ulcer - Colostomy and urine catheter to divert from the decubitus site. Wound care. Analgesic medications as needed.    Diabetes - Insulin coverage, close monitoring of blood glucose levels.    Gastroesophageal reflux disease - On pantoprazole. Gastrostomy tube feeds. KAMAR BELLAMY  ----------------------------------------  The patient was seen and evaluated for respiratory failure.  The patient is in no acute distress.  On ventilator support.    Vital Signs Last 24 Hrs  T(C): 36.9 (05 Apr 2018 07:49), Max: 37.1 (05 Apr 2018 00:10)  T(F): 98.5 (05 Apr 2018 07:49), Max: 98.8 (05 Apr 2018 00:10)  HR: 85 (05 Apr 2018 07:49) (73 - 90)  BP: 120/78 (05 Apr 2018 07:49) (115/78 - 120/78)  BP(mean): --  RR: 14 (05 Apr 2018 07:49) (14 - 14)  SpO2: 100% (05 Apr 2018 07:49) (97% - 100%)    CAPILLARY BLOOD GLUCOSE  POCT Blood Glucose.: 97 mg/dL (05 Apr 2018 05:07)  POCT Blood Glucose.: 134 mg/dL (04 Apr 2018 23:47)    PHYSICAL EXAMINATION:  ----------------------------------------  General appearance: NAD, Sleeping  HEENT: NCAT, Conjunctiva clear  Neck: Supple, No JVD, No tenderness, Tracheostomy in place  Lungs: Clear to auscultation, Breath sound equal bilaterally, No wheezes, No rales  Cardiovascular: S1S2, Regular rhythm  Abdomen: Soft, Nontender, Nondistended, No guarding/rebound, Positive bowel sounds, Gastrostomy & colostomy  Extremities: No clubbing, No cyanosis, No edema, No calf tenderness  Neuro: No tremors, Extremity weakness most profound in the lower extremities    LABORATORY STUDIES:  ----------------------------------------             10.1   11.9  )-----------( 434      ( 04 Apr 2018 11:05 )             32.4     04-04    132<L>  |  94<L>  |  24.0<H>  ----------------------------<  99  4.1   |  28.0  |  0.61    Ca    9.1      04 Apr 2018 11:05    MEDICATIONS  (STANDING):  ascorbic acid 500 milliGRAM(s) Oral daily  aspirin  chewable 81 milliGRAM(s) Oral daily  baclofen 10 milliGRAM(s) Oral two times a day  chlorhexidine 0.12% Liquid 15 milliLiter(s) Swish and Spit two times a day  clopidogrel Tablet 75 milliGRAM(s) Oral daily  dextrose 5%. 1000 milliLiter(s) (50 mL/Hr) IV Continuous <Continuous>  dextrose 50% Injectable 12.5 Gram(s) IV Push once  dextrose 50% Injectable 25 Gram(s) IV Push once  dextrose 50% Injectable 25 Gram(s) IV Push once  heparin  Injectable 5000 Unit(s) SubCutaneous every 8 hours  insulin glargine Injectable (LANTUS) 20 Unit(s) SubCutaneous at bedtime  insulin regular  human corrective regimen sliding scale   SubCutaneous every 6 hours  lactobacillus acidophilus 1 Tablet(s) Oral every 12 hours  magnesium oxide 400 milliGRAM(s) Oral three times a day with meals  multivitamin   Solution 5 milliLiter(s) Oral daily  pantoprazole   Suspension 40 milliGRAM(s) Enteral Tube before breakfast  petrolatum Ophthalmic Ointment 1 Application(s) Both EYES at bedtime  vitamin A &amp; D Ointment 1 Application(s) Topical two times a day    MEDICATIONS  (PRN):  acetaminophen   Tablet 650 milliGRAM(s) Oral every 6 hours PRN For Temp greater than 38 C (100.4 F)  acetaminophen   Tablet. 650 milliGRAM(s) Oral every 6 hours PRN Moderate Pain (4 - 6) and headache  ALBUTerol    0.083% 2.5 milliGRAM(s) Nebulizer every 6 hours PRN Shortness of Breath and/or Wheezing  ALPRAZolam 0.5 milliGRAM(s) Oral three times a day PRN anxiety  artificial  tears Solution 1 Drop(s) Both EYES every 4 hours PRN Dry Eyes  glucagon  Injectable 1 milliGRAM(s) IntraMuscular once PRN Glucose <70 milliGRAM(s)/deciLiter  oxyCODONE    Solution 15 milliGRAM(s) Oral every 3 hours PRN mild to moderate pain      ASSESSMENT / PLAN:  ----------------------------------------  Acute on chronic respiratory failure - On ventilator support via tracheostomy. To follow up with Pulmonary.    Pneumonia - Completed the course of antibiotics. Monitoring clinically. Leukocytosis improved.    Transverse myelitis - On gastrostomy tube feeds. Monitoring colostomy. Prophylactic heparin.    Decubitus ulcer - Colostomy and urine catheter to divert from the decubitus site. Wound care. Analgesic medications as needed.    Diabetes - Insulin coverage, close monitoring of blood glucose levels.    Gastroesophageal reflux disease - On pantoprazole. Gastrostomy tube feeds. KAMAR BELLAMY  ----------------------------------------  The patient was seen and evaluated for respiratory failure.  The patient is in no acute distress.  On ventilator support.    Vital Signs Last 24 Hrs  T(C): 36.9 (05 Apr 2018 07:49), Max: 37.1 (05 Apr 2018 00:10)  T(F): 98.5 (05 Apr 2018 07:49), Max: 98.8 (05 Apr 2018 00:10)  HR: 85 (05 Apr 2018 07:49) (73 - 90)  BP: 120/78 (05 Apr 2018 07:49) (115/78 - 120/78)  BP(mean): --  RR: 14 (05 Apr 2018 07:49) (14 - 14)  SpO2: 100% (05 Apr 2018 07:49) (97% - 100%)    CAPILLARY BLOOD GLUCOSE  POCT Blood Glucose.: 97 mg/dL (05 Apr 2018 05:07)  POCT Blood Glucose.: 134 mg/dL (04 Apr 2018 23:47)    PHYSICAL EXAMINATION:  ----------------------------------------  General appearance: NAD, Sleeping  HEENT: NCAT, Conjunctiva clear  Neck: Supple, No JVD, No tenderness, Tracheostomy in place  Lungs: Clear to auscultation, Breath sound equal bilaterally, No wheezes, No rales  Cardiovascular: S1S2, Regular rhythm  Abdomen: Soft, Nontender, Nondistended, No guarding/rebound, Positive bowel sounds, Gastrostomy & colostomy  Extremities: No clubbing, No cyanosis, No edema, No calf tenderness  Neuro: No tremors, Extremity weakness most profound in the lower extremities    LABORATORY STUDIES:  ----------------------------------------             10.1   11.9  )-----------( 434      ( 04 Apr 2018 11:05 )             32.4     04-04    132<L>  |  94<L>  |  24.0<H>  ----------------------------<  99  4.1   |  28.0  |  0.61    Ca    9.1      04 Apr 2018 11:05    MEDICATIONS  (STANDING):  ascorbic acid 500 milliGRAM(s) Oral daily  aspirin  chewable 81 milliGRAM(s) Oral daily  baclofen 10 milliGRAM(s) Oral two times a day  chlorhexidine 0.12% Liquid 15 milliLiter(s) Swish and Spit two times a day  clopidogrel Tablet 75 milliGRAM(s) Oral daily  dextrose 5%. 1000 milliLiter(s) (50 mL/Hr) IV Continuous <Continuous>  dextrose 50% Injectable 12.5 Gram(s) IV Push once  dextrose 50% Injectable 25 Gram(s) IV Push once  dextrose 50% Injectable 25 Gram(s) IV Push once  heparin  Injectable 5000 Unit(s) SubCutaneous every 8 hours  insulin glargine Injectable (LANTUS) 20 Unit(s) SubCutaneous at bedtime  insulin regular  human corrective regimen sliding scale   SubCutaneous every 6 hours  lactobacillus acidophilus 1 Tablet(s) Oral every 12 hours  magnesium oxide 400 milliGRAM(s) Oral three times a day with meals  multivitamin   Solution 5 milliLiter(s) Oral daily  pantoprazole   Suspension 40 milliGRAM(s) Enteral Tube before breakfast  petrolatum Ophthalmic Ointment 1 Application(s) Both EYES at bedtime  vitamin A &amp; D Ointment 1 Application(s) Topical two times a day    MEDICATIONS  (PRN):  acetaminophen   Tablet 650 milliGRAM(s) Oral every 6 hours PRN For Temp greater than 38 C (100.4 F)  acetaminophen   Tablet. 650 milliGRAM(s) Oral every 6 hours PRN Moderate Pain (4 - 6) and headache  ALBUTerol    0.083% 2.5 milliGRAM(s) Nebulizer every 6 hours PRN Shortness of Breath and/or Wheezing  ALPRAZolam 0.5 milliGRAM(s) Oral three times a day PRN anxiety  artificial  tears Solution 1 Drop(s) Both EYES every 4 hours PRN Dry Eyes  glucagon  Injectable 1 milliGRAM(s) IntraMuscular once PRN Glucose <70 milliGRAM(s)/deciLiter  oxyCODONE    Solution 15 milliGRAM(s) Oral every 3 hours PRN mild to moderate pain      ASSESSMENT / PLAN:  ----------------------------------------  Acute on chronic respiratory failure - On ventilator support via tracheostomy. To follow up with Pulmonary. For weaning at the rehabilitation facility if tolerates.    Pneumonia - Completed the course of antibiotics. Monitoring clinically. Leukocytosis improved.    Transverse myelitis - On gastrostomy tube feeds. Monitor colostomy output. Prophylactic heparin.    Decubitus ulcer - Colostomy and urine catheter to divert from the decubitus site. Wound care. Analgesic medications as needed.    Diabetes - Insulin coverage, close monitoring of blood glucose levels.    Gastroesophageal reflux disease - On pantoprazole. Gastrostomy tube feeds.

## 2018-04-05 NOTE — PROGRESS NOTE ADULT - ASSESSMENT
VDRF secondary to neurologic condition>transverse myelitis  Not tolerant of spontaneous breathing trials    Plan:  1.Continue vent support  2.OK for transfer to vent facility for further attempts at weaning as tolerated when arranged.

## 2018-04-05 NOTE — PROGRESS NOTE ADULT - SUBJECTIVE AND OBJECTIVE BOX
PULMONARY PROGRESS NOTE      [id number]    Patient is a 62y old  Male who presents with a chief complaint of fevers (12 Mar 2018 18:28)      INTERVAL HPI/OVERNIGHT EVENTS:  Stable on full vent  Apparently not able to tolerate spontaneous trials  D/C planning in progress    MEDICATIONS  (STANDING):  ascorbic acid 500 milliGRAM(s) Oral daily  aspirin  chewable 81 milliGRAM(s) Oral daily  baclofen 10 milliGRAM(s) Oral two times a day  chlorhexidine 0.12% Liquid 15 milliLiter(s) Swish and Spit two times a day  clopidogrel Tablet 75 milliGRAM(s) Oral daily  dextrose 5%. 1000 milliLiter(s) (50 mL/Hr) IV Continuous <Continuous>  dextrose 50% Injectable 12.5 Gram(s) IV Push once  dextrose 50% Injectable 25 Gram(s) IV Push once  dextrose 50% Injectable 25 Gram(s) IV Push once  heparin  Injectable 5000 Unit(s) SubCutaneous every 8 hours  insulin glargine Injectable (LANTUS) 20 Unit(s) SubCutaneous at bedtime  insulin regular  human corrective regimen sliding scale   SubCutaneous every 6 hours  lactobacillus acidophilus 1 Tablet(s) Oral every 12 hours  magnesium oxide 400 milliGRAM(s) Oral three times a day with meals  multivitamin   Solution 5 milliLiter(s) Oral daily  pantoprazole   Suspension 40 milliGRAM(s) Enteral Tube before breakfast  petrolatum Ophthalmic Ointment 1 Application(s) Both EYES at bedtime  vitamin A &amp; D Ointment 1 Application(s) Topical two times a day      MEDICATIONS  (PRN):  acetaminophen   Tablet 650 milliGRAM(s) Oral every 6 hours PRN For Temp greater than 38 C (100.4 F)  acetaminophen   Tablet. 650 milliGRAM(s) Oral every 6 hours PRN Moderate Pain (4 - 6) and headache  ALBUTerol    0.083% 2.5 milliGRAM(s) Nebulizer every 6 hours PRN Shortness of Breath and/or Wheezing  ALPRAZolam 0.5 milliGRAM(s) Oral three times a day PRN anxiety  artificial  tears Solution 1 Drop(s) Both EYES every 4 hours PRN Dry Eyes  glucagon  Injectable 1 milliGRAM(s) IntraMuscular once PRN Glucose <70 milliGRAM(s)/deciLiter  oxyCODONE    Solution 15 milliGRAM(s) Oral every 3 hours PRN mild to moderate pain      Allergies    No Known Allergies    Intolerances        PAST MEDICAL & SURGICAL HISTORY:  Essential hypertension  Paralysis  Transverse myelitis  History of tracheostomy      SOCIAL HISTORY  Smoking History:       REVIEW OF SYSTEMS:    CONSTITUTIONAL:  No distress    HEENT:  Eyes:  No diplopia or blurred vision. ENT:  No earache, sore throat or runny nose.    CARDIOVASCULAR:  No pressure, squeezing, tightness, heaviness or aching about the chest; no palpitations.    RESPIRATORY: Vent dependent    GASTROINTESTINAL:  No nausea, vomiting or diarrhea.    GENITOURINARY:  No dysuria, frequency or urgency.    MUSCULOSKELETAL:  No joint pain    SKIN:  No new lesions.    NEUROLOGIC:  No paresthesias, fasciculations, seizures or weakness.    PSYCHIATRIC:  No disorder of thought or mood.    ENDOCRINE:  No heat or cold intolerance, polyuria or polydipsia.    HEMATOLOGICAL:  No easy bruising or bleeding.     Vital Signs Last 24 Hrs  T(C): 36.9 (05 Apr 2018 07:49), Max: 37.1 (05 Apr 2018 00:10)  T(F): 98.5 (05 Apr 2018 07:49), Max: 98.8 (05 Apr 2018 00:10)  HR: 85 (05 Apr 2018 07:49) (73 - 90)  BP: 120/78 (05 Apr 2018 07:49) (115/78 - 120/78)  BP(mean): --  RR: 14 (05 Apr 2018 07:49) (14 - 14)  SpO2: 100% (05 Apr 2018 07:49) (97% - 100%)    PHYSICAL EXAMINATION:    GENERAL: The patient is awake and alert in no apparent distress.     HEENT: Head is normocephalic and atraumatic. Extraocular muscles are intact. Mucous membranes are moist.    NECK: Supple.Trach site clear    LUNGS: Clear to auscultation without wheezing, rales or rhonchi; respirations unlabored    HEART: Regular rate and rhythm without murmur.    ABDOMEN: Soft, nontender, and nondistended.      EXTREMITIES: Without any cyanosis, clubbing, rash, lesions or edema.    NEUROLOGIC:Quadraparetic    SKIN: No ulceration or induration present.      LABS:                        10.1   11.9  )-----------( 434      ( 04 Apr 2018 11:05 )             32.4     04-04    132<L>  |  94<L>  |  24.0<H>  ----------------------------<  99  4.1   |  28.0  |  0.61    Ca    9.1      04 Apr 2018 11:05                          MICROBIOLOGY:    RADIOLOGY & ADDITIONAL STUDIES:

## 2018-04-06 LAB
GLUCOSE BLDC GLUCOMTR-MCNC: 120 MG/DL — HIGH (ref 70–99)
GLUCOSE BLDC GLUCOMTR-MCNC: 126 MG/DL — HIGH (ref 70–99)
GLUCOSE BLDC GLUCOMTR-MCNC: 81 MG/DL — SIGNIFICANT CHANGE UP (ref 70–99)

## 2018-04-06 PROCEDURE — 99233 SBSQ HOSP IP/OBS HIGH 50: CPT

## 2018-04-06 RX ADMIN — HEPARIN SODIUM 5000 UNIT(S): 5000 INJECTION INTRAVENOUS; SUBCUTANEOUS at 22:19

## 2018-04-06 RX ADMIN — OXYCODONE HYDROCHLORIDE 15 MILLIGRAM(S): 5 TABLET ORAL at 09:17

## 2018-04-06 RX ADMIN — MAGNESIUM OXIDE 400 MG ORAL TABLET 400 MILLIGRAM(S): 241.3 TABLET ORAL at 09:17

## 2018-04-06 RX ADMIN — OXYCODONE HYDROCHLORIDE 15 MILLIGRAM(S): 5 TABLET ORAL at 00:22

## 2018-04-06 RX ADMIN — OXYCODONE HYDROCHLORIDE 15 MILLIGRAM(S): 5 TABLET ORAL at 22:19

## 2018-04-06 RX ADMIN — MAGNESIUM OXIDE 400 MG ORAL TABLET 400 MILLIGRAM(S): 241.3 TABLET ORAL at 13:04

## 2018-04-06 RX ADMIN — INSULIN GLARGINE 20 UNIT(S): 100 INJECTION, SOLUTION SUBCUTANEOUS at 22:18

## 2018-04-06 RX ADMIN — Medication 1 TABLET(S): at 05:09

## 2018-04-06 RX ADMIN — Medication 1 APPLICATION(S): at 05:25

## 2018-04-06 RX ADMIN — OXYCODONE HYDROCHLORIDE 15 MILLIGRAM(S): 5 TABLET ORAL at 06:10

## 2018-04-06 RX ADMIN — OXYCODONE HYDROCHLORIDE 15 MILLIGRAM(S): 5 TABLET ORAL at 23:20

## 2018-04-06 RX ADMIN — HEPARIN SODIUM 5000 UNIT(S): 5000 INJECTION INTRAVENOUS; SUBCUTANEOUS at 13:04

## 2018-04-06 RX ADMIN — HEPARIN SODIUM 5000 UNIT(S): 5000 INJECTION INTRAVENOUS; SUBCUTANEOUS at 05:09

## 2018-04-06 RX ADMIN — Medication 0.5 MILLIGRAM(S): at 05:09

## 2018-04-06 RX ADMIN — Medication 5 MILLILITER(S): at 13:04

## 2018-04-06 RX ADMIN — OXYCODONE HYDROCHLORIDE 15 MILLIGRAM(S): 5 TABLET ORAL at 13:04

## 2018-04-06 RX ADMIN — Medication 10 MILLIGRAM(S): at 05:09

## 2018-04-06 RX ADMIN — OXYCODONE HYDROCHLORIDE 15 MILLIGRAM(S): 5 TABLET ORAL at 05:10

## 2018-04-06 RX ADMIN — PANTOPRAZOLE SODIUM 40 MILLIGRAM(S): 20 TABLET, DELAYED RELEASE ORAL at 05:10

## 2018-04-06 RX ADMIN — Medication 81 MILLIGRAM(S): at 13:04

## 2018-04-06 RX ADMIN — Medication 500 MILLIGRAM(S): at 13:04

## 2018-04-06 RX ADMIN — CLOPIDOGREL BISULFATE 75 MILLIGRAM(S): 75 TABLET, FILM COATED ORAL at 13:04

## 2018-04-06 RX ADMIN — CHLORHEXIDINE GLUCONATE 15 MILLILITER(S): 213 SOLUTION TOPICAL at 05:09

## 2018-04-06 RX ADMIN — OXYCODONE HYDROCHLORIDE 15 MILLIGRAM(S): 5 TABLET ORAL at 14:04

## 2018-04-06 RX ADMIN — OXYCODONE HYDROCHLORIDE 15 MILLIGRAM(S): 5 TABLET ORAL at 10:17

## 2018-04-06 NOTE — PROGRESS NOTE ADULT - SUBJECTIVE AND OBJECTIVE BOX
KAMAR BELLAMY  ----------------------------------------  The patient was seen and evaluated for respiratory failure.  The patient is in no acute distress.  Tolerating ventilator support.    Vital Signs Last 24 Hrs  T(C): 37.1 (06 Apr 2018 07:43), Max: 37.3 (06 Apr 2018 00:02)  T(F): 98.8 (06 Apr 2018 07:43), Max: 99.1 (06 Apr 2018 00:02)  HR: 99 (06 Apr 2018 09:55) (75 - 99)  BP: 115/77 (06 Apr 2018 07:43) (102/66 - 115/77)  BP(mean): --  RR: 19 (06 Apr 2018 07:43) (18 - 19)  SpO2: 99% (06 Apr 2018 09:55) (95% - 100%)    CAPILLARY BLOOD GLUCOSE  POCT Blood Glucose.: 81 mg/dL (06 Apr 2018 05:24)  POCT Blood Glucose.: 111 mg/dL (05 Apr 2018 23:29)  POCT Blood Glucose.: 121 mg/dL (05 Apr 2018 17:30)    PHYSICAL EXAMINATION:  ----------------------------------------  General appearance: NAD, Sleeping  HEENT: NCAT, Conjunctiva clear  Neck: Supple, No JVD, No tenderness, Tracheostomy in place  Lungs: Clear to auscultation, Breath sound equal bilaterally, No wheezes, No rales  Cardiovascular: S1S2, Regular rhythm  Abdomen: Soft, Nontender, Nondistended, No guarding/rebound, Positive bowel sounds, Gastrostomy & colostomy in place  Extremities: No clubbing, No cyanosis, No edema, No calf tenderness  Neuro: No tremors, Extremity weakness most profound in the lower extremities    LABORATORY STUDIES:  ----------------------------------------             10.1   11.9  )-----------( 434      ( 04 Apr 2018 11:05 )             32.4     04-04    132<L>  |  94<L>  |  24.0<H>  ----------------------------<  99  4.1   |  28.0  |  0.61    Ca    9.1      04 Apr 2018 11:05    MEDICATIONS  (STANDING):  ascorbic acid 500 milliGRAM(s) Oral daily  aspirin  chewable 81 milliGRAM(s) Oral daily  baclofen 10 milliGRAM(s) Oral two times a day  chlorhexidine 0.12% Liquid 15 milliLiter(s) Swish and Spit two times a day  clopidogrel Tablet 75 milliGRAM(s) Oral daily  dextrose 5%. 1000 milliLiter(s) (50 mL/Hr) IV Continuous <Continuous>  dextrose 50% Injectable 12.5 Gram(s) IV Push once  dextrose 50% Injectable 25 Gram(s) IV Push once  dextrose 50% Injectable 25 Gram(s) IV Push once  heparin  Injectable 5000 Unit(s) SubCutaneous every 8 hours  insulin glargine Injectable (LANTUS) 20 Unit(s) SubCutaneous at bedtime  insulin regular  human corrective regimen sliding scale   SubCutaneous every 6 hours  lactobacillus acidophilus 1 Tablet(s) Oral every 12 hours  magnesium oxide 400 milliGRAM(s) Oral three times a day with meals  multivitamin   Solution 5 milliLiter(s) Oral daily  pantoprazole   Suspension 40 milliGRAM(s) Enteral Tube before breakfast  petrolatum Ophthalmic Ointment 1 Application(s) Both EYES at bedtime  vitamin A &amp; D Ointment 1 Application(s) Topical two times a day    MEDICATIONS  (PRN):  acetaminophen   Tablet 650 milliGRAM(s) Oral every 6 hours PRN For Temp greater than 38 C (100.4 F)  acetaminophen   Tablet. 650 milliGRAM(s) Oral every 6 hours PRN Moderate Pain (4 - 6) and headache  ALBUTerol    0.083% 2.5 milliGRAM(s) Nebulizer every 6 hours PRN Shortness of Breath and/or Wheezing  ALPRAZolam 0.5 milliGRAM(s) Oral three times a day PRN anxiety  artificial  tears Solution 1 Drop(s) Both EYES every 4 hours PRN Dry Eyes  glucagon  Injectable 1 milliGRAM(s) IntraMuscular once PRN Glucose <70 milliGRAM(s)/deciLiter  oxyCODONE    Solution 15 milliGRAM(s) Oral every 3 hours PRN mild to moderate pain      ASSESSMENT / PLAN:  ----------------------------------------  Acute on chronic respiratory failure - On ventilator support via tracheostomy. To follow up with Pulmonary at [facility]. For weaning at the rehabilitation facility if tolerates.    Pneumonia - Completed the course of antibiotics. Monitoring clinically. Leukocytosis improved.    Transverse myelitis - Prophylactic heparin. On gastrostomy tube feeds. Monitor colostomy output.    Decubitus ulcer - Colostomy and urine catheter to divert from the decubitus site. Wound care. Analgesic medications as needed.    Diabetes - Insulin coverage, close monitoring of blood glucose levels.    Gastroesophageal reflux disease - On pantoprazole. Gastrostomy tube feeds. KAMAR BELLAMY  ----------------------------------------  The patient was seen and evaluated for respiratory failure.  The patient is in no acute distress.  Tolerating ventilator support.    HPI:  62M presented from the nursing home with a three day history of fevers. The patient was admitted to the intensive care unit for further management. Antibiotics were initiated for sepsis and pneumonia. The patient was continued on ventilator support. The patient was seen by Surgery in consultation for wound care of the decubitus ulcer. Repeat laboratory results noted leukocytosis and antibiotics were initiated for urinary tract infection. CT of the chest noted a right sided pneumonia and antibiotics were adjusted. The patient’s condition decompensated and he required initiation of ventilator support for acute on chronic respiratory failure. The patient had improvement in his symptoms and was tranferred to the medical service. The gastrostomy tube was noted to be dislodged and the patient was seen by Gastroenterology in consultation for replacement of the tube. The patient was continued on tube feeds and completed the course of antibiotics and remained afebrile. The patient was followed by Pulmonary and continued on ventilator support while awaiting transfer to a rehabilitation facility.    Vital Signs Last 24 Hrs  T(C): 37.1 (06 Apr 2018 07:43), Max: 37.3 (06 Apr 2018 00:02)  T(F): 98.8 (06 Apr 2018 07:43), Max: 99.1 (06 Apr 2018 00:02)  HR: 99 (06 Apr 2018 09:55) (75 - 99)  BP: 115/77 (06 Apr 2018 07:43) (102/66 - 115/77)  BP(mean): --  RR: 19 (06 Apr 2018 07:43) (18 - 19)  SpO2: 99% (06 Apr 2018 09:55) (95% - 100%)    CAPILLARY BLOOD GLUCOSE  POCT Blood Glucose.: 81 mg/dL (06 Apr 2018 05:24)  POCT Blood Glucose.: 111 mg/dL (05 Apr 2018 23:29)  POCT Blood Glucose.: 121 mg/dL (05 Apr 2018 17:30)    PHYSICAL EXAMINATION:  ----------------------------------------  General appearance: NAD, Sleeping  HEENT: NCAT, Conjunctiva clear  Neck: Supple, No JVD, No tenderness, Tracheostomy in place  Lungs: Clear to auscultation, Breath sound equal bilaterally, No wheezes, No rales  Cardiovascular: S1S2, Regular rhythm  Abdomen: Soft, Nontender, Nondistended, No guarding/rebound, Positive bowel sounds, Gastrostomy & colostomy in place  Extremities: No clubbing, No cyanosis, No edema, No calf tenderness  Neuro: No tremors, Extremity weakness most profound in the lower extremities    LABORATORY STUDIES:  ----------------------------------------             10.1   11.9  )-----------( 434      ( 04 Apr 2018 11:05 )             32.4     04-04    132<L>  |  94<L>  |  24.0<H>  ----------------------------<  99  4.1   |  28.0  |  0.61    Ca    9.1      04 Apr 2018 11:05    MEDICATIONS  (STANDING):  ascorbic acid 500 milliGRAM(s) Oral daily  aspirin  chewable 81 milliGRAM(s) Oral daily  baclofen 10 milliGRAM(s) Oral two times a day  chlorhexidine 0.12% Liquid 15 milliLiter(s) Swish and Spit two times a day  clopidogrel Tablet 75 milliGRAM(s) Oral daily  dextrose 5%. 1000 milliLiter(s) (50 mL/Hr) IV Continuous <Continuous>  dextrose 50% Injectable 12.5 Gram(s) IV Push once  dextrose 50% Injectable 25 Gram(s) IV Push once  dextrose 50% Injectable 25 Gram(s) IV Push once  heparin  Injectable 5000 Unit(s) SubCutaneous every 8 hours  insulin glargine Injectable (LANTUS) 20 Unit(s) SubCutaneous at bedtime  insulin regular  human corrective regimen sliding scale   SubCutaneous every 6 hours  lactobacillus acidophilus 1 Tablet(s) Oral every 12 hours  magnesium oxide 400 milliGRAM(s) Oral three times a day with meals  multivitamin   Solution 5 milliLiter(s) Oral daily  pantoprazole   Suspension 40 milliGRAM(s) Enteral Tube before breakfast  petrolatum Ophthalmic Ointment 1 Application(s) Both EYES at bedtime  vitamin A &amp; D Ointment 1 Application(s) Topical two times a day    MEDICATIONS  (PRN):  acetaminophen   Tablet 650 milliGRAM(s) Oral every 6 hours PRN For Temp greater than 38 C (100.4 F)  acetaminophen   Tablet. 650 milliGRAM(s) Oral every 6 hours PRN Moderate Pain (4 - 6) and headache  ALBUTerol    0.083% 2.5 milliGRAM(s) Nebulizer every 6 hours PRN Shortness of Breath and/or Wheezing  ALPRAZolam 0.5 milliGRAM(s) Oral three times a day PRN anxiety  artificial  tears Solution 1 Drop(s) Both EYES every 4 hours PRN Dry Eyes  glucagon  Injectable 1 milliGRAM(s) IntraMuscular once PRN Glucose <70 milliGRAM(s)/deciLiter  oxyCODONE    Solution 15 milliGRAM(s) Oral every 3 hours PRN mild to moderate pain      ASSESSMENT / PLAN:  ----------------------------------------  Acute on chronic respiratory failure - On ventilator support via tracheostomy. To follow up with Pulmonary at [facility]. For weaning at the rehabilitation facility if tolerates.    Pneumonia - Completed the course of antibiotics. Monitoring clinically. Leukocytosis improved.    Transverse myelitis - Prophylactic heparin. On gastrostomy tube feeds. Monitor colostomy output.    Decubitus ulcer - Colostomy and urine catheter to divert from the decubitus site. Wound care. Analgesic medications as needed.    Diabetes - Insulin coverage, close monitoring of blood glucose levels.    Gastroesophageal reflux disease - On pantoprazole. Gastrostomy tube feeds.

## 2018-04-07 LAB
GLUCOSE BLDC GLUCOMTR-MCNC: 132 MG/DL — HIGH (ref 70–99)
GLUCOSE BLDC GLUCOMTR-MCNC: 135 MG/DL — HIGH (ref 70–99)
GLUCOSE BLDC GLUCOMTR-MCNC: 144 MG/DL — HIGH (ref 70–99)
GLUCOSE BLDC GLUCOMTR-MCNC: 165 MG/DL — HIGH (ref 70–99)

## 2018-04-07 PROCEDURE — 99233 SBSQ HOSP IP/OBS HIGH 50: CPT

## 2018-04-07 RX ADMIN — Medication 1 TABLET(S): at 05:34

## 2018-04-07 RX ADMIN — Medication 81 MILLIGRAM(S): at 08:31

## 2018-04-07 RX ADMIN — HEPARIN SODIUM 5000 UNIT(S): 5000 INJECTION INTRAVENOUS; SUBCUTANEOUS at 16:26

## 2018-04-07 RX ADMIN — OXYCODONE HYDROCHLORIDE 15 MILLIGRAM(S): 5 TABLET ORAL at 16:25

## 2018-04-07 RX ADMIN — Medication 10 MILLIGRAM(S): at 16:27

## 2018-04-07 RX ADMIN — OXYCODONE HYDROCHLORIDE 15 MILLIGRAM(S): 5 TABLET ORAL at 21:15

## 2018-04-07 RX ADMIN — MAGNESIUM OXIDE 400 MG ORAL TABLET 400 MILLIGRAM(S): 241.3 TABLET ORAL at 05:35

## 2018-04-07 RX ADMIN — OXYCODONE HYDROCHLORIDE 15 MILLIGRAM(S): 5 TABLET ORAL at 17:00

## 2018-04-07 RX ADMIN — Medication 500 MILLIGRAM(S): at 08:32

## 2018-04-07 RX ADMIN — Medication 1 TABLET(S): at 16:26

## 2018-04-07 RX ADMIN — OXYCODONE HYDROCHLORIDE 15 MILLIGRAM(S): 5 TABLET ORAL at 09:00

## 2018-04-07 RX ADMIN — MAGNESIUM OXIDE 400 MG ORAL TABLET 400 MILLIGRAM(S): 241.3 TABLET ORAL at 08:32

## 2018-04-07 RX ADMIN — Medication 1 APPLICATION(S): at 05:35

## 2018-04-07 RX ADMIN — MAGNESIUM OXIDE 400 MG ORAL TABLET 400 MILLIGRAM(S): 241.3 TABLET ORAL at 16:27

## 2018-04-07 RX ADMIN — HEPARIN SODIUM 5000 UNIT(S): 5000 INJECTION INTRAVENOUS; SUBCUTANEOUS at 21:14

## 2018-04-07 RX ADMIN — OXYCODONE HYDROCHLORIDE 15 MILLIGRAM(S): 5 TABLET ORAL at 21:50

## 2018-04-07 RX ADMIN — INSULIN GLARGINE 20 UNIT(S): 100 INJECTION, SOLUTION SUBCUTANEOUS at 21:14

## 2018-04-07 RX ADMIN — OXYCODONE HYDROCHLORIDE 15 MILLIGRAM(S): 5 TABLET ORAL at 04:30

## 2018-04-07 RX ADMIN — CLOPIDOGREL BISULFATE 75 MILLIGRAM(S): 75 TABLET, FILM COATED ORAL at 08:32

## 2018-04-07 RX ADMIN — CHLORHEXIDINE GLUCONATE 15 MILLILITER(S): 213 SOLUTION TOPICAL at 21:11

## 2018-04-07 RX ADMIN — INSULIN HUMAN 1: 100 INJECTION, SOLUTION SUBCUTANEOUS at 00:27

## 2018-04-07 RX ADMIN — OXYCODONE HYDROCHLORIDE 15 MILLIGRAM(S): 5 TABLET ORAL at 08:30

## 2018-04-07 RX ADMIN — OXYCODONE HYDROCHLORIDE 15 MILLIGRAM(S): 5 TABLET ORAL at 02:51

## 2018-04-07 RX ADMIN — HEPARIN SODIUM 5000 UNIT(S): 5000 INJECTION INTRAVENOUS; SUBCUTANEOUS at 05:35

## 2018-04-07 RX ADMIN — CHLORHEXIDINE GLUCONATE 15 MILLILITER(S): 213 SOLUTION TOPICAL at 16:27

## 2018-04-07 RX ADMIN — Medication 1 APPLICATION(S): at 16:27

## 2018-04-07 RX ADMIN — Medication 10 MILLIGRAM(S): at 05:34

## 2018-04-07 RX ADMIN — Medication 5 MILLILITER(S): at 08:32

## 2018-04-07 RX ADMIN — Medication 0.5 MILLIGRAM(S): at 21:14

## 2018-04-07 NOTE — PROGRESS NOTE ADULT - ASSESSMENT
Acute on chronic respiratory failure - On ventilator support via tracheostomy. To follow up with Pulmonary at [facility]. For weaning at the rehabilitation facility if tolerates.    Pneumonia - Completed the course of antibiotics. Monitoring clinically. Leukocytosis improved.    Transverse myelitis - Prophylactic heparin. On gastrostomy tube feeds. Monitor colostomy output.    Decubitus ulcer - Colostomy and urine catheter to divert from the decubitus site. Wound care. Analgesic medications as needed.    Diabetes - Insulin coverage, close monitoring of blood glucose levels.    Gastroesophageal reflux disease - On pantoprazole. Gastrostomy tube feeds.

## 2018-04-07 NOTE — PROGRESS NOTE ADULT - SUBJECTIVE AND OBJECTIVE BOX
CC: Resp failure on vent support via tracheostomy.  Peg tube feeding. Colostomy.  Transverse myelitis with paraplegia. Sepsis syndrome resolved.   HPI:  61y/o male with pmh of PMH Transverse myelitis, Paraplegia, trach/peg, colostomy, HTN, [facility] with fever for [facility] days.      Patient denies any cough, sob, runny nose, vomiting, diarrhea, abdominal pain, pain with urination.    Patient signed MOLST REQUESTING CPR on 2/18/18.    Patient eats via tube feeding,.      Originally at [facility] in   7months ago, developed Acute transverse myelitis.    Heart Attack, stent placed, check up after.  He was given a tetanus shot, then couple days ago he developed lower extremity weakness, paralysis; ultimately developed ulcers.      [facility]--> [facility] (kept him for a few weeks) --> wife couldn't take care of him;    He is getting frequent uti's, anemia reequiring transfusions.    101 fever at nursing home today ([facility]) (10 Mar 2018 23:26)    REVIEW OF SYSTEMS:    Patient denied fever, chills, abdominal pain, nausea, vomiting, cough, shortness of breath, chest pain or palpitations    Vital Signs Last 24 Hrs  T(C): 37.1 (07 Apr 2018 07:44), Max: 37.7 (06 Apr 2018 16:49)  T(F): 98.8 (07 Apr 2018 07:44), Max: 99.9 (06 Apr 2018 16:49)  HR: 92 (07 Apr 2018 07:44) (9 - 108)  BP: 120/78 (07 Apr 2018 07:44) (109/80 - 120/78)  BP(mean): --  RR: 22 (07 Apr 2018 07:44) (16 - 22)  SpO2: 99% (07 Apr 2018 09:06) (98% - 100%)I&O's Summary    06 Apr 2018 07:01  -  07 Apr 2018 07:00  --------------------------------------------------------  IN: 990 mL / OUT: 1400 mL / NET: -410 mL    07 Apr 2018 07:01  -  07 Apr 2018 11:26  --------------------------------------------------------  IN: 600 mL / OUT: 300 mL / NET: 300 mL      PHYSICAL EXAM:  GENERAL: NAD,  HEENT: PERRL, +EOMI, anicteric, no [place]  NECK: Supple, No JVD   CHEST/LUNG: CTA bilaterally; Normal effort  HEART: S1S2 Normal intensity, no murmurs, gallops or rubs noted  ABDOMEN: Soft, BS Normoactive, NT, ND, no HSM noted  EXTREMITIES:  2+ radial and DP pulses noted, no clubbing, cyanosis, or edema noted. Bed bound.   SKIN: No rashes or lesions noted  NEURO: A&Ox3, Nonverbal. Paraplegia, CN II-XII intact  PSYCH: Depressed mood and affect; insight/judgement appropriate  LABS:              RADIOLOGY & ADDITIONAL TESTS:    MEDICATIONS:  MEDICATIONS  (STANDING):  ascorbic acid 500 milliGRAM(s) Oral daily  aspirin  chewable 81 milliGRAM(s) Oral daily  baclofen 10 milliGRAM(s) Oral two times a day  chlorhexidine 0.12% Liquid 15 milliLiter(s) Swish and Spit two times a day  clopidogrel Tablet 75 milliGRAM(s) Oral daily  dextrose 5%. 1000 milliLiter(s) (50 mL/Hr) IV Continuous <Continuous>  dextrose 50% Injectable 12.5 Gram(s) IV Push once  dextrose 50% Injectable 25 Gram(s) IV Push once  dextrose 50% Injectable 25 Gram(s) IV Push once  heparin  Injectable 5000 Unit(s) SubCutaneous every 8 hours  insulin glargine Injectable (LANTUS) 20 Unit(s) SubCutaneous at bedtime  insulin regular  human corrective regimen sliding scale   SubCutaneous every 6 hours  lactobacillus acidophilus 1 Tablet(s) Oral every 12 hours  magnesium oxide 400 milliGRAM(s) Oral three times a day with meals  multivitamin   Solution 5 milliLiter(s) Oral daily  pantoprazole   Suspension 40 milliGRAM(s) Enteral Tube before breakfast  petrolatum Ophthalmic Ointment 1 Application(s) Both EYES at bedtime  vitamin A &amp; D Ointment 1 Application(s) Topical two times a day    MEDICATIONS  (PRN):  acetaminophen   Tablet 650 milliGRAM(s) Oral every 6 hours PRN For Temp greater than 38 C (100.4 F)  acetaminophen   Tablet. 650 milliGRAM(s) Oral every 6 hours PRN Moderate Pain (4 - 6) and headache  ALBUTerol    0.083% 2.5 milliGRAM(s) Nebulizer every 6 hours PRN Shortness of Breath and/or Wheezing  ALPRAZolam 0.5 milliGRAM(s) Oral three times a day PRN anxiety  artificial  tears Solution 1 Drop(s) Both EYES every 4 hours PRN Dry Eyes  glucagon  Injectable 1 milliGRAM(s) IntraMuscular once PRN Glucose <70 milliGRAM(s)/deciLiter  oxyCODONE    Solution 15 milliGRAM(s) Oral every 3 hours PRN mild to moderate pain

## 2018-04-08 LAB
CK SERPL-CCNC: 23 U/L — LOW (ref 30–200)
GLUCOSE BLDC GLUCOMTR-MCNC: 126 MG/DL — HIGH (ref 70–99)
GLUCOSE BLDC GLUCOMTR-MCNC: 139 MG/DL — HIGH (ref 70–99)
GLUCOSE BLDC GLUCOMTR-MCNC: 141 MG/DL — HIGH (ref 70–99)
GLUCOSE BLDC GLUCOMTR-MCNC: 142 MG/DL — HIGH (ref 70–99)
TROPONIN T SERPL-MCNC: 0.02 NG/ML — SIGNIFICANT CHANGE UP (ref 0–0.06)

## 2018-04-08 PROCEDURE — 93010 ELECTROCARDIOGRAM REPORT: CPT

## 2018-04-08 PROCEDURE — 99233 SBSQ HOSP IP/OBS HIGH 50: CPT

## 2018-04-08 RX ORDER — OXYCODONE HYDROCHLORIDE 5 MG/1
15 TABLET ORAL
Qty: 0 | Refills: 0 | Status: DISCONTINUED | OUTPATIENT
Start: 2018-04-08 | End: 2018-04-13

## 2018-04-08 RX ADMIN — OXYCODONE HYDROCHLORIDE 15 MILLIGRAM(S): 5 TABLET ORAL at 05:30

## 2018-04-08 RX ADMIN — HEPARIN SODIUM 5000 UNIT(S): 5000 INJECTION INTRAVENOUS; SUBCUTANEOUS at 05:12

## 2018-04-08 RX ADMIN — CHLORHEXIDINE GLUCONATE 15 MILLILITER(S): 213 SOLUTION TOPICAL at 17:27

## 2018-04-08 RX ADMIN — Medication 81 MILLIGRAM(S): at 13:48

## 2018-04-08 RX ADMIN — Medication 10 MILLIGRAM(S): at 05:12

## 2018-04-08 RX ADMIN — Medication 10 MILLIGRAM(S): at 17:25

## 2018-04-08 RX ADMIN — OXYCODONE HYDROCHLORIDE 15 MILLIGRAM(S): 5 TABLET ORAL at 18:25

## 2018-04-08 RX ADMIN — MAGNESIUM OXIDE 400 MG ORAL TABLET 400 MILLIGRAM(S): 241.3 TABLET ORAL at 13:48

## 2018-04-08 RX ADMIN — CLOPIDOGREL BISULFATE 75 MILLIGRAM(S): 75 TABLET, FILM COATED ORAL at 13:47

## 2018-04-08 RX ADMIN — Medication 1 APPLICATION(S): at 18:45

## 2018-04-08 RX ADMIN — INSULIN GLARGINE 20 UNIT(S): 100 INJECTION, SOLUTION SUBCUTANEOUS at 22:03

## 2018-04-08 RX ADMIN — Medication 5 MILLILITER(S): at 13:48

## 2018-04-08 RX ADMIN — OXYCODONE HYDROCHLORIDE 15 MILLIGRAM(S): 5 TABLET ORAL at 22:30

## 2018-04-08 RX ADMIN — MAGNESIUM OXIDE 400 MG ORAL TABLET 400 MILLIGRAM(S): 241.3 TABLET ORAL at 17:25

## 2018-04-08 RX ADMIN — HEPARIN SODIUM 5000 UNIT(S): 5000 INJECTION INTRAVENOUS; SUBCUTANEOUS at 13:48

## 2018-04-08 RX ADMIN — OXYCODONE HYDROCHLORIDE 15 MILLIGRAM(S): 5 TABLET ORAL at 22:01

## 2018-04-08 RX ADMIN — OXYCODONE HYDROCHLORIDE 15 MILLIGRAM(S): 5 TABLET ORAL at 05:00

## 2018-04-08 RX ADMIN — Medication 1 APPLICATION(S): at 05:14

## 2018-04-08 RX ADMIN — OXYCODONE HYDROCHLORIDE 15 MILLIGRAM(S): 5 TABLET ORAL at 17:25

## 2018-04-08 RX ADMIN — Medication 1 TABLET(S): at 17:25

## 2018-04-08 RX ADMIN — Medication 500 MILLIGRAM(S): at 14:20

## 2018-04-08 RX ADMIN — CHLORHEXIDINE GLUCONATE 15 MILLILITER(S): 213 SOLUTION TOPICAL at 05:12

## 2018-04-08 RX ADMIN — Medication 650 MILLIGRAM(S): at 17:27

## 2018-04-08 RX ADMIN — MAGNESIUM OXIDE 400 MG ORAL TABLET 400 MILLIGRAM(S): 241.3 TABLET ORAL at 05:14

## 2018-04-08 RX ADMIN — Medication 1 TABLET(S): at 05:13

## 2018-04-08 RX ADMIN — HEPARIN SODIUM 5000 UNIT(S): 5000 INJECTION INTRAVENOUS; SUBCUTANEOUS at 22:03

## 2018-04-08 NOTE — PROGRESS NOTE ADULT - SUBJECTIVE AND OBJECTIVE BOX
CC: complaining of chest discomfort and difficulty breathing . On vent support saturating 100%.  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 Tracheostomy and peg in place.   HPI:  63y/o male with pmh of PMH Transverse myelitis, Paraplegia, trach/peg, colostomy, HTN, coming from [facility] presents with fever for 3 days.      Patient denies any cough, sob, runny nose, vomiting, diarrhea, abdominal pain, pain with urination.    Patient signed MOLST REQUESTING CPR on 2/18/18.    Patient eats via tube feeding,.      Originally at [facility] in   7months ago, developed Acute transverse myelitis.    Heart Attack, stent placed, check up after.  He was given a tetanus shot, then couple days ago he developed lower extremity weakness, paralysis; ultimately developed ulcers.      [facility]--> [facility] (kept him for a few weeks) --> wife couldn't take care of him;    He is getting frequent uti's, anemia reequiring transfusions.    101 fever at nursing home today ([facility]) (10 Mar 2018 23:26)    REVIEW OF SYSTEMS:    Patient denied fever, chills, abdominal pain, nausea, vomiting, cough, shortness of breath, chest pain or palpitations    Vital Signs Last 24 Hrs  T(C): 36.8 (08 Apr 2018 08:03), Max: 37.3 (07 Apr 2018 20:45)  T(F): 98.3 (08 Apr 2018 08:03), Max: 99.2 (07 Apr 2018 20:45)  HR: 85 (08 Apr 2018 08:03) (78 - 91)  BP: 116/75 (08 Apr 2018 08:03) (110/72 - 121/80)  BP(mean): --  RR: 20 (08 Apr 2018 08:03) (16 - 20)  SpO2: 100% (08 Apr 2018 08:03) (100% - 100%)I&O's Summary    07 Apr 2018 07:01  -  08 Apr 2018 07:00  --------------------------------------------------------  IN: 2120 mL / OUT: 2200 mL / NET: -80 mL    08 Apr 2018 07:01  -  08 Apr 2018 13:54  --------------------------------------------------------  IN: 0 mL / OUT: 400 mL / NET: -400 mL      PHYSICAL EXAM:  GENERAL: NAD  HEENT: PERRL, +EOMI, anicteric, no [place]  NECK: Supple, No JVD   CHEST/LUNG: bilateral transmitted sounds.   HEART: S1S2 Normal intensity, no murmurs, gallops or rubs noted  ABDOMEN: Soft, BS Normoactive, NT, ND, no HSM noted  EXTREMITIES:   no clubbing, cyanosis, or edema noted. Bed bound  SKIN: No rashes or lesions noted  NEURO: Non verbal  quadreparesis.  CN II-XII intact  PSYCH: Depressed mood and affect; insight/judgement inappropriate  LABS:              RADIOLOGY & ADDITIONAL TESTS:    MEDICATIONS:  MEDICATIONS  (STANDING):  ascorbic acid 500 milliGRAM(s) Oral daily  aspirin  chewable 81 milliGRAM(s) Oral daily  baclofen 10 milliGRAM(s) Oral two times a day  chlorhexidine 0.12% Liquid 15 milliLiter(s) Swish and Spit two times a day  clopidogrel Tablet 75 milliGRAM(s) Oral daily  dextrose 5%. 1000 milliLiter(s) (50 mL/Hr) IV Continuous <Continuous>  dextrose 50% Injectable 12.5 Gram(s) IV Push once  dextrose 50% Injectable 25 Gram(s) IV Push once  dextrose 50% Injectable 25 Gram(s) IV Push once  heparin  Injectable 5000 Unit(s) SubCutaneous every 8 hours  insulin glargine Injectable (LANTUS) 20 Unit(s) SubCutaneous at bedtime  insulin regular  human corrective regimen sliding scale   SubCutaneous every 6 hours  lactobacillus acidophilus 1 Tablet(s) Oral every 12 hours  magnesium oxide 400 milliGRAM(s) Oral three times a day with meals  multivitamin   Solution 5 milliLiter(s) Oral daily  pantoprazole   Suspension 40 milliGRAM(s) Enteral Tube before breakfast  petrolatum Ophthalmic Ointment 1 Application(s) Both EYES at bedtime  vitamin A &amp; D Ointment 1 Application(s) Topical two times a day    MEDICATIONS  (PRN):  acetaminophen   Tablet 650 milliGRAM(s) Oral every 6 hours PRN For Temp greater than 38 C (100.4 F)  acetaminophen   Tablet. 650 milliGRAM(s) Oral every 6 hours PRN Moderate Pain (4 - 6) and headache  ALBUTerol    0.083% 2.5 milliGRAM(s) Nebulizer every 6 hours PRN Shortness of Breath and/or Wheezing  ALPRAZolam 0.5 milliGRAM(s) Oral three times a day PRN anxiety  artificial  tears Solution 1 Drop(s) Both EYES every 4 hours PRN Dry Eyes  glucagon  Injectable 1 milliGRAM(s) IntraMuscular once PRN Glucose <70 milliGRAM(s)/deciLiter  oxyCODONE    Solution 15 milliGRAM(s) Oral every 3 hours PRN mild to moderate pain

## 2018-04-08 NOTE — PROGRESS NOTE ADULT - ASSESSMENT
Acute on chronic respiratory failure - On ventilator support via tracheostomy. Pulm consult is following . For eventual weaning at the rehabilitation facility if tolerates.    Pneumonia - Completed the course of antibiotics. Monitoring clinically. Leukocytosis improved.    Transverse myelitis - Prophylactic heparin. On gastrostomy tube feeds. Monitor colostomy output.    Decubitus ulcer - Colostomy and urine catheter to divert from the decubitus site. Wound care. Analgesic medications as needed.    Diabetes - Insulin coverage, close monitoring of blood glucose levels.    Gastroesophageal reflux disease - On pantoprazole. Gastrostomy tube feeds.    Chest discomfort.- Will obtain and EKG and cardiac enzyme.     Supportive care.

## 2018-04-09 LAB
GLUCOSE BLDC GLUCOMTR-MCNC: 104 MG/DL — HIGH (ref 70–99)
GLUCOSE BLDC GLUCOMTR-MCNC: 122 MG/DL — HIGH (ref 70–99)
GLUCOSE BLDC GLUCOMTR-MCNC: 146 MG/DL — HIGH (ref 70–99)
GLUCOSE BLDC GLUCOMTR-MCNC: 165 MG/DL — HIGH (ref 70–99)
GLUCOSE BLDC GLUCOMTR-MCNC: 99 MG/DL — SIGNIFICANT CHANGE UP (ref 70–99)

## 2018-04-09 PROCEDURE — 99233 SBSQ HOSP IP/OBS HIGH 50: CPT

## 2018-04-09 RX ADMIN — OXYCODONE HYDROCHLORIDE 15 MILLIGRAM(S): 5 TABLET ORAL at 06:45

## 2018-04-09 RX ADMIN — MAGNESIUM OXIDE 400 MG ORAL TABLET 400 MILLIGRAM(S): 241.3 TABLET ORAL at 18:20

## 2018-04-09 RX ADMIN — MAGNESIUM OXIDE 400 MG ORAL TABLET 400 MILLIGRAM(S): 241.3 TABLET ORAL at 06:19

## 2018-04-09 RX ADMIN — Medication 1 APPLICATION(S): at 06:19

## 2018-04-09 RX ADMIN — OXYCODONE HYDROCHLORIDE 15 MILLIGRAM(S): 5 TABLET ORAL at 06:18

## 2018-04-09 RX ADMIN — INSULIN HUMAN 1: 100 INJECTION, SOLUTION SUBCUTANEOUS at 00:05

## 2018-04-09 RX ADMIN — Medication 1 APPLICATION(S): at 21:55

## 2018-04-09 RX ADMIN — Medication 0.5 MILLIGRAM(S): at 23:56

## 2018-04-09 RX ADMIN — OXYCODONE HYDROCHLORIDE 15 MILLIGRAM(S): 5 TABLET ORAL at 18:18

## 2018-04-09 RX ADMIN — INSULIN GLARGINE 20 UNIT(S): 100 INJECTION, SOLUTION SUBCUTANEOUS at 21:54

## 2018-04-09 RX ADMIN — Medication 0.5 MILLIGRAM(S): at 06:18

## 2018-04-09 RX ADMIN — Medication 1 APPLICATION(S): at 18:31

## 2018-04-09 RX ADMIN — Medication 81 MILLIGRAM(S): at 13:07

## 2018-04-09 RX ADMIN — MAGNESIUM OXIDE 400 MG ORAL TABLET 400 MILLIGRAM(S): 241.3 TABLET ORAL at 13:07

## 2018-04-09 RX ADMIN — HEPARIN SODIUM 5000 UNIT(S): 5000 INJECTION INTRAVENOUS; SUBCUTANEOUS at 21:54

## 2018-04-09 RX ADMIN — CLOPIDOGREL BISULFATE 75 MILLIGRAM(S): 75 TABLET, FILM COATED ORAL at 13:07

## 2018-04-09 RX ADMIN — OXYCODONE HYDROCHLORIDE 15 MILLIGRAM(S): 5 TABLET ORAL at 14:08

## 2018-04-09 RX ADMIN — HEPARIN SODIUM 5000 UNIT(S): 5000 INJECTION INTRAVENOUS; SUBCUTANEOUS at 13:10

## 2018-04-09 RX ADMIN — Medication 10 MILLIGRAM(S): at 18:20

## 2018-04-09 RX ADMIN — OXYCODONE HYDROCHLORIDE 15 MILLIGRAM(S): 5 TABLET ORAL at 19:19

## 2018-04-09 RX ADMIN — HEPARIN SODIUM 5000 UNIT(S): 5000 INJECTION INTRAVENOUS; SUBCUTANEOUS at 06:19

## 2018-04-09 RX ADMIN — OXYCODONE HYDROCHLORIDE 15 MILLIGRAM(S): 5 TABLET ORAL at 13:07

## 2018-04-09 RX ADMIN — Medication 500 MILLIGRAM(S): at 13:07

## 2018-04-09 RX ADMIN — Medication 10 MILLIGRAM(S): at 06:19

## 2018-04-09 RX ADMIN — CHLORHEXIDINE GLUCONATE 15 MILLILITER(S): 213 SOLUTION TOPICAL at 06:19

## 2018-04-09 RX ADMIN — Medication 1 TABLET(S): at 18:20

## 2018-04-09 RX ADMIN — Medication 5 MILLILITER(S): at 13:07

## 2018-04-09 RX ADMIN — Medication 1 TABLET(S): at 06:18

## 2018-04-09 RX ADMIN — CHLORHEXIDINE GLUCONATE 15 MILLILITER(S): 213 SOLUTION TOPICAL at 18:22

## 2018-04-09 NOTE — PROGRESS NOTE ADULT - SUBJECTIVE AND OBJECTIVE BOX
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  Resp failure on vent support, tracheostomy. Peg tube.   HPI:  61y/o male with pmh of PMH Transverse myelitis, Paraplegia, trach/peg, colostomy, HTN, coming from [facility] presents with fever for 3 days.      Patient denies any cough, sob, runny nose, vomiting, diarrhea, abdominal pain, pain with urination.    Patient signed MOLST REQUESTING CPR on 2/18/18.    Patient eats via tube feeding,.      Originally at [facility] in   7months ago, developed Acute transverse myelitis.    Heart Attack, stent placed, check up after.  He was given a tetanus shot, then couple days ago he developed lower extremity weakness, paralysis; ultimately developed ulcers.      SBU--> [facility] (kept him for a few weeks) --> wife couldn't take care of him;    He is getting frequent uti's, anemia reequiring transfusions.    101 fever at nursing home today ([facility]) (10 Mar 2018 23:26)    REVIEW OF SYSTEMS:    Patient denied fever, chills, abdominal pain, nausea, vomiting, cough, shortness of breath, chest pain or palpitations    Vital Signs Last 24 Hrs  T(C): 36.9 (09 Apr 2018 07:00), Max: 37.2 (08 Apr 2018 21:48)  T(F): 98.4 (09 Apr 2018 07:00), Max: 98.9 (08 Apr 2018 21:48)  HR: 66 (09 Apr 2018 08:20) (66 - 85)  BP: 117/68 (09 Apr 2018 07:00) (109/73 - 117/68)  BP(mean): --  RR: 16 (09 Apr 2018 07:00) (16 - 20)  SpO2: 100% (09 Apr 2018 08:20) (99% - 100%)I&O's Summary    08 Apr 2018 07:01  -  09 Apr 2018 07:00  --------------------------------------------------------  IN: 1130 mL / OUT: 1100 mL / NET: 30 mL      PHYSICAL EXAM:  GENERAL: NAD,  HEENT: PERRL, +EOMI, anicteric, no [place]  NECK: Supple, No JVD   CHEST/LUNG: CTA bilaterally; Normal effort  HEART: S1S2 Normal intensity, no murmurs, gallops or rubs noted  ABDOMEN: Soft, BS Normoactive, NT, ND, no HSM noted  EXTREMITIES:  2+ radial and DP pulses noted, no clubbing, cyanosis, or edema noted, Bed bound   SKIN: No rashes or lesions noted  NEURO: Non verbal , no focal deficits noted, CN II-XII intact  PSYCH: Depressed mood and affect; insight/judgement inappropriate  LABS:              RADIOLOGY & ADDITIONAL TESTS:    MEDICATIONS:  MEDICATIONS  (STANDING):  ascorbic acid 500 milliGRAM(s) Oral daily  aspirin  chewable 81 milliGRAM(s) Oral daily  baclofen 10 milliGRAM(s) Oral two times a day  chlorhexidine 0.12% Liquid 15 milliLiter(s) Swish and Spit two times a day  clopidogrel Tablet 75 milliGRAM(s) Oral daily  dextrose 5%. 1000 milliLiter(s) (50 mL/Hr) IV Continuous <Continuous>  dextrose 50% Injectable 12.5 Gram(s) IV Push once  dextrose 50% Injectable 25 Gram(s) IV Push once  dextrose 50% Injectable 25 Gram(s) IV Push once  heparin  Injectable 5000 Unit(s) SubCutaneous every 8 hours  insulin glargine Injectable (LANTUS) 20 Unit(s) SubCutaneous at bedtime  insulin regular  human corrective regimen sliding scale   SubCutaneous every 6 hours  lactobacillus acidophilus 1 Tablet(s) Oral every 12 hours  magnesium oxide 400 milliGRAM(s) Oral three times a day with meals  multivitamin   Solution 5 milliLiter(s) Oral daily  pantoprazole   Suspension 40 milliGRAM(s) Enteral Tube before breakfast  petrolatum Ophthalmic Ointment 1 Application(s) Both EYES at bedtime  vitamin A &amp; D Ointment 1 Application(s) Topical two times a day    MEDICATIONS  (PRN):  acetaminophen   Tablet 650 milliGRAM(s) Oral every 6 hours PRN For Temp greater than 38 C (100.4 F)  acetaminophen   Tablet. 650 milliGRAM(s) Oral every 6 hours PRN Moderate Pain (4 - 6) and headache  ALBUTerol    0.083% 2.5 milliGRAM(s) Nebulizer every 6 hours PRN Shortness of Breath and/or Wheezing  ALPRAZolam 0.5 milliGRAM(s) Oral three times a day PRN anxiety  artificial  tears Solution 1 Drop(s) Both EYES every 4 hours PRN Dry Eyes  glucagon  Injectable 1 milliGRAM(s) IntraMuscular once PRN Glucose <70 milliGRAM(s)/deciLiter  oxyCODONE    Solution 15 milliGRAM(s) Oral every 3 hours PRN Severe Pain (7 - 10)

## 2018-04-10 LAB
ANION GAP SERPL CALC-SCNC: 13 MMOL/L — SIGNIFICANT CHANGE UP (ref 5–17)
BUN SERPL-MCNC: 18 MG/DL — SIGNIFICANT CHANGE UP (ref 8–20)
CALCIUM SERPL-MCNC: 9.1 MG/DL — SIGNIFICANT CHANGE UP (ref 8.6–10.2)
CHLORIDE SERPL-SCNC: 95 MMOL/L — LOW (ref 98–107)
CO2 SERPL-SCNC: 28 MMOL/L — SIGNIFICANT CHANGE UP (ref 22–29)
CREAT SERPL-MCNC: 0.63 MG/DL — SIGNIFICANT CHANGE UP (ref 0.5–1.3)
GLUCOSE BLDC GLUCOMTR-MCNC: 130 MG/DL — HIGH (ref 70–99)
GLUCOSE BLDC GLUCOMTR-MCNC: 147 MG/DL — HIGH (ref 70–99)
GLUCOSE BLDC GLUCOMTR-MCNC: 155 MG/DL — HIGH (ref 70–99)
GLUCOSE BLDC GLUCOMTR-MCNC: 187 MG/DL — HIGH (ref 70–99)
GLUCOSE SERPL-MCNC: 135 MG/DL — HIGH (ref 70–115)
HCT VFR BLD CALC: 32.7 % — LOW (ref 42–52)
HGB BLD-MCNC: 10 G/DL — LOW (ref 14–18)
MCHC RBC-ENTMCNC: 27.3 PG — SIGNIFICANT CHANGE UP (ref 27–31)
MCHC RBC-ENTMCNC: 30.6 G/DL — LOW (ref 32–36)
MCV RBC AUTO: 89.3 FL — SIGNIFICANT CHANGE UP (ref 80–94)
PLATELET # BLD AUTO: 387 K/UL — SIGNIFICANT CHANGE UP (ref 150–400)
POTASSIUM SERPL-MCNC: 4.1 MMOL/L — SIGNIFICANT CHANGE UP (ref 3.5–5.3)
POTASSIUM SERPL-SCNC: 4.1 MMOL/L — SIGNIFICANT CHANGE UP (ref 3.5–5.3)
RBC # BLD: 3.66 M/UL — LOW (ref 4.6–6.2)
RBC # FLD: 16.1 % — HIGH (ref 11–15.6)
SODIUM SERPL-SCNC: 136 MMOL/L — SIGNIFICANT CHANGE UP (ref 135–145)
WBC # BLD: 10.6 K/UL — SIGNIFICANT CHANGE UP (ref 4.8–10.8)
WBC # FLD AUTO: 10.6 K/UL — SIGNIFICANT CHANGE UP (ref 4.8–10.8)

## 2018-04-10 PROCEDURE — 99233 SBSQ HOSP IP/OBS HIGH 50: CPT

## 2018-04-10 RX ORDER — ALPRAZOLAM 0.25 MG
0.5 TABLET ORAL THREE TIMES A DAY
Qty: 0 | Refills: 0 | Status: DISCONTINUED | OUTPATIENT
Start: 2018-04-10 | End: 2018-04-13

## 2018-04-10 RX ADMIN — CHLORHEXIDINE GLUCONATE 15 MILLILITER(S): 213 SOLUTION TOPICAL at 06:05

## 2018-04-10 RX ADMIN — Medication 1 APPLICATION(S): at 19:20

## 2018-04-10 RX ADMIN — ALBUTEROL 2.5 MILLIGRAM(S): 90 AEROSOL, METERED ORAL at 00:31

## 2018-04-10 RX ADMIN — OXYCODONE HYDROCHLORIDE 15 MILLIGRAM(S): 5 TABLET ORAL at 12:28

## 2018-04-10 RX ADMIN — OXYCODONE HYDROCHLORIDE 15 MILLIGRAM(S): 5 TABLET ORAL at 03:44

## 2018-04-10 RX ADMIN — CHLORHEXIDINE GLUCONATE 15 MILLILITER(S): 213 SOLUTION TOPICAL at 19:18

## 2018-04-10 RX ADMIN — HEPARIN SODIUM 5000 UNIT(S): 5000 INJECTION INTRAVENOUS; SUBCUTANEOUS at 21:36

## 2018-04-10 RX ADMIN — Medication 10 MILLIGRAM(S): at 19:18

## 2018-04-10 RX ADMIN — Medication 1 APPLICATION(S): at 21:37

## 2018-04-10 RX ADMIN — Medication 81 MILLIGRAM(S): at 12:37

## 2018-04-10 RX ADMIN — Medication 1 APPLICATION(S): at 06:06

## 2018-04-10 RX ADMIN — MAGNESIUM OXIDE 400 MG ORAL TABLET 400 MILLIGRAM(S): 241.3 TABLET ORAL at 15:45

## 2018-04-10 RX ADMIN — HEPARIN SODIUM 5000 UNIT(S): 5000 INJECTION INTRAVENOUS; SUBCUTANEOUS at 06:06

## 2018-04-10 RX ADMIN — INSULIN HUMAN 1: 100 INJECTION, SOLUTION SUBCUTANEOUS at 06:07

## 2018-04-10 RX ADMIN — Medication 0.5 MILLIGRAM(S): at 21:36

## 2018-04-10 RX ADMIN — Medication 5 MILLILITER(S): at 12:37

## 2018-04-10 RX ADMIN — Medication 0.5 MILLIGRAM(S): at 15:46

## 2018-04-10 RX ADMIN — OXYCODONE HYDROCHLORIDE 15 MILLIGRAM(S): 5 TABLET ORAL at 04:30

## 2018-04-10 RX ADMIN — CLOPIDOGREL BISULFATE 75 MILLIGRAM(S): 75 TABLET, FILM COATED ORAL at 15:46

## 2018-04-10 RX ADMIN — HEPARIN SODIUM 5000 UNIT(S): 5000 INJECTION INTRAVENOUS; SUBCUTANEOUS at 15:46

## 2018-04-10 RX ADMIN — INSULIN HUMAN 1: 100 INJECTION, SOLUTION SUBCUTANEOUS at 19:26

## 2018-04-10 RX ADMIN — INSULIN GLARGINE 20 UNIT(S): 100 INJECTION, SOLUTION SUBCUTANEOUS at 21:36

## 2018-04-10 RX ADMIN — MAGNESIUM OXIDE 400 MG ORAL TABLET 400 MILLIGRAM(S): 241.3 TABLET ORAL at 19:17

## 2018-04-10 RX ADMIN — Medication 500 MILLIGRAM(S): at 12:37

## 2018-04-10 RX ADMIN — OXYCODONE HYDROCHLORIDE 15 MILLIGRAM(S): 5 TABLET ORAL at 19:19

## 2018-04-10 NOTE — CHART NOTE - NSCHARTNOTEFT_GEN_A_CORE
Source: Patient [ ]  Family [ ]   other [X]: EMR    Current Diet:   Diet, NPO with Tube Feed:   Glucerna 1.5 Beau    Tube Feeding Modality: Gastrostomy  Total Volume for 24 Hours (mL): 1400  Continuous  Starting Tube Feed Rate {mL per Hour}: 70  Until Goal Tube Feed Rate (mL per Hour): 70  Tube Feed Duration (in Hours): 20  Tube Feed Start Time: 15:30 (04-03-18 @ 15:05    Current Weight:   164.9 (03-20-18)  174.6 (03-19-18)  173.2 (03-18-18)    % Weight Change     Pertinent Medications: MEDICATIONS  (STANDING):  ascorbic acid 500 milliGRAM(s) Oral daily  aspirin  chewable 81 milliGRAM(s) Oral daily  baclofen 10 milliGRAM(s) Oral two times a day  chlorhexidine 0.12% Liquid 15 milliLiter(s) Swish and Spit two times a day  clopidogrel Tablet 75 milliGRAM(s) Oral daily  dextrose 5%. 1000 milliLiter(s) (50 mL/Hr) IV Continuous <Continuous>  dextrose 50% Injectable 12.5 Gram(s) IV Push once  dextrose 50% Injectable 25 Gram(s) IV Push once  dextrose 50% Injectable 25 Gram(s) IV Push once  heparin  Injectable 5000 Unit(s) SubCutaneous every 8 hours  insulin glargine Injectable (LANTUS) 20 Unit(s) SubCutaneous at bedtime  insulin regular  human corrective regimen sliding scale   SubCutaneous every 6 hours  magnesium oxide 400 milliGRAM(s) Oral three times a day with meals  multivitamin   Solution 5 milliLiter(s) Oral daily  pantoprazole   Suspension 40 milliGRAM(s) Enteral Tube before breakfast  petrolatum Ophthalmic Ointment 1 Application(s) Both EYES at bedtime  vitamin A &amp; D Ointment 1 Application(s) Topical two times a day    MEDICATIONS  (PRN):  acetaminophen   Tablet 650 milliGRAM(s) Oral every 6 hours PRN For Temp greater than 38 C (100.4 F)  acetaminophen   Tablet. 650 milliGRAM(s) Oral every 6 hours PRN Moderate Pain (4 - 6) and headache  ALBUTerol    0.083% 2.5 milliGRAM(s) Nebulizer every 6 hours PRN Shortness of Breath and/or Wheezing  ALPRAZolam 0.5 milliGRAM(s) Oral three times a day PRN anxiety  artificial  tears Solution 1 Drop(s) Both EYES every 4 hours PRN Dry Eyes  glucagon  Injectable 1 milliGRAM(s) IntraMuscular once PRN Glucose <70 milliGRAM(s)/deciLiter  oxyCODONE    Solution 15 milliGRAM(s) Oral every 3 hours PRN Severe Pain (7 - 10)    Pertinent Labs: CBC Full  -  ( 10 Apr 2018 09:49 )  WBC Count : 10.6 K/uL  Hemoglobin : 10.0 g/dL  Hematocrit : 32.7 %  Platelet Count - Automated : 387 K/uL  Mean Cell Volume : 89.3 fl  Mean Cell Hemoglobin : 27.3 pg  Mean Cell Hemoglobin Concentration : 30.6 g/dL  Auto Neutrophil # : x  Auto Lymphocyte # : x  Auto Monocyte # : x  Auto Eosinophil # : x  Auto Basophil # : x  Auto Neutrophil % : x  Auto Lymphocyte % : x  Auto Monocyte % : x  Auto Eosinophil % : x  Auto Basophil % : x    Skin: Stage IV Sacral Pressure Ulcer, Stage II Right Heel Ulcer    Estimated Needs:   [X] no change since previous assessment  [ ] recalculated:     Current Nutrition Diagnosis:  Pt presents at risk secondary to increased protein calorie needs, related to increased  physiologic demand of healing, as evidenced by stage IV sacral pressure ulcer and stage II right heel ulcer.    Recommendations:   1) Continue current enteral feeding regimen  2) Monitor Weight and Wound Healing    Monitoring and Evaluation:   [ ] PO intake [X] Tolerance to diet prescription [X] Weights  [X] Follow up per protocol [X] Labs:

## 2018-04-10 NOTE — PROGRESS NOTE ADULT - ASSESSMENT
Acute on chronic respiratory failure - On ventilator support via tracheostomy. Pulm consult is following . For eventual weaning at the rehabilitation facility if tolerates.    Pneumonia - Completed the course of antibiotics. Monitoring clinically. Leukocytosis improved.    Transverse myelitis - Prophylactic heparin. On gastrostomy tube feeds. Monitor colostomy output.    Decubitus ulcer - Colostomy and urine catheter to divert from the decubitus site. Wound care. Analgesic medications as needed.    Diabetes - Insulin coverage, close monitoring of blood glucose levels.    Gastroesophageal reflux disease - On pantoprazole. Gastrostomy tube feeds.    Chest discomfort.- Will obtain and EKG and cardiac enzyme.     Supportive care. Awaiting transfer to vent capable long term care facility

## 2018-04-10 NOTE — PROGRESS NOTE ADULT - ASSESSMENT
VDRF secondary to neurologic condition>transverse myelitis  chronic RLL atelectasis  Not tolerant of spontaneous breathing trials    Plan:  1.Continue vent support  2.OK for transfer to vent facility for further attempts at weaning as tolerated when arranged.   3 nebs

## 2018-04-10 NOTE — PROGRESS NOTE ADULT - SUBJECTIVE AND OBJECTIVE BOX
CC: Functional quadreplegia from neuromuscular condition transverse myelitis.  Vent dependent resp failure. Trach , Peg , Colostomy .  Stable but resistant to vent weaning.  Awaiting transfer to vent capable long term care facility.   HPI:  61y/o male with pmh of PMH Transverse myelitis, Paraplegia, trach/peg, colostomy, HTN, coming from [facility] presents with fever for 3 days.      Patient denies any cough, sob, runny nose, vomiting, diarrhea, abdominal pain, pain with urination.    Patient signed MOLST REQUESTING CPR on 2/18/18.    Patient eats via tube feeding,.      Originally at [facility] in   7months ago, developed Acute transverse myelitis.    Heart Attack, stent placed, check up after.  He was given a tetanus shot, then couple days ago he developed lower extremity weakness, paralysis; ultimately developed ulcers.      [facility]--> [facility] (kept him for a few weeks) --> wife couldn't take care of him;    He is getting frequent uti's, anemia reequiring transfusions.    101 fever at nursing home today ([facility]) (10 Mar 2018 23:26)    REVIEW OF SYSTEMS:    Patient denied fever, chills, abdominal pain, nausea, vomiting, cough, shortness of breath, chest pain or palpitations    Vital Signs Last 24 Hrs  T(C): 36.8 (10 Apr 2018 07:00), Max: 37.4 (09 Apr 2018 16:21)  T(F): 98.2 (10 Apr 2018 07:00), Max: 99.3 (09 Apr 2018 16:21)  HR: 87 (10 Apr 2018 12:16) (68 - 88)  BP: 116/78 (10 Apr 2018 07:00) (103/64 - 116/78)  BP(mean): --  RR: 18 (10 Apr 2018 07:00) (18 - 18)  SpO2: 100% (10 Apr 2018 12:16) (98% - 100%)I&O's Summary    09 Apr 2018 07:01  -  10 Apr 2018 07:00  --------------------------------------------------------  IN: 1800 mL / OUT: 1100 mL / NET: 700 mL      PHYSICAL EXAM:  GENERAL:   HEENT: PERRL, +EOMI, anicteric, no [place]  NECK: Supple, No JVD   CHEST/LUNG: CTA bilaterally; Normal effort  HEART: S1S2 Normal intensity, no murmurs, gallops or rubs noted  ABDOMEN: Soft, BS Normoactive, NT, ND, no HSM noted. Peg tube colostomy   EXTREMITIES:  2+ radial and DP pulses noted, no clubbing, cyanosis, or edema noted, bed bound   SKIN: No rashes or lesions noted  NEURO: Lethargy, quadreparesis CN II-XII intact  PSYCH: Depressed mood and affect; insight/judgement appropriate  LABS:                        10.0   10.6  )-----------( 387      ( 10 Apr 2018 09:49 )             32.7     04-10    136  |  95<L>  |  18.0  ----------------------------<  135<H>  4.1   |  28.0  |  0.63    Ca    9.1      10 Apr 2018 09:49          RADIOLOGY & ADDITIONAL TESTS:    MEDICATIONS:  MEDICATIONS  (STANDING):  ascorbic acid 500 milliGRAM(s) Oral daily  aspirin  chewable 81 milliGRAM(s) Oral daily  baclofen 10 milliGRAM(s) Oral two times a day  chlorhexidine 0.12% Liquid 15 milliLiter(s) Swish and Spit two times a day  clopidogrel Tablet 75 milliGRAM(s) Oral daily  dextrose 5%. 1000 milliLiter(s) (50 mL/Hr) IV Continuous <Continuous>  dextrose 50% Injectable 12.5 Gram(s) IV Push once  dextrose 50% Injectable 25 Gram(s) IV Push once  dextrose 50% Injectable 25 Gram(s) IV Push once  heparin  Injectable 5000 Unit(s) SubCutaneous every 8 hours  insulin glargine Injectable (LANTUS) 20 Unit(s) SubCutaneous at bedtime  insulin regular  human corrective regimen sliding scale   SubCutaneous every 6 hours  magnesium oxide 400 milliGRAM(s) Oral three times a day with meals  multivitamin   Solution 5 milliLiter(s) Oral daily  pantoprazole   Suspension 40 milliGRAM(s) Enteral Tube before breakfast  petrolatum Ophthalmic Ointment 1 Application(s) Both EYES at bedtime  vitamin A &amp; D Ointment 1 Application(s) Topical two times a day    MEDICATIONS  (PRN):  acetaminophen   Tablet 650 milliGRAM(s) Oral every 6 hours PRN For Temp greater than 38 C (100.4 F)  acetaminophen   Tablet. 650 milliGRAM(s) Oral every 6 hours PRN Moderate Pain (4 - 6) and headache  ALBUTerol    0.083% 2.5 milliGRAM(s) Nebulizer every 6 hours PRN Shortness of Breath and/or Wheezing  ALPRAZolam 0.5 milliGRAM(s) Oral three times a day PRN anxiety  artificial  tears Solution 1 Drop(s) Both EYES every 4 hours PRN Dry Eyes  glucagon  Injectable 1 milliGRAM(s) IntraMuscular once PRN Glucose <70 milliGRAM(s)/deciLiter  oxyCODONE    Solution 15 milliGRAM(s) Oral every 3 hours PRN Severe Pain (7 - 10)

## 2018-04-10 NOTE — PROGRESS NOTE ADULT - SUBJECTIVE AND OBJECTIVE BOX
PULMONARY PROGRESS NOTE      [id number]    Patient is a 62y old  Male who presents with a chief complaint of fevers (12 Mar 2018 18:28)      INTERVAL HPI/OVERNIGHT EVENTS:  no overnight issues  placement pending  MEDICATIONS  (STANDING):  ascorbic acid 500 milliGRAM(s) Oral daily  aspirin  chewable 81 milliGRAM(s) Oral daily  baclofen 10 milliGRAM(s) Oral two times a day  chlorhexidine 0.12% Liquid 15 milliLiter(s) Swish and Spit two times a day  clopidogrel Tablet 75 milliGRAM(s) Oral daily  dextrose 5%. 1000 milliLiter(s) (50 mL/Hr) IV Continuous <Continuous>  dextrose 50% Injectable 12.5 Gram(s) IV Push once  dextrose 50% Injectable 25 Gram(s) IV Push once  dextrose 50% Injectable 25 Gram(s) IV Push once  heparin  Injectable 5000 Unit(s) SubCutaneous every 8 hours  insulin glargine Injectable (LANTUS) 20 Unit(s) SubCutaneous at bedtime  insulin regular  human corrective regimen sliding scale   SubCutaneous every 6 hours  magnesium oxide 400 milliGRAM(s) Oral three times a day with meals  multivitamin   Solution 5 milliLiter(s) Oral daily  pantoprazole   Suspension 40 milliGRAM(s) Enteral Tube before breakfast  petrolatum Ophthalmic Ointment 1 Application(s) Both EYES at bedtime  vitamin A &amp; D Ointment 1 Application(s) Topical two times a day      MEDICATIONS  (PRN):  acetaminophen   Tablet 650 milliGRAM(s) Oral every 6 hours PRN For Temp greater than 38 C (100.4 F)  acetaminophen   Tablet. 650 milliGRAM(s) Oral every 6 hours PRN Moderate Pain (4 - 6) and headache  ALBUTerol    0.083% 2.5 milliGRAM(s) Nebulizer every 6 hours PRN Shortness of Breath and/or Wheezing  ALPRAZolam 0.5 milliGRAM(s) Oral three times a day PRN anxiety  artificial  tears Solution 1 Drop(s) Both EYES every 4 hours PRN Dry Eyes  glucagon  Injectable 1 milliGRAM(s) IntraMuscular once PRN Glucose <70 milliGRAM(s)/deciLiter  oxyCODONE    Solution 15 milliGRAM(s) Oral every 3 hours PRN Severe Pain (7 - 10)      Allergies    No Known Allergies    Intolerances        PAST MEDICAL & SURGICAL HISTORY:  Essential hypertension  Paralysis  Transverse myelitis  History of tracheostomy      SOCIAL HISTORY  Smoking History:       REVIEW OF SYSTEMS:    n/a  Vital Signs Last 24 Hrs  T(C): 36.8 (10 Apr 2018 07:00), Max: 37.4 (09 Apr 2018 16:21)  T(F): 98.2 (10 Apr 2018 07:00), Max: 99.3 (09 Apr 2018 16:21)  HR: 87 (10 Apr 2018 12:16) (68 - 88)  BP: 116/78 (10 Apr 2018 07:00) (103/64 - 116/78)  BP(mean): --  RR: 18 (10 Apr 2018 07:00) (18 - 18)  SpO2: 100% (10 Apr 2018 12:16) (98% - 100%)    PHYSICAL EXAMINATION:    GENERAL: The patient is awake and alert in no apparent distress.     HEENT: Head is normocephalic and atraumatic. Extraocular muscles are intact. Mucous membranes are moist.    NECK: Supple.    LUNGS: moderate air entry bilat without wheezing, rales or rhonchi; respirations unlabored    HEART: Regular rate and rhythm without murmur.    ABDOMEN: Soft, nontender, and nondistended.      EXTREMITIES: Without any cyanosis, clubbing, rash, lesions or edema.    NEUROLOGIC: quadriplegic.    LABS:                        10.0   10.6  )-----------( 387      ( 10 Apr 2018 09:49 )             32.7     04-10    136  |  95<L>  |  18.0  ----------------------------<  135<H>  4.1   |  28.0  |  0.63    Ca    9.1      10 Apr 2018 09:49            CARDIAC MARKERS ( 08 Apr 2018 17:13 )  x     / 0.02 ng/mL / 23 U/L / x     / x                    MICROBIOLOGY:    RADIOLOGY & ADDITIONAL STUDIES:  cxr reviewed

## 2018-04-11 LAB
GLUCOSE BLDC GLUCOMTR-MCNC: 143 MG/DL — HIGH (ref 70–99)
GLUCOSE BLDC GLUCOMTR-MCNC: 189 MG/DL — HIGH (ref 70–99)
GLUCOSE BLDC GLUCOMTR-MCNC: 220 MG/DL — HIGH (ref 70–99)

## 2018-04-11 PROCEDURE — 99233 SBSQ HOSP IP/OBS HIGH 50: CPT

## 2018-04-11 RX ORDER — MORPHINE SULFATE 50 MG/1
2 CAPSULE, EXTENDED RELEASE ORAL EVERY 4 HOURS
Qty: 0 | Refills: 0 | Status: DISCONTINUED | OUTPATIENT
Start: 2018-04-11 | End: 2018-04-13

## 2018-04-11 RX ADMIN — Medication 0.5 MILLIGRAM(S): at 11:47

## 2018-04-11 RX ADMIN — Medication 81 MILLIGRAM(S): at 11:48

## 2018-04-11 RX ADMIN — HEPARIN SODIUM 5000 UNIT(S): 5000 INJECTION INTRAVENOUS; SUBCUTANEOUS at 05:47

## 2018-04-11 RX ADMIN — MAGNESIUM OXIDE 400 MG ORAL TABLET 400 MILLIGRAM(S): 241.3 TABLET ORAL at 05:46

## 2018-04-11 RX ADMIN — Medication 1 APPLICATION(S): at 05:48

## 2018-04-11 RX ADMIN — INSULIN GLARGINE 20 UNIT(S): 100 INJECTION, SOLUTION SUBCUTANEOUS at 22:43

## 2018-04-11 RX ADMIN — HEPARIN SODIUM 5000 UNIT(S): 5000 INJECTION INTRAVENOUS; SUBCUTANEOUS at 11:48

## 2018-04-11 RX ADMIN — CHLORHEXIDINE GLUCONATE 15 MILLILITER(S): 213 SOLUTION TOPICAL at 05:47

## 2018-04-11 RX ADMIN — CLOPIDOGREL BISULFATE 75 MILLIGRAM(S): 75 TABLET, FILM COATED ORAL at 11:48

## 2018-04-11 RX ADMIN — INSULIN HUMAN 2: 100 INJECTION, SOLUTION SUBCUTANEOUS at 11:50

## 2018-04-11 RX ADMIN — OXYCODONE HYDROCHLORIDE 15 MILLIGRAM(S): 5 TABLET ORAL at 12:00

## 2018-04-11 RX ADMIN — OXYCODONE HYDROCHLORIDE 15 MILLIGRAM(S): 5 TABLET ORAL at 11:46

## 2018-04-11 RX ADMIN — MORPHINE SULFATE 2 MILLIGRAM(S): 50 CAPSULE, EXTENDED RELEASE ORAL at 19:00

## 2018-04-11 RX ADMIN — PANTOPRAZOLE SODIUM 40 MILLIGRAM(S): 20 TABLET, DELAYED RELEASE ORAL at 05:48

## 2018-04-11 RX ADMIN — MORPHINE SULFATE 2 MILLIGRAM(S): 50 CAPSULE, EXTENDED RELEASE ORAL at 22:56

## 2018-04-11 RX ADMIN — INSULIN HUMAN 1: 100 INJECTION, SOLUTION SUBCUTANEOUS at 06:05

## 2018-04-11 RX ADMIN — MAGNESIUM OXIDE 400 MG ORAL TABLET 400 MILLIGRAM(S): 241.3 TABLET ORAL at 11:47

## 2018-04-11 RX ADMIN — MORPHINE SULFATE 2 MILLIGRAM(S): 50 CAPSULE, EXTENDED RELEASE ORAL at 18:42

## 2018-04-11 RX ADMIN — MORPHINE SULFATE 2 MILLIGRAM(S): 50 CAPSULE, EXTENDED RELEASE ORAL at 22:41

## 2018-04-11 RX ADMIN — Medication 5 MILLILITER(S): at 11:48

## 2018-04-11 RX ADMIN — Medication 500 MILLIGRAM(S): at 11:48

## 2018-04-11 RX ADMIN — HEPARIN SODIUM 5000 UNIT(S): 5000 INJECTION INTRAVENOUS; SUBCUTANEOUS at 22:43

## 2018-04-11 RX ADMIN — Medication 10 MILLIGRAM(S): at 05:46

## 2018-04-11 NOTE — PROGRESS NOTE ADULT - SUBJECTIVE AND OBJECTIVE BOX
CC: Vent dependent resp failure secondary to neuromuscular disorder transverse myelitis. Functional quadreplegia. Trach, Peg , Colostomy   HPI:  63y/o male with pmh of PMH Transverse myelitis, Paraplegia, trach/peg, colostomy, HTN, coming from [facility] presents with fever for 3 days.      Patient denies any cough, sob, runny nose, vomiting, diarrhea, abdominal pain, pain with urination.    Patient signed MOLST REQUESTING CPR on 2/18/18.    Patient eats via tube feeding,.      Originally at [facility] in   7months ago, developed Acute transverse myelitis.    Heart Attack, stent placed, check up after.  He was given a tetanus shot, then couple days ago he developed lower extremity weakness, paralysis; ultimately developed ulcers.      [facility]--> [facility] (kept him for a few weeks) --> wife couldn't take care of him;    He is getting frequent uti's, anemia reequiring transfusions.    101 fever at nursing home today ([facility] [place]) (10 Mar 2018 23:26)    REVIEW OF SYSTEMS:    Patient denied fever, chills, abdominal pain, nausea, vomiting, cough, shortness of breath, chest pain or palpitations    Vital Signs Last 24 Hrs  T(C): 36.8 (11 Apr 2018 07:00), Max: 37.3 (11 Apr 2018 00:34)  T(F): 98.2 (11 Apr 2018 07:00), Max: 99.1 (11 Apr 2018 00:34)  HR: 90 (11 Apr 2018 10:35) (77 - 91)  BP: 143/91 (11 Apr 2018 07:00) (106/68 - 143/91)  BP(mean): --  RR: 18 (11 Apr 2018 00:34) (16 - 18)  SpO2: 99% (11 Apr 2018 10:35) (97% - 100%)I&O's Summary    10 Apr 2018 07:01  -  11 Apr 2018 07:00  --------------------------------------------------------  IN: 1310 mL / OUT: 750 mL / NET: 560 mL    11 Apr 2018 07:01  -  11 Apr 2018 12:47  --------------------------------------------------------  IN: 0 mL / OUT: 900 mL / NET: -900 mL      PHYSICAL EXAM:  GENERAL: NAD,   HEENT: PERRL, +EOMI, anicteric, no [place]  NECK: Supple, No JVD   CHEST/LUNG: bilateral transmitted breath sounds   HEART: S1S2 Normal intensity, no murmurs, gallops or rubs noted  ABDOMEN: Soft, BS Normoactive, NT, ND, no HSM noted  EXTREMITIES:  2+ radial and DP pulses noted, no clubbing, cyanosis, or edema noted. Bed bound   SKIN: No rashes or lesions noted  NEURO: A&Ox3, Quadreparesis. CN II-XII intact  PSYCH: Depressed mood and affect; insight/judgement appropriate  LABS:                        10.0   10.6  )-----------( 387      ( 10 Apr 2018 09:49 )             32.7     04-10    136  |  95<L>  |  18.0  ----------------------------<  135<H>  4.1   |  28.0  |  0.63    Ca    9.1      10 Apr 2018 09:49          RADIOLOGY & ADDITIONAL TESTS:    MEDICATIONS:  MEDICATIONS  (STANDING):  ascorbic acid 500 milliGRAM(s) Oral daily  aspirin  chewable 81 milliGRAM(s) Oral daily  baclofen 10 milliGRAM(s) Oral two times a day  chlorhexidine 0.12% Liquid 15 milliLiter(s) Swish and Spit two times a day  clopidogrel Tablet 75 milliGRAM(s) Oral daily  dextrose 5%. 1000 milliLiter(s) (50 mL/Hr) IV Continuous <Continuous>  dextrose 50% Injectable 12.5 Gram(s) IV Push once  dextrose 50% Injectable 25 Gram(s) IV Push once  dextrose 50% Injectable 25 Gram(s) IV Push once  heparin  Injectable 5000 Unit(s) SubCutaneous every 8 hours  insulin glargine Injectable (LANTUS) 20 Unit(s) SubCutaneous at bedtime  insulin regular  human corrective regimen sliding scale   SubCutaneous every 6 hours  magnesium oxide 400 milliGRAM(s) Oral three times a day with meals  multivitamin   Solution 5 milliLiter(s) Oral daily  pantoprazole   Suspension 40 milliGRAM(s) Enteral Tube before breakfast  petrolatum Ophthalmic Ointment 1 Application(s) Both EYES at bedtime  vitamin A &amp; D Ointment 1 Application(s) Topical two times a day    MEDICATIONS  (PRN):  acetaminophen   Tablet 650 milliGRAM(s) Oral every 6 hours PRN For Temp greater than 38 C (100.4 F)  acetaminophen   Tablet. 650 milliGRAM(s) Oral every 6 hours PRN Moderate Pain (4 - 6) and headache  ALBUTerol    0.083% 2.5 milliGRAM(s) Nebulizer every 6 hours PRN Shortness of Breath and/or Wheezing  ALPRAZolam 0.5 milliGRAM(s) Oral three times a day PRN anxiety  artificial  tears Solution 1 Drop(s) Both EYES every 4 hours PRN Dry Eyes  glucagon  Injectable 1 milliGRAM(s) IntraMuscular once PRN Glucose <70 milliGRAM(s)/deciLiter  oxyCODONE    Solution 15 milliGRAM(s) Oral every 3 hours PRN Severe Pain (7 - 10)

## 2018-04-12 DIAGNOSIS — K94.23 GASTROSTOMY MALFUNCTION: ICD-10-CM

## 2018-04-12 LAB
GLUCOSE BLDC GLUCOMTR-MCNC: 118 MG/DL — HIGH (ref 70–99)
GLUCOSE BLDC GLUCOMTR-MCNC: 142 MG/DL — HIGH (ref 70–99)
GLUCOSE BLDC GLUCOMTR-MCNC: 165 MG/DL — HIGH (ref 70–99)

## 2018-04-12 PROCEDURE — 99233 SBSQ HOSP IP/OBS HIGH 50: CPT

## 2018-04-12 PROCEDURE — 99232 SBSQ HOSP IP/OBS MODERATE 35: CPT

## 2018-04-12 RX ADMIN — HEPARIN SODIUM 5000 UNIT(S): 5000 INJECTION INTRAVENOUS; SUBCUTANEOUS at 14:56

## 2018-04-12 RX ADMIN — INSULIN HUMAN 1: 100 INJECTION, SOLUTION SUBCUTANEOUS at 23:22

## 2018-04-12 RX ADMIN — OXYCODONE HYDROCHLORIDE 15 MILLIGRAM(S): 5 TABLET ORAL at 11:34

## 2018-04-12 RX ADMIN — CHLORHEXIDINE GLUCONATE 15 MILLILITER(S): 213 SOLUTION TOPICAL at 06:17

## 2018-04-12 RX ADMIN — Medication 5 MILLILITER(S): at 14:56

## 2018-04-12 RX ADMIN — Medication 10 MILLIGRAM(S): at 17:00

## 2018-04-12 RX ADMIN — Medication 500 MILLIGRAM(S): at 14:57

## 2018-04-12 RX ADMIN — MAGNESIUM OXIDE 400 MG ORAL TABLET 400 MILLIGRAM(S): 241.3 TABLET ORAL at 16:59

## 2018-04-12 RX ADMIN — Medication 81 MILLIGRAM(S): at 14:57

## 2018-04-12 RX ADMIN — OXYCODONE HYDROCHLORIDE 15 MILLIGRAM(S): 5 TABLET ORAL at 12:15

## 2018-04-12 RX ADMIN — HEPARIN SODIUM 5000 UNIT(S): 5000 INJECTION INTRAVENOUS; SUBCUTANEOUS at 22:44

## 2018-04-12 RX ADMIN — CHLORHEXIDINE GLUCONATE 15 MILLILITER(S): 213 SOLUTION TOPICAL at 16:59

## 2018-04-12 RX ADMIN — HEPARIN SODIUM 5000 UNIT(S): 5000 INJECTION INTRAVENOUS; SUBCUTANEOUS at 06:17

## 2018-04-12 RX ADMIN — Medication 1 APPLICATION(S): at 22:44

## 2018-04-12 RX ADMIN — Medication 0.5 MILLIGRAM(S): at 11:01

## 2018-04-12 RX ADMIN — INSULIN GLARGINE 20 UNIT(S): 100 INJECTION, SOLUTION SUBCUTANEOUS at 22:45

## 2018-04-12 RX ADMIN — CLOPIDOGREL BISULFATE 75 MILLIGRAM(S): 75 TABLET, FILM COATED ORAL at 14:56

## 2018-04-12 RX ADMIN — MAGNESIUM OXIDE 400 MG ORAL TABLET 400 MILLIGRAM(S): 241.3 TABLET ORAL at 14:56

## 2018-04-12 RX ADMIN — Medication 1 APPLICATION(S): at 16:58

## 2018-04-12 NOTE — PROGRESS NOTE ADULT - SUBJECTIVE AND OBJECTIVE BOX
Pt seen and examined f/u for Gastrostomy tube malfunction.  Asked to see patient again for clogged G tube. G tube easily unclogged with flush of normal saline thru tube with gently pressure using a gabriel syringe after confirmation that tube was in proper position. Tube now unclogged.    REVIEW OF SYSTEMS: unable to obtain      MEDICATIONS:  MEDICATIONS  (STANDING):  ascorbic acid 500 milliGRAM(s) Oral daily  aspirin  chewable 81 milliGRAM(s) Oral daily  baclofen 10 milliGRAM(s) Oral two times a day  chlorhexidine 0.12% Liquid 15 milliLiter(s) Swish and Spit two times a day  clopidogrel Tablet 75 milliGRAM(s) Oral daily  dextrose 5%. 1000 milliLiter(s) (50 mL/Hr) IV Continuous <Continuous>  dextrose 50% Injectable 12.5 Gram(s) IV Push once  dextrose 50% Injectable 25 Gram(s) IV Push once  dextrose 50% Injectable 25 Gram(s) IV Push once  heparin  Injectable 5000 Unit(s) SubCutaneous every 8 hours  insulin glargine Injectable (LANTUS) 20 Unit(s) SubCutaneous at bedtime  insulin regular  human corrective regimen sliding scale   SubCutaneous every 6 hours  magnesium oxide 400 milliGRAM(s) Oral three times a day with meals  multivitamin   Solution 5 milliLiter(s) Oral daily  pantoprazole   Suspension 40 milliGRAM(s) Enteral Tube before breakfast  petrolatum Ophthalmic Ointment 1 Application(s) Both EYES at bedtime  vitamin A &amp; D Ointment 1 Application(s) Topical two times a day    MEDICATIONS  (PRN):  acetaminophen   Tablet 650 milliGRAM(s) Oral every 6 hours PRN For Temp greater than 38 C (100.4 F)  acetaminophen   Tablet. 650 milliGRAM(s) Oral every 6 hours PRN Moderate Pain (4 - 6) and headache  ALBUTerol    0.083% 2.5 milliGRAM(s) Nebulizer every 6 hours PRN Shortness of Breath and/or Wheezing  ALPRAZolam 0.5 milliGRAM(s) Oral three times a day PRN anxiety  artificial  tears Solution 1 Drop(s) Both EYES every 4 hours PRN Dry Eyes  glucagon  Injectable 1 milliGRAM(s) IntraMuscular once PRN Glucose <70 milliGRAM(s)/deciLiter  morphine  - Injectable 2 milliGRAM(s) IV Push every 4 hours PRN Severe Pain (7 - 10)  oxyCODONE    Solution 15 milliGRAM(s) Oral every 3 hours PRN Severe Pain (7 - 10)      Allergies    No Known Allergies    Intolerances        Vital Signs Last 24 Hrs  T(C): 36.7 (12 Apr 2018 00:00), Max: 37.2 (11 Apr 2018 15:39)  T(F): 98.1 (12 Apr 2018 00:00), Max: 99 (11 Apr 2018 15:39)  HR: 78 (12 Apr 2018 00:00) (78 - 97)  BP: 123/84 (12 Apr 2018 00:00) (123/84 - 139/81)  BP(mean): --  RR: 18 (12 Apr 2018 00:00) (18 - 18)  SpO2: 99% (12 Apr 2018 04:25) (97% - 100%)    04-11 @ 07:01  -  04-12 @ 07:00  --------------------------------------------------------  IN: 0 mL / OUT: 1300 mL / NET: -1300 mL        PHYSICAL EXAM:    General: Well developed; well nourished; in no acute distress, sleeping and only slightly respomsive  HEENT: MMM, conjunctiva and sclera clear, trach in place  Gastrointestinal:Abdomen: Soft non-tender non-distended; Normal bowel sounds; No hepatosplenomegaly. G tube site clean without erythema or discharge.  Extremities: no cyanosis, clubbing or edema.  Skin: Warm and dry. No obvious rash    LABS:      CBC Full  -  ( 10 Apr 2018 09:49 )  WBC Count : 10.6 K/uL  Hemoglobin : 10.0 g/dL  Hematocrit : 32.7 %  Platelet Count - Automated : 387 K/uL  Mean Cell Volume : 89.3 fl  Mean Cell Hemoglobin : 27.3 pg  Mean Cell Hemoglobin Concentration : 30.6 g/dL  Auto Neutrophil # : x  Auto Lymphocyte # : x  Auto Monocyte # : x  Auto Eosinophil # : x  Auto Basophil # : x  Auto Neutrophil % : x  Auto Lymphocyte % : x  Auto Monocyte % : x  Auto Eosinophil % : x  Auto Basophil % : x    04-10    136  |  95<L>  |  18.0  ----------------------------<  135<H>  4.1   |  28.0  |  0.63    Ca    9.1      10 Apr 2018 09:49

## 2018-04-12 NOTE — PROGRESS NOTE ADULT - PROBLEM SELECTOR PLAN 1
unclogged by myself. Infuse 100 ml water thru G tube QID. Will see only prn your request. Restart feeds.

## 2018-04-12 NOTE — PROGRESS NOTE ADULT - SUBJECTIVE AND OBJECTIVE BOX
CC: Quadreparesis from neuromuscular disorder transverse myelitis.  Vent dependent Resp failure.  Nursing staff reported that peg tube blocked requiring GI review.  G-  HPI:  61y/o male with pmh of PMH Transverse myelitis, Paraplegia, trach/peg, colostomy, HTN, coming from [facility] presents with fever for 3 days.      Patient denies any cough, sob, runny nose, vomiting, diarrhea, abdominal pain, pain with urination.    Patient signed MOLST REQUESTING CPR on 2/18/18.    Patient eats via tube feeding,.      Originally at [facility] in   7months ago, developed Acute transverse myelitis.    Heart Attack, stent placed, check up after.  He was given a tetanus shot, then couple days ago he developed lower extremity weakness, paralysis; ultimately developed ulcers.      SBU--> [facility] (kept him for a few weeks) --> wife couldn't take care of him;    He is getting frequent uti's, anemia reequiring transfusions.    101 fever at nursing home today ([facility] [place]) (10 Mar 2018 23:26)    REVIEW OF SYSTEMS:    Patient denied fever, chills, abdominal pain, nausea, vomiting, cough, shortness of breath, chest pain or palpitations    Vital Signs Last 24 Hrs  T(C): 38 (12 Apr 2018 12:07), Max: 38 (12 Apr 2018 12:07)  T(F): 100.4 (12 Apr 2018 12:07), Max: 100.4 (12 Apr 2018 12:07)  HR: 66 (12 Apr 2018 12:44) (66 - 97)  BP: 116/68 (12 Apr 2018 12:07) (116/68 - 139/81)  BP(mean): --  RR: 18 (12 Apr 2018 12:07) (18 - 18)  SpO2: 100% (12 Apr 2018 13:57) (97% - 100%)I&O's Summary    11 Apr 2018 07:01  -  12 Apr 2018 07:00  --------------------------------------------------------  IN: 0 mL / OUT: 1300 mL / NET: -1300 mL      PHYSICAL EXAM:  GENERAL: NAD  HEENT: PERRL, +EOMI, anicteric, no [place]  NECK: Supple, No JVD . Trach colar  CHEST/LUNG: bilateral transmitted breath sound   HEART: S1S2 Normal intensity, no murmurs, gallops or rubs noted  ABDOMEN: Soft, BS Normoactive, NT, ND, no HSM noted  EXTREMITIES:  2+ radial and DP pulses noted, no clubbing, cyanosis, or edema noted, Bed bound   SKIN: No rashes or lesions noted  NEURO: A&Ox3, quadreparesis. CN II-XII intact  PSYCH: Depressed mood and affect; insight/judgement inappropriate  LABS:              RADIOLOGY & ADDITIONAL TESTS:    MEDICATIONS:  MEDICATIONS  (STANDING):  ascorbic acid 500 milliGRAM(s) Oral daily  aspirin  chewable 81 milliGRAM(s) Oral daily  baclofen 10 milliGRAM(s) Oral two times a day  chlorhexidine 0.12% Liquid 15 milliLiter(s) Swish and Spit two times a day  clopidogrel Tablet 75 milliGRAM(s) Oral daily  dextrose 5%. 1000 milliLiter(s) (50 mL/Hr) IV Continuous <Continuous>  dextrose 50% Injectable 12.5 Gram(s) IV Push once  dextrose 50% Injectable 25 Gram(s) IV Push once  dextrose 50% Injectable 25 Gram(s) IV Push once  heparin  Injectable 5000 Unit(s) SubCutaneous every 8 hours  insulin glargine Injectable (LANTUS) 20 Unit(s) SubCutaneous at bedtime  insulin regular  human corrective regimen sliding scale   SubCutaneous every 6 hours  magnesium oxide 400 milliGRAM(s) Oral three times a day with meals  multivitamin   Solution 5 milliLiter(s) Oral daily  pantoprazole   Suspension 40 milliGRAM(s) Enteral Tube before breakfast  petrolatum Ophthalmic Ointment 1 Application(s) Both EYES at bedtime  vitamin A &amp; D Ointment 1 Application(s) Topical two times a day    MEDICATIONS  (PRN):  acetaminophen   Tablet 650 milliGRAM(s) Oral every 6 hours PRN For Temp greater than 38 C (100.4 F)  acetaminophen   Tablet. 650 milliGRAM(s) Oral every 6 hours PRN Moderate Pain (4 - 6) and headache  ALBUTerol    0.083% 2.5 milliGRAM(s) Nebulizer every 6 hours PRN Shortness of Breath and/or Wheezing  ALPRAZolam 0.5 milliGRAM(s) Oral three times a day PRN anxiety  artificial  tears Solution 1 Drop(s) Both EYES every 4 hours PRN Dry Eyes  glucagon  Injectable 1 milliGRAM(s) IntraMuscular once PRN Glucose <70 milliGRAM(s)/deciLiter  morphine  - Injectable 2 milliGRAM(s) IV Push every 4 hours PRN Severe Pain (7 - 10)  oxyCODONE    Solution 15 milliGRAM(s) Oral every 3 hours PRN Severe Pain (7 - 10)

## 2018-04-12 NOTE — PROGRESS NOTE ADULT - ASSESSMENT
Acute on chronic respiratory failure - On ventilator support via tracheostomy. Pulm consult is following . For eventual weaning at the rehabilitation facility if tolerates.    Pneumonia - Completed the course of antibiotics. Monitoring clinically. Leukocytosis improved.    Transverse myelitis - Prophylactic heparin. On gastrostomy tube feeds. Monitor colostomy output.    Decubitus ulcer - Colostomy and urine catheter to divert from the decubitus site. Wound care. Analgesic medications as needed.    Diabetes - Insulin coverage, close monitoring of blood glucose levels.    Gastroesophageal reflux disease - On pantoprazole. Gastrostomy tube feeds.    Supportive care. Awaiting transfer to vent capable long term care facility

## 2018-04-13 VITALS
SYSTOLIC BLOOD PRESSURE: 116 MMHG | TEMPERATURE: 99 F | DIASTOLIC BLOOD PRESSURE: 69 MMHG | HEART RATE: 77 BPM | RESPIRATION RATE: 18 BRPM

## 2018-04-13 LAB
GLUCOSE BLDC GLUCOMTR-MCNC: 126 MG/DL — HIGH (ref 70–99)
GLUCOSE BLDC GLUCOMTR-MCNC: 182 MG/DL — HIGH (ref 70–99)

## 2018-04-13 PROCEDURE — 83605 ASSAY OF LACTIC ACID: CPT

## 2018-04-13 PROCEDURE — 84100 ASSAY OF PHOSPHORUS: CPT

## 2018-04-13 PROCEDURE — 74018 RADEX ABDOMEN 1 VIEW: CPT

## 2018-04-13 PROCEDURE — 96374 THER/PROPH/DIAG INJ IV PUSH: CPT | Mod: XU

## 2018-04-13 PROCEDURE — 84443 ASSAY THYROID STIM HORMONE: CPT

## 2018-04-13 PROCEDURE — 70450 CT HEAD/BRAIN W/O DYE: CPT

## 2018-04-13 PROCEDURE — 87040 BLOOD CULTURE FOR BACTERIA: CPT

## 2018-04-13 PROCEDURE — 84295 ASSAY OF SERUM SODIUM: CPT

## 2018-04-13 PROCEDURE — 82435 ASSAY OF BLOOD CHLORIDE: CPT

## 2018-04-13 PROCEDURE — 82803 BLOOD GASES ANY COMBINATION: CPT

## 2018-04-13 PROCEDURE — 82272 OCCULT BLD FECES 1-3 TESTS: CPT

## 2018-04-13 PROCEDURE — 94003 VENT MGMT INPAT SUBQ DAY: CPT

## 2018-04-13 PROCEDURE — 84132 ASSAY OF SERUM POTASSIUM: CPT

## 2018-04-13 PROCEDURE — 71045 X-RAY EXAM CHEST 1 VIEW: CPT

## 2018-04-13 PROCEDURE — 80202 ASSAY OF VANCOMYCIN: CPT

## 2018-04-13 PROCEDURE — 71250 CT THORAX DX C-: CPT

## 2018-04-13 PROCEDURE — 84145 PROCALCITONIN (PCT): CPT

## 2018-04-13 PROCEDURE — 99285 EMERGENCY DEPT VISIT HI MDM: CPT | Mod: 25

## 2018-04-13 PROCEDURE — 85027 COMPLETE CBC AUTOMATED: CPT

## 2018-04-13 PROCEDURE — 92523 SPEECH SOUND LANG COMPREHEN: CPT

## 2018-04-13 PROCEDURE — 87186 SC STD MICRODIL/AGAR DIL: CPT

## 2018-04-13 PROCEDURE — 81001 URINALYSIS AUTO W/SCOPE: CPT

## 2018-04-13 PROCEDURE — 82533 TOTAL CORTISOL: CPT

## 2018-04-13 PROCEDURE — 97163 PT EVAL HIGH COMPLEX 45 MIN: CPT

## 2018-04-13 PROCEDURE — 74177 CT ABD & PELVIS W/CONTRAST: CPT

## 2018-04-13 PROCEDURE — 36415 COLL VENOUS BLD VENIPUNCTURE: CPT

## 2018-04-13 PROCEDURE — 94799 UNLISTED PULMONARY SVC/PX: CPT

## 2018-04-13 PROCEDURE — 94002 VENT MGMT INPAT INIT DAY: CPT

## 2018-04-13 PROCEDURE — 87070 CULTURE OTHR SPECIMN AEROBIC: CPT

## 2018-04-13 PROCEDURE — 93005 ELECTROCARDIOGRAM TRACING: CPT

## 2018-04-13 PROCEDURE — 82550 ASSAY OF CK (CPK): CPT

## 2018-04-13 PROCEDURE — 87045 FECES CULTURE AEROBIC BACT: CPT

## 2018-04-13 PROCEDURE — 80053 COMPREHEN METABOLIC PANEL: CPT

## 2018-04-13 PROCEDURE — 82330 ASSAY OF CALCIUM: CPT

## 2018-04-13 PROCEDURE — 87046 STOOL CULTR AEROBIC BACT EA: CPT

## 2018-04-13 PROCEDURE — 82274 ASSAY TEST FOR BLOOD FECAL: CPT

## 2018-04-13 PROCEDURE — 86140 C-REACTIVE PROTEIN: CPT

## 2018-04-13 PROCEDURE — 99239 HOSP IP/OBS DSCHRG MGMT >30: CPT

## 2018-04-13 PROCEDURE — 85014 HEMATOCRIT: CPT

## 2018-04-13 PROCEDURE — 83735 ASSAY OF MAGNESIUM: CPT

## 2018-04-13 PROCEDURE — 84484 ASSAY OF TROPONIN QUANT: CPT

## 2018-04-13 PROCEDURE — 94760 N-INVAS EAR/PLS OXIMETRY 1: CPT

## 2018-04-13 PROCEDURE — 87493 C DIFF AMPLIFIED PROBE: CPT

## 2018-04-13 PROCEDURE — 83036 HEMOGLOBIN GLYCOSYLATED A1C: CPT

## 2018-04-13 PROCEDURE — 94640 AIRWAY INHALATION TREATMENT: CPT

## 2018-04-13 PROCEDURE — 80048 BASIC METABOLIC PNL TOTAL CA: CPT

## 2018-04-13 PROCEDURE — 82962 GLUCOSE BLOOD TEST: CPT

## 2018-04-13 PROCEDURE — 74176 CT ABD & PELVIS W/O CONTRAST: CPT

## 2018-04-13 PROCEDURE — 82947 ASSAY GLUCOSE BLOOD QUANT: CPT

## 2018-04-13 PROCEDURE — 87086 URINE CULTURE/COLONY COUNT: CPT

## 2018-04-13 RX ORDER — FENTANYL CITRATE 50 UG/ML
1 INJECTION INTRAVENOUS
Qty: 0 | Refills: 0 | Status: DISCONTINUED | OUTPATIENT
Start: 2018-04-13 | End: 2018-04-13

## 2018-04-13 RX ADMIN — PANTOPRAZOLE SODIUM 40 MILLIGRAM(S): 20 TABLET, DELAYED RELEASE ORAL at 05:06

## 2018-04-13 RX ADMIN — CHLORHEXIDINE GLUCONATE 15 MILLILITER(S): 213 SOLUTION TOPICAL at 05:06

## 2018-04-13 RX ADMIN — OXYCODONE HYDROCHLORIDE 15 MILLIGRAM(S): 5 TABLET ORAL at 09:06

## 2018-04-13 RX ADMIN — Medication 500 MILLIGRAM(S): at 12:01

## 2018-04-13 RX ADMIN — MAGNESIUM OXIDE 400 MG ORAL TABLET 400 MILLIGRAM(S): 241.3 TABLET ORAL at 12:04

## 2018-04-13 RX ADMIN — HEPARIN SODIUM 5000 UNIT(S): 5000 INJECTION INTRAVENOUS; SUBCUTANEOUS at 05:06

## 2018-04-13 RX ADMIN — Medication 650 MILLIGRAM(S): at 09:05

## 2018-04-13 RX ADMIN — FENTANYL CITRATE 1 PATCH: 50 INJECTION INTRAVENOUS at 15:54

## 2018-04-13 RX ADMIN — INSULIN HUMAN 1: 100 INJECTION, SOLUTION SUBCUTANEOUS at 05:17

## 2018-04-13 RX ADMIN — OXYCODONE HYDROCHLORIDE 15 MILLIGRAM(S): 5 TABLET ORAL at 15:54

## 2018-04-13 RX ADMIN — Medication 0.5 MILLIGRAM(S): at 15:54

## 2018-04-13 RX ADMIN — Medication 81 MILLIGRAM(S): at 12:01

## 2018-04-13 RX ADMIN — OXYCODONE HYDROCHLORIDE 15 MILLIGRAM(S): 5 TABLET ORAL at 09:05

## 2018-04-13 RX ADMIN — Medication 1 APPLICATION(S): at 05:06

## 2018-04-13 RX ADMIN — HEPARIN SODIUM 5000 UNIT(S): 5000 INJECTION INTRAVENOUS; SUBCUTANEOUS at 15:28

## 2018-04-13 RX ADMIN — OXYCODONE HYDROCHLORIDE 15 MILLIGRAM(S): 5 TABLET ORAL at 05:05

## 2018-04-13 RX ADMIN — OXYCODONE HYDROCHLORIDE 15 MILLIGRAM(S): 5 TABLET ORAL at 12:05

## 2018-04-13 RX ADMIN — Medication 5 MILLILITER(S): at 12:04

## 2018-04-13 RX ADMIN — MAGNESIUM OXIDE 400 MG ORAL TABLET 400 MILLIGRAM(S): 241.3 TABLET ORAL at 05:07

## 2018-04-13 RX ADMIN — Medication 10 MILLIGRAM(S): at 05:06

## 2018-04-13 RX ADMIN — CLOPIDOGREL BISULFATE 75 MILLIGRAM(S): 75 TABLET, FILM COATED ORAL at 12:01

## 2018-04-13 RX ADMIN — Medication 0.5 MILLIGRAM(S): at 09:06

## 2018-04-13 RX ADMIN — OXYCODONE HYDROCHLORIDE 15 MILLIGRAM(S): 5 TABLET ORAL at 12:04

## 2018-04-13 RX ADMIN — OXYCODONE HYDROCHLORIDE 15 MILLIGRAM(S): 5 TABLET ORAL at 04:19

## 2018-04-13 NOTE — PROGRESS NOTE ADULT - SUBJECTIVE AND OBJECTIVE BOX
CC: Vent dependent resp failure , from neuromuscular condition - transverse myelitis. Functional quadreplagia.  HPI:  61y/o male with pmh of PMH Transverse myelitis, Paraplegia, trach/peg, colostomy, HTN, coming from [facility] presents with fever for 3 days.      Patient denies any cough, sob, runny nose, vomiting, diarrhea, abdominal pain, pain with urination.    Patient signed MOLST REQUESTING CPR on 2/18/18.    Patient eats via tube feeding,.      Originally at [facility] in   7months ago, developed Acute transverse myelitis.    Heart Attack, stent placed, check up after.  He was given a tetanus shot, then couple days ago he developed lower extremity weakness, paralysis; ultimately developed ulcers.      [facility]--> [facility] (kept him for a few weeks) --> wife couldn't take care of him;    He is getting frequent uti's, anemia reequiring transfusions.    101 fever at nursing home today ([facility] [place]) (10 Mar 2018 23:26)    REVIEW OF SYSTEMS:    Patient denied fever, chills, abdominal pain, nausea, vomiting, cough, shortness of breath, chest pain or palpitations    Vital Signs Last 24 Hrs  T(C): 37 (13 Apr 2018 07:56), Max: 37.4 (12 Apr 2018 16:21)  T(F): 98.6 (13 Apr 2018 07:56), Max: 99.3 (12 Apr 2018 16:21)  HR: 81 (13 Apr 2018 07:56) (66 - 83)  BP: 106/69 (13 Apr 2018 07:56) (101/61 - 117/73)  BP(mean): --  RR: 19 (13 Apr 2018 07:56) (18 - 19)  SpO2: 99% (13 Apr 2018 07:56) (98% - 100%)I&O's Summary    12 Apr 2018 07:01  -  13 Apr 2018 07:00  --------------------------------------------------------  IN: 1610 mL / OUT: 1450 mL / NET: 160 mL      PHYSICAL EXAM:  GENERAL: NAD,  HEENT: PERRL, +EOMI, anicteric, no [place]  NECK: Supple, No JVD   CHEST/LUNG: CTA bilaterally; Normal effort  HEART: S1S2 Normal intensity, no murmurs, gallops or rubs noted  ABDOMEN: Soft, BS Normoactive, NT, ND, no HSM noted. Peg , colostomy   EXTREMITIES:  2+ radial and DP pulses noted, no clubbing, cyanosis, or edema noted, Bed bound   SKIN: No rashes or lesions noted  NEURO: A&Ox3, quadreparesis, CN II-XII intact  PSYCH: normal mood and affect; insight/judgement appropriate  LABS:              RADIOLOGY & ADDITIONAL TESTS:    MEDICATIONS:  MEDICATIONS  (STANDING):  ascorbic acid 500 milliGRAM(s) Oral daily  aspirin  chewable 81 milliGRAM(s) Oral daily  baclofen 10 milliGRAM(s) Oral two times a day  chlorhexidine 0.12% Liquid 15 milliLiter(s) Swish and Spit two times a day  clopidogrel Tablet 75 milliGRAM(s) Oral daily  dextrose 5%. 1000 milliLiter(s) (50 mL/Hr) IV Continuous <Continuous>  dextrose 50% Injectable 12.5 Gram(s) IV Push once  dextrose 50% Injectable 25 Gram(s) IV Push once  dextrose 50% Injectable 25 Gram(s) IV Push once  heparin  Injectable 5000 Unit(s) SubCutaneous every 8 hours  insulin glargine Injectable (LANTUS) 20 Unit(s) SubCutaneous at bedtime  insulin regular  human corrective regimen sliding scale   SubCutaneous every 6 hours  magnesium oxide 400 milliGRAM(s) Oral three times a day with meals  multivitamin   Solution 5 milliLiter(s) Oral daily  pantoprazole   Suspension 40 milliGRAM(s) Enteral Tube before breakfast  petrolatum Ophthalmic Ointment 1 Application(s) Both EYES at bedtime  vitamin A &amp; D Ointment 1 Application(s) Topical two times a day    MEDICATIONS  (PRN):  acetaminophen   Tablet 650 milliGRAM(s) Oral every 6 hours PRN For Temp greater than 38 C (100.4 F)  acetaminophen   Tablet. 650 milliGRAM(s) Oral every 6 hours PRN Moderate Pain (4 - 6) and headache  ALBUTerol    0.083% 2.5 milliGRAM(s) Nebulizer every 6 hours PRN Shortness of Breath and/or Wheezing  ALPRAZolam 0.5 milliGRAM(s) Oral three times a day PRN anxiety  artificial  tears Solution 1 Drop(s) Both EYES every 4 hours PRN Dry Eyes  glucagon  Injectable 1 milliGRAM(s) IntraMuscular once PRN Glucose <70 milliGRAM(s)/deciLiter  morphine  - Injectable 2 milliGRAM(s) IV Push every 4 hours PRN Severe Pain (7 - 10)  oxyCODONE    Solution 15 milliGRAM(s) Oral every 3 hours PRN Severe Pain (7 - 10)

## 2018-04-13 NOTE — PROGRESS NOTE ADULT - PROVIDER SPECIALTY LIST ADULT
Critical Care
Family Medicine
Gastroenterology
Hospitalist
Infectious Disease
Palliative Care
Palliative Care
Pulmonology
Critical Care
Hospitalist
Hospitalist
Pulmonology
Hospitalist
Infectious Disease
Pulmonology
Hospitalist
Family Medicine
Pulmonology
Hospitalist
Infectious Disease
Family Medicine
Family Medicine

## 2018-04-13 NOTE — PROGRESS NOTE ADULT - ASSESSMENT
Acute on chronic respiratory failure - On ventilator support via tracheostomy. Pulm consult is following . For eventual weaning at the rehabilitation facility if tolerates.    Pneumonia - Completed the course of antibiotics. Monitoring clinically. Leukocytosis improved.    Transverse myelitis - Prophylactic heparin. On gastrostomy tube feeds. Monitor colostomy output.    Decubitus ulcer - Colostomy and urine catheter to divert from the decubitus site. Wound care. Analgesic medications as needed.    Diabetes - Insulin coverage, close monitoring of blood glucose levels.    Gastroesophageal reflux disease - On pantoprazole. Gastrostomy tube feeds.    Supportive care.  transfer to vent capable long term care facility today.

## 2018-06-05 ENCOUNTER — INPATIENT (INPATIENT)
Facility: HOSPITAL | Age: 63
LOS: 2 days | Discharge: NURSING, REHAB OR LONG-TERM CARE | End: 2018-06-08
Attending: INTERNAL MEDICINE | Admitting: INTERNAL MEDICINE
Payer: COMMERCIAL

## 2018-06-05 VITALS
WEIGHT: 149.91 LBS | TEMPERATURE: 98 F | DIASTOLIC BLOOD PRESSURE: 68 MMHG | OXYGEN SATURATION: 99 % | HEART RATE: 110 BPM | HEIGHT: 68 IN | SYSTOLIC BLOOD PRESSURE: 104 MMHG

## 2018-06-05 DIAGNOSIS — Z98.890 OTHER SPECIFIED POSTPROCEDURAL STATES: Chronic | ICD-10-CM

## 2018-06-05 PROBLEM — G83.9 PARALYTIC SYNDROME, UNSPECIFIED: Chronic | Status: ACTIVE | Noted: 2018-03-10

## 2018-06-05 PROBLEM — I10 ESSENTIAL (PRIMARY) HYPERTENSION: Chronic | Status: ACTIVE | Noted: 2018-03-10

## 2018-06-05 PROBLEM — G37.3 ACUTE TRANSVERSE MYELITIS IN DEMYELINATING DISEASE OF CENTRAL NERVOUS SYSTEM: Chronic | Status: ACTIVE | Noted: 2018-03-10

## 2018-06-05 LAB
ALBUMIN SERPL ELPH-MCNC: 2 G/DL — LOW (ref 3.3–5)
ALP SERPL-CCNC: 137 U/L — HIGH (ref 40–120)
ALT FLD-CCNC: 59 U/L — SIGNIFICANT CHANGE UP (ref 12–78)
ANION GAP SERPL CALC-SCNC: 8 MMOL/L — SIGNIFICANT CHANGE UP (ref 5–17)
APPEARANCE UR: ABNORMAL
AST SERPL-CCNC: 34 U/L — SIGNIFICANT CHANGE UP (ref 15–37)
BACTERIA # UR AUTO: ABNORMAL
BASE EXCESS BLDA CALC-SCNC: 2.6 MMOL/L — HIGH (ref -2–2)
BASE EXCESS BLDV CALC-SCNC: 3.9 MMOL/L — HIGH (ref -2–2)
BASOPHILS # BLD AUTO: 0.06 K/UL — SIGNIFICANT CHANGE UP (ref 0–0.2)
BASOPHILS NFR BLD AUTO: 0.2 % — SIGNIFICANT CHANGE UP (ref 0–2)
BILIRUB SERPL-MCNC: 0.4 MG/DL — SIGNIFICANT CHANGE UP (ref 0.2–1.2)
BILIRUB UR-MCNC: NEGATIVE — SIGNIFICANT CHANGE UP
BLOOD GAS COMMENTS ARTERIAL: SIGNIFICANT CHANGE UP
BUN SERPL-MCNC: 61 MG/DL — HIGH (ref 7–23)
CALCIUM SERPL-MCNC: 8.7 MG/DL — SIGNIFICANT CHANGE UP (ref 8.5–10.1)
CHLORIDE SERPL-SCNC: 92 MMOL/L — LOW (ref 96–108)
CO2 SERPL-SCNC: 29 MMOL/L — SIGNIFICANT CHANGE UP (ref 22–31)
COLOR SPEC: ABNORMAL
COMMENT - URINE: SIGNIFICANT CHANGE UP
CREAT SERPL-MCNC: 1.63 MG/DL — HIGH (ref 0.5–1.3)
DIFF PNL FLD: ABNORMAL
EOSINOPHIL # BLD AUTO: 0.01 K/UL — SIGNIFICANT CHANGE UP (ref 0–0.5)
EOSINOPHIL NFR BLD AUTO: 0 % — SIGNIFICANT CHANGE UP (ref 0–6)
EPI CELLS # UR: ABNORMAL
GAS PNL BLDA: SIGNIFICANT CHANGE UP
GLUCOSE BLDC GLUCOMTR-MCNC: 236 MG/DL — HIGH (ref 70–99)
GLUCOSE SERPL-MCNC: 201 MG/DL — HIGH (ref 70–99)
GLUCOSE UR QL: NEGATIVE MG/DL — SIGNIFICANT CHANGE UP
HCO3 BLDA-SCNC: 27 MMOL/L — SIGNIFICANT CHANGE UP (ref 21–29)
HCO3 BLDV-SCNC: 28 MMOL/L — SIGNIFICANT CHANGE UP (ref 21–29)
HCT VFR BLD CALC: 30.2 % — LOW (ref 39–50)
HGB BLD-MCNC: 9.5 G/DL — LOW (ref 13–17)
HOROWITZ INDEX BLDA+IHG-RTO: 30 — SIGNIFICANT CHANGE UP
IMM GRANULOCYTES NFR BLD AUTO: 1.8 % — HIGH (ref 0–1.5)
KETONES UR-MCNC: NEGATIVE — SIGNIFICANT CHANGE UP
LACTATE SERPL-SCNC: 2.3 MMOL/L — HIGH (ref 0.7–2)
LACTATE SERPL-SCNC: 2.8 MMOL/L — HIGH (ref 0.7–2)
LEUKOCYTE ESTERASE UR-ACNC: ABNORMAL
LYMPHOCYTES # BLD AUTO: 0.37 K/UL — LOW (ref 1–3.3)
LYMPHOCYTES # BLD AUTO: 1.2 % — LOW (ref 13–44)
MCHC RBC-ENTMCNC: 27.2 PG — SIGNIFICANT CHANGE UP (ref 27–34)
MCHC RBC-ENTMCNC: 31.5 GM/DL — LOW (ref 32–36)
MCV RBC AUTO: 86.5 FL — SIGNIFICANT CHANGE UP (ref 80–100)
MONOCYTES # BLD AUTO: 1.85 K/UL — HIGH (ref 0–0.9)
MONOCYTES NFR BLD AUTO: 5.8 % — SIGNIFICANT CHANGE UP (ref 2–14)
NEUTROPHILS # BLD AUTO: 28.88 K/UL — HIGH (ref 1.8–7.4)
NEUTROPHILS NFR BLD AUTO: 91 % — HIGH (ref 43–77)
NITRITE UR-MCNC: POSITIVE
NRBC # BLD: 0 /100 WBCS — SIGNIFICANT CHANGE UP (ref 0–0)
PCO2 BLDA: 42 MMHG — SIGNIFICANT CHANGE UP (ref 32–46)
PCO2 BLDV: 42 MMHG — SIGNIFICANT CHANGE UP (ref 35–50)
PH BLDA: 7.42 — SIGNIFICANT CHANGE UP (ref 7.35–7.45)
PH BLDV: 7.44 — SIGNIFICANT CHANGE UP (ref 7.35–7.45)
PH UR: 6 — SIGNIFICANT CHANGE UP (ref 5–8)
PLATELET # BLD AUTO: 298 K/UL — SIGNIFICANT CHANGE UP (ref 150–400)
PO2 BLDA: 127 MMHG — HIGH (ref 74–108)
PO2 BLDV: 195 MMHG — HIGH (ref 25–45)
POTASSIUM SERPL-MCNC: 5.2 MMOL/L — SIGNIFICANT CHANGE UP (ref 3.5–5.3)
POTASSIUM SERPL-SCNC: 5.2 MMOL/L — SIGNIFICANT CHANGE UP (ref 3.5–5.3)
PROT SERPL-MCNC: 6.7 GM/DL — SIGNIFICANT CHANGE UP (ref 6–8.3)
PROT UR-MCNC: 100 MG/DL
RAPID RVP RESULT: SIGNIFICANT CHANGE UP
RBC # BLD: 3.49 M/UL — LOW (ref 4.2–5.8)
RBC # FLD: 17.1 % — HIGH (ref 10.3–14.5)
RBC CASTS # UR COMP ASSIST: >50 /HPF (ref 0–4)
SAO2 % BLDA: 99 % — HIGH (ref 92–96)
SAO2 % BLDV: 100 % — HIGH (ref 67–88)
SODIUM SERPL-SCNC: 129 MMOL/L — LOW (ref 135–145)
SP GR SPEC: 1.01 — SIGNIFICANT CHANGE UP (ref 1.01–1.02)
UROBILINOGEN FLD QL: 1 MG/DL
WBC # BLD: 31.75 K/UL — HIGH (ref 3.8–10.5)
WBC # FLD AUTO: 31.75 K/UL — HIGH (ref 3.8–10.5)
WBC UR QL: ABNORMAL

## 2018-06-05 PROCEDURE — 99291 CRITICAL CARE FIRST HOUR: CPT

## 2018-06-05 PROCEDURE — 71045 X-RAY EXAM CHEST 1 VIEW: CPT | Mod: 26

## 2018-06-05 PROCEDURE — 93010 ELECTROCARDIOGRAM REPORT: CPT

## 2018-06-05 RX ORDER — DEXTROSE 50 % IN WATER 50 %
12.5 SYRINGE (ML) INTRAVENOUS ONCE
Qty: 0 | Refills: 0 | Status: DISCONTINUED | OUTPATIENT
Start: 2018-06-05 | End: 2018-06-08

## 2018-06-05 RX ORDER — GLUCAGON INJECTION, SOLUTION 0.5 MG/.1ML
1 INJECTION, SOLUTION SUBCUTANEOUS ONCE
Qty: 0 | Refills: 0 | Status: DISCONTINUED | OUTPATIENT
Start: 2018-06-05 | End: 2018-06-08

## 2018-06-05 RX ORDER — SODIUM CHLORIDE 9 MG/ML
3 INJECTION INTRAMUSCULAR; INTRAVENOUS; SUBCUTANEOUS ONCE
Qty: 0 | Refills: 0 | Status: COMPLETED | OUTPATIENT
Start: 2018-06-05 | End: 2018-06-05

## 2018-06-05 RX ORDER — ALPRAZOLAM 0.25 MG
0.5 TABLET ORAL EVERY 8 HOURS
Qty: 0 | Refills: 0 | Status: DISCONTINUED | OUTPATIENT
Start: 2018-06-05 | End: 2018-06-08

## 2018-06-05 RX ORDER — DEXTROSE 50 % IN WATER 50 %
25 SYRINGE (ML) INTRAVENOUS ONCE
Qty: 0 | Refills: 0 | Status: DISCONTINUED | OUTPATIENT
Start: 2018-06-05 | End: 2018-06-08

## 2018-06-05 RX ORDER — INSULIN LISPRO 100/ML
0 VIAL (ML) SUBCUTANEOUS
Qty: 0 | Refills: 0 | COMMUNITY

## 2018-06-05 RX ORDER — ASPIRIN/CALCIUM CARB/MAGNESIUM 324 MG
81 TABLET ORAL DAILY
Qty: 0 | Refills: 0 | Status: DISCONTINUED | OUTPATIENT
Start: 2018-06-05 | End: 2018-06-08

## 2018-06-05 RX ORDER — FENTANYL CITRATE 50 UG/ML
1 INJECTION INTRAVENOUS
Qty: 0 | Refills: 0 | Status: DISCONTINUED | OUTPATIENT
Start: 2018-06-05 | End: 2018-06-08

## 2018-06-05 RX ORDER — OXYCODONE HYDROCHLORIDE 5 MG/1
10 TABLET ORAL EVERY 6 HOURS
Qty: 0 | Refills: 0 | Status: DISCONTINUED | OUTPATIENT
Start: 2018-06-05 | End: 2018-06-08

## 2018-06-05 RX ORDER — DEXTROSE 50 % IN WATER 50 %
15 SYRINGE (ML) INTRAVENOUS ONCE
Qty: 0 | Refills: 0 | Status: DISCONTINUED | OUTPATIENT
Start: 2018-06-05 | End: 2018-06-08

## 2018-06-05 RX ORDER — CEFEPIME 1 G/1
2 INJECTION, POWDER, FOR SOLUTION INTRAMUSCULAR; INTRAVENOUS
Qty: 0 | Refills: 0 | COMMUNITY

## 2018-06-05 RX ORDER — SODIUM CHLORIDE 9 MG/ML
500 INJECTION INTRAMUSCULAR; INTRAVENOUS; SUBCUTANEOUS
Qty: 0 | Refills: 0 | Status: COMPLETED | OUTPATIENT
Start: 2018-06-05 | End: 2018-06-05

## 2018-06-05 RX ORDER — VANCOMYCIN HCL 1 G
1000 VIAL (EA) INTRAVENOUS ONCE
Qty: 0 | Refills: 0 | Status: COMPLETED | OUTPATIENT
Start: 2018-06-05 | End: 2018-06-05

## 2018-06-05 RX ORDER — PIPERACILLIN AND TAZOBACTAM 4; .5 G/20ML; G/20ML
3.38 INJECTION, POWDER, LYOPHILIZED, FOR SOLUTION INTRAVENOUS ONCE
Qty: 0 | Refills: 0 | Status: COMPLETED | OUTPATIENT
Start: 2018-06-05 | End: 2018-06-05

## 2018-06-05 RX ORDER — ALPRAZOLAM 0.25 MG
0.5 TABLET ORAL ONCE
Qty: 0 | Refills: 0 | Status: DISCONTINUED | OUTPATIENT
Start: 2018-06-05 | End: 2018-06-05

## 2018-06-05 RX ORDER — INSULIN LISPRO 100/ML
VIAL (ML) SUBCUTANEOUS EVERY 6 HOURS
Qty: 0 | Refills: 0 | Status: DISCONTINUED | OUTPATIENT
Start: 2018-06-05 | End: 2018-06-08

## 2018-06-05 RX ORDER — HEPARIN SODIUM 5000 [USP'U]/ML
5000 INJECTION INTRAVENOUS; SUBCUTANEOUS EVERY 8 HOURS
Qty: 0 | Refills: 0 | Status: DISCONTINUED | OUTPATIENT
Start: 2018-06-05 | End: 2018-06-08

## 2018-06-05 RX ORDER — SODIUM CHLORIDE 9 MG/ML
1000 INJECTION INTRAMUSCULAR; INTRAVENOUS; SUBCUTANEOUS
Qty: 0 | Refills: 0 | Status: DISCONTINUED | OUTPATIENT
Start: 2018-06-05 | End: 2018-06-08

## 2018-06-05 RX ORDER — ACETAMINOPHEN 500 MG
650 TABLET ORAL EVERY 6 HOURS
Qty: 0 | Refills: 0 | Status: DISCONTINUED | OUTPATIENT
Start: 2018-06-05 | End: 2018-06-08

## 2018-06-05 RX ORDER — CLOPIDOGREL BISULFATE 75 MG/1
75 TABLET, FILM COATED ORAL DAILY
Qty: 0 | Refills: 0 | Status: DISCONTINUED | OUTPATIENT
Start: 2018-06-05 | End: 2018-06-08

## 2018-06-05 RX ORDER — SERTRALINE 25 MG/1
50 TABLET, FILM COATED ORAL DAILY
Qty: 0 | Refills: 0 | Status: DISCONTINUED | OUTPATIENT
Start: 2018-06-05 | End: 2018-06-08

## 2018-06-05 RX ORDER — SODIUM CHLORIDE 9 MG/ML
1000 INJECTION, SOLUTION INTRAVENOUS
Qty: 0 | Refills: 0 | Status: DISCONTINUED | OUTPATIENT
Start: 2018-06-05 | End: 2018-06-08

## 2018-06-05 RX ORDER — NYSTATIN CREAM 100000 [USP'U]/G
1 CREAM TOPICAL
Qty: 0 | Refills: 0 | Status: DISCONTINUED | OUTPATIENT
Start: 2018-06-05 | End: 2018-06-08

## 2018-06-05 RX ORDER — PIPERACILLIN AND TAZOBACTAM 4; .5 G/20ML; G/20ML
3.38 INJECTION, POWDER, LYOPHILIZED, FOR SOLUTION INTRAVENOUS EVERY 8 HOURS
Qty: 0 | Refills: 0 | Status: DISCONTINUED | OUTPATIENT
Start: 2018-06-05 | End: 2018-06-08

## 2018-06-05 RX ADMIN — SODIUM CHLORIDE 2000 MILLILITER(S): 9 INJECTION INTRAMUSCULAR; INTRAVENOUS; SUBCUTANEOUS at 15:25

## 2018-06-05 RX ADMIN — SODIUM CHLORIDE 2000 MILLILITER(S): 9 INJECTION INTRAMUSCULAR; INTRAVENOUS; SUBCUTANEOUS at 15:24

## 2018-06-05 RX ADMIN — FENTANYL CITRATE 1 PATCH: 50 INJECTION INTRAVENOUS at 21:28

## 2018-06-05 RX ADMIN — Medication 0.5 MILLIGRAM(S): at 17:12

## 2018-06-05 RX ADMIN — HEPARIN SODIUM 5000 UNIT(S): 5000 INJECTION INTRAVENOUS; SUBCUTANEOUS at 21:51

## 2018-06-05 RX ADMIN — SODIUM CHLORIDE 2000 MILLILITER(S): 9 INJECTION INTRAMUSCULAR; INTRAVENOUS; SUBCUTANEOUS at 15:20

## 2018-06-05 RX ADMIN — SODIUM CHLORIDE 3 MILLILITER(S): 9 INJECTION INTRAMUSCULAR; INTRAVENOUS; SUBCUTANEOUS at 15:53

## 2018-06-05 RX ADMIN — SODIUM CHLORIDE 2000 MILLILITER(S): 9 INJECTION INTRAMUSCULAR; INTRAVENOUS; SUBCUTANEOUS at 17:15

## 2018-06-05 RX ADMIN — SODIUM CHLORIDE 2000 MILLILITER(S): 9 INJECTION INTRAMUSCULAR; INTRAVENOUS; SUBCUTANEOUS at 15:26

## 2018-06-05 RX ADMIN — SODIUM CHLORIDE 100 MILLILITER(S): 9 INJECTION INTRAMUSCULAR; INTRAVENOUS; SUBCUTANEOUS at 19:36

## 2018-06-05 RX ADMIN — PIPERACILLIN AND TAZOBACTAM 200 GRAM(S): 4; .5 INJECTION, POWDER, LYOPHILIZED, FOR SOLUTION INTRAVENOUS at 15:30

## 2018-06-05 RX ADMIN — Medication 250 MILLIGRAM(S): at 17:15

## 2018-06-05 RX ADMIN — PIPERACILLIN AND TAZOBACTAM 25 GRAM(S): 4; .5 INJECTION, POWDER, LYOPHILIZED, FOR SOLUTION INTRAVENOUS at 21:51

## 2018-06-05 NOTE — H&P ADULT - HISTORY OF PRESENT ILLNESS
Asked to see pt at 1730.  Pt seen and examined in ED at 1745.  Pt is poor historian and unwilling to provide Hx    63 y/o male N[facility] with HTN, DM, chronic resp failure s/p trach and PEG, transverse myelitis and functional quad, s/p colostomy and chronic beltrán admitted to [facility] in May with PNA (actinobacter baumanii) and March with UTI (PSAE muti resistant but S to zosyn) presents with UTI sepsis.  CXR clear.  Given 500ml IV bolus.  lactate 2.8 IV vanco and zosyn  On my exam, NAD, vented, hemodynamically stable, did not want to provide hx.    Pt is admitted to CCU

## 2018-06-05 NOTE — H&P ADULT - NSHPLABSRESULTS_GEN_ALL_CORE
9.5    31.75 )-----------( 298      ( 2018 14:15 )             30.2       CBC Full  -  ( 2018 14:15 )  WBC Count : 31.75 K/uL  Hemoglobin : 9.5 g/dL  Hematocrit : 30.2 %  Platelet Count - Automated : 298 K/uL  Mean Cell Volume : 86.5 fl  Mean Cell Hemoglobin : 27.2 pg  Mean Cell Hemoglobin Concentration : 31.5 gm/dL  Auto Neutrophil # : 28.88 K/uL  Auto Lymphocyte # : 0.37 K/uL  Auto Monocyte # : 1.85 K/uL  Auto Eosinophil # : 0.01 K/uL  Auto Basophil # : 0.06 K/uL  Auto Neutrophil % : 91.0 %  Auto Lymphocyte % : 1.2 %  Auto Monocyte % : 5.8 %  Auto Eosinophil % : 0.0 %  Auto Basophil % : 0.2 %          129<L>  |  92<L>  |  61<H>  ----------------------------<  201<H>  5.2   |  29  |  1.63<H>    Ca    8.7      2018 14:15    TPro  6.7  /  Alb  2.0<L>  /  TBili  0.4  /  DBili  x   /  AST  34  /  ALT  59  /  AlkPhos  137<H>            Urinalysis Basic - ( 2018 15:46 )    Color: Red / Appearance: Slightly Turbid / S.010 / pH: x  Gluc: x / Ketone: Negative  / Bili: Negative / Urobili: 1 mg/dL   Blood: x / Protein: 100 mg/dL / Nitrite: Positive   Leuk Esterase: Moderate / RBC: >50 /HPF / WBC 11-25   Sq Epi: x / Non Sq Epi: Moderate / Bacteria: Many        LIVER FUNCTIONS - ( 2018 14:15 )  Alb: 2.0 g/dL / Pro: 6.7 gm/dL / ALK PHOS: 137 U/L / ALT: 59 U/L / AST: 34 U/L / GGT: x             ABG - ( 2018 17:13 )  pH, Arterial: 7.42  pH, Blood: x     /  pCO2: 42    /  pO2: 127   / HCO3: 27    / Base Excess: 2.6   /  SaO2: 99

## 2018-06-05 NOTE — ED ADULT NURSE NOTE - OBJECTIVE STATEMENT
BIBA from [facility], brought in due to elevated WBC. per nursing notes. BIBA from [facility], brought in due to elevated WBC. per nursing notes. patient tachycardic, febrile. Patient trach with Vent, beltrán in place, pressures ulcer stage 4, sent for sepsis.

## 2018-06-05 NOTE — ED PROVIDER NOTE - OBJECTIVE STATEMENT
63 y/o male with a PMHx of chronic ventilator dependence. functional quadriplegia PEG colostomy chronic Iniguez, DM, Anemia presents to the ED s/p abnormal lab result. Found blood work SNF elevated WBC count. has fever, tachycardia, mild HTN lethargic.  IVF, IV cephaline were started today. Transferred to ED. Unable to obtain HPI due to unresponsiveness 63 y/o male with a PMHx of chronic ventilator dependence. functional quadriplegia PEG colostomy chronic Iniguez, DM, Anemia presents to the ED s/p abnormal lab result. Found blood work SNF elevated WBC count. has fever, tachycardia, mild HTN lethargic.  IVF, IV cephaline were started today. Transferred to ED. Unable to obtain HPI due to limited speech at baseline

## 2018-06-05 NOTE — H&P ADULT - ASSESSMENT
IMP:    UTI sepsis with chronic beltrán--has had multi resistant PSAE in March at [place]  ALDAIR  Leukocytosis   Chronic resp failure  S/P trach and PEG  Functional Quad  Sacral decub--diversion colostomy    Chronically ill on life support    Plan:    Admit to CCU    Full vent support  Cont with zosyn--ID eval--Follow up Cxs sent from ED  NPO  IVF  HOB > 30  DVT prophy--SCD and sqhep   Keep chronic beltrán in       45 cc mins excluding procedure

## 2018-06-05 NOTE — H&P ADULT - NSHPPHYSICALEXAM_GEN_ALL_CORE
Vital Signs Last 24 Hrs  T(C): 37.2 (05 Jun 2018 17:30), Max: 37.5 (05 Jun 2018 14:00)  T(F): 99 (05 Jun 2018 17:30), Max: 99.5 (05 Jun 2018 14:00)  HR: 99 (05 Jun 2018 17:30) (99 - 110)  BP: 108/67 (05 Jun 2018 17:30) (93/67 - 113/71)  BP(mean): --  RR: 15 (05 Jun 2018 17:30) (15 - 18)  SpO2: 100% (05 Jun 2018 17:30) (99% - 100%)

## 2018-06-05 NOTE — ED PROVIDER NOTE - MEDICAL DECISION MAKING DETAILS
63 y/o male with a PMHx of chronic ventilator dependence. functional quadriplegia PEG colostomy chronic Iniguez, DM Anemia presents to the ED s/p abnormal lab result. Plan for sepsis w/u iv abx, iv fluids, likely need admission.

## 2018-06-05 NOTE — ED PROVIDER NOTE - CARE PLAN
Principal Discharge DX:	Septic shock  Secondary Diagnosis:	UTI (urinary tract infection)  Secondary Diagnosis:	Chronic respiratory failure

## 2018-06-06 LAB
ANION GAP SERPL CALC-SCNC: 7 MMOL/L — SIGNIFICANT CHANGE UP (ref 5–17)
BUN SERPL-MCNC: 44 MG/DL — HIGH (ref 7–23)
CALCIUM SERPL-MCNC: 8.2 MG/DL — LOW (ref 8.5–10.1)
CHLORIDE SERPL-SCNC: 100 MMOL/L — SIGNIFICANT CHANGE UP (ref 96–108)
CO2 SERPL-SCNC: 29 MMOL/L — SIGNIFICANT CHANGE UP (ref 22–31)
CREAT SERPL-MCNC: 1.05 MG/DL — SIGNIFICANT CHANGE UP (ref 0.5–1.3)
CULTURE RESULTS: NO GROWTH — SIGNIFICANT CHANGE UP
GLUCOSE BLDC GLUCOMTR-MCNC: 136 MG/DL — HIGH (ref 70–99)
GLUCOSE BLDC GLUCOMTR-MCNC: 163 MG/DL — HIGH (ref 70–99)
GLUCOSE BLDC GLUCOMTR-MCNC: 170 MG/DL — HIGH (ref 70–99)
GLUCOSE BLDC GLUCOMTR-MCNC: 177 MG/DL — HIGH (ref 70–99)
GLUCOSE SERPL-MCNC: 116 MG/DL — HIGH (ref 70–99)
HBA1C BLD-MCNC: 5.8 % — HIGH (ref 4–5.6)
HCT VFR BLD CALC: 27 % — LOW (ref 39–50)
HGB BLD-MCNC: 8.3 G/DL — LOW (ref 13–17)
MAGNESIUM SERPL-MCNC: 2.1 MG/DL — SIGNIFICANT CHANGE UP (ref 1.6–2.6)
MCHC RBC-ENTMCNC: 26.6 PG — LOW (ref 27–34)
MCHC RBC-ENTMCNC: 30.7 GM/DL — LOW (ref 32–36)
MCV RBC AUTO: 86.5 FL — SIGNIFICANT CHANGE UP (ref 80–100)
NRBC # BLD: 0 /100 WBCS — SIGNIFICANT CHANGE UP (ref 0–0)
PHOSPHATE SERPL-MCNC: 2.7 MG/DL — SIGNIFICANT CHANGE UP (ref 2.5–4.5)
PLATELET # BLD AUTO: 237 K/UL — SIGNIFICANT CHANGE UP (ref 150–400)
POTASSIUM SERPL-MCNC: 4 MMOL/L — SIGNIFICANT CHANGE UP (ref 3.5–5.3)
POTASSIUM SERPL-SCNC: 4 MMOL/L — SIGNIFICANT CHANGE UP (ref 3.5–5.3)
RBC # BLD: 3.12 M/UL — LOW (ref 4.2–5.8)
RBC # FLD: 17.2 % — HIGH (ref 10.3–14.5)
SODIUM SERPL-SCNC: 136 MMOL/L — SIGNIFICANT CHANGE UP (ref 135–145)
SPECIMEN SOURCE: SIGNIFICANT CHANGE UP
WBC # BLD: 18.86 K/UL — HIGH (ref 3.8–10.5)
WBC # FLD AUTO: 18.86 K/UL — HIGH (ref 3.8–10.5)

## 2018-06-06 RX ORDER — ZOLPIDEM TARTRATE 10 MG/1
5 TABLET ORAL AT BEDTIME
Qty: 0 | Refills: 0 | Status: DISCONTINUED | OUTPATIENT
Start: 2018-06-06 | End: 2018-06-08

## 2018-06-06 RX ADMIN — PIPERACILLIN AND TAZOBACTAM 25 GRAM(S): 4; .5 INJECTION, POWDER, LYOPHILIZED, FOR SOLUTION INTRAVENOUS at 13:07

## 2018-06-06 RX ADMIN — NYSTATIN CREAM 1 APPLICATION(S): 100000 CREAM TOPICAL at 06:03

## 2018-06-06 RX ADMIN — HEPARIN SODIUM 5000 UNIT(S): 5000 INJECTION INTRAVENOUS; SUBCUTANEOUS at 05:40

## 2018-06-06 RX ADMIN — SODIUM CHLORIDE 100 MILLILITER(S): 9 INJECTION INTRAMUSCULAR; INTRAVENOUS; SUBCUTANEOUS at 13:12

## 2018-06-06 RX ADMIN — OXYCODONE HYDROCHLORIDE 10 MILLIGRAM(S): 5 TABLET ORAL at 17:50

## 2018-06-06 RX ADMIN — Medication 1 DROP(S): at 17:04

## 2018-06-06 RX ADMIN — ZOLPIDEM TARTRATE 5 MILLIGRAM(S): 10 TABLET ORAL at 22:47

## 2018-06-06 RX ADMIN — Medication 2: at 11:41

## 2018-06-06 RX ADMIN — SERTRALINE 50 MILLIGRAM(S): 25 TABLET, FILM COATED ORAL at 11:26

## 2018-06-06 RX ADMIN — OXYCODONE HYDROCHLORIDE 10 MILLIGRAM(S): 5 TABLET ORAL at 22:47

## 2018-06-06 RX ADMIN — Medication 2: at 17:12

## 2018-06-06 RX ADMIN — CLOPIDOGREL BISULFATE 75 MILLIGRAM(S): 75 TABLET, FILM COATED ORAL at 11:18

## 2018-06-06 RX ADMIN — NYSTATIN CREAM 1 APPLICATION(S): 100000 CREAM TOPICAL at 17:04

## 2018-06-06 RX ADMIN — HEPARIN SODIUM 5000 UNIT(S): 5000 INJECTION INTRAVENOUS; SUBCUTANEOUS at 21:06

## 2018-06-06 RX ADMIN — HEPARIN SODIUM 5000 UNIT(S): 5000 INJECTION INTRAVENOUS; SUBCUTANEOUS at 13:07

## 2018-06-06 RX ADMIN — Medication 0.5 MILLIGRAM(S): at 17:04

## 2018-06-06 RX ADMIN — Medication 2: at 00:45

## 2018-06-06 RX ADMIN — Medication 81 MILLIGRAM(S): at 11:18

## 2018-06-06 RX ADMIN — PIPERACILLIN AND TAZOBACTAM 25 GRAM(S): 4; .5 INJECTION, POWDER, LYOPHILIZED, FOR SOLUTION INTRAVENOUS at 21:06

## 2018-06-06 RX ADMIN — PIPERACILLIN AND TAZOBACTAM 25 GRAM(S): 4; .5 INJECTION, POWDER, LYOPHILIZED, FOR SOLUTION INTRAVENOUS at 05:40

## 2018-06-06 RX ADMIN — OXYCODONE HYDROCHLORIDE 10 MILLIGRAM(S): 5 TABLET ORAL at 17:16

## 2018-06-06 NOTE — DIETITIAN INITIAL EVALUATION ADULT. - ENERGY NEEDS
Ht.  68    "        Wt.    68    kg               BMI    22.8              IBW   70    kg               Pt is at  99  %  IBW

## 2018-06-06 NOTE — DIETITIAN INITIAL EVALUATION ADULT. - OTHER INFO
63 y/o male NHR with HTN, DM, chronic resp failure s/p trach and PEG, transverse myelitis and functional quad, s/p colostomy and chronic beltrán admitted to [facility] in May with PNA (actinobacter baumanii) and March with UTI (PSAE muti resistant but S to zosyn) presents with UTI sepsis. 61 y/o male NHR with HTN, DM, chronic resp failure s/p trach and PEG, transverse myelitis and functional quad, s/p colostomy and chronic beltrán admitted to [place] in May with PNA,  and March with UTI presents with UTI sepsis.  Pt has stage 4 PU on sacrum.  Pt from [facility].  Pt speaks very low, difficult to understand.  At [facility], pt was on Glucerna.  Pt meets criteria for moderate protein-calorie malnutrition in context of chronic disease.  NFPE reveals severe muscle wasting consistent with paralysis, moderate muscle wasting (clavicles, shoulders, scapula, interosseus, temples), moderate fat depletion (triceps.)  Suggest Glucerna 1.5 60 cc/hr x 24 hr.  Provides 2160 kcal, 119 g pro, 1002 cc fluids.  Meets pt's needs for kcal.  Add one packet prosource to TF to meet pt's needs for protein.  TF  via G-tube. Will monitor PO intake, tolerance, labs and weight.

## 2018-06-06 NOTE — CHART NOTE - NSCHARTNOTEFT_GEN_A_CORE
Upon Nutritional Assessment by the Registered Dietitian your patient was determined to meet criteria / has evidence of the following diagnosis/diagnoses:          [ ]  Mild Protein Calorie Malnutrition        [x]  Moderate Protein Calorie Malnutrition        [ ] Severe Protein Calorie Malnutrition        [ ] Unspecified Protein Calorie Malnutrition        [ ] Underweight / BMI <19        [ ] Morbid Obesity / BMI > 40      Findings as based on:  •  Comprehensive nutrition assessment and consultation  •  Calorie counts (nutrient intake analysis)  •  Food acceptance and intake status from observations by staff  •  Follow up  •  Patient education  •  Intervention secondary to interdisciplinary rounds  •   concerns    Pt meets criteria for moderate protein-calorie malnutrition in context of chronic disease.    NFPE reveals severe muscle wasting consistent with paralysis, moderate muscle wasting (clavicles, shoulders, scapula, interosseus, temples),   moderate fat depletion (triceps.)   Pt has stage 4 PU.     Treatment:    The following diet has been recommended:     Suggest Glucerna 1.5 60 cc/hr x 24 hr.    Provides 2160 kcal, 119 g pro, 1002 cc fluids.    Meets pt's needs for kcal.    Add one packet prosource to TF to meet pt's needs for protein.    TF  via G-tube.    PROVIDER Section:     By signing this assessment you are acknowledging and agree with the diagnosis/diagnoses assigned by the Registered Dietitian    Comments:

## 2018-06-06 NOTE — CHART NOTE - NSCHARTNOTEFT_GEN_A_CORE
Suggest Tube Feed    Glucerna 1.5 60 cc/hr x 24 hr.    Provides 2160 kcal, 119 g pro, 1002 cc fluids.    Meets pt's needs for kcal.    Add one packet prosource to TF to meet pt's needs for protein.    TF  via G-tube.  Pt on Glucerna 1.5 at [facility]

## 2018-06-06 NOTE — CONSULT NOTE ADULT - SUBJECTIVE AND OBJECTIVE BOX
Patient is a 62y old  Male who presents with a chief complaint of UTI sepsis and chronic vent     HPI:  61 y/o male NHR with h/o HTN, DM, chronic resp failure s/p trach and PEG, transverse myelitis and functional quadriplegia, s/p colostomy, urinary retention with chronic beltrán, recent hospitalization at [facility] with pneumonia with ACBA and UTI with MDR PSAE was admitted was admitted on  for increased weakness and leukocytosis. He had blood work done at NH and was noted with leukocytosis. He was sent to the hospital for further evaluation. In ER, he received vanco IV and zosyn.      PMH: as above  PSH: as above  Meds: per reconciliation sheet, noted below  MEDICATIONS  (STANDING):  aspirin  chewable 81 milliGRAM(s) Oral daily  clopidogrel Tablet 75 milliGRAM(s) Oral daily  dextrose 5%. 1000 milliLiter(s) (50 mL/Hr) IV Continuous <Continuous>  dextrose 50% Injectable 12.5 Gram(s) IV Push once  dextrose 50% Injectable 25 Gram(s) IV Push once  dextrose 50% Injectable 25 Gram(s) IV Push once  fentaNYL   Patch  50 MICROgram(s)/Hr 1 Patch Transdermal every 72 hours  heparin  Injectable 5000 Unit(s) SubCutaneous every 8 hours  insulin lispro (HumaLOG) corrective regimen sliding scale   SubCutaneous every 6 hours  nystatin Cream 1 Application(s) Topical two times a day  piperacillin/tazobactam IVPB. 3.375 Gram(s) IV Intermittent every 8 hours  sertraline 50 milliGRAM(s) Oral daily  sodium chloride 0.9%. 1000 milliLiter(s) (100 mL/Hr) IV Continuous <Continuous>    MEDICATIONS  (PRN):  acetaminophen   Tablet 650 milliGRAM(s) Oral every 6 hours PRN For Temp greater than 38 C (100.4 F)  ALPRAZolam 0.5 milliGRAM(s) Oral every 8 hours PRN anxiety  dextrose 40% Gel 15 Gram(s) Oral once PRN Blood Glucose LESS THAN 70 milliGRAM(s)/deciliter  glucagon  Injectable 1 milliGRAM(s) IntraMuscular once PRN Glucose LESS THAN 70 milligrams/deciliter  oxyCODONE    IR 10 milliGRAM(s) Oral every 6 hours PRN Moderate Pain (4 - 6)    Allergies    No Known Allergies    Intolerances      Social: no smoking, no alcohol, no illegal drugs; no recent travel, no exposure to TB  FAMILY HISTORY:  No pertinent family history in first degree relatives    no history of premature cardiovascular disease in first degree relatives    ROS: the patient denies fever, no chills, no HA, no seizures, no dizziness, no sore throat, no nasal congestion, no blurry vision, no CP, no palpitations, no SOB, no cough, no abdominal pain, no diarrhea, no N/V, no dysuria, no leg pain, no claudication, no rash, no joint aches, no rectal pain or bleeding, no night sweats  All other systems reviewed and are negative    Vital Signs Last 24 Hrs  T(C): 37.8 (2018 09:56), Max: 38.1 (2018 00:17)  T(F): 100 (2018 09:56), Max: 100.5 (2018 00:17)  HR: 83 (2018 10:00) (16 - 118)  BP: 112/67 (2018 10:00) (93/67 - 133/80)  BP(mean): 77 (2018 10:00) (62 - 87)  RR: 14 (2018 10:00) (13 - 20)  SpO2: 100% (2018 10:00) (95% - 100%)  Daily Height in cm: 172.72 (2018 13:44)    Daily Weight in k (2018 07:41)    PE:    Constitutional: frail looking  HEENT: NC/AT, EOMI, PERRLA, conjunctivae clear; ears and nose atraumatic; pharynx benign  Neck: supple; thyroid not palpable; has trache  Back: no tenderness  Respiratory: respiratory effort normal; clear to auscultation  Cardiovascular: S1S2 regular, no murmurs  Abdomen: soft, not tender, not distended, positive BS; no liver or spleen organomegaly; has PEG and colostomy  Genitourinary: no suprapubic tenderness  Lymphatic: no LN palpable  Musculoskeletal: no muscle tenderness, no joint swelling or tenderness  Extremities: no pedal edema  Neurological/ Psychiatric: AxOx3, judgement and insight normal;  moving all extremities  Skin: no rashes; no palpable lesions    Labs: all available labs reviewed                        8.3    18.86 )-----------( 237      ( 2018 05:29 )             27.0     06-06    136  |  100  |  44<H>  ----------------------------<  116<H>  4.0   |  29  |  1.05    Ca    8.2<L>      2018 05:29  Phos  2.7     06-06  Mg     2.1     06-06    TPro  6.7  /  Alb  2.0<L>  /  TBili  0.4  /  DBili  x   /  AST  34  /  ALT  59  /  AlkPhos  137<H>  06-     LIVER FUNCTIONS - ( 2018 14:15 )  Alb: 2.0 g/dL / Pro: 6.7 gm/dL / ALK PHOS: 137 U/L / ALT: 59 U/L / AST: 34 U/L / GGT: x           Urinalysis Basic - ( 2018 15:46 )    Color: Red / Appearance: Slightly Turbid / S.010 / pH: x  Gluc: x / Ketone: Negative  / Bili: Negative / Urobili: 1 mg/dL   Blood: x / Protein: 100 mg/dL / Nitrite: Positive   Leuk Esterase: Moderate / RBC: >50 /HPF / WBC 11-25   Sq Epi: x / Non Sq Epi: Moderate / Bacteria: Many          Radiology: all available radiological tests reviewed    Advanced directives addressed: full resuscitation Patient is a 62y old  Male who presents with a chief complaint of UTI sepsis and chronic vent     HPI:  63 y/o male NHR with h/o HTN, DM, chronic resp failure s/p trach and PEG, transverse myelitis and functional quadriplegia, s/p colostomy, urinary retention with chronic beltrán, recent hospitalization at [facility] with pneumonia with ACBA and UTI with MDR PSAE was admitted was admitted on  for increased weakness and leukocytosis. He had blood work done at NH and was noted with leukocytosis. He was sent to the hospital for further evaluation. In ER, he received vanco IV and zosyn.      PMH: as above  PSH: as above  Meds: per reconciliation sheet, noted below  MEDICATIONS  (STANDING):  aspirin  chewable 81 milliGRAM(s) Oral daily  clopidogrel Tablet 75 milliGRAM(s) Oral daily  dextrose 5%. 1000 milliLiter(s) (50 mL/Hr) IV Continuous <Continuous>  dextrose 50% Injectable 12.5 Gram(s) IV Push once  dextrose 50% Injectable 25 Gram(s) IV Push once  dextrose 50% Injectable 25 Gram(s) IV Push once  fentaNYL   Patch  50 MICROgram(s)/Hr 1 Patch Transdermal every 72 hours  heparin  Injectable 5000 Unit(s) SubCutaneous every 8 hours  insulin lispro (HumaLOG) corrective regimen sliding scale   SubCutaneous every 6 hours  nystatin Cream 1 Application(s) Topical two times a day  piperacillin/tazobactam IVPB. 3.375 Gram(s) IV Intermittent every 8 hours  sertraline 50 milliGRAM(s) Oral daily  sodium chloride 0.9%. 1000 milliLiter(s) (100 mL/Hr) IV Continuous <Continuous>    MEDICATIONS  (PRN):  acetaminophen   Tablet 650 milliGRAM(s) Oral every 6 hours PRN For Temp greater than 38 C (100.4 F)  ALPRAZolam 0.5 milliGRAM(s) Oral every 8 hours PRN anxiety  dextrose 40% Gel 15 Gram(s) Oral once PRN Blood Glucose LESS THAN 70 milliGRAM(s)/deciliter  glucagon  Injectable 1 milliGRAM(s) IntraMuscular once PRN Glucose LESS THAN 70 milligrams/deciliter  oxyCODONE    IR 10 milliGRAM(s) Oral every 6 hours PRN Moderate Pain (4 - 6)    Allergies    No Known Allergies    Intolerances      Social: no smoking, no alcohol, no illegal drugs; no recent travel, no exposure to TB  FAMILY HISTORY:  No pertinent family history in first degree relatives    no history of premature cardiovascular disease in first degree relatives    ROS: the patient has trach and is poorly verbal; limited; denies pain, no CP, no SOB, no cough, no abdominal pain, no joint aches  All other systems reviewed and are negative    Vital Signs Last 24 Hrs  T(C): 37.8 (2018 09:56), Max: 38.1 (2018 00:17)  T(F): 100 (2018 09:56), Max: 100.5 (2018 00:17)  HR: 83 (2018 10:00) (16 - 118)  BP: 112/67 (2018 10:00) (93/67 - 133/80)  BP(mean): 77 (2018 10:00) (62 - 87)  RR: 14 (2018 10:00) (13 - 20)  SpO2: 100% (2018 10:00) (95% - 100%)  Daily Height in cm: 172.72 (2018 13:44)    Daily Weight in k (2018 07:41)    PE:    Constitutional: frail looking  HEENT: NC/AT, EOMI, PERRLA, conjunctivae clear; ears and nose atraumatic; pharynx benign  Neck: supple; thyroid not palpable; has trache  Back: no tenderness  Respiratory: respiratory effort normal; few crackles at bases  Cardiovascular: S1S2 regular, no murmurs  Abdomen: soft, not tender, not distended, positive BS; no liver or spleen organomegaly; has PEG and colostomy  Genitourinary: no suprapubic tenderness  Lymphatic: no LN palpable  Musculoskeletal: no muscle tenderness, no joint swelling or tenderness  Extremities: no pedal edema  Neurological/ Psychiatric: AxOx2, judgement and insight impaired;  moving all extremities  Skin: no rashes; no palpable lesions    Labs: all available labs reviewed                        8.3    18.86 )-----------( 237      ( 2018 05:29 )             27.0     06-06    136  |  100  |  44<H>  ----------------------------<  116<H>  4.0   |  29  |  1.05    Ca    8.2<L>      2018 05:29  Phos  2.7     06-06  Mg     2.1     06-06    TPro  6.7  /  Alb  2.0<L>  /  TBili  0.4  /  DBili  x   /  AST  34  /  ALT  59  /  AlkPhos  137<H>  06-05     LIVER FUNCTIONS - ( 2018 14:15 )  Alb: 2.0 g/dL / Pro: 6.7 gm/dL / ALK PHOS: 137 U/L / ALT: 59 U/L / AST: 34 U/L / GGT: x           Urinalysis Basic - ( 2018 15:46 )    Color: Red / Appearance: Slightly Turbid / S.010 / pH: x  Gluc: x / Ketone: Negative  / Bili: Negative / Urobili: 1 mg/dL   Blood: x / Protein: 100 mg/dL / Nitrite: Positive   Leuk Esterase: Moderate / RBC: >50 /HPF / WBC 11-25   Sq Epi: x / Non Sq Epi: Moderate / Bacteria: Many          Radiology: all available radiological tests reviewed    < from: Xray Chest 1 View- PORTABLE-Routine (18 @ 11:06) >  Support Devices:  Unchanged  Heart:  Unremarkable  Mediastinum:  Unremarkable  Lungs/Airways: Decreased right pleural effusion.  Bones/Soft tissues: Unremarkable    < end of copied text >      Advanced directives addressed: full resuscitation

## 2018-06-06 NOTE — PROGRESS NOTE ADULT - SUBJECTIVE AND OBJECTIVE BOX
Events Overnight: on vent, comfortable, low grade fevers, creatinine better, bp ok    HPI:     63 y/o male NHR with HTN, DM, chronic resp failure s/p trach and PEG, transverse myelitis and functional quad, s/p colostomy and chronic beltrán admitted to [place] in May with PNA (actinobacter baumanii) and March with UTI (PSAE muti resistant but S to zosyn) presented with fever, inc lactate, renal insufficiency CXR clear.  Given 500ml IV bolus.  lactate 2.8 IV vanco and zosyn  beltrán changed, pyuria on u/a, still low grade fevers      PMH:  AS ABOVE     ROS - cant obtain secondary to  s/p trach, no specific complaints         MEDICATIONS  (STANDING):  aspirin  chewable 81 milliGRAM(s) Oral daily  clopidogrel Tablet 75 milliGRAM(s) Oral daily  dextrose 5%. 1000 milliLiter(s) (50 mL/Hr) IV Continuous <Continuous>  dextrose 50% Injectable 12.5 Gram(s) IV Push once  dextrose 50% Injectable 25 Gram(s) IV Push once  dextrose 50% Injectable 25 Gram(s) IV Push once  fentaNYL   Patch  50 MICROgram(s)/Hr 1 Patch Transdermal every 72 hours  heparin  Injectable 5000 Unit(s) SubCutaneous every 8 hours  insulin lispro (HumaLOG) corrective regimen sliding scale   SubCutaneous every 6 hours  nystatin Cream 1 Application(s) Topical two times a day  piperacillin/tazobactam IVPB. 3.375 Gram(s) IV Intermittent every 8 hours  sertraline 50 milliGRAM(s) Oral daily  sodium chloride 0.9%. 1000 milliLiter(s) (100 mL/Hr) IV Continuous <Continuous>    MEDICATIONS  (PRN):  acetaminophen   Tablet 650 milliGRAM(s) Oral every 6 hours PRN For Temp greater than 38 C (100.4 F)  ALPRAZolam 0.5 milliGRAM(s) Oral every 8 hours PRN anxiety  dextrose 40% Gel 15 Gram(s) Oral once PRN Blood Glucose LESS THAN 70 milliGRAM(s)/deciliter  glucagon  Injectable 1 milliGRAM(s) IntraMuscular once PRN Glucose LESS THAN 70 milligrams/deciliter  oxyCODONE    IR 10 milliGRAM(s) Oral every 6 hours PRN Moderate Pain (4 - 6)                Height (cm): 172.72 (-05 @ 13:44)  Weight (kg): 68 ( @ 13:44)  BMI (kg/m2): 22.8 ( @ 13:44)    ICU Vital Signs Last 24 Hrs  T(C): 37.9 (2018 04:18), Max: 38.1 (2018 00:17)  T(F): 100.2 (2018 04:18), Max: 100.5 (2018 00:17)  HR: 100 (2018 08:00) (16 - 118)  BP: 126/72 (2018 08:00) (93/67 - 133/80)  BP(mean): 84 (2018 08:00) (62 - 84)  ABP: --  ABP(mean): --  RR: 17 (2018 08:00) (13 - 20)  SpO2: 100% (2018 08:00) (95% - 100%)      Mode: AC/ CMV (Assist Control/ Continuous Mandatory Ventilation)  RR (machine): 14  TV (machine): 500  FiO2: 30  PEEP: 5  ITime: 1  PIP: 21      I&O's Summary    2018 07:01  -  2018 07:00  --------------------------------------------------------  IN: 0 mL / OUT: 1600 mL / NET: -1600 mL    2018 07:01  -  2018 09:34  --------------------------------------------------------  IN: 0 mL / OUT: 300 mL / NET: -300 mL        Physical Exam:     Physical Exam:  · Constitutional	detailed exam	  · Constitutional Details	comfotrable	  · ENMT	detailed exam	  · ENMT Comments	trach site clean	  · Respiratory	detailed exam	  · Respiratory Details	bilateral equal bs	  · Cardiovascular	Regular rate & rhythm, normal S1, S2; no murmurs, gallops or rubs; no S3, S4	  · Gastrointestinal	detailed exam	  · GI Normal	+ PEG and ostomy, soft non tender	  · Extremities	No cyanosis, clubbing or edema	  · Neurological	detailed exam	  · Neurological Details	 awake, functional quad	   chronic beltrán                          8.3    18.86 )-----------( 237      ( 2018 05:29 )             27.0       06-    136  |  100  |  44<H>  ----------------------------<  116<H>  4.0   |  29  |  1.05    Ca    8.2<L>      2018 05:29  Phos  2.7     -  Mg     2.1     -    TPro  6.7  /  Alb  2.0<L>  /  TBili  0.4  /  DBili  x   /  AST  34  /  ALT  59  /  AlkPhos  137<H>              ABG - ( 2018 17:13 )  pH, Arterial: 7.42  pH, Blood: x     /  pCO2: 42    /  pO2: 127   / HCO3: 27    / Base Excess: 2.6   /  SaO2: 99                  Urinalysis Basic - ( 2018 15:46 )    Color: Red / Appearance: Slightly Turbid / S.010 / pH: x  Gluc: x / Ketone: Negative  / Bili: Negative / Urobili: 1 mg/dL   Blood: x / Protein: 100 mg/dL / Nitrite: Positive   Leuk Esterase: Moderate / RBC: >50 /HPF / WBC 11-25   Sq Epi: x / Non Sq Epi: Moderate / Bacteria: Many        DVT Prophylaxis:    heparin subq                                                             Advanced Directives: Full code

## 2018-06-06 NOTE — CONSULT NOTE ADULT - ASSESSMENT
61 y/o male NHR with h/o HTN, DM, chronic resp failure s/p trach and PEG, transverse myelitis and functional quadriplegia, s/p colostomy, urinary retention with chronic beltrán, recent hospitalization at [facility] with pneumonia with ACBA and UTI with MDR PSAE was admitted was admitted on 6/5 for increased weakness and leukocytosis. He had blood work done at NH and was noted with leukocytosis. He was sent to the hospital for further evaluation. In ER, he received vanco IV and zosyn.    1. Low grade fever. Leukocytosis. Pyuria. Probable UTI. Urinary retention with chronic beltrán.  -obtain BC x 2, urine c/s 61 y/o male NHR with h/o HTN, DM, chronic resp failure s/p trach and PEG, transverse myelitis and functional quadriplegia, s/p colostomy, urinary retention with chronic beltrán, recent hospitalization at [facility] with pneumonia with ACBA and UTI with MDR PSAE was admitted was admitted on 6/5 for increased weakness and leukocytosis. He had blood work done at NH and was noted with leukocytosis. He was sent to the hospital for further evaluation. In ER, he received vanco IV and zosyn.    1. Low grade fever. Leukocytosis. Pyuria. Probable UTI. Urinary retention with chronic beltrán. Chronic respiratory failure on ventilatory support.  -prior pulmonary infection with CRE-ACBA and prior urinary infection with CRE-PSAE  -probable cause of leukocytosis is urinary infection  -obtain BC x 2, urine c/s  -he received zosyn overnight and his leukocytosis is improved  -agree with zosyn 3.375 mg IV q8h for now  -reason for abx use and side effects reviewed with patient; monitor BMP   -old chart reviewed to assess prior cultures  -strict isolation  -monitor respiratory function  -monitor temps  -f/u CBC  -supportive care  2. Other issues:   -care per medicine

## 2018-06-06 NOTE — DIETITIAN INITIAL EVALUATION ADULT. - NS AS NUTRI INTERV ENTERAL NUTRITION
Composition/Suggest Glucerna 1.5 55 cc/hr x 24 hr.  Provides 2160 kcal, 119 g pro, 1002 cc fluids.  Meets pt's needs for kcal, pro 100%.  Via G-tube./Concentration/Rate/Volume/Route

## 2018-06-07 LAB
ANION GAP SERPL CALC-SCNC: 8 MMOL/L — SIGNIFICANT CHANGE UP (ref 5–17)
BUN SERPL-MCNC: 39 MG/DL — HIGH (ref 7–23)
CALCIUM SERPL-MCNC: 8.1 MG/DL — LOW (ref 8.5–10.1)
CHLORIDE SERPL-SCNC: 107 MMOL/L — SIGNIFICANT CHANGE UP (ref 96–108)
CO2 SERPL-SCNC: 26 MMOL/L — SIGNIFICANT CHANGE UP (ref 22–31)
CREAT SERPL-MCNC: 0.9 MG/DL — SIGNIFICANT CHANGE UP (ref 0.5–1.3)
GLUCOSE BLDC GLUCOMTR-MCNC: 216 MG/DL — HIGH (ref 70–99)
GLUCOSE BLDC GLUCOMTR-MCNC: 229 MG/DL — HIGH (ref 70–99)
GLUCOSE BLDC GLUCOMTR-MCNC: 243 MG/DL — HIGH (ref 70–99)
GLUCOSE BLDC GLUCOMTR-MCNC: 273 MG/DL — HIGH (ref 70–99)
GLUCOSE BLDC GLUCOMTR-MCNC: 314 MG/DL — HIGH (ref 70–99)
GLUCOSE SERPL-MCNC: 238 MG/DL — HIGH (ref 70–99)
HCT VFR BLD CALC: 25.3 % — LOW (ref 39–50)
HGB BLD-MCNC: 7.9 G/DL — LOW (ref 13–17)
LACTATE SERPL-SCNC: 1.4 MMOL/L — SIGNIFICANT CHANGE UP (ref 0.7–2)
MCHC RBC-ENTMCNC: 27.6 PG — SIGNIFICANT CHANGE UP (ref 27–34)
MCHC RBC-ENTMCNC: 31.2 GM/DL — LOW (ref 32–36)
MCV RBC AUTO: 88.5 FL — SIGNIFICANT CHANGE UP (ref 80–100)
NRBC # BLD: 0 /100 WBCS — SIGNIFICANT CHANGE UP (ref 0–0)
PLATELET # BLD AUTO: 180 K/UL — SIGNIFICANT CHANGE UP (ref 150–400)
POTASSIUM SERPL-MCNC: 3.8 MMOL/L — SIGNIFICANT CHANGE UP (ref 3.5–5.3)
POTASSIUM SERPL-SCNC: 3.8 MMOL/L — SIGNIFICANT CHANGE UP (ref 3.5–5.3)
RBC # BLD: 2.86 M/UL — LOW (ref 4.2–5.8)
RBC # FLD: 17 % — HIGH (ref 10.3–14.5)
SODIUM SERPL-SCNC: 141 MMOL/L — SIGNIFICANT CHANGE UP (ref 135–145)
WBC # BLD: 10.34 K/UL — SIGNIFICANT CHANGE UP (ref 3.8–10.5)
WBC # FLD AUTO: 10.34 K/UL — SIGNIFICANT CHANGE UP (ref 3.8–10.5)

## 2018-06-07 RX ORDER — INSULIN GLARGINE 100 [IU]/ML
15 INJECTION, SOLUTION SUBCUTANEOUS AT BEDTIME
Qty: 0 | Refills: 0 | Status: DISCONTINUED | OUTPATIENT
Start: 2018-06-07 | End: 2018-06-08

## 2018-06-07 RX ADMIN — PIPERACILLIN AND TAZOBACTAM 25 GRAM(S): 4; .5 INJECTION, POWDER, LYOPHILIZED, FOR SOLUTION INTRAVENOUS at 22:45

## 2018-06-07 RX ADMIN — NYSTATIN CREAM 1 APPLICATION(S): 100000 CREAM TOPICAL at 18:01

## 2018-06-07 RX ADMIN — OXYCODONE HYDROCHLORIDE 10 MILLIGRAM(S): 5 TABLET ORAL at 23:43

## 2018-06-07 RX ADMIN — PIPERACILLIN AND TAZOBACTAM 25 GRAM(S): 4; .5 INJECTION, POWDER, LYOPHILIZED, FOR SOLUTION INTRAVENOUS at 06:17

## 2018-06-07 RX ADMIN — INSULIN GLARGINE 15 UNIT(S): 100 INJECTION, SOLUTION SUBCUTANEOUS at 23:06

## 2018-06-07 RX ADMIN — PIPERACILLIN AND TAZOBACTAM 25 GRAM(S): 4; .5 INJECTION, POWDER, LYOPHILIZED, FOR SOLUTION INTRAVENOUS at 13:21

## 2018-06-07 RX ADMIN — OXYCODONE HYDROCHLORIDE 10 MILLIGRAM(S): 5 TABLET ORAL at 19:32

## 2018-06-07 RX ADMIN — Medication 4: at 00:34

## 2018-06-07 RX ADMIN — NYSTATIN CREAM 1 APPLICATION(S): 100000 CREAM TOPICAL at 06:35

## 2018-06-07 RX ADMIN — Medication 1 DROP(S): at 06:17

## 2018-06-07 RX ADMIN — HEPARIN SODIUM 5000 UNIT(S): 5000 INJECTION INTRAVENOUS; SUBCUTANEOUS at 22:45

## 2018-06-07 RX ADMIN — SERTRALINE 50 MILLIGRAM(S): 25 TABLET, FILM COATED ORAL at 13:21

## 2018-06-07 RX ADMIN — Medication 4: at 23:08

## 2018-06-07 RX ADMIN — HEPARIN SODIUM 5000 UNIT(S): 5000 INJECTION INTRAVENOUS; SUBCUTANEOUS at 16:19

## 2018-06-07 RX ADMIN — Medication 8: at 13:30

## 2018-06-07 RX ADMIN — OXYCODONE HYDROCHLORIDE 10 MILLIGRAM(S): 5 TABLET ORAL at 06:17

## 2018-06-07 RX ADMIN — Medication 81 MILLIGRAM(S): at 13:21

## 2018-06-07 RX ADMIN — HEPARIN SODIUM 5000 UNIT(S): 5000 INJECTION INTRAVENOUS; SUBCUTANEOUS at 06:17

## 2018-06-07 RX ADMIN — Medication 6: at 06:35

## 2018-06-07 RX ADMIN — Medication 1 DROP(S): at 18:01

## 2018-06-07 RX ADMIN — CLOPIDOGREL BISULFATE 75 MILLIGRAM(S): 75 TABLET, FILM COATED ORAL at 13:20

## 2018-06-07 RX ADMIN — Medication 0.5 MILLIGRAM(S): at 22:45

## 2018-06-07 RX ADMIN — Medication 4: at 18:02

## 2018-06-07 NOTE — PROGRESS NOTE ADULT - SUBJECTIVE AND OBJECTIVE BOX
HPI:     63 y/o male NHR with HTN, DM, chronic resp failure s/p trach and PEG, transverse myelitis and functional quad, s/p colostomy and chronic beltrán admitted to [place] in May with PNA (actinobacter baumanii) and March with UTI (PSAE muti resistant but S to zosyn) presented with fever, inc lactate, renal insufficiency CXR clear.  Given 500ml IV bolus.  lactate 2.8 IV vanco and zosyn  beltrán changed, pyuria on u/a, still low grade fevers      : Patient seen and chart reviewed.  WBC has improved and no fevers         PAST MEDICAL & SURGICAL HISTORY:  Essential hypertension  Paralysis  Transverse myelitis  History of tracheostomy      FAMILY HISTORY:  No pertinent family history in first degree relatives      Social Hx:    Allergies    No Known Allergies    Intolerances            ICU Vital Signs Last 24 Hrs  T(C): 38.3 (2018 05:31), Max: 38.3 (2018 05:31)  T(F): 100.9 (2018 05:31), Max: 100.9 (2018 05:31)  HR: 78 (2018 06:00) (77 - 96)  BP: 150/81 (2018 06:00) (100/64 - 159/83)  BP(mean): 97 (2018 06:00) (72 - 102)  ABP: --  ABP(mean): --  RR: 15 (2018 06:00) (14 - 18)  SpO2: 100% (2018 06:00) (99% - 100%)      Mode: AC/ CMV (Assist Control/ Continuous Mandatory Ventilation)  RR (machine): 14  TV (machine): 500  FiO2: 24  PEEP: 5  ITime: 1  PIP: 24      I&O's Summary    2018 07:01  -  2018 07:00  --------------------------------------------------------  IN: 1348 mL / OUT: 2050 mL / NET: -702 mL                              7.9    10.34 )-----------( 180      ( 2018 06:04 )             25.3       06-07    141  |  107  |  39<H>  ----------------------------<  238<H>  3.8   |  26  |  0.90    Ca    8.1<L>      2018 06:04  Phos  2.7     06-06  Mg     2.1     06-06    TPro  6.7  /  Alb  2.0<L>  /  TBili  0.4  /  DBili  x   /  AST  34  /  ALT  59  /  AlkPhos  137<H>  06-05            ABG - ( 2018 17:13 )  pH, Arterial: 7.42  pH, Blood: x     /  pCO2: 42    /  pO2: 127   / HCO3: 27    / Base Excess: 2.6   /  SaO2: 99                  Urinalysis Basic - ( 2018 15:46 )    Color: Red / Appearance: Slightly Turbid / S.010 / pH: x  Gluc: x / Ketone: Negative  / Bili: Negative / Urobili: 1 mg/dL   Blood: x / Protein: 100 mg/dL / Nitrite: Positive   Leuk Esterase: Moderate / RBC: >50 /HPF / WBC 11-25   Sq Epi: x / Non Sq Epi: Moderate / Bacteria: Many        MEDICATIONS  (STANDING):  artificial  tears Solution 1 Drop(s) Both EYES two times a day  aspirin  chewable 81 milliGRAM(s) Oral daily  clopidogrel Tablet 75 milliGRAM(s) Oral daily  dextrose 5%. 1000 milliLiter(s) (50 mL/Hr) IV Continuous <Continuous>  dextrose 50% Injectable 12.5 Gram(s) IV Push once  dextrose 50% Injectable 25 Gram(s) IV Push once  dextrose 50% Injectable 25 Gram(s) IV Push once  fentaNYL   Patch  50 MICROgram(s)/Hr 1 Patch Transdermal every 72 hours  heparin  Injectable 5000 Unit(s) SubCutaneous every 8 hours  insulin glargine Injectable (LANTUS) 15 Unit(s) SubCutaneous at bedtime  insulin lispro (HumaLOG) corrective regimen sliding scale   SubCutaneous every 6 hours  nystatin Cream 1 Application(s) Topical two times a day  piperacillin/tazobactam IVPB. 3.375 Gram(s) IV Intermittent every 8 hours  sertraline 50 milliGRAM(s) Oral daily  sodium chloride 0.9%. 1000 milliLiter(s) (100 mL/Hr) IV Continuous <Continuous>    MEDICATIONS  (PRN):  acetaminophen   Tablet 650 milliGRAM(s) Oral every 6 hours PRN For Temp greater than 38 C (100.4 F)  ALPRAZolam 0.5 milliGRAM(s) Oral every 8 hours PRN anxiety  dextrose 40% Gel 15 Gram(s) Oral once PRN Blood Glucose LESS THAN 70 milliGRAM(s)/deciliter  glucagon  Injectable 1 milliGRAM(s) IntraMuscular once PRN Glucose LESS THAN 70 milligrams/deciliter  oxyCODONE    IR 10 milliGRAM(s) Oral every 6 hours PRN Moderate Pain (4 - 6)  zolpidem 5 milliGRAM(s) Oral at bedtime PRN Insomnia      DVT Prophylaxis: [facility]    Advanced Directives:  Discussed with:    Visit Information:    ** Time is exclusive of billed procedures and/or teaching and/or routine family updates.

## 2018-06-07 NOTE — PROGRESS NOTE ADULT - SUBJECTIVE AND OBJECTIVE BOX
Date of service: 18 @ 10:01    Lying in bed in NAD  Lethargic  Poorly verbal  Fever is down    MEDICATIONS  (STANDING):  artificial  tears Solution 1 Drop(s) Both EYES two times a day  aspirin  chewable 81 milliGRAM(s) Oral daily  clopidogrel Tablet 75 milliGRAM(s) Oral daily  dextrose 5%. 1000 milliLiter(s) (50 mL/Hr) IV Continuous <Continuous>  dextrose 50% Injectable 12.5 Gram(s) IV Push once  dextrose 50% Injectable 25 Gram(s) IV Push once  dextrose 50% Injectable 25 Gram(s) IV Push once  fentaNYL   Patch  50 MICROgram(s)/Hr 1 Patch Transdermal every 72 hours  heparin  Injectable 5000 Unit(s) SubCutaneous every 8 hours  insulin glargine Injectable (LANTUS) 15 Unit(s) SubCutaneous at bedtime  insulin lispro (HumaLOG) corrective regimen sliding scale   SubCutaneous every 6 hours  nystatin Cream 1 Application(s) Topical two times a day  piperacillin/tazobactam IVPB. 3.375 Gram(s) IV Intermittent every 8 hours  sertraline 50 milliGRAM(s) Oral daily  sodium chloride 0.9%. 1000 milliLiter(s) (100 mL/Hr) IV Continuous <Continuous>    MEDICATIONS  (PRN):  acetaminophen   Tablet 650 milliGRAM(s) Oral every 6 hours PRN For Temp greater than 38 C (100.4 F)  ALPRAZolam 0.5 milliGRAM(s) Oral every 8 hours PRN anxiety  dextrose 40% Gel 15 Gram(s) Oral once PRN Blood Glucose LESS THAN 70 milliGRAM(s)/deciliter  glucagon  Injectable 1 milliGRAM(s) IntraMuscular once PRN Glucose LESS THAN 70 milligrams/deciliter  oxyCODONE    IR 10 milliGRAM(s) Oral every 6 hours PRN Moderate Pain (4 - 6)  zolpidem 5 milliGRAM(s) Oral at bedtime PRN Insomnia      Vital Signs Last 24 Hrs  T(C): 38.3 (2018 05:31), Max: 38.3 (2018 05:31)  T(F): 100.9 (2018 05:31), Max: 100.9 (2018 05:31)  HR: 78 (2018 06:00) (77 - 96)  BP: 150/81 (2018 06:00) (100/64 - 159/83)  BP(mean): 97 (2018 06:00) (72 - 102)  RR: 15 (2018 06:00) (14 - 18)  SpO2: 100% (2018 06:00) (99% - 100%)    Physical Exam:      Constitutional: frail looking  HEENT: NC/AT, EOMI, PERRLA, conjunctivae clear  Neck: supple; thyroid not palpable; has trache  Back: no tenderness  Respiratory: respiratory effort normal; few crackles at bases  Cardiovascular: S1S2 regular, no murmurs  Abdomen: soft, not tender, not distended, positive BS; has PEG and colostomy  Genitourinary: no suprapubic tenderness  Lymphatic: no LN palpable  Musculoskeletal: no muscle tenderness, no joint swelling or tenderness  Extremities: no pedal edema  Neurological/ Psychiatric: AxOx2, moving all extremities  Skin: no rashes; no palpable lesions    Labs: reviewed                        7.9    10.34 )-----------( 180      ( 2018 06:04 )             25.3     06-07    141  |  107  |  39<H>  ----------------------------<  238<H>  3.8   |  26  |  0.90    Ca    8.1<L>      2018 06:04  Phos  2.7     06-06  Mg     2.1     06-06    TPro  6.7  /  Alb  2.0<L>  /  TBili  0.4  /  DBili  x   /  AST  34  /  ALT  59  /  AlkPhos  137<H>  06-05                        8.3    18.86 )-----------( 237      ( 2018 05:29 )             27.0     06-06    136  |  100  |  44<H>  ----------------------------<  116<H>  4.0   |  29  |  1.05    Ca    8.2<L>      2018 05:29  Phos  2.7     06-06  Mg     2.1     06-06    TPro  6.7  /  Alb  2.0<L>  /  TBili  0.4  /  DBili  x   /  AST  34  /  ALT  59  /  AlkPhos  137<H>  06-05     LIVER FUNCTIONS - ( 2018 14:15 )  Alb: 2.0 g/dL / Pro: 6.7 gm/dL / ALK PHOS: 137 U/L / ALT: 59 U/L / AST: 34 U/L / GGT: x           Urinalysis Basic - ( 2018 15:46 )    Color: Red / Appearance: Slightly Turbid / S.010 / pH: x  Gluc: x / Ketone: Negative  / Bili: Negative / Urobili: 1 mg/dL   Blood: x / Protein: 100 mg/dL / Nitrite: Positive   Leuk Esterase: Moderate / RBC: >50 /HPF / WBC 11-25   Sq Epi: x / Non Sq Epi: Moderate / Bacteria: Many      Culture - Urine (collected 2018 15:46)  Source: .Urine None  Final Report (2018 22:06):    No growth    Culture - Blood (collected 2018 14:15)  Source: .Blood Blood-Peripheral  Preliminary Report (2018 21:01):    No growth to date.    Culture - Blood (collected 2018 14:15)  Source: .Blood Blood-Peripheral  Preliminary Report (2018 21:01):    No growth to date.          Radiology: all available radiological tests reviewed    < from: Xray Chest 1 View- PORTABLE-Routine (18 @ 11:06) >  Support Devices:  Unchanged  Heart:  Unremarkable  Mediastinum:  Unremarkable  Lungs/Airways: Decreased right pleural effusion.  Bones/Soft tissues: Unremarkable    < end of copied text >      Advanced directives addressed: full resuscitation

## 2018-06-08 ENCOUNTER — TRANSCRIPTION ENCOUNTER (OUTPATIENT)
Age: 63
End: 2018-06-08

## 2018-06-08 VITALS — DIASTOLIC BLOOD PRESSURE: 64 MMHG | SYSTOLIC BLOOD PRESSURE: 145 MMHG | HEART RATE: 54 BPM | RESPIRATION RATE: 14 BRPM

## 2018-06-08 LAB
ANION GAP SERPL CALC-SCNC: 5 MMOL/L — SIGNIFICANT CHANGE UP (ref 5–17)
BUN SERPL-MCNC: 31 MG/DL — HIGH (ref 7–23)
CALCIUM SERPL-MCNC: 8.1 MG/DL — LOW (ref 8.5–10.1)
CHLORIDE SERPL-SCNC: 112 MMOL/L — HIGH (ref 96–108)
CO2 SERPL-SCNC: 28 MMOL/L — SIGNIFICANT CHANGE UP (ref 22–31)
CREAT SERPL-MCNC: 0.75 MG/DL — SIGNIFICANT CHANGE UP (ref 0.5–1.3)
GLUCOSE BLDC GLUCOMTR-MCNC: 192 MG/DL — HIGH (ref 70–99)
GLUCOSE BLDC GLUCOMTR-MCNC: 211 MG/DL — HIGH (ref 70–99)
GLUCOSE SERPL-MCNC: 162 MG/DL — HIGH (ref 70–99)
GRAM STN FLD: SIGNIFICANT CHANGE UP
HCT VFR BLD CALC: 26 % — LOW (ref 39–50)
HGB BLD-MCNC: 7.8 G/DL — LOW (ref 13–17)
MAGNESIUM SERPL-MCNC: 1.9 MG/DL — SIGNIFICANT CHANGE UP (ref 1.6–2.6)
MCHC RBC-ENTMCNC: 26.5 PG — LOW (ref 27–34)
MCHC RBC-ENTMCNC: 30 GM/DL — LOW (ref 32–36)
MCV RBC AUTO: 88.4 FL — SIGNIFICANT CHANGE UP (ref 80–100)
METHOD TYPE: SIGNIFICANT CHANGE UP
NRBC # BLD: 0 /100 WBCS — SIGNIFICANT CHANGE UP (ref 0–0)
P AERUGINOSA DNA BLD POS NAA+NON-PROBE: SIGNIFICANT CHANGE UP
PHOSPHATE SERPL-MCNC: 1.9 MG/DL — LOW (ref 2.5–4.5)
PLATELET # BLD AUTO: 191 K/UL — SIGNIFICANT CHANGE UP (ref 150–400)
POTASSIUM SERPL-MCNC: 3.7 MMOL/L — SIGNIFICANT CHANGE UP (ref 3.5–5.3)
POTASSIUM SERPL-SCNC: 3.7 MMOL/L — SIGNIFICANT CHANGE UP (ref 3.5–5.3)
RBC # BLD: 2.94 M/UL — LOW (ref 4.2–5.8)
RBC # FLD: 17.2 % — HIGH (ref 10.3–14.5)
SODIUM SERPL-SCNC: 145 MMOL/L — SIGNIFICANT CHANGE UP (ref 135–145)
WBC # BLD: 8.98 K/UL — SIGNIFICANT CHANGE UP (ref 3.8–10.5)
WBC # FLD AUTO: 8.98 K/UL — SIGNIFICANT CHANGE UP (ref 3.8–10.5)

## 2018-06-08 RX ORDER — SODIUM,POTASSIUM PHOSPHATES 278-250MG
2 POWDER IN PACKET (EA) ORAL
Qty: 0 | Refills: 0 | Status: DISCONTINUED | OUTPATIENT
Start: 2018-06-08 | End: 2018-06-08

## 2018-06-08 RX ORDER — CEFEPIME 1 G/1
1 INJECTION, POWDER, FOR SOLUTION INTRAMUSCULAR; INTRAVENOUS
Qty: 0 | Refills: 0 | COMMUNITY

## 2018-06-08 RX ADMIN — Medication 81 MILLIGRAM(S): at 12:22

## 2018-06-08 RX ADMIN — Medication 4: at 12:20

## 2018-06-08 RX ADMIN — PIPERACILLIN AND TAZOBACTAM 25 GRAM(S): 4; .5 INJECTION, POWDER, LYOPHILIZED, FOR SOLUTION INTRAVENOUS at 06:00

## 2018-06-08 RX ADMIN — OXYCODONE HYDROCHLORIDE 10 MILLIGRAM(S): 5 TABLET ORAL at 12:19

## 2018-06-08 RX ADMIN — OXYCODONE HYDROCHLORIDE 10 MILLIGRAM(S): 5 TABLET ORAL at 06:28

## 2018-06-08 RX ADMIN — Medication 2 PACKET(S): at 14:44

## 2018-06-08 RX ADMIN — NYSTATIN CREAM 1 APPLICATION(S): 100000 CREAM TOPICAL at 06:01

## 2018-06-08 RX ADMIN — CLOPIDOGREL BISULFATE 75 MILLIGRAM(S): 75 TABLET, FILM COATED ORAL at 12:22

## 2018-06-08 RX ADMIN — HEPARIN SODIUM 5000 UNIT(S): 5000 INJECTION INTRAVENOUS; SUBCUTANEOUS at 14:14

## 2018-06-08 RX ADMIN — OXYCODONE HYDROCHLORIDE 10 MILLIGRAM(S): 5 TABLET ORAL at 00:15

## 2018-06-08 RX ADMIN — HEPARIN SODIUM 5000 UNIT(S): 5000 INJECTION INTRAVENOUS; SUBCUTANEOUS at 06:00

## 2018-06-08 RX ADMIN — SERTRALINE 50 MILLIGRAM(S): 25 TABLET, FILM COATED ORAL at 12:22

## 2018-06-08 RX ADMIN — Medication 1 PACKET(S): at 17:21

## 2018-06-08 RX ADMIN — ZOLPIDEM TARTRATE 5 MILLIGRAM(S): 10 TABLET ORAL at 01:53

## 2018-06-08 RX ADMIN — Medication 1 DROP(S): at 17:58

## 2018-06-08 RX ADMIN — Medication 1 DROP(S): at 06:00

## 2018-06-08 RX ADMIN — OXYCODONE HYDROCHLORIDE 10 MILLIGRAM(S): 5 TABLET ORAL at 18:06

## 2018-06-08 RX ADMIN — Medication 650 MILLIGRAM(S): at 06:28

## 2018-06-08 RX ADMIN — PIPERACILLIN AND TAZOBACTAM 25 GRAM(S): 4; .5 INJECTION, POWDER, LYOPHILIZED, FOR SOLUTION INTRAVENOUS at 14:44

## 2018-06-08 RX ADMIN — Medication 2: at 06:05

## 2018-06-08 RX ADMIN — NYSTATIN CREAM 1 APPLICATION(S): 100000 CREAM TOPICAL at 14:44

## 2018-06-08 NOTE — DISCHARGE NOTE ADULT - CARE PLAN
Principal Discharge DX:	Chronic respiratory failure  Goal:	Stay on vent  Assessment and plan of treatment:	stay on vent  Secondary Diagnosis:	Urinary tract infection without hematuria, site unspecified  Goal:	monitor for fevers  Secondary Diagnosis:	Transverse myelitis  Goal:	PT  Secondary Diagnosis:	Paralysis  Goal:	PT  Secondary Diagnosis:	History of tracheostomy  Goal:	trach care  Secondary Diagnosis:	Essential hypertension  Goal:	BP mids

## 2018-06-08 NOTE — DISCHARGE NOTE ADULT - SECONDARY DIAGNOSIS.
Urinary tract infection without hematuria, site unspecified Transverse myelitis Paralysis History of tracheostomy Essential hypertension

## 2018-06-08 NOTE — PROGRESS NOTE ADULT - ASSESSMENT
1. Patient with sepsis secondary UTI      on vanco, zosyn, has hx. multiresistent organisms, chronic beltrán awaiting cultures      fever, curve, wbc better with abx      will  continue hydration , tube feeds
61 y/o male [facility] with h/o HTN, DM, chronic resp failure s/p trach and PEG, transverse myelitis and functional quadriplegia, s/p colostomy, urinary retention with chronic beltrán, recent hospitalization at [facility] with pneumonia with ACBA and UTI with MDR PSAE was admitted was admitted on 6/5 for increased weakness and leukocytosis. He had blood work done at NH and was noted with leukocytosis. He was sent to the hospital for further evaluation. In ER, he received vanco IV and zosyn.    1. Low grade fever. Pyuria. Probable UTI. Urinary retention with chronic beltrán. Chronic respiratory failure on ventilatory support.  -prior pulmonary infection with CRE-ACBA and prior urinary infection with CRE-PSAE  -leukocytosis resolved  -respiratory function is stable  -BC x 2, urine c/s are negative to date  -on zosyn 3.375 mg IV q8h # 3  -tolerating abx well so far; no side effects noted  -complete abx therapy today  -strict isolation  -monitor respiratory function   -monitor temps  -f/u CBC  -supportive care  2. Other issues:   -care per medicine
63 y/o male [facility] with h/o HTN, DM, chronic resp failure s/p trach and PEG, transverse myelitis and functional quadriplegia, s/p colostomy, urinary retention with chronic beltrán, recent hospitalization at [facility] with pneumonia with ACBA and UTI with MDR PSAE was admitted was admitted on 6/5 for increased weakness and leukocytosis. He had blood work done at NH and was noted with leukocytosis. He was sent to the hospital for further evaluation. In ER, he received vanco IV and zosyn.    1. Low grade fever. Pyuria. Probable UTI. Urinary retention with chronic beltrán. Chronic respiratory failure on ventilatory support.  -prior pulmonary infection with CRE-ACBA and prior urinary infection with CRE-PSAE  -leukocytosis is improving  -respiratory function is stable  -fever is fown  -BC x 2, urine c/s are negative to date  -on zosyn 3.375 mg IV q8h # 2  -tolerating abx well so far; no side effects noted  -continue abx coverage for now  -strict isolation  -monitor respiratory function   -monitor temps  -f/u CBC  -supportive care  2. Other issues:   -care per medicine
1. Patient with sepsis secondary UTI     Patient WBC normal.  Cultures negative  ID will stop antibiotics and possible transfer back to NH tomorrow  Continue feeds  Resume lantus  SQH DVT Prophylaxiis

## 2018-06-08 NOTE — PROGRESS NOTE ADULT - SUBJECTIVE AND OBJECTIVE BOX
Date of service: 18 @ 10:51    Comfortable  Denies pain  Respiratory status is unchanged    ROS: no fever or chills; denies dizziness, no HA, no SOB or cough, no abdominal pain, no diarrhea or constipation; no dysuria, no legs pain, no rashes    MEDICATIONS  (STANDING):  artificial  tears Solution 1 Drop(s) Both EYES two times a day  aspirin  chewable 81 milliGRAM(s) Oral daily  clopidogrel Tablet 75 milliGRAM(s) Oral daily  dextrose 5%. 1000 milliLiter(s) (50 mL/Hr) IV Continuous <Continuous>  dextrose 50% Injectable 12.5 Gram(s) IV Push once  dextrose 50% Injectable 25 Gram(s) IV Push once  dextrose 50% Injectable 25 Gram(s) IV Push once  fentaNYL   Patch  50 MICROgram(s)/Hr 1 Patch Transdermal every 72 hours  heparin  Injectable 5000 Unit(s) SubCutaneous every 8 hours  insulin glargine Injectable (LANTUS) 15 Unit(s) SubCutaneous at bedtime  insulin lispro (HumaLOG) corrective regimen sliding scale   SubCutaneous every 6 hours  nystatin Cream 1 Application(s) Topical two times a day  piperacillin/tazobactam IVPB. 3.375 Gram(s) IV Intermittent every 8 hours  potassium phosphate / sodium phosphate powder 2 Packet(s) Oral two times a day  sertraline 50 milliGRAM(s) Oral daily  sodium chloride 0.9%. 1000 milliLiter(s) (100 mL/Hr) IV Continuous <Continuous>    MEDICATIONS  (PRN):  acetaminophen   Tablet 650 milliGRAM(s) Oral every 6 hours PRN For Temp greater than 38 C (100.4 F)  ALPRAZolam 0.5 milliGRAM(s) Oral every 8 hours PRN anxiety  dextrose 40% Gel 15 Gram(s) Oral once PRN Blood Glucose LESS THAN 70 milliGRAM(s)/deciliter  glucagon  Injectable 1 milliGRAM(s) IntraMuscular once PRN Glucose LESS THAN 70 milligrams/deciliter  oxyCODONE    IR 10 milliGRAM(s) Oral every 6 hours PRN Moderate Pain (4 - 6)  zolpidem 5 milliGRAM(s) Oral at bedtime PRN Insomnia      Vital Signs Last 24 Hrs  T(C): 38.3 (2018 06:00), Max: 38.3 (2018 06:00)  T(F): 101 (2018 06:00), Max: 101 (2018 06:00)  HR: 67 (2018 09:56) (57 - 80)  BP: 154/80 (2018 09:00) (129/69 - 161/87)  BP(mean): 105 (2018 06:00) (82 - 106)  RR: 14 (2018 09:00) (14 - 18)  SpO2: 100% (2018 09:56) (98% - 100%)    Physical Exam:      Constitutional: frail looking  HEENT: NC/AT, EOMI, PERRLA, conjunctivae clear  Neck: supple; thyroid not palpable; has trache  Back: no tenderness  Respiratory: respiratory effort normal; few crackles at bases  Cardiovascular: S1S2 regular, no murmurs  Abdomen: soft, not tender, not distended, positive BS; has PEG and colostomy  Genitourinary: no suprapubic tenderness  Lymphatic: no LN palpable  Musculoskeletal: no muscle tenderness, no joint swelling or tenderness  Extremities: no pedal edema  Neurological/ Psychiatric: AxOx2, moving all extremities  Skin: no rashes; no palpable lesions    Labs: reviewed                        7.8    8.98  )-----------( 191      ( 2018 06:28 )             26.0     06-08    145  |  112<H>  |  31<H>  ----------------------------<  162<H>  3.7   |  28  |  0.75    Ca    8.1<L>      2018 06:28  Phos  1.9     06-08  Mg     1.9     06-08                        7.9    10.34 )-----------( 180      ( 2018 06:04 )             25.3     06-07    141  |  107  |  39<H>  ----------------------------<  238<H>  3.8   |  26  |  0.90    Ca    8.1<L>      2018 06:04  Phos  2.7     06-06  Mg     2.1     06-06    TPro  6.7  /  Alb  2.0<L>  /  TBili  0.4  /  DBili  x   /  AST  34  /  ALT  59  /  AlkPhos  137<H>                          8.3    18.86 )-----------( 237      ( 2018 05:29 )             27.0     -    136  |  100  |  44<H>  ----------------------------<  116<H>  4.0   |  29  |  1.05    Ca    8.2<L>      2018 05:29  Phos  2.7     -  Mg     2.1     -    TPro  6.7  /  Alb  2.0<L>  /  TBili  0.4  /  DBili  x   /  AST  34  /  ALT  59  /  AlkPhos  137<H>       LIVER FUNCTIONS - ( 2018 14:15 )  Alb: 2.0 g/dL / Pro: 6.7 gm/dL / ALK PHOS: 137 U/L / ALT: 59 U/L / AST: 34 U/L / GGT: x           Urinalysis Basic - ( 2018 15:46 )    Color: Red / Appearance: Slightly Turbid / S.010 / pH: x  Gluc: x / Ketone: Negative  / Bili: Negative / Urobili: 1 mg/dL   Blood: x / Protein: 100 mg/dL / Nitrite: Positive   Leuk Esterase: Moderate / RBC: >50 /HPF / WBC 11-25   Sq Epi: x / Non Sq Epi: Moderate / Bacteria: Many      Culture - Urine (collected 2018 15:46)  Source: .Urine None  Final Report (2018 22:06):    No growth    Culture - Blood (collected 2018 14:15)  Source: .Blood Blood-Peripheral  Preliminary Report (2018 21:01):    No growth to date.    Culture - Blood (collected 2018 14:15)  Source: .Blood Blood-Peripheral  Preliminary Report (2018 21:01):    No growth to date.          Radiology: all available radiological tests reviewed    < from: Xray Chest 1 View- PORTABLE-Routine (18 @ 11:06) >  Support Devices:  Unchanged  Heart:  Unremarkable  Mediastinum:  Unremarkable  Lungs/Airways: Decreased right pleural effusion.  Bones/Soft tissues: Unremarkable    < end of copied text >      Advanced directives addressed: full resuscitation

## 2018-06-08 NOTE — DISCHARGE NOTE ADULT - MEDICATION SUMMARY - MEDICATIONS TO TAKE
I will START or STAY ON the medications listed below when I get home from the hospital:    aspirin 81 mg oral tablet  -- 1 tab(s) by gastrostomy tube once a day  -- Indication: For CAD    oxyCODONE 10 mg oral tablet  -- 1 tab(s) by gastrostomy tube every 6 hours, As Needed  -- Indication: For pain    acetaminophen 325 mg oral tablet  -- 2 tab(s) by gastrostomy tube every 8 hours, As Needed  -- Indication: For pain    fentaNYL 50 mcg/hr transdermal film, extended release  -- 1 film(s) by transdermal patch every 72 hours  -- Indication: For pain    oxyCODONE 15 mg oral tablet  -- 1 tab(s) by mouth 2 times a day  -- Indication: For pain    Milk of Magnesia 8% oral suspension  -- 30 milliliter(s) by gastrostomy tube prn, As Needed  -- Indication: For Constipation    nitroglycerin 0.4 mg sublingual spray  -- 1 spray(s) under tongue every 5 minutes x3 doses, As Needed  -- Indication: For Chest pain    heparin 5000 units/mL injectable solution  -- 5000 unit(s) subcutaneous every 8 hours  -- Indication: For provent DVTs    Zoloft 50 mg oral tablet  -- 1 tab(s) by mouth once a day  -- Indication: For Depression    Lantus 100 units/mL subcutaneous solution  -- 15 unit(s) subcutaneous once a day (at bedtime)  -- Indication: For DM    Plavix 75 mg oral tablet  -- 1 tab(s) by gastrostomy tube once a day  -- Indication: For CAD    Xanax 0.5 mg oral tablet  -- 1 tab(s) by gastrostomy tube every 8 hours, As Needed  -- Indication: For anxiety    nystatin 100,000 units/g topical powder  -- Apply on skin to affected area 2 times a day  -- Indication: For rash    nystatin 100,000 units/g topical cream  -- Apply on skin to affected area 2 times a day  -- Indication: For rash    ferrous sulfate 220 mg/5 mL (44 mg elemental iron) oral elixir  -- 7.5 milliliter(s) by gastrostomy tube once a day  -- Indication: For anemia    Fleet Enema 7 g-19 g rectal enema  -- 133 milliliter(s) rectally prn, As Needed  -- Indication: For Constipatation    simethicone 80 mg oral tablet, chewable  -- 1 tab(s) by gastrostomy tube 4 times a day (after meals and at bedtime)  -- Indication: For gas    baclofen 10 mg oral tablet  -- 10 milligram(s) by gastrostomy tube 2 times a day  -- Indication: For Spasm    Artificial Tears ophthalmic solution  -- 1 drop(s) to each affected eye every 4 hours while awake, As Needed  -- Indication: For dry eyes    lactobacillus acidophilus oral capsule  -- 1 cap(s) by gastrostomy tube 2 times a day  -- Indication: For gut jocelynn    omeprazole 40 mg oral powder for reconstitution  -- 1  by gastrostomy tube once a day  -- Indication: For PUD    Therapeutic Multiple Vitamins with Minerals oral tablet  -- 1 tab(s) by gastrostomy tube once a day  -- Indication: For Supplement    Vitamin C 500 mg oral tablet  -- 1 tab(s) by gastrostomy tube once a day  -- Indication: For immune support

## 2018-06-08 NOTE — DISCHARGE NOTE ADULT - HOSPITAL COURSE
Admitted with an elevated WBC.  After a few days of zosyn it remained normal.  No Temps.  All Cultures were negative.  Dr. montalvo from ID felt he could be D/C off antibiotics.  If fever recurrs he should retutn to ED

## 2018-06-08 NOTE — DISCHARGE NOTE ADULT - PATIENT PORTAL LINK FT
You can access the [facility] Patient Portal, offered by [facility], by registering with the following website: [email or web address]

## 2018-06-08 NOTE — CHART NOTE - NSCHARTNOTEFT_GEN_A_CORE
Assessment:   Assessment:    Pt currently tolerating TF  Suggest maintain this rate, this formula  Pt expected to be d/c'ed back to [facility] today  Will continue to monitor and be available for support        Diet Presciption: Diet, NPO with Tube Feed:   Tube Feeding Modality: Gastrostomy  Glucerna 1.5 Beau (GLUCERNA1.5)  Continuous  Starting Tube Feed Rate {mL per Hour}: 20  Increase Tube Feed Rate by (mL): 20     Every 2 hours  Until Goal Tube Feed Rate (mL per Hour): 60  Tube Feed Duration (in Hours): 24  Tube Feed Start Time: 12:00  No Carb Prosource (1pkg = 15gms Protein)     Qty per Day:  1 (06-06-18 @ 09:06)    Previous recommendation:    Suggest Glucerna 1.5 60 cc/hr x 24 hr.  Provides 2160 kcal, 119 g pro, 1002 cc fluids.    Meets pt's needs for kcal.  Add one packet prosource to TF to meet pt's needs for protein.    TF  via G-tube.  Wt Hx:  Height (cm): 172.72 (06-05-18 @ 13:44)  Weight (kg): 68 (06-05-18 @ 13:44)  BMI (kg/m2): 22.8 (06-05-18 @ 13:44)    Assessment:   Estimated Energy Needs (calories/kg):  · Weight Used for Calculation  current     Estimated Energy Needs (30-35 calories/kg):  · Weight  (lbs)  149.9 lb  · Weight (kg)  68 kg  · From (30 beau/kg)  2040   · To (35 calories/kg)  2380     Other Calculation:  · Other Calculation  Ht.  68    "        Wt.    68    kg               BMI    22.8              IBW   70    kg               Pt is at  99  %  IBW    Estimated Protein Needs (1.8-2.0 gm/kg):  · Weight  (lbs)  149.9   · Weight (kg)  68 kg  · From (1.8 g/kg)  122.4   · To (2.0 g/kg)  136     Estimated Fluid Needs (30-35 ml/kg):  · Weight  (lbs)  149.9   · Weight (kg)  68   · From (30 ml/kg)  2040   · To (35 ml/kg)  2380     Pertinent Medications: MEDICATIONS  (STANDING):  artificial  tears Solution 1 Drop(s) Both EYES two times a day  aspirin  chewable 81 milliGRAM(s) Oral daily  clopidogrel Tablet 75 milliGRAM(s) Oral daily  dextrose 5%. 1000 milliLiter(s) (50 mL/Hr) IV Continuous <Continuous>  dextrose 50% Injectable 12.5 Gram(s) IV Push once  dextrose 50% Injectable 25 Gram(s) IV Push once  dextrose 50% Injectable 25 Gram(s) IV Push once  fentaNYL   Patch  50 MICROgram(s)/Hr 1 Patch Transdermal every 72 hours  heparin  Injectable 5000 Unit(s) SubCutaneous every 8 hours  insulin glargine Injectable (LANTUS) 15 Unit(s) SubCutaneous at bedtime  insulin lispro (HumaLOG) corrective regimen sliding scale   SubCutaneous every 6 hours  nystatin Cream 1 Application(s) Topical two times a day  piperacillin/tazobactam IVPB. 3.375 Gram(s) IV Intermittent every 8 hours  potassium phosphate / sodium phosphate powder 2 Packet(s) Oral two times a day  sertraline 50 milliGRAM(s) Oral daily  sodium chloride 0.9%. 1000 milliLiter(s) (100 mL/Hr) IV Continuous <Continuous>    MEDICATIONS  (PRN):  acetaminophen   Tablet 650 milliGRAM(s) Oral every 6 hours PRN For Temp greater than 38 C (100.4 F)  ALPRAZolam 0.5 milliGRAM(s) Oral every 8 hours PRN anxiety  dextrose 40% Gel 15 Gram(s) Oral once PRN Blood Glucose LESS THAN 70 milliGRAM(s)/deciliter  glucagon  Injectable 1 milliGRAM(s) IntraMuscular once PRN Glucose LESS THAN 70 milligrams/deciliter  oxyCODONE    IR 10 milliGRAM(s) Oral every 6 hours PRN Moderate Pain (4 - 6)  zolpidem 5 milliGRAM(s) Oral at bedtime PRN Insomnia    Pertinent Labs: 06-08 Na145 mmol/L Glu 162 mg/dL<H> K+ 3.7 mmol/L Cr  0.75 mg/dL BUN 31 mg/dL<H> 06-08 Phos 1.9 mg/dL<L> 06-05 Alb 2.0 g/dL<L> 06-06 [id number] 5.8 %<H>     CAPILLARY BLOOD GLUCOSE      POCT Blood Glucose.: 192 mg/dL (08 Jun 2018 06:03)  POCT Blood Glucose.: 229 mg/dL (07 Jun 2018 22:49)  POCT Blood Glucose.: 243 mg/dL (07 Jun 2018 17:42)  POCT Blood Glucose.: 314 mg/dL (07 Jun 2018 13:25)        Monitoring and Evaluation:   [x] PO intake/Nutr support infusion [ x ] Tolerance to Nutr [ x ] weights [ x ] labs[ x ] follow up per protocol  [ ] other:

## 2018-06-10 LAB
-  AMIKACIN: SIGNIFICANT CHANGE UP
-  AZTREONAM: SIGNIFICANT CHANGE UP
-  CEFEPIME: SIGNIFICANT CHANGE UP
-  CEFTAZIDIME: SIGNIFICANT CHANGE UP
-  CIPROFLOXACIN: SIGNIFICANT CHANGE UP
-  GENTAMICIN: SIGNIFICANT CHANGE UP
-  IMIPENEM: SIGNIFICANT CHANGE UP
-  LEVOFLOXACIN: SIGNIFICANT CHANGE UP
-  MEROPENEM: SIGNIFICANT CHANGE UP
-  PIPERACILLIN/TAZOBACTAM: SIGNIFICANT CHANGE UP
-  TOBRAMYCIN: SIGNIFICANT CHANGE UP
CULTURE RESULTS: SIGNIFICANT CHANGE UP
CULTURE RESULTS: SIGNIFICANT CHANGE UP
METHOD TYPE: SIGNIFICANT CHANGE UP
ORGANISM # SPEC MICROSCOPIC CNT: SIGNIFICANT CHANGE UP
SPECIMEN SOURCE: SIGNIFICANT CHANGE UP
SPECIMEN SOURCE: SIGNIFICANT CHANGE UP

## 2018-06-13 DIAGNOSIS — L89.154 PRESSURE ULCER OF SACRAL REGION, STAGE 4: ICD-10-CM
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
## 2018-06-13 DIAGNOSIS — A41.9 SEPSIS, UNSPECIFIED ORGANISM: ICD-10-CM

## 2018-06-13 DIAGNOSIS — D64.9 ANEMIA, UNSPECIFIED: ICD-10-CM

## 2018-06-13 DIAGNOSIS — N17.9 ACUTE KIDNEY FAILURE, UNSPECIFIED: ICD-10-CM

## 2018-06-13 DIAGNOSIS — G83.9 PARALYTIC SYNDROME, UNSPECIFIED: ICD-10-CM

## 2018-06-13 DIAGNOSIS — R65.21 SEVERE SEPSIS WITH SEPTIC SHOCK: ICD-10-CM

## 2018-06-13 DIAGNOSIS — Z93.3 COLOSTOMY STATUS: ICD-10-CM

## 2018-06-13 DIAGNOSIS — Z79.82 LONG TERM (CURRENT) USE OF ASPIRIN: ICD-10-CM

## 2018-06-13 DIAGNOSIS — Z93.1 GASTROSTOMY STATUS: ICD-10-CM

## 2018-06-13 DIAGNOSIS — T83.511A INFECTION AND INFLAMMATORY REACTION DUE TO INDWELLING URETHRAL CATHETER, INITIAL ENCOUNTER: ICD-10-CM

## 2018-06-13 DIAGNOSIS — Z93.0 TRACHEOSTOMY STATUS: ICD-10-CM

## 2018-06-13 DIAGNOSIS — R53.2 FUNCTIONAL QUADRIPLEGIA: ICD-10-CM

## 2018-06-13 DIAGNOSIS — N39.0 URINARY TRACT INFECTION, SITE NOT SPECIFIED: ICD-10-CM

## 2018-06-13 DIAGNOSIS — E11.9 TYPE 2 DIABETES MELLITUS WITHOUT COMPLICATIONS: ICD-10-CM

## 2018-06-13 DIAGNOSIS — J96.10 CHRONIC RESPIRATORY FAILURE, UNSPECIFIED WHETHER WITH HYPOXIA OR HYPERCAPNIA: ICD-10-CM

## 2018-06-13 DIAGNOSIS — R33.9 RETENTION OF URINE, UNSPECIFIED: ICD-10-CM

## 2018-06-13 DIAGNOSIS — R79.9 ABNORMAL FINDING OF BLOOD CHEMISTRY, UNSPECIFIED: ICD-10-CM

## 2018-06-13 DIAGNOSIS — I10 ESSENTIAL (PRIMARY) HYPERTENSION: ICD-10-CM

## 2018-06-13 DIAGNOSIS — Z79.4 LONG TERM (CURRENT) USE OF INSULIN: ICD-10-CM

## 2018-06-13 DIAGNOSIS — G37.3 ACUTE TRANSVERSE MYELITIS IN DEMYELINATING DISEASE OF CENTRAL NERVOUS SYSTEM: ICD-10-CM

## 2018-06-13 DIAGNOSIS — Z79.891 LONG TERM (CURRENT) USE OF OPIATE ANALGESIC: ICD-10-CM

## 2018-06-13 DIAGNOSIS — Z79.02 LONG TERM (CURRENT) USE OF ANTITHROMBOTICS/ANTIPLATELETS: ICD-10-CM

## 2018-06-13 DIAGNOSIS — E44.0 MODERATE PROTEIN-CALORIE MALNUTRITION: ICD-10-CM

## 2018-06-27 NOTE — CDI QUERY NOTE - NSCDIOTHERTXTBX_GEN_ALL_CORE_HH
Etiology of Fever  Pt admitted with fevers and developed elevated WBC. He was recently treated for UTI.  Documentation states Fever - possible partially treated UTI. chest x-ray on admission shows small LLL infiltrate.  Pt then developed Sepsis.  Please clarify the  likely, probable or suspected cause of the symptom of fever.  A. fever suspect d/t pneumonia  B. Fever likely d/t suspected UTI  C. Fever likely d/t Other_____________________  D. Fever is not clinically significant
Decubitus Ulcer POA    ED: pressure ulcer-POA.  H&P -sacral ulcer unspecified stage POA.    Surgical consult 3/12- "Stage IV sacral decubitus [id number] Stage IV sacral decubitus ulcer, well granulated with scant fibrinous exudate, no pus, or erythema, undermining."    3/12 Family medicine "Known sacral ulcer stage 3."    3/14 Wound care nurse consult-" Received wound care referral for stage 4 community acquired pressure injury."    Nursing flow sheets 3/12- 3/14- documenting stage 3 POA DU sacrum, then stage 4 DU sacrum.    Please clarify stage of sacral decubitus ulcer that was POA.  1) Stage IV sacral decubitus ulcer POA  2) Stage 3 sacral decubitus ulcer POA  3) Other ( please specify)

## 2019-04-15 RX ORDER — VANCOMYCIN HCL 1 G
125 VIAL (EA) INTRAVENOUS
Qty: 0 | Refills: 0 | COMMUNITY
Start: 2019-04-15 | End: 2019-04-28

## 2019-04-15 RX ORDER — VANCOMYCIN HCL 1 G
1 VIAL (EA) INTRAVENOUS
Qty: 0 | Refills: 0 | COMMUNITY
Start: 2019-04-15 | End: 2019-05-25

## 2019-04-15 RX ORDER — CEFTRIAXONE 500 MG/1
2 INJECTION, POWDER, FOR SOLUTION INTRAMUSCULAR; INTRAVENOUS
Qty: 0 | Refills: 0 | COMMUNITY
Start: 2019-04-15 | End: 2019-05-25

## 2019-04-24 ENCOUNTER — INPATIENT (INPATIENT)
Facility: HOSPITAL | Age: 64
LOS: 12 days | Discharge: NURSING, REHAB OR LONG-TERM CARE | End: 2019-05-07
Attending: INTERNAL MEDICINE | Admitting: INTERNAL MEDICINE
Payer: MEDICARE

## 2019-04-24 VITALS
WEIGHT: 229.94 LBS | TEMPERATURE: 104 F | RESPIRATION RATE: 19 BRPM | OXYGEN SATURATION: 96 % | DIASTOLIC BLOOD PRESSURE: 82 MMHG | SYSTOLIC BLOOD PRESSURE: 158 MMHG | HEART RATE: 128 BPM | HEIGHT: 70 IN

## 2019-04-24 DIAGNOSIS — Z98.890 OTHER SPECIFIED POSTPROCEDURAL STATES: Chronic | ICD-10-CM

## 2019-04-24 LAB
ALBUMIN SERPL ELPH-MCNC: 1.9 G/DL — LOW (ref 3.3–5)
ALP SERPL-CCNC: 856 U/L — HIGH (ref 40–120)
ALT FLD-CCNC: 595 U/L — HIGH (ref 12–78)
ANION GAP SERPL CALC-SCNC: 6 MMOL/L — SIGNIFICANT CHANGE UP (ref 5–17)
ANISOCYTOSIS BLD QL: SLIGHT — SIGNIFICANT CHANGE UP
APPEARANCE UR: CLEAR — SIGNIFICANT CHANGE UP
APTT BLD: 35.8 SEC — SIGNIFICANT CHANGE UP (ref 27.5–36.3)
AST SERPL-CCNC: 805 U/L — HIGH (ref 15–37)
BACTERIA # UR AUTO: ABNORMAL
BASOPHILS # BLD AUTO: 0 K/UL — SIGNIFICANT CHANGE UP (ref 0–0.2)
BASOPHILS NFR BLD AUTO: 0 % — SIGNIFICANT CHANGE UP (ref 0–2)
BILIRUB SERPL-MCNC: 1.4 MG/DL — HIGH (ref 0.2–1.2)
BILIRUB UR-MCNC: NEGATIVE — SIGNIFICANT CHANGE UP
BUN SERPL-MCNC: 21 MG/DL — SIGNIFICANT CHANGE UP (ref 7–23)
CALCIUM SERPL-MCNC: 8.9 MG/DL — SIGNIFICANT CHANGE UP (ref 8.5–10.1)
CHLORIDE SERPL-SCNC: 94 MMOL/L — LOW (ref 96–108)
CO2 SERPL-SCNC: 27 MMOL/L — SIGNIFICANT CHANGE UP (ref 22–31)
COLOR SPEC: YELLOW — SIGNIFICANT CHANGE UP
COMMENT - URINE: SIGNIFICANT CHANGE UP
COMMENT - URINE: SIGNIFICANT CHANGE UP
CREAT SERPL-MCNC: 0.64 MG/DL — SIGNIFICANT CHANGE UP (ref 0.5–1.3)
DIFF PNL FLD: ABNORMAL
EOSINOPHIL # BLD AUTO: 0 K/UL — SIGNIFICANT CHANGE UP (ref 0–0.5)
EOSINOPHIL NFR BLD AUTO: 0 % — SIGNIFICANT CHANGE UP (ref 0–6)
EPI CELLS # UR: ABNORMAL
GLUCOSE SERPL-MCNC: 161 MG/DL — HIGH (ref 70–99)
GLUCOSE UR QL: NEGATIVE MG/DL — SIGNIFICANT CHANGE UP
HCT VFR BLD CALC: 27.3 % — LOW (ref 39–50)
HGB BLD-MCNC: 8.1 G/DL — LOW (ref 13–17)
HYPOCHROMIA BLD QL: SIGNIFICANT CHANGE UP
INR BLD: 1.33 RATIO — HIGH (ref 0.88–1.16)
KETONES UR-MCNC: NEGATIVE — SIGNIFICANT CHANGE UP
LACTATE SERPL-SCNC: 1.4 MMOL/L — SIGNIFICANT CHANGE UP (ref 0.7–2)
LEUKOCYTE ESTERASE UR-ACNC: NEGATIVE — SIGNIFICANT CHANGE UP
LYMPHOCYTES # BLD AUTO: 0.4 K/UL — LOW (ref 1–3.3)
LYMPHOCYTES # BLD AUTO: 2 % — LOW (ref 13–44)
MANUAL SMEAR VERIFICATION: SIGNIFICANT CHANGE UP
MCHC RBC-ENTMCNC: 24.8 PG — LOW (ref 27–34)
MCHC RBC-ENTMCNC: 29.7 GM/DL — LOW (ref 32–36)
MCV RBC AUTO: 83.5 FL — SIGNIFICANT CHANGE UP (ref 80–100)
MONOCYTES # BLD AUTO: 1.39 K/UL — HIGH (ref 0–0.9)
MONOCYTES NFR BLD AUTO: 7 % — SIGNIFICANT CHANGE UP (ref 2–14)
NEUTROPHILS # BLD AUTO: 18.01 K/UL — HIGH (ref 1.8–7.4)
NEUTROPHILS NFR BLD AUTO: 91 % — HIGH (ref 43–77)
NITRITE UR-MCNC: NEGATIVE — SIGNIFICANT CHANGE UP
NRBC # BLD: 0 /100 — SIGNIFICANT CHANGE UP (ref 0–0)
NRBC # BLD: SIGNIFICANT CHANGE UP /100 WBCS (ref 0–0)
PH UR: 6 — SIGNIFICANT CHANGE UP (ref 5–8)
PLAT MORPH BLD: NORMAL — SIGNIFICANT CHANGE UP
PLATELET # BLD AUTO: 617 K/UL — HIGH (ref 150–400)
POTASSIUM SERPL-MCNC: 4.8 MMOL/L — SIGNIFICANT CHANGE UP (ref 3.5–5.3)
POTASSIUM SERPL-SCNC: 4.8 MMOL/L — SIGNIFICANT CHANGE UP (ref 3.5–5.3)
PROT SERPL-MCNC: 7.9 GM/DL — SIGNIFICANT CHANGE UP (ref 6–8.3)
PROT UR-MCNC: 30 MG/DL
PROTHROM AB SERPL-ACNC: 14.9 SEC — HIGH (ref 10–12.9)
RBC # BLD: 3.27 M/UL — LOW (ref 4.2–5.8)
RBC # FLD: 17.8 % — HIGH (ref 10.3–14.5)
RBC BLD AUTO: ABNORMAL
RBC CASTS # UR COMP ASSIST: ABNORMAL /HPF (ref 0–4)
SODIUM SERPL-SCNC: 127 MMOL/L — LOW (ref 135–145)
SP GR SPEC: 1.01 — SIGNIFICANT CHANGE UP (ref 1.01–1.02)
UROBILINOGEN FLD QL: NEGATIVE MG/DL — SIGNIFICANT CHANGE UP
WBC # BLD: 19.79 K/UL — HIGH (ref 3.8–10.5)
WBC # FLD AUTO: 19.79 K/UL — HIGH (ref 3.8–10.5)
WBC UR QL: SIGNIFICANT CHANGE UP

## 2019-04-24 PROCEDURE — 71045 X-RAY EXAM CHEST 1 VIEW: CPT | Mod: 26

## 2019-04-24 PROCEDURE — 93010 ELECTROCARDIOGRAM REPORT: CPT

## 2019-04-24 PROCEDURE — 76700 US EXAM ABDOM COMPLETE: CPT | Mod: 26

## 2019-04-24 PROCEDURE — 99285 EMERGENCY DEPT VISIT HI MDM: CPT

## 2019-04-24 RX ORDER — CEFEPIME 1 G/1
1000 INJECTION, POWDER, FOR SOLUTION INTRAMUSCULAR; INTRAVENOUS ONCE
Qty: 0 | Refills: 0 | Status: DISCONTINUED | OUTPATIENT
Start: 2019-04-24 | End: 2019-04-24

## 2019-04-24 RX ORDER — OXYCODONE HYDROCHLORIDE 5 MG/1
1 TABLET ORAL
Qty: 0 | Refills: 0 | COMMUNITY

## 2019-04-24 RX ORDER — NYSTATIN CREAM 100000 [USP'U]/G
1 CREAM TOPICAL
Qty: 0 | Refills: 0 | COMMUNITY

## 2019-04-24 RX ORDER — NYSTATIN CREAM 100000 [USP'U]/G
2 CREAM TOPICAL
Qty: 0 | Refills: 0 | COMMUNITY

## 2019-04-24 RX ORDER — FERROUS SULFATE 325(65) MG
7.5 TABLET ORAL
Qty: 0 | Refills: 0 | COMMUNITY

## 2019-04-24 RX ORDER — SODIUM CHLORIDE 9 MG/ML
3200 INJECTION, SOLUTION INTRAVENOUS ONCE
Qty: 0 | Refills: 0 | Status: COMPLETED | OUTPATIENT
Start: 2019-04-24 | End: 2019-04-24

## 2019-04-24 RX ORDER — VANCOMYCIN HCL 1 G
1500 VIAL (EA) INTRAVENOUS ONCE
Qty: 0 | Refills: 0 | Status: COMPLETED | OUTPATIENT
Start: 2019-04-24 | End: 2019-04-24

## 2019-04-24 RX ORDER — ACETAMINOPHEN 500 MG
975 TABLET ORAL ONCE
Qty: 0 | Refills: 0 | Status: COMPLETED | OUTPATIENT
Start: 2019-04-24 | End: 2019-04-24

## 2019-04-24 RX ORDER — CEFEPIME 1 G/1
1000 INJECTION, POWDER, FOR SOLUTION INTRAMUSCULAR; INTRAVENOUS ONCE
Qty: 0 | Refills: 0 | Status: COMPLETED | OUTPATIENT
Start: 2019-04-24 | End: 2019-04-24

## 2019-04-24 RX ORDER — CEFEPIME 1 G/1
1000 INJECTION, POWDER, FOR SOLUTION INTRAMUSCULAR; INTRAVENOUS EVERY 12 HOURS
Qty: 0 | Refills: 0 | Status: DISCONTINUED | OUTPATIENT
Start: 2019-04-24 | End: 2019-04-25

## 2019-04-24 RX ORDER — MAGNESIUM HYDROXIDE 400 MG/1
30 TABLET, CHEWABLE ORAL
Qty: 0 | Refills: 0 | COMMUNITY

## 2019-04-24 RX ORDER — INSULIN GLARGINE 100 [IU]/ML
15 INJECTION, SOLUTION SUBCUTANEOUS
Qty: 0 | Refills: 0 | COMMUNITY

## 2019-04-24 RX ORDER — ACETAMINOPHEN 500 MG
975 TABLET ORAL ONCE
Qty: 0 | Refills: 0 | Status: DISCONTINUED | OUTPATIENT
Start: 2019-04-24 | End: 2019-04-24

## 2019-04-24 RX ORDER — CEFEPIME 1 G/1
1000 INJECTION, POWDER, FOR SOLUTION INTRAMUSCULAR; INTRAVENOUS EVERY 12 HOURS
Qty: 0 | Refills: 0 | Status: DISCONTINUED | OUTPATIENT
Start: 2019-04-24 | End: 2019-04-24

## 2019-04-24 RX ORDER — NITROGLYCERIN 6.5 MG
1 CAPSULE, EXTENDED RELEASE ORAL
Qty: 0 | Refills: 0 | COMMUNITY

## 2019-04-24 RX ORDER — CLOPIDOGREL BISULFATE 75 MG/1
1 TABLET, FILM COATED ORAL
Qty: 0 | Refills: 0 | COMMUNITY

## 2019-04-24 RX ORDER — CHLORHEXIDINE GLUCONATE 213 G/1000ML
5 SOLUTION TOPICAL
Qty: 0 | Refills: 0 | Status: DISCONTINUED | OUTPATIENT
Start: 2019-04-24 | End: 2019-05-07

## 2019-04-24 RX ORDER — BACLOFEN 100 %
10 POWDER (GRAM) MISCELLANEOUS
Qty: 0 | Refills: 0 | COMMUNITY

## 2019-04-24 RX ORDER — HEPARIN SODIUM 5000 [USP'U]/ML
5000 INJECTION INTRAVENOUS; SUBCUTANEOUS
Qty: 0 | Refills: 0 | COMMUNITY

## 2019-04-24 RX ORDER — OMEPRAZOLE 10 MG/1
1 CAPSULE, DELAYED RELEASE ORAL
Qty: 0 | Refills: 0 | COMMUNITY

## 2019-04-24 RX ADMIN — Medication 500 MILLIGRAM(S): at 21:47

## 2019-04-24 RX ADMIN — CEFEPIME 1000 MILLIGRAM(S): 1 INJECTION, POWDER, FOR SOLUTION INTRAMUSCULAR; INTRAVENOUS at 22:11

## 2019-04-24 RX ADMIN — SODIUM CHLORIDE 3200 MILLILITER(S): 9 INJECTION, SOLUTION INTRAVENOUS at 20:13

## 2019-04-24 RX ADMIN — Medication 975 MILLIGRAM(S): at 20:35

## 2019-04-24 NOTE — ED ADULT TRIAGE NOTE - CHIEF COMPLAINT QUOTE
Patient comes to ED from [facility] for sepsis. pt rectal temp 102. pt on chronic vent. pt on vancomycin for C-Diff

## 2019-04-24 NOTE — H&P ADULT - NSICDXPASTMEDICALHX_GEN_ALL_CORE_FT
PAST MEDICAL HISTORY:  Essential hypertension     H/O quadriplegia     Paralysis     Pneumonia     Transverse myelitis     UTI (urinary tract infection)

## 2019-04-24 NOTE — H&P ADULT - NSICDXPASTSURGICALHX_GEN_ALL_CORE_FT
PAST SURGICAL HISTORY:  History of tracheostomy     PEG (percutaneous endoscopic gastrostomy) status     S/P colostomy

## 2019-04-24 NOTE — ED PROVIDER NOTE - NS_ ATTENDINGSCRIBEDETAILS _ED_A_ED_FT
I, Hao Collins MD,  performed the initial face to face bedside interview with this patient regarding history of present illness, review of symptoms and relevant past medical, social and family history.  I completed an independent physical examination.    The history, relevant review of systems, past medical and surgical history, medical decision making, and physical examination was documented by the scribe in my presence and I attest to the accuracy of the documentation.

## 2019-04-24 NOTE — H&P ADULT - HISTORY OF PRESENT ILLNESS
Mode: AC/ CMV (Assist Control/ Continuous Mandatory Ventilation)  RR (machine): 14  TV (machine): 500  FiO2: 40  PEEP: 5  ITime: 1      DVT Prophylaxis:    Advanced Directives:  Discussed with:    Visit Information:    ** Time is exclusive of billed procedures and/or teaching and/or routine family updates. 62yo M admitted 4/24 due to fever and leukocytosis.  Already being treated for C difficile colitis and 'osteomyelitis' from sacral decubitus at NH (IV Vanco IV Cefepime PO Vanco).  In ED fever 104* F and WBC 19k.  Hemodynamics reasonable.  Pt with elevated LFT's - US in ED with distended GB and sludge - pt refused CT scan - seen by surgery team - No acute intervention at this time.     I spoke with patient in detail and with his wife Barbara via telephone [phone] - issues and plans discussed in detail and all questions answered.    Pt with hx of HTN, DM, chronic resp failure/vent dependent s/p trach and PEG, transverse myelitis (as result of ? tetanus toxoid injection), functional quadripegia, s/p (? diverting) colostomy - most recently at  6/2018 due to sepsis of urinary source (CAUTi present on admission), before that was in  hospital 4/2018 with PNA Acinetobacter baumannii) and 3/2018 with UTI (PSAE muti resistant but S to zosyn).    Pt no longer has indwelling catheter, but did have PICC line placed approx 1 week ago.    A copy of his MOLST form was sent from NH and this was confirmed with patient and a new form documented.    Pt is AA and O x 3 NAD NRD and answers questions appropriately via head nod.      Mode: AC/ CMV (Assist Control/ Continuous Mandatory Ventilation)  RR (machine): 14  TV (machine): 500  FiO2: 40  PEEP: 5  ITime: 1      DVT Prophylaxis: Lovenox    Advanced Directives:  FULL resuscitation  Discussed with: patient - MOLST redone as only photocopy sent with pt from NH.    Visit Information: Critical care time 60 min.    ** Time is exclusive of billed procedures and/or teaching and/or routine family updates.

## 2019-04-24 NOTE — ED PROVIDER NOTE - ENMT, MLM
Airway patent, Nasal mucosa clear. Mouth with normal mucosa. Throat has no vesicles, no oropharyngeal exudates and uvula is midline. +Chronic trach in place

## 2019-04-24 NOTE — ED PROVIDER NOTE - OBJECTIVE STATEMENT
62 y/o male with a PMHx of chronic ventilator dependence, functional quadriplegia, PEG, colostomy, chronic Iniguez, DM, Anemia presents to the ED from [facility] c/o fever.  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 Unable to obtain full HPI secondary to patient being non-verbal.

## 2019-04-24 NOTE — H&P ADULT - ASSESSMENT
62yo M suffering from severe sepsis - source uncertain and several potential sites:  Possible cholecystitis given elevated LFT's, distention of GB and sludge, though may just be cholestasis due to sepsis at a different site  Possible line infection (CLABSi) present on admission due to RUE PICC line - removed in ED to eliminate as source.  C difficile being treated at NH since ? 4/15 - don't know if had (+) stool toxin  Unstageable sacral decubitus [id number] present on admission - possible osteomyelitis already being treated at NH  UA negative so urine not likely to be source  CXR ? possible LEFT infiltrate, but no sputum or respiratory symptoms so unlikely source    hyponatremia   transaminitis as noted able ? cholecystitis vs cholestasis due to sepsis    underlying hx of HTN, DM, chronic resp failure/vent dependent s/p trach and PEG, transverse myelitis (as result of ? tetanus toxoid injection),   functional quadriplegia s/p (? diverting) colostomy    tenuous with obvious potential for deterioration given debilitated state and severity of illness.    Plan at this time is for admission to CCU  HOB 30  GI and DVT prophylaxis as appropriate  continue IV vanco, PO vanco, Cefepime for now - Consult ID  PICC line removed as noted  vent support  TF's - nutritional consult in AM  Glycemic control  saline and repeat Na level in am  repeat LFT's in AM  Surgical consult appreciated - Pt refusing CT scan at present   re-evaluate need for HIDA in AM  f/u cultures  wound care RN evaluation  supportive care    Wife Barbara is [phone].

## 2019-04-24 NOTE — ED PROVIDER NOTE - PROGRESS NOTE DETAILS
Spoke with Dr. Campos, surgeon on call about elevated LFT's and US showing possible cheli. Pt now refusing CT abd and pelvis. Will admit to ICU spoke with Dr. Alfredo. Pt is refusing Ct abd/pelvis

## 2019-04-25 DIAGNOSIS — Z93.1 GASTROSTOMY STATUS: Chronic | ICD-10-CM

## 2019-04-25 DIAGNOSIS — Z93.3 COLOSTOMY STATUS: Chronic | ICD-10-CM

## 2019-04-25 LAB
ALBUMIN SERPL ELPH-MCNC: 1.7 G/DL — LOW (ref 3.3–5)
ALP SERPL-CCNC: 653 U/L — HIGH (ref 40–120)
ALT FLD-CCNC: 407 U/L — HIGH (ref 12–78)
ANION GAP SERPL CALC-SCNC: 7 MMOL/L — SIGNIFICANT CHANGE UP (ref 5–17)
AST SERPL-CCNC: 305 U/L — HIGH (ref 15–37)
BILIRUB SERPL-MCNC: 0.6 MG/DL — SIGNIFICANT CHANGE UP (ref 0.2–1.2)
BUN SERPL-MCNC: 18 MG/DL — SIGNIFICANT CHANGE UP (ref 7–23)
C DIFF BY PCR RESULT: SIGNIFICANT CHANGE UP
C DIFF TOX GENS STL QL NAA+PROBE: SIGNIFICANT CHANGE UP
CALCIUM SERPL-MCNC: 8.2 MG/DL — LOW (ref 8.5–10.1)
CHLORIDE SERPL-SCNC: 95 MMOL/L — LOW (ref 96–108)
CO2 SERPL-SCNC: 27 MMOL/L — SIGNIFICANT CHANGE UP (ref 22–31)
CREAT SERPL-MCNC: 0.48 MG/DL — LOW (ref 0.5–1.3)
GLUCOSE BLDC GLUCOMTR-MCNC: 131 MG/DL — HIGH (ref 70–99)
GLUCOSE BLDC GLUCOMTR-MCNC: 137 MG/DL — HIGH (ref 70–99)
GLUCOSE BLDC GLUCOMTR-MCNC: 140 MG/DL — HIGH (ref 70–99)
GLUCOSE BLDC GLUCOMTR-MCNC: 179 MG/DL — HIGH (ref 70–99)
GLUCOSE BLDC GLUCOMTR-MCNC: 224 MG/DL — HIGH (ref 70–99)
GLUCOSE SERPL-MCNC: 177 MG/DL — HIGH (ref 70–99)
HBA1C BLD-MCNC: 6.6 % — HIGH (ref 4–5.6)
HCT VFR BLD CALC: 24.3 % — LOW (ref 39–50)
HCV AB S/CO SERPL IA: 0.25 S/CO — SIGNIFICANT CHANGE UP (ref 0–0.99)
HCV AB SERPL-IMP: SIGNIFICANT CHANGE UP
HGB BLD-MCNC: 7 G/DL — CRITICAL LOW (ref 13–17)
MCHC RBC-ENTMCNC: 24 PG — LOW (ref 27–34)
MCHC RBC-ENTMCNC: 28.8 GM/DL — LOW (ref 32–36)
MCV RBC AUTO: 83.2 FL — SIGNIFICANT CHANGE UP (ref 80–100)
NRBC # BLD: 0 /100 WBCS — SIGNIFICANT CHANGE UP (ref 0–0)
PLATELET # BLD AUTO: 520 K/UL — HIGH (ref 150–400)
POTASSIUM SERPL-MCNC: 4.3 MMOL/L — SIGNIFICANT CHANGE UP (ref 3.5–5.3)
POTASSIUM SERPL-SCNC: 4.3 MMOL/L — SIGNIFICANT CHANGE UP (ref 3.5–5.3)
PROT SERPL-MCNC: 6.9 GM/DL — SIGNIFICANT CHANGE UP (ref 6–8.3)
RBC # BLD: 2.92 M/UL — LOW (ref 4.2–5.8)
RBC # FLD: 17.6 % — HIGH (ref 10.3–14.5)
SODIUM SERPL-SCNC: 129 MMOL/L — LOW (ref 135–145)
WBC # BLD: 19.7 K/UL — HIGH (ref 3.8–10.5)
WBC # FLD AUTO: 19.7 K/UL — HIGH (ref 3.8–10.5)

## 2019-04-25 PROCEDURE — 74177 CT ABD & PELVIS W/CONTRAST: CPT | Mod: 26

## 2019-04-25 PROCEDURE — 12345: CPT | Mod: NC

## 2019-04-25 PROCEDURE — 78226 HEPATOBILIARY SYSTEM IMAGING: CPT | Mod: 26

## 2019-04-25 RX ORDER — FENTANYL CITRATE 50 UG/ML
1 INJECTION INTRAVENOUS
Qty: 0 | Refills: 0 | Status: DISCONTINUED | OUTPATIENT
Start: 2019-04-24 | End: 2019-04-29

## 2019-04-25 RX ORDER — SIMETHICONE 80 MG/1
80 TABLET, CHEWABLE ORAL EVERY 6 HOURS
Qty: 0 | Refills: 0 | Status: DISCONTINUED | OUTPATIENT
Start: 2019-04-25 | End: 2019-05-07

## 2019-04-25 RX ORDER — INSULIN GLARGINE 100 [IU]/ML
6 INJECTION, SOLUTION SUBCUTANEOUS ONCE
Qty: 0 | Refills: 0 | Status: COMPLETED | OUTPATIENT
Start: 2019-04-25 | End: 2019-04-25

## 2019-04-25 RX ORDER — VANCOMYCIN HCL 1 G
1500 VIAL (EA) INTRAVENOUS EVERY 12 HOURS
Qty: 0 | Refills: 0 | Status: DISCONTINUED | OUTPATIENT
Start: 2019-04-25 | End: 2019-04-25

## 2019-04-25 RX ORDER — ASCORBIC ACID 60 MG
500 TABLET,CHEWABLE ORAL DAILY
Qty: 0 | Refills: 0 | Status: DISCONTINUED | OUTPATIENT
Start: 2019-04-25 | End: 2019-05-07

## 2019-04-25 RX ORDER — GABAPENTIN 400 MG/1
300 CAPSULE ORAL
Qty: 0 | Refills: 0 | Status: DISCONTINUED | OUTPATIENT
Start: 2019-04-24 | End: 2019-05-07

## 2019-04-25 RX ORDER — INSULIN GLARGINE 100 [IU]/ML
6 INJECTION, SOLUTION SUBCUTANEOUS AT BEDTIME
Qty: 0 | Refills: 0 | Status: DISCONTINUED | OUTPATIENT
Start: 2019-04-25 | End: 2019-05-07

## 2019-04-25 RX ORDER — TIGECYCLINE 50 MG/5ML
INJECTION, POWDER, LYOPHILIZED, FOR SOLUTION INTRAVENOUS
Qty: 0 | Refills: 0 | Status: COMPLETED | OUTPATIENT
Start: 2019-04-25 | End: 2019-04-29

## 2019-04-25 RX ORDER — DEXTROSE 50 % IN WATER 50 %
25 SYRINGE (ML) INTRAVENOUS ONCE
Qty: 0 | Refills: 0 | Status: DISCONTINUED | OUTPATIENT
Start: 2019-04-25 | End: 2019-05-07

## 2019-04-25 RX ORDER — MONTELUKAST 4 MG/1
10 TABLET, CHEWABLE ORAL AT BEDTIME
Qty: 0 | Refills: 0 | Status: DISCONTINUED | OUTPATIENT
Start: 2019-04-25 | End: 2019-05-07

## 2019-04-25 RX ORDER — TIGECYCLINE 50 MG/5ML
100 INJECTION, POWDER, LYOPHILIZED, FOR SOLUTION INTRAVENOUS ONCE
Qty: 0 | Refills: 0 | Status: COMPLETED | OUTPATIENT
Start: 2019-04-25 | End: 2019-04-25

## 2019-04-25 RX ORDER — LACTOBACILLUS ACIDOPHILUS 100MM CELL
1 CAPSULE ORAL
Qty: 0 | Refills: 0 | Status: DISCONTINUED | OUTPATIENT
Start: 2019-04-25 | End: 2019-05-07

## 2019-04-25 RX ORDER — CYCLOBENZAPRINE HYDROCHLORIDE 10 MG/1
10 TABLET, FILM COATED ORAL THREE TIMES A DAY
Qty: 0 | Refills: 0 | Status: DISCONTINUED | OUTPATIENT
Start: 2019-04-25 | End: 2019-05-07

## 2019-04-25 RX ORDER — PANTOPRAZOLE SODIUM 20 MG/1
40 TABLET, DELAYED RELEASE ORAL DAILY
Qty: 0 | Refills: 0 | Status: DISCONTINUED | OUTPATIENT
Start: 2019-04-25 | End: 2019-05-07

## 2019-04-25 RX ORDER — TIGECYCLINE 50 MG/5ML
50 INJECTION, POWDER, LYOPHILIZED, FOR SOLUTION INTRAVENOUS EVERY 12 HOURS
Qty: 0 | Refills: 0 | Status: COMPLETED | OUTPATIENT
Start: 2019-04-26 | End: 2019-04-29

## 2019-04-25 RX ORDER — LORATADINE 10 MG/1
10 TABLET ORAL DAILY
Qty: 0 | Refills: 0 | Status: DISCONTINUED | OUTPATIENT
Start: 2019-04-25 | End: 2019-05-07

## 2019-04-25 RX ORDER — INSULIN LISPRO 100/ML
VIAL (ML) SUBCUTANEOUS EVERY 6 HOURS
Qty: 0 | Refills: 0 | Status: DISCONTINUED | OUTPATIENT
Start: 2019-04-25 | End: 2019-05-07

## 2019-04-25 RX ORDER — DEXTROSE 50 % IN WATER 50 %
12.5 SYRINGE (ML) INTRAVENOUS ONCE
Qty: 0 | Refills: 0 | Status: DISCONTINUED | OUTPATIENT
Start: 2019-04-25 | End: 2019-05-07

## 2019-04-25 RX ORDER — VANCOMYCIN HCL 1 G
125 VIAL (EA) INTRAVENOUS EVERY 6 HOURS
Qty: 0 | Refills: 0 | Status: DISCONTINUED | OUTPATIENT
Start: 2019-04-25 | End: 2019-05-07

## 2019-04-25 RX ORDER — ASPIRIN/CALCIUM CARB/MAGNESIUM 324 MG
81 TABLET ORAL DAILY
Qty: 0 | Refills: 0 | Status: DISCONTINUED | OUTPATIENT
Start: 2019-04-24 | End: 2019-05-07

## 2019-04-25 RX ORDER — SERTRALINE 25 MG/1
50 TABLET, FILM COATED ORAL DAILY
Qty: 0 | Refills: 0 | Status: DISCONTINUED | OUTPATIENT
Start: 2019-04-24 | End: 2019-05-07

## 2019-04-25 RX ORDER — ACETAMINOPHEN 500 MG
650 TABLET ORAL EVERY 6 HOURS
Qty: 0 | Refills: 0 | Status: DISCONTINUED | OUTPATIENT
Start: 2019-04-24 | End: 2019-05-07

## 2019-04-25 RX ORDER — GLUCAGON INJECTION, SOLUTION 0.5 MG/.1ML
1 INJECTION, SOLUTION SUBCUTANEOUS ONCE
Qty: 0 | Refills: 0 | Status: DISCONTINUED | OUTPATIENT
Start: 2019-04-25 | End: 2019-05-07

## 2019-04-25 RX ORDER — FERROUS SULFATE 325(65) MG
300 TABLET ORAL
Qty: 0 | Refills: 0 | Status: DISCONTINUED | OUTPATIENT
Start: 2019-04-25 | End: 2019-05-07

## 2019-04-25 RX ORDER — DEXTROSE 50 % IN WATER 50 %
15 SYRINGE (ML) INTRAVENOUS ONCE
Qty: 0 | Refills: 0 | Status: DISCONTINUED | OUTPATIENT
Start: 2019-04-25 | End: 2019-05-07

## 2019-04-25 RX ORDER — ENOXAPARIN SODIUM 100 MG/ML
40 INJECTION SUBCUTANEOUS DAILY
Qty: 0 | Refills: 0 | Status: DISCONTINUED | OUTPATIENT
Start: 2019-04-25 | End: 2019-05-07

## 2019-04-25 RX ORDER — MULTIVIT-MIN/FERROUS GLUCONATE 9 MG/15 ML
1 LIQUID (ML) ORAL DAILY
Qty: 0 | Refills: 0 | Status: DISCONTINUED | OUTPATIENT
Start: 2019-04-25 | End: 2019-05-07

## 2019-04-25 RX ORDER — SODIUM CHLORIDE 9 MG/ML
1000 INJECTION, SOLUTION INTRAVENOUS
Qty: 0 | Refills: 0 | Status: DISCONTINUED | OUTPATIENT
Start: 2019-04-25 | End: 2019-05-07

## 2019-04-25 RX ORDER — ALPRAZOLAM 0.25 MG
0.5 TABLET ORAL EVERY 8 HOURS
Qty: 0 | Refills: 0 | Status: DISCONTINUED | OUTPATIENT
Start: 2019-04-25 | End: 2019-05-02

## 2019-04-25 RX ADMIN — CHLORHEXIDINE GLUCONATE 5 MILLILITER(S): 213 SOLUTION TOPICAL at 17:15

## 2019-04-25 RX ADMIN — SIMETHICONE 80 MILLIGRAM(S): 80 TABLET, CHEWABLE ORAL at 06:03

## 2019-04-25 RX ADMIN — Medication 0.5 MILLIGRAM(S): at 06:02

## 2019-04-25 RX ADMIN — SERTRALINE 50 MILLIGRAM(S): 25 TABLET, FILM COATED ORAL at 17:13

## 2019-04-25 RX ADMIN — LORATADINE 10 MILLIGRAM(S): 10 TABLET ORAL at 17:14

## 2019-04-25 RX ADMIN — SIMETHICONE 80 MILLIGRAM(S): 80 TABLET, CHEWABLE ORAL at 17:14

## 2019-04-25 RX ADMIN — SIMETHICONE 80 MILLIGRAM(S): 80 TABLET, CHEWABLE ORAL at 23:47

## 2019-04-25 RX ADMIN — Medication 125 MILLIGRAM(S): at 06:04

## 2019-04-25 RX ADMIN — Medication 650 MILLIGRAM(S): at 06:02

## 2019-04-25 RX ADMIN — MONTELUKAST 10 MILLIGRAM(S): 4 TABLET, CHEWABLE ORAL at 21:33

## 2019-04-25 RX ADMIN — Medication 1 DROP(S): at 21:33

## 2019-04-25 RX ADMIN — Medication 1 TABLET(S): at 17:13

## 2019-04-25 RX ADMIN — Medication 1 DROP(S): at 01:36

## 2019-04-25 RX ADMIN — Medication 1 DROP(S): at 11:19

## 2019-04-25 RX ADMIN — Medication 650 MILLIGRAM(S): at 08:00

## 2019-04-25 RX ADMIN — Medication 1 TABLET(S): at 06:02

## 2019-04-25 RX ADMIN — Medication 2 MILLIGRAM(S): at 11:58

## 2019-04-25 RX ADMIN — GABAPENTIN 300 MILLIGRAM(S): 400 CAPSULE ORAL at 06:02

## 2019-04-25 RX ADMIN — Medication 500 MILLIGRAM(S): at 06:02

## 2019-04-25 RX ADMIN — Medication 125 MILLIGRAM(S): at 23:47

## 2019-04-25 RX ADMIN — CEFEPIME 1000 MILLIGRAM(S): 1 INJECTION, POWDER, FOR SOLUTION INTRAMUSCULAR; INTRAVENOUS at 06:03

## 2019-04-25 RX ADMIN — INSULIN GLARGINE 6 UNIT(S): 100 INJECTION, SOLUTION SUBCUTANEOUS at 01:35

## 2019-04-25 RX ADMIN — Medication 0.5 MILLIGRAM(S): at 17:13

## 2019-04-25 RX ADMIN — Medication 81 MILLIGRAM(S): at 17:13

## 2019-04-25 RX ADMIN — GABAPENTIN 300 MILLIGRAM(S): 400 CAPSULE ORAL at 17:13

## 2019-04-25 RX ADMIN — INSULIN GLARGINE 6 UNIT(S): 100 INJECTION, SOLUTION SUBCUTANEOUS at 23:47

## 2019-04-25 RX ADMIN — PANTOPRAZOLE SODIUM 40 MILLIGRAM(S): 20 TABLET, DELAYED RELEASE ORAL at 17:12

## 2019-04-25 RX ADMIN — Medication 1 DROP(S): at 17:14

## 2019-04-25 RX ADMIN — Medication 1 DROP(S): at 06:03

## 2019-04-25 RX ADMIN — Medication 125 MILLIGRAM(S): at 21:34

## 2019-04-25 RX ADMIN — Medication 125 MILLIGRAM(S): at 17:13

## 2019-04-25 RX ADMIN — Medication 500 MILLIGRAM(S): at 17:13

## 2019-04-25 RX ADMIN — Medication 2: at 06:19

## 2019-04-25 RX ADMIN — CHLORHEXIDINE GLUCONATE 5 MILLILITER(S): 213 SOLUTION TOPICAL at 06:04

## 2019-04-25 RX ADMIN — Medication 0.5 MILLIGRAM(S): at 21:33

## 2019-04-25 RX ADMIN — TIGECYCLINE 110 MILLIGRAM(S): 50 INJECTION, POWDER, LYOPHILIZED, FOR SOLUTION INTRAVENOUS at 17:13

## 2019-04-25 RX ADMIN — ENOXAPARIN SODIUM 40 MILLIGRAM(S): 100 INJECTION SUBCUTANEOUS at 17:12

## 2019-04-25 NOTE — CONSULT NOTE ADULT - SUBJECTIVE AND OBJECTIVE BOX
Patient is a 63y old  Male who presents with a chief complaint of suspected sepsis (2019 09:55)    HPI:  Pt is a 64 y/o M PMhx of HTN, DM, chronic resp failure/vent dependent s/p trach and PEG, transverse myelitis (as result of ? tetanus toxoid injection), functional quadripegia, s/p (? diverting) colostomy - most recently at  2018 due to sepsis of urinary source (CAUTi present on admission), before that was in  hospital 2018 with PNA Acinetobacter baumannii) and 3/2018 with UTI (PSAE muti resistant but S to zosyn). Patient was admitted  due to fever and leukocytosis.  Already being treated for C difficile colitis and 'osteomyelitis' from sacral decubitus at NH (IV Vanco IV Cefepime PO Vanco).    In ED fever 104* F and WBC 19k.  Hemodynamics reasonable.  Pt with elevated LFT's - US in ED with distended GB and sludge - pt refused CT scan - seen by surgery team - No acute intervention at this time.     Patient was found to be resting in treatment bed, on vent, HPI unobtainable.    PAST MEDICAL & SURGICAL HISTORY:  Pneumonia  UTI (urinary tract infection)  H/O quadriplegia  Essential hypertension  Paralysis  Transverse myelitis  S/P colostomy  PEG (percutaneous endoscopic gastrostomy) status  History of tracheostomy    MEDICATIONS  (STANDING):  ALPRAZolam 0.5 milliGRAM(s) Oral every 8 hours  artificial  tears Solution 1 Drop(s) Both EYES every 4 hours  ascorbic acid 500 milliGRAM(s) Oral daily  aspirin  chewable 81 milliGRAM(s) Oral daily  cefepime  Injectable. 1000 milliGRAM(s) IV Push every 12 hours  chlorhexidine 0.12% Liquid 5 milliLiter(s) Oral Mucosa two times a day  dextrose 5%. 1000 milliLiter(s) (50 mL/Hr) IV Continuous <Continuous>  dextrose 50% Injectable 12.5 Gram(s) IV Push once  dextrose 50% Injectable 25 Gram(s) IV Push once  dextrose 50% Injectable 25 Gram(s) IV Push once  enoxaparin Injectable 40 milliGRAM(s) SubCutaneous daily  fentaNYL   Patch  50 MICROgram(s)/Hr 1 Patch Transdermal every 72 hours  ferrous    sulfate Liquid 300 milliGRAM(s) Enteral Tube <User Schedule>  gabapentin 300 milliGRAM(s) Oral two times a day  insulin glargine Injectable (LANTUS) 6 Unit(s) SubCutaneous at bedtime  insulin lispro (HumaLOG) corrective regimen sliding scale   SubCutaneous every 6 hours  lactobacillus acidophilus 1 Tablet(s) Oral two times a day  loratadine 10 milliGRAM(s) Oral daily  montelukast 10 milliGRAM(s) Oral at bedtime  multivitamin/minerals 1 Tablet(s) Oral daily  pantoprazole  Injectable 40 milliGRAM(s) IV Push daily  sertraline 50 milliGRAM(s) Oral daily  simethicone 80 milliGRAM(s) Chew every 6 hours  vancomycin    Solution 125 milliGRAM(s) Oral every 6 hours  vancomycin  IVPB 1500 milliGRAM(s) IV Intermittent every 12 hours    MEDICATIONS  (PRN):  acetaminophen   Tablet .. 650 milliGRAM(s) Oral every 6 hours PRN Temp greater or equal to 38C (100.4F), Moderate Pain (4 - 6)  cyclobenzaprine 10 milliGRAM(s) Oral three times a day PRN Muscle Spasm  dextrose 40% Gel 15 Gram(s) Oral once PRN Blood Glucose LESS THAN 70 milliGRAM(s)/deciliter  glucagon  Injectable 1 milliGRAM(s) IntraMuscular once PRN Glucose LESS THAN 70 milligrams/deciliter    Allergies  No Known Allergies    Intolerances      Social: no smoking, no alcohol, no illegal drugs; no recent travel, no exposure to TB  FAMILY HISTORY:  No pertinent family history in first degree relatives     no history of premature cardiovascular disease in first degree relatives    ROS: unobtainable due to patient's condition    Vital Signs Last 24 Hrs  T(C): 38.6 (2019 08:14), Max: 40.1 (2019 19:27)  T(F): 101.4 (2019 08:14), Max: 104.2 (2019 19:27)  HR: 101 (2019 09:00) (101 - 128)  BP: 124/80 (2019 09:00) (118/71 - 163/83)  BP(mean): 90 (2019 09:00) (83 - 100)  RR: 20 (2019 09:00) (17 - 23)  SpO2: 100% (2019 09:00) (93% - 100%)  Daily Height in cm: 177.8 (2019 19:27)    Daily Weight in k.6 (2019 06:14)    PE:    Constitutional: frail looking  HEENT: NC/AT, EOMI, PERRLA, conjunctivae clear; ears and nose atraumatic; pharynx benign  Neck: supple; thyroid not palpable  Back: no tenderness  Respiratory: respiratory effort normal; clear to auscultation  Cardiovascular: S1S2 regular, no murmurs  Abdomen: soft, not tender, not distended, positive BS; liver and spleen WNL  Genitourinary: no suprapubic tenderness  Lymphatic: no LN palpable  Musculoskeletal: no muscle tenderness, no joint swelling or tenderness  Extremities: no pedal edema  Neurological/ Psychiatric: on ventilator  Skin: no rashes; no palpable lesions    Labs: all available labs reviewed                        7.0     )-----------( 520      ( 2019 06:41 )             24.3     04-    129<L>  |  95<L>  |  18  ----------------------------<  177<H>  4.3   |  27  |  0.48<L>    Ca    8.2<L>      2019 06:41    TPro  6.9  /  Alb  1.7<L>  /  TBili  0.6  /  DBili  x   /  AST  305<H>  /  ALT  407<H>  /  AlkPhos  653<H>       LIVER FUNCTIONS - ( 2019 06:41 )  Alb: 1.7 g/dL / Pro: 6.9 gm/dL / ALK PHOS: 653 U/L / ALT: 407 U/L / AST: 305 U/L / GGT: x           Urinalysis Basic - ( 2019 21:31 )    Color: Yellow / Appearance: Clear / S.010 / pH: x  Gluc: x / Ketone: Negative  / Bili: Negative / Urobili: Negative mg/dL   Blood: x / Protein: 30 mg/dL / Nitrite: Negative   Leuk Esterase: Negative / RBC: 3-5 /HPF / WBC 0-2   Sq Epi: x / Non Sq Epi: Moderate / Bacteria: Moderate    < from: US Abdomen Complete (19 @ 21:40) >  IMPRESSION:     Distended gallbladder with sludge and gallbladder wall thickening/edema   which can be seen in the setting of acute cholecystitis. HIDA scan should   be considered for further evaluation.    < end of copied text >    < from: Xray Chest 1 View-PORTABLE IMMEDIATE (19 @ 20:39) >  IMPRESSION:   There is a patchy left midlung airspace opacity. No pleural effusion or   pneumothorax. Cardiomediastinal silhouette is unremarkable. Tracheostomy   is visualized. Old right clavicular fracture.    < end of copied text > Patient is a 63y old  Male who presents with a chief complaint of suspected sepsis (2019 09:55)    HPI:  Pt is a 62 y/o M PMhx of HTN, DM, chronic resp failure/vent dependent s/p trach and PEG, transverse myelitis (as result of ? tetanus toxoid injection), functional quadripegia, s/p  colostomy - most recently at  2018 due to sepsis of urinary source (CAUTi present on admission), before that was in  hospital 2018 with PNA Acinetobacter baumannii) and 3/2018 with UTI (PSAE muti resistant but S to zosyn). Patient was admitted  due to fever and leukocytosis.  Already being treated for C difficile colitis and 'osteomyelitis' from sacral decubitus at NH (IV Vanco IV Cefepime PO Vanco), Picc line was placed at [facility] on . History per medical record as patient unable to provide history.        PAST MEDICAL & SURGICAL HISTORY:  Pneumonia  UTI (urinary tract infection)  H/O quadriplegia  Essential hypertension  Paralysis  Transverse myelitis  S/P colostomy  PEG (percutaneous endoscopic gastrostomy) status  History of tracheostomy    MEDICATIONS  (STANDING):  ALPRAZolam 0.5 milliGRAM(s) Oral every 8 hours  artificial  tears Solution 1 Drop(s) Both EYES every 4 hours  ascorbic acid 500 milliGRAM(s) Oral daily  aspirin  chewable 81 milliGRAM(s) Oral daily  cefepime  Injectable. 1000 milliGRAM(s) IV Push every 12 hours  chlorhexidine 0.12% Liquid 5 milliLiter(s) Oral Mucosa two times a day  dextrose 5%. 1000 milliLiter(s) (50 mL/Hr) IV Continuous <Continuous>  dextrose 50% Injectable 12.5 Gram(s) IV Push once  dextrose 50% Injectable 25 Gram(s) IV Push once  dextrose 50% Injectable 25 Gram(s) IV Push once  enoxaparin Injectable 40 milliGRAM(s) SubCutaneous daily  fentaNYL   Patch  50 MICROgram(s)/Hr 1 Patch Transdermal every 72 hours  ferrous    sulfate Liquid 300 milliGRAM(s) Enteral Tube <User Schedule>  gabapentin 300 milliGRAM(s) Oral two times a day  insulin glargine Injectable (LANTUS) 6 Unit(s) SubCutaneous at bedtime  insulin lispro (HumaLOG) corrective regimen sliding scale   SubCutaneous every 6 hours  lactobacillus acidophilus 1 Tablet(s) Oral two times a day  loratadine 10 milliGRAM(s) Oral daily  montelukast 10 milliGRAM(s) Oral at bedtime  multivitamin/minerals 1 Tablet(s) Oral daily  pantoprazole  Injectable 40 milliGRAM(s) IV Push daily  sertraline 50 milliGRAM(s) Oral daily  simethicone 80 milliGRAM(s) Chew every 6 hours  vancomycin    Solution 125 milliGRAM(s) Oral every 6 hours  vancomycin  IVPB 1500 milliGRAM(s) IV Intermittent every 12 hours    MEDICATIONS  (PRN):  acetaminophen   Tablet .. 650 milliGRAM(s) Oral every 6 hours PRN Temp greater or equal to 38C (100.4F), Moderate Pain (4 - 6)  cyclobenzaprine 10 milliGRAM(s) Oral three times a day PRN Muscle Spasm  dextrose 40% Gel 15 Gram(s) Oral once PRN Blood Glucose LESS THAN 70 milliGRAM(s)/deciliter  glucagon  Injectable 1 milliGRAM(s) IntraMuscular once PRN Glucose LESS THAN 70 milligrams/deciliter    Allergies  No Known Allergies    Intolerances      Social: no smoking, no alcohol, no illegal drugs; no recent travel, no exposure to TB  FAMILY HISTORY:  No pertinent family history in first degree relatives     no history of premature cardiovascular disease in first degree relatives    ROS: unobtainable due to patient's condition    Vital Signs Last 24 Hrs  T(C): 38.6 (2019 08:14), Max: 40.1 (2019 19:27)  T(F): 101.4 (2019 08:14), Max: 104.2 (2019 19:27)  HR: 101 (2019 09:00) (101 - 128)  BP: 124/80 (2019 09:00) (118/71 - 163/83)  BP(mean): 90 (2019 09:00) (83 - 100)  RR: 20 (2019 09:00) (17 - 23)  SpO2: 100% (2019 09:00) (93% - 100%)  Daily Height in cm: 177.8 (2019 19:27)    Daily Weight in k.6 (2019 06:14)    PE:    Constitutional: frail looking  HEENT: NC/AT  Neck: supple; thyroid not palpable  Respiratory: respiratory effort normal scattered coarse breath sounds  Cardiovascular: S1S2 regular, no murmurs  Abdomen: soft, not tender, ostomy in place  Musculoskeletal: no muscle tenderness, no joint swelling or tenderness  Extremities: no pedal edema  Neurological/ Psychiatric: on ventilator  Skin: no rashes; no palpable lesions    Labs: all available labs reviewed                        7.0    19.70 )-----------( 520      ( 2019 06:41 )             24.3     -    129<L>  |  95<L>  |  18  ----------------------------<  177<H>  4.3   |  27  |  0.48<L>    Ca    8.2<L>      2019 06:41    TPro  6.9  /  Alb  1.7<L>  /  TBili  0.6  /  DBili  x   /  AST  305<H>  /  ALT  407<H>  /  AlkPhos  653<H>       LIVER FUNCTIONS - ( 2019 06:41 )  Alb: 1.7 g/dL / Pro: 6.9 gm/dL / ALK PHOS: 653 U/L / ALT: 407 U/L / AST: 305 U/L / GGT: x           Urinalysis Basic - ( 2019 21:31 )    Color: Yellow / Appearance: Clear / S.010 / pH: x  Gluc: x / Ketone: Negative  / Bili: Negative / Urobili: Negative mg/dL   Blood: x / Protein: 30 mg/dL / Nitrite: Negative   Leuk Esterase: Negative / RBC: 3-5 /HPF / WBC 0-2   Sq Epi: x / Non Sq Epi: Moderate / Bacteria: Moderate    < from: US Abdomen Complete (19 @ 21:40) >  IMPRESSION:     Distended gallbladder with sludge and gallbladder wall thickening/edema   which can be seen in the setting of acute cholecystitis. HIDA scan should   be considered for further evaluation.    < end of copied text >    < from: Xray Chest 1 View-PORTABLE IMMEDIATE (19 @ 20:39) >  IMPRESSION:   There is a patchy left midlung airspace opacity. No pleural effusion or   pneumothorax. Cardiomediastinal silhouette is unremarkable. Tracheostomy   is visualized. Old right clavicular fracture.    < end of copied text >

## 2019-04-25 NOTE — DIETITIAN INITIAL EVALUATION ADULT. - ENERGY NEEDS
Ht.  70    "        Wt.  175.4  #              BMI 25.1                    #               Pt is at  106  %  IBW

## 2019-04-25 NOTE — PATIENT PROFILE ADULT - NSPROMEDSHERBAL_GEN_A_NUR
Please advise to reorder of Minocycline. LOV 3/8/17 with regard to acne and noted wanted to continue as acne was improving. Has upcoming on 12/18/17.   no

## 2019-04-25 NOTE — CONSULT NOTE ADULT - SUBJECTIVE AND OBJECTIVE BOX
HPI:  Pt is a 63 M with a PMH of DM, HTN,PNA, UTI, Respiratory failure/vent dependent , functional quadrapalegic /transverse myelitis, + CDiff, Sacral decubiti Tx with IV Abx for possible osteomyelitis  presenting with Fever, leukocytosis . Hx recent PICC line placement 1 week ago. Sx Consult Dr Campos for possible cholecystitis on Abd sono    Sx Hx: Trach, PEG, diverting colostomy    Vital Signs Last 24 Hrs  T(C): 38.9 (2019 02:36), Max: 40.1 (2019 19:27)  T(F): 102.1 (2019 02:36), Max: 104.2 (2019 19:27)  HR: 110 (2019 02:47) (109 - 128)  BP: 141/75 (2019 02:36) (118/71 - 158/82)  BP(mean): 91 (2019 02:36) (83 - 91)    RR: 23 (2019 02:36) (19 - 23)  SpO2: 93% (2019 02:47) (93% - 97%)    Gen: Pt in Bed , trach in place on Vent, NAD    AAOx 3     Eyes:  PERRL/EOMI, sclera anicteric    Heart: S1S2 rate 115, reg    Lungs: Scattered rhonchi B/L:    Abd: ND, +BS, Soft, NT to palpation  colostomy with semi-formed slool, gas, PEG in place    Approx 3/3 cm Sacral decubiti without erythema or exudates                              8.1    19.79 )-----------( 617      ( 2019 20:08 )             27.3     04-24    127<L>  |  94<L>  |  21  ----------------------------<  161<H>  4.8   |  27  |  0.64    Ca    8.9      2019 20:08    TPro  7.9  /  Alb  1.9<L>  /  TBili  1.4<H>  /  DBili  x   /  AST  805<H>  /  ALT  595<H>  /  AlkPhos  856<H>  04-24        LIVER FUNCTIONS - ( 2019 20:08 )  Alb: 1.9 g/dL / Pro: 7.9 gm/dL / ALK PHOS: 856 U/L / ALT: 595 U/L / AST: 805 U/L / GGT: x           PT/INR - ( 2019 20:08 )   PT: 14.9 sec;   INR: 1.33 ratio         PTT - ( 2019 20:08 )  PTT:35.8 sec  Urinalysis Basic - ( 2019 21:31 )    Color: Yellow / Appearance: Clear / S.010 / pH: x  Gluc: x / Ketone: Negative  / Bili: Negative / Urobili: Negative mg/dL   Blood: x / Protein: 30 mg/dL / Nitrite: Negative   Leuk Esterase: Negative / RBC: 3-5 /HPF / WBC 0-2   Sq Epi: x / Non Sq Epi: Moderate / Bacteria: Moderate        Lactate, Blood: 1.4 mmol/L ( @ 20:08)    Blood, Urine: Trace ( @ 21:31)      SONO: distended GB with sludge, thickened GB wall      A/P:  Admitted to ICU   Dx; Sepsis  PICC removed and blood C&S sent/pending  Vent support  Continue IV Vanco, cefepime pending ID consult  Medical /Vent management per ICU  Intensivist spoke with Pt wife will be in in the AM  Pt refusing CT, possible HIDA /CT if consents  Pt seen with and case discussed with Dr Beth Lai [facility]

## 2019-04-25 NOTE — DIETITIAN INITIAL EVALUATION ADULT. - PERTINENT LABORATORY DATA
04-25 Na129 mmol/L<L> Glu 177 mg/dL<H> K+ 4.3 mmol/L Cr  0.48 mg/dL<L> BUN 18 mg/dL Phos n/a   Alb 1.7 g/dL<L> PAB n/a

## 2019-04-25 NOTE — PROGRESS NOTE ADULT - SUBJECTIVE AND OBJECTIVE BOX
concern for line infection given significant leukocytosis approx 20k and fever 104*F  wife reports PICC was placed approx 1 week ago.  Discussed with pt prior to removal.  PICC line removed without issue 44cm total length recovered.  Hemostasis achieved.

## 2019-04-25 NOTE — PROGRESS NOTE ADULT - ASSESSMENT
Quadriparetic patient with chornic osteo previously on vanco, cefepime had picc line  also c. dif  admitted with fever to 104, inc lfts  picc removed  u/s - thickened gb  on vanco , cefepime  check cultures  hida scal  will ct abdomen pelvis  hida scan, concern over possible cholecystitis  Hida scan

## 2019-04-25 NOTE — ED ADULT NURSE NOTE - OBJECTIVE STATEMENT
pt is a 63 y.o. male chronic parapalegic from [facility]. with trach > vent. pt is awake and alert. pt had a reported 102.7 rectal temperature. septic work up. respiratory notified. Intensivist notified. MD Alfredo removed PICC line from right arm. please see MD note for further information.

## 2019-04-25 NOTE — PROGRESS NOTE ADULT - ASSESSMENT
64 yo male trach dependent, gastrostomy tube, colostomy, sacral osteomyelitis, c diff, malnourished, dehydrated, admitted with fever, elevated WBC, elevated lfts, ;fts trending lower this am, no complaints, hemodynamically normal  -I don't think he has acute cholecystitis, his abdomen is soft, non tender, he could have a chronic cholecystitis; his lFts are lower this am after hydration. I wonder if he has cholestasis (dehydration, multifactorial, medication related?) Cont IV Abx  -F/u panculture. Likely pneumonia (LLL infiltrate on CXR?), UTI explaining fever?, PICC removed  -ID Consult  -Chronic osteomyelitis could explain elevated WBC too.  -Patient is chronically ill explaining elevated WBC, malnourished, dehydrated, recent c diff?, stool are formed at the moment  -Patient refusing abd CT scan

## 2019-04-25 NOTE — ED ADULT NURSE NOTE - NSIMPLEMENTINTERV_GEN_ALL_ED
Implemented All Fall Risk Interventions:  [place] to call system. Call bell, personal items and telephone within reach. Instruct patient to call for assistance. Room bathroom lighting operational. Non-slip footwear when patient is off stretcher. Physically safe environment: no spills, clutter or unnecessary equipment. Stretcher in lowest position, wheels locked, appropriate side rails in place. Provide visual cue, wrist band, yellow gown, etc. Monitor gait and stability. Monitor for mental status changes and reorient to person, place, and time. Review medications for side effects contributing to fall risk. Reinforce activity limits and safety measures with patient and family.

## 2019-04-25 NOTE — ED ADULT NURSE NOTE - CHIEF COMPLAINT QUOTE
Patient comes to ED from [facility] for sepsis. pt rectal temp 102. pt on chronic vent. pt on vancomycin for C-Diff

## 2019-04-25 NOTE — CHART NOTE - NSCHARTNOTEFT_GEN_A_CORE
Upon Nutritional Assessment by the Registered Dietitian your patient was determined to meet criteria / has evidence of the following diagnosis/diagnoses:          [ ]  Mild Protein Calorie Malnutrition        [ ]  Moderate Protein Calorie Malnutrition        [x ] Severe Protein Calorie Malnutrition        [ ] Unspecified Protein Calorie Malnutrition        [ ] Underweight / BMI <19        [ ] Morbid Obesity / BMI > 40      Findings as based on:  •  Comprehensive nutrition assessment and consultation  •  Calorie counts (nutrient intake analysis)  •  Food acceptance and intake status from observations by staff  •  Follow up  •  Patient education  •  Intervention secondary to interdisciplinary rounds  •   concerns      Treatment:    1) Continue with Glucerna 1.5 @ goal rate 50cc/hr (providing total volume of 1100ml/daily) + 4 packs of Prosource TF. Enteral feeding + Prosource will provide 1650 calories/ 135mg protein, 835ml free water).   2) Additional continuous water flush 50cc/hr (volume total 1100ml). Water flush and free water from TF will provide a total of 1935ml daily.   3) maintain aspiration precautions back of bed > 30 degrees.   4) monitor daily weights.   5) order additional labs phosphorus, magnesium not ordered initially.   6) monitor labs/lytes and replete as needed.            PROVIDER Section:     By signing this assessment you are acknowledging and agree with the diagnosis/diagnoses assigned by the Registered Dietitian    Comments:

## 2019-04-25 NOTE — PROGRESS NOTE ADULT - SUBJECTIVE AND OBJECTIVE BOX
62 yo male trach dependent, gastrostomy tube, colostomy, sacral osteomyelitis, c diff, malnourished, dehydrated, admitted with fever, elevated WBC, elevated lfts, ;fts trending lower this am, no complaints, hemodynamically normal    Sleeping  Trach in place-vented  Abd soft, non tender, gastrostomy tube in place, colostomy in place functioning  Sacral decubitus ulcer 3cm x 3cm depth, no necrotic tissue seen  Condom cath in place              Vital Signs Last 24 Hrs  T(C): 38.6 (25 Apr 2019 08:14), Max: 40.1 (24 Apr 2019 19:27)  T(F): 101.4 (25 Apr 2019 08:14), Max: 104.2 (24 Apr 2019 19:27)  HR: 104 (25 Apr 2019 08:14) (104 - 128)  BP: 143/81 (25 Apr 2019 08:14) (118/71 - 163/83)  BP(mean): 95 (25 Apr 2019 08:14) (83 - 100)  RR: 17 (25 Apr 2019 08:14) (17 - 23)  SpO2: 100% (25 Apr 2019 08:14) (93% - 100%)                          7.0    19.70 )-----------( 520      ( 25 Apr 2019 06:41 )             24.3       04-25    129<L>  |  95<L>  |  18  ----------------------------<  177<H>  4.3   |  27  |  0.48<L>    Ca    8.2<L>      25 Apr 2019 06:41    TPro  6.9  /  Alb  1.7<L>  /  TBili  0.6  /  DBili  x   /  AST  305<H>  /  ALT  407<H>  /  AlkPhos  653<H>  04-25      LIVER FUNCTIONS - ( 25 Apr 2019 06:41 )  Alb: 1.7 g/dL / Pro: 6.9 gm/dL / ALK PHOS: 653 U/L / ALT: 407 U/L / AST: 305 U/L / GGT: x             MEDICATIONS  (STANDING):  ALPRAZolam 0.5 milliGRAM(s) Oral every 8 hours  artificial  tears Solution 1 Drop(s) Both EYES every 4 hours  ascorbic acid 500 milliGRAM(s) Oral daily  aspirin  chewable 81 milliGRAM(s) Oral daily  cefepime  Injectable. 1000 milliGRAM(s) IV Push every 12 hours  chlorhexidine 0.12% Liquid 5 milliLiter(s) Oral Mucosa two times a day  dextrose 5%. 1000 milliLiter(s) (50 mL/Hr) IV Continuous <Continuous>  dextrose 50% Injectable 12.5 Gram(s) IV Push once  dextrose 50% Injectable 25 Gram(s) IV Push once  dextrose 50% Injectable 25 Gram(s) IV Push once  enoxaparin Injectable 40 milliGRAM(s) SubCutaneous daily  fentaNYL   Patch  50 MICROgram(s)/Hr 1 Patch Transdermal every 72 hours  ferrous    sulfate Liquid 300 milliGRAM(s) Enteral Tube <User Schedule>  gabapentin 300 milliGRAM(s) Oral two times a day  insulin glargine Injectable (LANTUS) 6 Unit(s) SubCutaneous at bedtime  insulin lispro (HumaLOG) corrective regimen sliding scale   SubCutaneous every 6 hours  lactobacillus acidophilus 1 Tablet(s) Oral two times a day  loratadine 10 milliGRAM(s) Oral daily  montelukast 10 milliGRAM(s) Oral at bedtime  multivitamin/minerals 1 Tablet(s) Oral daily  pantoprazole  Injectable 40 milliGRAM(s) IV Push daily  sertraline 50 milliGRAM(s) Oral daily  simethicone 80 milliGRAM(s) Chew every 6 hours  vancomycin    Solution 125 milliGRAM(s) Oral every 6 hours  vancomycin  IVPB 1500 milliGRAM(s) IV Intermittent every 12 hours    MEDICATIONS  (PRN):  acetaminophen   Tablet .. 650 milliGRAM(s) Oral every 6 hours PRN Temp greater or equal to 38C (100.4F), Moderate Pain (4 - 6)  cyclobenzaprine 10 milliGRAM(s) Oral three times a day PRN Muscle Spasm  dextrose 40% Gel 15 Gram(s) Oral once PRN Blood Glucose LESS THAN 70 milliGRAM(s)/deciliter  glucagon  Injectable 1 milliGRAM(s) IntraMuscular once PRN Glucose LESS THAN 70 milligrams/deciliter      MEDICATIONS  (STANDING):  ALPRAZolam 0.5 milliGRAM(s) Oral every 8 hours  artificial  tears Solution 1 Drop(s) Both EYES every 4 hours  ascorbic acid 500 milliGRAM(s) Oral daily  aspirin  chewable 81 milliGRAM(s) Oral daily  cefepime  Injectable. 1000 milliGRAM(s) IV Push every 12 hours  chlorhexidine 0.12% Liquid 5 milliLiter(s) Oral Mucosa two times a day  dextrose 5%. 1000 milliLiter(s) (50 mL/Hr) IV Continuous <Continuous>  dextrose 50% Injectable 12.5 Gram(s) IV Push once  dextrose 50% Injectable 25 Gram(s) IV Push once  dextrose 50% Injectable 25 Gram(s) IV Push once  enoxaparin Injectable 40 milliGRAM(s) SubCutaneous daily  fentaNYL   Patch  50 MICROgram(s)/Hr 1 Patch Transdermal every 72 hours  ferrous    sulfate Liquid 300 milliGRAM(s) Enteral Tube <User Schedule>  gabapentin 300 milliGRAM(s) Oral two times a day  insulin glargine Injectable (LANTUS) 6 Unit(s) SubCutaneous at bedtime  insulin lispro (HumaLOG) corrective regimen sliding scale   SubCutaneous every 6 hours  lactobacillus acidophilus 1 Tablet(s) Oral two times a day  loratadine 10 milliGRAM(s) Oral daily  montelukast 10 milliGRAM(s) Oral at bedtime  multivitamin/minerals 1 Tablet(s) Oral daily  pantoprazole  Injectable 40 milliGRAM(s) IV Push daily  sertraline 50 milliGRAM(s) Oral daily  simethicone 80 milliGRAM(s) Chew every 6 hours  vancomycin    Solution 125 milliGRAM(s) Oral every 6 hours  vancomycin  IVPB 1500 milliGRAM(s) IV Intermittent every 12 hours

## 2019-04-25 NOTE — DIETITIAN INITIAL EVALUATION ADULT. - OTHER INFO
Nutrition consult for enteral feeds and pressure ulcer stage 2 or >. 64yo male admitted from [facility] with Sepsis. Possible Cholecystis/sludge. LFT's elevated. History of PNA, UTI, functioning quadrapelgia, s/p colostomy, HTN, PEG. Last documented weight indicates no significant changes x 10 months. Last RD assessment with inaccurate weight due to incorrect admission weight. TF at NH Glucerna 1.5 70ml/hr total volume 1100ml (providing 1650 calories/91gm protein w/ 835ml free water from formula). Free flush @ 25cc/hr (total volume 400ml) + Free water flush Q8 hrs 200ml (total volume with Free water from enteral formula=1835ml. Pt's TF regimen meets energy needs however fluid and protein are under estimated needs at this time. Skin: Sacrum DTI, right ankle DTI w/ no edema noted. Alfredo score=11 (high risk of further skin breakdown). Pt remains NPO as PTA and aspiration precautions maintained. Documented colostomy output- 200ml. Lab: Sodium 129(L), Glucose 177mg/dl. (4/25/19). PTA on antibiotics for CDIFF. NFPE indicates Severe temporal wasting and Moderate ocular fat loss only. Pt appears well nourished, appears to have visible upper body edema (not documented). Pt meets criteria for severe protein/calorie malnutrition in context of acute illness secondary to severe muscle wasting/moderate fat loss. Recommendations: 1) Continue with Glucerna 1.5 @ goal rate 50cc/hr (providing total volume of 1100ml/daily) + 4 packs of Prosource TF. Enteral feeding + Prosource will provide 1650 calories/ 135mg protein, 835ml free water). 2) Additional continuous water flush 50cc/hr (volume total 1100ml). Water flush and free water from TF will provide a total of 1935ml daily. 3) maintain aspiration precautions back of bed > 30 degrees. 4) monitor daily weights. 5) order additional labs phosphorus, magnesium not ordered initially. 6) monitor labs/lytes and replete as needed.

## 2019-04-25 NOTE — CONSULT NOTE ADULT - ASSESSMENT
Pt is a 62 y/o M PMhx of HTN, DM, chronic resp failure/vent dependent s/p trach and PEG, transverse myelitis (as result of ? tetanus toxoid injection), functional quadripegia, s/p (? diverting) colostomy - most recently at  6/2018 due to sepsis of urinary source (CAUTi present on admission), before that was in  hospital 4/2018 with PNA Acinetobacter baumannii) and 3/2018 with UTI (PSAE muti resistant but S to zosyn). Patient was admitted 4/24 due to fever and leukocytosis.  Already being treated for C difficile colitis and 'osteomyelitis' from sacral decubitus at NH (IV Vanco IV Cefepime PO Vanco).    In ED fever 104* F and WBC 19k.  Hemodynamics reasonable.  Pt with elevated LFT's - US in ED with distended GB and sludge - pt refused CT scan - seen by surgery team - No acute intervention at this time.     Patient was found to be resting in treatment bed, on vent, HPI unobtainable.    1. Sepsis. Sacral wound with probable OM.  - Patient seen and examined by Infectious disease.  - All available labs and imaging reviewed at this time. Will f/u on HIDA scan and CT abdomen/pelvis. 	  - Patient given vanco and cefipime  - ID will follow patient  - Will discuss with attending Dr. Austin. Pt is a 64 y/o M PMhx of HTN, DM, chronic resp failure/vent dependent s/p trach and PEG, transverse myelitis (as result of ? tetanus toxoid injection), functional quadripegia, s/p  colostomy - most recently at  6/2018 due to sepsis of urinary source (CAUTi present on admission), before that was in  hospital 4/2018 with PNA Acinetobacter baumannii) and 3/2018 with UTI (PSAE muti resistant but S to zosyn). Patient was admitted 4/24 due to fever and leukocytosis.  Already being treated for C difficile colitis and 'osteomyelitis' from sacral decubitus at NH (IV Vanco IV Cefepime PO Vanco), Picc line was placed at [facility] on 4/12. History per medical record as patient unable to provide history.  1. 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 other issues: per medicine Pt is a 64 y/o M PMhx of HTN, DM, chronic resp failure/vent dependent s/p trach and PEG, transverse myelitis (as result of ? tetanus toxoid injection), functional quadripegia, s/p  colostomy - most recently at  6/2018 due to sepsis of urinary source (CAUTi present on admission), before that was in  hospital 4/2018 with PNA Acinetobacter baumannii) and 3/2018 with UTI (PSAE muti resistant but S to zosyn). Patient was admitted 4/24 due to fever and leukocytosis.  Already being treated for C difficile colitis and 'osteomyelitis' from sacral decubitus at NH (IV Vanco IV Cefepime PO Vanco), Picc line was placed at [facility] on 4/12. History per medical record as patient unable to provide history.  1. 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 other issues: per medicine

## 2019-04-25 NOTE — DIETITIAN INITIAL EVALUATION ADULT. - NS AS NUTRI INTERV ENTERAL NUTRITION
Composition/Feeding tube flush/Concentration/Rate/Volume/1) Continue with Glucerna 1.5 @ goal rate 50cc/hr (providing total volume of 1100ml/daily) + 4 packs of Prosource TF. Enteral feeding + Prosource will provide 1650 calories/ 135mg protein, 835ml free water).

## 2019-04-25 NOTE — PROGRESS NOTE ADULT - SUBJECTIVE AND OBJECTIVE BOX
Events Overnight: still febrile to 101. us/ showed thickened gb    HPI:       64yo M admitted  due to fever and leukocytosis.  Already being treated for C difficile colitis and 'osteomyelitis' from sacral decubitus at NH (IV Vanco IV Cefepime PO Vanco).  In ED fever 104* F and WBC 19k.  Hemodynamics reasonable.  Pt with elevated LFT's - US in ED with distended GB and sludge - pt refused CT scan -     PMH    Pt with hx of HTN, DM, chronic resp failure/vent dependent s/p trach and PEG, transverse myelitis (as result of ? tetanus toxoid injection), functional quadripegia, s/p (? diverting) colostomy - most recently at  2018 due to sepsis of urinary source (CAUTi present on admission), before that was in  hospital 2018 with PNA Acinetobacter baumannii) and 3/2018 with UTI (PSAE muti resistant but S to zosyn).    ROs - feels feverish, no increased secretions, sob, no abdominaml pain, no change in stool         MEDICATIONS  (STANDING):  ALPRAZolam 0.5 milliGRAM(s) Oral every 8 hours  artificial  tears Solution 1 Drop(s) Both EYES every 4 hours  ascorbic acid 500 milliGRAM(s) Oral daily  aspirin  chewable 81 milliGRAM(s) Oral daily  cefepime  Injectable. 1000 milliGRAM(s) IV Push every 12 hours  chlorhexidine 0.12% Liquid 5 milliLiter(s) Oral Mucosa two times a day  dextrose 5%. 1000 milliLiter(s) (50 mL/Hr) IV Continuous <Continuous>  dextrose 50% Injectable 12.5 Gram(s) IV Push once  dextrose 50% Injectable 25 Gram(s) IV Push once  dextrose 50% Injectable 25 Gram(s) IV Push once  enoxaparin Injectable 40 milliGRAM(s) SubCutaneous daily  fentaNYL   Patch  50 MICROgram(s)/Hr 1 Patch Transdermal every 72 hours  ferrous    sulfate Liquid 300 milliGRAM(s) Enteral Tube <User Schedule>  gabapentin 300 milliGRAM(s) Oral two times a day  insulin glargine Injectable (LANTUS) 6 Unit(s) SubCutaneous at bedtime  insulin lispro (HumaLOG) corrective regimen sliding scale   SubCutaneous every 6 hours  lactobacillus acidophilus 1 Tablet(s) Oral two times a day  loratadine 10 milliGRAM(s) Oral daily  montelukast 10 milliGRAM(s) Oral at bedtime  multivitamin/minerals 1 Tablet(s) Oral daily  pantoprazole  Injectable 40 milliGRAM(s) IV Push daily  sertraline 50 milliGRAM(s) Oral daily  simethicone 80 milliGRAM(s) Chew every 6 hours  vancomycin    Solution 125 milliGRAM(s) Oral every 6 hours  vancomycin  IVPB 1500 milliGRAM(s) IV Intermittent every 12 hours    MEDICATIONS  (PRN):  acetaminophen   Tablet .. 650 milliGRAM(s) Oral every 6 hours PRN Temp greater or equal to 38C (100.4F), Moderate Pain (4 - 6)  cyclobenzaprine 10 milliGRAM(s) Oral three times a day PRN Muscle Spasm  dextrose 40% Gel 15 Gram(s) Oral once PRN Blood Glucose LESS THAN 70 milliGRAM(s)/deciliter  glucagon  Injectable 1 milliGRAM(s) IntraMuscular once PRN Glucose LESS THAN 70 milligrams/deciliter      Height (cm): 177.8 ( @ 19:27)  Weight (kg): 104.3 ( @ 19:27)  BMI (kg/m2): 33 ( @ 19:27)    ICU Vital Signs Last 24 Hrs  T(C): 38.6 (2019 08:14), Max: 40.1 (2019 19:27)  T(F): 101.4 (2019 08:14), Max: 104.2 (2019 19:27)  HR: 101 (2019 09:00) (101 - 128)  BP: 124/80 (2019 09:00) (118/71 - 163/83)  BP(mean): 90 (2019 09:00) (83 - 100)  ABP: --  ABP(mean): --  RR: 20 (2019 09:00) (17 - 23)  SpO2: 100% (2019 09:00) (93% - 100%)      Mode: AC/ CMV (Assist Control/ Continuous Mandatory Ventilation)  RR (machine): 14  TV (machine): 0.5  FiO2: 40  PEEP: 5  ITime: 1  MAP: 12  PIP: 26      I&O's Summary    2019 07:01  -  2019 07:00  --------------------------------------------------------  IN: 0 mL / OUT: 475 mL / NET: -475 mL                                 7.0    19.70 )-----------( 520      ( 2019 06:41 )             24.3       04-    129<L>  |  95<L>  |  18  ----------------------------<  177<H>  4.3   |  27  |  0.48<L>    Ca    8.2<L>      2019 06:41    TPro  6.9  /  Alb  1.7<L>  /  TBili  0.6  /  DBili  x   /  AST  305<H>  /  ALT  407<H>  /  AlkPhos  653<H>      Urinalysis Basic - ( 2019 21:31 )    Color: Yellow / Appearance: Clear / S.010 / pH: x  Gluc: x / Ketone: Negative  / Bili: Negative / Urobili: Negative mg/dL   Blood: x / Protein: 30 mg/dL / Nitrite: Negative   Leuk Esterase: Negative / RBC: 3-5 /HPF / WBC 0-2   Sq Epi: x / Non Sq Epi: Moderate / Bacteria: Moderate        DVT Prophylaxis:     Lovenox                                                            Advanced Directives: Full code

## 2019-04-25 NOTE — DIETITIAN INITIAL EVALUATION ADULT. - NS AS NUTRI DX NUTRIENT
Malnutrition/Pt meets criteria for severe protein/calorie malnutrition in context of acute illness secondary to severe muscle wasting/moderate fat loss.

## 2019-04-26 LAB
ALBUMIN SERPL ELPH-MCNC: 1.7 G/DL — LOW (ref 3.3–5)
ALP SERPL-CCNC: 501 U/L — HIGH (ref 40–120)
ALT FLD-CCNC: 234 U/L — HIGH (ref 12–78)
ANION GAP SERPL CALC-SCNC: 6 MMOL/L — SIGNIFICANT CHANGE UP (ref 5–17)
AST SERPL-CCNC: 76 U/L — HIGH (ref 15–37)
BILIRUB SERPL-MCNC: 0.5 MG/DL — SIGNIFICANT CHANGE UP (ref 0.2–1.2)
BUN SERPL-MCNC: 20 MG/DL — SIGNIFICANT CHANGE UP (ref 7–23)
CALCIUM SERPL-MCNC: 8.5 MG/DL — SIGNIFICANT CHANGE UP (ref 8.5–10.1)
CHLORIDE SERPL-SCNC: 95 MMOL/L — LOW (ref 96–108)
CO2 SERPL-SCNC: 27 MMOL/L — SIGNIFICANT CHANGE UP (ref 22–31)
CREAT SERPL-MCNC: 0.44 MG/DL — LOW (ref 0.5–1.3)
CULTURE RESULTS: NO GROWTH — SIGNIFICANT CHANGE UP
GLUCOSE BLDC GLUCOMTR-MCNC: 109 MG/DL — HIGH (ref 70–99)
GLUCOSE BLDC GLUCOMTR-MCNC: 127 MG/DL — HIGH (ref 70–99)
GLUCOSE BLDC GLUCOMTR-MCNC: 132 MG/DL — HIGH (ref 70–99)
GLUCOSE BLDC GLUCOMTR-MCNC: 147 MG/DL — HIGH (ref 70–99)
GLUCOSE SERPL-MCNC: 140 MG/DL — HIGH (ref 70–99)
HCT VFR BLD CALC: 25.5 % — LOW (ref 39–50)
HGB BLD-MCNC: 7.4 G/DL — LOW (ref 13–17)
MCHC RBC-ENTMCNC: 24.2 PG — LOW (ref 27–34)
MCHC RBC-ENTMCNC: 29 GM/DL — LOW (ref 32–36)
MCV RBC AUTO: 83.3 FL — SIGNIFICANT CHANGE UP (ref 80–100)
NRBC # BLD: 0 /100 WBCS — SIGNIFICANT CHANGE UP (ref 0–0)
PLATELET # BLD AUTO: 496 K/UL — HIGH (ref 150–400)
POTASSIUM SERPL-MCNC: 4 MMOL/L — SIGNIFICANT CHANGE UP (ref 3.5–5.3)
POTASSIUM SERPL-SCNC: 4 MMOL/L — SIGNIFICANT CHANGE UP (ref 3.5–5.3)
PROT SERPL-MCNC: 6.9 GM/DL — SIGNIFICANT CHANGE UP (ref 6–8.3)
RBC # BLD: 3.06 M/UL — LOW (ref 4.2–5.8)
RBC # FLD: 17.8 % — HIGH (ref 10.3–14.5)
SODIUM SERPL-SCNC: 128 MMOL/L — LOW (ref 135–145)
SPECIMEN SOURCE: SIGNIFICANT CHANGE UP
WBC # BLD: 17.77 K/UL — HIGH (ref 3.8–10.5)
WBC # FLD AUTO: 17.77 K/UL — HIGH (ref 3.8–10.5)

## 2019-04-26 RX ORDER — OXYCODONE AND ACETAMINOPHEN 5; 325 MG/1; MG/1
1 TABLET ORAL EVERY 6 HOURS
Qty: 0 | Refills: 0 | Status: DISCONTINUED | OUTPATIENT
Start: 2019-04-26 | End: 2019-05-01

## 2019-04-26 RX ADMIN — GABAPENTIN 300 MILLIGRAM(S): 400 CAPSULE ORAL at 18:11

## 2019-04-26 RX ADMIN — Medication 650 MILLIGRAM(S): at 05:47

## 2019-04-26 RX ADMIN — Medication 0.5 MILLIGRAM(S): at 12:52

## 2019-04-26 RX ADMIN — Medication 500 MILLIGRAM(S): at 12:49

## 2019-04-26 RX ADMIN — CHLORHEXIDINE GLUCONATE 5 MILLILITER(S): 213 SOLUTION TOPICAL at 18:45

## 2019-04-26 RX ADMIN — Medication 1 TABLET(S): at 05:32

## 2019-04-26 RX ADMIN — INSULIN GLARGINE 6 UNIT(S): 100 INJECTION, SOLUTION SUBCUTANEOUS at 23:41

## 2019-04-26 RX ADMIN — SIMETHICONE 80 MILLIGRAM(S): 80 TABLET, CHEWABLE ORAL at 23:42

## 2019-04-26 RX ADMIN — Medication 1 DROP(S): at 21:21

## 2019-04-26 RX ADMIN — Medication 125 MILLIGRAM(S): at 12:51

## 2019-04-26 RX ADMIN — Medication 1 DROP(S): at 12:52

## 2019-04-26 RX ADMIN — Medication 125 MILLIGRAM(S): at 23:42

## 2019-04-26 RX ADMIN — GABAPENTIN 300 MILLIGRAM(S): 400 CAPSULE ORAL at 05:32

## 2019-04-26 RX ADMIN — Medication 0.5 MILLIGRAM(S): at 21:20

## 2019-04-26 RX ADMIN — MONTELUKAST 10 MILLIGRAM(S): 4 TABLET, CHEWABLE ORAL at 21:20

## 2019-04-26 RX ADMIN — PANTOPRAZOLE SODIUM 40 MILLIGRAM(S): 20 TABLET, DELAYED RELEASE ORAL at 12:51

## 2019-04-26 RX ADMIN — Medication 1 TABLET(S): at 12:50

## 2019-04-26 RX ADMIN — SIMETHICONE 80 MILLIGRAM(S): 80 TABLET, CHEWABLE ORAL at 12:53

## 2019-04-26 RX ADMIN — Medication 1 DROP(S): at 18:12

## 2019-04-26 RX ADMIN — Medication 125 MILLIGRAM(S): at 18:11

## 2019-04-26 RX ADMIN — ENOXAPARIN SODIUM 40 MILLIGRAM(S): 100 INJECTION SUBCUTANEOUS at 12:50

## 2019-04-26 RX ADMIN — SIMETHICONE 80 MILLIGRAM(S): 80 TABLET, CHEWABLE ORAL at 05:32

## 2019-04-26 RX ADMIN — Medication 650 MILLIGRAM(S): at 06:36

## 2019-04-26 RX ADMIN — CHLORHEXIDINE GLUCONATE 5 MILLILITER(S): 213 SOLUTION TOPICAL at 05:33

## 2019-04-26 RX ADMIN — Medication 1 TABLET(S): at 18:11

## 2019-04-26 RX ADMIN — SERTRALINE 50 MILLIGRAM(S): 25 TABLET, FILM COATED ORAL at 12:51

## 2019-04-26 RX ADMIN — TIGECYCLINE 105 MILLIGRAM(S): 50 INJECTION, POWDER, LYOPHILIZED, FOR SOLUTION INTRAVENOUS at 18:11

## 2019-04-26 RX ADMIN — Medication 81 MILLIGRAM(S): at 12:49

## 2019-04-26 RX ADMIN — SIMETHICONE 80 MILLIGRAM(S): 80 TABLET, CHEWABLE ORAL at 18:12

## 2019-04-26 RX ADMIN — LORATADINE 10 MILLIGRAM(S): 10 TABLET ORAL at 12:50

## 2019-04-26 RX ADMIN — Medication 300 MILLIGRAM(S): at 12:48

## 2019-04-26 RX ADMIN — TIGECYCLINE 105 MILLIGRAM(S): 50 INJECTION, POWDER, LYOPHILIZED, FOR SOLUTION INTRAVENOUS at 05:33

## 2019-04-26 RX ADMIN — FENTANYL CITRATE 1 PATCH: 50 INJECTION INTRAVENOUS at 12:48

## 2019-04-26 RX ADMIN — Medication 125 MILLIGRAM(S): at 05:33

## 2019-04-26 RX ADMIN — Medication 0.5 MILLIGRAM(S): at 05:32

## 2019-04-26 NOTE — PROGRESS NOTE ADULT - ASSESSMENT
Pt is a 62 y/o M PMhx of HTN, DM, chronic resp failure/vent dependent s/p trach and PEG, transverse myelitis (as result of ? tetanus toxoid injection), functional quadripegia, s/p  colostomy - most recently at  6/2018 due to sepsis of urinary source (CAUTi present on admission), before that was in  hospital 4/2018 with PNA Acinetobacter baumannii) and 3/2018 with UTI (PSAE muti resistant but S to zosyn). Patient was admitted 4/24 due to fever and leukocytosis.  Already being treated for C difficile colitis and 'osteomyelitis' from sacral decubitus at NH (IV Vanco IV Cefepime PO Vanco), Picc line was placed at [facility] on 4/12. History per medical record as patient unable to provide history.  1. 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 other issues: per medicine

## 2019-04-26 NOTE — ADVANCED PRACTICE NURSE CONSULT - ASSESSMENT
This is a 63 year old male that was admitted to the hospital on 4/24/2019 for sepsis.  PMH-  HTN, DM, chronic resp failure/vent dependent s/p trach and PEG, transverse myelitis (as result of ? tetanus toxoid injection), functional quadriplegia s/p  colostomy.    Requested by MD to assess patient's coccyx stage 4 pressure injury that was present on admission.  Patient presents on a Total Care Sport mattress on his right side with both heels elevated off mattress.      Old dressing to coccyx removed. Wound irrigated with normal saline. Measures [id number]. Positive probe to the bone. Wound bed with 100% pink tissue. No odor noted, periwound intact. Would classify as a stage 4 pressure injury. Medihoney applied to wound bed and kaltostat gently packed into wound bed to fill in dead space.  Skin prep applied to periwound for protection.  Foam dressing placed as cover dressing.  Patient placed onto his backside per his request.  Heels remains elevated off mattress.

## 2019-04-26 NOTE — PROGRESS NOTE ADULT - SUBJECTIVE AND OBJECTIVE BOX
Events Overnight   fevers better, no complaints    HPI:    64yo M admitted  due to fever and leukocytosis.  Already being treated for C difficile colitis and 'osteomyelitis' from sacral decubitus at NH (IV Vanco IV Cefepime PO Vanco).  In ED fever 104* F and WBC 19k.  Hemodynamics reasonable.  Pt with elevated LFT's - US in ED with distended GB and sludge -    ct scan bibasilar infiltrate, gall stones, gb does not appear thickened  hida scan non visualization    ROS - no complaints     stronger     no chest pain , sob , abdominal pain, non increase in secretions    ros otherwise negative       MEDICATIONS  (STANDING):  ALPRAZolam 0.5 milliGRAM(s) Oral every 8 hours  artificial  tears Solution 1 Drop(s) Both EYES every 4 hours  ascorbic acid 500 milliGRAM(s) Oral daily  aspirin  chewable 81 milliGRAM(s) Oral daily  chlorhexidine 0.12% Liquid 5 milliLiter(s) Oral Mucosa two times a day  dextrose 5%. 1000 milliLiter(s) (50 mL/Hr) IV Continuous <Continuous>  dextrose 50% Injectable 12.5 Gram(s) IV Push once  dextrose 50% Injectable 25 Gram(s) IV Push once  dextrose 50% Injectable 25 Gram(s) IV Push once  enoxaparin Injectable 40 milliGRAM(s) SubCutaneous daily  fentaNYL   Patch  50 MICROgram(s)/Hr 1 Patch Transdermal every 72 hours  ferrous    sulfate Liquid 300 milliGRAM(s) Enteral Tube <User Schedule>  gabapentin 300 milliGRAM(s) Oral two times a day  insulin glargine Injectable (LANTUS) 6 Unit(s) SubCutaneous at bedtime  insulin lispro (HumaLOG) corrective regimen sliding scale   SubCutaneous every 6 hours  lactobacillus acidophilus 1 Tablet(s) Oral two times a day  loratadine 10 milliGRAM(s) Oral daily  montelukast 10 milliGRAM(s) Oral at bedtime  multivitamin/minerals 1 Tablet(s) Oral daily  pantoprazole  Injectable 40 milliGRAM(s) IV Push daily  sertraline 50 milliGRAM(s) Oral daily  simethicone 80 milliGRAM(s) Chew every 6 hours  tigecycline IVPB      tigecycline IVPB 50 milliGRAM(s) IV Intermittent every 12 hours  vancomycin    Solution 125 milliGRAM(s) Oral every 6 hours    MEDICATIONS  (PRN):  acetaminophen   Tablet .. 650 milliGRAM(s) Oral every 6 hours PRN Temp greater or equal to 38C (100.4F), Moderate Pain (4 - 6)  cyclobenzaprine 10 milliGRAM(s) Oral three times a day PRN Muscle Spasm  dextrose 40% Gel 15 Gram(s) Oral once PRN Blood Glucose LESS THAN 70 milliGRAM(s)/deciliter  glucagon  Injectable 1 milliGRAM(s) IntraMuscular once PRN Glucose LESS THAN 70 milligrams/deciliter        ICU Vital Signs Last 24 Hrs  T(C): 37.7 (2019 09:33), Max: 38 (2019 13:30)  T(F): 99.9 (2019 09:33), Max: 100.4 (2019 13:30)  HR: 95 (2019 08:00) (90 - 106)  BP: 113/79 (2019 09:33) (88/58 - 153/96)  BP(mean): 87 (2019 09:33) (65 - 115)  ABP: --  ABP(mean): --  RR: 19 (2019 08:00) (14 - 20)  SpO2: 99% (2019 08:00) (95% - 100%)      Mode: AC/ CMV (Assist Control/ Continuous Mandatory Ventilation)  RR (machine): 14  TV (machine): 500  FiO2: 40  PEEP: 5  MAP: 13  PIP: 31      I&O's Summary    2019 07:01  -  2019 07:00  --------------------------------------------------------  IN: 1005 mL / OUT: 2050 mL / NET: -1045 mL        Physical Exam:    General - comfortable no distress    neuro slightly confused, quadriparesis    lungs - clear on vent minimal secretions    abdomen - s/p peg, soft non distended     voiding, urine culture negative    ext warm    buttock - deep 3 cm dti                             7.4    17.77 )-----------( 496      ( 2019 06:38 )             25.5       04-26    128<L>  |  95<L>  |  20  ----------------------------<  140<H>  4.0   |  27  |  0.44<L>    Ca    8.5      2019 06:38    TPro  6.9  /  Alb  1.7<L>  /  TBili  0.5  /  DBili  x   /  AST  76<H>  /  ALT  234<H>  /  AlkPhos  501<H>  04-26        Urinalysis Basic - ( 2019 21:31 )    Color: Yellow / Appearance: Clear / S.010 / pH: x  Gluc: x / Ketone: Negative  / Bili: Negative / Urobili: Negative mg/dL   Blood: x / Protein: 30 mg/dL / Nitrite: Negative   Leuk Esterase: Negative / RBC: 3-5 /HPF / WBC 0-2   Sq Epi: x / Non Sq Epi: Moderate / Bacteria: Moderate        DVT Prophylaxis:     Enoxararine                                                            Advanced Directives: Full code

## 2019-04-26 NOTE — PROGRESS NOTE ADULT - ASSESSMENT
Patient admitted with fever, and inc wbc,   pateint with quadriparesis on vent  l;fts improving  fevers better   abx changed to tigecycline  gb imaging conflicting, ct not thickened, hida non visualize,   d/w surgery will treat conservatively for now

## 2019-04-26 NOTE — PROGRESS NOTE ADULT - ASSESSMENT
64 yo male with LLL pneumonia, posit HIDA scan, normal looking gallbladder on CT scan , normalization of LFts, afebrile  -I don't think he has cholecystitis, I think HIDA is falsely positive, normal looking gallbladder on Ct scan. He's improving clinically, I would NOt recomment a cholecystotosmy tube since he's improving. I think he had a multifactorial cholestasis that quickly is improving with IVF and IV Abx  -Cont TF

## 2019-04-26 NOTE — PROGRESS NOTE ADULT - SUBJECTIVE AND OBJECTIVE BOX
62 yo male trach/vent dependent admitted with fever, improving clinically, afebrile, WBC trending lower after receiving tygagil. HIDA posit for cholecystitis. Abd CT showed normal looking gallbladder, LLL pneumonia    Trach in place/vented  Abd soft, NT, gastrostomy tube in place/ colostomy              Vital Signs Last 24 Hrs  T(C): 37.7 (26 Apr 2019 09:33), Max: 38 (25 Apr 2019 13:30)  T(F): 99.9 (26 Apr 2019 09:33), Max: 100.4 (25 Apr 2019 13:30)  HR: 95 (26 Apr 2019 08:00) (90 - 106)  BP: 113/79 (26 Apr 2019 09:33) (88/58 - 153/96)  BP(mean): 87 (26 Apr 2019 09:33) (65 - 115)  RR: 19 (26 Apr 2019 08:00) (14 - 20)  SpO2: 99% (26 Apr 2019 08:00) (95% - 100%)                          7.4    17.77 )-----------( 496      ( 26 Apr 2019 06:38 )             25.5       04-26    128<L>  |  95<L>  |  20  ----------------------------<  140<H>  4.0   |  27  |  0.44<L>    Ca    8.5      26 Apr 2019 06:38    TPro  6.9  /  Alb  1.7<L>  /  TBili  0.5  /  DBili  x   /  AST  76<H>  /  ALT  234<H>  /  AlkPhos  501<H>  04-26      LIVER FUNCTIONS - ( 26 Apr 2019 06:38 )  Alb: 1.7 g/dL / Pro: 6.9 gm/dL / ALK PHOS: 501 U/L / ALT: 234 U/L / AST: 76 U/L / GGT: x             MEDICATIONS  (STANDING):  ALPRAZolam 0.5 milliGRAM(s) Oral every 8 hours  artificial  tears Solution 1 Drop(s) Both EYES every 4 hours  ascorbic acid 500 milliGRAM(s) Oral daily  aspirin  chewable 81 milliGRAM(s) Oral daily  chlorhexidine 0.12% Liquid 5 milliLiter(s) Oral Mucosa two times a day  dextrose 5%. 1000 milliLiter(s) (50 mL/Hr) IV Continuous <Continuous>  dextrose 50% Injectable 12.5 Gram(s) IV Push once  dextrose 50% Injectable 25 Gram(s) IV Push once  dextrose 50% Injectable 25 Gram(s) IV Push once  enoxaparin Injectable 40 milliGRAM(s) SubCutaneous daily  fentaNYL   Patch  50 MICROgram(s)/Hr 1 Patch Transdermal every 72 hours  ferrous    sulfate Liquid 300 milliGRAM(s) Enteral Tube <User Schedule>  gabapentin 300 milliGRAM(s) Oral two times a day  insulin glargine Injectable (LANTUS) 6 Unit(s) SubCutaneous at bedtime  insulin lispro (HumaLOG) corrective regimen sliding scale   SubCutaneous every 6 hours  lactobacillus acidophilus 1 Tablet(s) Oral two times a day  loratadine 10 milliGRAM(s) Oral daily  montelukast 10 milliGRAM(s) Oral at bedtime  multivitamin/minerals 1 Tablet(s) Oral daily  pantoprazole  Injectable 40 milliGRAM(s) IV Push daily  sertraline 50 milliGRAM(s) Oral daily  simethicone 80 milliGRAM(s) Chew every 6 hours  tigecycline IVPB      tigecycline IVPB 50 milliGRAM(s) IV Intermittent every 12 hours  vancomycin    Solution 125 milliGRAM(s) Oral every 6 hours    MEDICATIONS  (PRN):  acetaminophen   Tablet .. 650 milliGRAM(s) Oral every 6 hours PRN Temp greater or equal to 38C (100.4F), Moderate Pain (4 - 6)  cyclobenzaprine 10 milliGRAM(s) Oral three times a day PRN Muscle Spasm  dextrose 40% Gel 15 Gram(s) Oral once PRN Blood Glucose LESS THAN 70 milliGRAM(s)/deciliter  glucagon  Injectable 1 milliGRAM(s) IntraMuscular once PRN Glucose LESS THAN 70 milligrams/deciliter      MEDICATIONS  (STANDING):  ALPRAZolam 0.5 milliGRAM(s) Oral every 8 hours  artificial  tears Solution 1 Drop(s) Both EYES every 4 hours  ascorbic acid 500 milliGRAM(s) Oral daily  aspirin  chewable 81 milliGRAM(s) Oral daily  chlorhexidine 0.12% Liquid 5 milliLiter(s) Oral Mucosa two times a day  dextrose 5%. 1000 milliLiter(s) (50 mL/Hr) IV Continuous <Continuous>  dextrose 50% Injectable 12.5 Gram(s) IV Push once  dextrose 50% Injectable 25 Gram(s) IV Push once  dextrose 50% Injectable 25 Gram(s) IV Push once  enoxaparin Injectable 40 milliGRAM(s) SubCutaneous daily  fentaNYL   Patch  50 MICROgram(s)/Hr 1 Patch Transdermal every 72 hours  ferrous    sulfate Liquid 300 milliGRAM(s) Enteral Tube <User Schedule>  gabapentin 300 milliGRAM(s) Oral two times a day  insulin glargine Injectable (LANTUS) 6 Unit(s) SubCutaneous at bedtime  insulin lispro (HumaLOG) corrective regimen sliding scale   SubCutaneous every 6 hours  lactobacillus acidophilus 1 Tablet(s) Oral two times a day  loratadine 10 milliGRAM(s) Oral daily  montelukast 10 milliGRAM(s) Oral at bedtime  multivitamin/minerals 1 Tablet(s) Oral daily  pantoprazole  Injectable 40 milliGRAM(s) IV Push daily  sertraline 50 milliGRAM(s) Oral daily  simethicone 80 milliGRAM(s) Chew every 6 hours  tigecycline IVPB      tigecycline IVPB 50 milliGRAM(s) IV Intermittent every 12 hours  vancomycin    Solution 125 milliGRAM(s) Oral every 6 hours

## 2019-04-26 NOTE — PROGRESS NOTE ADULT - SUBJECTIVE AND OBJECTIVE BOX
Patient is a 63y old  Male who presents with a chief complaint of suspected sepsis (25 Apr 2019 09:55)        Date of service: 04-26-19 @ 14:43    Patient lying in bed; appears comfortable        ROS unable to obtain secondary to patient medical condition     MEDICATIONS  (STANDING):  ALPRAZolam 0.5 milliGRAM(s) Oral every 8 hours  artificial  tears Solution 1 Drop(s) Both EYES every 4 hours  ascorbic acid 500 milliGRAM(s) Oral daily  aspirin  chewable 81 milliGRAM(s) Oral daily  chlorhexidine 0.12% Liquid 5 milliLiter(s) Oral Mucosa two times a day  dextrose 5%. 1000 milliLiter(s) (50 mL/Hr) IV Continuous <Continuous>  dextrose 50% Injectable 12.5 Gram(s) IV Push once  dextrose 50% Injectable 25 Gram(s) IV Push once  dextrose 50% Injectable 25 Gram(s) IV Push once  enoxaparin Injectable 40 milliGRAM(s) SubCutaneous daily  fentaNYL   Patch  50 MICROgram(s)/Hr 1 Patch Transdermal every 72 hours  ferrous    sulfate Liquid 300 milliGRAM(s) Enteral Tube <User Schedule>  gabapentin 300 milliGRAM(s) Oral two times a day  insulin glargine Injectable (LANTUS) 6 Unit(s) SubCutaneous at bedtime  insulin lispro (HumaLOG) corrective regimen sliding scale   SubCutaneous every 6 hours  lactobacillus acidophilus 1 Tablet(s) Oral two times a day  loratadine 10 milliGRAM(s) Oral daily  montelukast 10 milliGRAM(s) Oral at bedtime  multivitamin/minerals 1 Tablet(s) Oral daily  pantoprazole  Injectable 40 milliGRAM(s) IV Push daily  sertraline 50 milliGRAM(s) Oral daily  simethicone 80 milliGRAM(s) Chew every 6 hours  tigecycline IVPB      tigecycline IVPB 50 milliGRAM(s) IV Intermittent every 12 hours  vancomycin    Solution 125 milliGRAM(s) Oral every 6 hours    MEDICATIONS  (PRN):  acetaminophen   Tablet .. 650 milliGRAM(s) Oral every 6 hours PRN Temp greater or equal to 38C (100.4F), Moderate Pain (4 - 6)  cyclobenzaprine 10 milliGRAM(s) Oral three times a day PRN Muscle Spasm  dextrose 40% Gel 15 Gram(s) Oral once PRN Blood Glucose LESS THAN 70 milliGRAM(s)/deciliter  glucagon  Injectable 1 milliGRAM(s) IntraMuscular once PRN Glucose LESS THAN 70 milligrams/deciliter      Vital Signs Last 24 Hrs  T(C): 36.5 (26 Apr 2019 12:00), Max: 37.8 (26 Apr 2019 05:22)  T(F): 97.7 (26 Apr 2019 12:00), Max: 100.1 (26 Apr 2019 05:22)  HR: 93 (26 Apr 2019 14:00) (90 - 102)  BP: 100/75 (26 Apr 2019 14:00) (88/58 - 153/96)  BP(mean): 81 (26 Apr 2019 14:00) (65 - 115)  RR: 21 (26 Apr 2019 14:00) (15 - 21)  SpO2: 98% (26 Apr 2019 14:00) (95% - 100%)    Physical Exam:    PE:    Constitutional: frail looking  HEENT: NC/AT  Neck: supple; thyroid not palpable  Respiratory: respiratory effort normal scattered coarse breath sounds  Cardiovascular: S1S2 regular, no murmurs  Abdomen: soft, not tender, ostomy in place  Musculoskeletal: no muscle tenderness, no joint swelling or tenderness  Extremities: no pedal edema  Neurological/ Psychiatric: on ventilator  Skin: no rashes; no palpable lesions    Labs: all available labs reviewed                    Labs:                        7.4    17.77 )-----------( 496      ( 26 Apr 2019 06:38 )             25.5     04-26    128<L>  |  95<L>  |  20  ----------------------------<  140<H>  4.0   |  27  |  0.44<L>    Ca    8.5      26 Apr 2019 06:38    TPro  6.9  /  Alb  1.7<L>  /  TBili  0.5  /  DBili  x   /  AST  76<H>  /  ALT  234<H>  /  AlkPhos  501<H>  04-26           Cultures:       Culture - Urine (collected 04-24-19 @ 21:31)  Source: .Urine Clean Catch (Midstream)  Final Report (04-26-19 @ 01:35):    No growth    Culture - Blood (collected 04-24-19 @ 20:08)  Source: .Blood None  Preliminary Report (04-26-19 @ 03:01):    No growth to date.    Culture - Blood (collected 04-24-19 @ 20:08)  Source: .Blood None  Preliminary Report (04-26-19 @ 03:01):    No growth to date.              < from: US Abdomen Complete (04.24.19 @ 21:40) >  IMPRESSION:     Distended gallbladder with sludge and gallbladder wall thickening/edema   which can be seen in the setting of acute cholecystitis. HIDA scan should   be considered for further evaluation.    < end of copied text >    < from: Xray Chest 1 View-PORTABLE IMMEDIATE (04.24.19 @ 20:39) >  IMPRESSION:   There is a patchy left midlung airspace opacity. No pleural effusion or   pneumothorax. Cardiomediastinal silhouette is unremarkable. Tracheostomy   is visualized. Old right clavicular fracture.    < end of copied text >

## 2019-04-26 NOTE — ADVANCED PRACTICE NURSE CONSULT - RECOMMEDATIONS
1) Continue to turn and position every 2 hours  2) Continue to elevate heels off mattress  3) Change dressing to coccyx every other day with medihoney, kaltostat and foam dressing  4) Albumin- 1.4 on 4/26.  Nutritionist following patient with the following treatment recommendations    Treatment:    1) Continue with Glucerna 1.5 @ goal rate 50cc/hr (providing total volume of 1100ml/daily) + 4 packs of Prosource TF. Enteral feeding + Prosource will provide 1650 calories/ 135mg protein, 835ml free water).   2) Additional continuous water flush 50cc/hr (volume total 1100ml). Water flush and free water from TF will provide a total of 1935ml daily.   3) maintain aspiration precautions back of bed > 30 degrees.   4) monitor daily weights.   5) order additional labs phosphorus, magnesium not ordered initially.   6) monitor labs/lytes and replete as needed.

## 2019-04-27 LAB
ALBUMIN SERPL ELPH-MCNC: 1.7 G/DL — LOW (ref 3.3–5)
ALP SERPL-CCNC: 418 U/L — HIGH (ref 40–120)
ALT FLD-CCNC: 143 U/L — HIGH (ref 12–78)
ANION GAP SERPL CALC-SCNC: 5 MMOL/L — SIGNIFICANT CHANGE UP (ref 5–17)
AST SERPL-CCNC: 25 U/L — SIGNIFICANT CHANGE UP (ref 15–37)
BILIRUB SERPL-MCNC: 0.4 MG/DL — SIGNIFICANT CHANGE UP (ref 0.2–1.2)
BUN SERPL-MCNC: 28 MG/DL — HIGH (ref 7–23)
CALCIUM SERPL-MCNC: 8.2 MG/DL — LOW (ref 8.5–10.1)
CHLORIDE SERPL-SCNC: 99 MMOL/L — SIGNIFICANT CHANGE UP (ref 96–108)
CO2 SERPL-SCNC: 30 MMOL/L — SIGNIFICANT CHANGE UP (ref 22–31)
CREAT SERPL-MCNC: 0.62 MG/DL — SIGNIFICANT CHANGE UP (ref 0.5–1.3)
GLUCOSE BLDC GLUCOMTR-MCNC: 130 MG/DL — HIGH (ref 70–99)
GLUCOSE BLDC GLUCOMTR-MCNC: 131 MG/DL — HIGH (ref 70–99)
GLUCOSE BLDC GLUCOMTR-MCNC: 141 MG/DL — HIGH (ref 70–99)
GLUCOSE SERPL-MCNC: 118 MG/DL — HIGH (ref 70–99)
HCT VFR BLD CALC: 26.9 % — LOW (ref 39–50)
HGB BLD-MCNC: 7.8 G/DL — LOW (ref 13–17)
MCHC RBC-ENTMCNC: 24.4 PG — LOW (ref 27–34)
MCHC RBC-ENTMCNC: 29 GM/DL — LOW (ref 32–36)
MCV RBC AUTO: 84.1 FL — SIGNIFICANT CHANGE UP (ref 80–100)
NRBC # BLD: 0 /100 WBCS — SIGNIFICANT CHANGE UP (ref 0–0)
PLATELET # BLD AUTO: 516 K/UL — HIGH (ref 150–400)
POTASSIUM SERPL-MCNC: 3.7 MMOL/L — SIGNIFICANT CHANGE UP (ref 3.5–5.3)
POTASSIUM SERPL-SCNC: 3.7 MMOL/L — SIGNIFICANT CHANGE UP (ref 3.5–5.3)
PROT SERPL-MCNC: 6.8 GM/DL — SIGNIFICANT CHANGE UP (ref 6–8.3)
RBC # BLD: 3.2 M/UL — LOW (ref 4.2–5.8)
RBC # FLD: 17.9 % — HIGH (ref 10.3–14.5)
SODIUM SERPL-SCNC: 134 MMOL/L — LOW (ref 135–145)
WBC # BLD: 12.92 K/UL — HIGH (ref 3.8–10.5)
WBC # FLD AUTO: 12.92 K/UL — HIGH (ref 3.8–10.5)

## 2019-04-27 RX ADMIN — Medication 1 DROP(S): at 14:43

## 2019-04-27 RX ADMIN — ENOXAPARIN SODIUM 40 MILLIGRAM(S): 100 INJECTION SUBCUTANEOUS at 11:45

## 2019-04-27 RX ADMIN — Medication 125 MILLIGRAM(S): at 06:04

## 2019-04-27 RX ADMIN — Medication 1 DROP(S): at 06:03

## 2019-04-27 RX ADMIN — GABAPENTIN 300 MILLIGRAM(S): 400 CAPSULE ORAL at 06:05

## 2019-04-27 RX ADMIN — TIGECYCLINE 105 MILLIGRAM(S): 50 INJECTION, POWDER, LYOPHILIZED, FOR SOLUTION INTRAVENOUS at 17:24

## 2019-04-27 RX ADMIN — Medication 1 TABLET(S): at 06:04

## 2019-04-27 RX ADMIN — Medication 1 TABLET(S): at 11:46

## 2019-04-27 RX ADMIN — OXYCODONE AND ACETAMINOPHEN 1 TABLET(S): 5; 325 TABLET ORAL at 03:24

## 2019-04-27 RX ADMIN — Medication 1 TABLET(S): at 17:20

## 2019-04-27 RX ADMIN — SIMETHICONE 80 MILLIGRAM(S): 80 TABLET, CHEWABLE ORAL at 11:45

## 2019-04-27 RX ADMIN — Medication 500 MILLIGRAM(S): at 11:45

## 2019-04-27 RX ADMIN — SERTRALINE 50 MILLIGRAM(S): 25 TABLET, FILM COATED ORAL at 11:47

## 2019-04-27 RX ADMIN — Medication 125 MILLIGRAM(S): at 17:24

## 2019-04-27 RX ADMIN — CHLORHEXIDINE GLUCONATE 5 MILLILITER(S): 213 SOLUTION TOPICAL at 06:16

## 2019-04-27 RX ADMIN — MONTELUKAST 10 MILLIGRAM(S): 4 TABLET, CHEWABLE ORAL at 22:12

## 2019-04-27 RX ADMIN — OXYCODONE AND ACETAMINOPHEN 1 TABLET(S): 5; 325 TABLET ORAL at 22:12

## 2019-04-27 RX ADMIN — LORATADINE 10 MILLIGRAM(S): 10 TABLET ORAL at 11:46

## 2019-04-27 RX ADMIN — SIMETHICONE 80 MILLIGRAM(S): 80 TABLET, CHEWABLE ORAL at 06:04

## 2019-04-27 RX ADMIN — Medication 1 DROP(S): at 17:36

## 2019-04-27 RX ADMIN — Medication 0.5 MILLIGRAM(S): at 22:12

## 2019-04-27 RX ADMIN — PANTOPRAZOLE SODIUM 40 MILLIGRAM(S): 20 TABLET, DELAYED RELEASE ORAL at 11:46

## 2019-04-27 RX ADMIN — FENTANYL CITRATE 1 PATCH: 50 INJECTION INTRAVENOUS at 02:08

## 2019-04-27 RX ADMIN — Medication 125 MILLIGRAM(S): at 11:45

## 2019-04-27 RX ADMIN — Medication 0.5 MILLIGRAM(S): at 06:04

## 2019-04-27 RX ADMIN — GABAPENTIN 300 MILLIGRAM(S): 400 CAPSULE ORAL at 17:20

## 2019-04-27 RX ADMIN — Medication 1 DROP(S): at 11:48

## 2019-04-27 RX ADMIN — Medication 81 MILLIGRAM(S): at 11:46

## 2019-04-27 RX ADMIN — Medication 325 MILLIGRAM(S): at 17:30

## 2019-04-27 RX ADMIN — Medication 1 DROP(S): at 22:12

## 2019-04-27 RX ADMIN — CHLORHEXIDINE GLUCONATE 5 MILLILITER(S): 213 SOLUTION TOPICAL at 17:37

## 2019-04-27 RX ADMIN — SIMETHICONE 80 MILLIGRAM(S): 80 TABLET, CHEWABLE ORAL at 17:25

## 2019-04-27 RX ADMIN — OXYCODONE AND ACETAMINOPHEN 1 TABLET(S): 5; 325 TABLET ORAL at 06:36

## 2019-04-27 RX ADMIN — TIGECYCLINE 105 MILLIGRAM(S): 50 INJECTION, POWDER, LYOPHILIZED, FOR SOLUTION INTRAVENOUS at 06:04

## 2019-04-27 RX ADMIN — Medication 0.5 MILLIGRAM(S): at 22:13

## 2019-04-27 NOTE — PROGRESS NOTE ADULT - ASSESSMENT
Patient with chronic vent , admitted with high fevers, hx. osteomyilitis and c.dif  PICC line removes  There was a concern of cholecystitis, ct gb looks ok, but non visualiz on hida  treating with abx. improved,  follow lfts  on tigecycline  tolerating tube feeds

## 2019-04-27 NOTE — PROGRESS NOTE ADULT - SUBJECTIVE AND OBJECTIVE BOX
Patient is a 63y old  Male who presents with a chief complaint of suspected sepsis (25 Apr 2019 09:55)      Date of service: 04-27-19 @ 13:21      Patient lying in bed; nonverbal      ROS unable to obtain secondary to patient medical condition     MEDICATIONS  (STANDING):  ALPRAZolam 0.5 milliGRAM(s) Oral every 8 hours  artificial  tears Solution 1 Drop(s) Both EYES every 4 hours  ascorbic acid 500 milliGRAM(s) Oral daily  aspirin  chewable 81 milliGRAM(s) Oral daily  chlorhexidine 0.12% Liquid 5 milliLiter(s) Oral Mucosa two times a day  dextrose 5%. 1000 milliLiter(s) (50 mL/Hr) IV Continuous <Continuous>  dextrose 50% Injectable 12.5 Gram(s) IV Push once  dextrose 50% Injectable 25 Gram(s) IV Push once  dextrose 50% Injectable 25 Gram(s) IV Push once  enoxaparin Injectable 40 milliGRAM(s) SubCutaneous daily  fentaNYL   Patch  50 MICROgram(s)/Hr 1 Patch Transdermal every 72 hours  ferrous    sulfate Liquid 300 milliGRAM(s) Enteral Tube <User Schedule>  gabapentin 300 milliGRAM(s) Oral two times a day  insulin glargine Injectable (LANTUS) 6 Unit(s) SubCutaneous at bedtime  insulin lispro (HumaLOG) corrective regimen sliding scale   SubCutaneous every 6 hours  lactobacillus acidophilus 1 Tablet(s) Oral two times a day  loratadine 10 milliGRAM(s) Oral daily  montelukast 10 milliGRAM(s) Oral at bedtime  multivitamin/minerals 1 Tablet(s) Oral daily  pantoprazole  Injectable 40 milliGRAM(s) IV Push daily  sertraline 50 milliGRAM(s) Oral daily  simethicone 80 milliGRAM(s) Chew every 6 hours  tigecycline IVPB      tigecycline IVPB 50 milliGRAM(s) IV Intermittent every 12 hours  vancomycin    Solution 125 milliGRAM(s) Oral every 6 hours    MEDICATIONS  (PRN):  acetaminophen   Tablet .. 650 milliGRAM(s) Oral every 6 hours PRN Temp greater or equal to 38C (100.4F), Moderate Pain (4 - 6)  cyclobenzaprine 10 milliGRAM(s) Oral three times a day PRN Muscle Spasm  dextrose 40% Gel 15 Gram(s) Oral once PRN Blood Glucose LESS THAN 70 milliGRAM(s)/deciliter  glucagon  Injectable 1 milliGRAM(s) IntraMuscular once PRN Glucose LESS THAN 70 milligrams/deciliter  oxyCODONE    5 mG/acetaminophen 325 mG 1 Tablet(s) Oral every 6 hours PRN Moderate Pain (4 - 6)      Vital Signs Last 24 Hrs  T(C): 35.9 (27 Apr 2019 00:15), Max: 35.9 (27 Apr 2019 00:15)  T(F): 96.7 (27 Apr 2019 00:15), Max: 96.7 (27 Apr 2019 00:15)  HR: 85 (27 Apr 2019 07:00) (85 - 96)  BP: 101/64 (27 Apr 2019 07:00) (90/57 - 120/72)  BP(mean): 72 (27 Apr 2019 07:00) (52 - 81)  RR: 14 (27 Apr 2019 07:00) (14 - 21)  SpO2: 99% (27 Apr 2019 06:00) (96% - 99%)    Physical Exam:        PE:    Constitutional: frail looking  HEENT: NC/AT  Neck: supple; thyroid not palpable  Respiratory: respiratory effort normal scattered coarse breath sounds  Cardiovascular: S1S2 regular, no murmurs  Abdomen: soft, not tender, ostomy in place, peg  Musculoskeletal: no muscle tenderness, no joint swelling or tenderness  Extremities: no pedal edema  Neurological/ Psychiatric: on ventilator  Skin: no rashes; no palpable lesions    Labs: all available labs reviewed                    Labs:                           Labs:                        7.8    12.92 )-----------( 516      ( 27 Apr 2019 12:39 )             26.9     04-27    134<L>  |  99  |  28<H>  ----------------------------<  118<H>  3.7   |  30  |  0.62    Ca    8.2<L>      27 Apr 2019 12:39    TPro  6.8  /  Alb  1.7<L>  /  TBili  0.4  /  DBili  x   /  AST  25  /  ALT  143<H>  /  AlkPhos  418<H>  04-27           Cultures:       Culture - Urine (collected 04-24-19 @ 21:31)  Source: .Urine Clean Catch (Midstream)  Final Report (04-26-19 @ 01:35):    No growth    Culture - Blood (collected 04-24-19 @ 20:08)  Source: .Blood None  Preliminary Report (04-26-19 @ 03:01):    No growth to date.    Culture - Blood (collected 04-24-19 @ 20:08)  Source: .Blood None  Preliminary Report (04-26-19 @ 03:01):    No growth to date.                < from: US Abdomen Complete (04.24.19 @ 21:40) >  IMPRESSION:     Distended gallbladder with sludge and gallbladder wall thickening/edema   which can be seen in the setting of acute cholecystitis. HIDA scan should   be considered for further evaluation.    < end of copied text >    < from: Xray Chest 1 View-PORTABLE IMMEDIATE (04.24.19 @ 20:39) >  IMPRESSION:   There is a patchy left midlung airspace opacity. No pleural effusion or   pneumothorax. Cardiomediastinal silhouette is unremarkable. Tracheostomy   is visualized. Old right clavicular fracture.    < end of copied text >

## 2019-04-27 NOTE — PROGRESS NOTE ADULT - ASSESSMENT
Pt is a 62 y/o M PMhx of HTN, DM, chronic resp failure/vent dependent s/p trach and PEG, transverse myelitis (as result of ? tetanus toxoid injection), functional quadripegia, s/p  colostomy - most recently at  6/2018 due to sepsis of urinary source (CAUTi present on admission), before that was in  hospital 4/2018 with PNA Acinetobacter baumannii) and 3/2018 with UTI (PSAE muti resistant but S to zosyn). Patient was admitted 4/24 due to fever and leukocytosis.  Already being treated for C difficile colitis and 'osteomyelitis' from sacral decubitus at NH (IV Vanco IV Cefepime PO Vanco), Picc line was placed at [facility] on 4/12. History per medical record as patient unable to provide history.  1. 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 other issues: per medicine

## 2019-04-27 NOTE — PROGRESS NOTE ADULT - SUBJECTIVE AND OBJECTIVE BOX
Events Overnight: fevers better, no complaints, minimal secretions    HPI:     4yo M admitted 4/24 due to fever and leukocytosis.  Already being treated for C difficile colitis and 'osteomyelitis' from sacral decubitus at NH (IV Vanco IV Cefepime PO Vanco).  In ED fever 104* F and WBC 19k.  Hemodynamics reasonable.  Pt with elevated LFT's - US in ED with distended GB and sludge -    ct scan bibasilar infiltrate, gall stones, gb does not appear thickened  hida scan non visualization    ROS - no complaints           stronger, less confused           no chest pain , sob , abdominal pain, non increase in secretions            ros otherwise negative    MEDICATIONS  (STANDING):  ALPRAZolam 0.5 milliGRAM(s) Oral every 8 hours  artificial  tears Solution 1 Drop(s) Both EYES every 4 hours  ascorbic acid 500 milliGRAM(s) Oral daily  aspirin  chewable 81 milliGRAM(s) Oral daily  chlorhexidine 0.12% Liquid 5 milliLiter(s) Oral Mucosa two times a day  dextrose 5%. 1000 milliLiter(s) (50 mL/Hr) IV Continuous <Continuous>  dextrose 50% Injectable 12.5 Gram(s) IV Push once  dextrose 50% Injectable 25 Gram(s) IV Push once  dextrose 50% Injectable 25 Gram(s) IV Push once  enoxaparin Injectable 40 milliGRAM(s) SubCutaneous daily  fentaNYL   Patch  50 MICROgram(s)/Hr 1 Patch Transdermal every 72 hours  ferrous    sulfate Liquid 300 milliGRAM(s) Enteral Tube <User Schedule>  gabapentin 300 milliGRAM(s) Oral two times a day  insulin glargine Injectable (LANTUS) 6 Unit(s) SubCutaneous at bedtime  insulin lispro (HumaLOG) corrective regimen sliding scale   SubCutaneous every 6 hours  lactobacillus acidophilus 1 Tablet(s) Oral two times a day  loratadine 10 milliGRAM(s) Oral daily  montelukast 10 milliGRAM(s) Oral at bedtime  multivitamin/minerals 1 Tablet(s) Oral daily  pantoprazole  Injectable 40 milliGRAM(s) IV Push daily  sertraline 50 milliGRAM(s) Oral daily  simethicone 80 milliGRAM(s) Chew every 6 hours  tigecycline IVPB      tigecycline IVPB 50 milliGRAM(s) IV Intermittent every 12 hours  vancomycin    Solution 125 milliGRAM(s) Oral every 6 hours    MEDICATIONS  (PRN):  acetaminophen   Tablet .. 650 milliGRAM(s) Oral every 6 hours PRN Temp greater or equal to 38C (100.4F), Moderate Pain (4 - 6)  cyclobenzaprine 10 milliGRAM(s) Oral three times a day PRN Muscle Spasm  dextrose 40% Gel 15 Gram(s) Oral once PRN Blood Glucose LESS THAN 70 milliGRAM(s)/deciliter  glucagon  Injectable 1 milliGRAM(s) IntraMuscular once PRN Glucose LESS THAN 70 milligrams/deciliter  oxyCODONE    5 mG/acetaminophen 325 mG 1 Tablet(s) Oral every 6 hours PRN Moderate Pain (4 - 6)      ICU Vital Signs Last 24 Hrs  T(C): 35.9 (27 Apr 2019 00:15), Max: 37.7 (26 Apr 2019 09:33)  T(F): 96.7 (27 Apr 2019 00:15), Max: 99.9 (26 Apr 2019 09:33)  HR: 85 (27 Apr 2019 07:00) (85 - 98)  BP: 101/64 (27 Apr 2019 07:00) (90/57 - 126/76)  BP(mean): 72 (27 Apr 2019 07:00) (52 - 87)  ABP: --  ABP(mean): --  RR: 14 (27 Apr 2019 07:00) (14 - 21)  SpO2: 99% (27 Apr 2019 06:00) (95% - 99%)      Mode: AC/ CMV (Assist Control/ Continuous Mandatory Ventilation)  RR (machine): 14  TV (machine): 500  FiO2: 30  PEEP: 5  PIP: 30    I&O's Summary    26 Apr 2019 07:01  -  27 Apr 2019 07:00  --------------------------------------------------------  IN: 1530 mL / OUT: 1750 mL / NET: -220 mL    Physical Exam:      General -  comfortable      HEENT  anicteric      neck s/p trach, site clear      Lungs - minimal secretions                  clear      abdomen - flat , soft      back 3 x 3 cm decubitus     ext warm                             7.4    17.77 )-----------( 496      ( 26 Apr 2019 06:38 )             25.5       04-26    128<L>  |  95<L>  |  20  ----------------------------<  140<H>  4.0   |  27  |  0.44<L>    Ca    8.5      26 Apr 2019 06:38    TPro  6.9  /  Alb  1.7<L>  /  TBili  0.5  /  DBili  x   /  AST  76<H>  /  ALT  234<H>  /  AlkPhos  501<H>  04-26                    DVT Prophylaxis:                                                                 Advanced Directives:

## 2019-04-28 LAB
ALBUMIN SERPL ELPH-MCNC: 1.7 G/DL — LOW (ref 3.3–5)
ALP SERPL-CCNC: 384 U/L — HIGH (ref 40–120)
ALT FLD-CCNC: 105 U/L — HIGH (ref 12–78)
ANION GAP SERPL CALC-SCNC: 7 MMOL/L — SIGNIFICANT CHANGE UP (ref 5–17)
AST SERPL-CCNC: 19 U/L — SIGNIFICANT CHANGE UP (ref 15–37)
BILIRUB SERPL-MCNC: 0.4 MG/DL — SIGNIFICANT CHANGE UP (ref 0.2–1.2)
BUN SERPL-MCNC: 27 MG/DL — HIGH (ref 7–23)
CALCIUM SERPL-MCNC: 8 MG/DL — LOW (ref 8.5–10.1)
CHLORIDE SERPL-SCNC: 98 MMOL/L — SIGNIFICANT CHANGE UP (ref 96–108)
CO2 SERPL-SCNC: 26 MMOL/L — SIGNIFICANT CHANGE UP (ref 22–31)
CREAT SERPL-MCNC: 0.61 MG/DL — SIGNIFICANT CHANGE UP (ref 0.5–1.3)
GLUCOSE BLDC GLUCOMTR-MCNC: 115 MG/DL — HIGH (ref 70–99)
GLUCOSE BLDC GLUCOMTR-MCNC: 142 MG/DL — HIGH (ref 70–99)
GLUCOSE BLDC GLUCOMTR-MCNC: 154 MG/DL — HIGH (ref 70–99)
GLUCOSE BLDC GLUCOMTR-MCNC: 158 MG/DL — HIGH (ref 70–99)
GLUCOSE BLDC GLUCOMTR-MCNC: 167 MG/DL — HIGH (ref 70–99)
GLUCOSE SERPL-MCNC: 158 MG/DL — HIGH (ref 70–99)
HCT VFR BLD CALC: 26.4 % — LOW (ref 39–50)
HGB BLD-MCNC: 7.6 G/DL — LOW (ref 13–17)
MCHC RBC-ENTMCNC: 24.1 PG — LOW (ref 27–34)
MCHC RBC-ENTMCNC: 28.8 GM/DL — LOW (ref 32–36)
MCV RBC AUTO: 83.5 FL — SIGNIFICANT CHANGE UP (ref 80–100)
NRBC # BLD: 0 /100 WBCS — SIGNIFICANT CHANGE UP (ref 0–0)
PLATELET # BLD AUTO: 567 K/UL — HIGH (ref 150–400)
POTASSIUM SERPL-MCNC: 3.9 MMOL/L — SIGNIFICANT CHANGE UP (ref 3.5–5.3)
POTASSIUM SERPL-SCNC: 3.9 MMOL/L — SIGNIFICANT CHANGE UP (ref 3.5–5.3)
PROT SERPL-MCNC: 6.4 GM/DL — SIGNIFICANT CHANGE UP (ref 6–8.3)
RBC # BLD: 3.16 M/UL — LOW (ref 4.2–5.8)
RBC # FLD: 18.3 % — HIGH (ref 10.3–14.5)
SODIUM SERPL-SCNC: 131 MMOL/L — LOW (ref 135–145)
WBC # BLD: 14.66 K/UL — HIGH (ref 3.8–10.5)
WBC # FLD AUTO: 14.66 K/UL — HIGH (ref 3.8–10.5)

## 2019-04-28 RX ADMIN — SIMETHICONE 80 MILLIGRAM(S): 80 TABLET, CHEWABLE ORAL at 17:35

## 2019-04-28 RX ADMIN — Medication 125 MILLIGRAM(S): at 00:48

## 2019-04-28 RX ADMIN — Medication 2: at 23:49

## 2019-04-28 RX ADMIN — Medication 1 DROP(S): at 01:02

## 2019-04-28 RX ADMIN — SIMETHICONE 80 MILLIGRAM(S): 80 TABLET, CHEWABLE ORAL at 23:50

## 2019-04-28 RX ADMIN — OXYCODONE AND ACETAMINOPHEN 1 TABLET(S): 5; 325 TABLET ORAL at 04:00

## 2019-04-28 RX ADMIN — INSULIN GLARGINE 6 UNIT(S): 100 INJECTION, SOLUTION SUBCUTANEOUS at 00:48

## 2019-04-28 RX ADMIN — Medication 1 DROP(S): at 05:25

## 2019-04-28 RX ADMIN — LORATADINE 10 MILLIGRAM(S): 10 TABLET ORAL at 12:48

## 2019-04-28 RX ADMIN — TIGECYCLINE 105 MILLIGRAM(S): 50 INJECTION, POWDER, LYOPHILIZED, FOR SOLUTION INTRAVENOUS at 05:25

## 2019-04-28 RX ADMIN — Medication 0.5 MILLIGRAM(S): at 05:25

## 2019-04-28 RX ADMIN — Medication 1 TABLET(S): at 12:48

## 2019-04-28 RX ADMIN — PANTOPRAZOLE SODIUM 40 MILLIGRAM(S): 20 TABLET, DELAYED RELEASE ORAL at 12:47

## 2019-04-28 RX ADMIN — CYCLOBENZAPRINE HYDROCHLORIDE 10 MILLIGRAM(S): 10 TABLET, FILM COATED ORAL at 21:58

## 2019-04-28 RX ADMIN — OXYCODONE AND ACETAMINOPHEN 1 TABLET(S): 5; 325 TABLET ORAL at 06:23

## 2019-04-28 RX ADMIN — Medication 300 MILLIGRAM(S): at 09:47

## 2019-04-28 RX ADMIN — SIMETHICONE 80 MILLIGRAM(S): 80 TABLET, CHEWABLE ORAL at 12:48

## 2019-04-28 RX ADMIN — Medication 500 MILLIGRAM(S): at 12:48

## 2019-04-28 RX ADMIN — SIMETHICONE 80 MILLIGRAM(S): 80 TABLET, CHEWABLE ORAL at 06:18

## 2019-04-28 RX ADMIN — ENOXAPARIN SODIUM 40 MILLIGRAM(S): 100 INJECTION SUBCUTANEOUS at 12:48

## 2019-04-28 RX ADMIN — Medication 125 MILLIGRAM(S): at 12:48

## 2019-04-28 RX ADMIN — SERTRALINE 50 MILLIGRAM(S): 25 TABLET, FILM COATED ORAL at 12:48

## 2019-04-28 RX ADMIN — Medication 125 MILLIGRAM(S): at 23:50

## 2019-04-28 RX ADMIN — CHLORHEXIDINE GLUCONATE 5 MILLILITER(S): 213 SOLUTION TOPICAL at 05:42

## 2019-04-28 RX ADMIN — Medication 1 DROP(S): at 21:27

## 2019-04-28 RX ADMIN — Medication 125 MILLIGRAM(S): at 05:25

## 2019-04-28 RX ADMIN — Medication 81 MILLIGRAM(S): at 12:48

## 2019-04-28 RX ADMIN — OXYCODONE AND ACETAMINOPHEN 1 TABLET(S): 5; 325 TABLET ORAL at 06:22

## 2019-04-28 RX ADMIN — Medication 125 MILLIGRAM(S): at 17:35

## 2019-04-28 RX ADMIN — FENTANYL CITRATE 1 PATCH: 50 INJECTION INTRAVENOUS at 09:23

## 2019-04-28 RX ADMIN — FENTANYL CITRATE 1 PATCH: 50 INJECTION INTRAVENOUS at 06:23

## 2019-04-28 RX ADMIN — OXYCODONE AND ACETAMINOPHEN 1 TABLET(S): 5; 325 TABLET ORAL at 12:48

## 2019-04-28 RX ADMIN — OXYCODONE AND ACETAMINOPHEN 1 TABLET(S): 5; 325 TABLET ORAL at 19:57

## 2019-04-28 RX ADMIN — Medication 0.5 MILLIGRAM(S): at 14:50

## 2019-04-28 RX ADMIN — Medication 1 TABLET(S): at 05:25

## 2019-04-28 RX ADMIN — Medication 1 DROP(S): at 09:47

## 2019-04-28 RX ADMIN — INSULIN GLARGINE 6 UNIT(S): 100 INJECTION, SOLUTION SUBCUTANEOUS at 23:50

## 2019-04-28 RX ADMIN — MONTELUKAST 10 MILLIGRAM(S): 4 TABLET, CHEWABLE ORAL at 21:27

## 2019-04-28 RX ADMIN — Medication 1 DROP(S): at 14:50

## 2019-04-28 RX ADMIN — CHLORHEXIDINE GLUCONATE 5 MILLILITER(S): 213 SOLUTION TOPICAL at 17:37

## 2019-04-28 RX ADMIN — Medication 0.5 MILLIGRAM(S): at 21:27

## 2019-04-28 RX ADMIN — Medication 1 TABLET(S): at 17:35

## 2019-04-28 RX ADMIN — GABAPENTIN 300 MILLIGRAM(S): 400 CAPSULE ORAL at 05:24

## 2019-04-28 RX ADMIN — SIMETHICONE 80 MILLIGRAM(S): 80 TABLET, CHEWABLE ORAL at 00:47

## 2019-04-28 RX ADMIN — GABAPENTIN 300 MILLIGRAM(S): 400 CAPSULE ORAL at 17:35

## 2019-04-28 RX ADMIN — Medication 2: at 13:08

## 2019-04-28 RX ADMIN — TIGECYCLINE 105 MILLIGRAM(S): 50 INJECTION, POWDER, LYOPHILIZED, FOR SOLUTION INTRAVENOUS at 17:35

## 2019-04-28 NOTE — PROGRESS NOTE ADULT - SUBJECTIVE AND OBJECTIVE BOX
Patient is a 63y old  Male who presents with a chief complaint of suspected sepsis (25 Apr 2019 09:55)    Date of service: 04-28-19 @ 15:59    Patient lying in bed; wife at bedside        ROS: unable to obtain secondary to patient medical condition     MEDICATIONS  (STANDING):  ALPRAZolam 0.5 milliGRAM(s) Oral every 8 hours  artificial  tears Solution 1 Drop(s) Both EYES every 4 hours  ascorbic acid 500 milliGRAM(s) Oral daily  aspirin  chewable 81 milliGRAM(s) Oral daily  chlorhexidine 0.12% Liquid 5 milliLiter(s) Oral Mucosa two times a day  dextrose 5%. 1000 milliLiter(s) (50 mL/Hr) IV Continuous <Continuous>  dextrose 50% Injectable 12.5 Gram(s) IV Push once  dextrose 50% Injectable 25 Gram(s) IV Push once  dextrose 50% Injectable 25 Gram(s) IV Push once  enoxaparin Injectable 40 milliGRAM(s) SubCutaneous daily  fentaNYL   Patch  50 MICROgram(s)/Hr 1 Patch Transdermal every 72 hours  ferrous    sulfate Liquid 300 milliGRAM(s) Enteral Tube <User Schedule>  gabapentin 300 milliGRAM(s) Oral two times a day  insulin glargine Injectable (LANTUS) 6 Unit(s) SubCutaneous at bedtime  insulin lispro (HumaLOG) corrective regimen sliding scale   SubCutaneous every 6 hours  lactobacillus acidophilus 1 Tablet(s) Oral two times a day  loratadine 10 milliGRAM(s) Oral daily  montelukast 10 milliGRAM(s) Oral at bedtime  multivitamin/minerals 1 Tablet(s) Oral daily  pantoprazole  Injectable 40 milliGRAM(s) IV Push daily  sertraline 50 milliGRAM(s) Oral daily  simethicone 80 milliGRAM(s) Chew every 6 hours  tigecycline IVPB      tigecycline IVPB 50 milliGRAM(s) IV Intermittent every 12 hours  vancomycin    Solution 125 milliGRAM(s) Oral every 6 hours    MEDICATIONS  (PRN):  acetaminophen   Tablet .. 650 milliGRAM(s) Oral every 6 hours PRN Temp greater or equal to 38C (100.4F), Moderate Pain (4 - 6)  cyclobenzaprine 10 milliGRAM(s) Oral three times a day PRN Muscle Spasm  dextrose 40% Gel 15 Gram(s) Oral once PRN Blood Glucose LESS THAN 70 milliGRAM(s)/deciliter  glucagon  Injectable 1 milliGRAM(s) IntraMuscular once PRN Glucose LESS THAN 70 milligrams/deciliter  oxyCODONE    5 mG/acetaminophen 325 mG 1 Tablet(s) Oral every 6 hours PRN Moderate Pain (4 - 6)      Vital Signs Last 24 Hrs  T(C): 37.3 (28 Apr 2019 10:44), Max: 37.3 (28 Apr 2019 10:44)  T(F): 99.1 (28 Apr 2019 10:44), Max: 99.1 (28 Apr 2019 10:44)  HR: 91 (28 Apr 2019 15:00) (78 - 103)  BP: 108/69 (28 Apr 2019 15:00) (94/61 - 135/61)  BP(mean): 78 (28 Apr 2019 15:00) (60 - 81)  RR: 17 (28 Apr 2019 15:00) (0 - 21)  SpO2: 100% (28 Apr 2019 15:00) (96% - 100%)    Physical Exam:        PE:    Constitutional: frail looking  HEENT: NC/AT  Neck: trach  Respiratory: respiratory effort normal scattered coarse breath sounds  Cardiovascular: S1S2 regular, no murmurs  Abdomen: soft, not tender, ostomy in place, peg  Musculoskeletal: no muscle tenderness, no joint swelling or tenderness  Extremities: no pedal edema  Neurological/ Psychiatric: on ventilator  Skin: no rashes; no palpable lesions    Labs: all available labs reviewed                    Labs:                           Labs:                      Labs:                        7.6    14.66 )-----------( 567      ( 28 Apr 2019 06:18 )             26.4     04-28    131<L>  |  98  |  27<H>  ----------------------------<  158<H>  3.9   |  26  |  0.61    Ca    8.0<L>      28 Apr 2019 06:18    TPro  6.4  /  Alb  1.7<L>  /  TBili  0.4  /  DBili  x   /  AST  19  /  ALT  105<H>  /  AlkPhos  384<H>  04-28           Cultures:       Culture - Urine (collected 04-24-19 @ 21:31)  Source: .Urine Clean Catch (Midstream)  Final Report (04-26-19 @ 01:35):    No growth    Culture - Blood (collected 04-24-19 @ 20:08)  Source: .Blood None  Preliminary Report (04-26-19 @ 03:01):    No growth to date.    Culture - Blood (collected 04-24-19 @ 20:08)  Source: .Blood None  Preliminary Report (04-26-19 @ 03:01):    No growth to date.              < from: US Abdomen Complete (04.24.19 @ 21:40) >  IMPRESSION:     Distended gallbladder with sludge and gallbladder wall thickening/edema   which can be seen in the setting of acute cholecystitis. HIDA scan should   be considered for further evaluation.    < end of copied text >    < from: Xray Chest 1 View-PORTABLE IMMEDIATE (04.24.19 @ 20:39) >  IMPRESSION:   There is a patchy left midlung airspace opacity. No pleural effusion or   pneumothorax. Cardiomediastinal silhouette is unremarkable. Tracheostomy   is visualized. Old right clavicular fracture.    < end of copied text >

## 2019-04-28 NOTE — PROGRESS NOTE ADULT - ASSESSMENT
Pt is a 64 y/o M PMhx of HTN, DM, chronic resp failure/vent dependent s/p trach and PEG, transverse myelitis (as result of ? tetanus toxoid injection), functional quadripegia, s/p  colostomy - most recently at  6/2018 due to sepsis of urinary source (CAUTi present on admission), before that was in  hospital 4/2018 with PNA Acinetobacter baumannii) and 3/2018 with UTI (PSAE muti resistant but S to zosyn). Patient was admitted 4/24 due to fever and leukocytosis.  Already being treated for C difficile colitis and 'osteomyelitis' from sacral decubitus at NH (IV Vanco IV Cefepime PO Vanco), Picc line was placed at [facility] on 4/12. History per medical record as patient unable to provide history.  1. 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 other issues: per medicine

## 2019-04-28 NOTE — PROGRESS NOTE ADULT - SUBJECTIVE AND OBJECTIVE BOX
Events Overnight: fevers better on Day number 4 Tigecycline    HPI:      62yo M admitted 4/24 due to fever and leukocytosis.  Already being treated for C difficile colitis and 'osteomyelitis' from sacral decubitus at NH (IV Vanco IV Cefepime PO Vanco).  In ED fever 104* F and WBC 19k.  Hemodynamics reasonable.  Pt with elevated LFT's - US in ED with distended GB and sludge -    ct scan bibasilar infiltrate, gall stones, gb does not appear thickened  hida scan non visualization  Fevers gone still slightly elevated wbc    ROS -   no complaints             stronger, more alert             no chest pain , sob , abdominal pain, non increase in secretions             ros otherwise negative     MEDICATIONS  (STANDING):  ALPRAZolam 0.5 milliGRAM(s) Oral every 8 hours  artificial  tears Solution 1 Drop(s) Both EYES every 4 hours  ascorbic acid 500 milliGRAM(s) Oral daily  aspirin  chewable 81 milliGRAM(s) Oral daily  chlorhexidine 0.12% Liquid 5 milliLiter(s) Oral Mucosa two times a day  dextrose 5%. 1000 milliLiter(s) (50 mL/Hr) IV Continuous <Continuous>  dextrose 50% Injectable 12.5 Gram(s) IV Push once  dextrose 50% Injectable 25 Gram(s) IV Push once  dextrose 50% Injectable 25 Gram(s) IV Push once  enoxaparin Injectable 40 milliGRAM(s) SubCutaneous daily  fentaNYL   Patch  50 MICROgram(s)/Hr 1 Patch Transdermal every 72 hours  ferrous    sulfate Liquid 300 milliGRAM(s) Enteral Tube <User Schedule>  gabapentin 300 milliGRAM(s) Oral two times a day  insulin glargine Injectable (LANTUS) 6 Unit(s) SubCutaneous at bedtime  insulin lispro (HumaLOG) corrective regimen sliding scale   SubCutaneous every 6 hours  lactobacillus acidophilus 1 Tablet(s) Oral two times a day  loratadine 10 milliGRAM(s) Oral daily  montelukast 10 milliGRAM(s) Oral at bedtime  multivitamin/minerals 1 Tablet(s) Oral daily  pantoprazole  Injectable 40 milliGRAM(s) IV Push daily  sertraline 50 milliGRAM(s) Oral daily  simethicone 80 milliGRAM(s) Chew every 6 hours  tigecycline IVPB      tigecycline IVPB 50 milliGRAM(s) IV Intermittent every 12 hours  vancomycin    Solution 125 milliGRAM(s) Oral every 6 hours    MEDICATIONS  (PRN):  acetaminophen   Tablet .. 650 milliGRAM(s) Oral every 6 hours PRN Temp greater or equal to 38C (100.4F), Moderate Pain (4 - 6)  cyclobenzaprine 10 milliGRAM(s) Oral three times a day PRN Muscle Spasm  dextrose 40% Gel 15 Gram(s) Oral once PRN Blood Glucose LESS THAN 70 milliGRAM(s)/deciliter  glucagon  Injectable 1 milliGRAM(s) IntraMuscular once PRN Glucose LESS THAN 70 milligrams/deciliter  oxyCODONE    5 mG/acetaminophen 325 mG 1 Tablet(s) Oral every 6 hours PRN Moderate Pain (4 - 6)    ICU Vital Signs Last 24 Hrs  T(C): 36.7 (28 Apr 2019 06:15), Max: 36.7 (28 Apr 2019 06:15)  T(F): 98 (28 Apr 2019 06:15), Max: 98 (28 Apr 2019 06:15)  HR: 96 (28 Apr 2019 09:00) (75 - 103)  BP: 94/61 (28 Apr 2019 09:00) (94/61 - 135/61)  BP(mean): 68 (28 Apr 2019 09:00) (60 - 80)  ABP: --  ABP(mean): --  RR: 15 (28 Apr 2019 09:00) (15 - 21)  SpO2: 100% (28 Apr 2019 09:00) (96% - 100%)      Mode: AC/ CMV (Assist Control/ Continuous Mandatory Ventilation)  RR (machine): 14  TV (machine): 500  FiO2: 30  PEEP: 5  PIP: 29    I&O's Summary    27 Apr 2019 07:01  -  28 Apr 2019 07:00  --------------------------------------------------------  IN: 2740 mL / OUT: 1350 mL / NET: 1390 mL    Physical Exam:      General - weak, but comfortable      heent - nc/at      neck - s/p trach      lungs clear      CV rrr      abdomen, soft, non tender, s/p peg      ext warm      neuro - quadriparesis, but awake and alert                                   7.6    14.66 )-----------( 567      ( 28 Apr 2019 06:18 )             26.4       04-28    131<L>  |  98  |  27<H>  ----------------------------<  158<H>  3.9   |  26  |  0.61    Ca    8.0<L>      28 Apr 2019 06:18    TPro  6.4  /  Alb  1.7<L>  /  TBili  0.4  /  DBili  x   /  AST  19  /  ALT  105<H>  /  AlkPhos  384<H>  04-28          DVT Prophylaxis:     Full code                                                             Advanced Directives: Lovenox

## 2019-04-28 NOTE — PROGRESS NOTE ADULT - ASSESSMENT
Patient with chronic vent , admitted with high fevers, hx. osteomyilitis and c.dif  PICC line removes  There was a concern of cholecystitis, ct gb looks ok, but non visualiz on hida  treating with abx. improved,  follow lfts  on tigecycline day 4  tolerating tube feeds  fevers may also have been from osteo

## 2019-04-29 LAB
ALBUMIN SERPL ELPH-MCNC: 1.6 G/DL — LOW (ref 3.3–5)
ALP SERPL-CCNC: 656 U/L — HIGH (ref 40–120)
ALT FLD-CCNC: 212 U/L — HIGH (ref 12–78)
ANION GAP SERPL CALC-SCNC: 9 MMOL/L — SIGNIFICANT CHANGE UP (ref 5–17)
AST SERPL-CCNC: 223 U/L — HIGH (ref 15–37)
BILIRUB SERPL-MCNC: 1.8 MG/DL — HIGH (ref 0.2–1.2)
BUN SERPL-MCNC: 21 MG/DL — SIGNIFICANT CHANGE UP (ref 7–23)
CALCIUM SERPL-MCNC: 8 MG/DL — LOW (ref 8.5–10.1)
CHLORIDE SERPL-SCNC: 102 MMOL/L — SIGNIFICANT CHANGE UP (ref 96–108)
CO2 SERPL-SCNC: 25 MMOL/L — SIGNIFICANT CHANGE UP (ref 22–31)
CREAT SERPL-MCNC: 0.62 MG/DL — SIGNIFICANT CHANGE UP (ref 0.5–1.3)
GLUCOSE BLDC GLUCOMTR-MCNC: 139 MG/DL — HIGH (ref 70–99)
GLUCOSE BLDC GLUCOMTR-MCNC: 151 MG/DL — HIGH (ref 70–99)
GLUCOSE SERPL-MCNC: 139 MG/DL — HIGH (ref 70–99)
HCT VFR BLD CALC: 28.7 % — LOW (ref 39–50)
HGB BLD-MCNC: 8.4 G/DL — LOW (ref 13–17)
MCHC RBC-ENTMCNC: 24.6 PG — LOW (ref 27–34)
MCHC RBC-ENTMCNC: 29.3 GM/DL — LOW (ref 32–36)
MCV RBC AUTO: 83.9 FL — SIGNIFICANT CHANGE UP (ref 80–100)
NRBC # BLD: 0 /100 WBCS — SIGNIFICANT CHANGE UP (ref 0–0)
PLATELET # BLD AUTO: 621 K/UL — HIGH (ref 150–400)
POTASSIUM SERPL-MCNC: 4.1 MMOL/L — SIGNIFICANT CHANGE UP (ref 3.5–5.3)
POTASSIUM SERPL-SCNC: 4.1 MMOL/L — SIGNIFICANT CHANGE UP (ref 3.5–5.3)
PROT SERPL-MCNC: 6.4 GM/DL — SIGNIFICANT CHANGE UP (ref 6–8.3)
RBC # BLD: 3.42 M/UL — LOW (ref 4.2–5.8)
RBC # FLD: 19.5 % — HIGH (ref 10.3–14.5)
SODIUM SERPL-SCNC: 136 MMOL/L — SIGNIFICANT CHANGE UP (ref 135–145)
WBC # BLD: 23.06 K/UL — HIGH (ref 3.8–10.5)
WBC # FLD AUTO: 23.06 K/UL — HIGH (ref 3.8–10.5)

## 2019-04-29 RX ORDER — TIGECYCLINE 50 MG/5ML
50 INJECTION, POWDER, LYOPHILIZED, FOR SOLUTION INTRAVENOUS EVERY 12 HOURS
Qty: 0 | Refills: 0 | Status: DISCONTINUED | OUTPATIENT
Start: 2019-04-29 | End: 2019-05-07

## 2019-04-29 RX ADMIN — SIMETHICONE 80 MILLIGRAM(S): 80 TABLET, CHEWABLE ORAL at 17:20

## 2019-04-29 RX ADMIN — OXYCODONE AND ACETAMINOPHEN 1 TABLET(S): 5; 325 TABLET ORAL at 22:10

## 2019-04-29 RX ADMIN — Medication 650 MILLIGRAM(S): at 05:07

## 2019-04-29 RX ADMIN — OXYCODONE AND ACETAMINOPHEN 1 TABLET(S): 5; 325 TABLET ORAL at 05:07

## 2019-04-29 RX ADMIN — Medication 1 TABLET(S): at 05:15

## 2019-04-29 RX ADMIN — OXYCODONE AND ACETAMINOPHEN 1 TABLET(S): 5; 325 TABLET ORAL at 01:02

## 2019-04-29 RX ADMIN — CYCLOBENZAPRINE HYDROCHLORIDE 10 MILLIGRAM(S): 10 TABLET, FILM COATED ORAL at 05:36

## 2019-04-29 RX ADMIN — SERTRALINE 50 MILLIGRAM(S): 25 TABLET, FILM COATED ORAL at 11:28

## 2019-04-29 RX ADMIN — Medication 500 MILLIGRAM(S): at 11:28

## 2019-04-29 RX ADMIN — FENTANYL CITRATE 1 PATCH: 50 INJECTION INTRAVENOUS at 11:35

## 2019-04-29 RX ADMIN — GABAPENTIN 300 MILLIGRAM(S): 400 CAPSULE ORAL at 17:16

## 2019-04-29 RX ADMIN — FENTANYL CITRATE 1 PATCH: 50 INJECTION INTRAVENOUS at 07:26

## 2019-04-29 RX ADMIN — CHLORHEXIDINE GLUCONATE 5 MILLILITER(S): 213 SOLUTION TOPICAL at 17:21

## 2019-04-29 RX ADMIN — MONTELUKAST 10 MILLIGRAM(S): 4 TABLET, CHEWABLE ORAL at 21:40

## 2019-04-29 RX ADMIN — FENTANYL CITRATE 1 PATCH: 50 INJECTION INTRAVENOUS at 00:55

## 2019-04-29 RX ADMIN — CHLORHEXIDINE GLUCONATE 5 MILLILITER(S): 213 SOLUTION TOPICAL at 06:01

## 2019-04-29 RX ADMIN — Medication 1 DROP(S): at 16:14

## 2019-04-29 RX ADMIN — Medication 125 MILLIGRAM(S): at 11:35

## 2019-04-29 RX ADMIN — SIMETHICONE 80 MILLIGRAM(S): 80 TABLET, CHEWABLE ORAL at 11:35

## 2019-04-29 RX ADMIN — PANTOPRAZOLE SODIUM 40 MILLIGRAM(S): 20 TABLET, DELAYED RELEASE ORAL at 11:29

## 2019-04-29 RX ADMIN — TIGECYCLINE 105 MILLIGRAM(S): 50 INJECTION, POWDER, LYOPHILIZED, FOR SOLUTION INTRAVENOUS at 05:16

## 2019-04-29 RX ADMIN — Medication 125 MILLIGRAM(S): at 17:20

## 2019-04-29 RX ADMIN — Medication 81 MILLIGRAM(S): at 11:28

## 2019-04-29 RX ADMIN — Medication 325 MILLIGRAM(S): at 01:03

## 2019-04-29 RX ADMIN — Medication 125 MILLIGRAM(S): at 05:16

## 2019-04-29 RX ADMIN — Medication 1 DROP(S): at 11:46

## 2019-04-29 RX ADMIN — OXYCODONE AND ACETAMINOPHEN 1 TABLET(S): 5; 325 TABLET ORAL at 21:38

## 2019-04-29 RX ADMIN — TIGECYCLINE 105 MILLIGRAM(S): 50 INJECTION, POWDER, LYOPHILIZED, FOR SOLUTION INTRAVENOUS at 17:16

## 2019-04-29 RX ADMIN — FENTANYL CITRATE 1 PATCH: 50 INJECTION INTRAVENOUS at 11:34

## 2019-04-29 RX ADMIN — LORATADINE 10 MILLIGRAM(S): 10 TABLET ORAL at 11:48

## 2019-04-29 RX ADMIN — ENOXAPARIN SODIUM 40 MILLIGRAM(S): 100 INJECTION SUBCUTANEOUS at 11:29

## 2019-04-29 RX ADMIN — Medication 0.5 MILLIGRAM(S): at 05:15

## 2019-04-29 RX ADMIN — Medication 0.5 MILLIGRAM(S): at 16:21

## 2019-04-29 RX ADMIN — Medication 1 DROP(S): at 17:28

## 2019-04-29 RX ADMIN — Medication 1 TABLET(S): at 11:28

## 2019-04-29 RX ADMIN — GABAPENTIN 300 MILLIGRAM(S): 400 CAPSULE ORAL at 05:16

## 2019-04-29 RX ADMIN — Medication 300 MILLIGRAM(S): at 11:28

## 2019-04-29 RX ADMIN — SIMETHICONE 80 MILLIGRAM(S): 80 TABLET, CHEWABLE ORAL at 05:36

## 2019-04-29 RX ADMIN — Medication 1 TABLET(S): at 17:16

## 2019-04-29 RX ADMIN — Medication 0.5 MILLIGRAM(S): at 21:38

## 2019-04-29 NOTE — CONSULT NOTE ADULT - SUBJECTIVE AND OBJECTIVE BOX
HPI:  64yo M with h/o trach/PEG from transverse myelitis, admitted 4/24 due to fever and leukocytosis.  Already being treated for C difficile colitis and 'osteomyelitis' from sacral decubitus at NH (IV Vanco IV Cefepime PO Vanco).  However, also noted to have elevated LFTs with ultrasound showing GB stones/sludge and HIDA + for cholecystitis. LFTs improved and now worsened again along with rising WBC. Patient unable to give history.        PAST MEDICAL & SURGICAL HISTORY:  Pneumonia  UTI (urinary tract infection)  H/O quadriplegia  Essential hypertension  Paralysis  Transverse myelitis  S/P colostomy  PEG (percutaneous endoscopic gastrostomy) status  History of tracheostomy      Home Medications:  acetaminophen 325 mg oral tablet: 2 tab(s) by gastrostomy tube every 8 hours, As Needed (24 Apr 2019 23:36)  Artificial Tears ophthalmic solution: 1 drop(s) to each affected eye every 4 hours while awake, As Needed (24 Apr 2019 23:36)  aspirin 81 mg oral tablet: 1 tab(s) by gastrostomy tube once a day (24 Apr 2019 23:36)  cefTRIAXone 2 g injection: 2 gram(s) injectable once a day (24 Apr 2019 23:36)  Claritin 10 mg oral tablet: 1 tab(s) orally once a day (24 Apr 2019 23:36)  cyclobenzaprine 10 mg oral tablet: 1 tab(s) orally 3 times a day, As Needed (24 Apr 2019 23:36)  fentaNYL 50 mcg/hr transdermal film, extended release: 1 film(s) transdermal every 72 hours (24 Apr 2019 23:36)  ferrous sulfate 220 mg/5 mL (44 mg elemental iron) oral elixir: 7.5 milliliter(s) by gastrostomy tube 4 times a week on Sun, Mon, Wed, Fri (24 Apr 2019 23:36)  Fleet Enema 7 g-19 g rectal enema: 133 milliliter(s) rectal prn, As Needed (24 Apr 2019 23:36)  gabapentin 300 mg oral capsule: 1 cap(s) orally 2 times a day (24 Apr 2019 23:36)  lactobacillus acidophilus oral capsule: 1 cap(s) by gastrostomy tube 2 times a day (24 Apr 2019 23:36)  Lantus 100 units/mL subcutaneous solution: 6 unit(s) subcutaneous once a day (at bedtime) (24 Apr 2019 23:36)  metFORMIN 500 mg oral tablet: 1 tab(s) orally once a day (24 Apr 2019 23:36)  montelukast 10 mg oral tablet: 1 tab(s) orally once a day (24 Apr 2019 23:36)  raNITIdine 300 mg oral tablet: 1 tab(s) orally once a day (at bedtime) (24 Apr 2019 23:36)  simethicone 80 mg oral tablet, chewable: 1 tab(s) by gastrostomy tube 4 times a day (after meals and at bedtime) (24 Apr 2019 23:36)  Therapeutic Multiple Vitamins with Minerals oral tablet: 1 tab(s) by gastrostomy tube once a day (24 Apr 2019 23:36)  vancomycin 1 g intravenous injection: 1 gram(s) intravenous 2 times a day (24 Apr 2019 23:36)  vancomycin 50 mg/mL oral liquid: 125 milligram(s) orally once a day (24 Apr 2019 23:36)  Vitamin C 500 mg oral tablet: 1 tab(s) by gastrostomy tube once a day (24 Apr 2019 23:36)  Xanax 0.5 mg oral tablet: 1 tab(s) by gastrostomy tube every 8 hours, As Needed (24 Apr 2019 23:36)  Zoloft 50 mg oral tablet: 1 tab(s) orally once a day (24 Apr 2019 23:36)      MEDICATIONS  (STANDING):  ALPRAZolam 0.5 milliGRAM(s) Oral every 8 hours  artificial  tears Solution 1 Drop(s) Both EYES every 4 hours  ascorbic acid 500 milliGRAM(s) Oral daily  aspirin  chewable 81 milliGRAM(s) Oral daily  chlorhexidine 0.12% Liquid 5 milliLiter(s) Oral Mucosa two times a day  dextrose 5%. 1000 milliLiter(s) (50 mL/Hr) IV Continuous <Continuous>  dextrose 50% Injectable 12.5 Gram(s) IV Push once  dextrose 50% Injectable 25 Gram(s) IV Push once  dextrose 50% Injectable 25 Gram(s) IV Push once  enoxaparin Injectable 40 milliGRAM(s) SubCutaneous daily  ferrous    sulfate Liquid 300 milliGRAM(s) Enteral Tube <User Schedule>  gabapentin 300 milliGRAM(s) Oral two times a day  insulin glargine Injectable (LANTUS) 6 Unit(s) SubCutaneous at bedtime  insulin lispro (HumaLOG) corrective regimen sliding scale   SubCutaneous every 6 hours  lactobacillus acidophilus 1 Tablet(s) Oral two times a day  loratadine 10 milliGRAM(s) Oral daily  montelukast 10 milliGRAM(s) Oral at bedtime  multivitamin/minerals 1 Tablet(s) Oral daily  pantoprazole  Injectable 40 milliGRAM(s) IV Push daily  sertraline 50 milliGRAM(s) Oral daily  simethicone 80 milliGRAM(s) Chew every 6 hours  tigecycline IVPB 50 milliGRAM(s) IV Intermittent every 12 hours  vancomycin    Solution 125 milliGRAM(s) Oral every 6 hours    MEDICATIONS  (PRN):  acetaminophen   Tablet .. 650 milliGRAM(s) Oral every 6 hours PRN Temp greater or equal to 38C (100.4F), Moderate Pain (4 - 6)  cyclobenzaprine 10 milliGRAM(s) Oral three times a day PRN Muscle Spasm  dextrose 40% Gel 15 Gram(s) Oral once PRN Blood Glucose LESS THAN 70 milliGRAM(s)/deciliter  glucagon  Injectable 1 milliGRAM(s) IntraMuscular once PRN Glucose LESS THAN 70 milligrams/deciliter  oxyCODONE    5 mG/acetaminophen 325 mG 1 Tablet(s) Oral every 6 hours PRN Moderate Pain (4 - 6)      Allergies    No Known Allergies    Intolerances        SOCIAL HISTORY:    FAMILY HISTORY:  No pertinent family history in first degree relatives      ROS  As above  Otherwise unremarkable    Vital Signs Last 24 Hrs  T(C): 37.3 (29 Apr 2019 16:47), Max: 38.9 (29 Apr 2019 04:15)  T(F): 99.2 (29 Apr 2019 16:47), Max: 102.1 (29 Apr 2019 04:15)  HR: 91 (29 Apr 2019 16:53) (91 - 120)  BP: 122/69 (29 Apr 2019 15:00) (98/71 - 140/79)  BP(mean): 78 (29 Apr 2019 15:00) (68 - 92)  RR: 19 (29 Apr 2019 15:00) (0 - 28)  SpO2: 100% (29 Apr 2019 16:53) (93% - 100%)    Constitutional: NAD, well-developed  Respiratory: CTAB  Cardiovascular: S1 and S2, RRR  Gastrointestinal: BS+, soft, NT/ND  Extremities: No peripheral edema  Psychiatric: Normal mood, normal affect  Skin: No rashes    LABS:                        8.4    23.06 )-----------( 621      ( 29 Apr 2019 06:49 )             28.7     04-29    136  |  102  |  21  ----------------------------<  139<H>  4.1   |  25  |  0.62    Ca    8.0<L>      29 Apr 2019 06:49    TPro  6.4  /  Alb  1.6<L>  /  TBili  1.8<H>  /  DBili  x   /  AST  223<H>  /  ALT  212<H>  /  AlkPhos  656<H>  04-29      LIVER FUNCTIONS - ( 29 Apr 2019 06:49 )  Alb: 1.6 g/dL / Pro: 6.4 gm/dL / ALK PHOS: 656 U/L / ALT: 212 U/L / AST: 223 U/L / GGT: x             RADIOLOGY & ADDITIONAL STUDIES:

## 2019-04-29 NOTE — PROGRESS NOTE ADULT - ASSESSMENT
Pt is a 62 y/o M PMhx of HTN, DM, chronic resp failure/vent dependent s/p trach and PEG, transverse myelitis (as result of ? tetanus toxoid injection), functional quadripegia, s/p  colostomy - most recently at  6/2018 due to sepsis of urinary source (CAUTi present on admission), before that was in  hospital 4/2018 with PNA Acinetobacter baumannii) and 3/2018 with UTI (PSAE muti resistant but S to zosyn). Patient was admitted 4/24 due to fever and leukocytosis.  Already being treated for C difficile colitis and 'osteomyelitis' from sacral decubitus at NH (IV Vanco IV Cefepime PO Vanco), Picc line was placed at [facility] on 4/12. History per medical record as patient unable to provide history.  1. 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 other issues: per medicine

## 2019-04-29 NOTE — PROGRESS NOTE ADULT - SUBJECTIVE AND OBJECTIVE BOX
Patient is a 63y old  Male who presents with a chief complaint of suspected sepsis (25 Apr 2019 09:55)    Date of service: 04-29-19 @ 13:31      Patient lying in bed; has fever, back pain      ROS: unable to obtain secondary to patient medical condition     MEDICATIONS  (STANDING):  ALPRAZolam 0.5 milliGRAM(s) Oral every 8 hours  artificial  tears Solution 1 Drop(s) Both EYES every 4 hours  ascorbic acid 500 milliGRAM(s) Oral daily  aspirin  chewable 81 milliGRAM(s) Oral daily  chlorhexidine 0.12% Liquid 5 milliLiter(s) Oral Mucosa two times a day  dextrose 5%. 1000 milliLiter(s) (50 mL/Hr) IV Continuous <Continuous>  dextrose 50% Injectable 12.5 Gram(s) IV Push once  dextrose 50% Injectable 25 Gram(s) IV Push once  dextrose 50% Injectable 25 Gram(s) IV Push once  enoxaparin Injectable 40 milliGRAM(s) SubCutaneous daily  ferrous    sulfate Liquid 300 milliGRAM(s) Enteral Tube <User Schedule>  gabapentin 300 milliGRAM(s) Oral two times a day  insulin glargine Injectable (LANTUS) 6 Unit(s) SubCutaneous at bedtime  insulin lispro (HumaLOG) corrective regimen sliding scale   SubCutaneous every 6 hours  lactobacillus acidophilus 1 Tablet(s) Oral two times a day  loratadine 10 milliGRAM(s) Oral daily  montelukast 10 milliGRAM(s) Oral at bedtime  multivitamin/minerals 1 Tablet(s) Oral daily  pantoprazole  Injectable 40 milliGRAM(s) IV Push daily  sertraline 50 milliGRAM(s) Oral daily  simethicone 80 milliGRAM(s) Chew every 6 hours  tigecycline IVPB 50 milliGRAM(s) IV Intermittent every 12 hours  vancomycin    Solution 125 milliGRAM(s) Oral every 6 hours    MEDICATIONS  (PRN):  acetaminophen   Tablet .. 650 milliGRAM(s) Oral every 6 hours PRN Temp greater or equal to 38C (100.4F), Moderate Pain (4 - 6)  cyclobenzaprine 10 milliGRAM(s) Oral three times a day PRN Muscle Spasm  dextrose 40% Gel 15 Gram(s) Oral once PRN Blood Glucose LESS THAN 70 milliGRAM(s)/deciliter  glucagon  Injectable 1 milliGRAM(s) IntraMuscular once PRN Glucose LESS THAN 70 milligrams/deciliter  oxyCODONE    5 mG/acetaminophen 325 mG 1 Tablet(s) Oral every 6 hours PRN Moderate Pain (4 - 6)      Vital Signs Last 24 Hrs  T(C): 36.8 (29 Apr 2019 09:50), Max: 38.9 (29 Apr 2019 04:15)  T(F): 98.3 (29 Apr 2019 09:50), Max: 102.1 (29 Apr 2019 04:15)  HR: 104 (29 Apr 2019 11:26) (91 - 120)  BP: 104/68 (29 Apr 2019 09:00) (98/63 - 132/72)  BP(mean): 74 (29 Apr 2019 09:00) (68 - 86)  RR: 18 (29 Apr 2019 09:00) (0 - 28)  SpO2: 100% (29 Apr 2019 11:26) (93% - 100%)    Physical Exam:        PE:    Constitutional: frail looking  HEENT: NC/AT  Neck: trach  Respiratory: respiratory effort normal scattered coarse breath sounds  Cardiovascular: S1S2 regular, no murmurs  Abdomen: soft, not tender, ostomy in place, peg  Musculoskeletal: no muscle tenderness, no joint swelling or tenderness  Extremities: no pedal edema  Neurological/ Psychiatric: on ventilator  Skin: no rashes; no palpable lesions    Labs: all available labs reviewed                    Labs:                        8.4    23.06 )-----------( 621      ( 29 Apr 2019 06:49 )             28.7     04-29    136  |  102  |  21  ----------------------------<  139<H>  4.1   |  25  |  0.62    Ca    8.0<L>      29 Apr 2019 06:49    TPro  6.4  /  Alb  1.6<L>  /  TBili  1.8<H>  /  DBili  x   /  AST  223<H>  /  ALT  212<H>  /  AlkPhos  656<H>  04-29           Cultures:       Culture - Urine (collected 04-24-19 @ 21:31)  Source: .Urine Clean Catch (Midstream)  Final Report (04-26-19 @ 01:35):    No growth    Culture - Blood (collected 04-24-19 @ 20:08)  Source: .Blood None  Preliminary Report (04-26-19 @ 03:01):    No growth to date.    Culture - Blood (collected 04-24-19 @ 20:08)  Source: .Blood None  Preliminary Report (04-26-19 @ 03:01):    No growth to date.                < from: US Abdomen Complete (04.24.19 @ 21:40) >  IMPRESSION:     Distended gallbladder with sludge and gallbladder wall thickening/edema   which can be seen in the setting of acute cholecystitis. HIDA scan should   be considered for further evaluation.    < end of copied text >    < from: Xray Chest 1 View-PORTABLE IMMEDIATE (04.24.19 @ 20:39) >  IMPRESSION:   There is a patchy left midlung airspace opacity. No pleural effusion or   pneumothorax. Cardiomediastinal silhouette is unremarkable. Tracheostomy   is visualized. Old right clavicular fracture.    < end of copied text >

## 2019-04-29 NOTE — PROGRESS NOTE ADULT - SUBJECTIVE AND OBJECTIVE BOX
64 yo male admitted with sepsis/ trach/feeding tube, elevated LFts, LFts had come down to normal and bumped this am, along fever, elevated WBC. CT scan done few days ago showed normal looking GB, posit HIDA scan, off Tigacyl    Abd soft, NT , ND  Trach/ vented  sleeping              Vital Signs Last 24 Hrs  T(C): 36.8 (29 Apr 2019 09:50), Max: 38.9 (29 Apr 2019 04:15)  T(F): 98.3 (29 Apr 2019 09:50), Max: 102.1 (29 Apr 2019 04:15)  HR: 99 (29 Apr 2019 09:00) (90 - 120)  BP: 104/68 (29 Apr 2019 09:00) (98/63 - 132/72)  BP(mean): 74 (29 Apr 2019 09:00) (60 - 86)  RR: 18 (29 Apr 2019 09:00) (0 - 28)  SpO2: 98% (29 Apr 2019 09:00) (93% - 100%)                          8.4    23.06 )-----------( 621      ( 29 Apr 2019 06:49 )             28.7       04-29    136  |  102  |  21  ----------------------------<  139<H>  4.1   |  25  |  0.62    Ca    8.0<L>      29 Apr 2019 06:49    TPro  6.4  /  Alb  1.6<L>  /  TBili  1.8<H>  /  DBili  x   /  AST  223<H>  /  ALT  212<H>  /  AlkPhos  656<H>  04-29      LIVER FUNCTIONS - ( 29 Apr 2019 06:49 )  Alb: 1.6 g/dL / Pro: 6.4 gm/dL / ALK PHOS: 656 U/L / ALT: 212 U/L / AST: 223 U/L / GGT: x             MEDICATIONS  (STANDING):  ALPRAZolam 0.5 milliGRAM(s) Oral every 8 hours  artificial  tears Solution 1 Drop(s) Both EYES every 4 hours  ascorbic acid 500 milliGRAM(s) Oral daily  aspirin  chewable 81 milliGRAM(s) Oral daily  chlorhexidine 0.12% Liquid 5 milliLiter(s) Oral Mucosa two times a day  dextrose 5%. 1000 milliLiter(s) (50 mL/Hr) IV Continuous <Continuous>  dextrose 50% Injectable 12.5 Gram(s) IV Push once  dextrose 50% Injectable 25 Gram(s) IV Push once  dextrose 50% Injectable 25 Gram(s) IV Push once  enoxaparin Injectable 40 milliGRAM(s) SubCutaneous daily  fentaNYL   Patch  50 MICROgram(s)/Hr 1 Patch Transdermal every 72 hours  ferrous    sulfate Liquid 300 milliGRAM(s) Enteral Tube <User Schedule>  gabapentin 300 milliGRAM(s) Oral two times a day  insulin glargine Injectable (LANTUS) 6 Unit(s) SubCutaneous at bedtime  insulin lispro (HumaLOG) corrective regimen sliding scale   SubCutaneous every 6 hours  lactobacillus acidophilus 1 Tablet(s) Oral two times a day  loratadine 10 milliGRAM(s) Oral daily  montelukast 10 milliGRAM(s) Oral at bedtime  multivitamin/minerals 1 Tablet(s) Oral daily  pantoprazole  Injectable 40 milliGRAM(s) IV Push daily  sertraline 50 milliGRAM(s) Oral daily  simethicone 80 milliGRAM(s) Chew every 6 hours  vancomycin    Solution 125 milliGRAM(s) Oral every 6 hours    MEDICATIONS  (PRN):  acetaminophen   Tablet .. 650 milliGRAM(s) Oral every 6 hours PRN Temp greater or equal to 38C (100.4F), Moderate Pain (4 - 6)  cyclobenzaprine 10 milliGRAM(s) Oral three times a day PRN Muscle Spasm  dextrose 40% Gel 15 Gram(s) Oral once PRN Blood Glucose LESS THAN 70 milliGRAM(s)/deciliter  glucagon  Injectable 1 milliGRAM(s) IntraMuscular once PRN Glucose LESS THAN 70 milligrams/deciliter  oxyCODONE    5 mG/acetaminophen 325 mG 1 Tablet(s) Oral every 6 hours PRN Moderate Pain (4 - 6)      MEDICATIONS  (STANDING):  ALPRAZolam 0.5 milliGRAM(s) Oral every 8 hours  artificial  tears Solution 1 Drop(s) Both EYES every 4 hours  ascorbic acid 500 milliGRAM(s) Oral daily  aspirin  chewable 81 milliGRAM(s) Oral daily  chlorhexidine 0.12% Liquid 5 milliLiter(s) Oral Mucosa two times a day  dextrose 5%. 1000 milliLiter(s) (50 mL/Hr) IV Continuous <Continuous>  dextrose 50% Injectable 12.5 Gram(s) IV Push once  dextrose 50% Injectable 25 Gram(s) IV Push once  dextrose 50% Injectable 25 Gram(s) IV Push once  enoxaparin Injectable 40 milliGRAM(s) SubCutaneous daily  fentaNYL   Patch  50 MICROgram(s)/Hr 1 Patch Transdermal every 72 hours  ferrous    sulfate Liquid 300 milliGRAM(s) Enteral Tube <User Schedule>  gabapentin 300 milliGRAM(s) Oral two times a day  insulin glargine Injectable (LANTUS) 6 Unit(s) SubCutaneous at bedtime  insulin lispro (HumaLOG) corrective regimen sliding scale   SubCutaneous every 6 hours  lactobacillus acidophilus 1 Tablet(s) Oral two times a day  loratadine 10 milliGRAM(s) Oral daily  montelukast 10 milliGRAM(s) Oral at bedtime  multivitamin/minerals 1 Tablet(s) Oral daily  pantoprazole  Injectable 40 milliGRAM(s) IV Push daily  sertraline 50 milliGRAM(s) Oral daily  simethicone 80 milliGRAM(s) Chew every 6 hours  vancomycin    Solution 125 milliGRAM(s) Oral every 6 hours

## 2019-04-29 NOTE — PROGRESS NOTE ADULT - SUBJECTIVE AND OBJECTIVE BOX
CC:  Sepsis     HPI:    62 y/o male chronic Trach and PEG, OM was on IV abx in NH, H/O CDI on PO vanco admitted on 4/24 for sepsis.  CT showed relatively normal GB--Sono showed distended GB and HIDA was +.  LFTS are up.  Pt remains febrile and was on Tigecycline until 4/29.  Pt is followed by general surgery    4/29:  Pt seen and examined in CCU--vented--awake--Denies Abd pain--Jem TF.  Tm 102.8  WBC 24K.  Will ask GI to see       PMH:  As above.     PSH:  As above.     FH: Non Contributory other than those listed in HPI    Social History:      MEDICATIONS  (STANDING):  ALPRAZolam 0.5 milliGRAM(s) Oral every 8 hours  artificial  tears Solution 1 Drop(s) Both EYES every 4 hours  ascorbic acid 500 milliGRAM(s) Oral daily  aspirin  chewable 81 milliGRAM(s) Oral daily  chlorhexidine 0.12% Liquid 5 milliLiter(s) Oral Mucosa two times a day  dextrose 5%. 1000 milliLiter(s) (50 mL/Hr) IV Continuous <Continuous>  dextrose 50% Injectable 12.5 Gram(s) IV Push once  dextrose 50% Injectable 25 Gram(s) IV Push once  dextrose 50% Injectable 25 Gram(s) IV Push once  enoxaparin Injectable 40 milliGRAM(s) SubCutaneous daily  ferrous    sulfate Liquid 300 milliGRAM(s) Enteral Tube <User Schedule>  gabapentin 300 milliGRAM(s) Oral two times a day  insulin glargine Injectable (LANTUS) 6 Unit(s) SubCutaneous at bedtime  insulin lispro (HumaLOG) corrective regimen sliding scale   SubCutaneous every 6 hours  lactobacillus acidophilus 1 Tablet(s) Oral two times a day  loratadine 10 milliGRAM(s) Oral daily  montelukast 10 milliGRAM(s) Oral at bedtime  multivitamin/minerals 1 Tablet(s) Oral daily  pantoprazole  Injectable 40 milliGRAM(s) IV Push daily  sertraline 50 milliGRAM(s) Oral daily  simethicone 80 milliGRAM(s) Chew every 6 hours  vancomycin    Solution 125 milliGRAM(s) Oral every 6 hours    MEDICATIONS  (PRN):  acetaminophen   Tablet .. 650 milliGRAM(s) Oral every 6 hours PRN Temp greater or equal to 38C (100.4F), Moderate Pain (4 - 6)  cyclobenzaprine 10 milliGRAM(s) Oral three times a day PRN Muscle Spasm  dextrose 40% Gel 15 Gram(s) Oral once PRN Blood Glucose LESS THAN 70 milliGRAM(s)/deciliter  glucagon  Injectable 1 milliGRAM(s) IntraMuscular once PRN Glucose LESS THAN 70 milligrams/deciliter  oxyCODONE    5 mG/acetaminophen 325 mG 1 Tablet(s) Oral every 6 hours PRN Moderate Pain (4 - 6)      Allergies: NKDA    ROS:  SEE BELOW        ICU Vital Signs Last 24 Hrs  T(C): 36.8 (29 Apr 2019 09:50), Max: 38.9 (29 Apr 2019 04:15)  T(F): 98.3 (29 Apr 2019 09:50), Max: 102.1 (29 Apr 2019 04:15)  HR: 104 (29 Apr 2019 11:26) (90 - 120)  BP: 104/68 (29 Apr 2019 09:00) (98/63 - 132/72)  BP(mean): 74 (29 Apr 2019 09:00) (60 - 86)  ABP: --  ABP(mean): --  RR: 18 (29 Apr 2019 09:00) (0 - 28)  SpO2: 100% (29 Apr 2019 11:26) (93% - 100%)      Mode: AC/ CMV (Assist Control/ Continuous Mandatory Ventilation)  RR (machine): 14  TV (machine): 500  FiO2: 30  PEEP: 5  ITime: 1  PIP: 31      I&O's Summary    28 Apr 2019 07:01  -  29 Apr 2019 07:00  --------------------------------------------------------  IN: 2440 mL / OUT: 1375 mL / NET: 1065 mL        Physical Exam:  SEE BELOW                          8.4    23.06 )-----------( 621      ( 29 Apr 2019 06:49 )             28.7       04-29    136  |  102  |  21  ----------------------------<  139<H>  4.1   |  25  |  0.62    Ca    8.0<L>      29 Apr 2019 06:49    TPro  6.4  /  Alb  1.6<L>  /  TBili  1.8<H>  /  DBili  x   /  AST  223<H>  /  ALT  212<H>  /  AlkPhos  656<H>  04-29                    DVT Prophylaxis:                                                            Contraindication:     Advanced Directives:    Discussed with:    Visit Information:  Time spent excluding procedure:  45 cc mins    ** Time is exclusive of billed procedures and/or teaching and/or routine family updates.

## 2019-04-29 NOTE — CONSULT NOTE ADULT - ASSESSMENT
Imp:  Favor cholecytitis and now probably also with CBD stones/sludge with rising WBC/LFTs    Rec:  Favor MRCP and perc cheli. ERCP may also be needed but this wouldn't address the associated cholecystitis, which I suspect is the more acute issue.  MSG left with with wife to discuss  For now will order MRCP

## 2019-04-29 NOTE — PROGRESS NOTE ADULT - ASSESSMENT
62 yo male admitted with sepsis/ trach/feeding tube, elevated LFts, LFts had come down to normal and bumped this am, along fever, elevated WBC. CT scan done few days ago showed normal looking GB, posit HIDA scan, off Tigacyl  -I think he's acting like he's having cholangitis with sudden elevation of lFts after Tigacyl was discontinued. He could have a very thick bile causing cholestais with sudden elevation of lFts. He might need an ERCP to swipe his common bile duct. I don't think he has cholecystitis, not convinced a cholecystostomy tube would solve the cholangitis problem.  -Restart Tigacyl?

## 2019-04-29 NOTE — PROGRESS NOTE ADULT - ASSESSMENT
IMP:    64 y/o male chronic Trach and PEG, OM was on IV abx in NH, H/O CDI on PO vanco admitted with sepsis--AC vs cholangitis with fever, WBC, LFTs 600s and + HIDA scan  Chronic resp failure  Functional Quad    Plan:    Obtain GI consult with dr herbert  May need either PCT vs ERCP for which pt will need to be tx   As d/w Dr michele, will restart IV abx  PO Vanco  DVT prophy--SCD and LMWH    CCU care--d/w CCU staff

## 2019-04-30 LAB
ALBUMIN SERPL ELPH-MCNC: 1.7 G/DL — LOW (ref 3.3–5)
ALP SERPL-CCNC: 717 U/L — HIGH (ref 40–120)
ALT FLD-CCNC: 180 U/L — HIGH (ref 12–78)
ANION GAP SERPL CALC-SCNC: 7 MMOL/L — SIGNIFICANT CHANGE UP (ref 5–17)
APTT BLD: 36.3 SEC — SIGNIFICANT CHANGE UP (ref 27.5–36.3)
AST SERPL-CCNC: 79 U/L — HIGH (ref 15–37)
BILIRUB DIRECT SERPL-MCNC: 0.5 MG/DL — HIGH (ref 0–0.2)
BILIRUB INDIRECT FLD-MCNC: 0.3 MG/DL — SIGNIFICANT CHANGE UP (ref 0.2–1)
BILIRUB SERPL-MCNC: 0.8 MG/DL — SIGNIFICANT CHANGE UP (ref 0.2–1.2)
BUN SERPL-MCNC: 22 MG/DL — SIGNIFICANT CHANGE UP (ref 7–23)
CALCIUM SERPL-MCNC: 8 MG/DL — LOW (ref 8.5–10.1)
CHLORIDE SERPL-SCNC: 100 MMOL/L — SIGNIFICANT CHANGE UP (ref 96–108)
CO2 SERPL-SCNC: 26 MMOL/L — SIGNIFICANT CHANGE UP (ref 22–31)
CREAT SERPL-MCNC: 0.57 MG/DL — SIGNIFICANT CHANGE UP (ref 0.5–1.3)
CULTURE RESULTS: SIGNIFICANT CHANGE UP
CULTURE RESULTS: SIGNIFICANT CHANGE UP
GLUCOSE BLDC GLUCOMTR-MCNC: 133 MG/DL — HIGH (ref 70–99)
GLUCOSE BLDC GLUCOMTR-MCNC: 139 MG/DL — HIGH (ref 70–99)
GLUCOSE BLDC GLUCOMTR-MCNC: 140 MG/DL — HIGH (ref 70–99)
GLUCOSE BLDC GLUCOMTR-MCNC: 158 MG/DL — HIGH (ref 70–99)
GLUCOSE SERPL-MCNC: 143 MG/DL — HIGH (ref 70–99)
HCT VFR BLD CALC: 28.3 % — LOW (ref 39–50)
HGB BLD-MCNC: 8.3 G/DL — LOW (ref 13–17)
INR BLD: 1.43 RATIO — HIGH (ref 0.88–1.16)
MAGNESIUM SERPL-MCNC: 2.1 MG/DL — SIGNIFICANT CHANGE UP (ref 1.6–2.6)
MCHC RBC-ENTMCNC: 24.6 PG — LOW (ref 27–34)
MCHC RBC-ENTMCNC: 29.3 GM/DL — LOW (ref 32–36)
MCV RBC AUTO: 83.7 FL — SIGNIFICANT CHANGE UP (ref 80–100)
NRBC # BLD: 0 /100 WBCS — SIGNIFICANT CHANGE UP (ref 0–0)
PHOSPHATE SERPL-MCNC: 3.1 MG/DL — SIGNIFICANT CHANGE UP (ref 2.5–4.5)
PLATELET # BLD AUTO: 596 K/UL — HIGH (ref 150–400)
POTASSIUM SERPL-MCNC: 4.2 MMOL/L — SIGNIFICANT CHANGE UP (ref 3.5–5.3)
POTASSIUM SERPL-SCNC: 4.2 MMOL/L — SIGNIFICANT CHANGE UP (ref 3.5–5.3)
PROT SERPL-MCNC: 6.4 GM/DL — SIGNIFICANT CHANGE UP (ref 6–8.3)
PROTHROM AB SERPL-ACNC: 16.1 SEC — HIGH (ref 10–12.9)
RBC # BLD: 3.38 M/UL — LOW (ref 4.2–5.8)
RBC # FLD: 19.7 % — HIGH (ref 10.3–14.5)
SODIUM SERPL-SCNC: 133 MMOL/L — LOW (ref 135–145)
SPECIMEN SOURCE: SIGNIFICANT CHANGE UP
SPECIMEN SOURCE: SIGNIFICANT CHANGE UP
WBC # BLD: 19.27 K/UL — HIGH (ref 3.8–10.5)
WBC # FLD AUTO: 19.27 K/UL — HIGH (ref 3.8–10.5)

## 2019-04-30 PROCEDURE — 74181 MRI ABDOMEN W/O CONTRAST: CPT | Mod: 26

## 2019-04-30 RX ORDER — ONDANSETRON 8 MG/1
4 TABLET, FILM COATED ORAL EVERY 6 HOURS
Qty: 0 | Refills: 0 | Status: DISCONTINUED | OUTPATIENT
Start: 2019-04-30 | End: 2019-05-07

## 2019-04-30 RX ADMIN — Medication 125 MILLIGRAM(S): at 21:38

## 2019-04-30 RX ADMIN — Medication 125 MILLIGRAM(S): at 00:31

## 2019-04-30 RX ADMIN — Medication 2 MILLIGRAM(S): at 17:26

## 2019-04-30 RX ADMIN — Medication 125 MILLIGRAM(S): at 23:49

## 2019-04-30 RX ADMIN — Medication 81 MILLIGRAM(S): at 13:48

## 2019-04-30 RX ADMIN — GABAPENTIN 300 MILLIGRAM(S): 400 CAPSULE ORAL at 05:30

## 2019-04-30 RX ADMIN — Medication 0.5 MILLIGRAM(S): at 21:38

## 2019-04-30 RX ADMIN — SIMETHICONE 80 MILLIGRAM(S): 80 TABLET, CHEWABLE ORAL at 00:31

## 2019-04-30 RX ADMIN — Medication 1 TABLET(S): at 13:48

## 2019-04-30 RX ADMIN — Medication 125 MILLIGRAM(S): at 16:48

## 2019-04-30 RX ADMIN — Medication 1 DROP(S): at 21:38

## 2019-04-30 RX ADMIN — FENTANYL CITRATE 1 PATCH: 50 INJECTION INTRAVENOUS at 01:20

## 2019-04-30 RX ADMIN — TIGECYCLINE 105 MILLIGRAM(S): 50 INJECTION, POWDER, LYOPHILIZED, FOR SOLUTION INTRAVENOUS at 05:30

## 2019-04-30 RX ADMIN — GABAPENTIN 300 MILLIGRAM(S): 400 CAPSULE ORAL at 17:02

## 2019-04-30 RX ADMIN — CHLORHEXIDINE GLUCONATE 5 MILLILITER(S): 213 SOLUTION TOPICAL at 06:19

## 2019-04-30 RX ADMIN — SERTRALINE 50 MILLIGRAM(S): 25 TABLET, FILM COATED ORAL at 13:49

## 2019-04-30 RX ADMIN — Medication 500 MILLIGRAM(S): at 13:49

## 2019-04-30 RX ADMIN — Medication 0.5 MILLIGRAM(S): at 05:30

## 2019-04-30 RX ADMIN — PANTOPRAZOLE SODIUM 40 MILLIGRAM(S): 20 TABLET, DELAYED RELEASE ORAL at 13:48

## 2019-04-30 RX ADMIN — TIGECYCLINE 105 MILLIGRAM(S): 50 INJECTION, POWDER, LYOPHILIZED, FOR SOLUTION INTRAVENOUS at 17:03

## 2019-04-30 RX ADMIN — INSULIN GLARGINE 6 UNIT(S): 100 INJECTION, SOLUTION SUBCUTANEOUS at 23:50

## 2019-04-30 RX ADMIN — SIMETHICONE 80 MILLIGRAM(S): 80 TABLET, CHEWABLE ORAL at 05:30

## 2019-04-30 RX ADMIN — SIMETHICONE 80 MILLIGRAM(S): 80 TABLET, CHEWABLE ORAL at 17:09

## 2019-04-30 RX ADMIN — OXYCODONE AND ACETAMINOPHEN 1 TABLET(S): 5; 325 TABLET ORAL at 23:50

## 2019-04-30 RX ADMIN — ONDANSETRON 4 MILLIGRAM(S): 8 TABLET, FILM COATED ORAL at 06:49

## 2019-04-30 RX ADMIN — MONTELUKAST 10 MILLIGRAM(S): 4 TABLET, CHEWABLE ORAL at 21:38

## 2019-04-30 RX ADMIN — SIMETHICONE 80 MILLIGRAM(S): 80 TABLET, CHEWABLE ORAL at 23:51

## 2019-04-30 RX ADMIN — FENTANYL CITRATE 1 PATCH: 50 INJECTION INTRAVENOUS at 17:00

## 2019-04-30 RX ADMIN — OXYCODONE AND ACETAMINOPHEN 1 TABLET(S): 5; 325 TABLET ORAL at 09:54

## 2019-04-30 RX ADMIN — FENTANYL CITRATE 1 PATCH: 50 INJECTION INTRAVENOUS at 08:11

## 2019-04-30 RX ADMIN — Medication 1 TABLET(S): at 05:30

## 2019-04-30 RX ADMIN — CHLORHEXIDINE GLUCONATE 5 MILLILITER(S): 213 SOLUTION TOPICAL at 17:10

## 2019-04-30 RX ADMIN — Medication 0.5 MILLIGRAM(S): at 13:48

## 2019-04-30 RX ADMIN — Medication 125 MILLIGRAM(S): at 05:30

## 2019-04-30 NOTE — CONSULT NOTE ADULT - CONSULT REASON
evaluate for continuation of vancomycin IV and/or PO
Elevated LFTs
GOC
possible cholecystitis on sono, sepsis

## 2019-04-30 NOTE — PROGRESS NOTE ADULT - CARDIOVASCULAR
Regular rate & rhythm, normal S1, S2; no murmurs, gallops or rubs; no S3, S4
detailed exam
detailed exam

## 2019-04-30 NOTE — CONSULT NOTE ADULT - ASSESSMENT
63y old Male coming from [facility] with hx of HTN, DM, chronic resp failure/vent dependent s/p trach and PEG, transverse myelitis (as result of ? tetanus toxoid injection), functional quadriplegia s/p  colostomy - most recently at  2018 due to sepsis of urinary source (CAUTi present on admission), before that was in  hospital 2018 with PNA Acinetobacter baumannii) and 3/2018 with UTI (PSAE muti resistant but S to zosyn). Patient now admitted  due to fever and leukocytosis.  Of note pt was being treated for C difficile colitis and 'osteomyelitis' from sacral decubitus at NH (IV Vanco IV Cefepime PO Vanco), Picc line was placed at [facility] on  and removed on this admission as possible source. Found to have elevated LFTs, persistently elevated Alk Phos. Imaging including CT showed no issue with GB, but US and HIDA showing evidence of cholecystitis vs. cholangitis. Palliative Care consulted to assist with establishing GOC.     1) Pain  - chronic pain as per NH records  - of note, pt was on fentanyl patch 50mcg and oxycodone 20mg standing at NH  - however, pt came in with fevers due to sepsis, justifying removal of patches to prevent bolus dosing during fevers (more absorption at subq level due to vasodilation)  - now on percocet 1 tab q6h prn   - will monitor need and adjust as necessary. If remains fever-free will also consider replacing fentanyl patch for longer-lasting pain relief  - recommend use of flexeril when able also, as pain seems to be mostly musculoskeletal and described often as an ache.   - In setting of quadriplegia, this is commonplace     2) Sepsis: Cholecystitis vs. Cholangitis  - ID, GI, and surgery notes appreciated  - concern for GB vs common bile duct pathology  - imaging thus far suggesting cholecystitis, but not completely clear and management seems to differ significantly without further clarification  - pt agreeable to MRCP today  - discussed case with Dr. Garza and considerations include cheli vs. perc drain vs. ERCP  - hope to have results and options for discussion tomorrow     3) Debility  - PPS<40%, dependent for all ADLs  - neurological disorder with unclear etiology, thus difficult to discern how it will truly progress  - dietary notes appreciated- severe protein calorie malnutrition noted     4) Prognosis  - likely overall poor  - in setting of neurological condition that has made pt completely debilitated, multiple hospitalizations for infection, skin breakdown, PPS<40%, albumin 1.7 pt would be at high risk for further complications. He is not a candidate for hospice given his ventilator dependence.     5) GOC/Advanced Directives  - pt seems to have capacity for decision making, noting that he would like to be apart of conversations that require this  - HCP on chart namin) wife Courtney Amin [phone] and 2) Stephen Amin [phone]  - [facility] no [facility], will discuss  - GOC meeting scheduled for tomorrow at 10am.     Thank you for including us in Mr. Amin's care. Will continue to follow with you.    Tenzin Garcia MD  Palliative Care Attending

## 2019-04-30 NOTE — PROGRESS NOTE ADULT - SUBJECTIVE AND OBJECTIVE BOX
CC:  Resp failure and sepsis     HPI:    64 y/o male chronic Trach and PEG, OM was on IV abx in NH, H/O CDI on PO vanco admitted on 4/24 for sepsis.  CT showed relatively normal GB--Sono showed distended GB and HIDA was +.  LFTS are up.  Pt remains febrile and was on Tigecycline until 4/29.  Pt is followed by general surgery    4/29:  Pt seen and examined in CCU--vented--awake--Denies Abd pain--Jem TF.  Tm 102.8  WBC 24K.  Will ask GI to see     4/30:  Pt seen and examined in CCU--vented--awake--case d/w dr herbert and wife--Appreciate keon shepard and nik input--back on tigecycline.       PMH:  As above.     PSH:  As above.     FH: Non Contributory other than those listed in HPI    Social History:      MEDICATIONS  (STANDING):  ALPRAZolam 0.5 milliGRAM(s) Oral every 8 hours  artificial  tears Solution 1 Drop(s) Both EYES every 4 hours  ascorbic acid 500 milliGRAM(s) Oral daily  aspirin  chewable 81 milliGRAM(s) Oral daily  chlorhexidine 0.12% Liquid 5 milliLiter(s) Oral Mucosa two times a day  dextrose 5%. 1000 milliLiter(s) (50 mL/Hr) IV Continuous <Continuous>  dextrose 50% Injectable 12.5 Gram(s) IV Push once  dextrose 50% Injectable 25 Gram(s) IV Push once  dextrose 50% Injectable 25 Gram(s) IV Push once  enoxaparin Injectable 40 milliGRAM(s) SubCutaneous daily  ferrous    sulfate Liquid 300 milliGRAM(s) Enteral Tube <User Schedule>  gabapentin 300 milliGRAM(s) Oral two times a day  insulin glargine Injectable (LANTUS) 6 Unit(s) SubCutaneous at bedtime  insulin lispro (HumaLOG) corrective regimen sliding scale   SubCutaneous every 6 hours  lactobacillus acidophilus 1 Tablet(s) Oral two times a day  loratadine 10 milliGRAM(s) Oral daily  montelukast 10 milliGRAM(s) Oral at bedtime  multivitamin/minerals 1 Tablet(s) Oral daily  pantoprazole  Injectable 40 milliGRAM(s) IV Push daily  sertraline 50 milliGRAM(s) Oral daily  simethicone 80 milliGRAM(s) Chew every 6 hours  tigecycline IVPB 50 milliGRAM(s) IV Intermittent every 12 hours  vancomycin    Solution 125 milliGRAM(s) Oral every 6 hours    MEDICATIONS  (PRN):  acetaminophen   Tablet .. 650 milliGRAM(s) Oral every 6 hours PRN Temp greater or equal to 38C (100.4F), Moderate Pain (4 - 6)  cyclobenzaprine 10 milliGRAM(s) Oral three times a day PRN Muscle Spasm  dextrose 40% Gel 15 Gram(s) Oral once PRN Blood Glucose LESS THAN 70 milliGRAM(s)/deciliter  glucagon  Injectable 1 milliGRAM(s) IntraMuscular once PRN Glucose LESS THAN 70 milligrams/deciliter  ondansetron Injectable 4 milliGRAM(s) IV Push every 6 hours PRN Nausea  oxyCODONE    5 mG/acetaminophen 325 mG 1 Tablet(s) Oral every 6 hours PRN Moderate Pain (4 - 6)      Allergies: NKDA    ROS:  SEE BELOW        ICU Vital Signs Last 24 Hrs  T(C): 36.8 (30 Apr 2019 05:52), Max: 37.4 (29 Apr 2019 13:00)  T(F): 98.3 (30 Apr 2019 05:52), Max: 99.4 (30 Apr 2019 00:00)  HR: 95 (30 Apr 2019 08:00) (91 - 106)  BP: 125/76 (30 Apr 2019 08:00) (98/60 - 147/91)  BP(mean): 86 (30 Apr 2019 08:00) (68 - 105)  ABP: --  ABP(mean): --  RR: 20 (30 Apr 2019 08:00) (10 - 22)  SpO2: 97% (30 Apr 2019 08:00) (95% - 100%)      Mode: AC/ CMV (Assist Control/ Continuous Mandatory Ventilation)  RR (machine): 14  TV (machine): 500  FiO2: 30  PEEP: 5  ITime: 1  MAP: 10  PIP: 41      I&O's Summary    29 Apr 2019 07:01  -  30 Apr 2019 07:00  --------------------------------------------------------  IN: 1110 mL / OUT: 451 mL / NET: 659 mL        Physical Exam:  SEE BELOW                          8.3    19.27 )-----------( 596      ( 30 Apr 2019 06:42 )             28.3       04-30    133<L>  |  100  |  22  ----------------------------<  143<H>  4.2   |  26  |  0.57    Ca    8.0<L>      30 Apr 2019 06:42  Phos  3.1     04-30  Mg     2.1     04-30    TPro  6.4  /  Alb  1.7<L>  /  TBili  0.8  /  DBili  0.5<H>  /  AST  79<H>  /  ALT  180<H>  /  AlkPhos  717<H>  04-30                    DVT Prophylaxis:                                                            Contraindication:     Advanced Directives:    Discussed with:    Visit Information:  Time spent excluding procedure:  30 cc mins    ** Time is exclusive of billed procedures and/or teaching and/or routine family updates.

## 2019-04-30 NOTE — CONSULT NOTE ADULT - SUBJECTIVE AND OBJECTIVE BOX
HPI: Mr. Amin is a 63y old Male coming from [facility] with hx of HTN, DM, chronic resp failure/vent dependent s/p trach and PEG, transverse myelitis (as result of ? tetanus toxoid injection), functional quadripegia, s/p  colostomy - most recently at  6/2018 due to sepsis of urinary source (CAUTi present on admission), before that was in  hospital 4/2018 with PNA Acinetobacter baumannii) and 3/2018 with UTI (PSAE muti resistant but S to zosyn). Patient now admitted 4/24 due to fever and leukocytosis.  Of note pt was being treated for C difficile colitis and 'osteomyelitis' from sacral decubitus at NH (IV Vanco IV Cefepime PO Vanco), Picc line was placed at [facility] on 4/12 adn removed on this admission as possible source. Found to have elevated LFTs, persistently elevated AlkP. Imaging including CT showed no issue with GB, but US and HIDA showing evidence of cholecystitis vs. cholangitis. Palliative Care consulted to assist with establishing GOC.     Met Mr. Amin this am, no family at bedtime. Pt notes ache in neck, 7/10, non-radiating. Asking to be turned and then reassessed before wanting further meds. He denies dyspnea or any other sx beside mild anxiety. Denies depression, unsure if xanax as helpful as he would like.     Pt was able to fully orient including explaining that he was here due to issue with his GB. He also recalled living at [facility] for the past year and being trached for 2y. When asked, he was unaware of MRCP, wanting to know more about it. Initially he was hesitant to agree to this, saying that MRIs are too long, but after explaining that he could have some sedation for this he was on board. Inquired about his information preferences and proxy, he noted that his wife helps him to make decisions. Likewise, pt would like to be apart of any GOC meeting, open to us reassessing his willingness at the time of meeting so he can opt out if he changes his mind. Let him know we would schedule this after MRCP results come in so that we can have a fruitful conversation about full gamut of options, which he agreed with. Called his wife/HCP Courtney ([phone]) who agreed that they need the support, and was glad to hear the pt was able to choose to push forward with MRCP, as she was unsure what to do. She shared briefly that his QOL has been terrible since he began to have neurologic decline and that at times she is concerned about his ability to see this, given his decisions to have no limits on interventions. Explained that we would help to share these impressions, as it is often easier to hear coming from someone who is not so close to the pt. She thus agreed with suggested meeting time for tomorrow at 10am. CCU team made aware.       PAIN: (x )Yes   ( )No  Level:  Location:  Intensity:    /10  Quality:  Aggravating Factors:  Alleviating Factors:  Radiation:  Duration/Timing:  Impact on ADLs:    DYSPNEA: ( ) Yes  ( ) No  Level:    PAST MEDICAL & SURGICAL HISTORY:  Pneumonia  UTI (urinary tract infection)  H/O quadriplegia  Essential hypertension  Paralysis  Transverse myelitis  S/P colostomy  PEG (percutaneous endoscopic gastrostomy) status  History of tracheostomy      SOCIAL HX:    Hx opiate tolerance ( )YES  ( )NO    Baseline ADLs  (Prior to Admission)  ( ) Independent   ( )Dependent    FAMILY HISTORY:  No pertinent family history in first degree relatives      Review of Systems:    Anxiety-  Depression-  Physical Discomfort-  Dyspnea-  Constipation-  Diarrhea-  Nausea-  Vomiting-  Anorexia-  Weight Loss-   Cough-  Secretions-  Fatigue-  Weakness-  Delirium-    All other systems reviewed and negative  Unable to obtain/Limited due to:      PHYSICAL EXAM:    Vital Signs Last 24 Hrs  T(C): 36.8 (30 Apr 2019 05:52), Max: 37.4 (29 Apr 2019 13:00)  T(F): 98.3 (30 Apr 2019 05:52), Max: 99.4 (30 Apr 2019 00:00)  HR: 101 (30 Apr 2019 10:00) (91 - 106)  BP: 116/88 (30 Apr 2019 10:00) (98/60 - 147/91)  BP(mean): 94 (30 Apr 2019 10:00) (68 - 105)  RR: 20 (30 Apr 2019 10:00) (10 - 22)  SpO2: 98% (30 Apr 2019 10:39) (94% - 100%)  Daily     Daily     PPSV2:   %  FAST:    General:  Mental Status:  HEENT:  Lungs:  Cardiac:  GI:  :  Ext:  Neuro:      LABS:                        8.3    19.27 )-----------( 596      ( 30 Apr 2019 06:42 )             28.3     04-30    133<L>  |  100  |  22  ----------------------------<  143<H>  4.2   |  26  |  0.57    Ca    8.0<L>      30 Apr 2019 06:42  Phos  3.1     04-30  Mg     2.1     04-30    TPro  6.4  /  Alb  1.7<L>  /  TBili  0.8  /  DBili  0.5<H>  /  AST  79<H>  /  ALT  180<H>  /  AlkPhos  717<H>  04-30    PT/INR - ( 30 Apr 2019 06:42 )   PT: 16.1 sec;   INR: 1.43 ratio         PTT - ( 30 Apr 2019 06:42 )  PTT:36.3 sec  Albumin: Albumin, Serum: 1.7 g/dL (04-30 @ 06:42)      Allergies    No Known Allergies    Intolerances      MEDICATIONS  (STANDING):  ALPRAZolam 0.5 milliGRAM(s) Oral every 8 hours  artificial  tears Solution 1 Drop(s) Both EYES every 4 hours  ascorbic acid 500 milliGRAM(s) Oral daily  aspirin  chewable 81 milliGRAM(s) Oral daily  chlorhexidine 0.12% Liquid 5 milliLiter(s) Oral Mucosa two times a day  dextrose 5%. 1000 milliLiter(s) (50 mL/Hr) IV Continuous <Continuous>  dextrose 50% Injectable 12.5 Gram(s) IV Push once  dextrose 50% Injectable 25 Gram(s) IV Push once  dextrose 50% Injectable 25 Gram(s) IV Push once  enoxaparin Injectable 40 milliGRAM(s) SubCutaneous daily  ferrous    sulfate Liquid 300 milliGRAM(s) Enteral Tube <User Schedule>  gabapentin 300 milliGRAM(s) Oral two times a day  insulin glargine Injectable (LANTUS) 6 Unit(s) SubCutaneous at bedtime  insulin lispro (HumaLOG) corrective regimen sliding scale   SubCutaneous every 6 hours  lactobacillus acidophilus 1 Tablet(s) Oral two times a day  loratadine 10 milliGRAM(s) Oral daily  LORazepam   Injectable 2 milliGRAM(s) IV Push once  montelukast 10 milliGRAM(s) Oral at bedtime  multivitamin/minerals 1 Tablet(s) Oral daily  pantoprazole  Injectable 40 milliGRAM(s) IV Push daily  sertraline 50 milliGRAM(s) Oral daily  simethicone 80 milliGRAM(s) Chew every 6 hours  tigecycline IVPB 50 milliGRAM(s) IV Intermittent every 12 hours  vancomycin    Solution 125 milliGRAM(s) Oral every 6 hours    MEDICATIONS  (PRN):  acetaminophen   Tablet .. 650 milliGRAM(s) Oral every 6 hours PRN Temp greater or equal to 38C (100.4F), Moderate Pain (4 - 6)  cyclobenzaprine 10 milliGRAM(s) Oral three times a day PRN Muscle Spasm  dextrose 40% Gel 15 Gram(s) Oral once PRN Blood Glucose LESS THAN 70 milliGRAM(s)/deciliter  glucagon  Injectable 1 milliGRAM(s) IntraMuscular once PRN Glucose LESS THAN 70 milligrams/deciliter  ondansetron Injectable 4 milliGRAM(s) IV Push every 6 hours PRN Nausea  oxyCODONE    5 mG/acetaminophen 325 mG 1 Tablet(s) Oral every 6 hours PRN Moderate Pain (4 - 6)      RADIOLOGY/ADDITIONAL STUDIES: HPI: Mr. Amin is a 63y old Male coming from [facility] with hx of HTN, DM, chronic resp failure/vent dependent s/p trach and PEG, transverse myelitis (as result of ? tetanus toxoid injection), functional quadriplegia s/p  colostomy - most recently at  6/2018 due to sepsis of urinary source (CAUTi present on admission), before that was in  hospital 4/2018 with PNA Acinetobacter baumannii) and 3/2018 with UTI (PSAE muti resistant but S to zosyn). Patient now admitted 4/24 due to fever and leukocytosis.  Of note pt was being treated for C difficile colitis and 'osteomyelitis' from sacral decubitus at NH (IV Vanco IV Cefepime PO Vanco), Picc line was placed at [facility] on 4/12 and removed on this admission as possible source. Found to have elevated LFTs, persistently elevated Alk Phos. Imaging including CT showed no issue with GB, but US and HIDA showing evidence of cholecystitis vs. cholangitis. Palliative Care consulted to assist with establishing GOC.     Met Mr. Amin this am, no family at bedtime. Pt notes ache in neck, 7/10, non-radiating. Asking to be turned and then reassessed before wanting further meds. He denies dyspnea or any other sx beside mild anxiety. Denies depression, unsure if xanax as helpful as he would like.     Pt was able to fully orient including explaining that he was here due to issue with his GB. He also recalled living at [facility] for the past year and being trached for 2y. When asked, he was unaware of MRCP, wanting to know more about it. Initially he was hesitant to agree to this, saying that MRIs are too long, but after explaining that he could have some sedation for this he was on board. Inquired about his information preferences and proxy, he noted that his wife helps him to make decisions. Likewise, pt would like to be apart of any GOC meeting, open to us reassessing his willingness at the time of meeting so he can opt out if he changes his mind. Let him know we would schedule this after MRCP results come in so that we can have a fruitful conversation about full gamut of options, which he agreed with. Called his wife/HCP Courtney ([phone]) who agreed that they need the support, and was glad to hear the pt was able to choose to push forward with MRCP, as she was unsure what to do. She shared briefly that his QOL has been terrible since he began to have neurologic decline and that at times she is concerned about his ability to see this, given his decisions to have no limits on interventions. Explained that we would help to share these impressions, as it is often easier to hear coming from someone who is not so close to the pt. She thus agreed with suggested meeting time for tomorrow at 10am. CCU team made aware.       PAIN: (x )Yes   ( )No  Level: moderate  Location: neck  Intensity:    7/10  Quality: ache  Aggravating Factors: position  Alleviating Factors: re-postioning, pain meds  Radiation: no  Duration/Timing: intermittent (chronic)  Impact on ADLs: no    DYSPNEA: ( ) Yes  (x ) No    PAST MEDICAL & SURGICAL HISTORY:  Pneumonia  UTI (urinary tract infection)  H/O quadriplegia  Essential hypertension  Paralysis  Transverse myelitis  S/P colostomy  PEG (percutaneous endoscopic gastrostomy) status  History of tracheostomy      SOCIAL HX: Lives at [facility], , son    Hx opiate tolerance (x )YES  ( )NO- as per med rec from NH, pt was on fentanyl patch (50mcg) and oxycodone 20mg standing    Baseline ADLs  (Prior to Admission)  ( ) Independent   (x )Dependent- for all ADLs    FAMILY HISTORY:  Unable to recall      Review of Systems:    Anxiety- yes, at times  Depression- denies  Constipation- denies, endorses having colostomy  Nausea- earlier today, resolved  Vomiting- denies  Anorexia- has PEG  Fatigue- yes  Delirium- sometimes forgetful of time    All other systems reviewed and negative      PHYSICAL EXAM:    Vital Signs Last 24 Hrs  T(C): 36.8 (30 Apr 2019 05:52), Max: 37.4 (29 Apr 2019 13:00)  T(F): 98.3 (30 Apr 2019 05:52), Max: 99.4 (30 Apr 2019 00:00)  HR: 101 (30 Apr 2019 10:00) (91 - 106)  BP: 116/88 (30 Apr 2019 10:00) (98/60 - 147/91)  BP(mean): 94 (30 Apr 2019 10:00) (68 - 105)  RR: 20 (30 Apr 2019 10:00) (10 - 22)  SpO2: 98% (30 Apr 2019 10:39) (94% - 100%)  Daily     Daily     PPSV2: 30  %    General: Middle aged male, trach, able to mouth words, NAD  Mental Status: AOx3-4 (sometimes shaky on timing)  HEENT: trach in place  Lungs: dec at bases bl  Cardiac: + s1 s2 rrr  GI: soft nt nd +bs, PEG and colostomy present with some stool  : condom catheter  Ext: moves shoulders, otherwise functionally quadraplegic  Neuro: limited by above      LABS:                        8.3    19.27 )-----------( 596      ( 30 Apr 2019 06:42 )             28.3     04-30    133<L>  |  100  |  22  ----------------------------<  143<H>  4.2   |  26  |  0.57    Ca    8.0<L>      30 Apr 2019 06:42  Phos  3.1     04-30  Mg     2.1     04-30    TPro  6.4  /  Alb  1.7<L>  /  TBili  0.8  /  DBili  0.5<H>  /  AST  79<H>  /  ALT  180<H>  /  AlkPhos  717<H>  04-30    PT/INR - ( 30 Apr 2019 06:42 )   PT: 16.1 sec;   INR: 1.43 ratio         PTT - ( 30 Apr 2019 06:42 )  PTT:36.3 sec  Albumin: Albumin, Serum: 1.7 g/dL (04-30 @ 06:42)      Allergies    No Known Allergies    Intolerances      MEDICATIONS  (STANDING):  ALPRAZolam 0.5 milliGRAM(s) Oral every 8 hours  artificial  tears Solution 1 Drop(s) Both EYES every 4 hours  ascorbic acid 500 milliGRAM(s) Oral daily  aspirin  chewable 81 milliGRAM(s) Oral daily  chlorhexidine 0.12% Liquid 5 milliLiter(s) Oral Mucosa two times a day  dextrose 5%. 1000 milliLiter(s) (50 mL/Hr) IV Continuous <Continuous>  dextrose 50% Injectable 12.5 Gram(s) IV Push once  dextrose 50% Injectable 25 Gram(s) IV Push once  dextrose 50% Injectable 25 Gram(s) IV Push once  enoxaparin Injectable 40 milliGRAM(s) SubCutaneous daily  ferrous    sulfate Liquid 300 milliGRAM(s) Enteral Tube <User Schedule>  gabapentin 300 milliGRAM(s) Oral two times a day  insulin glargine Injectable (LANTUS) 6 Unit(s) SubCutaneous at bedtime  insulin lispro (HumaLOG) corrective regimen sliding scale   SubCutaneous every 6 hours  lactobacillus acidophilus 1 Tablet(s) Oral two times a day  loratadine 10 milliGRAM(s) Oral daily  LORazepam   Injectable 2 milliGRAM(s) IV Push once  montelukast 10 milliGRAM(s) Oral at bedtime  multivitamin/minerals 1 Tablet(s) Oral daily  pantoprazole  Injectable 40 milliGRAM(s) IV Push daily  sertraline 50 milliGRAM(s) Oral daily  simethicone 80 milliGRAM(s) Chew every 6 hours  tigecycline IVPB 50 milliGRAM(s) IV Intermittent every 12 hours  vancomycin    Solution 125 milliGRAM(s) Oral every 6 hours    MEDICATIONS  (PRN):  acetaminophen   Tablet .. 650 milliGRAM(s) Oral every 6 hours PRN Temp greater or equal to 38C (100.4F), Moderate Pain (4 - 6)  cyclobenzaprine 10 milliGRAM(s) Oral three times a day PRN Muscle Spasm  dextrose 40% Gel 15 Gram(s) Oral once PRN Blood Glucose LESS THAN 70 milliGRAM(s)/deciliter  glucagon  Injectable 1 milliGRAM(s) IntraMuscular once PRN Glucose LESS THAN 70 milligrams/deciliter  ondansetron Injectable 4 milliGRAM(s) IV Push every 6 hours PRN Nausea  oxyCODONE    5 mG/acetaminophen 325 mG 1 Tablet(s) Oral every 6 hours PRN Moderate Pain (4 - 6)      RADIOLOGY/ADDITIONAL STUDIES:    EXAM:  US ABDOMEN COMPLETE                        PROCEDURE DATE:  04/24/2019      IMPRESSION:     Distended gallbladder with sludge and gallbladder wall thickening/edema   which can be seen in the setting of acute cholecystitis. HIDA scan should   be considered for further evaluation.    RAMSES FINCH   This document has been electronically signed. Apr 24 2019  9:57PM    EXAM:  CT ABDOMEN AND PELVIS OC IC                        PROCEDURE DATE:  04/25/2019      IMPRESSION:     *  Bibasilar clustered nodular opacity with additional findings of   atelectasis/consolidation at the lung bases, concerning for pneumonia.  *  Cholelithiasis, without overt CT findings of acute cholecystitis. If   additional imaging is clinically indicated, consider sonography or   nuclear medicine hepatobiliary scan.      DEE DEE PRASAD M.D., ATTENDING RADIOLOGIST  This document has been electronically signed. Apr 25 2019  3:42PM      EXAM:  NM HEPATOBILIARY IMG                        PROCEDURE DATE:  04/25/2019      IMPRESSION: Abnormal hepatobiliary scan.    Nonvisualization of the gallbladder in the entire 2 hours of imaging. In   the proper clinical setting, this is compatible with acute cholecystitis.    Dr. Mccray was notified of these results by telephone with read back   at about 4:30 PM on 4/25/2019.     ILEANA BUTT   This document has been electronically signed. Apr [id number]  4:41PM

## 2019-04-30 NOTE — PROGRESS NOTE ADULT - SUBJECTIVE AND OBJECTIVE BOX
62 yo male Trach/vented/ gastrostomy tube/ sacral osteo?/admitted with cholecystitis vs cholangitis, WBC 69290, normal T lonnie this am, AST/ALT trending lower/ Alk P remains elevated 700's, normal vital signs, on tigacyl    Abd soft, non tender, non distended, gastrostomy tube in place  trach   follows commands, no complaints              Vital Signs Last 24 Hrs  T(C): 36.8 (30 Apr 2019 05:52), Max: 37.4 (29 Apr 2019 13:00)  T(F): 98.3 (30 Apr 2019 05:52), Max: 99.4 (30 Apr 2019 00:00)  HR: 95 (30 Apr 2019 08:00) (91 - 106)  BP: 125/76 (30 Apr 2019 08:00) (98/60 - 147/91)  BP(mean): 86 (30 Apr 2019 08:00) (68 - 105)  RR: 20 (30 Apr 2019 08:00) (10 - 22)  SpO2: 97% (30 Apr 2019 08:00) (95% - 100%)                          8.3    19.27 )-----------( 596      ( 30 Apr 2019 06:42 )             28.3       04-30    133<L>  |  100  |  22  ----------------------------<  143<H>  4.2   |  26  |  0.57    Ca    8.0<L>      30 Apr 2019 06:42  Phos  3.1     04-30  Mg     2.1     04-30    TPro  6.4  /  Alb  1.7<L>  /  TBili  0.8  /  DBili  0.5<H>  /  AST  79<H>  /  ALT  180<H>  /  AlkPhos  717<H>  04-30      LIVER FUNCTIONS - ( 30 Apr 2019 06:42 )  Alb: 1.7 g/dL / Pro: 6.4 gm/dL / ALK PHOS: 717 U/L / ALT: 180 U/L / AST: 79 U/L / GGT: x             MEDICATIONS  (STANDING):  ALPRAZolam 0.5 milliGRAM(s) Oral every 8 hours  artificial  tears Solution 1 Drop(s) Both EYES every 4 hours  ascorbic acid 500 milliGRAM(s) Oral daily  aspirin  chewable 81 milliGRAM(s) Oral daily  chlorhexidine 0.12% Liquid 5 milliLiter(s) Oral Mucosa two times a day  dextrose 5%. 1000 milliLiter(s) (50 mL/Hr) IV Continuous <Continuous>  dextrose 50% Injectable 12.5 Gram(s) IV Push once  dextrose 50% Injectable 25 Gram(s) IV Push once  dextrose 50% Injectable 25 Gram(s) IV Push once  enoxaparin Injectable 40 milliGRAM(s) SubCutaneous daily  ferrous    sulfate Liquid 300 milliGRAM(s) Enteral Tube <User Schedule>  gabapentin 300 milliGRAM(s) Oral two times a day  insulin glargine Injectable (LANTUS) 6 Unit(s) SubCutaneous at bedtime  insulin lispro (HumaLOG) corrective regimen sliding scale   SubCutaneous every 6 hours  lactobacillus acidophilus 1 Tablet(s) Oral two times a day  loratadine 10 milliGRAM(s) Oral daily  montelukast 10 milliGRAM(s) Oral at bedtime  multivitamin/minerals 1 Tablet(s) Oral daily  pantoprazole  Injectable 40 milliGRAM(s) IV Push daily  sertraline 50 milliGRAM(s) Oral daily  simethicone 80 milliGRAM(s) Chew every 6 hours  tigecycline IVPB 50 milliGRAM(s) IV Intermittent every 12 hours  vancomycin    Solution 125 milliGRAM(s) Oral every 6 hours    MEDICATIONS  (PRN):  acetaminophen   Tablet .. 650 milliGRAM(s) Oral every 6 hours PRN Temp greater or equal to 38C (100.4F), Moderate Pain (4 - 6)  cyclobenzaprine 10 milliGRAM(s) Oral three times a day PRN Muscle Spasm  dextrose 40% Gel 15 Gram(s) Oral once PRN Blood Glucose LESS THAN 70 milliGRAM(s)/deciliter  glucagon  Injectable 1 milliGRAM(s) IntraMuscular once PRN Glucose LESS THAN 70 milligrams/deciliter  ondansetron Injectable 4 milliGRAM(s) IV Push every 6 hours PRN Nausea  oxyCODONE    5 mG/acetaminophen 325 mG 1 Tablet(s) Oral every 6 hours PRN Moderate Pain (4 - 6)      MEDICATIONS  (STANDING):  ALPRAZolam 0.5 milliGRAM(s) Oral every 8 hours  artificial  tears Solution 1 Drop(s) Both EYES every 4 hours  ascorbic acid 500 milliGRAM(s) Oral daily  aspirin  chewable 81 milliGRAM(s) Oral daily  chlorhexidine 0.12% Liquid 5 milliLiter(s) Oral Mucosa two times a day  dextrose 5%. 1000 milliLiter(s) (50 mL/Hr) IV Continuous <Continuous>  dextrose 50% Injectable 12.5 Gram(s) IV Push once  dextrose 50% Injectable 25 Gram(s) IV Push once  dextrose 50% Injectable 25 Gram(s) IV Push once  enoxaparin Injectable 40 milliGRAM(s) SubCutaneous daily  ferrous    sulfate Liquid 300 milliGRAM(s) Enteral Tube <User Schedule>  gabapentin 300 milliGRAM(s) Oral two times a day  insulin glargine Injectable (LANTUS) 6 Unit(s) SubCutaneous at bedtime  insulin lispro (HumaLOG) corrective regimen sliding scale   SubCutaneous every 6 hours  lactobacillus acidophilus 1 Tablet(s) Oral two times a day  loratadine 10 milliGRAM(s) Oral daily  montelukast 10 milliGRAM(s) Oral at bedtime  multivitamin/minerals 1 Tablet(s) Oral daily  pantoprazole  Injectable 40 milliGRAM(s) IV Push daily  sertraline 50 milliGRAM(s) Oral daily  simethicone 80 milliGRAM(s) Chew every 6 hours  tigecycline IVPB 50 milliGRAM(s) IV Intermittent every 12 hours  vancomycin    Solution 125 milliGRAM(s) Oral every 6 hours

## 2019-04-30 NOTE — PROGRESS NOTE ADULT - ASSESSMENT
64 yo male Trach/vented/ gastrostomy tube/ sacral osteo?/admitted with cholecystitis vs cholangitis, WBC 09841, normal T lonnie this am, AST/ALT trending lower/ Alk P remains elevated 700's, normal vital signs  -Cont Tigacyl  -Will f/u MRCP and see if MRCP suggest a common bile duct problem (sludge, stone? causing cholangitis) vs a cholecystitis. Last CT scan had shown a normal looking gallbladder. If cholangitis is his problem percutaneous cholecystostomy might not  resolve his problem. The rapid decrease of LFts would favor a common bile duct problem. 64 yo male Trach/vented/ gastrostomy tube/ sacral osteo?/admitted with cholecystitis vs cholangitis, WBC 44185, normal T lonnie this am, AST/ALT trending lower/ Alk P remains elevated 700's, normal vital signs  -Cont Tigacyl  -Will f/u MRCP and see if MRCP suggest a common bile duct problem (sludge, stone? causing cholangitis) vs a cholecystitis. Last CT scan had shown a normal looking gallbladder. If cholangitis is his problem percutaneous cholecystostomy might not  resolve his problem. The rapid decrease of LFts would favor a common bile duct problem. Very elevated Alk P usually comes from common bile duct problem as well. Gallbladder should look very inflammed if cholecystitis is his problem.

## 2019-04-30 NOTE — PROGRESS NOTE ADULT - SUBJECTIVE AND OBJECTIVE BOX
Patient is a 63y old  Male who presents with a chief complaint of suspected sepsis (30 Apr 2019 08:45)      Subective:  Patient afebrile, resting.    PAST MEDICAL & SURGICAL HISTORY:  Pneumonia  UTI (urinary tract infection)  H/O quadriplegia  Essential hypertension  Paralysis  Transverse myelitis  S/P colostomy  PEG (percutaneous endoscopic gastrostomy) status  History of tracheostomy      MEDICATIONS  (STANDING):  ALPRAZolam 0.5 milliGRAM(s) Oral every 8 hours  artificial  tears Solution 1 Drop(s) Both EYES every 4 hours  ascorbic acid 500 milliGRAM(s) Oral daily  aspirin  chewable 81 milliGRAM(s) Oral daily  chlorhexidine 0.12% Liquid 5 milliLiter(s) Oral Mucosa two times a day  dextrose 5%. 1000 milliLiter(s) (50 mL/Hr) IV Continuous <Continuous>  dextrose 50% Injectable 12.5 Gram(s) IV Push once  dextrose 50% Injectable 25 Gram(s) IV Push once  dextrose 50% Injectable 25 Gram(s) IV Push once  enoxaparin Injectable 40 milliGRAM(s) SubCutaneous daily  ferrous    sulfate Liquid 300 milliGRAM(s) Enteral Tube <User Schedule>  gabapentin 300 milliGRAM(s) Oral two times a day  insulin glargine Injectable (LANTUS) 6 Unit(s) SubCutaneous at bedtime  insulin lispro (HumaLOG) corrective regimen sliding scale   SubCutaneous every 6 hours  lactobacillus acidophilus 1 Tablet(s) Oral two times a day  loratadine 10 milliGRAM(s) Oral daily  montelukast 10 milliGRAM(s) Oral at bedtime  multivitamin/minerals 1 Tablet(s) Oral daily  pantoprazole  Injectable 40 milliGRAM(s) IV Push daily  sertraline 50 milliGRAM(s) Oral daily  simethicone 80 milliGRAM(s) Chew every 6 hours  tigecycline IVPB 50 milliGRAM(s) IV Intermittent every 12 hours  vancomycin    Solution 125 milliGRAM(s) Oral every 6 hours    MEDICATIONS  (PRN):  acetaminophen   Tablet .. 650 milliGRAM(s) Oral every 6 hours PRN Temp greater or equal to 38C (100.4F), Moderate Pain (4 - 6)  cyclobenzaprine 10 milliGRAM(s) Oral three times a day PRN Muscle Spasm  dextrose 40% Gel 15 Gram(s) Oral once PRN Blood Glucose LESS THAN 70 milliGRAM(s)/deciliter  glucagon  Injectable 1 milliGRAM(s) IntraMuscular once PRN Glucose LESS THAN 70 milligrams/deciliter  ondansetron Injectable 4 milliGRAM(s) IV Push every 6 hours PRN Nausea  oxyCODONE    5 mG/acetaminophen 325 mG 1 Tablet(s) Oral every 6 hours PRN Moderate Pain (4 - 6)      REVIEW OF SYSTEMS:    RESPIRATORY: No shortness of breath  CARDIOVASCULAR: No chest pain  All other review of systems is negative unless indicated above.    Vital Signs Last 24 Hrs  T(C): 36.8 (30 Apr 2019 05:52), Max: 37.4 (29 Apr 2019 13:00)  T(F): 98.3 (30 Apr 2019 05:52), Max: 99.4 (30 Apr 2019 00:00)  HR: 95 (30 Apr 2019 08:00) (91 - 106)  BP: 125/76 (30 Apr 2019 08:00) (98/60 - 147/91)  BP(mean): 86 (30 Apr 2019 08:00) (68 - 105)  RR: 20 (30 Apr 2019 08:00) (10 - 22)  SpO2: 97% (30 Apr 2019 08:00) (95% - 100%)    PHYSICAL EXAM:    Constitutional: NAD, chronically ill  Respiratory: CTAB  Cardiovascular: S1 and S2, RRR  Gastrointestinal: BS+, soft, NT/ND  Extremities: No peripheral edema    LABS:                        8.3    19.27 )-----------( 596      ( 30 Apr 2019 06:42 )             28.3     04-30    133<L>  |  100  |  22  ----------------------------<  143<H>  4.2   |  26  |  0.57    Ca    8.0<L>      30 Apr 2019 06:42  Phos  3.1     04-30  Mg     2.1     04-30    TPro  6.4  /  Alb  1.7<L>  /  TBili  0.8  /  DBili  0.5<H>  /  AST  79<H>  /  ALT  180<H>  /  AlkPhos  717<H>  04-30    PT/INR - ( 30 Apr 2019 06:42 )   PT: 16.1 sec;   INR: 1.43 ratio         PTT - ( 30 Apr 2019 06:42 )  PTT:36.3 sec  LIVER FUNCTIONS - ( 30 Apr 2019 06:42 )  Alb: 1.7 g/dL / Pro: 6.4 gm/dL / ALK PHOS: 717 U/L / ALT: 180 U/L / AST: 79 U/L / GGT: x             RADIOLOGY & ADDITIONAL STUDIES:

## 2019-04-30 NOTE — PROGRESS NOTE ADULT - ASSESSMENT
Imp:  Favor biliary source of infection, although unclear if pure cholecystitis or CBD stones/sludge contributing.    Rec:  Wife is somewhat unclear about overall goals, approach etc but for now agrees to full treatment  Start with MRCP and from there decide regarding lap cheli, perc, ERCP etc.

## 2019-04-30 NOTE — PROGRESS NOTE ADULT - ASSESSMENT
IMP:    62 y/o male chronic Trach and PEG, OM was on IV abx in NH, H/O CDI on PO vanco admitted with sepsis--AC vs cholangitis with fever, WBC, LFTs 600s and + HIDA scan    Chronic resp failure  Functional Quad    Plan:    Full vent support  MRCP--Rx P findings--May need either PCT vs ERCP   Tigecycline as per ID  PO Vanco  DVT prophy--SCD and LMWH    CCU care--d/w CCU staff, keon Dennis and nik as well as wife at bedside-- All concerns addressed

## 2019-05-01 LAB
ALBUMIN SERPL ELPH-MCNC: 1.6 G/DL — LOW (ref 3.3–5)
ALP SERPL-CCNC: 585 U/L — HIGH (ref 40–120)
ALT FLD-CCNC: 113 U/L — HIGH (ref 12–78)
ANION GAP SERPL CALC-SCNC: 7 MMOL/L — SIGNIFICANT CHANGE UP (ref 5–17)
AST SERPL-CCNC: 25 U/L — SIGNIFICANT CHANGE UP (ref 15–37)
BILIRUB DIRECT SERPL-MCNC: 0.4 MG/DL — HIGH (ref 0–0.2)
BILIRUB INDIRECT FLD-MCNC: 0.2 MG/DL — SIGNIFICANT CHANGE UP (ref 0.2–1)
BILIRUB SERPL-MCNC: 0.6 MG/DL — SIGNIFICANT CHANGE UP (ref 0.2–1.2)
BUN SERPL-MCNC: 24 MG/DL — HIGH (ref 7–23)
CALCIUM SERPL-MCNC: 7.8 MG/DL — LOW (ref 8.5–10.1)
CHLORIDE SERPL-SCNC: 101 MMOL/L — SIGNIFICANT CHANGE UP (ref 96–108)
CO2 SERPL-SCNC: 25 MMOL/L — SIGNIFICANT CHANGE UP (ref 22–31)
CREAT SERPL-MCNC: 0.62 MG/DL — SIGNIFICANT CHANGE UP (ref 0.5–1.3)
GLUCOSE BLDC GLUCOMTR-MCNC: 135 MG/DL — HIGH (ref 70–99)
GLUCOSE BLDC GLUCOMTR-MCNC: 158 MG/DL — HIGH (ref 70–99)
GLUCOSE BLDC GLUCOMTR-MCNC: 167 MG/DL — HIGH (ref 70–99)
GLUCOSE BLDC GLUCOMTR-MCNC: 173 MG/DL — HIGH (ref 70–99)
GLUCOSE SERPL-MCNC: 159 MG/DL — HIGH (ref 70–99)
HCT VFR BLD CALC: 28.8 % — LOW (ref 39–50)
HGB BLD-MCNC: 8.3 G/DL — LOW (ref 13–17)
MAGNESIUM SERPL-MCNC: 2.1 MG/DL — SIGNIFICANT CHANGE UP (ref 1.6–2.6)
MCHC RBC-ENTMCNC: 24.4 PG — LOW (ref 27–34)
MCHC RBC-ENTMCNC: 28.8 GM/DL — LOW (ref 32–36)
MCV RBC AUTO: 84.7 FL — SIGNIFICANT CHANGE UP (ref 80–100)
NRBC # BLD: 0 /100 WBCS — SIGNIFICANT CHANGE UP (ref 0–0)
PHOSPHATE SERPL-MCNC: 3.5 MG/DL — SIGNIFICANT CHANGE UP (ref 2.5–4.5)
PLATELET # BLD AUTO: 545 K/UL — HIGH (ref 150–400)
POTASSIUM SERPL-MCNC: 4.1 MMOL/L — SIGNIFICANT CHANGE UP (ref 3.5–5.3)
POTASSIUM SERPL-SCNC: 4.1 MMOL/L — SIGNIFICANT CHANGE UP (ref 3.5–5.3)
PROT SERPL-MCNC: 6.2 GM/DL — SIGNIFICANT CHANGE UP (ref 6–8.3)
RBC # BLD: 3.4 M/UL — LOW (ref 4.2–5.8)
RBC # FLD: 19.9 % — HIGH (ref 10.3–14.5)
SODIUM SERPL-SCNC: 133 MMOL/L — LOW (ref 135–145)
WBC # BLD: 16.63 K/UL — HIGH (ref 3.8–10.5)
WBC # FLD AUTO: 16.63 K/UL — HIGH (ref 3.8–10.5)

## 2019-05-01 PROCEDURE — 72197 MRI PELVIS W/O & W/DYE: CPT | Mod: 26

## 2019-05-01 RX ORDER — OXYCODONE HYDROCHLORIDE 5 MG/1
10 TABLET ORAL EVERY 4 HOURS
Qty: 0 | Refills: 0 | Status: DISCONTINUED | OUTPATIENT
Start: 2019-05-01 | End: 2019-05-07

## 2019-05-01 RX ORDER — ALPRAZOLAM 0.25 MG
0.5 TABLET ORAL EVERY 8 HOURS
Qty: 0 | Refills: 0 | Status: DISCONTINUED | OUTPATIENT
Start: 2019-05-03 | End: 2019-05-07

## 2019-05-01 RX ORDER — ALPRAZOLAM 0.25 MG
0.5 TABLET ORAL ONCE
Qty: 0 | Refills: 0 | Status: DISCONTINUED | OUTPATIENT
Start: 2019-05-01 | End: 2019-05-01

## 2019-05-01 RX ORDER — OXYCODONE AND ACETAMINOPHEN 5; 325 MG/1; MG/1
1 TABLET ORAL EVERY 4 HOURS
Qty: 0 | Refills: 0 | Status: DISCONTINUED | OUTPATIENT
Start: 2019-05-04 | End: 2019-05-07

## 2019-05-01 RX ORDER — LIDOCAINE 4 G/100G
2 CREAM TOPICAL DAILY
Qty: 0 | Refills: 0 | Status: DISCONTINUED | OUTPATIENT
Start: 2019-05-01 | End: 2019-05-07

## 2019-05-01 RX ADMIN — OXYCODONE HYDROCHLORIDE 10 MILLIGRAM(S): 5 TABLET ORAL at 16:14

## 2019-05-01 RX ADMIN — Medication 81 MILLIGRAM(S): at 11:30

## 2019-05-01 RX ADMIN — SIMETHICONE 80 MILLIGRAM(S): 80 TABLET, CHEWABLE ORAL at 06:27

## 2019-05-01 RX ADMIN — Medication 300 MILLIGRAM(S): at 11:26

## 2019-05-01 RX ADMIN — Medication 1 DROP(S): at 11:25

## 2019-05-01 RX ADMIN — Medication 125 MILLIGRAM(S): at 17:20

## 2019-05-01 RX ADMIN — LIDOCAINE 2 PATCH: 4 CREAM TOPICAL at 12:41

## 2019-05-01 RX ADMIN — Medication 1 TABLET(S): at 11:28

## 2019-05-01 RX ADMIN — Medication 2: at 06:28

## 2019-05-01 RX ADMIN — GABAPENTIN 300 MILLIGRAM(S): 400 CAPSULE ORAL at 06:27

## 2019-05-01 RX ADMIN — FENTANYL CITRATE 1 PATCH: 50 INJECTION INTRAVENOUS at 06:16

## 2019-05-01 RX ADMIN — LIDOCAINE 2 PATCH: 4 CREAM TOPICAL at 23:44

## 2019-05-01 RX ADMIN — Medication 1 DROP(S): at 06:27

## 2019-05-01 RX ADMIN — SIMETHICONE 80 MILLIGRAM(S): 80 TABLET, CHEWABLE ORAL at 17:20

## 2019-05-01 RX ADMIN — OXYCODONE HYDROCHLORIDE 10 MILLIGRAM(S): 5 TABLET ORAL at 22:37

## 2019-05-01 RX ADMIN — Medication 650 MILLIGRAM(S): at 03:56

## 2019-05-01 RX ADMIN — GABAPENTIN 300 MILLIGRAM(S): 400 CAPSULE ORAL at 17:21

## 2019-05-01 RX ADMIN — SERTRALINE 50 MILLIGRAM(S): 25 TABLET, FILM COATED ORAL at 11:29

## 2019-05-01 RX ADMIN — CHLORHEXIDINE GLUCONATE 5 MILLILITER(S): 213 SOLUTION TOPICAL at 17:21

## 2019-05-01 RX ADMIN — Medication 1 DROP(S): at 17:23

## 2019-05-01 RX ADMIN — TIGECYCLINE 105 MILLIGRAM(S): 50 INJECTION, POWDER, LYOPHILIZED, FOR SOLUTION INTRAVENOUS at 06:27

## 2019-05-01 RX ADMIN — Medication 2: at 12:41

## 2019-05-01 RX ADMIN — SIMETHICONE 80 MILLIGRAM(S): 80 TABLET, CHEWABLE ORAL at 11:28

## 2019-05-01 RX ADMIN — MONTELUKAST 10 MILLIGRAM(S): 4 TABLET, CHEWABLE ORAL at 21:36

## 2019-05-01 RX ADMIN — CHLORHEXIDINE GLUCONATE 5 MILLILITER(S): 213 SOLUTION TOPICAL at 06:28

## 2019-05-01 RX ADMIN — Medication 650 MILLIGRAM(S): at 02:56

## 2019-05-01 RX ADMIN — FENTANYL CITRATE 1 PATCH: 50 INJECTION INTRAVENOUS at 17:22

## 2019-05-01 RX ADMIN — Medication 1 TABLET(S): at 06:27

## 2019-05-01 RX ADMIN — Medication 0.5 MILLIGRAM(S): at 14:10

## 2019-05-01 RX ADMIN — Medication 0.5 MILLIGRAM(S): at 21:36

## 2019-05-01 RX ADMIN — Medication 125 MILLIGRAM(S): at 11:28

## 2019-05-01 RX ADMIN — Medication 125 MILLIGRAM(S): at 06:27

## 2019-05-01 RX ADMIN — ENOXAPARIN SODIUM 40 MILLIGRAM(S): 100 INJECTION SUBCUTANEOUS at 11:27

## 2019-05-01 RX ADMIN — LORATADINE 10 MILLIGRAM(S): 10 TABLET ORAL at 11:28

## 2019-05-01 RX ADMIN — PANTOPRAZOLE SODIUM 40 MILLIGRAM(S): 20 TABLET, DELAYED RELEASE ORAL at 11:28

## 2019-05-01 RX ADMIN — Medication 500 MILLIGRAM(S): at 11:28

## 2019-05-01 RX ADMIN — Medication 0.5 MILLIGRAM(S): at 06:27

## 2019-05-01 RX ADMIN — Medication 1 DROP(S): at 21:36

## 2019-05-01 RX ADMIN — LIDOCAINE 2 PATCH: 4 CREAM TOPICAL at 17:22

## 2019-05-01 RX ADMIN — Medication 1 DROP(S): at 14:11

## 2019-05-01 RX ADMIN — Medication 0.5 MILLIGRAM(S): at 17:08

## 2019-05-01 RX ADMIN — OXYCODONE HYDROCHLORIDE 10 MILLIGRAM(S): 5 TABLET ORAL at 21:37

## 2019-05-01 RX ADMIN — OXYCODONE AND ACETAMINOPHEN 1 TABLET(S): 5; 325 TABLET ORAL at 00:50

## 2019-05-01 RX ADMIN — Medication 1 TABLET(S): at 17:20

## 2019-05-01 RX ADMIN — TIGECYCLINE 105 MILLIGRAM(S): 50 INJECTION, POWDER, LYOPHILIZED, FOR SOLUTION INTRAVENOUS at 17:20

## 2019-05-01 RX ADMIN — OXYCODONE AND ACETAMINOPHEN 1 TABLET(S): 5; 325 TABLET ORAL at 11:26

## 2019-05-01 RX ADMIN — OXYCODONE HYDROCHLORIDE 10 MILLIGRAM(S): 5 TABLET ORAL at 15:19

## 2019-05-01 NOTE — GOALS OF CARE CONVERSATION - PERSONAL ADVANCE DIRECTIVE - WHAT MATTERS MOST
Unclear, they both were glad that pt was improving, but pt said clearly he does not feel like he has hope. Will continue to support them both.

## 2019-05-01 NOTE — CHART NOTE - NSCHARTNOTEFT_GEN_A_CORE
HPI:  62yo M admitted  due to fever and leukocytosis.  Already being treated for C difficile colitis and 'osteomyelitis' from sacral decubitus at NH (IV Vanco IV Cefepime PO Vanco).  (2019 23:24)      PERTINENT PMH REVIEWED:  [ X ] YES [ ] NO           Primary Contact: HCP on chart namin) wife Courtney Amin [phone] and 2) Stephen Amin [phone]    HCP [ X ] Surrogate [   ] Guardian [   ]    Mental Status: [ X ] Alert  [ X  ] Oriented [  ] Confused [  ] Lethargic [  ]  Concerns of Depression [  ]- none identified   Anxiety [   ]- anxious at times   Baseline ADLs (prior to admission):  Independent [ ] moderately [ ] fully   Dependent   [ ] moderately [ X ] fully    Family Meeting attendees:    Anticipated Grief: Patient [  X ] Family [ X ]    Goals of Care: Continue with current care and return to [facility]. Pt. not ready for a comfort focus.    Previous Services: [facility]    ADVANCE DIRECTIVES: Pt. is DNR only as pt. is already vent dependent with a trach/PEG    Anticipated D/C Plan:  Back to [facility] on vent unit                    Summary:    Team met with pt. and his wife to discuss goals of care, assist with planning and provide supportive counseling. Pt. is a resident at [facility] on the vent unit. Pt. is vent dependent with a trach and PEG and as per wife has been for about a year and a half and has been a [place] for a year. Pt. discussed how he feels he has been getting worse and not improving. When asked what he was hoping for he expressed that he does not have much hope at this point. He expressed his frustrations of not being able to do anything for himself and continuing to get weaker.    Pts current medical condition and goals of care discussed. Team reviewed all of pts medical issues and gave current update. We discussed pts goals and discussed how at any point he has the options to compassionately wean from the vent and focus on his comfort but understanding his time would be short. Pt. expressed understanding of this however, he reports that he not ready for a comfort focus and to be compassionately weaned at this time. Pt. would like to continue with trach/PEG and returning back to [facility] unit.     We discussed pts wishes regarding resuscitation and explained what this means in detail. After our discussion pt. decided that he would not want to be resuscitated and would want to allow a natural process to happen if his heart were to stop. Pt. consented to DNR. We reviewed MOLST and completed a new MOLST as pt. wanted to set some limits as opposed to previous form. MOLST states DNR, Long term intubation, Send to hospital, Long term feeding tube, Trial of IV Fluids, Determine use of antibiotics, No Dialysis.     Plan at this time is for pt. to return to [facility]. He is not ready for a comfort focused plan at this time however, is aware of his options. Emotional support provided. Our team will continue to follow.

## 2019-05-01 NOTE — CHART NOTE - NSCHARTNOTEFT_GEN_A_CORE
Assessment:   *pt continues to be managed in CCU: severe sepsis with possible cholecystitis with elevated LFT's (improving), s/p MRCP eval'd by GI.  C.Diff (+).    *pt TF had been stopped for GI testing; now resumed and back to goal rate (titrated over the past couple of days).  Per RN now tolerating TF at goal rate.  Pt has been meeting <75% of estimated nutr needs over the past few days 2/2 GI issues.  Current goal rate with prosource TF 4 packets, will provide ~1810Kcal, 135g protein, and 835mL free water.  Pt with no additional free water flushes ordered.  Rec'd additional free water flushes of 45mL q1hr to ensure adequate hydration.  *new wt shows slight wt loss of ~2Kg.  continue to monitor and check daily to track/trend changes.  *(+) trace generalized edema.  (+) colostomy output 5/1/19.    Recommendations:  1) c/w Glucerna 1.5 @ 50mL/hr with 4pkts prosource TF  2) monitor hydration, consider free water flushes of 45mL q1hr  3) monitor TF tolerance, keep back of bed > 35 degrees  4) daily wt checks  5) monitor lytes/mins; replete/correct prn      Diet Prescription: Diet, NPO with Tube Feed:   Tube Feeding Modality: Gastrostomy  Glucerna 1.5 Beau (GLUCERNA1.5)  Total Volume for 24 Hours (mL): 1200  Continuous  Starting Tube Feed Rate {mL per Hour}: 50  Increase Tube Feed Rate by (mL): 0     Every hour  Until Goal Tube Feed Rate (mL per Hour): 50  Tube Feed Duration (in Hours): 24  Tube Feed Start Time: 00:00  No Carb Prosource TF     Qty per Day:  4 (04-30-19 @ 21:32)      Wt Hx:  102.7Kg (4/29)  77.2Kg (4/26)  Weight (kg): 104.3 (04-24-19 @ 19:27)    Estimated Needs:   [x] no change since previous assessment  Estimated Energy Needs (20-25 calories/kg):  · Weight  (lbs) 175.4 lb  · Weight (kg) 79.5 kg  · From (20 beau/kg) 1590 To (25 beau/kg) 1987    Other Calculation:  · Other Calculation  Ht.  70    "        Wt.  175.4  #              BMI 25.1                    #               Pt is at  106  %  IBW    Estimated Protein Needs (1.6-1.8 gm/kg):  · Weight  (lbs) 166  · Weight (kg) 75.2 kg  · From (1.6 g/kg) 120.32 To (1.8 g/kg) 135.36    Nutrition Diagnostic #1:  · Nutrition Diagnostic Terminology #1: Nutrient  · Nutrient: Malnutrition; Pt meets criteria for severe protein/calorie malnutrition in context of acute illness secondary to severe muscle wasting/moderate fat loss.  · Etiology: increased nutrient needs secondary to unhealing pressure ulcers and sepsis.  · Signs/Symptoms: DTI pressure ulcer, severe muscle wasting, moderate fat loss  · Nutrition Intervention: Enteral Nutrition  · Enteral Nutrition: Composition; Concentration; Rate; Volume; Feeding tube flush; 1) Continue with Glucerna 1.5 @ goal rate 50cc/hr (providing total volume of 1100ml/daily) + 4 packs of Prosource TF. Enteral feeding + Prosource will provide 1650 calories/ 135mg protein, 835ml free water).  · Goal/Expected Outcome: Tolerance of enteral feeds, Reduced s/s of malnutrition, Healing wounds.      Nutrition Diagnosis is [x] ongoing  [ ] resolved [ ] not applicable     New Nutrition Diagnosis: [x] not applicable         Pertinent Medications: MEDICATIONS  (STANDING):  ALPRAZolam 0.5 milliGRAM(s) Oral every 8 hours  artificial  tears Solution 1 Drop(s) Both EYES every 4 hours  ascorbic acid 500 milliGRAM(s) Oral daily  aspirin  chewable 81 milliGRAM(s) Oral daily  chlorhexidine 0.12% Liquid 5 milliLiter(s) Oral Mucosa two times a day  dextrose 5%. 1000 milliLiter(s) (50 mL/Hr) IV Continuous <Continuous>  dextrose 50% Injectable 12.5 Gram(s) IV Push once  dextrose 50% Injectable 25 Gram(s) IV Push once  dextrose 50% Injectable 25 Gram(s) IV Push once  enoxaparin Injectable 40 milliGRAM(s) SubCutaneous daily  ferrous    sulfate Liquid 300 milliGRAM(s) Enteral Tube <User Schedule>  gabapentin 300 milliGRAM(s) Oral two times a day  insulin glargine Injectable (LANTUS) 6 Unit(s) SubCutaneous at bedtime  insulin lispro (HumaLOG) corrective regimen sliding scale   SubCutaneous every 6 hours  lactobacillus acidophilus 1 Tablet(s) Oral two times a day  lidocaine   Patch 2 Patch Transdermal daily  loratadine 10 milliGRAM(s) Oral daily  montelukast 10 milliGRAM(s) Oral at bedtime  multivitamin/minerals 1 Tablet(s) Oral daily  pantoprazole  Injectable 40 milliGRAM(s) IV Push daily  sertraline 50 milliGRAM(s) Oral daily  simethicone 80 milliGRAM(s) Chew every 6 hours  tigecycline IVPB 50 milliGRAM(s) IV Intermittent every 12 hours  vancomycin    Solution 125 milliGRAM(s) Oral every 6 hours    MEDICATIONS  (PRN):  acetaminophen   Tablet .. 650 milliGRAM(s) Oral every 6 hours PRN Temp greater or equal to 38C (100.4F), Moderate Pain (4 - 6)  cyclobenzaprine 10 milliGRAM(s) Oral three times a day PRN Muscle Spasm  dextrose 40% Gel 15 Gram(s) Oral once PRN Blood Glucose LESS THAN 70 milliGRAM(s)/deciliter  glucagon  Injectable 1 milliGRAM(s) IntraMuscular once PRN Glucose LESS THAN 70 milligrams/deciliter  ondansetron Injectable 4 milliGRAM(s) IV Push every 6 hours PRN Nausea  oxyCODONE    5 mG/acetaminophen 325 mG 1 Tablet(s) Oral every 6 hours PRN Moderate Pain (4 - 6)  oxyCODONE    IR 10 milliGRAM(s) Oral every 4 hours PRN severe pain or dyspnea    Pertinent Labs: 05-01 Na133 mmol/L<L> Glu 159 mg/dL<H> K+ 4.1 mmol/L Cr  0.62 mg/dL BUN 24 mg/dL<H> 05-01 Phos 3.5 mg/dL 05-01 Alb 1.6 g/dL<L> 04-25 [id number] 6.6 %<H>     CAPILLARY BLOOD GLUCOSE      POCT Blood Glucose.: 158 mg/dL (01 May 2019 06:23)  POCT Blood Glucose.: 139 mg/dL (30 Apr 2019 23:47)  POCT Blood Glucose.: 133 mg/dL (30 Apr 2019 16:36)      Skin: marcos score = 12  PU:  unstageable; sacrum  SDTI: Rt ankle    Monitoring and Evaluation:   [x] PO intake/Nutr support infusion [ x ] Tolerance to Nutr [ x ] weights [ x ] labs[ x ] follow up per protocol  [ ] other:

## 2019-05-01 NOTE — PROGRESS NOTE ADULT - ASSESSMENT
Pt is a 64 y/o M PMhx of HTN, DM, chronic resp failure/vent dependent s/p trach and PEG, transverse myelitis (as result of ? tetanus toxoid injection), functional quadripegia, s/p  colostomy - most recently at  6/2018 due to sepsis of urinary source (CAUTi present on admission), before that was in  hospital 4/2018 with PNA Acinetobacter baumannii) and 3/2018 with UTI (PSAE muti resistant but S to zosyn). Patient was admitted 4/24 due to fever and leukocytosis.  Already being treated for C difficile colitis and 'osteomyelitis' from sacral decubitus at NH (IV Vanco IV Cefepime PO Vanco), Picc line was placed at [facility] on 4/12. History per medical record as patient unable to provide history.  1. 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  - day #7 tigecycline  - tolerating antibiotics without rashes or side effects   - suggest MRI sacrum to evaluate for osteomyelitis, hesitate to empirically place picc line and just treat for osteomyelitis given that patient has multiple resistant bacteria, history of cdiff and this admission was for fever when patient had a picc line in place; his admission may have been for osteomyelitis versus transient passage of gallbladder stone verus line infection; therefore patient and family agreeable to MRI to further delineate for osteomyelitis  - had wound care evaluation  - will continue po vancomycin while on antibiotics to prevent cdiff  2. other issues: per medicine

## 2019-05-01 NOTE — PROGRESS NOTE ADULT - ASSESSMENT
63y old Male coming from [facility] with hx of HTN, DM, chronic resp failure/vent dependent s/p trach and PEG, transverse myelitis (as result of ? tetanus toxoid injection), functional quadriplegia s/p  colostomy - most recently at  2018 due to sepsis of urinary source (CAUTi present on admission), before that was in  hospital 2018 with PNA Acinetobacter baumannii) and 3/2018 with UTI (PSAE muti resistant but S to zosyn). Patient now admitted  due to fever and leukocytosis.  Of note pt was being treated for C difficile colitis and 'osteomyelitis' from sacral decubitus at NH (IV Vanco IV Cefepime PO Vanco), Picc line was placed at [facility] on  and removed on this admission as possible source. Found to have elevated LFTs, persistently elevated Alk Phos. Imaging including CT showed no issue with GB, but US and HIDA showing evidence of cholecystitis vs. cholangitis. Palliative Care consulted to assist with establishing GOC.     1) Pain  - chronic pain as per NH records  - of note, pt was on fentanyl patch 50mcg and oxycodone 20mg standing at NH  - however, pt came in with fevers due to sepsis, justifying removal of patches to prevent bolus dosing during fevers (more absorption at subq level due to vasodilation)  - now on percocet 1 tab q6h prn   - only using 1-2 doses a day  - added oxy IR 10mg q4h prn severe pain (to minimize Tylenol use in setting of elevated LFTs)  - if needing/using multiple doses of above and if remains fever-free will also consider replacing fentanyl patch for longer-lasting pain relief  - recommend use of flexeril when able also, as pain seems to be mostly musculoskeletal and described often as an ache.   - In setting of quadriplegia, this is commonplace   - also added lidocaine patches    2) Sepsis: Cholecystitis vs. Cholangitis  - ID, GI, and surgery notes appreciated  - concern for GB vs common bile duct pathology  - imaging thus far suggesting cholecystitis, but not completely clear and management seems to differ significantly without further clarification  - considerations include cheli vs. perc drain vs. ERCP  - MRCP completed yesterday showing no abnormality with either GB or ducts, thus both GI and surgical team agree to pursue conservative measures and not any procedures    3) Debility  - PPS<40%, dependent for all ADLs  - neurological disorder with unclear etiology, thus difficult to discern how it will truly progress  - dietary notes appreciated- severe protein calorie malnutrition noted     4) Prognosis  - likely overall poor  - in setting of neurological condition that has made pt completely debilitated, multiple hospitalizations for infection, skin breakdown, PPS<40%, albumin 1.7 pt would be at high risk for further complications. He is not a candidate for hospice given his ventilator dependence, but would be a candidate for compassionate wean when/if ever ready for this, which was discussed with pt. He confirmed he is not ready for this, though grateful to know it is an option given he does not feel he has QOL.     5) GOC/Advanced Directives  - pt seems to have capacity for decision making, noting that he would like to be apart of conversations that require this  - HCP on chart namin) wife Courtney Amin [phone] and 2) Stephen Amin [phone]  - MOLST on chart ): no limits REVIEWED AND VOIDED--> New MOLST created: DNR, limited interventions, DNI, c/w feeding tube, trial of IVF, determine use of abx, no dilaysis  - GOC meeting held today- pt was thoughtful about how his life has changed and while not ready to transition fully to comfort focus, he was able to set some limits. Plan is for medical stabilization and return to [facility]. *Spent 62 minutes discussing GOC with family including Advance care planning, explanation and discussion of advance directives, reviewed all treatment/dispo options, and MOLST. See GOC note for further details.    Thank you for including us in Mr. Amin's care. Will continue to follow with you.    Tenzin Garcia MD  Palliative Care Attending

## 2019-05-01 NOTE — PROGRESS NOTE ADULT - SUBJECTIVE AND OBJECTIVE BOX
HPI: Pt seen and examined this am, wife at bedside. Pt noting 7/10 back pain, aching. He thinks percocet is helpful sometimes. He recalls being on fentanyl patch at [facility], his wife confirming, and oxycodone. Shared why they this was likely removed on admission (due to fever) which they understood. Shared plan to adjust regimen which he agreed with. He denied any other discomfort besides occasional dry mouth, open to suggestion to have swabs dipped in flavorful liquid. he was nostalgic for time at another facility when he was able to tolerate feeds, expressing frustration with not being able to do this anymore, and appreciative of rec for at least having flavorful liquid (i.e. diet soda/juice with in DM diet) to swab with.     Pt remains oriented and was still interested in being apart of [facility] discussion. See GOC note for additional details.       PAIN: as above    DYSPNEA: denies      ROS:  Nausea- when turned  Weakness- worsening over time    All other systems reviewed and negative      PHYSICAL EXAM:    Vital Signs Last 24 Hrs  T(C): 37.6 (01 May 2019 10:05), Max: 37.6 (01 May 2019 10:05)  T(F): 99.6 (01 May 2019 10:05), Max: 99.6 (01 May 2019 10:05)  HR: 85 (01 May 2019 09:00) (83 - 92)  BP: 110/67 (01 May 2019 09:00) (97/68 - 126/80)  BP(mean): 76 (01 May 2019 09:00) (71 - 110)  RR: 18 (01 May 2019 09:00) (13 - 22)  SpO2: 100% (01 May 2019 08:00) (96% - 100%)  Daily     Daily     PPSV2: 30  %    General: Middle aged male, trach, able to mouth words, NAD  Mental Status: AOx4 (sometimes shaky on timing)  HEENT: trach in place, try mmm  Lungs: dec at bases bl  Cardiac: + s1 s2 rrr  GI: soft nt nd +bs, PEG and colostomy present with some stool  : condom catheter  Ext: moves shoulders, otherwise functionally quadraplegic  Neuro: limited by above    LABS:                        8.3    16.63 )-----------( 545      ( 01 May 2019 06:51 )             28.8     05-01    133<L>  |  101  |  24<H>  ----------------------------<  159<H>  4.1   |  25  |  0.62    Ca    7.8<L>      01 May 2019 06:51  Phos  3.5     05-01  Mg     2.1     05-01    TPro  6.2  /  Alb  1.6<L>  /  TBili  0.6  /  DBili  0.4<H>  /  AST  25  /  ALT  113<H>  /  AlkPhos  585<H>  05-01    PT/INR - ( 30 Apr 2019 06:42 )   PT: 16.1 sec;   INR: 1.43 ratio         PTT - ( 30 Apr 2019 06:42 )  PTT:36.3 sec  Albumin: Albumin, Serum: 1.6 g/dL (05-01 @ 06:51)      Allergies    No Known Allergies    Intolerances      MEDICATIONS  (STANDING):  ALPRAZolam 0.5 milliGRAM(s) Oral every 8 hours  artificial  tears Solution 1 Drop(s) Both EYES every 4 hours  ascorbic acid 500 milliGRAM(s) Oral daily  aspirin  chewable 81 milliGRAM(s) Oral daily  chlorhexidine 0.12% Liquid 5 milliLiter(s) Oral Mucosa two times a day  dextrose 5%. 1000 milliLiter(s) (50 mL/Hr) IV Continuous <Continuous>  dextrose 50% Injectable 12.5 Gram(s) IV Push once  dextrose 50% Injectable 25 Gram(s) IV Push once  dextrose 50% Injectable 25 Gram(s) IV Push once  enoxaparin Injectable 40 milliGRAM(s) SubCutaneous daily  ferrous    sulfate Liquid 300 milliGRAM(s) Enteral Tube <User Schedule>  gabapentin 300 milliGRAM(s) Oral two times a day  insulin glargine Injectable (LANTUS) 6 Unit(s) SubCutaneous at bedtime  insulin lispro (HumaLOG) corrective regimen sliding scale   SubCutaneous every 6 hours  lactobacillus acidophilus 1 Tablet(s) Oral two times a day  loratadine 10 milliGRAM(s) Oral daily  montelukast 10 milliGRAM(s) Oral at bedtime  multivitamin/minerals 1 Tablet(s) Oral daily  pantoprazole  Injectable 40 milliGRAM(s) IV Push daily  sertraline 50 milliGRAM(s) Oral daily  simethicone 80 milliGRAM(s) Chew every 6 hours  tigecycline IVPB 50 milliGRAM(s) IV Intermittent every 12 hours  vancomycin    Solution 125 milliGRAM(s) Oral every 6 hours    MEDICATIONS  (PRN):  acetaminophen   Tablet .. 650 milliGRAM(s) Oral every 6 hours PRN Temp greater or equal to 38C (100.4F), Moderate Pain (4 - 6)  cyclobenzaprine 10 milliGRAM(s) Oral three times a day PRN Muscle Spasm  dextrose 40% Gel 15 Gram(s) Oral once PRN Blood Glucose LESS THAN 70 milliGRAM(s)/deciliter  glucagon  Injectable 1 milliGRAM(s) IntraMuscular once PRN Glucose LESS THAN 70 milligrams/deciliter  ondansetron Injectable 4 milliGRAM(s) IV Push every 6 hours PRN Nausea  oxyCODONE    5 mG/acetaminophen 325 mG 1 Tablet(s) Oral every 6 hours PRN Moderate Pain (4 - 6)      RADIOLOGY:    EXAM:  MR ABDOMEN                        PROCEDURE DATE:  04/30/2019      IMPRESSION:    No choledocholithiasis or biliary dilatation.    ELBERT BEDOLLA   This document has been electronically signed. Apr30 2019  7:45PM

## 2019-05-01 NOTE — GOALS OF CARE CONVERSATION - PERSONAL ADVANCE DIRECTIVE - CONVERSATION DETAILS
Met with pt and wife to discuss medical issues and overall GOC. Pt explained that he has been declining over the past year, not getting any better. He describes multiple medical issues, including recurrent infections and hospitalizations, that seem to be coming one after the other. He agrees he has not been able to catch a break from all of this. All the while, he is losing more function, noting he used to at least be able to lift his arm to scratch/wipe his own face or taste some kind of food, but now this is not the case. He endorses frustration because of how much he has lost. He notes he was about to retire from his job at the [employer] for 16y, missing his work, and never thinking his [facility] would be spent like this. His wife endorses the same, noting that his QOL has declined. Pt clarified that he does not feel like he has any QOL and often no hope or things to make him happy. He did affirm having the support of his wife and his son Stephen though. We took time to normalize his feelings amidst such an abnormal and frustrating situation. He was open to full review of his medical issues and options moving forward.     Pt was unable to recall full details of what has been going on, feeling like people don't tell him things. He was able to agree with his wife that his major issue was infection and possible issue with his GB. Took time to review his issues in detail from his likely transverse myelitis, without definitive cause, how this has led to progressive weakness and neurological decline, also compromising breathing muscles and leading to need for vent support. Likewise, discussed how this debility increases his chance of recurrent infections or complications like those he came in with on this admission. Discussed how subspecialists did all the tests to discern between infection/inflammation in GB vs ducts or if this was all due to stones/sludge. Shared that MRCP showed no issues with any of this, inclining subspecialists to think this may have all been due to passage of stone or sludge. Clarified that due to his overall picture and because he was improving with medical management, that they opted to pursue conservative therapy as opposed to any further procedures. Pt and wife were relieved to hear this. Pt already feeling like he had been through so much.     Given likelihood of return to [facility] and also remaining risk for further compromise in the future, took time to clarify pt's advanced directives. He did not recall completing MOLST on admission, but was open to review and revision now. He agreed with recommendation for DNR, noting that he is just getting worse and agrees with allowing team to honor his body if it makes the final decision (if his heart stops despite optimal medical care). See full list of decisions below. While pt wanted to return to the hospital, he noted often that his QOL is poor and not improving, not so keen on having to go through more issues moving forward. Let pt know that though he is trach'd and as a result would not qualify for comfort focused plan via hospice, he could at any time let teams know that instead of enduring more interventions that may not be likely to improve his overall QOL, that he would prefer to focusing on comfort, allowing compassionate weaning from the vent.  We made clear that he is not trapped in this cycle of turmoil and had a choice in the matter. He was appreciative for this information, noting that for now, he was not ready for this. His wife also expressed relief with the decisions that he was making, seeing for herself how much pt's ailments have taken a toll on his physical, psychological, and emotional well-being.     Pt also noting a lot of concerns about his treatment at NH. Wife did not seem too concerned about this, but they both agreed that he should let us know what is happening there so we could have this addressed via floor SW when arranging dispo. Reassured him that staff here would do their best to ensure his safety while arranging best plan for him and his family.     Ultimately, plan is to be medically stabilized and dc'd likely back to [facility] when primary team deems this appropriate. Noted intent to follow along to aid with sx management.

## 2019-05-01 NOTE — PROGRESS NOTE ADULT - ASSESSMENT
Imp:  Malnutrition    Rec:  Corepak placed -- will check CXR and start tube feeds.  Will defer PEG until overall improved.

## 2019-05-01 NOTE — PROGRESS NOTE ADULT - ASSESSMENT
62yo M suffering from severe sepsis - source uncertain and several potential sites:  Possible cholecystitis given elevated LFT's, distention of GB and sludge, though may just be cholestasis due to sepsis at a different site  Possible line infection (CLABSi) present on admission due to RUE PICC line - removed in ED to eliminate as source.  C difficile being treated at NH since ? 4/15 - don't know if had (+) stool toxin  Unstageable sacral decubitus [id number] present on admission - possible osteomyelitis already being treated at NH  UA negative so urine not likely to be source  CXR ? possible LEFT infiltrate, but no sputum or respiratory symptoms so unlikely source    hyponatremia   transaminitis as noted able ? cholecystitis/cholangitis vs cholestasis due to sepsis    underlying hx of HTN, DM, chronic resp failure/vent dependent s/p trach and PEG, transverse myelitis (as result of ? tetanus toxoid injection),   functional quadriplegia s/p (? diverting) colostomy    tenuous with obvious potential for deterioration given debilitated state and severity of illness.    Plan at this time is for continued care in CCU  HOB 30  GI and DVT prophylaxis as appropriate  continue Tigecycline - ID input appreciated  Surgical and GI input appreciated.   vent support  TF's   Glycemic control  wound care   Palliative care meeting requested by family/patient  supportive care    Wife Barbara is [phone].

## 2019-05-01 NOTE — PROGRESS NOTE ADULT - SUBJECTIVE AND OBJECTIVE BOX
Patient is a 63y old  Male who presents with a chief complaint of suspected sepsis (25 Apr 2019 09:55)    Date of service: 05-01-19 @ 13:43    Patient lying in bed, notes back, sacral pain; no diarrhea; fevers are intermittent; had MRCP which is not suspect for acute gallbladder disease        ROS: no fever or chills; denies dizziness, no HA, no SOB or cough, no abdominal pain, no diarrhea or constipation; no dysuria, no urinary frequency, no legs pain, no rashes    MEDICATIONS  (STANDING):  ALPRAZolam 0.5 milliGRAM(s) Oral every 8 hours  artificial  tears Solution 1 Drop(s) Both EYES every 4 hours  ascorbic acid 500 milliGRAM(s) Oral daily  aspirin  chewable 81 milliGRAM(s) Oral daily  chlorhexidine 0.12% Liquid 5 milliLiter(s) Oral Mucosa two times a day  dextrose 5%. 1000 milliLiter(s) (50 mL/Hr) IV Continuous <Continuous>  dextrose 50% Injectable 12.5 Gram(s) IV Push once  dextrose 50% Injectable 25 Gram(s) IV Push once  dextrose 50% Injectable 25 Gram(s) IV Push once  enoxaparin Injectable 40 milliGRAM(s) SubCutaneous daily  ferrous    sulfate Liquid 300 milliGRAM(s) Enteral Tube <User Schedule>  gabapentin 300 milliGRAM(s) Oral two times a day  insulin glargine Injectable (LANTUS) 6 Unit(s) SubCutaneous at bedtime  insulin lispro (HumaLOG) corrective regimen sliding scale   SubCutaneous every 6 hours  lactobacillus acidophilus 1 Tablet(s) Oral two times a day  lidocaine   Patch 2 Patch Transdermal daily  loratadine 10 milliGRAM(s) Oral daily  montelukast 10 milliGRAM(s) Oral at bedtime  multivitamin/minerals 1 Tablet(s) Oral daily  pantoprazole  Injectable 40 milliGRAM(s) IV Push daily  sertraline 50 milliGRAM(s) Oral daily  simethicone 80 milliGRAM(s) Chew every 6 hours  tigecycline IVPB 50 milliGRAM(s) IV Intermittent every 12 hours  vancomycin    Solution 125 milliGRAM(s) Oral every 6 hours    MEDICATIONS  (PRN):  acetaminophen   Tablet .. 650 milliGRAM(s) Oral every 6 hours PRN Temp greater or equal to 38C (100.4F), Moderate Pain (4 - 6)  ALPRAZolam 0.5 milliGRAM(s) Oral once PRN prior to MRI  cyclobenzaprine 10 milliGRAM(s) Oral three times a day PRN Muscle Spasm  dextrose 40% Gel 15 Gram(s) Oral once PRN Blood Glucose LESS THAN 70 milliGRAM(s)/deciliter  glucagon  Injectable 1 milliGRAM(s) IntraMuscular once PRN Glucose LESS THAN 70 milligrams/deciliter  ondansetron Injectable 4 milliGRAM(s) IV Push every 6 hours PRN Nausea  oxyCODONE    5 mG/acetaminophen 325 mG 1 Tablet(s) Oral every 6 hours PRN Moderate Pain (4 - 6)  oxyCODONE    IR 10 milliGRAM(s) Oral every 4 hours PRN severe pain or dyspnea      Vital Signs Last 24 Hrs  T(C): 36.1 (01 May 2019 13:14), Max: 37.6 (01 May 2019 10:05)  T(F): 97 (01 May 2019 13:14), Max: 99.6 (01 May 2019 10:05)  HR: 84 (01 May 2019 13:00) (83 - 91)  BP: 94/59 (01 May 2019 13:00) (94/59 - 126/80)  BP(mean): 67 (01 May 2019 13:00) (67 - 110)  RR: 15 (01 May 2019 13:00) (13 - 22)  SpO2: 99% (01 May 2019 13:19) (97% - 100%)    Physical Exam:        PE:    Constitutional: frail looking  HEENT: NC/AT  Neck: trach  Respiratory: respiratory effort normal scattered coarse breath sounds  Cardiovascular: S1S2 regular, no murmurs  Abdomen: soft, not tender, ostomy in place, peg  Musculoskeletal: no muscle tenderness, no joint swelling or tenderness  Extremities: no pedal edema  Neurological/ Psychiatric: on ventilator  Skin: no rashes; no palpable lesions    Labs: all available labs reviewed                    Labs:                         Labs:                        8.3    16.63 )-----------( 545      ( 01 May 2019 06:51 )             28.8     05-01    133<L>  |  101  |  24<H>  ----------------------------<  159<H>  4.1   |  25  |  0.62    Ca    7.8<L>      01 May 2019 06:51  Phos  3.5     05-01  Mg     2.1     05-01    TPro  6.2  /  Alb  1.6<L>  /  TBili  0.6  /  DBili  0.4<H>  /  AST  25  /  ALT  113<H>  /  AlkPhos  585<H>  05-01           Cultures:       Culture - Urine (collected 04-24-19 @ 21:31)  Source: .Urine Clean Catch (Midstream)  Final Report (04-26-19 @ 01:35):    No growth    Culture - Blood (collected 04-24-19 @ 20:08)  Source: .Blood None  Final Report (04-30-19 @ 03:01):    No growth at 5 days.    Culture - Blood (collected 04-24-19 @ 20:08)  Source: .Blood None  Final Report (04-30-19 @ 03:01):    No growth at 5 days.      < from: MR Abdomen No Cont (04.30.19 @ 18:35) >    EXAM:  MR ABDOMEN                            PROCEDURE DATE:  04/30/2019          INTERPRETATION:  MR ABDOMEN    HISTORY:  elevated LFTs    TECHNIQUE: Multiplanar T1-weighted, T2-weighted, and diffusion weighted   sequences were obtained of the abdomen, without contrast.  3D heavily   T2-weighted thin-section MRCP was also performed.    Field Strength: 1.5T    Contrast: None    .    COMPARISON: CT 4/25/2019.    FINDINGS:    LOWER CHEST: Bibasilar consolidation.    LIVER: Normal.  BILE DUCTS: 8 mm common bile duct with normal distal tapering. No filling   defect. No intrahepatic dilatation.  GALLBLADDER: Multiple small stones and sludge without distention, wall   thickening, or pericholecystic inflammation. Cystic duct patency is   observed.Low and medial insertion of the cystic duct.  SPLEEN: Normal.  PANCREAS: Diffuse atrophy.  ADRENALS: Normal.  KIDNEYS/URETERS: No hydronephrosis.    VISUALIZED PORTIONS:    BOWEL: Percutaneous gastrostomy tube.  PERITONEUM: No ascites.  VESSELS:  Normal caliber aorta.  RETROPERITONEUM: No adenopathy.    ABDOMINAL WALL: Normal.  BONES: No aggressive lesion.    IMPRESSION:    No choledocholithiasis or biliary dilatation.    < end of copied text >            < from: US Abdomen Complete (04.24.19 @ 21:40) >  IMPRESSION:     Distended gallbladder with sludge and gallbladder wall thickening/edema   which can be seen in the setting of acute cholecystitis. HIDA scan should   be considered for further evaluation.    < end of copied text >    < from: Xray Chest 1 View-PORTABLE IMMEDIATE (04.24.19 @ 20:39) >  IMPRESSION:   There is a patchy left midlung airspace opacity. No pleural effusion or   pneumothorax. Cardiomediastinal silhouette is unremarkable. Tracheostomy   is visualized. Old right clavicular fracture.    < end of copied text >

## 2019-05-01 NOTE — PROGRESS NOTE ADULT - SUBJECTIVE AND OBJECTIVE BOX
Patient is a 63y old  Male who presents with a chief complaint of suspected sepsis, fevers (01 May 2019 13:43)      Subective:  Labs were off this AM, PEG canceled    PAST MEDICAL & SURGICAL HISTORY:  Pneumonia  UTI (urinary tract infection)  H/O quadriplegia  Essential hypertension  Paralysis  Transverse myelitis  S/P colostomy  PEG (percutaneous endoscopic gastrostomy) status  History of tracheostomy      MEDICATIONS  (STANDING):  ALPRAZolam 0.5 milliGRAM(s) Oral every 8 hours  artificial  tears Solution 1 Drop(s) Both EYES every 4 hours  ascorbic acid 500 milliGRAM(s) Oral daily  aspirin  chewable 81 milliGRAM(s) Oral daily  chlorhexidine 0.12% Liquid 5 milliLiter(s) Oral Mucosa two times a day  dextrose 5%. 1000 milliLiter(s) (50 mL/Hr) IV Continuous <Continuous>  dextrose 50% Injectable 12.5 Gram(s) IV Push once  dextrose 50% Injectable 25 Gram(s) IV Push once  dextrose 50% Injectable 25 Gram(s) IV Push once  enoxaparin Injectable 40 milliGRAM(s) SubCutaneous daily  ferrous    sulfate Liquid 300 milliGRAM(s) Enteral Tube <User Schedule>  gabapentin 300 milliGRAM(s) Oral two times a day  insulin glargine Injectable (LANTUS) 6 Unit(s) SubCutaneous at bedtime  insulin lispro (HumaLOG) corrective regimen sliding scale   SubCutaneous every 6 hours  lactobacillus acidophilus 1 Tablet(s) Oral two times a day  lidocaine   Patch 2 Patch Transdermal daily  loratadine 10 milliGRAM(s) Oral daily  montelukast 10 milliGRAM(s) Oral at bedtime  multivitamin/minerals 1 Tablet(s) Oral daily  pantoprazole  Injectable 40 milliGRAM(s) IV Push daily  sertraline 50 milliGRAM(s) Oral daily  simethicone 80 milliGRAM(s) Chew every 6 hours  tigecycline IVPB 50 milliGRAM(s) IV Intermittent every 12 hours  vancomycin    Solution 125 milliGRAM(s) Oral every 6 hours    MEDICATIONS  (PRN):  acetaminophen   Tablet .. 650 milliGRAM(s) Oral every 6 hours PRN Temp greater or equal to 38C (100.4F), Moderate Pain (4 - 6)  cyclobenzaprine 10 milliGRAM(s) Oral three times a day PRN Muscle Spasm  dextrose 40% Gel 15 Gram(s) Oral once PRN Blood Glucose LESS THAN 70 milliGRAM(s)/deciliter  glucagon  Injectable 1 milliGRAM(s) IntraMuscular once PRN Glucose LESS THAN 70 milligrams/deciliter  ondansetron Injectable 4 milliGRAM(s) IV Push every 6 hours PRN Nausea  oxyCODONE    5 mG/acetaminophen 325 mG 1 Tablet(s) Oral every 6 hours PRN Moderate Pain (4 - 6)  oxyCODONE    IR 10 milliGRAM(s) Oral every 4 hours PRN severe pain or dyspnea      REVIEW OF SYSTEMS:    RESPIRATORY: No shortness of breath  CARDIOVASCULAR: No chest pain  All other review of systems is negative unless indicated above.    Vital Signs Last 24 Hrs  T(C): 36.1 (01 May 2019 13:14), Max: 37.6 (01 May 2019 10:05)  T(F): 97 (01 May 2019 13:14), Max: 99.6 (01 May 2019 10:05)  HR: 86 (01 May 2019 17:00) (83 - 91)  BP: 103/66 (01 May 2019 17:00) (94/59 - 126/69)  BP(mean): 73 (01 May 2019 17:00) (67 - 110)  RR: 18 (01 May 2019 17:00) (13 - 22)  SpO2: 100% (01 May 2019 17:00) (97% - 100%)    PHYSICAL EXAM:    Constitutional: NAD, chronically ill appearing  Respiratory: CTAB  Cardiovascular: S1 and S2, RRR  Gastrointestinal: BS+, soft, NT/ND  Extremities: No peripheral edema    LABS:                        8.3    16.63 )-----------( 545      ( 01 May 2019 06:51 )             28.8     05-01    133<L>  |  101  |  24<H>  ----------------------------<  159<H>  4.1   |  25  |  0.62    Ca    7.8<L>      01 May 2019 06:51  Phos  3.5     05-01  Mg     2.1     05-01    TPro  6.2  /  Alb  1.6<L>  /  TBili  0.6  /  DBili  0.4<H>  /  AST  25  /  ALT  113<H>  /  AlkPhos  585<H>  05-01    PT/INR - ( 30 Apr 2019 06:42 )   PT: 16.1 sec;   INR: 1.43 ratio         PTT - ( 30 Apr 2019 06:42 )  PTT:36.3 sec  LIVER FUNCTIONS - ( 01 May 2019 06:51 )  Alb: 1.6 g/dL / Pro: 6.2 gm/dL / ALK PHOS: 585 U/L / ALT: 113 U/L / AST: 25 U/L / GGT: x             RADIOLOGY & ADDITIONAL STUDIES:

## 2019-05-01 NOTE — PROGRESS NOTE ADULT - SUBJECTIVE AND OBJECTIVE BOX
64 yo male with improving LFts, afebrile, normal MRCP, no evidence of cholecystitis, no CBD stone    I would favor conservative treatment specially with a normal gallbladder on Ct scan and MRCP.

## 2019-05-01 NOTE — GOALS OF CARE CONVERSATION - PERSONAL ADVANCE DIRECTIVE - TREATMENT GUIDELINE COMMENT
MOLST decisions: DNR, limited interventions, DNI, c/w feeding tube, trial of IVF, determine use of abx, no dilaysis

## 2019-05-01 NOTE — PROGRESS NOTE ADULT - SUBJECTIVE AND OBJECTIVE BOX
Patient is a 63y old  Male who presents with a chief complaint of suspected sepsis (30 Apr 2019 11:23)      Subective:  No changes  MRCP negative    PAST MEDICAL & SURGICAL HISTORY:  Pneumonia  UTI (urinary tract infection)  H/O quadriplegia  Essential hypertension  Paralysis  Transverse myelitis  S/P colostomy  PEG (percutaneous endoscopic gastrostomy) status  History of tracheostomy      MEDICATIONS  (STANDING):  ALPRAZolam 0.5 milliGRAM(s) Oral every 8 hours  artificial  tears Solution 1 Drop(s) Both EYES every 4 hours  ascorbic acid 500 milliGRAM(s) Oral daily  aspirin  chewable 81 milliGRAM(s) Oral daily  chlorhexidine 0.12% Liquid 5 milliLiter(s) Oral Mucosa two times a day  dextrose 5%. 1000 milliLiter(s) (50 mL/Hr) IV Continuous <Continuous>  dextrose 50% Injectable 12.5 Gram(s) IV Push once  dextrose 50% Injectable 25 Gram(s) IV Push once  dextrose 50% Injectable 25 Gram(s) IV Push once  enoxaparin Injectable 40 milliGRAM(s) SubCutaneous daily  ferrous    sulfate Liquid 300 milliGRAM(s) Enteral Tube <User Schedule>  gabapentin 300 milliGRAM(s) Oral two times a day  insulin glargine Injectable (LANTUS) 6 Unit(s) SubCutaneous at bedtime  insulin lispro (HumaLOG) corrective regimen sliding scale   SubCutaneous every 6 hours  lactobacillus acidophilus 1 Tablet(s) Oral two times a day  loratadine 10 milliGRAM(s) Oral daily  montelukast 10 milliGRAM(s) Oral at bedtime  multivitamin/minerals 1 Tablet(s) Oral daily  pantoprazole  Injectable 40 milliGRAM(s) IV Push daily  sertraline 50 milliGRAM(s) Oral daily  simethicone 80 milliGRAM(s) Chew every 6 hours  tigecycline IVPB 50 milliGRAM(s) IV Intermittent every 12 hours  vancomycin    Solution 125 milliGRAM(s) Oral every 6 hours    MEDICATIONS  (PRN):  acetaminophen   Tablet .. 650 milliGRAM(s) Oral every 6 hours PRN Temp greater or equal to 38C (100.4F), Moderate Pain (4 - 6)  cyclobenzaprine 10 milliGRAM(s) Oral three times a day PRN Muscle Spasm  dextrose 40% Gel 15 Gram(s) Oral once PRN Blood Glucose LESS THAN 70 milliGRAM(s)/deciliter  glucagon  Injectable 1 milliGRAM(s) IntraMuscular once PRN Glucose LESS THAN 70 milligrams/deciliter  ondansetron Injectable 4 milliGRAM(s) IV Push every 6 hours PRN Nausea  oxyCODONE    5 mG/acetaminophen 325 mG 1 Tablet(s) Oral every 6 hours PRN Moderate Pain (4 - 6)      REVIEW OF SYSTEMS:    RESPIRATORY: No shortness of breath  CARDIOVASCULAR: No chest pain  All other review of systems is negative unless indicated above.    Vital Signs Last 24 Hrs  T(C): 36.8 (01 May 2019 05:17), Max: 37.2 (01 May 2019 01:00)  T(F): 98.2 (01 May 2019 05:17), Max: 98.9 (01 May 2019 01:00)  HR: 83 (01 May 2019 07:00) (83 - 101)  BP: 101/64 (01 May 2019 07:00) (97/68 - 126/80)  BP(mean): 72 (01 May 2019 07:00) (71 - 110)  RR: 19 (01 May 2019 07:00) (13 - 22)  SpO2: 100% (01 May 2019 05:40) (94% - 100%)    PHYSICAL EXAM:    Constitutional: NAD,   Respiratory: CTAB  Cardiovascular: S1 and S2, RRR  Gastrointestinal: BS+, soft, NT/ND  Extremities: No peripheral edema      LABS:                        8.3    16.63 )-----------( 545      ( 01 May 2019 06:51 )             28.8     05-01    133<L>  |  101  |  24<H>  ----------------------------<  159<H>  4.1   |  25  |  0.62    Ca    7.8<L>      01 May 2019 06:51  Phos  3.5     05-01  Mg     2.1     05-01    TPro  6.2  /  Alb  1.6<L>  /  TBili  0.6  /  DBili  0.4<H>  /  AST  25  /  ALT  113<H>  /  AlkPhos  585<H>  05-01    PT/INR - ( 30 Apr 2019 06:42 )   PT: 16.1 sec;   INR: 1.43 ratio         PTT - ( 30 Apr 2019 06:42 )  PTT:36.3 sec  LIVER FUNCTIONS - ( 01 May 2019 06:51 )  Alb: 1.6 g/dL / Pro: 6.2 gm/dL / ALK PHOS: 585 U/L / ALT: 113 U/L / AST: 25 U/L / GGT: x             RADIOLOGY & ADDITIONAL STUDIES:

## 2019-05-01 NOTE — PROGRESS NOTE ADULT - ASSESSMENT
Imp:  At this point, I favor patient has had treated cholecystitis with intermittent passage of sludge/stones through the CBD  Clinically better at this point    Rec:  The appropriate treatment for an otherwise healthy patient would be lap cheli  However, given the overall medical condition, supportive care may be most appropriate  Will defer surgical decision to Dr. Campos and palliative care team

## 2019-05-01 NOTE — PROGRESS NOTE ADULT - SUBJECTIVE AND OBJECTIVE BOX
62yo M admitted 4/24 due to fever and leukocytosis.  Already being treated for C difficile colitis and 'osteomyelitis' from sacral decubitus at NH (IV Vanco IV Cefepime PO Vanco).  In ED fever 104* F and WBC 19k.  Hemodynamics reasonable.  Pt with elevated LFT's - US in ED with distended GB and sludge - pt refused CT scan - seen by surgery team - No acute intervention at this time.     I spoke with patient in detail and with his wife Barbara via telephone [phone] - issues and plans discussed in detail and all questions answered.    Pt with hx of HTN, DM, chronic resp failure/vent dependent s/p trach and PEG, transverse myelitis (as result of ? tetanus toxoid injection), functional quadripegia, s/p (? diverting) colostomy - most recently at  6/2018 due to sepsis of urinary source (CAUTi present on admission), before that was in [facility] 4/2018 with PNA Acinetobacter baumannii) and 3/2018 with UTI (PSAE muti resistant but S to zosyn).    Pt no longer has indwelling catheter, but did have PICC line placed approx 1 week ago.    A copy of his MOLST form was sent from NH and this was confirmed with patient and a new form documented.    Pt is AA and O x 3 NAD NRD and answers questions appropriately via head nod.    CT showed relatively normal GB--Sono showed distended GB and HIDA was +.  LFTS are up.  Pt febrile and was on Tigecycline until 4/29.  Pt is followed by general surgery    4/29:  Tm 102.8  WBC 24K.       4/30:  tigecycline resumed.  GI eval appreciated - MRCP ordered.    5/1: above d/w Dr Maddox who had been caring for the patient - MRCP negative.  D/w Dr Garza.  Explained issues and plans to patient and all questions answered.  Will also speak with wife in same regard.      Mode: AC/ CMV (Assist Control/ Continuous Mandatory Ventilation)  RR (machine): 14  TV (machine): 500  FiO2: 40  PEEP: 5  ITime: 1          PAST MEDICAL & SURGICAL HISTORY:  Pneumonia  UTI (urinary tract infection)  H/O quadriplegia  Essential hypertension  Paralysis  Transverse myelitis  S/P colostomy  PEG (percutaneous endoscopic gastrostomy) status  History of tracheostomy      Allergies    No Known Allergies        ICU Vital Signs Last 24 Hrs  T(C): 37.6 (01 May 2019 10:05), Max: 37.6 (01 May 2019 10:05)  T(F): 99.6 (01 May 2019 10:05), Max: 99.6 (01 May 2019 10:05)  HR: 85 (01 May 2019 09:00) (83 - 97)  BP: 110/67 (01 May 2019 09:00) (97/68 - 126/80)  BP(mean): 76 (01 May 2019 09:00) (71 - 110)  ABP: --  ABP(mean): --  RR: 18 (01 May 2019 09:00) (13 - 22)  SpO2: 100% (01 May 2019 08:00) (96% - 100%)      Mode: AC/ CMV (Assist Control/ Continuous Mandatory Ventilation)  RR (machine): 14  TV (machine): 500  FiO2: 30  PEEP: 5  ITime: 1  PIP: 32      I&O's Summary    30 Apr 2019 07:01  -  01 May 2019 07:00  --------------------------------------------------------  IN: 1201 mL / OUT: 795 mL / NET: 406 mL                              8.3    16.63 )-----------( 545      ( 01 May 2019 06:51 )             28.8       05-01    133<L>  |  101  |  24<H>  ----------------------------<  159<H>  4.1   |  25  |  0.62    Ca    7.8<L>      01 May 2019 06:51  Phos  3.5     05-01  Mg     2.1     05-01    TPro  6.2  /  Alb  1.6<L>  /  TBili  0.6  /  DBili  0.4<H>  /  AST  25  /  ALT  113<H>  /  AlkPhos  585<H>  05-01      CAPILLARY BLOOD GLUCOSE      POCT Blood Glucose.: 158 mg/dL (01 May 2019 06:23)  POCT Blood Glucose.: 139 mg/dL (30 Apr 2019 23:47)  POCT Blood Glucose.: 133 mg/dL (30 Apr 2019 16:36)      LIVER FUNCTIONS - ( 01 May 2019 06:51 )  Alb: 1.6 g/dL / Pro: 6.2 gm/dL / ALK PHOS: 585 U/L / ALT: 113 U/L / AST: 25 U/L / GGT: x           PT/INR - ( 30 Apr 2019 06:42 )   PT: 16.1 sec;   INR: 1.43 ratio         PTT - ( 30 Apr 2019 06:42 )  PTT:36.3 sec        MEDICATIONS  (STANDING):  ALPRAZolam 0.5 milliGRAM(s) Oral every 8 hours  artificial  tears Solution 1 Drop(s) Both EYES every 4 hours  ascorbic acid 500 milliGRAM(s) Oral daily  aspirin  chewable 81 milliGRAM(s) Oral daily  chlorhexidine 0.12% Liquid 5 milliLiter(s) Oral Mucosa two times a day  dextrose 5%. 1000 milliLiter(s) (50 mL/Hr) IV Continuous <Continuous>  dextrose 50% Injectable 12.5 Gram(s) IV Push once  dextrose 50% Injectable 25 Gram(s) IV Push once  dextrose 50% Injectable 25 Gram(s) IV Push once  enoxaparin Injectable 40 milliGRAM(s) SubCutaneous daily  ferrous    sulfate Liquid 300 milliGRAM(s) Enteral Tube <User Schedule>  gabapentin 300 milliGRAM(s) Oral two times a day  insulin glargine Injectable (LANTUS) 6 Unit(s) SubCutaneous at bedtime  insulin lispro (HumaLOG) corrective regimen sliding scale   SubCutaneous every 6 hours  lactobacillus acidophilus 1 Tablet(s) Oral two times a day  loratadine 10 milliGRAM(s) Oral daily  montelukast 10 milliGRAM(s) Oral at bedtime  multivitamin/minerals 1 Tablet(s) Oral daily  pantoprazole  Injectable 40 milliGRAM(s) IV Push daily  sertraline 50 milliGRAM(s) Oral daily  simethicone 80 milliGRAM(s) Chew every 6 hours  tigecycline IVPB 50 milliGRAM(s) IV Intermittent every 12 hours  vancomycin    Solution 125 milliGRAM(s) Oral every 6 hours    MEDICATIONS  (PRN):  acetaminophen   Tablet .. 650 milliGRAM(s) Oral every 6 hours PRN Temp greater or equal to 38C (100.4F), Moderate Pain (4 - 6)  cyclobenzaprine 10 milliGRAM(s) Oral three times a day PRN Muscle Spasm  dextrose 40% Gel 15 Gram(s) Oral once PRN Blood Glucose LESS THAN 70 milliGRAM(s)/deciliter  glucagon  Injectable 1 milliGRAM(s) IntraMuscular once PRN Glucose LESS THAN 70 milligrams/deciliter  ondansetron Injectable 4 milliGRAM(s) IV Push every 6 hours PRN Nausea  oxyCODONE    5 mG/acetaminophen 325 mG 1 Tablet(s) Oral every 6 hours PRN Moderate Pain (4 - 6)    Review of Systems:  · Negative General Symptoms	no chills	  · General Symptoms	fever	  · Negative Skin Symptoms	no rash; no itching	  · Negative Ophthalmologic Symptoms	no diplopia; no photophobia	  · Ophthalmologic Comments	(+) dry eyes	  · Negative ENMT Symptoms	no dysphagia	  · Negative Respiratory and Thorax Symptoms	no wheezing; no dyspnea; no cough	  · Negative Cardiovascular Symptoms	no chest pain; no palpitations	  · Negative Gastrointestinal Symptoms	no nausea; no vomiting	  · Negative General Genitourinary Symptoms	no dysuria	  · Musculoskeletal Symptoms	muscle weakness	  · Negative Neurological Symptoms	no headache; no loss of consciousness	  · Neurological Symptoms	weakness	  · Negative Psychiatric Symptoms	no suicidal ideation	  · Psychiatric Symptoms	depression	  · Negative Hematology Symptoms	no gum bleeding; no nose bleeding	  · Negative Allergic Reactions	no urticaria	  · Additional ROS	ALL other ROS are NEGATIVE.	    Physical Exam:  · Constitutional	detailed exam	  · Constitutional Details	no distress	  · Eyes	detailed exam	  · Eyes Details	PERRL; EOMI	  · ENMT	detailed exam	  · ENMT Details	mouth	  · Mouth	dry	  · Neck	detailed exam	  · Neck Details	supple; no JVD; trach in situ	  · Back	detailed exam	  · Back Details	normal shape	  · Respiratory	detailed exam	  · Respiratory Details	airway patent; breath sounds equal; good air movement	  · Cardiovascular	detailed exam	  · Cardiovascular Details	regular rate and rhythm	  · Gastrointestinal	detailed exam	  · GI Normal	soft; nontender; bowel sounds normal	  · GI Abnormal	distended	  · Gastrointestinal Comments	LLQ ostomy with normal appearing stool, epigastric position G tube in situ.	  · Genitourinary	detailed exam	  · Genitourinary Details Male	normal external genitalia	  · Genitourinary Comments	texas cath in place	  · Extremities	detailed exam	  · Extremities Details	no cyanosis	  · Vascular	Equal and normal pulses (carotid, femoral, dorsalis pedis)	  · Neurological	detailed exam	  · Neurological Details	alert and oriented x 3	  · Motor	minimal movement of UE's - NO movement of LE's, contractures of hands B/L	  · Sensory	grossly intact	  · Skin	detailed exam	  · Skin Details	warm and dry	  · Musculoskeletal	detailed exam	  · Musculoskeletal Details	quadriplegia	  · Psychiatric	detailed exam	  · Psychiatric Details	depressed; flat affect	      DVT Prophylaxis: Lovenox    Advanced Directives:  FULL resuscitation  Discussed with: patient - CRICKET roger as only photocopy sent with pt from NH.    Visit Information: SSQ    ** Time is exclusive of billed procedures and/or teaching and/or routine family updates. (24 Apr 2019 23:24) 64yo M admitted 4/24 due to fever and leukocytosis.  Already being treated for C difficile colitis and 'osteomyelitis' from sacral decubitus at NH (IV Vanco IV Cefepime PO Vanco).  In ED fever 104* F and WBC 19k.  Hemodynamics reasonable.  Pt with elevated LFT's - US in ED with distended GB and sludge - pt refused CT scan - seen by surgery team - No acute intervention at this time.     I spoke with patient in detail and with his wife Barbara via telephone [phone] - issues and plans discussed in detail and all questions answered.    Pt with hx of HTN, DM, chronic resp failure/vent dependent s/p trach and PEG, transverse myelitis (as result of ? tetanus toxoid injection), functional quadripegia, s/p (? diverting) colostomy - most recently at  6/2018 due to sepsis of urinary source (CAUTi present on admission), before that was in [facility] 4/2018 with PNA Acinetobacter baumannii) and 3/2018 with UTI (PSAE muti resistant but S to zosyn).    Pt no longer has indwelling catheter, but did have PICC line placed approx 1 week ago.    A copy of his MOLST form was sent from NH and this was confirmed with patient and a new form documented.    Pt is AA and O x 3 NAD NRD and answers questions appropriately via head nod.    CT showed relatively normal GB--Sono showed distended GB and HIDA was +.  LFTS are up.  Pt febrile and was on Tigecycline until 4/29.  Pt is followed by general surgery    4/29:  Tm 102.8  WBC 24K.       4/30:  tigecycline resumed.  GI eval appreciated - MRCP ordered.    5/1: above d/w Dr Maddox who had been caring for the patient - MRCP negative.  D/w Dr Garza.  Explained issues and plans to patient and all questions answered.  Will also speak with wife in same regard.      Mode: AC/ CMV (Assist Control/ Continuous Mandatory Ventilation)  RR (machine): 14  TV (machine): 500  FiO2: 40  PEEP: 5  ITime: 1          PAST MEDICAL & SURGICAL HISTORY:  Pneumonia  UTI (urinary tract infection)  H/O quadriplegia  Essential hypertension  Paralysis  Transverse myelitis  S/P colostomy  PEG (percutaneous endoscopic gastrostomy) status  History of tracheostomy      Allergies    No Known Allergies        ICU Vital Signs Last 24 Hrs  T(C): 37.6 (01 May 2019 10:05), Max: 37.6 (01 May 2019 10:05)  T(F): 99.6 (01 May 2019 10:05), Max: 99.6 (01 May 2019 10:05)  HR: 85 (01 May 2019 09:00) (83 - 97)  BP: 110/67 (01 May 2019 09:00) (97/68 - 126/80)  BP(mean): 76 (01 May 2019 09:00) (71 - 110)  ABP: --  ABP(mean): --  RR: 18 (01 May 2019 09:00) (13 - 22)  SpO2: 100% (01 May 2019 08:00) (96% - 100%)      Mode: AC/ CMV (Assist Control/ Continuous Mandatory Ventilation)  RR (machine): 14  TV (machine): 500  FiO2: 30  PEEP: 5  ITime: 1  PIP: 32      I&O's Summary    30 Apr 2019 07:01  -  01 May 2019 07:00  --------------------------------------------------------  IN: 1201 mL / OUT: 795 mL / NET: 406 mL                              8.3    16.63 )-----------( 545      ( 01 May 2019 06:51 )             28.8       05-01    133<L>  |  101  |  24<H>  ----------------------------<  159<H>  4.1   |  25  |  0.62    Ca    7.8<L>      01 May 2019 06:51  Phos  3.5     05-01  Mg     2.1     05-01    TPro  6.2  /  Alb  1.6<L>  /  TBili  0.6  /  DBili  0.4<H>  /  AST  25  /  ALT  113<H>  /  AlkPhos  585<H>  05-01      CAPILLARY BLOOD GLUCOSE      POCT Blood Glucose.: 158 mg/dL (01 May 2019 06:23)  POCT Blood Glucose.: 139 mg/dL (30 Apr 2019 23:47)  POCT Blood Glucose.: 133 mg/dL (30 Apr 2019 16:36)      LIVER FUNCTIONS - ( 01 May 2019 06:51 )  Alb: 1.6 g/dL / Pro: 6.2 gm/dL / ALK PHOS: 585 U/L / ALT: 113 U/L / AST: 25 U/L / GGT: x           PT/INR - ( 30 Apr 2019 06:42 )   PT: 16.1 sec;   INR: 1.43 ratio         PTT - ( 30 Apr 2019 06:42 )  PTT:36.3 sec        MEDICATIONS  (STANDING):  ALPRAZolam 0.5 milliGRAM(s) Oral every 8 hours  artificial  tears Solution 1 Drop(s) Both EYES every 4 hours  ascorbic acid 500 milliGRAM(s) Oral daily  aspirin  chewable 81 milliGRAM(s) Oral daily  chlorhexidine 0.12% Liquid 5 milliLiter(s) Oral Mucosa two times a day  dextrose 5%. 1000 milliLiter(s) (50 mL/Hr) IV Continuous <Continuous>  dextrose 50% Injectable 12.5 Gram(s) IV Push once  dextrose 50% Injectable 25 Gram(s) IV Push once  dextrose 50% Injectable 25 Gram(s) IV Push once  enoxaparin Injectable 40 milliGRAM(s) SubCutaneous daily  ferrous    sulfate Liquid 300 milliGRAM(s) Enteral Tube <User Schedule>  gabapentin 300 milliGRAM(s) Oral two times a day  insulin glargine Injectable (LANTUS) 6 Unit(s) SubCutaneous at bedtime  insulin lispro (HumaLOG) corrective regimen sliding scale   SubCutaneous every 6 hours  lactobacillus acidophilus 1 Tablet(s) Oral two times a day  loratadine 10 milliGRAM(s) Oral daily  montelukast 10 milliGRAM(s) Oral at bedtime  multivitamin/minerals 1 Tablet(s) Oral daily  pantoprazole  Injectable 40 milliGRAM(s) IV Push daily  sertraline 50 milliGRAM(s) Oral daily  simethicone 80 milliGRAM(s) Chew every 6 hours  tigecycline IVPB 50 milliGRAM(s) IV Intermittent every 12 hours  vancomycin    Solution 125 milliGRAM(s) Oral every 6 hours    MEDICATIONS  (PRN):  acetaminophen   Tablet .. 650 milliGRAM(s) Oral every 6 hours PRN Temp greater or equal to 38C (100.4F), Moderate Pain (4 - 6)  cyclobenzaprine 10 milliGRAM(s) Oral three times a day PRN Muscle Spasm  dextrose 40% Gel 15 Gram(s) Oral once PRN Blood Glucose LESS THAN 70 milliGRAM(s)/deciliter  glucagon  Injectable 1 milliGRAM(s) IntraMuscular once PRN Glucose LESS THAN 70 milligrams/deciliter  ondansetron Injectable 4 milliGRAM(s) IV Push every 6 hours PRN Nausea  oxyCODONE    5 mG/acetaminophen 325 mG 1 Tablet(s) Oral every 6 hours PRN Moderate Pain (4 - 6)    Review of Systems:  · Negative General Symptoms	no chills	  · General Symptoms	fever	  · Negative Skin Symptoms	no rash; no itching	  · Negative Ophthalmologic Symptoms	no diplopia; no photophobia	  · Ophthalmologic Comments	(+) dry eyes	  · Negative ENMT Symptoms	no dysphagia	  · Negative Respiratory and Thorax Symptoms	no wheezing; no dyspnea; no cough	  · Negative Cardiovascular Symptoms	no chest pain; no palpitations	  · Negative Gastrointestinal Symptoms	no nausea; no vomiting	  · Negative General Genitourinary Symptoms	no dysuria	  · Musculoskeletal Symptoms	muscle weakness	  · Negative Neurological Symptoms	no headache; no loss of consciousness	  · Neurological Symptoms	weakness	  · Negative Psychiatric Symptoms	no suicidal ideation	  · Psychiatric Symptoms	depression	  · Negative Hematology Symptoms	no gum bleeding; no nose bleeding	  · Negative Allergic Reactions	no urticaria	  · Additional ROS	ALL other ROS are NEGATIVE.	    Physical Exam:  · Constitutional	detailed exam	  · Constitutional Details	no distress	  · Eyes	detailed exam	  · Eyes Details	PERRL; EOMI	  · ENMT	detailed exam	  · ENMT Details	mouth	  · Mouth	dry	  · Neck	detailed exam	  · Neck Details	supple; no JVD; trach in situ	  · Back	detailed exam	  · Back Details	normal shape	  · Respiratory	detailed exam	  · Respiratory Details	airway patent; breath sounds equal; good air movement	  · Cardiovascular	detailed exam	  · Cardiovascular Details	regular rate and rhythm	  · Gastrointestinal	detailed exam	  · GI Normal	soft; nontender; bowel sounds normal	  · GI Abnormal	distended	  · Gastrointestinal Comments	LLQ ostomy with normal appearing stool, epigastric position G tube in situ.	  · Genitourinary	detailed exam	  · Genitourinary Details Male	normal external genitalia	  · Genitourinary Comments	texas cath in place	  · Extremities	detailed exam	  · Extremities Details	no cyanosis	  · Vascular	Equal and normal pulses (carotid, femoral, dorsalis pedis)	  · Neurological	detailed exam	  · Neurological Details	alert and oriented x 3	  · Motor	minimal movement of UE's - NO movement of LE's, contractures of hands B/L	  · Sensory	grossly intact	  · Skin	detailed exam	  · Skin Details	warm and dry	  · Musculoskeletal	detailed exam	  · Musculoskeletal Details	quadriplegia	  · Psychiatric	detailed exam	  · Psychiatric Details	depressed; flat affect	      DVT Prophylaxis: Lovenox    Advanced Directives:  FULL resuscitation  Discussed with: patient - CRICKET duran as only photocopy sent with pt from NH.    Visit Information: SSQ    ** Time is exclusive of billed procedures and/or teaching and/or routine family updates.

## 2019-05-02 LAB
ALBUMIN SERPL ELPH-MCNC: 1.7 G/DL — LOW (ref 3.3–5)
ALP SERPL-CCNC: 565 U/L — HIGH (ref 40–120)
ALT FLD-CCNC: 87 U/L — HIGH (ref 12–78)
ANION GAP SERPL CALC-SCNC: 6 MMOL/L — SIGNIFICANT CHANGE UP (ref 5–17)
AST SERPL-CCNC: 16 U/L — SIGNIFICANT CHANGE UP (ref 15–37)
BILIRUB SERPL-MCNC: 0.6 MG/DL — SIGNIFICANT CHANGE UP (ref 0.2–1.2)
BUN SERPL-MCNC: 21 MG/DL — SIGNIFICANT CHANGE UP (ref 7–23)
CALCIUM SERPL-MCNC: 8.2 MG/DL — LOW (ref 8.5–10.1)
CHLORIDE SERPL-SCNC: 100 MMOL/L — SIGNIFICANT CHANGE UP (ref 96–108)
CO2 SERPL-SCNC: 25 MMOL/L — SIGNIFICANT CHANGE UP (ref 22–31)
CREAT SERPL-MCNC: 0.58 MG/DL — SIGNIFICANT CHANGE UP (ref 0.5–1.3)
GLUCOSE BLDC GLUCOMTR-MCNC: 163 MG/DL — HIGH (ref 70–99)
GLUCOSE BLDC GLUCOMTR-MCNC: 220 MG/DL — HIGH (ref 70–99)
GLUCOSE BLDC GLUCOMTR-MCNC: 223 MG/DL — HIGH (ref 70–99)
GLUCOSE SERPL-MCNC: 139 MG/DL — HIGH (ref 70–99)
HCT VFR BLD CALC: 32.6 % — LOW (ref 39–50)
HGB BLD-MCNC: 9.3 G/DL — LOW (ref 13–17)
MCHC RBC-ENTMCNC: 24.2 PG — LOW (ref 27–34)
MCHC RBC-ENTMCNC: 28.5 GM/DL — LOW (ref 32–36)
MCV RBC AUTO: 84.9 FL — SIGNIFICANT CHANGE UP (ref 80–100)
NRBC # BLD: 0 /100 WBCS — SIGNIFICANT CHANGE UP (ref 0–0)
PLATELET # BLD AUTO: 651 K/UL — HIGH (ref 150–400)
POTASSIUM SERPL-MCNC: 4.4 MMOL/L — SIGNIFICANT CHANGE UP (ref 3.5–5.3)
POTASSIUM SERPL-SCNC: 4.4 MMOL/L — SIGNIFICANT CHANGE UP (ref 3.5–5.3)
PROT SERPL-MCNC: 6.7 GM/DL — SIGNIFICANT CHANGE UP (ref 6–8.3)
RBC # BLD: 3.84 M/UL — LOW (ref 4.2–5.8)
RBC # FLD: 20 % — HIGH (ref 10.3–14.5)
SODIUM SERPL-SCNC: 131 MMOL/L — LOW (ref 135–145)
WBC # BLD: 20.56 K/UL — HIGH (ref 3.8–10.5)
WBC # FLD AUTO: 20.56 K/UL — HIGH (ref 3.8–10.5)

## 2019-05-02 RX ORDER — FENTANYL CITRATE 50 UG/ML
1 INJECTION INTRAVENOUS
Qty: 0 | Refills: 0 | Status: DISCONTINUED | OUTPATIENT
Start: 2019-05-02 | End: 2019-05-06

## 2019-05-02 RX ADMIN — SIMETHICONE 80 MILLIGRAM(S): 80 TABLET, CHEWABLE ORAL at 00:16

## 2019-05-02 RX ADMIN — INSULIN GLARGINE 6 UNIT(S): 100 INJECTION, SOLUTION SUBCUTANEOUS at 22:35

## 2019-05-02 RX ADMIN — Medication 125 MILLIGRAM(S): at 00:16

## 2019-05-02 RX ADMIN — Medication 0.5 MILLIGRAM(S): at 14:54

## 2019-05-02 RX ADMIN — INSULIN GLARGINE 6 UNIT(S): 100 INJECTION, SOLUTION SUBCUTANEOUS at 00:16

## 2019-05-02 RX ADMIN — Medication 0.5 MILLIGRAM(S): at 22:35

## 2019-05-02 RX ADMIN — FENTANYL CITRATE 1 PATCH: 50 INJECTION INTRAVENOUS at 12:21

## 2019-05-02 RX ADMIN — LORATADINE 10 MILLIGRAM(S): 10 TABLET ORAL at 12:21

## 2019-05-02 RX ADMIN — Medication 0.5 MILLIGRAM(S): at 06:06

## 2019-05-02 RX ADMIN — Medication 1 DROP(S): at 17:02

## 2019-05-02 RX ADMIN — GABAPENTIN 300 MILLIGRAM(S): 400 CAPSULE ORAL at 06:06

## 2019-05-02 RX ADMIN — MONTELUKAST 10 MILLIGRAM(S): 4 TABLET, CHEWABLE ORAL at 22:34

## 2019-05-02 RX ADMIN — Medication 1 TABLET(S): at 12:21

## 2019-05-02 RX ADMIN — Medication 500 MILLIGRAM(S): at 12:21

## 2019-05-02 RX ADMIN — Medication 2: at 00:16

## 2019-05-02 RX ADMIN — ENOXAPARIN SODIUM 40 MILLIGRAM(S): 100 INJECTION SUBCUTANEOUS at 12:19

## 2019-05-02 RX ADMIN — SIMETHICONE 80 MILLIGRAM(S): 80 TABLET, CHEWABLE ORAL at 22:35

## 2019-05-02 RX ADMIN — CHLORHEXIDINE GLUCONATE 5 MILLILITER(S): 213 SOLUTION TOPICAL at 17:03

## 2019-05-02 RX ADMIN — Medication 1 DROP(S): at 22:00

## 2019-05-02 RX ADMIN — TIGECYCLINE 105 MILLIGRAM(S): 50 INJECTION, POWDER, LYOPHILIZED, FOR SOLUTION INTRAVENOUS at 17:01

## 2019-05-02 RX ADMIN — Medication 125 MILLIGRAM(S): at 17:01

## 2019-05-02 RX ADMIN — Medication 2: at 06:07

## 2019-05-02 RX ADMIN — Medication 125 MILLIGRAM(S): at 06:06

## 2019-05-02 RX ADMIN — LIDOCAINE 2 PATCH: 4 CREAM TOPICAL at 17:01

## 2019-05-02 RX ADMIN — Medication 4: at 12:51

## 2019-05-02 RX ADMIN — CHLORHEXIDINE GLUCONATE 5 MILLILITER(S): 213 SOLUTION TOPICAL at 06:08

## 2019-05-02 RX ADMIN — TIGECYCLINE 105 MILLIGRAM(S): 50 INJECTION, POWDER, LYOPHILIZED, FOR SOLUTION INTRAVENOUS at 06:06

## 2019-05-02 RX ADMIN — Medication 1 DROP(S): at 12:18

## 2019-05-02 RX ADMIN — OXYCODONE HYDROCHLORIDE 10 MILLIGRAM(S): 5 TABLET ORAL at 22:36

## 2019-05-02 RX ADMIN — Medication 81 MILLIGRAM(S): at 12:21

## 2019-05-02 RX ADMIN — SIMETHICONE 80 MILLIGRAM(S): 80 TABLET, CHEWABLE ORAL at 06:06

## 2019-05-02 RX ADMIN — SERTRALINE 50 MILLIGRAM(S): 25 TABLET, FILM COATED ORAL at 12:21

## 2019-05-02 RX ADMIN — Medication 1 TABLET(S): at 17:01

## 2019-05-02 RX ADMIN — SIMETHICONE 80 MILLIGRAM(S): 80 TABLET, CHEWABLE ORAL at 12:21

## 2019-05-02 RX ADMIN — Medication 4: at 22:36

## 2019-05-02 RX ADMIN — PANTOPRAZOLE SODIUM 40 MILLIGRAM(S): 20 TABLET, DELAYED RELEASE ORAL at 12:19

## 2019-05-02 RX ADMIN — Medication 1 DROP(S): at 06:07

## 2019-05-02 RX ADMIN — Medication 125 MILLIGRAM(S): at 12:19

## 2019-05-02 RX ADMIN — ONDANSETRON 4 MILLIGRAM(S): 8 TABLET, FILM COATED ORAL at 00:49

## 2019-05-02 RX ADMIN — FENTANYL CITRATE 1 PATCH: 50 INJECTION INTRAVENOUS at 06:08

## 2019-05-02 RX ADMIN — Medication 1 TABLET(S): at 06:06

## 2019-05-02 RX ADMIN — Medication 125 MILLIGRAM(S): at 22:34

## 2019-05-02 RX ADMIN — GABAPENTIN 300 MILLIGRAM(S): 400 CAPSULE ORAL at 17:15

## 2019-05-02 RX ADMIN — LIDOCAINE 2 PATCH: 4 CREAM TOPICAL at 12:20

## 2019-05-02 RX ADMIN — FENTANYL CITRATE 1 PATCH: 50 INJECTION INTRAVENOUS at 12:20

## 2019-05-02 NOTE — PROGRESS NOTE ADULT - ASSESSMENT
64 yo male with sacral osteomyelitis on IV Abx, normal lFts  -Cont IV abx as per ID  -No surgical intervention needed for sacral decubitus ulcer, cont local wound care, rotate patient in bed frequently every 15-30 min, improve nutritional support.

## 2019-05-02 NOTE — PROGRESS NOTE ADULT - ASSESSMENT
64yo M suffering from severe sepsis - source uncertain and several potential sites:  Possible cholecystitis given elevated LFT's, distention of GB and sludge, though may just be cholestasis due to sepsis at a different site  Possible line infection (CLABSi) present on admission due to RUE PICC line - removed in ED to eliminate as source.  C difficile being treated at NH since ? 4/15 - don't know if had (+) stool toxin  Unstageable sacral decubitus [id number] present on admission - possible osteomyelitis already being treated at NH  UA negative so urine not likely to be source  CXR ? possible LEFT infiltrate, but no sputum or respiratory symptoms so unlikely source    hyponatremia   transaminitis as noted able ? cholecystitis/cholangitis vs cholestasis due to sepsis    underlying hx of HTN, DM, chronic resp failure/vent dependent s/p trach and PEG, transverse myelitis (as result of ? tetanus toxoid injection),   functional quadriplegia s/p (? diverting) colostomy    MR 5/1 confirmed sacral osteomyelitis.  palliative care meeting 5/1 resulted in patient self-determination of DNR status        Plan at this time is for continued care in CCU  HOB 30  GI and DVT prophylaxis as appropriate  continue Tigecycline - ID input appreciated  Surgical and GI input appreciated.   vent support  TF's   Glycemic control  wound care   supportive care    DNR    Wife Barbara is [phone].

## 2019-05-02 NOTE — PROGRESS NOTE ADULT - SUBJECTIVE AND OBJECTIVE BOX
Patient is a 63y old  Male who presents with a chief complaint of suspected sepsis (25 Apr 2019 09:55)    Date of service: 05-02-19 @ 13:53      Patient lying in bed; afebrile      ROS unable to obtain secondary to patient medical condition     MEDICATIONS  (STANDING):  ALPRAZolam 0.5 milliGRAM(s) Oral every 8 hours  artificial  tears Solution 1 Drop(s) Both EYES every 4 hours  ascorbic acid 500 milliGRAM(s) Oral daily  aspirin  chewable 81 milliGRAM(s) Oral daily  chlorhexidine 0.12% Liquid 5 milliLiter(s) Oral Mucosa two times a day  dextrose 5%. 1000 milliLiter(s) (50 mL/Hr) IV Continuous <Continuous>  dextrose 50% Injectable 12.5 Gram(s) IV Push once  dextrose 50% Injectable 25 Gram(s) IV Push once  dextrose 50% Injectable 25 Gram(s) IV Push once  enoxaparin Injectable 40 milliGRAM(s) SubCutaneous daily  fentaNYL   Patch  50 MICROgram(s)/Hr 1 Patch Transdermal every 72 hours  ferrous    sulfate Liquid 300 milliGRAM(s) Enteral Tube <User Schedule>  gabapentin 300 milliGRAM(s) Oral two times a day  insulin glargine Injectable (LANTUS) 6 Unit(s) SubCutaneous at bedtime  insulin lispro (HumaLOG) corrective regimen sliding scale   SubCutaneous every 6 hours  lactobacillus acidophilus 1 Tablet(s) Oral two times a day  lidocaine   Patch 2 Patch Transdermal daily  loratadine 10 milliGRAM(s) Oral daily  montelukast 10 milliGRAM(s) Oral at bedtime  multivitamin/minerals 1 Tablet(s) Oral daily  pantoprazole  Injectable 40 milliGRAM(s) IV Push daily  sertraline 50 milliGRAM(s) Oral daily  simethicone 80 milliGRAM(s) Chew every 6 hours  tigecycline IVPB 50 milliGRAM(s) IV Intermittent every 12 hours  vancomycin    Solution 125 milliGRAM(s) Oral every 6 hours    MEDICATIONS  (PRN):  acetaminophen   Tablet .. 650 milliGRAM(s) Oral every 6 hours PRN Temp greater or equal to 38C (100.4F), Moderate Pain (4 - 6)  cyclobenzaprine 10 milliGRAM(s) Oral three times a day PRN Muscle Spasm  dextrose 40% Gel 15 Gram(s) Oral once PRN Blood Glucose LESS THAN 70 milliGRAM(s)/deciliter  glucagon  Injectable 1 milliGRAM(s) IntraMuscular once PRN Glucose LESS THAN 70 milligrams/deciliter  ondansetron Injectable 4 milliGRAM(s) IV Push every 6 hours PRN Nausea  oxyCODONE    5 mG/acetaminophen 325 mG 1 Tablet(s) Oral every 6 hours PRN Moderate Pain (4 - 6)  oxyCODONE    IR 10 milliGRAM(s) Oral every 4 hours PRN severe pain or dyspnea      Vital Signs Last 24 Hrs  T(C): 37.7 (02 May 2019 11:38), Max: 37.7 (02 May 2019 11:38)  T(F): 99.8 (02 May 2019 11:38), Max: 99.8 (02 May 2019 11:38)  HR: 112 (02 May 2019 12:00) (84 - 113)  BP: 123/73 (02 May 2019 12:00) (98/71 - 143/86)  BP(mean): 84 (02 May 2019 12:00) (70 - 95)  RR: 0 (02 May 2019 12:00) (0 - 23)  SpO2: 99% (02 May 2019 12:00) (91% - 100%)    Physical Exam:      PE:    Constitutional: frail looking  HEENT: NC/AT  Neck: trach  Respiratory: respiratory effort normal scattered coarse breath sounds  Cardiovascular: S1S2 regular, no murmurs  Abdomen: soft, not tender, ostomy in place, peg  Musculoskeletal: no muscle tenderness, no joint swelling or tenderness  Extremities: no pedal edema  Neurological/ Psychiatric: on ventilator  Skin: no rashes; no palpable lesions    Labs: all available labs reviewed                    Labs:                       Labs:                        9.3    20.56 )-----------( 651      ( 02 May 2019 06:15 )             32.6     05-02    131<L>  |  100  |  21  ----------------------------<  139<H>  4.4   |  25  |  0.58    Ca    8.2<L>      02 May 2019 06:15  Phos  3.5     05-01  Mg     2.1     05-01    TPro  6.7  /  Alb  1.7<L>  /  TBili  0.6  /  DBili  x   /  AST  16  /  ALT  87<H>  /  AlkPhos  565<H>  05-02           Cultures:         < from: MR Pelvis Bony Only w/wo IV Cont (05.01.19 @ 18:52) >    EXAM:  MR PELVIS BONY ONLY [facility] IC                            PROCEDURE DATE:  05/01/2019          INTERPRETATION:  MRI OF THE BONY PELVIS.    CLINICAL INFORMATION: Sacral decubitus ulcer. Rule out osteomyelitis.  TECHNIQUE: Multiplanar MR imaging was obtained of the bony pelvis with   and without the administration of intravenous contrast. 10 cc of Gadavist   were administered intravenously.    FINDINGS:    Decubitus ulcer overlies the lower sacrum/coccyx posteriorly. There are   erosive changes with edema and enhancement of the coccyx and S5 sacral   segment, consistent with osteomyelitis. Edema and enhancement is   visualized within the inferior aspect of the S4 segment with mild   decreased T1 marrow signal, consistent with early osteomyelitis. There is   surrounding soft tissue edema and enhancement within the presacral region   anterior to S3 and S4. No discrete fluid collection is visualized.   Heterotopic ossification is visualized within the region of the left   gluteus and piriformis musculature with mild piriformis edema. Partially   imaged visceral pelvic contents are unremarkable.    IMPRESSION:    Sacral decubitus ulcer with osteomyelitis of the S5 vertebral body   segment and of the coccyx as well as early osteomyelitis of the inferior   aspect of the S4 vertebral body segment.     < end of copied text >                < from: MR Abdomen No Cont (04.30.19 @ 18:35) >    EXAM:  MR ABDOMEN                            PROCEDURE DATE:  04/30/2019          INTERPRETATION:  MR ABDOMEN    HISTORY:  elevated LFTs    TECHNIQUE: Multiplanar T1-weighted, T2-weighted, and diffusion weighted   sequences were obtained of the abdomen, without contrast.  3D heavily   T2-weighted thin-section MRCP was also performed.    Field Strength: 1.5T    Contrast: None    .    COMPARISON: CT 4/25/2019.    FINDINGS:    LOWER CHEST: Bibasilar consolidation.    LIVER: Normal.  BILE DUCTS: 8 mm common bile duct with normal distal tapering. No filling   defect. No intrahepatic dilatation.  GALLBLADDER: Multiple small stones and sludge without distention, wall   thickening, or pericholecystic inflammation. Cystic duct patency is   observed.Low and medial insertion of the cystic duct.  SPLEEN: Normal.  PANCREAS: Diffuse atrophy.  ADRENALS: Normal.  KIDNEYS/URETERS: No hydronephrosis.    VISUALIZED PORTIONS:    BOWEL: Percutaneous gastrostomy tube.  PERITONEUM: No ascites.  VESSELS:  Normal caliber aorta.  RETROPERITONEUM: No adenopathy.    ABDOMINAL WALL: Normal.  BONES: No aggressive lesion.    IMPRESSION:    No choledocholithiasis or biliary dilatation.    < end of copied text >            < from: US Abdomen Complete (04.24.19 @ 21:40) >  IMPRESSION:     Distended gallbladder with sludge and gallbladder wall thickening/edema   which can be seen in the setting of acute cholecystitis. HIDA scan should   be considered for further evaluation.    < end of copied text >    < from: Xray Chest 1 View-PORTABLE IMMEDIATE (04.24.19 @ 20:39) >  IMPRESSION:   There is a patchy left midlung airspace opacity. No pleural effusion or   pneumothorax. Cardiomediastinal silhouette is unremarkable. Tracheostomy   is visualized. Old right clavicular fracture.    < end of copied text >

## 2019-05-02 NOTE — PROGRESS NOTE ADULT - ASSESSMENT
63y old Male coming from [facility] with hx of HTN, DM, chronic resp failure/vent dependent s/p trach and PEG, transverse myelitis (as result of ? tetanus toxoid injection), functional quadriplegia s/p  colostomy - most recently at  2018 due to sepsis of urinary source (CAUTi present on admission), before that was in  hospital 2018 with PNA Acinetobacter baumannii) and 3/2018 with UTI (PSAE muti resistant but S to zosyn). Patient now admitted  due to fever and leukocytosis.  Of note pt was being treated for C difficile colitis and 'osteomyelitis' from sacral decubitus at NH (IV Vanco IV Cefepime PO Vanco), Picc line was placed at [facility] on  and removed on this admission as possible source. Found to have elevated LFTs, persistently elevated Alk Phos. Imaging including CT showed no issue with GB, but US and HIDA showing evidence of cholecystitis vs. cholangitis. Palliative Care consulted to assist with establishing GOC.     1) Pain  - chronic pain as per NH records  - of note, pt was on fentanyl patch 50mcg and oxycodone 20mg standing at NH  - however, pt came in with fevers due to sepsis, justifying removal of patches to prevent bolus dosing during fevers (more absorption at subq level due to vasodilation)  - now on percocet 1 tab q6h prn   - only using 1-2 doses a day  - c/w oxy IR 10mg q4h prn severe pain (to minimize Tylenol use in setting of elevated LFTs)  - if needing/using multiple doses of above and if remains fever-free will also consider replacing fentanyl patch for longer-lasting pain relief (only 3 doses or prn's in past 24h)  - recommend use of flexeril when able also, as pain seems to be mostly musculoskeletal and described often as an ache.   - In setting of quadriplegia, this is commonplace   - c/w lidocaine patches    2) Sepsis: Cholecystitis vs. Cholangitis + osteo  - ID, GI, and surgery notes appreciated  - concern for GB vs common bile duct pathology  - imaging originally suggesting cholecystitis, but not completely clear and management options differed significantly without further clarification  - considerations include cheli vs. perc drain vs. ERCP  - MRCP completed  showing no abnormality with either GB or ducts, thus both GI and surgical team agree to pursue conservative measures and not any procedures  - ID commenting that pt likely has osteo and MRI was done  confirming this  - remains on IV abx as a result  - WBC climbing today    3) Debility  - PPS<40%, dependent for all ADLs  - neurological disorder with unclear etiology, thus difficult to discern how it will truly progress  - dietary notes appreciated- severe protein calorie malnutrition noted     4) Prognosis  - likely overall poor  - in setting of neurological condition that has made pt completely debilitated, multiple hospitalizations for infection, skin breakdown w/ osteomyelitis, PPS<40%, albumin 1.7 pt would be at high risk for further complications. He is not a candidate for hospice given his ventilator dependence, but would be a candidate for compassionate wean when/if ever ready for this, which was discussed with pt. He confirmed he is not ready for this, though grateful to know it is an option given he does not feel he has QOL.     5) GOC/Advanced Directives  - pt seems to have capacity for decision making, noting that he would like to be apart of conversations that require this  - HCP on chart namin) wife Courtney Amin [phone] and 2) Stephen Amin [phone]  - MOLST on chart ): no limits REVIEWED AND VOIDED--> New MOLST created: DNR, limited interventions, DNI, c/w feeding tube, trial of IVF, determine use of abx, no dilaysis  - [facility] meeting held - pt was thoughtful about how his life has changed and while not ready to transition fully to comfort focus, he was able to set some limits. Plan is for medical stabilization and return to [facility]. * See [facility] note for further details.    Thank you for including us in Mr. Amin's care. Will continue to follow with you.    Tenzin Garcia MD  Palliative Care Attending

## 2019-05-02 NOTE — PROGRESS NOTE ADULT - SUBJECTIVE AND OBJECTIVE BOX
HPI: Pt seen and examined this am in follow up for sx, no family at bedside. Pt sleeping comfortably, easily awakens to voice. Denies any pain or discomfort. No other issues noted.       PAIN: denies    DYSPNEA: denies      ROS:  All other systems reviewed and negative      PHYSICAL EXAM:    Vital Signs Last 24 Hrs  T(C): 36.6 (02 May 2019 05:05), Max: 37.5 (02 May 2019 02:00)  T(F): 97.9 (02 May 2019 05:05), Max: 99.5 (02 May 2019 02:00)  HR: 109 (02 May 2019 09:06) (84 - 113)  BP: 134/81 (02 May 2019 09:00) (94/59 - 143/86)  BP(mean): 89 (02 May 2019 09:00) (67 - 95)  RR: 22 (02 May 2019 09:06) (15 - 23)  SpO2: 97% (02 May 2019 09:06) (91% - 100%)  Daily     Daily Weight in k.9 (02 May 2019 05:05)    PPSV2: 30  %    General: Middle aged male, trach, able to mouth words, NAD  Mental Status: AOx4 (sometimes shaky on timing)  HEENT: trach in place, dry mmm  Lungs: dec at bases bl  Cardiac: + s1 s2 rrr  GI: soft nt nd +bs, PEG and colostomy present with some stool  : condom catheter  Ext: moves shoulders, otherwise functionally quadriplegic  Neuro: limited by above    LABS:                        9.3    20.56 )-----------( 651      ( 02 May 2019 06:15 )             32.6     -    131<L>  |  100  |  21  ----------------------------<  139<H>  4.4   |  25  |  0.58    Ca    8.2<L>      02 May 2019 06:15  Phos  3.5     05-  Mg     2.1     -    TPro  6.7  /  Alb  1.7<L>  /  TBili  0.6  /  DBili  x   /  AST  16  /  ALT  87<H>  /  AlkPhos  565<H>  05      Albumin: Albumin, Serum: 1.7 g/dL (05-02 @ 06:15)      Allergies    No Known Allergies    Intolerances      MEDICATIONS  (STANDING):  ALPRAZolam 0.5 milliGRAM(s) Oral every 8 hours  artificial  tears Solution 1 Drop(s) Both EYES every 4 hours  ascorbic acid 500 milliGRAM(s) Oral daily  aspirin  chewable 81 milliGRAM(s) Oral daily  chlorhexidine 0.12% Liquid 5 milliLiter(s) Oral Mucosa two times a day  dextrose 5%. 1000 milliLiter(s) (50 mL/Hr) IV Continuous <Continuous>  dextrose 50% Injectable 12.5 Gram(s) IV Push once  dextrose 50% Injectable 25 Gram(s) IV Push once  dextrose 50% Injectable 25 Gram(s) IV Push once  enoxaparin Injectable 40 milliGRAM(s) SubCutaneous daily  fentaNYL   Patch  50 MICROgram(s)/Hr 1 Patch Transdermal every 72 hours  ferrous    sulfate Liquid 300 milliGRAM(s) Enteral Tube <User Schedule>  gabapentin 300 milliGRAM(s) Oral two times a day  insulin glargine Injectable (LANTUS) 6 Unit(s) SubCutaneous at bedtime  insulin lispro (HumaLOG) corrective regimen sliding scale   SubCutaneous every 6 hours  lactobacillus acidophilus 1 Tablet(s) Oral two times a day  lidocaine   Patch 2 Patch Transdermal daily  loratadine 10 milliGRAM(s) Oral daily  montelukast 10 milliGRAM(s) Oral at bedtime  multivitamin/minerals 1 Tablet(s) Oral daily  pantoprazole  Injectable 40 milliGRAM(s) IV Push daily  sertraline 50 milliGRAM(s) Oral daily  simethicone 80 milliGRAM(s) Chew every 6 hours  tigecycline IVPB 50 milliGRAM(s) IV Intermittent every 12 hours  vancomycin    Solution 125 milliGRAM(s) Oral every 6 hours    MEDICATIONS  (PRN):  acetaminophen   Tablet .. 650 milliGRAM(s) Oral every 6 hours PRN Temp greater or equal to 38C (100.4F), Moderate Pain (4 - 6)  cyclobenzaprine 10 milliGRAM(s) Oral three times a day PRN Muscle Spasm  dextrose 40% Gel 15 Gram(s) Oral once PRN Blood Glucose LESS THAN 70 milliGRAM(s)/deciliter  glucagon  Injectable 1 milliGRAM(s) IntraMuscular once PRN Glucose LESS THAN 70 milligrams/deciliter  ondansetron Injectable 4 milliGRAM(s) IV Push every 6 hours PRN Nausea  oxyCODONE    5 mG/acetaminophen 325 mG 1 Tablet(s) Oral every 6 hours PRN Moderate Pain (4 - 6)  oxyCODONE    IR 10 milliGRAM(s) Oral every 4 hours PRN severe pain or dyspnea      RADIOLOGY:  EXAM:  MR PELVIS BONY ONLY [facility] IC                        PROCEDURE DATE:  2019      IMPRESSION:    Sacral decubitus ulcer with osteomyelitis of the S5 vertebral body   segment and of the coccyx as well as early osteomyelitis of the inferior   aspect of the S4 vertebral body segment.     OTTO DIAZ M.D., ATTENDING RADIOLOGIST  This document has been electronically signed. May  2 2019  9:01AM

## 2019-05-02 NOTE — PROGRESS NOTE ADULT - SUBJECTIVE AND OBJECTIVE BOX
64 yo male with sacral osteomyelitis, no issues. MRI showed sacral osteo, no fluid collections. Normal LFts, WBC 20,000. afebrile    Sacral area 3cm opening , 3 cm depth, no necrotic tissue seen  Abd soft, NT, gastrostomy tube in place  Trach/vented    < from: MR Pelvis Bony Only w/wo IV Cont (05.01.19 @ 18:52) >  IMPRESSION:    Sacral decubitus ulcer with osteomyelitis of the S5 vertebral body   segment and of the coccyx as well as early osteomyelitis of the inferior   aspect of the S4 vertebral body segment.     < end of copied text >

## 2019-05-02 NOTE — PROGRESS NOTE ADULT - ASSESSMENT
Pt is a 64 y/o M PMhx of HTN, DM, chronic resp failure/vent dependent s/p trach and PEG, transverse myelitis (as result of ? tetanus toxoid injection), functional quadripegia, s/p  colostomy - most recently at  6/2018 due to sepsis of urinary source (CAUTi present on admission), before that was in  hospital 4/2018 with PNA Acinetobacter baumannii) and 3/2018 with UTI (PSAE muti resistant but S to zosyn). Patient was admitted 4/24 due to fever and leukocytosis.  Already being treated for C difficile colitis and 'osteomyelitis' from sacral decubitus at NH (IV Vanco IV Cefepime PO Vanco), Picc line was placed at [facility] on 4/12. History per medical record as patient unable to provide history.  1. 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 other issues: per medicine

## 2019-05-02 NOTE — PROGRESS NOTE ADULT - SUBJECTIVE AND OBJECTIVE BOX
62yo M admitted 4/24 due to fever and leukocytosis.  Already being treated for C difficile colitis and 'osteomyelitis' from sacral decubitus at NH (IV Vanco IV Cefepime PO Vanco).  In ED fever 104* F and WBC 19k.  Hemodynamics reasonable.  Pt with elevated LFT's - US in ED with distended GB and sludge - pt refused CT scan - seen by surgery team - No acute intervention at this time.     I spoke with patient in detail and with his wife Barbara via telephone [phone] - issues and plans discussed in detail and all questions answered.    Pt with hx of HTN, DM, chronic resp failure/vent dependent s/p trach and PEG, transverse myelitis (as result of ? tetanus toxoid injection), functional quadripegia, s/p (? diverting) colostomy - most recently at  6/2018 due to sepsis of urinary source (CAUTi present on admission), before that was in [facility] 4/2018 with PNA Acinetobacter baumannii) and 3/2018 with UTI (PSAE muti resistant but S to zosyn).    Pt no longer has indwelling catheter, but did have PICC line placed approx 1 week ago.    A copy of his MOLST form was sent from NH and this was confirmed with patient and a new form documented.    Pt is AA and O x 3 NAD NRD and answers questions appropriately via head nod.    CT showed relatively normal GB--Sono showed distended GB and HIDA was +.  LFTS are up.  Pt febrile and was on Tigecycline until 4/29.  Pt is followed by general surgery    4/29:  Tm 102.8  WBC 24K.       4/30:  tigecycline resumed.  GI eval appreciated - MRCP ordered.    5/1: above d/w Dr Maddox who had been caring for the patient - MRCP negative.  D/w Dr Garza.  Explained issues and plans to patient and all questions answered.  Will also speak with wife in same regard.  Addendum: spoke with pt and wife in detail about issues and plans.  D/w Dr Austin as well.  MRI ordered to assess for osteomyelitis.    5/2: MRI (+) for osteomyelitis.        Allergies    No Known Allergies        ICU Vital Signs Last 24 Hrs  T(C): 36.6 (02 May 2019 05:05), Max: 37.6 (01 May 2019 10:05)  T(F): 97.9 (02 May 2019 05:05), Max: 99.6 (01 May 2019 10:05)  HR: 110 (02 May 2019 06:00) (84 - 113)  BP: 112/82 (02 May 2019 06:00) (94/59 - 143/86)  BP(mean): 88 (02 May 2019 06:00) (67 - 95)  ABP: --  ABP(mean): --  RR: 19 (02 May 2019 06:00) (15 - 23)  SpO2: 95% (02 May 2019 06:00) (91% - 100%)      Mode: AC/ CMV (Assist Control/ Continuous Mandatory Ventilation)  RR (machine): 14  TV (machine): 500  FiO2: 35  PEEP: 5  ITime: 1  PIP: 35      I&O's Summary    01 May 2019 07:01  -  02 May 2019 07:00  --------------------------------------------------------  IN: 2061 mL / OUT: 1385 mL / NET: 676 mL                              9.3    20.56 )-----------( 651      ( 02 May 2019 06:15 )             32.6       05-02    131<L>  |  100  |  21  ----------------------------<  139<H>  4.4   |  25  |  0.58    Ca    8.2<L>      02 May 2019 06:15  Phos  3.5     05-01  Mg     2.1     05-01    TPro  6.7  /  Alb  1.7<L>  /  TBili  0.6  /  DBili  x   /  AST  16  /  ALT  87<H>  /  AlkPhos  565<H>  05-02      CAPILLARY BLOOD GLUCOSE      POCT Blood Glucose.: 163 mg/dL (02 May 2019 06:04)  POCT Blood Glucose.: 167 mg/dL (01 May 2019 23:30)  POCT Blood Glucose.: 135 mg/dL (01 May 2019 19:12)  POCT Blood Glucose.: 173 mg/dL (01 May 2019 12:04)      LIVER FUNCTIONS - ( 02 May 2019 06:15 )  Alb: 1.7 g/dL / Pro: 6.7 gm/dL / ALK PHOS: 565 U/L / ALT: 87 U/L / AST: 16 U/L / GGT: x                               MEDICATIONS  (STANDING):  ALPRAZolam 0.5 milliGRAM(s) Oral every 8 hours  artificial  tears Solution 1 Drop(s) Both EYES every 4 hours  ascorbic acid 500 milliGRAM(s) Oral daily  aspirin  chewable 81 milliGRAM(s) Oral daily  chlorhexidine 0.12% Liquid 5 milliLiter(s) Oral Mucosa two times a day  dextrose 5%. 1000 milliLiter(s) (50 mL/Hr) IV Continuous <Continuous>  dextrose 50% Injectable 12.5 Gram(s) IV Push once  dextrose 50% Injectable 25 Gram(s) IV Push once  dextrose 50% Injectable 25 Gram(s) IV Push once  enoxaparin Injectable 40 milliGRAM(s) SubCutaneous daily  ferrous    sulfate Liquid 300 milliGRAM(s) Enteral Tube <User Schedule>  gabapentin 300 milliGRAM(s) Oral two times a day  insulin glargine Injectable (LANTUS) 6 Unit(s) SubCutaneous at bedtime  insulin lispro (HumaLOG) corrective regimen sliding scale   SubCutaneous every 6 hours  lactobacillus acidophilus 1 Tablet(s) Oral two times a day  lidocaine   Patch 2 Patch Transdermal daily  loratadine 10 milliGRAM(s) Oral daily  montelukast 10 milliGRAM(s) Oral at bedtime  multivitamin/minerals 1 Tablet(s) Oral daily  pantoprazole  Injectable 40 milliGRAM(s) IV Push daily  sertraline 50 milliGRAM(s) Oral daily  simethicone 80 milliGRAM(s) Chew every 6 hours  tigecycline IVPB 50 milliGRAM(s) IV Intermittent every 12 hours  vancomycin    Solution 125 milliGRAM(s) Oral every 6 hours    MEDICATIONS  (PRN):  acetaminophen   Tablet .. 650 milliGRAM(s) Oral every 6 hours PRN Temp greater or equal to 38C (100.4F), Moderate Pain (4 - 6)  cyclobenzaprine 10 milliGRAM(s) Oral three times a day PRN Muscle Spasm  dextrose 40% Gel 15 Gram(s) Oral once PRN Blood Glucose LESS THAN 70 milliGRAM(s)/deciliter  glucagon  Injectable 1 milliGRAM(s) IntraMuscular once PRN Glucose LESS THAN 70 milligrams/deciliter  ondansetron Injectable 4 milliGRAM(s) IV Push every 6 hours PRN Nausea  oxyCODONE    5 mG/acetaminophen 325 mG 1 Tablet(s) Oral every 6 hours PRN Moderate Pain (4 - 6)  oxyCODONE    IR 10 milliGRAM(s) Oral every 4 hours PRN severe pain or dyspnea      Review of Systems:  · Negative General Symptoms	no chills	  · General Symptoms	fever	  · Negative Skin Symptoms	no rash; no itching	  · Negative Ophthalmologic Symptoms	no diplopia; no photophobia	  · Ophthalmologic Comments	(+) dry eyes	  · Negative ENMT Symptoms	no dysphagia	  · Negative Respiratory and Thorax Symptoms	no wheezing; no dyspnea; no cough	  · Negative Cardiovascular Symptoms	no chest pain; no palpitations	  · Negative Gastrointestinal Symptoms	no nausea; no vomiting	  · Negative General Genitourinary Symptoms	no dysuria	  · Musculoskeletal Symptoms	muscle weakness	  · Negative Neurological Symptoms	no headache; no loss of consciousness	  · Neurological Symptoms	weakness	  · Negative Psychiatric Symptoms	no suicidal ideation	  · Psychiatric Symptoms	depression	  · Negative Hematology Symptoms	no gum bleeding; no nose bleeding	  · Negative Allergic Reactions	no urticaria	  · Additional ROS	ALL other ROS are NEGATIVE.	    Physical Exam:  · Constitutional	detailed exam	  · Constitutional Details	no distress	  · Eyes	detailed exam	  · Eyes Details	PERRL; EOMI	  · ENMT	detailed exam	  · ENMT Details	mouth	  · Mouth	dry	  · Neck	detailed exam	  · Neck Details	supple; no JVD; trach in situ	  · Back	detailed exam	  · Back Details	normal shape	  · Respiratory	detailed exam	  · Respiratory Details	airway patent; breath sounds equal; good air movement	  · Cardiovascular	detailed exam	  · Cardiovascular Details	regular rate and rhythm	  · Gastrointestinal	detailed exam	  · GI Normal	soft; nontender; bowel sounds normal	  · GI Abnormal	distended	  · Gastrointestinal Comments	LLQ ostomy with normal appearing stool, epigastric position G tube in situ.	  · Genitourinary	detailed exam	  · Genitourinary Details Male	normal external genitalia	  · Genitourinary Comments	texas cath in place	  · Extremities	detailed exam	  · Extremities Details	no cyanosis	  · Vascular	Equal and normal pulses (carotid, femoral, dorsalis pedis)	  · Neurological	detailed exam	  · Neurological Details	alert and oriented x 3	  · Motor	minimal movement of UE's - NO movement of LE's, contractures of hands B/L	  · Sensory	grossly intact	  · Skin	detailed exam	  · Skin Details	warm and dry	  · Musculoskeletal	detailed exam	  · Musculoskeletal Details	quadriplegia	  · Psychiatric	detailed exam	  · Psychiatric Details	depressed; flat affect	      DVT Prophylaxis: Lovenox    Advanced Directives:  FULL resuscitation  Discussed with: patient - MOLST redone as only photocopy sent with pt from NH.    Visit Information: SSQ    ** Time is exclusive of billed procedures and/or teaching and/or routine family updates. 62yo M admitted 4/24 due to fever and leukocytosis.  Already being treated for C difficile colitis and 'osteomyelitis' from sacral decubitus at NH (IV Vanco IV Cefepime PO Vanco).  In ED fever 104* F and WBC 19k.  Hemodynamics reasonable.  Pt with elevated LFT's - US in ED with distended GB and sludge - pt refused CT scan - seen by surgery team - No acute intervention at this time.     I spoke with patient in detail and with his wife Barbara via telephone [phone] - issues and plans discussed in detail and all questions answered.    Pt with hx of HTN, DM, chronic resp failure/vent dependent s/p trach and PEG, transverse myelitis (as result of ? tetanus toxoid injection), functional quadripegia, s/p (? diverting) colostomy - most recently at  6/2018 due to sepsis of urinary source (CAUTi present on admission), before that was in [facility] 4/2018 with PNA Acinetobacter baumannii) and 3/2018 with UTI (PSAE muti resistant but S to zosyn).    Pt no longer has indwelling catheter, but did have PICC line placed approx 1 week ago.    A copy of his MOLST form was sent from NH and this was confirmed with patient and a new form documented.    Pt is AA and O x 3 NAD NRD and answers questions appropriately via head nod.    CT showed relatively normal GB--Sono showed distended GB and HIDA was +.  LFTS are up.  Pt febrile and was on Tigecycline until 4/29.  Pt is followed by general surgery    4/29:  Tm 102.8  WBC 24K.       4/30:  tigecycline resumed.  GI eval appreciated - MRCP ordered.    5/1: above d/w Dr Maddox who had been caring for the patient - MRCP negative.  D/w Dr Garza.  Explained issues and plans to patient and all questions answered.  Will also speak with wife in same regard.  Addendum: spoke with pt and wife in detail about issues and plans.  D/w Dr Austin as well.  MRI ordered to assess for osteomyelitis.    5/2: MRI (+) for osteomyelitis.        Allergies    No Known Allergies        ICU Vital Signs Last 24 Hrs  T(C): 36.6 (02 May 2019 05:05), Max: 37.6 (01 May 2019 10:05)  T(F): 97.9 (02 May 2019 05:05), Max: 99.6 (01 May 2019 10:05)  HR: 110 (02 May 2019 06:00) (84 - 113)  BP: 112/82 (02 May 2019 06:00) (94/59 - 143/86)  BP(mean): 88 (02 May 2019 06:00) (67 - 95)  ABP: --  ABP(mean): --  RR: 19 (02 May 2019 06:00) (15 - 23)  SpO2: 95% (02 May 2019 06:00) (91% - 100%)      Mode: AC/ CMV (Assist Control/ Continuous Mandatory Ventilation)  RR (machine): 14  TV (machine): 500  FiO2: 35  PEEP: 5  ITime: 1  PIP: 35      I&O's Summary    01 May 2019 07:01  -  02 May 2019 07:00  --------------------------------------------------------  IN: 2061 mL / OUT: 1385 mL / NET: 676 mL                              9.3    20.56 )-----------( 651      ( 02 May 2019 06:15 )             32.6       05-02    131<L>  |  100  |  21  ----------------------------<  139<H>  4.4   |  25  |  0.58    Ca    8.2<L>      02 May 2019 06:15  Phos  3.5     05-01  Mg     2.1     05-01    TPro  6.7  /  Alb  1.7<L>  /  TBili  0.6  /  DBili  x   /  AST  16  /  ALT  87<H>  /  AlkPhos  565<H>  05-02      CAPILLARY BLOOD GLUCOSE      POCT Blood Glucose.: 163 mg/dL (02 May 2019 06:04)  POCT Blood Glucose.: 167 mg/dL (01 May 2019 23:30)  POCT Blood Glucose.: 135 mg/dL (01 May 2019 19:12)  POCT Blood Glucose.: 173 mg/dL (01 May 2019 12:04)      LIVER FUNCTIONS - ( 02 May 2019 06:15 )  Alb: 1.7 g/dL / Pro: 6.7 gm/dL / ALK PHOS: 565 U/L / ALT: 87 U/L / AST: 16 U/L / GGT: x             MEDICATIONS  (STANDING):  ALPRAZolam 0.5 milliGRAM(s) Oral every 8 hours  artificial  tears Solution 1 Drop(s) Both EYES every 4 hours  ascorbic acid 500 milliGRAM(s) Oral daily  aspirin  chewable 81 milliGRAM(s) Oral daily  chlorhexidine 0.12% Liquid 5 milliLiter(s) Oral Mucosa two times a day  dextrose 5%. 1000 milliLiter(s) (50 mL/Hr) IV Continuous <Continuous>  dextrose 50% Injectable 12.5 Gram(s) IV Push once  dextrose 50% Injectable 25 Gram(s) IV Push once  dextrose 50% Injectable 25 Gram(s) IV Push once  enoxaparin Injectable 40 milliGRAM(s) SubCutaneous daily  ferrous    sulfate Liquid 300 milliGRAM(s) Enteral Tube <User Schedule>  gabapentin 300 milliGRAM(s) Oral two times a day  insulin glargine Injectable (LANTUS) 6 Unit(s) SubCutaneous at bedtime  insulin lispro (HumaLOG) corrective regimen sliding scale   SubCutaneous every 6 hours  lactobacillus acidophilus 1 Tablet(s) Oral two times a day  lidocaine   Patch 2 Patch Transdermal daily  loratadine 10 milliGRAM(s) Oral daily  montelukast 10 milliGRAM(s) Oral at bedtime  multivitamin/minerals 1 Tablet(s) Oral daily  pantoprazole  Injectable 40 milliGRAM(s) IV Push daily  sertraline 50 milliGRAM(s) Oral daily  simethicone 80 milliGRAM(s) Chew every 6 hours  tigecycline IVPB 50 milliGRAM(s) IV Intermittent every 12 hours  vancomycin    Solution 125 milliGRAM(s) Oral every 6 hours    MEDICATIONS  (PRN):  acetaminophen   Tablet .. 650 milliGRAM(s) Oral every 6 hours PRN Temp greater or equal to 38C (100.4F), Moderate Pain (4 - 6)  cyclobenzaprine 10 milliGRAM(s) Oral three times a day PRN Muscle Spasm  dextrose 40% Gel 15 Gram(s) Oral once PRN Blood Glucose LESS THAN 70 milliGRAM(s)/deciliter  glucagon  Injectable 1 milliGRAM(s) IntraMuscular once PRN Glucose LESS THAN 70 milligrams/deciliter  ondansetron Injectable 4 milliGRAM(s) IV Push every 6 hours PRN Nausea  oxyCODONE    5 mG/acetaminophen 325 mG 1 Tablet(s) Oral every 6 hours PRN Moderate Pain (4 - 6)  oxyCODONE    IR 10 milliGRAM(s) Oral every 4 hours PRN severe pain or dyspnea      Review of Systems:  · Negative General Symptoms	no chills	  · General Symptoms	fever	  · Negative Skin Symptoms	no rash; no itching	  · Negative Ophthalmologic Symptoms	no diplopia; no photophobia	  · Ophthalmologic Comments	(+) dry eyes	  · Negative ENMT Symptoms	no dysphagia	  · Negative Respiratory and Thorax Symptoms	no wheezing; no dyspnea; no cough	  · Negative Cardiovascular Symptoms	no chest pain; no palpitations	  · Negative Gastrointestinal Symptoms	no nausea; no vomiting	  · Negative General Genitourinary Symptoms	no dysuria	  · Musculoskeletal Symptoms	muscle weakness	  · Negative Neurological Symptoms	no headache; no loss of consciousness	  · Neurological Symptoms	weakness	  · Negative Psychiatric Symptoms	no suicidal ideation	  · Psychiatric Symptoms	depression	  · Negative Hematology Symptoms	no gum bleeding; no nose bleeding	  · Negative Allergic Reactions	no urticaria	  · Additional ROS	ALL other ROS are NEGATIVE.	    Physical Exam:  · Constitutional	detailed exam	  · Constitutional Details	no distress	  · Eyes	detailed exam	  · Eyes Details	PERRL; EOMI	  · ENMT	detailed exam	  · ENMT Details	mouth	  · Mouth	dry	  · Neck	detailed exam	  · Neck Details	supple; no JVD; trach in situ	  · Back	detailed exam	  · Back Details	normal shape	  · Respiratory	detailed exam	  · Respiratory Details	airway patent; breath sounds equal; good air movement	  · Cardiovascular	detailed exam	  · Cardiovascular Details	regular rate and rhythm	  · Gastrointestinal	detailed exam	  · GI Normal	soft; nontender; bowel sounds normal	  · GI Abnormal	distended	  · Gastrointestinal Comments	LLQ ostomy with normal appearing stool, epigastric position G tube in situ.	  · Genitourinary	detailed exam	  · Genitourinary Details Male	normal external genitalia	  · Genitourinary Comments	texas cath in place	  · Extremities	detailed exam	  · Extremities Details	no cyanosis	  · Vascular	Equal and normal pulses (carotid, femoral, dorsalis pedis)	  · Neurological	detailed exam	  · Neurological Details	alert and oriented x 3	  · Motor	minimal movement of UE's - NO movement of LE's, contractures of hands B/L	  · Sensory	grossly intact	  · Skin	detailed exam	  · Skin Details	warm and dry	  · Musculoskeletal	detailed exam	  · Musculoskeletal Details	quadriplegia	  · Psychiatric	detailed exam	  · Psychiatric Details	depressed; flat affect	      DVT Prophylaxis: Lovenox    Advanced Directives:  FULL resuscitation  Discussed with: patient - MOLST redone as only photocopy sent with pt from NH.    Visit Information: SSQ    ** Time is exclusive of billed procedures and/or teaching and/or routine family updates. 64yo M admitted 4/24 due to fever and leukocytosis.  Already being treated for C difficile colitis and 'osteomyelitis' from sacral decubitus at NH (IV Vanco IV Cefepime PO Vanco).  In ED fever 104* F and WBC 19k.  Hemodynamics reasonable.  Pt with elevated LFT's - US in ED with distended GB and sludge - pt refused CT scan - seen by surgery team - No acute intervention at this time.     I spoke with patient in detail and with his wife Barbara via telephone [phone] - issues and plans discussed in detail and all questions answered.    Pt with hx of HTN, DM, chronic resp failure/vent dependent s/p trach and PEG, transverse myelitis (as result of ? tetanus toxoid injection), functional quadripegia, s/p (? diverting) colostomy - most recently at  6/2018 due to sepsis of urinary source (CAUTi present on admission), before that was in [facility] 4/2018 with PNA Acinetobacter baumannii) and 3/2018 with UTI (PSAE muti resistant but S to zosyn).    Pt no longer has indwelling catheter, but did have PICC line placed approx 1 week ago.    A copy of his MOLST form was sent from NH and this was confirmed with patient and a new form documented.    Pt is AA and O x 3 NAD NRD and answers questions appropriately via head nod.    CT showed relatively normal GB--Sono showed distended GB and HIDA was +.  LFTS are up.  Pt febrile and was on Tigecycline until 4/29.  Pt is followed by general surgery    4/29:  Tm 102.8  WBC 24K.       4/30:  tigecycline resumed.  GI eval appreciated - MRCP ordered.    5/1: above d/w Dr Maddox who had been caring for the patient - MRCP negative.  D/w Dr Garza.  Explained issues and plans to patient and all questions answered.  Will also speak with wife in same regard.  Addendum: spoke with pt and wife in detail about issues and plans.  D/w Dr Austin as well.  MRI ordered to assess for osteomyelitis.    5/2: MRI (+) for osteomyelitis.  Discussed with Dr Campos from surgery - No role of debridement at present.  D/w ID - will consider PICC and regimen for return to [facility].      Allergies    No Known Allergies        ICU Vital Signs Last 24 Hrs  T(C): 36.6 (02 May 2019 05:05), Max: 37.6 (01 May 2019 10:05)  T(F): 97.9 (02 May 2019 05:05), Max: 99.6 (01 May 2019 10:05)  HR: 110 (02 May 2019 06:00) (84 - 113)  BP: 112/82 (02 May 2019 06:00) (94/59 - 143/86)  BP(mean): 88 (02 May 2019 06:00) (67 - 95)  ABP: --  ABP(mean): --  RR: 19 (02 May 2019 06:00) (15 - 23)  SpO2: 95% (02 May 2019 06:00) (91% - 100%)      Mode: AC/ CMV (Assist Control/ Continuous Mandatory Ventilation)  RR (machine): 14  TV (machine): 500  FiO2: 35  PEEP: 5  ITime: 1  PIP: 35      I&O's Summary    01 May 2019 07:01  -  02 May 2019 07:00  --------------------------------------------------------  IN: 2061 mL / OUT: 1385 mL / NET: 676 mL                              9.3    20.56 )-----------( 651      ( 02 May 2019 06:15 )             32.6       05-02    131<L>  |  100  |  21  ----------------------------<  139<H>  4.4   |  25  |  0.58    Ca    8.2<L>      02 May 2019 06:15  Phos  3.5     05-01  Mg     2.1     05-01    TPro  6.7  /  Alb  1.7<L>  /  TBili  0.6  /  DBili  x   /  AST  16  /  ALT  87<H>  /  AlkPhos  565<H>  05-02      CAPILLARY BLOOD GLUCOSE      POCT Blood Glucose.: 163 mg/dL (02 May 2019 06:04)  POCT Blood Glucose.: 167 mg/dL (01 May 2019 23:30)  POCT Blood Glucose.: 135 mg/dL (01 May 2019 19:12)  POCT Blood Glucose.: 173 mg/dL (01 May 2019 12:04)      LIVER FUNCTIONS - ( 02 May 2019 06:15 )  Alb: 1.7 g/dL / Pro: 6.7 gm/dL / ALK PHOS: 565 U/L / ALT: 87 U/L / AST: 16 U/L / GGT: x             MEDICATIONS  (STANDING):  ALPRAZolam 0.5 milliGRAM(s) Oral every 8 hours  artificial  tears Solution 1 Drop(s) Both EYES every 4 hours  ascorbic acid 500 milliGRAM(s) Oral daily  aspirin  chewable 81 milliGRAM(s) Oral daily  chlorhexidine 0.12% Liquid 5 milliLiter(s) Oral Mucosa two times a day  dextrose 5%. 1000 milliLiter(s) (50 mL/Hr) IV Continuous <Continuous>  dextrose 50% Injectable 12.5 Gram(s) IV Push once  dextrose 50% Injectable 25 Gram(s) IV Push once  dextrose 50% Injectable 25 Gram(s) IV Push once  enoxaparin Injectable 40 milliGRAM(s) SubCutaneous daily  ferrous    sulfate Liquid 300 milliGRAM(s) Enteral Tube <User Schedule>  gabapentin 300 milliGRAM(s) Oral two times a day  insulin glargine Injectable (LANTUS) 6 Unit(s) SubCutaneous at bedtime  insulin lispro (HumaLOG) corrective regimen sliding scale   SubCutaneous every 6 hours  lactobacillus acidophilus 1 Tablet(s) Oral two times a day  lidocaine   Patch 2 Patch Transdermal daily  loratadine 10 milliGRAM(s) Oral daily  montelukast 10 milliGRAM(s) Oral at bedtime  multivitamin/minerals 1 Tablet(s) Oral daily  pantoprazole  Injectable 40 milliGRAM(s) IV Push daily  sertraline 50 milliGRAM(s) Oral daily  simethicone 80 milliGRAM(s) Chew every 6 hours  tigecycline IVPB 50 milliGRAM(s) IV Intermittent every 12 hours  vancomycin    Solution 125 milliGRAM(s) Oral every 6 hours    MEDICATIONS  (PRN):  acetaminophen   Tablet .. 650 milliGRAM(s) Oral every 6 hours PRN Temp greater or equal to 38C (100.4F), Moderate Pain (4 - 6)  cyclobenzaprine 10 milliGRAM(s) Oral three times a day PRN Muscle Spasm  dextrose 40% Gel 15 Gram(s) Oral once PRN Blood Glucose LESS THAN 70 milliGRAM(s)/deciliter  glucagon  Injectable 1 milliGRAM(s) IntraMuscular once PRN Glucose LESS THAN 70 milligrams/deciliter  ondansetron Injectable 4 milliGRAM(s) IV Push every 6 hours PRN Nausea  oxyCODONE    5 mG/acetaminophen 325 mG 1 Tablet(s) Oral every 6 hours PRN Moderate Pain (4 - 6)  oxyCODONE    IR 10 milliGRAM(s) Oral every 4 hours PRN severe pain or dyspnea      Review of Systems:  · Negative General Symptoms	no chills	  · General Symptoms	fever	  · Negative Skin Symptoms	no rash; no itching	  · Negative Ophthalmologic Symptoms	no diplopia; no photophobia	  · Ophthalmologic Comments	(+) dry eyes	  · Negative ENMT Symptoms	no dysphagia	  · Negative Respiratory and Thorax Symptoms	no wheezing; no dyspnea; no cough	  · Negative Cardiovascular Symptoms	no chest pain; no palpitations	  · Negative Gastrointestinal Symptoms	no nausea; no vomiting	  · Negative General Genitourinary Symptoms	no dysuria	  · Musculoskeletal Symptoms	muscle weakness	  · Negative Neurological Symptoms	no headache; no loss of consciousness	  · Neurological Symptoms	weakness	  · Negative Psychiatric Symptoms	no suicidal ideation	  · Psychiatric Symptoms	depression	  · Negative Hematology Symptoms	no gum bleeding; no nose bleeding	  · Negative Allergic Reactions	no urticaria	  · Additional ROS	ALL other ROS are NEGATIVE.	    Physical Exam:  · Constitutional	detailed exam	  · Constitutional Details	no distress	  · Eyes	detailed exam	  · Eyes Details	PERRL; EOMI	  · ENMT	detailed exam	  · ENMT Details	mouth	  · Mouth	dry	  · Neck	detailed exam	  · Neck Details	supple; no JVD; trach in situ	  · Back	detailed exam	  · Back Details	normal shape	  · Respiratory	detailed exam	  · Respiratory Details	airway patent; breath sounds equal; good air movement	  · Cardiovascular	detailed exam	  · Cardiovascular Details	regular rate and rhythm	  · Gastrointestinal	detailed exam	  · GI Normal	soft; nontender; bowel sounds normal	  · GI Abnormal	distended	  · Gastrointestinal Comments	LLQ ostomy with normal appearing stool, epigastric position G tube in situ.	  · Genitourinary	detailed exam	  · Genitourinary Details Male	normal external genitalia	  · Genitourinary Comments	texas cath in place	  · Extremities	detailed exam	  · Extremities Details	no cyanosis	  · Vascular	Equal and normal pulses (carotid, femoral, dorsalis pedis)	  · Neurological	detailed exam	  · Neurological Details	alert and oriented x 3	  · Motor	minimal movement of UE's - NO movement of LE's, contractures of hands B/L	  · Sensory	grossly intact	  · Skin	detailed exam	  · Skin Details	warm and dry	  · Musculoskeletal	detailed exam	  · Musculoskeletal Details	quadriplegia	  · Psychiatric	detailed exam	  · Psychiatric Details	depressed; flat affect	      DVT Prophylaxis: Lovenox    Advanced Directives:  DNR  Discussed with: patient - MOLST completed by Pall care team.    Visit Information: SSQ    ** Time is exclusive of billed procedures and/or teaching and/or routine family updates.

## 2019-05-03 LAB
ALBUMIN SERPL ELPH-MCNC: 1.4 G/DL — LOW (ref 3.3–5)
ALP SERPL-CCNC: 439 U/L — HIGH (ref 40–120)
ALT FLD-CCNC: 51 U/L — SIGNIFICANT CHANGE UP (ref 12–78)
ANION GAP SERPL CALC-SCNC: 4 MMOL/L — LOW (ref 5–17)
AST SERPL-CCNC: 11 U/L — LOW (ref 15–37)
BILIRUB SERPL-MCNC: 0.6 MG/DL — SIGNIFICANT CHANGE UP (ref 0.2–1.2)
BUN SERPL-MCNC: 18 MG/DL — SIGNIFICANT CHANGE UP (ref 7–23)
CALCIUM SERPL-MCNC: 8 MG/DL — LOW (ref 8.5–10.1)
CHLORIDE SERPL-SCNC: 98 MMOL/L — SIGNIFICANT CHANGE UP (ref 96–108)
CO2 SERPL-SCNC: 26 MMOL/L — SIGNIFICANT CHANGE UP (ref 22–31)
CREAT SERPL-MCNC: 0.48 MG/DL — LOW (ref 0.5–1.3)
GLUCOSE BLDC GLUCOMTR-MCNC: 150 MG/DL — HIGH (ref 70–99)
GLUCOSE BLDC GLUCOMTR-MCNC: 190 MG/DL — HIGH (ref 70–99)
GLUCOSE BLDC GLUCOMTR-MCNC: 201 MG/DL — HIGH (ref 70–99)
GLUCOSE BLDC GLUCOMTR-MCNC: 220 MG/DL — HIGH (ref 70–99)
GLUCOSE SERPL-MCNC: 200 MG/DL — HIGH (ref 70–99)
HCT VFR BLD CALC: 28.8 % — LOW (ref 39–50)
HGB BLD-MCNC: 8.3 G/DL — LOW (ref 13–17)
MCHC RBC-ENTMCNC: 24.8 PG — LOW (ref 27–34)
MCHC RBC-ENTMCNC: 28.8 GM/DL — LOW (ref 32–36)
MCV RBC AUTO: 86 FL — SIGNIFICANT CHANGE UP (ref 80–100)
NRBC # BLD: 0 /100 WBCS — SIGNIFICANT CHANGE UP (ref 0–0)
PLATELET # BLD AUTO: 559 K/UL — HIGH (ref 150–400)
POTASSIUM SERPL-MCNC: 4.6 MMOL/L — SIGNIFICANT CHANGE UP (ref 3.5–5.3)
POTASSIUM SERPL-SCNC: 4.6 MMOL/L — SIGNIFICANT CHANGE UP (ref 3.5–5.3)
PROT SERPL-MCNC: 6 GM/DL — SIGNIFICANT CHANGE UP (ref 6–8.3)
RBC # BLD: 3.35 M/UL — LOW (ref 4.2–5.8)
RBC # FLD: 20.2 % — HIGH (ref 10.3–14.5)
SODIUM SERPL-SCNC: 128 MMOL/L — LOW (ref 135–145)
WBC # BLD: 19.92 K/UL — HIGH (ref 3.8–10.5)
WBC # FLD AUTO: 19.92 K/UL — HIGH (ref 3.8–10.5)

## 2019-05-03 RX ORDER — SODIUM CHLORIDE 9 MG/ML
1 INJECTION INTRAMUSCULAR; INTRAVENOUS; SUBCUTANEOUS EVERY 6 HOURS
Qty: 0 | Refills: 0 | Status: COMPLETED | OUTPATIENT
Start: 2019-05-03 | End: 2019-05-03

## 2019-05-03 RX ORDER — CEFEPIME 1 G/1
1000 INJECTION, POWDER, FOR SOLUTION INTRAMUSCULAR; INTRAVENOUS EVERY 12 HOURS
Qty: 0 | Refills: 0 | Status: DISCONTINUED | OUTPATIENT
Start: 2019-05-03 | End: 2019-05-07

## 2019-05-03 RX ADMIN — MONTELUKAST 10 MILLIGRAM(S): 4 TABLET, CHEWABLE ORAL at 22:27

## 2019-05-03 RX ADMIN — Medication 500 MILLIGRAM(S): at 11:28

## 2019-05-03 RX ADMIN — GABAPENTIN 300 MILLIGRAM(S): 400 CAPSULE ORAL at 06:00

## 2019-05-03 RX ADMIN — LIDOCAINE 2 PATCH: 4 CREAM TOPICAL at 11:30

## 2019-05-03 RX ADMIN — Medication 1 TABLET(S): at 05:07

## 2019-05-03 RX ADMIN — Medication 81 MILLIGRAM(S): at 11:29

## 2019-05-03 RX ADMIN — FENTANYL CITRATE 1 PATCH: 50 INJECTION INTRAVENOUS at 20:03

## 2019-05-03 RX ADMIN — SIMETHICONE 80 MILLIGRAM(S): 80 TABLET, CHEWABLE ORAL at 05:06

## 2019-05-03 RX ADMIN — Medication 1 TABLET(S): at 11:29

## 2019-05-03 RX ADMIN — SIMETHICONE 80 MILLIGRAM(S): 80 TABLET, CHEWABLE ORAL at 10:47

## 2019-05-03 RX ADMIN — OXYCODONE HYDROCHLORIDE 10 MILLIGRAM(S): 5 TABLET ORAL at 12:14

## 2019-05-03 RX ADMIN — TIGECYCLINE 105 MILLIGRAM(S): 50 INJECTION, POWDER, LYOPHILIZED, FOR SOLUTION INTRAVENOUS at 05:07

## 2019-05-03 RX ADMIN — Medication 1 DROP(S): at 05:59

## 2019-05-03 RX ADMIN — SODIUM CHLORIDE 1 GRAM(S): 9 INJECTION INTRAMUSCULAR; INTRAVENOUS; SUBCUTANEOUS at 12:19

## 2019-05-03 RX ADMIN — Medication 125 MILLIGRAM(S): at 23:22

## 2019-05-03 RX ADMIN — Medication 300 MILLIGRAM(S): at 10:14

## 2019-05-03 RX ADMIN — FENTANYL CITRATE 1 PATCH: 50 INJECTION INTRAVENOUS at 07:51

## 2019-05-03 RX ADMIN — OXYCODONE HYDROCHLORIDE 10 MILLIGRAM(S): 5 TABLET ORAL at 02:31

## 2019-05-03 RX ADMIN — SODIUM CHLORIDE 1 GRAM(S): 9 INJECTION INTRAMUSCULAR; INTRAVENOUS; SUBCUTANEOUS at 10:47

## 2019-05-03 RX ADMIN — Medication 125 MILLIGRAM(S): at 05:08

## 2019-05-03 RX ADMIN — Medication 1 DROP(S): at 10:16

## 2019-05-03 RX ADMIN — LORATADINE 10 MILLIGRAM(S): 10 TABLET ORAL at 11:31

## 2019-05-03 RX ADMIN — CHLORHEXIDINE GLUCONATE 5 MILLILITER(S): 213 SOLUTION TOPICAL at 05:59

## 2019-05-03 RX ADMIN — LIDOCAINE 2 PATCH: 4 CREAM TOPICAL at 19:38

## 2019-05-03 RX ADMIN — Medication 4: at 17:21

## 2019-05-03 RX ADMIN — CEFEPIME 1000 MILLIGRAM(S): 1 INJECTION, POWDER, FOR SOLUTION INTRAMUSCULAR; INTRAVENOUS at 10:15

## 2019-05-03 RX ADMIN — SODIUM CHLORIDE 1 GRAM(S): 9 INJECTION INTRAMUSCULAR; INTRAVENOUS; SUBCUTANEOUS at 17:22

## 2019-05-03 RX ADMIN — Medication 125 MILLIGRAM(S): at 17:22

## 2019-05-03 RX ADMIN — Medication 0.5 MILLIGRAM(S): at 22:27

## 2019-05-03 RX ADMIN — CHLORHEXIDINE GLUCONATE 5 MILLILITER(S): 213 SOLUTION TOPICAL at 17:23

## 2019-05-03 RX ADMIN — SERTRALINE 50 MILLIGRAM(S): 25 TABLET, FILM COATED ORAL at 12:20

## 2019-05-03 RX ADMIN — Medication 2: at 12:05

## 2019-05-03 RX ADMIN — ENOXAPARIN SODIUM 40 MILLIGRAM(S): 100 INJECTION SUBCUTANEOUS at 11:28

## 2019-05-03 RX ADMIN — OXYCODONE HYDROCHLORIDE 10 MILLIGRAM(S): 5 TABLET ORAL at 22:27

## 2019-05-03 RX ADMIN — PANTOPRAZOLE SODIUM 40 MILLIGRAM(S): 20 TABLET, DELAYED RELEASE ORAL at 11:29

## 2019-05-03 RX ADMIN — TIGECYCLINE 105 MILLIGRAM(S): 50 INJECTION, POWDER, LYOPHILIZED, FOR SOLUTION INTRAVENOUS at 17:23

## 2019-05-03 RX ADMIN — Medication 125 MILLIGRAM(S): at 11:28

## 2019-05-03 RX ADMIN — Medication 1 TABLET(S): at 17:22

## 2019-05-03 RX ADMIN — Medication 1 DROP(S): at 17:23

## 2019-05-03 RX ADMIN — SIMETHICONE 80 MILLIGRAM(S): 80 TABLET, CHEWABLE ORAL at 22:27

## 2019-05-03 RX ADMIN — Medication 0.5 MILLIGRAM(S): at 05:07

## 2019-05-03 RX ADMIN — GABAPENTIN 300 MILLIGRAM(S): 400 CAPSULE ORAL at 17:22

## 2019-05-03 RX ADMIN — Medication 4: at 06:00

## 2019-05-03 RX ADMIN — SIMETHICONE 80 MILLIGRAM(S): 80 TABLET, CHEWABLE ORAL at 17:26

## 2019-05-03 RX ADMIN — INSULIN GLARGINE 6 UNIT(S): 100 INJECTION, SOLUTION SUBCUTANEOUS at 23:28

## 2019-05-03 RX ADMIN — LIDOCAINE 2 PATCH: 4 CREAM TOPICAL at 23:28

## 2019-05-03 NOTE — PROGRESS NOTE ADULT - SUBJECTIVE AND OBJECTIVE BOX
Patient is a 63y old  Male who presents with a chief complaint of suspected sepsis (25 Apr 2019 09:55)    Date of service: 05-03-19 @ 16:35        Patient lying in bed; afebrile    ROS unable to obtain secondary to patient medical condition     MEDICATIONS  (STANDING):  ALPRAZolam 0.5 milliGRAM(s) Oral every 8 hours  artificial  tears Solution 1 Drop(s) Both EYES every 4 hours  ascorbic acid 500 milliGRAM(s) Oral daily  aspirin  chewable 81 milliGRAM(s) Oral daily  cefepime  Injectable. 1000 milliGRAM(s) IV Push every 12 hours  chlorhexidine 0.12% Liquid 5 milliLiter(s) Oral Mucosa two times a day  dextrose 5%. 1000 milliLiter(s) (50 mL/Hr) IV Continuous <Continuous>  dextrose 50% Injectable 12.5 Gram(s) IV Push once  dextrose 50% Injectable 25 Gram(s) IV Push once  dextrose 50% Injectable 25 Gram(s) IV Push once  enoxaparin Injectable 40 milliGRAM(s) SubCutaneous daily  fentaNYL   Patch  50 MICROgram(s)/Hr 1 Patch Transdermal every 72 hours  ferrous    sulfate Liquid 300 milliGRAM(s) Enteral Tube <User Schedule>  gabapentin 300 milliGRAM(s) Oral two times a day  insulin glargine Injectable (LANTUS) 6 Unit(s) SubCutaneous at bedtime  insulin lispro (HumaLOG) corrective regimen sliding scale   SubCutaneous every 6 hours  lactobacillus acidophilus 1 Tablet(s) Oral two times a day  lidocaine   Patch 2 Patch Transdermal daily  loratadine 10 milliGRAM(s) Oral daily  montelukast 10 milliGRAM(s) Oral at bedtime  multivitamin/minerals 1 Tablet(s) Oral daily  pantoprazole  Injectable 40 milliGRAM(s) IV Push daily  sertraline 50 milliGRAM(s) Oral daily  simethicone 80 milliGRAM(s) Chew every 6 hours  sodium chloride 1 Gram(s) Oral every 6 hours  tigecycline IVPB 50 milliGRAM(s) IV Intermittent every 12 hours  vancomycin    Solution 125 milliGRAM(s) Oral every 6 hours    MEDICATIONS  (PRN):  acetaminophen   Tablet .. 650 milliGRAM(s) Oral every 6 hours PRN Temp greater or equal to 38C (100.4F), Moderate Pain (4 - 6)  cyclobenzaprine 10 milliGRAM(s) Oral three times a day PRN Muscle Spasm  dextrose 40% Gel 15 Gram(s) Oral once PRN Blood Glucose LESS THAN 70 milliGRAM(s)/deciliter  glucagon  Injectable 1 milliGRAM(s) IntraMuscular once PRN Glucose LESS THAN 70 milligrams/deciliter  ondansetron Injectable 4 milliGRAM(s) IV Push every 6 hours PRN Nausea  oxyCODONE    5 mG/acetaminophen 325 mG 1 Tablet(s) Oral every 6 hours PRN Moderate Pain (4 - 6)  oxyCODONE    IR 10 milliGRAM(s) Oral every 4 hours PRN severe pain or dyspnea      Vital Signs Last 24 Hrs  T(C): 37.3 (03 May 2019 15:00), Max: 37.6 (03 May 2019 12:00)  T(F): 99.2 (03 May 2019 15:00), Max: 99.6 (03 May 2019 12:00)  HR: 97 (03 May 2019 16:00) (95 - 111)  BP: 88/57 (03 May 2019 16:00) (82/55 - 128/68)  BP(mean): 63 (03 May 2019 16:00) (61 - 87)  RR: 18 (03 May 2019 16:00) (0 - 20)  SpO2: 98% (03 May 2019 16:00) (94% - 100%)    Physical Exam:    PE:    Constitutional: frail looking  HEENT: NC/AT  Neck: trach  Respiratory: respiratory effort normal scattered coarse breath sounds  Cardiovascular: S1S2 regular, no murmurs  Abdomen: soft, not tender, ostomy in place, peg  Musculoskeletal: no muscle tenderness, no joint swelling or tenderness  Extremities: no pedal edema  Neurological/ Psychiatric: on ventilator  Skin: no rashes; no palpable lesions    Labs: all available labs reviewed                    Labs:                Labs:                        8.3    19.92 )-----------( 559      ( 03 May 2019 06:29 )             28.8     05-03    128<L>  |  98  |  18  ----------------------------<  200<H>  4.6   |  26  |  0.48<L>    Ca    8.0<L>      03 May 2019 06:29    TPro  6.0  /  Alb  1.4<L>  /  TBili  0.6  /  DBili  x   /  AST  11<L>  /  ALT  51  /  AlkPhos  439<H>  05-03           Cultures:               < from: MR Pelvis Bony Only w/wo IV Cont (05.01.19 @ 18:52) >    EXAM:  MR PELVIS BONY ONLY WAW IC                            PROCEDURE DATE:  05/01/2019          INTERPRETATION:  MRI OF THE BONY PELVIS.    CLINICAL INFORMATION: Sacral decubitus ulcer. Rule out osteomyelitis.  TECHNIQUE: Multiplanar MR imaging was obtained of the bony pelvis with   and without the administration of intravenous contrast. 10 cc of Gadavist   were administered intravenously.    FINDINGS:    Decubitus ulcer overlies the lower sacrum/coccyx posteriorly. There are   erosive changes with edema and enhancement of the coccyx and S5 sacral   segment, consistent with osteomyelitis. Edema and enhancement is   visualized within the inferior aspect of the S4 segment with mild   decreased T1 marrow signal, consistent with early osteomyelitis. There is   surrounding soft tissue edema and enhancement within the presacral region   anterior to S3 and S4. No discrete fluid collection is visualized.   Heterotopic ossification is visualized within the region of the left   gluteus and piriformis musculature with mild piriformis edema. Partially   imaged visceral pelvic contents are unremarkable.    IMPRESSION:    Sacral decubitus ulcer with osteomyelitis of the S5 vertebral body   segment and of the coccyx as well as early osteomyelitis of the inferior   aspect of the S4 vertebral body segment.     < end of copied text >                < from: MR Abdomen No Cont (04.30.19 @ 18:35) >    EXAM:  MR ABDOMEN                            PROCEDURE DATE:  04/30/2019          INTERPRETATION:  MR ABDOMEN    HISTORY:  elevated LFTs    TECHNIQUE: Multiplanar T1-weighted, T2-weighted, and diffusion weighted   sequences were obtained of the abdomen, without contrast.  3D heavily   T2-weighted thin-section MRCP was also performed.    Field Strength: 1.5T    Contrast: None    .    COMPARISON: CT 4/25/2019.    FINDINGS:    LOWER CHEST: Bibasilar consolidation.    LIVER: Normal.  BILE DUCTS: 8 mm common bile duct with normal distal tapering. No filling   defect. No intrahepatic dilatation.  GALLBLADDER: Multiple small stones and sludge without distention, wall   thickening, or pericholecystic inflammation. Cystic duct patency is   observed.Low and medial insertion of the cystic duct.  SPLEEN: Normal.  PANCREAS: Diffuse atrophy.  ADRENALS: Normal.  KIDNEYS/URETERS: No hydronephrosis.    VISUALIZED PORTIONS:    BOWEL: Percutaneous gastrostomy tube.  PERITONEUM: No ascites.  VESSELS:  Normal caliber aorta.  RETROPERITONEUM: No adenopathy.    ABDOMINAL WALL: Normal.  BONES: No aggressive lesion.    IMPRESSION:    No choledocholithiasis or biliary dilatation.    < end of copied text >            < from: US Abdomen Complete (04.24.19 @ 21:40) >  IMPRESSION:     Distended gallbladder with sludge and gallbladder wall thickening/edema   which can be seen in the setting of acute cholecystitis. HIDA scan should   be considered for further evaluation.    < end of copied text >    < from: Xray Chest 1 View-PORTABLE IMMEDIATE (04.24.19 @ 20:39) >  IMPRESSION:   There is a patchy left midlung airspace opacity. No pleural effusion or   pneumothorax. Cardiomediastinal silhouette is unremarkable. Tracheostomy   is visualized. Old right clavicular fracture.    < end of copied text >

## 2019-05-03 NOTE — PROGRESS NOTE ADULT - SUBJECTIVE AND OBJECTIVE BOX
HPI: Pt seen and examined this am in follow up for sx. Pt asleep, but easily awakened by name. He has no complaints of pain or discomfort, sleeping well. As per RN, possible dc to [facility] today and PICC if so.       PAIN: denies  DYSPNEA: denies      ROS:  All other systems reviewed and negative      PHYSICAL EXAM:    Vital Signs Last 24 Hrs  T(C): 36.6 (03 May 2019 05:28), Max: 37.7 (02 May 2019 11:38)  T(F): 97.9 (03 May 2019 05:28), Max: 99.8 (02 May 2019 11:38)  HR: 108 (03 May 2019 08:00) (95 - 113)  BP: 109/72 (03 May 2019 08:00) (93/69 - 128/68)  BP(mean): 81 (03 May 2019 08:00) (72 - 89)  RR: 14 (03 May 2019 08:00) (0 - 22)  SpO2: 99% (03 May 2019 08:00) (97% - 100%)  Daily     Daily     PPSV2: 30  %    General: Middle aged male, trach, able to mouth words, NAD  HEENT: trach in place, dry mmm  Lungs: dec at bases bl  Cardiac: + s1 s2 rrr  GI: soft nt nd +bs, PEG and colostomy present with some stool  : condom catheter  Ext: moves shoulders, otherwise functionally quadriplegic  Neuro: limited by above    LABS:                        8.3    19.92 )-----------( 559      ( 03 May 2019 06:29 )             28.8     05-03    128<L>  |  98  |  18  ----------------------------<  200<H>  4.6   |  26  |  0.48<L>    Ca    8.0<L>      03 May 2019 06:29    TPro  6.0  /  Alb  1.4<L>  /  TBili  0.6  /  DBili  x   /  AST  11<L>  /  ALT  51  /  AlkPhos  439<H>  05-03      Albumin: Albumin, Serum: 1.4 g/dL (05-03 @ 06:29)      Allergies    No Known Allergies    Intolerances      MEDICATIONS  (STANDING):  ALPRAZolam 0.5 milliGRAM(s) Oral every 8 hours  artificial  tears Solution 1 Drop(s) Both EYES every 4 hours  ascorbic acid 500 milliGRAM(s) Oral daily  aspirin  chewable 81 milliGRAM(s) Oral daily  cefepime  Injectable. 1000 milliGRAM(s) IV Push every 12 hours  chlorhexidine 0.12% Liquid 5 milliLiter(s) Oral Mucosa two times a day  dextrose 5%. 1000 milliLiter(s) (50 mL/Hr) IV Continuous <Continuous>  dextrose 50% Injectable 12.5 Gram(s) IV Push once  dextrose 50% Injectable 25 Gram(s) IV Push once  dextrose 50% Injectable 25 Gram(s) IV Push once  enoxaparin Injectable 40 milliGRAM(s) SubCutaneous daily  fentaNYL   Patch  50 MICROgram(s)/Hr 1 Patch Transdermal every 72 hours  ferrous    sulfate Liquid 300 milliGRAM(s) Enteral Tube <User Schedule>  gabapentin 300 milliGRAM(s) Oral two times a day  insulin glargine Injectable (LANTUS) 6 Unit(s) SubCutaneous at bedtime  insulin lispro (HumaLOG) corrective regimen sliding scale   SubCutaneous every 6 hours  lactobacillus acidophilus 1 Tablet(s) Oral two times a day  lidocaine   Patch 2 Patch Transdermal daily  loratadine 10 milliGRAM(s) Oral daily  montelukast 10 milliGRAM(s) Oral at bedtime  multivitamin/minerals 1 Tablet(s) Oral daily  pantoprazole  Injectable 40 milliGRAM(s) IV Push daily  sertraline 50 milliGRAM(s) Oral daily  simethicone 80 milliGRAM(s) Chew every 6 hours  tigecycline IVPB 50 milliGRAM(s) IV Intermittent every 12 hours  vancomycin    Solution 125 milliGRAM(s) Oral every 6 hours    MEDICATIONS  (PRN):  acetaminophen   Tablet .. 650 milliGRAM(s) Oral every 6 hours PRN Temp greater or equal to 38C (100.4F), Moderate Pain (4 - 6)  cyclobenzaprine 10 milliGRAM(s) Oral three times a day PRN Muscle Spasm  dextrose 40% Gel 15 Gram(s) Oral once PRN Blood Glucose LESS THAN 70 milliGRAM(s)/deciliter  glucagon  Injectable 1 milliGRAM(s) IntraMuscular once PRN Glucose LESS THAN 70 milligrams/deciliter  ondansetron Injectable 4 milliGRAM(s) IV Push every 6 hours PRN Nausea  oxyCODONE    5 mG/acetaminophen 325 mG 1 Tablet(s) Oral every 6 hours PRN Moderate Pain (4 - 6)  oxyCODONE    IR 10 milliGRAM(s) Oral every 4 hours PRN severe pain or dyspnea

## 2019-05-03 NOTE — PROGRESS NOTE ADULT - ASSESSMENT
Pt is a 64 y/o M PMhx of HTN, DM, chronic resp failure/vent dependent s/p trach and PEG, transverse myelitis (as result of ? tetanus toxoid injection), functional quadripegia, s/p  colostomy - most recently at  6/2018 due to sepsis of urinary source (CAUTi present on admission), before that was in  hospital 4/2018 with PNA Acinetobacter baumannii) and 3/2018 with UTI (PSAE muti resistant but S to zosyn). Patient was admitted 4/24 due to fever and leukocytosis.  Already being treated for C difficile colitis and 'osteomyelitis' from sacral decubitus at NH (IV Vanco IV Cefepime PO Vanco), Picc line was placed at [facility] on 4/12. History per medical record as patient unable to provide history.  1. 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 other issues: per medicine

## 2019-05-03 NOTE — PROGRESS NOTE ADULT - ASSESSMENT
63y old Male coming from [facility] with hx of HTN, DM, chronic resp failure/vent dependent s/p trach and PEG, transverse myelitis (as result of ? tetanus toxoid injection), functional quadriplegia s/p  colostomy - most recently at  2018 due to sepsis of urinary source (CAUTi present on admission), before that was in  hospital 2018 with PNA Acinetobacter baumannii) and 3/2018 with UTI (PSAE muti resistant but S to zosyn). Patient now admitted  due to fever and leukocytosis.  Of note pt was being treated for C difficile colitis and 'osteomyelitis' from sacral decubitus at NH (IV Vanco IV Cefepime PO Vanco), Picc line was placed at [facility] on  and removed on this admission as possible source. Found to have elevated LFTs, persistently elevated Alk Phos. Imaging including CT showed no issue with GB, but US and HIDA showing evidence of cholecystitis vs. cholangitis. Palliative Care consulted to assist with establishing GOC.     1) Pain  - chronic pain as per NH records  - of note, pt was on fentanyl patch 50mcg and oxycodone 20mg standing at NH  - however, pt came in with fevers due to sepsis, justifying removal of patches to prevent bolus dosing during fevers (more absorption at subq level due to vasodilation)  - now on percocet 1 tab q6h prn   - c/w oxy IR 10mg q4h prn severe pain (to minimize Tylenol use in setting of elevated LFTs)  - ICU team replaced fentanyl 50mcg patch yesterday  - monitor for signs of sedation today, discussed with RN, as pt only use 20mg oxy in past 24h (about equivalent to 12mcg patch, however pt on 50mcg for quite some time prior to admission and as of , tolerating without issue)  - recommend use of flexeril when able also, as pain seems to be mostly musculoskeletal and described often as an ache.   - In setting of quadriplegia, this is commonplace   - c/w lidocaine patches    2) Sepsis: Cholecystitis vs. Cholangitis + osteo  - ID, GI, and surgery notes appreciated  - concern for GB vs common bile duct pathology  - imaging originally suggesting cholecystitis, but not completely clear and management options differed significantly without further clarification  - considerations include cheli vs. perc drain vs. ERCP  - MRCP completed  showing no abnormality with either GB or ducts, thus both GI and surgical team agree to pursue conservative measures and not any procedures  - ID commenting that pt likely has osteo and MRI was done  confirming this  - remains on IV abx as a result, likely placement of PICC prior to dc  - surgical notes appreciated- no need for surgical intervention for osteo    3) Debility  - PPS<40%, dependent for all ADLs  - neurological disorder with unclear etiology, thus difficult to discern how it will truly progress  - dietary notes appreciated- severe protein calorie malnutrition noted     4) Prognosis  - likely overall poor  - in setting of neurological condition that has made pt completely debilitated, multiple hospitalizations for infection, skin breakdown w/ osteomyelitis, PPS<40%, albumin 1.7 pt would be at high risk for further complications. He is not a candidate for hospice given his ventilator dependence, but would be a candidate for compassionate wean when/if ever ready for this, which was discussed with pt. He confirmed he is not ready for this, though grateful to know it is an option given he does not feel he has QOL.     5) GOC/Advanced Directives  - pt seems to have capacity for decision making, noting that he would like to be apart of conversations that require this  - HCP on chart namin) wife Courtney Amin [phone] and 2) Stephen Amin [phone]  - MOLST on chart ): no limits REVIEWED AND VOIDED--> New MOLST created: DNR, limited interventions, DNI, c/w feeding tube, trial of IVF, determine use of abx, no dilaysis  - [facility] meeting held - pt was thoughtful about how his life has changed and while not ready to transition fully to comfort focus, he was able to set some limits. Plan is for medical stabilization and return to [facility]. * See GO note for further details.    Thank you for including us in Mr. Amin's care. Will continue to follow with you.    Tenzin Garcia MD  Palliative Care Attending

## 2019-05-03 NOTE — PROGRESS NOTE ADULT - SUBJECTIVE AND OBJECTIVE BOX
62yo M admitted 4/24 due to fever and leukocytosis.  Already being treated for C difficile colitis and 'osteomyelitis' from sacral decubitus at NH (IV Vanco IV Cefepime PO Vanco).  In ED fever 104* F and WBC 19k.  Hemodynamics reasonable.  Pt with elevated LFT's - US in ED with distended GB and sludge - pt refused CT scan - seen by surgery team - No acute intervention at this time.     I spoke with patient in detail and with his wife Barbara via telephone [phone] - issues and plans discussed in detail and all questions answered.    Pt with hx of HTN, DM, chronic resp failure/vent dependent s/p trach and PEG, transverse myelitis (as result of ? tetanus toxoid injection), functional quadripegia, s/p (? diverting) colostomy - most recently at  6/2018 due to sepsis of urinary source (CAUTi present on admission), before that was in [facility] 4/2018 with PNA Acinetobacter baumannii) and 3/2018 with UTI (PSAE muti resistant but S to zosyn).    Pt no longer has indwelling catheter, but did have PICC line placed approx 1 week ago.    A copy of his MOLST form was sent from NH and this was confirmed with patient and a new form documented.    Pt is AA and O x 3 NAD NRD and answers questions appropriately via head nod.    CT showed relatively normal GB--Sono showed distended GB and HIDA was +.  LFTS are up.  Pt febrile and was on Tigecycline until 4/29.  Pt is followed by general surgery    4/29:  Tm 102.8  WBC 24K.       4/30:  tigecycline resumed.  GI eval appreciated - MRCP ordered.    5/1: above d/w Dr Maddox who had been caring for the patient - MRCP negative.  D/w Dr Garza.  Explained issues and plans to patient and all questions answered.  Will also speak with wife in same regard.  Addendum: spoke with pt and wife in detail about issues and plans.  D/w Dr Austin as well.  MRI ordered to assess for osteomyelitis.    5/2: MRI (+) for osteomyelitis.  Discussed with Dr Campos from surgery - No role of debridement at present.  D/w ID - will consider PICC and regimen for return to [facility].    5/3: no new issues, no overnight events - awaiting open bed at [facility].            PAST MEDICAL & SURGICAL HISTORY:  Pneumonia  UTI (urinary tract infection)  H/O quadriplegia  Essential hypertension  Paralysis  Transverse myelitis  S/P colostomy  PEG (percutaneous endoscopic gastrostomy) status  History of tracheostomy      Allergies    No Known Allergies        ICU Vital Signs Last 24 Hrs  T(C): 37.1 (03 May 2019 09:00), Max: 37.7 (02 May 2019 11:38)  T(F): 98.7 (03 May 2019 09:00), Max: 99.8 (02 May 2019 11:38)  HR: 108 (03 May 2019 09:00) (95 - 113)  BP: 118/66 (03 May 2019 09:00) (93/69 - 128/68)  BP(mean): 76 (03 May 2019 09:00) (72 - 87)  ABP: --  ABP(mean): --  RR: 16 (03 May 2019 09:00) (0 - 22)  SpO2: 94% (03 May 2019 09:00) (94% - 100%)      Mode: AC/ CMV (Assist Control/ Continuous Mandatory Ventilation)  RR (machine): 14  TV (machine): 500  FiO2: 40  PEEP: 5  ITime: 1  PIP: 34      I&O's Summary    02 May 2019 07:01  -  03 May 2019 07:00  --------------------------------------------------------  IN: 1360 mL / OUT: 1450 mL / NET: -90 mL    03 May 2019 07:01  -  03 May 2019 10:09  --------------------------------------------------------  IN: 285 mL / OUT: 0 mL / NET: 285 mL                              8.3    19.92 )-----------( 559      ( 03 May 2019 06:29 )             28.8       05-03    128<L>  |  98  |  18  ----------------------------<  200<H>  4.6   |  26  |  0.48<L>    Ca    8.0<L>      03 May 2019 06:29    TPro  6.0  /  Alb  1.4<L>  /  TBili  0.6  /  DBili  x   /  AST  11<L>  /  ALT  51  /  AlkPhos  439<H>  05-03      CAPILLARY BLOOD GLUCOSE      POCT Blood Glucose.: 201 mg/dL (03 May 2019 05:35)  POCT Blood Glucose.: 223 mg/dL (02 May 2019 22:27)  POCT Blood Glucose.: 220 mg/dL (02 May 2019 12:32)      LIVER FUNCTIONS - ( 03 May 2019 06:29 )  Alb: 1.4 g/dL / Pro: 6.0 gm/dL / ALK PHOS: 439 U/L / ALT: 51 U/L / AST: 11 U/L / GGT: x                 MEDICATIONS  (STANDING):  ALPRAZolam 0.5 milliGRAM(s) Oral every 8 hours  artificial  tears Solution 1 Drop(s) Both EYES every 4 hours  ascorbic acid 500 milliGRAM(s) Oral daily  aspirin  chewable 81 milliGRAM(s) Oral daily  cefepime  Injectable. 1000 milliGRAM(s) IV Push every 12 hours  chlorhexidine 0.12% Liquid 5 milliLiter(s) Oral Mucosa two times a day  dextrose 5%. 1000 milliLiter(s) (50 mL/Hr) IV Continuous <Continuous>  dextrose 50% Injectable 12.5 Gram(s) IV Push once  dextrose 50% Injectable 25 Gram(s) IV Push once  dextrose 50% Injectable 25 Gram(s) IV Push once  enoxaparin Injectable 40 milliGRAM(s) SubCutaneous daily  fentaNYL   Patch  50 MICROgram(s)/Hr 1 Patch Transdermal every 72 hours  ferrous    sulfate Liquid 300 milliGRAM(s) Enteral Tube <User Schedule>  gabapentin 300 milliGRAM(s) Oral two times a day  insulin glargine Injectable (LANTUS) 6 Unit(s) SubCutaneous at bedtime  insulin lispro (HumaLOG) corrective regimen sliding scale   SubCutaneous every 6 hours  lactobacillus acidophilus 1 Tablet(s) Oral two times a day  lidocaine   Patch 2 Patch Transdermal daily  loratadine 10 milliGRAM(s) Oral daily  montelukast 10 milliGRAM(s) Oral at bedtime  multivitamin/minerals 1 Tablet(s) Oral daily  pantoprazole  Injectable 40 milliGRAM(s) IV Push daily  sertraline 50 milliGRAM(s) Oral daily  simethicone 80 milliGRAM(s) Chew every 6 hours  sodium chloride 1 Gram(s) Oral every 6 hours  tigecycline IVPB 50 milliGRAM(s) IV Intermittent every 12 hours  vancomycin    Solution 125 milliGRAM(s) Oral every 6 hours    MEDICATIONS  (PRN):  acetaminophen   Tablet .. 650 milliGRAM(s) Oral every 6 hours PRN Temp greater or equal to 38C (100.4F), Moderate Pain (4 - 6)  cyclobenzaprine 10 milliGRAM(s) Oral three times a day PRN Muscle Spasm  dextrose 40% Gel 15 Gram(s) Oral once PRN Blood Glucose LESS THAN 70 milliGRAM(s)/deciliter  glucagon  Injectable 1 milliGRAM(s) IntraMuscular once PRN Glucose LESS THAN 70 milligrams/deciliter  ondansetron Injectable 4 milliGRAM(s) IV Push every 6 hours PRN Nausea  oxyCODONE    5 mG/acetaminophen 325 mG 1 Tablet(s) Oral every 6 hours PRN Moderate Pain (4 - 6)  oxyCODONE    IR 10 milliGRAM(s) Oral every 4 hours PRN severe pain or dyspnea      Review of Systems:  · Negative General Symptoms	no chills	  · General Symptoms	fever	  · Negative Skin Symptoms	no rash; no itching	  · Negative Ophthalmologic Symptoms	no diplopia; no photophobia	  · Ophthalmologic Comments	(+) dry eyes	  · Negative ENMT Symptoms	no dysphagia	  · Negative Respiratory and Thorax Symptoms	no wheezing; no dyspnea; no cough	  · Negative Cardiovascular Symptoms	no chest pain; no palpitations	  · Negative Gastrointestinal Symptoms	no nausea; no vomiting	  · Negative General Genitourinary Symptoms	no dysuria	  · Musculoskeletal Symptoms	muscle weakness	  · Negative Neurological Symptoms	no headache; no loss of consciousness	  · Neurological Symptoms	weakness	  · Negative Psychiatric Symptoms	no suicidal ideation	  · Psychiatric Symptoms	depression	  · Negative Hematology Symptoms	no gum bleeding; no nose bleeding	  · Negative Allergic Reactions	no urticaria	  · Additional ROS	ALL other ROS are NEGATIVE.	    Physical Exam:  · Constitutional	detailed exam	  · Constitutional Details	no distress	  · Eyes	detailed exam	  · Eyes Details	PERRL; EOMI	  · ENMT	detailed exam	  · ENMT Details	mouth	  · Mouth	dry	  · Neck	detailed exam	  · Neck Details	supple; no JVD; trach in situ	  · Back	detailed exam	  · Back Details	normal shape	  · Respiratory	detailed exam	  · Respiratory Details	airway patent; breath sounds equal; good air movement	  · Cardiovascular	detailed exam	  · Cardiovascular Details	regular rate and rhythm	  · Gastrointestinal	detailed exam	  · GI Normal	soft; nontender; bowel sounds normal	  · GI Abnormal	distended	  · Gastrointestinal Comments	LLQ ostomy with normal appearing stool, epigastric position G tube in situ.	  · Genitourinary	detailed exam	  · Genitourinary Details Male	normal external genitalia	  · Genitourinary Comments	texas cath in place	  · Extremities	detailed exam	  · Extremities Details	no cyanosis	  · Vascular	Equal and normal pulses (carotid, femoral, dorsalis pedis)	  · Neurological	detailed exam	  · Neurological Details	alert and oriented x 3	  · Motor	minimal movement of UE's - NO movement of LE's, contractures of hands B/L	  · Sensory	grossly intact	  · Skin	detailed exam	  · Skin Details	warm and dry	  · Musculoskeletal	detailed exam	  · Musculoskeletal Details	quadriplegia	  · Psychiatric	detailed exam	  · Psychiatric Details	depressed; flat affect	      DVT Prophylaxis: Lovenox    Advanced Directives:  DNR  Discussed with: patient - MOLST completed by Pall care team.    Visit Information: SSQ    ** Time is exclusive of billed procedures and/or teaching and/or routine family updates.

## 2019-05-03 NOTE — PROGRESS NOTE ADULT - ASSESSMENT
62yo M suffering from severe sepsis - source uncertain and several potential sites:  Possible cholecystitis given elevated LFT's, distention of GB and sludge, though may just be cholestasis due to sepsis at a different site  Possible line infection (CLABSi) present on admission due to RUE PICC line - removed in ED to eliminate as source.  C difficile being treated at NH since ? 4/15 - don't know if had (+) stool toxin  Unstageable sacral decubitus [id number] present on admission - possible osteomyelitis already being treated at NH  UA negative so urine not likely to be source  CXR ? possible LEFT infiltrate, but no sputum or respiratory symptoms so unlikely source    hyponatremia   transaminitis as noted able ? cholecystitis/cholangitis vs cholestasis due to sepsis    underlying hx of HTN, DM, chronic resp failure/vent dependent s/p trach and PEG, transverse myelitis (as result of ? tetanus toxoid injection),   functional quadriplegia s/p (? diverting) colostomy    MR 5/1 confirmed sacral osteomyelitis.  palliative care meeting 5/1 resulted in patient self-determination of DNR status        Plan at this time is for continued care in CCU  HOB 30  GI and DVT prophylaxis as appropriate  continue IV abx with plan for transfer to NH with PICC line once bed available  - ID input appreciated  Surgical and GI input appreciated.   vent support  TF's   Glycemic control  wound care   supportive care    DNR    Wife Barbara is [phone].

## 2019-05-04 LAB
ANION GAP SERPL CALC-SCNC: 5 MMOL/L — SIGNIFICANT CHANGE UP (ref 5–17)
BUN SERPL-MCNC: 19 MG/DL — SIGNIFICANT CHANGE UP (ref 7–23)
CALCIUM SERPL-MCNC: 7.7 MG/DL — LOW (ref 8.5–10.1)
CHLORIDE SERPL-SCNC: 99 MMOL/L — SIGNIFICANT CHANGE UP (ref 96–108)
CO2 SERPL-SCNC: 25 MMOL/L — SIGNIFICANT CHANGE UP (ref 22–31)
CREAT SERPL-MCNC: 0.53 MG/DL — SIGNIFICANT CHANGE UP (ref 0.5–1.3)
GLUCOSE BLDC GLUCOMTR-MCNC: 171 MG/DL — HIGH (ref 70–99)
GLUCOSE BLDC GLUCOMTR-MCNC: 187 MG/DL — HIGH (ref 70–99)
GLUCOSE BLDC GLUCOMTR-MCNC: 205 MG/DL — HIGH (ref 70–99)
GLUCOSE BLDC GLUCOMTR-MCNC: 205 MG/DL — HIGH (ref 70–99)
GLUCOSE SERPL-MCNC: 204 MG/DL — HIGH (ref 70–99)
HCT VFR BLD CALC: 26.6 % — LOW (ref 39–50)
HGB BLD-MCNC: 7.6 G/DL — LOW (ref 13–17)
MAGNESIUM SERPL-MCNC: 1.9 MG/DL — SIGNIFICANT CHANGE UP (ref 1.6–2.6)
MCHC RBC-ENTMCNC: 24.4 PG — LOW (ref 27–34)
MCHC RBC-ENTMCNC: 28.6 GM/DL — LOW (ref 32–36)
MCV RBC AUTO: 85.3 FL — SIGNIFICANT CHANGE UP (ref 80–100)
NRBC # BLD: 0 /100 WBCS — SIGNIFICANT CHANGE UP (ref 0–0)
PHOSPHATE SERPL-MCNC: 2.8 MG/DL — SIGNIFICANT CHANGE UP (ref 2.5–4.5)
PLATELET # BLD AUTO: 366 K/UL — SIGNIFICANT CHANGE UP (ref 150–400)
POTASSIUM SERPL-MCNC: 4.6 MMOL/L — SIGNIFICANT CHANGE UP (ref 3.5–5.3)
POTASSIUM SERPL-SCNC: 4.6 MMOL/L — SIGNIFICANT CHANGE UP (ref 3.5–5.3)
RBC # BLD: 3.12 M/UL — LOW (ref 4.2–5.8)
RBC # FLD: 20.7 % — HIGH (ref 10.3–14.5)
SODIUM SERPL-SCNC: 129 MMOL/L — LOW (ref 135–145)
WBC # BLD: 16.13 K/UL — HIGH (ref 3.8–10.5)
WBC # FLD AUTO: 16.13 K/UL — HIGH (ref 3.8–10.5)

## 2019-05-04 RX ORDER — SODIUM CHLORIDE 9 MG/ML
1 INJECTION INTRAMUSCULAR; INTRAVENOUS; SUBCUTANEOUS EVERY 6 HOURS
Qty: 0 | Refills: 0 | Status: COMPLETED | OUTPATIENT
Start: 2019-05-04 | End: 2019-05-04

## 2019-05-04 RX ADMIN — Medication 1 DROP(S): at 12:25

## 2019-05-04 RX ADMIN — FENTANYL CITRATE 1 PATCH: 50 INJECTION INTRAVENOUS at 19:53

## 2019-05-04 RX ADMIN — Medication 4: at 23:33

## 2019-05-04 RX ADMIN — Medication 4: at 06:23

## 2019-05-04 RX ADMIN — Medication 1 TABLET(S): at 18:16

## 2019-05-04 RX ADMIN — TIGECYCLINE 105 MILLIGRAM(S): 50 INJECTION, POWDER, LYOPHILIZED, FOR SOLUTION INTRAVENOUS at 18:16

## 2019-05-04 RX ADMIN — Medication 125 MILLIGRAM(S): at 18:16

## 2019-05-04 RX ADMIN — INSULIN GLARGINE 6 UNIT(S): 100 INJECTION, SOLUTION SUBCUTANEOUS at 23:32

## 2019-05-04 RX ADMIN — SIMETHICONE 80 MILLIGRAM(S): 80 TABLET, CHEWABLE ORAL at 23:06

## 2019-05-04 RX ADMIN — CEFEPIME 1000 MILLIGRAM(S): 1 INJECTION, POWDER, FOR SOLUTION INTRAMUSCULAR; INTRAVENOUS at 05:17

## 2019-05-04 RX ADMIN — Medication 125 MILLIGRAM(S): at 23:06

## 2019-05-04 RX ADMIN — CYCLOBENZAPRINE HYDROCHLORIDE 10 MILLIGRAM(S): 10 TABLET, FILM COATED ORAL at 12:22

## 2019-05-04 RX ADMIN — Medication 650 MILLIGRAM(S): at 07:00

## 2019-05-04 RX ADMIN — OXYCODONE HYDROCHLORIDE 10 MILLIGRAM(S): 5 TABLET ORAL at 23:00

## 2019-05-04 RX ADMIN — SODIUM CHLORIDE 1 GRAM(S): 9 INJECTION INTRAMUSCULAR; INTRAVENOUS; SUBCUTANEOUS at 12:22

## 2019-05-04 RX ADMIN — Medication 2: at 12:20

## 2019-05-04 RX ADMIN — SIMETHICONE 80 MILLIGRAM(S): 80 TABLET, CHEWABLE ORAL at 12:22

## 2019-05-04 RX ADMIN — OXYCODONE HYDROCHLORIDE 10 MILLIGRAM(S): 5 TABLET ORAL at 06:00

## 2019-05-04 RX ADMIN — Medication 1 DROP(S): at 05:16

## 2019-05-04 RX ADMIN — ENOXAPARIN SODIUM 40 MILLIGRAM(S): 100 INJECTION SUBCUTANEOUS at 12:21

## 2019-05-04 RX ADMIN — GABAPENTIN 300 MILLIGRAM(S): 400 CAPSULE ORAL at 05:16

## 2019-05-04 RX ADMIN — Medication 125 MILLIGRAM(S): at 05:16

## 2019-05-04 RX ADMIN — LIDOCAINE 2 PATCH: 4 CREAM TOPICAL at 14:40

## 2019-05-04 RX ADMIN — Medication 81 MILLIGRAM(S): at 12:24

## 2019-05-04 RX ADMIN — FENTANYL CITRATE 1 PATCH: 50 INJECTION INTRAVENOUS at 07:42

## 2019-05-04 RX ADMIN — CEFEPIME 1000 MILLIGRAM(S): 1 INJECTION, POWDER, FOR SOLUTION INTRAMUSCULAR; INTRAVENOUS at 18:10

## 2019-05-04 RX ADMIN — Medication 650 MILLIGRAM(S): at 06:22

## 2019-05-04 RX ADMIN — Medication 125 MILLIGRAM(S): at 12:22

## 2019-05-04 RX ADMIN — Medication 1 DROP(S): at 14:41

## 2019-05-04 RX ADMIN — OXYCODONE HYDROCHLORIDE 10 MILLIGRAM(S): 5 TABLET ORAL at 05:16

## 2019-05-04 RX ADMIN — Medication 1 DROP(S): at 18:10

## 2019-05-04 RX ADMIN — Medication 2: at 18:15

## 2019-05-04 RX ADMIN — Medication 500 MILLIGRAM(S): at 12:23

## 2019-05-04 RX ADMIN — Medication 1 TABLET(S): at 12:23

## 2019-05-04 RX ADMIN — Medication 650 MILLIGRAM(S): at 21:52

## 2019-05-04 RX ADMIN — SODIUM CHLORIDE 1 GRAM(S): 9 INJECTION INTRAMUSCULAR; INTRAVENOUS; SUBCUTANEOUS at 18:16

## 2019-05-04 RX ADMIN — Medication 0.5 MILLIGRAM(S): at 14:40

## 2019-05-04 RX ADMIN — SODIUM CHLORIDE 1 GRAM(S): 9 INJECTION INTRAMUSCULAR; INTRAVENOUS; SUBCUTANEOUS at 23:06

## 2019-05-04 RX ADMIN — SIMETHICONE 80 MILLIGRAM(S): 80 TABLET, CHEWABLE ORAL at 05:16

## 2019-05-04 RX ADMIN — TIGECYCLINE 105 MILLIGRAM(S): 50 INJECTION, POWDER, LYOPHILIZED, FOR SOLUTION INTRAVENOUS at 05:15

## 2019-05-04 RX ADMIN — PANTOPRAZOLE SODIUM 40 MILLIGRAM(S): 20 TABLET, DELAYED RELEASE ORAL at 12:23

## 2019-05-04 RX ADMIN — Medication 0.5 MILLIGRAM(S): at 21:52

## 2019-05-04 RX ADMIN — Medication 1 TABLET(S): at 05:16

## 2019-05-04 RX ADMIN — CHLORHEXIDINE GLUCONATE 5 MILLILITER(S): 213 SOLUTION TOPICAL at 18:11

## 2019-05-04 RX ADMIN — Medication 0.5 MILLIGRAM(S): at 05:16

## 2019-05-04 RX ADMIN — GABAPENTIN 300 MILLIGRAM(S): 400 CAPSULE ORAL at 18:11

## 2019-05-04 RX ADMIN — LIDOCAINE 2 PATCH: 4 CREAM TOPICAL at 21:53

## 2019-05-04 RX ADMIN — SIMETHICONE 80 MILLIGRAM(S): 80 TABLET, CHEWABLE ORAL at 18:16

## 2019-05-04 RX ADMIN — MONTELUKAST 10 MILLIGRAM(S): 4 TABLET, CHEWABLE ORAL at 21:52

## 2019-05-04 RX ADMIN — SERTRALINE 50 MILLIGRAM(S): 25 TABLET, FILM COATED ORAL at 12:23

## 2019-05-04 RX ADMIN — Medication 1 DROP(S): at 21:59

## 2019-05-04 RX ADMIN — LORATADINE 10 MILLIGRAM(S): 10 TABLET ORAL at 12:22

## 2019-05-04 RX ADMIN — Medication 650 MILLIGRAM(S): at 22:00

## 2019-05-04 NOTE — PROGRESS NOTE ADULT - ASSESSMENT
Pt is a 62 y/o M PMhx of HTN, DM, chronic resp failure/vent dependent s/p trach and PEG, transverse myelitis (as result of ? tetanus toxoid injection), functional quadripegia, s/p  colostomy - most recently at  6/2018 due to sepsis of urinary source (CAUTi present on admission), before that was in  hospital 4/2018 with PNA Acinetobacter baumannii) and 3/2018 with UTI (PSAE muti resistant but S to zosyn). Patient was admitted 4/24 due to fever and leukocytosis.  Already being treated for C difficile colitis and 'osteomyelitis' from sacral decubitus at NH (IV Vanco IV Cefepime PO Vanco), Picc line was placed at [facility] on 4/12. History per medical record as patient unable to provide history.  1. 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 other issues: per medicine

## 2019-05-04 NOTE — PROGRESS NOTE ADULT - SUBJECTIVE AND OBJECTIVE BOX
64yo M admitted 4/24 due to fever and leukocytosis.  Already being treated for C difficile colitis and 'osteomyelitis' from sacral decubitus at NH (IV Vanco IV Cefepime PO Vanco).  In ED fever 104* F and WBC 19k.  Hemodynamics reasonable.  Pt with elevated LFT's - US in ED with distended GB and sludge - pt refused CT scan - seen by surgery team - No acute intervention at this time.     I spoke with patient in detail and with his wife Barbara via telephone [phone] - issues and plans discussed in detail and all questions answered.    Pt with hx of HTN, DM, chronic resp failure/vent dependent s/p trach and PEG, transverse myelitis (as result of ? tetanus toxoid injection), functional quadripegia, s/p (? diverting) colostomy - most recently at  6/2018 due to sepsis of urinary source (CAUTi present on admission), before that was in [facility] 4/2018 with PNA Acinetobacter baumannii) and 3/2018 with UTI (PSAE muti resistant but S to zosyn).    Pt no longer has indwelling catheter, but did have PICC line placed approx 1 week ago.    A copy of his MOLST form was sent from NH and this was confirmed with patient and a new form documented.    Pt is AA and O x 3 NAD NRD and answers questions appropriately via head nod.    CT showed relatively normal GB--Sono showed distended GB and HIDA was +.  LFTS are up.  Pt febrile and was on Tigecycline until 4/29.  Pt is followed by general surgery    4/29:  Tm 102.8  WBC 24K.       4/30:  tigecycline resumed.  GI eval appreciated - MRCP ordered.    5/1: above d/w Dr Maddox who had been caring for the patient - MRCP negative.  D/w Dr Garza.  Explained issues and plans to patient and all questions answered.  Will also speak with wife in same regard.  Addendum: spoke with pt and wife in detail about issues and plans.  D/w Dr Austin as well.  MRI ordered to assess for osteomyelitis.    5/2: MRI (+) for osteomyelitis.  Discussed with Dr Campos from surgery - No role of debridement at present.  D/w ID - will consider PICC and regimen for return to [facility].    5/4: no new issues, no overnight events - awaiting open bed at [facility].              PAST MEDICAL & SURGICAL HISTORY:  Pneumonia  UTI (urinary tract infection)  H/O quadriplegia  Essential hypertension  Paralysis  Transverse myelitis  S/P colostomy  PEG (percutaneous endoscopic gastrostomy) status  History of tracheostomy      Allergies    No Known Allergies      ICU Vital Signs Last 24 Hrs  T(C): 37.6 (04 May 2019 06:40), Max: 37.6 (03 May 2019 12:00)  T(F): 99.7 (04 May 2019 06:40), Max: 99.7 (04 May 2019 06:40)  HR: 108 (04 May 2019 06:00) (88 - 111)  BP: 99/61 (04 May 2019 06:00) (80/56 - 118/67)  BP(mean): 71 (04 May 2019 06:00) (60 - 81)  ABP: --  ABP(mean): --  RR: 13 (04 May 2019 06:00) (12 - 22)  SpO2: 95% (04 May 2019 03:00) (94% - 100%)      Mode: AC/ CMV (Assist Control/ Continuous Mandatory Ventilation)  RR (machine): 14  TV (machine): 500  FiO2: 30  PEEP: 5  PIP: 30      I&O's Summary    03 May 2019 07:01  -  04 May 2019 07:00  --------------------------------------------------------  IN: 2580 mL / OUT: 850 mL / NET: 1730 mL                              7.6    16.13 )-----------( 366      ( 04 May 2019 06:40 )             26.6       05-04    129<L>  |  99  |  19  ----------------------------<  204<H>  4.6   |  25  |  0.53    Ca    7.7<L>      04 May 2019 06:40    TPro  6.0  /  Alb  1.4<L>  /  TBili  0.6  /  DBili  x   /  AST  11<L>  /  ALT  51  /  AlkPhos  439<H>  05-03      CAPILLARY BLOOD GLUCOSE      POCT Blood Glucose.: 205 mg/dL (04 May 2019 06:18)  POCT Blood Glucose.: 150 mg/dL (03 May 2019 23:24)  POCT Blood Glucose.: 220 mg/dL (03 May 2019 16:24)  POCT Blood Glucose.: 190 mg/dL (03 May 2019 11:41)      LIVER FUNCTIONS - ( 03 May 2019 06:29 )  Alb: 1.4 g/dL / Pro: 6.0 gm/dL / ALK PHOS: 439 U/L / ALT: 51 U/L / AST: 11 U/L / GGT: x             MEDICATIONS  (STANDING):  ALPRAZolam 0.5 milliGRAM(s) Oral every 8 hours  artificial  tears Solution 1 Drop(s) Both EYES every 4 hours  ascorbic acid 500 milliGRAM(s) Oral daily  aspirin  chewable 81 milliGRAM(s) Oral daily  cefepime  Injectable. 1000 milliGRAM(s) IV Push every 12 hours  chlorhexidine 0.12% Liquid 5 milliLiter(s) Oral Mucosa two times a day  dextrose 5%. 1000 milliLiter(s) (50 mL/Hr) IV Continuous <Continuous>  dextrose 50% Injectable 12.5 Gram(s) IV Push once  dextrose 50% Injectable 25 Gram(s) IV Push once  dextrose 50% Injectable 25 Gram(s) IV Push once  enoxaparin Injectable 40 milliGRAM(s) SubCutaneous daily  fentaNYL   Patch  50 MICROgram(s)/Hr 1 Patch Transdermal every 72 hours  ferrous    sulfate Liquid 300 milliGRAM(s) Enteral Tube <User Schedule>  gabapentin 300 milliGRAM(s) Oral two times a day  insulin glargine Injectable (LANTUS) 6 Unit(s) SubCutaneous at bedtime  insulin lispro (HumaLOG) corrective regimen sliding scale   SubCutaneous every 6 hours  lactobacillus acidophilus 1 Tablet(s) Oral two times a day  lidocaine   Patch 2 Patch Transdermal daily  loratadine 10 milliGRAM(s) Oral daily  montelukast 10 milliGRAM(s) Oral at bedtime  multivitamin/minerals 1 Tablet(s) Oral daily  pantoprazole  Injectable 40 milliGRAM(s) IV Push daily  sertraline 50 milliGRAM(s) Oral daily  simethicone 80 milliGRAM(s) Chew every 6 hours  tigecycline IVPB 50 milliGRAM(s) IV Intermittent every 12 hours  vancomycin    Solution 125 milliGRAM(s) Oral every 6 hours    MEDICATIONS  (PRN):  acetaminophen   Tablet .. 650 milliGRAM(s) Oral every 6 hours PRN Temp greater or equal to 38C (100.4F), Moderate Pain (4 - 6)  cyclobenzaprine 10 milliGRAM(s) Oral three times a day PRN Muscle Spasm  dextrose 40% Gel 15 Gram(s) Oral once PRN Blood Glucose LESS THAN 70 milliGRAM(s)/deciliter  glucagon  Injectable 1 milliGRAM(s) IntraMuscular once PRN Glucose LESS THAN 70 milligrams/deciliter  ondansetron Injectable 4 milliGRAM(s) IV Push every 6 hours PRN Nausea  oxyCODONE    5 mG/acetaminophen 325 mG 1 Tablet(s) Oral every 4 hours PRN Moderate Pain (4 - 6)  oxyCODONE    IR 10 milliGRAM(s) Oral every 4 hours PRN severe pain or dyspnea    Review of Systems:  · Negative General Symptoms	no chills	  · General Symptoms	fever	  · Negative Skin Symptoms	no rash; no itching	  · Negative Ophthalmologic Symptoms	no diplopia; no photophobia	  · Ophthalmologic Comments	(+) dry eyes	  · Negative ENMT Symptoms	no dysphagia	  · Negative Respiratory and Thorax Symptoms	no wheezing; no dyspnea; no cough	  · Negative Cardiovascular Symptoms	no chest pain; no palpitations	  · Negative Gastrointestinal Symptoms	no nausea; no vomiting	  · Negative General Genitourinary Symptoms	no dysuria	  · Musculoskeletal Symptoms	muscle weakness	  · Negative Neurological Symptoms	no headache; no loss of consciousness	  · Neurological Symptoms	weakness	  · Negative Psychiatric Symptoms	no suicidal ideation	  · Psychiatric Symptoms	depression	  · Negative Hematology Symptoms	no gum bleeding; no nose bleeding	  · Negative Allergic Reactions	no urticaria	  · Additional ROS	ALL other ROS are NEGATIVE.	    Physical Exam:  · Constitutional	detailed exam	  · Constitutional Details	no distress	  · Eyes	detailed exam	  · Eyes Details	PERRL; EOMI	  · ENMT	detailed exam	  · ENMT Details	mouth	  · Mouth	dry	  · Neck	detailed exam	  · Neck Details	supple; no JVD; trach in situ	  · Back	detailed exam	  · Back Details	normal shape	  · Respiratory	detailed exam	  · Respiratory Details	airway patent; breath sounds equal; good air movement	  · Cardiovascular	detailed exam	  · Cardiovascular Details	regular rate and rhythm	  · Gastrointestinal	detailed exam	  · GI Normal	soft; nontender; bowel sounds normal	  · GI Abnormal	distended	  · Gastrointestinal Comments	LLQ ostomy with normal appearing stool, epigastric position G tube in situ.	  · Genitourinary	detailed exam	  · Genitourinary Details Male	normal external genitalia	  · Genitourinary Comments	[facility] in place	  · Extremities	detailed exam	  · Extremities Details	no cyanosis	  · Vascular	Equal and normal pulses (carotid, femoral, dorsalis pedis)	  · Neurological	detailed exam	  · Neurological Details	alert and oriented x 3	  · Motor	minimal movement of UE's - NO movement of LE's, contractures of hands B/L	  · Sensory	grossly intact	  · Skin	detailed exam	  · Skin Details	warm and dry	  · Musculoskeletal	detailed exam	  · Musculoskeletal Details	quadriplegia	  · Psychiatric	detailed exam	  · Psychiatric Details	depressed; flat affect	      DVT Prophylaxis: Lovenox    Advanced Directives:  DNR  Discussed with: patient - MOLST completed by Pall care team.    Visit Information: SSQ    ** Time is exclusive of billed procedures and/or teaching and/or routine family updates.

## 2019-05-04 NOTE — PROGRESS NOTE ADULT - ASSESSMENT
62yo M suffering from severe sepsis - source uncertain and several potential sites:  Possible cholecystitis given elevated LFT's, distention of GB and sludge, though may just be cholestasis due to sepsis at a different site  Possible line infection (CLABSi) present on admission due to RUE PICC line - removed in ED to eliminate as source.  C difficile being treated at NH since ? 4/15 - don't know if had (+) stool toxin  Unstageable sacral decubitus [id number] present on admission - possible osteomyelitis already being treated at NH  UA negative so urine not likely to be source  CXR ? possible LEFT infiltrate, but no sputum or respiratory symptoms so unlikely source    hyponatremia   transaminitis as noted able ? cholecystitis/cholangitis vs cholestasis due to sepsis    underlying hx of HTN, DM, chronic resp failure/vent dependent s/p trach and PEG, transverse myelitis (as result of ? tetanus toxoid injection),   functional quadriplegia s/p (? diverting) colostomy    MR 5/1 confirmed sacral osteomyelitis.  palliative care meeting 5/1 resulted in patient self-determination of DNR status        Plan at this time is for continued care in CCU  HOB 30  GI and DVT prophylaxis as appropriate  continue IV abx with plan for transfer to NH with PICC line once bed available  - ID input appreciated  Surgical and GI input appreciated.   vent support  TF's   Glycemic control  wound care   supportive care    DNR    Wife Barbara is [phone].

## 2019-05-04 NOTE — PROGRESS NOTE ADULT - SUBJECTIVE AND OBJECTIVE BOX
Patient is a 63y old  Male who presents with a chief complaint of suspected sepsis (25 Apr 2019 09:55)    Date of service: 05-04-19 @ 10:30      Patient lying in bed; afebrile      ROS unable to obtain secondary to patient medical condition     MEDICATIONS  (STANDING):  ALPRAZolam 0.5 milliGRAM(s) Oral every 8 hours  artificial  tears Solution 1 Drop(s) Both EYES every 4 hours  ascorbic acid 500 milliGRAM(s) Oral daily  aspirin  chewable 81 milliGRAM(s) Oral daily  cefepime  Injectable. 1000 milliGRAM(s) IV Push every 12 hours  chlorhexidine 0.12% Liquid 5 milliLiter(s) Oral Mucosa two times a day  dextrose 5%. 1000 milliLiter(s) (50 mL/Hr) IV Continuous <Continuous>  dextrose 50% Injectable 12.5 Gram(s) IV Push once  dextrose 50% Injectable 25 Gram(s) IV Push once  dextrose 50% Injectable 25 Gram(s) IV Push once  enoxaparin Injectable 40 milliGRAM(s) SubCutaneous daily  fentaNYL   Patch  50 MICROgram(s)/Hr 1 Patch Transdermal every 72 hours  ferrous    sulfate Liquid 300 milliGRAM(s) Enteral Tube <User Schedule>  gabapentin 300 milliGRAM(s) Oral two times a day  insulin glargine Injectable (LANTUS) 6 Unit(s) SubCutaneous at bedtime  insulin lispro (HumaLOG) corrective regimen sliding scale   SubCutaneous every 6 hours  lactobacillus acidophilus 1 Tablet(s) Oral two times a day  lidocaine   Patch 2 Patch Transdermal daily  loratadine 10 milliGRAM(s) Oral daily  montelukast 10 milliGRAM(s) Oral at bedtime  multivitamin/minerals 1 Tablet(s) Oral daily  pantoprazole  Injectable 40 milliGRAM(s) IV Push daily  sertraline 50 milliGRAM(s) Oral daily  simethicone 80 milliGRAM(s) Chew every 6 hours  sodium chloride 1 Gram(s) Oral every 6 hours  tigecycline IVPB 50 milliGRAM(s) IV Intermittent every 12 hours  vancomycin    Solution 125 milliGRAM(s) Oral every 6 hours    MEDICATIONS  (PRN):  acetaminophen   Tablet .. 650 milliGRAM(s) Oral every 6 hours PRN Temp greater or equal to 38C (100.4F), Moderate Pain (4 - 6)  cyclobenzaprine 10 milliGRAM(s) Oral three times a day PRN Muscle Spasm  dextrose 40% Gel 15 Gram(s) Oral once PRN Blood Glucose LESS THAN 70 milliGRAM(s)/deciliter  glucagon  Injectable 1 milliGRAM(s) IntraMuscular once PRN Glucose LESS THAN 70 milligrams/deciliter  ondansetron Injectable 4 milliGRAM(s) IV Push every 6 hours PRN Nausea  oxyCODONE    5 mG/acetaminophen 325 mG 1 Tablet(s) Oral every 4 hours PRN Moderate Pain (4 - 6)  oxyCODONE    IR 10 milliGRAM(s) Oral every 4 hours PRN severe pain or dyspnea      Vital Signs Last 24 Hrs  T(C): 37.6 (04 May 2019 06:40), Max: 37.6 (03 May 2019 12:00)  T(F): 99.7 (04 May 2019 06:40), Max: 99.7 (04 May 2019 06:40)  HR: 94 (04 May 2019 09:00) (88 - 111)  BP: 96/67 (04 May 2019 09:00) (80/56 - 118/67)  BP(mean): 74 (04 May 2019 09:00) (60 - 80)  RR: 19 (04 May 2019 09:00) (13 - 22)  SpO2: 97% (04 May 2019 09:00) (95% - 100%)    Physical Exam:    PE:    Constitutional: frail looking  HEENT: NC/AT  Neck: trach  Respiratory: respiratory effort normal scattered coarse breath sounds  Cardiovascular: S1S2 regular, no murmurs  Abdomen: soft, not tender, ostomy in place, peg  Musculoskeletal: no muscle tenderness, no joint swelling or tenderness  Extremities: no pedal edema  Neurological/ Psychiatric: on ventilator  Skin: no rashes; no palpable lesions    Labs: all available labs reviewed                     Labs:                        7.6    16.13 )-----------( 366      ( 04 May 2019 06:40 )             26.6     05-04    129<L>  |  99  |  19  ----------------------------<  204<H>  4.6   |  25  |  0.53    Ca    7.7<L>      04 May 2019 06:40  Phos  2.8     05-04  Mg     1.9     05-04    TPro  6.0  /  Alb  1.4<L>  /  TBili  0.6  /  DBili  x   /  AST  11<L>  /  ALT  51  /  AlkPhos  439<H>  05-03           Cultures:               < from: MR Pelvis Bony Only w/wo IV Cont (05.01.19 @ 18:52) >    EXAM:  MR PELVIS BONY ONLY WAW IC                            PROCEDURE DATE:  05/01/2019          INTERPRETATION:  MRI OF THE BONY PELVIS.    CLINICAL INFORMATION: Sacral decubitus ulcer. Rule out osteomyelitis.  TECHNIQUE: Multiplanar MR imaging was obtained of the bony pelvis with   and without the administration of intravenous contrast. 10 cc of Gadavist   were administered intravenously.    FINDINGS:    Decubitus ulcer overlies the lower sacrum/coccyx posteriorly. There are   erosive changes with edema and enhancement of the coccyx and S5 sacral   segment, consistent with osteomyelitis. Edema and enhancement is   visualized within the inferior aspect of the S4 segment with mild   decreased T1 marrow signal, consistent with early osteomyelitis. There is   surrounding soft tissue edema and enhancement within the presacral region   anterior to S3 and S4. No discrete fluid collection is visualized.   Heterotopic ossification is visualized within the region of the left   gluteus and piriformis musculature with mild piriformis edema. Partially   imaged visceral pelvic contents are unremarkable.    IMPRESSION:    Sacral decubitus ulcer with osteomyelitis of the S5 vertebral body   segment and of the coccyx as well as early osteomyelitis of the inferior   aspect of the S4 vertebral body segment.     < end of copied text >                < from: MR Abdomen No Cont (04.30.19 @ 18:35) >    EXAM:  MR ABDOMEN                            PROCEDURE DATE:  04/30/2019          INTERPRETATION:  MR ABDOMEN    HISTORY:  elevated LFTs    TECHNIQUE: Multiplanar T1-weighted, T2-weighted, and diffusion weighted   sequences were obtained of the abdomen, without contrast.  3D heavily   T2-weighted thin-section MRCP was also performed.    Field Strength: 1.5T    Contrast: None    .    COMPARISON: CT 4/25/2019.    FINDINGS:    LOWER CHEST: Bibasilar consolidation.    LIVER: Normal.  BILE DUCTS: 8 mm common bile duct with normal distal tapering. No filling   defect. No intrahepatic dilatation.  GALLBLADDER: Multiple small stones and sludge without distention, wall   thickening, or pericholecystic inflammation. Cystic duct patency is   observed.Low and medial insertion of the cystic duct.  SPLEEN: Normal.  PANCREAS: Diffuse atrophy.  ADRENALS: Normal.  KIDNEYS/URETERS: No hydronephrosis.    VISUALIZED PORTIONS:    BOWEL: Percutaneous gastrostomy tube.  PERITONEUM: No ascites.  VESSELS:  Normal caliber aorta.  RETROPERITONEUM: No adenopathy.    ABDOMINAL WALL: Normal.  BONES: No aggressive lesion.    IMPRESSION:    No choledocholithiasis or biliary dilatation.    < end of copied text >            < from: US Abdomen Complete (04.24.19 @ 21:40) >  IMPRESSION:     Distended gallbladder with sludge and gallbladder wall thickening/edema   which can be seen in the setting of acute cholecystitis. HIDA scan should   be considered for further evaluation.    < end of copied text >    < from: Xray Chest 1 View-PORTABLE IMMEDIATE (04.24.19 @ 20:39) >  IMPRESSION:   There is a patchy left midlung airspace opacity. No pleural effusion or   pneumothorax. Cardiomediastinal silhouette is unremarkable. Tracheostomy   is visualized. Old right clavicular fracture.    < end of copied text >

## 2019-05-05 LAB
ANION GAP SERPL CALC-SCNC: 4 MMOL/L — LOW (ref 5–17)
BUN SERPL-MCNC: 21 MG/DL — SIGNIFICANT CHANGE UP (ref 7–23)
CALCIUM SERPL-MCNC: 7.6 MG/DL — LOW (ref 8.5–10.1)
CHLORIDE SERPL-SCNC: 102 MMOL/L — SIGNIFICANT CHANGE UP (ref 96–108)
CO2 SERPL-SCNC: 27 MMOL/L — SIGNIFICANT CHANGE UP (ref 22–31)
CREAT SERPL-MCNC: 0.55 MG/DL — SIGNIFICANT CHANGE UP (ref 0.5–1.3)
GLUCOSE BLDC GLUCOMTR-MCNC: 150 MG/DL — HIGH (ref 70–99)
GLUCOSE BLDC GLUCOMTR-MCNC: 159 MG/DL — HIGH (ref 70–99)
GLUCOSE BLDC GLUCOMTR-MCNC: 172 MG/DL — HIGH (ref 70–99)
GLUCOSE BLDC GLUCOMTR-MCNC: 199 MG/DL — HIGH (ref 70–99)
GLUCOSE SERPL-MCNC: 156 MG/DL — HIGH (ref 70–99)
HCT VFR BLD CALC: 27.4 % — LOW (ref 39–50)
HGB BLD-MCNC: 7.8 G/DL — LOW (ref 13–17)
MAGNESIUM SERPL-MCNC: 2 MG/DL — SIGNIFICANT CHANGE UP (ref 1.6–2.6)
MCHC RBC-ENTMCNC: 24.6 PG — LOW (ref 27–34)
MCHC RBC-ENTMCNC: 28.5 GM/DL — LOW (ref 32–36)
MCV RBC AUTO: 86.4 FL — SIGNIFICANT CHANGE UP (ref 80–100)
NRBC # BLD: 0 /100 WBCS — SIGNIFICANT CHANGE UP (ref 0–0)
PHOSPHATE SERPL-MCNC: 3.1 MG/DL — SIGNIFICANT CHANGE UP (ref 2.5–4.5)
PLATELET # BLD AUTO: 481 K/UL — HIGH (ref 150–400)
POTASSIUM SERPL-MCNC: 4.6 MMOL/L — SIGNIFICANT CHANGE UP (ref 3.5–5.3)
POTASSIUM SERPL-SCNC: 4.6 MMOL/L — SIGNIFICANT CHANGE UP (ref 3.5–5.3)
RBC # BLD: 3.17 M/UL — LOW (ref 4.2–5.8)
RBC # FLD: 20.5 % — HIGH (ref 10.3–14.5)
SODIUM SERPL-SCNC: 133 MMOL/L — LOW (ref 135–145)
WBC # BLD: 17.35 K/UL — HIGH (ref 3.8–10.5)
WBC # FLD AUTO: 17.35 K/UL — HIGH (ref 3.8–10.5)

## 2019-05-05 RX ORDER — SODIUM CHLORIDE 9 MG/ML
1000 INJECTION INTRAMUSCULAR; INTRAVENOUS; SUBCUTANEOUS ONCE
Qty: 0 | Refills: 0 | Status: COMPLETED | OUTPATIENT
Start: 2019-05-05 | End: 2019-05-05

## 2019-05-05 RX ORDER — SODIUM CHLORIDE 9 MG/ML
1000 INJECTION INTRAMUSCULAR; INTRAVENOUS; SUBCUTANEOUS
Qty: 0 | Refills: 0 | Status: DISCONTINUED | OUTPATIENT
Start: 2019-05-05 | End: 2019-05-07

## 2019-05-05 RX ADMIN — CHLORHEXIDINE GLUCONATE 5 MILLILITER(S): 213 SOLUTION TOPICAL at 17:14

## 2019-05-05 RX ADMIN — Medication 1 TABLET(S): at 11:50

## 2019-05-05 RX ADMIN — Medication 1 TABLET(S): at 17:13

## 2019-05-05 RX ADMIN — SIMETHICONE 80 MILLIGRAM(S): 80 TABLET, CHEWABLE ORAL at 05:34

## 2019-05-05 RX ADMIN — INSULIN GLARGINE 6 UNIT(S): 100 INJECTION, SOLUTION SUBCUTANEOUS at 23:25

## 2019-05-05 RX ADMIN — SODIUM CHLORIDE 100 MILLILITER(S): 9 INJECTION INTRAMUSCULAR; INTRAVENOUS; SUBCUTANEOUS at 20:25

## 2019-05-05 RX ADMIN — PANTOPRAZOLE SODIUM 40 MILLIGRAM(S): 20 TABLET, DELAYED RELEASE ORAL at 11:50

## 2019-05-05 RX ADMIN — LIDOCAINE 2 PATCH: 4 CREAM TOPICAL at 19:49

## 2019-05-05 RX ADMIN — Medication 0.5 MILLIGRAM(S): at 23:07

## 2019-05-05 RX ADMIN — Medication 125 MILLIGRAM(S): at 05:34

## 2019-05-05 RX ADMIN — Medication 125 MILLIGRAM(S): at 17:15

## 2019-05-05 RX ADMIN — Medication 81 MILLIGRAM(S): at 11:50

## 2019-05-05 RX ADMIN — SODIUM CHLORIDE 1000 MILLILITER(S): 9 INJECTION INTRAMUSCULAR; INTRAVENOUS; SUBCUTANEOUS at 08:49

## 2019-05-05 RX ADMIN — Medication 1 DROP(S): at 06:32

## 2019-05-05 RX ADMIN — SIMETHICONE 80 MILLIGRAM(S): 80 TABLET, CHEWABLE ORAL at 23:07

## 2019-05-05 RX ADMIN — SIMETHICONE 80 MILLIGRAM(S): 80 TABLET, CHEWABLE ORAL at 17:13

## 2019-05-05 RX ADMIN — SIMETHICONE 80 MILLIGRAM(S): 80 TABLET, CHEWABLE ORAL at 11:50

## 2019-05-05 RX ADMIN — Medication 0.5 MILLIGRAM(S): at 06:32

## 2019-05-05 RX ADMIN — SERTRALINE 50 MILLIGRAM(S): 25 TABLET, FILM COATED ORAL at 11:50

## 2019-05-05 RX ADMIN — Medication 500 MILLIGRAM(S): at 11:50

## 2019-05-05 RX ADMIN — Medication 2: at 12:37

## 2019-05-05 RX ADMIN — Medication 125 MILLIGRAM(S): at 11:51

## 2019-05-05 RX ADMIN — FENTANYL CITRATE 1 PATCH: 50 INJECTION INTRAVENOUS at 12:00

## 2019-05-05 RX ADMIN — Medication 2: at 23:24

## 2019-05-05 RX ADMIN — TIGECYCLINE 105 MILLIGRAM(S): 50 INJECTION, POWDER, LYOPHILIZED, FOR SOLUTION INTRAVENOUS at 05:34

## 2019-05-05 RX ADMIN — CEFEPIME 1000 MILLIGRAM(S): 1 INJECTION, POWDER, FOR SOLUTION INTRAMUSCULAR; INTRAVENOUS at 17:14

## 2019-05-05 RX ADMIN — Medication 300 MILLIGRAM(S): at 10:08

## 2019-05-05 RX ADMIN — ENOXAPARIN SODIUM 40 MILLIGRAM(S): 100 INJECTION SUBCUTANEOUS at 11:51

## 2019-05-05 RX ADMIN — Medication 1 TABLET(S): at 05:34

## 2019-05-05 RX ADMIN — LORATADINE 10 MILLIGRAM(S): 10 TABLET ORAL at 11:50

## 2019-05-05 RX ADMIN — CEFEPIME 1000 MILLIGRAM(S): 1 INJECTION, POWDER, FOR SOLUTION INTRAMUSCULAR; INTRAVENOUS at 06:30

## 2019-05-05 RX ADMIN — FENTANYL CITRATE 1 PATCH: 50 INJECTION INTRAVENOUS at 19:48

## 2019-05-05 RX ADMIN — LIDOCAINE 2 PATCH: 4 CREAM TOPICAL at 02:30

## 2019-05-05 RX ADMIN — OXYCODONE AND ACETAMINOPHEN 1 TABLET(S): 5; 325 TABLET ORAL at 07:00

## 2019-05-05 RX ADMIN — LIDOCAINE 2 PATCH: 4 CREAM TOPICAL at 11:52

## 2019-05-05 RX ADMIN — Medication 125 MILLIGRAM(S): at 23:07

## 2019-05-05 RX ADMIN — MONTELUKAST 10 MILLIGRAM(S): 4 TABLET, CHEWABLE ORAL at 23:07

## 2019-05-05 RX ADMIN — FENTANYL CITRATE 1 PATCH: 50 INJECTION INTRAVENOUS at 11:51

## 2019-05-05 RX ADMIN — SODIUM CHLORIDE 100 MILLILITER(S): 9 INJECTION INTRAMUSCULAR; INTRAVENOUS; SUBCUTANEOUS at 10:08

## 2019-05-05 RX ADMIN — Medication 1 DROP(S): at 23:10

## 2019-05-05 RX ADMIN — Medication 1 DROP(S): at 14:16

## 2019-05-05 RX ADMIN — FENTANYL CITRATE 1 PATCH: 50 INJECTION INTRAVENOUS at 12:02

## 2019-05-05 RX ADMIN — TIGECYCLINE 105 MILLIGRAM(S): 50 INJECTION, POWDER, LYOPHILIZED, FOR SOLUTION INTRAVENOUS at 17:14

## 2019-05-05 RX ADMIN — GABAPENTIN 300 MILLIGRAM(S): 400 CAPSULE ORAL at 05:34

## 2019-05-05 RX ADMIN — Medication 2: at 17:57

## 2019-05-05 RX ADMIN — LIDOCAINE 2 PATCH: 4 CREAM TOPICAL at 23:10

## 2019-05-05 RX ADMIN — GABAPENTIN 300 MILLIGRAM(S): 400 CAPSULE ORAL at 17:13

## 2019-05-05 RX ADMIN — CHLORHEXIDINE GLUCONATE 5 MILLILITER(S): 213 SOLUTION TOPICAL at 05:36

## 2019-05-05 RX ADMIN — OXYCODONE AND ACETAMINOPHEN 1 TABLET(S): 5; 325 TABLET ORAL at 05:34

## 2019-05-05 RX ADMIN — Medication 650 MILLIGRAM(S): at 23:07

## 2019-05-05 NOTE — PROGRESS NOTE ADULT - ASSESSMENT
64yo M suffering from severe sepsis - source uncertain and several potential sites:  Possible cholecystitis given elevated LFT's, distention of GB and sludge, though may just be cholestasis due to sepsis at a different site  Possible line infection (CLABSi) present on admission due to RUE PICC line - removed in ED to eliminate as source.  C difficile being treated at NH since ? 4/15 - don't know if had (+) stool toxin  Unstageable sacral decubitus [id number] present on admission - possible osteomyelitis already being treated at NH  UA negative so urine not likely to be source  CXR ? possible LEFT infiltrate, but no sputum or respiratory symptoms so unlikely source    hyponatremia   transaminitis as noted able ? cholecystitis/cholangitis vs cholestasis due to sepsis    underlying hx of HTN, DM, chronic resp failure/vent dependent s/p trach and PEG, transverse myelitis (as result of ? tetanus toxoid injection),   functional quadriplegia s/p (? diverting) colostomy    MR 5/1 confirmed sacral osteomyelitis.  palliative care meeting 5/1 resulted in patient self-determination of DNR status        Plan at this time is for continued care in CCU  HOB 30  GI and DVT prophylaxis as appropriate  continue IV abx with plan for transfer to NH with PICC line once bed available  - ID input appreciated  Surgical and GI input appreciated.   vent support  TF's   Glycemic control  wound care   supportive care    DNR    Wife Barbara is [phone]. 62yo M suffering from severe sepsis - source uncertain and several potential sites:  Possible cholecystitis given elevated LFT's, distention of GB and sludge, though may just be cholestasis due to sepsis at a different site  Possible line infection (CLABSi) present on admission due to RUE PICC line - removed in ED to eliminate as source.  C difficile being treated at NH since ? 4/15 - don't know if had (+) stool toxin  Unstageable sacral decubitus [id number] present on admission - possible osteomyelitis already being treated at NH  UA negative so urine not likely to be source  CXR ? possible LEFT infiltrate, but no sputum or respiratory symptoms so unlikely source    hyponatremia   transaminitis as noted able ? cholecystitis/cholangitis vs cholestasis due to sepsis    underlying hx of HTN, DM, chronic resp failure/vent dependent s/p trach and PEG, transverse myelitis (as result of ? tetanus toxoid injection),   functional quadriplegia s/p (? diverting) colostomy  hypotensive episode 5/5 - ? dehydration and hypovolemia  MR 5/1 confirmed sacral osteomyelitis.  palliative care meeting 5/1 resulted in patient self-determination of DNR status        Plan at this time is for continued care in CCU  HOB 30  GI and DVT prophylaxis as appropriate  continue IV abx with plan for transfer to NH with PICC line once bed available  - ID input appreciated  Surgical and GI input appreciated.   IVF bolus and maintenance fluid  vent support  TF's   Glycemic control  wound care   supportive care    DNR    Wife Barbara is [phone].

## 2019-05-05 NOTE — PROGRESS NOTE ADULT - SUBJECTIVE AND OBJECTIVE BOX
64yo M admitted 4/24 due to fever and leukocytosis.  Already being treated for C difficile colitis and 'osteomyelitis' from sacral decubitus at NH (IV Vanco IV Cefepime PO Vanco).  In ED fever 104* F and WBC 19k.  Hemodynamics reasonable.  Pt with elevated LFT's - US in ED with distended GB and sludge - pt refused CT scan - seen by surgery team - No acute intervention at this time.     I spoke with patient in detail and with his wife Barbara via telephone [phone] - issues and plans discussed in detail and all questions answered.    Pt with hx of HTN, DM, chronic resp failure/vent dependent s/p trach and PEG, transverse myelitis (as result of ? tetanus toxoid injection), functional quadripegia, s/p (? diverting) colostomy - most recently at  6/2018 due to sepsis of urinary source (CAUTi present on admission), before that was in [facility] 4/2018 with PNA Acinetobacter baumannii) and 3/2018 with UTI (PSAE muti resistant but S to zosyn).    Pt no longer has indwelling catheter, but did have PICC line placed approx 1 week ago.    A copy of his MOLST form was sent from NH and this was confirmed with patient and a new form documented.    Pt is AA and O x 3 NAD NRD and answers questions appropriately via head nod.    CT showed relatively normal GB--Sono showed distended GB and HIDA was +.  LFTS are up.  Pt febrile and was on Tigecycline until 4/29.  Pt is followed by general surgery    4/29:  Tm 102.8  WBC 24K.       4/30:  tigecycline resumed.  GI eval appreciated - MRCP ordered.    5/1: above d/w Dr Maddox who had been caring for the patient - MRCP negative.  D/w Dr Garza.  Explained issues and plans to patient and all questions answered.  Will also speak with wife in same regard.  Addendum: spoke with pt and wife in detail about issues and plans.  D/w Dr Austin as well.  MRI ordered to assess for osteomyelitis.    5/2: MRI (+) for osteomyelitis.  Discussed with Dr Campos from surgery - No role of debridement at present.  D/w ID - will consider PICC and regimen for return to [facility].    5/5: no new issues, no overnight events - awaiting open bed at [facility].            Mode: AC/ CMV (Assist Control/ Continuous Mandatory Ventilation)  RR (machine): 14  TV (machine): 500  FiO2: 40  PEEP: 5  ITime: 1      PAST MEDICAL & SURGICAL HISTORY:  Pneumonia  UTI (urinary tract infection)  H/O quadriplegia  Essential hypertension  Paralysis  Transverse myelitis  S/P colostomy  PEG (percutaneous endoscopic gastrostomy) status  History of tracheostomy      Allergies    No Known Allergies        ICU Vital Signs Last 24 Hrs  T(C): 37 (05 May 2019 06:00), Max: 37.7 (04 May 2019 23:00)  T(F): 98.6 (05 May 2019 06:00), Max: 99.8 (04 May 2019 23:00)  HR: 94 (05 May 2019 06:00) (90 - 105)  BP: 90/59 (05 May 2019 06:00) (79/59 - 110/68)  BP(mean): 64 (05 May 2019 06:00) (64 - 78)  ABP: --  ABP(mean): --  RR: 20 (05 May 2019 06:00) (18 - 21)  SpO2: 100% (05 May 2019 06:00) (96% - 100%)      Mode: AC/ CMV (Assist Control/ Continuous Mandatory Ventilation)  RR (machine): 14  TV (machine): 500  FiO2: 30  PEEP: 5  ITime: 1  MAP: 33  PIP: 30      I&O's Summary    04 May 2019 07:01  -  05 May 2019 07:00  --------------------------------------------------------  IN: 1100 mL / OUT: 1255 mL / NET: -155 mL                              7.8    17.35 )-----------( 481      ( 05 May 2019 06:58 )             27.4       05-05    133<L>  |  102  |  21  ----------------------------<  156<H>  4.6   |  27  |  0.55    Ca    7.6<L>      05 May 2019 06:58  Phos  3.1     05-05  Mg     2.0     05-05        CAPILLARY BLOOD GLUCOSE      POCT Blood Glucose.: 150 mg/dL (05 May 2019 05:43)  POCT Blood Glucose.: 205 mg/dL (04 May 2019 23:09)  POCT Blood Glucose.: 171 mg/dL (04 May 2019 18:14)  POCT Blood Glucose.: 187 mg/dL (04 May 2019 12:17)      MEDICATIONS  (STANDING):  ALPRAZolam 0.5 milliGRAM(s) Oral every 8 hours  artificial  tears Solution 1 Drop(s) Both EYES every 4 hours  ascorbic acid 500 milliGRAM(s) Oral daily  aspirin  chewable 81 milliGRAM(s) Oral daily  cefepime  Injectable. 1000 milliGRAM(s) IV Push every 12 hours  chlorhexidine 0.12% Liquid 5 milliLiter(s) Oral Mucosa two times a day  dextrose 5%. 1000 milliLiter(s) (50 mL/Hr) IV Continuous <Continuous>  dextrose 50% Injectable 12.5 Gram(s) IV Push once  dextrose 50% Injectable 25 Gram(s) IV Push once  dextrose 50% Injectable 25 Gram(s) IV Push once  enoxaparin Injectable 40 milliGRAM(s) SubCutaneous daily  fentaNYL   Patch  50 MICROgram(s)/Hr 1 Patch Transdermal every 72 hours  ferrous    sulfate Liquid 300 milliGRAM(s) Enteral Tube <User Schedule>  gabapentin 300 milliGRAM(s) Oral two times a day  insulin glargine Injectable (LANTUS) 6 Unit(s) SubCutaneous at bedtime  insulin lispro (HumaLOG) corrective regimen sliding scale   SubCutaneous every 6 hours  lactobacillus acidophilus 1 Tablet(s) Oral two times a day  lidocaine   Patch 2 Patch Transdermal daily  loratadine 10 milliGRAM(s) Oral daily  montelukast 10 milliGRAM(s) Oral at bedtime  multivitamin/minerals 1 Tablet(s) Oral daily  pantoprazole  Injectable 40 milliGRAM(s) IV Push daily  sertraline 50 milliGRAM(s) Oral daily  simethicone 80 milliGRAM(s) Chew every 6 hours  tigecycline IVPB 50 milliGRAM(s) IV Intermittent every 12 hours  vancomycin    Solution 125 milliGRAM(s) Oral every 6 hours    MEDICATIONS  (PRN):  acetaminophen   Tablet .. 650 milliGRAM(s) Oral every 6 hours PRN Temp greater or equal to 38C (100.4F), Moderate Pain (4 - 6)  cyclobenzaprine 10 milliGRAM(s) Oral three times a day PRN Muscle Spasm  dextrose 40% Gel 15 Gram(s) Oral once PRN Blood Glucose LESS THAN 70 milliGRAM(s)/deciliter  glucagon  Injectable 1 milliGRAM(s) IntraMuscular once PRN Glucose LESS THAN 70 milligrams/deciliter  ondansetron Injectable 4 milliGRAM(s) IV Push every 6 hours PRN Nausea  oxyCODONE    5 mG/acetaminophen 325 mG 1 Tablet(s) Oral every 4 hours PRN Moderate Pain (4 - 6)  oxyCODONE    IR 10 milliGRAM(s) Oral every 4 hours PRN severe pain or dyspnea    Review of Systems:  · Negative General Symptoms	no chills	  · General Symptoms	fever	  · Negative Skin Symptoms	no rash; no itching	  · Negative Ophthalmologic Symptoms	no diplopia; no photophobia	  · Ophthalmologic Comments	(+) dry eyes	  · Negative ENMT Symptoms	no dysphagia	  · Negative Respiratory and Thorax Symptoms	no wheezing; no dyspnea; no cough	  · Negative Cardiovascular Symptoms	no chest pain; no palpitations	  · Negative Gastrointestinal Symptoms	no nausea; no vomiting	  · Negative General Genitourinary Symptoms	no dysuria	  · Musculoskeletal Symptoms	muscle weakness	  · Negative Neurological Symptoms	no headache; no loss of consciousness	  · Neurological Symptoms	weakness	  · Negative Psychiatric Symptoms	no suicidal ideation	  · Psychiatric Symptoms	depression	  · Negative Hematology Symptoms	no gum bleeding; no nose bleeding	  · Negative Allergic Reactions	no urticaria	  · Additional ROS	ALL other ROS are NEGATIVE.	    Physical Exam:  · Constitutional	detailed exam	  · Constitutional Details	no distress	  · Eyes	detailed exam	  · Eyes Details	PERRL; EOMI	  · ENMT	detailed exam	  · ENMT Details	mouth	  · Mouth	dry	  · Neck	detailed exam	  · Neck Details	supple; no JVD; trach in situ	  · Back	detailed exam	  · Back Details	normal shape	  · Respiratory	detailed exam	  · Respiratory Details	airway patent; breath sounds equal; good air movement	  · Cardiovascular	detailed exam	  · Cardiovascular Details	regular rate and rhythm	  · Gastrointestinal	detailed exam	  · GI Normal	soft; nontender; bowel sounds normal	  · GI Abnormal	distended	  · Gastrointestinal Comments	LLQ ostomy with normal appearing stool, epigastric position G tube in situ.	  · Genitourinary	detailed exam	  · Genitourinary Details Male	normal external genitalia	  · Genitourinary Comments	texas cath in place	  · Extremities	detailed exam	  · Extremities Details	no cyanosis	  · Vascular	Equal and normal pulses (carotid, femoral, dorsalis pedis)	  · Neurological	detailed exam	  · Neurological Details	alert and oriented x 3	  · Motor	minimal movement of UE's - NO movement of LE's, contractures of hands B/L	  · Sensory	grossly intact	  · Skin	detailed exam	  · Skin Details	warm and dry	  · Musculoskeletal	detailed exam	  · Musculoskeletal Details	quadriplegia	  · Psychiatric	detailed exam	  · Psychiatric Details	depressed; flat affect	      DVT Prophylaxis: Lovenox    Advanced Directives:  DNR  Discussed with: patient - MOLST completed by Pall care team.    Visit Information: SSQ    ** Time is exclusive of billed procedures and/or teaching and/or routine family updates. 62yo M admitted 4/24 due to fever and leukocytosis.  Already being treated for C difficile colitis and 'osteomyelitis' from sacral decubitus at NH (IV Vanco IV Cefepime PO Vanco).  In ED fever 104* F and WBC 19k.  Hemodynamics reasonable.  Pt with elevated LFT's - US in ED with distended GB and sludge - pt refused CT scan - seen by surgery team - No acute intervention at this time.     I spoke with patient in detail and with his wife Barbara via telephone [phone] - issues and plans discussed in detail and all questions answered.    Pt with hx of HTN, DM, chronic resp failure/vent dependent s/p trach and PEG, transverse myelitis (as result of ? tetanus toxoid injection), functional quadripegia, s/p (? diverting) colostomy - most recently at  6/2018 due to sepsis of urinary source (CAUTi present on admission), before that was in [facility] 4/2018 with PNA Acinetobacter baumannii) and 3/2018 with UTI (PSAE muti resistant but S to zosyn).    Pt no longer has indwelling catheter, but did have PICC line placed approx 1 week ago.    A copy of his MOLST form was sent from NH and this was confirmed with patient and a new form documented.    Pt is AA and O x 3 NAD NRD and answers questions appropriately via head nod.    CT showed relatively normal GB--Sono showed distended GB and HIDA was +.  LFTS are up.  Pt febrile and was on Tigecycline until 4/29.  Pt is followed by general surgery    4/29:  Tm 102.8  WBC 24K.       4/30:  tigecycline resumed.  GI eval appreciated - MRCP ordered.    5/1: above d/w Dr Maddox who had been caring for the patient - MRCP negative.  D/w Dr Garza.  Explained issues and plans to patient and all questions answered.  Will also speak with wife in same regard.  Addendum: spoke with pt and wife in detail about issues and plans.  D/w Dr Austin as well.  MRI ordered to assess for osteomyelitis.    5/2: MRI (+) for osteomyelitis.  Discussed with Dr Campos from surgery - No role of debridement at present.  D/w ID - will consider PICC and regimen for return to [facility].    5/5: no new issues, no overnight events - awaiting open bed at [facility].  Episode of low BP - possible dehydration as free water stopped yesterday due to hyponatremia.        Mode: AC/ CMV (Assist Control/ Continuous Mandatory Ventilation)  RR (machine): 14  TV (machine): 500  FiO2: 40  PEEP: 5  ITime: 1      PAST MEDICAL & SURGICAL HISTORY:  Pneumonia  UTI (urinary tract infection)  H/O quadriplegia  Essential hypertension  Paralysis  Transverse myelitis  S/P colostomy  PEG (percutaneous endoscopic gastrostomy) status  History of tracheostomy      Allergies    No Known Allergies        ICU Vital Signs Last 24 Hrs  T(C): 37 (05 May 2019 06:00), Max: 37.7 (04 May 2019 23:00)  T(F): 98.6 (05 May 2019 06:00), Max: 99.8 (04 May 2019 23:00)  HR: 94 (05 May 2019 06:00) (90 - 105)  BP: 90/59 (05 May 2019 06:00) (79/59 - 110/68)  BP(mean): 64 (05 May 2019 06:00) (64 - 78)  ABP: --  ABP(mean): --  RR: 20 (05 May 2019 06:00) (18 - 21)  SpO2: 100% (05 May 2019 06:00) (96% - 100%)      Mode: AC/ CMV (Assist Control/ Continuous Mandatory Ventilation)  RR (machine): 14  TV (machine): 500  FiO2: 30  PEEP: 5  ITime: 1  MAP: 33  PIP: 30      I&O's Summary    04 May 2019 07:01  -  05 May 2019 07:00  --------------------------------------------------------  IN: 1100 mL / OUT: 1255 mL / NET: -155 mL                              7.8    17.35 )-----------( 481      ( 05 May 2019 06:58 )             27.4       05-05    133<L>  |  102  |  21  ----------------------------<  156<H>  4.6   |  27  |  0.55    Ca    7.6<L>      05 May 2019 06:58  Phos  3.1     05-05  Mg     2.0     05-05        CAPILLARY BLOOD GLUCOSE      POCT Blood Glucose.: 150 mg/dL (05 May 2019 05:43)  POCT Blood Glucose.: 205 mg/dL (04 May 2019 23:09)  POCT Blood Glucose.: 171 mg/dL (04 May 2019 18:14)  POCT Blood Glucose.: 187 mg/dL (04 May 2019 12:17)      MEDICATIONS  (STANDING):  ALPRAZolam 0.5 milliGRAM(s) Oral every 8 hours  artificial  tears Solution 1 Drop(s) Both EYES every 4 hours  ascorbic acid 500 milliGRAM(s) Oral daily  aspirin  chewable 81 milliGRAM(s) Oral daily  cefepime  Injectable. 1000 milliGRAM(s) IV Push every 12 hours  chlorhexidine 0.12% Liquid 5 milliLiter(s) Oral Mucosa two times a day  dextrose 5%. 1000 milliLiter(s) (50 mL/Hr) IV Continuous <Continuous>  dextrose 50% Injectable 12.5 Gram(s) IV Push once  dextrose 50% Injectable 25 Gram(s) IV Push once  dextrose 50% Injectable 25 Gram(s) IV Push once  enoxaparin Injectable 40 milliGRAM(s) SubCutaneous daily  fentaNYL   Patch  50 MICROgram(s)/Hr 1 Patch Transdermal every 72 hours  ferrous    sulfate Liquid 300 milliGRAM(s) Enteral Tube <User Schedule>  gabapentin 300 milliGRAM(s) Oral two times a day  insulin glargine Injectable (LANTUS) 6 Unit(s) SubCutaneous at bedtime  insulin lispro (HumaLOG) corrective regimen sliding scale   SubCutaneous every 6 hours  lactobacillus acidophilus 1 Tablet(s) Oral two times a day  lidocaine   Patch 2 Patch Transdermal daily  loratadine 10 milliGRAM(s) Oral daily  montelukast 10 milliGRAM(s) Oral at bedtime  multivitamin/minerals 1 Tablet(s) Oral daily  pantoprazole  Injectable 40 milliGRAM(s) IV Push daily  sertraline 50 milliGRAM(s) Oral daily  simethicone 80 milliGRAM(s) Chew every 6 hours  tigecycline IVPB 50 milliGRAM(s) IV Intermittent every 12 hours  vancomycin    Solution 125 milliGRAM(s) Oral every 6 hours    MEDICATIONS  (PRN):  acetaminophen   Tablet .. 650 milliGRAM(s) Oral every 6 hours PRN Temp greater or equal to 38C (100.4F), Moderate Pain (4 - 6)  cyclobenzaprine 10 milliGRAM(s) Oral three times a day PRN Muscle Spasm  dextrose 40% Gel 15 Gram(s) Oral once PRN Blood Glucose LESS THAN 70 milliGRAM(s)/deciliter  glucagon  Injectable 1 milliGRAM(s) IntraMuscular once PRN Glucose LESS THAN 70 milligrams/deciliter  ondansetron Injectable 4 milliGRAM(s) IV Push every 6 hours PRN Nausea  oxyCODONE    5 mG/acetaminophen 325 mG 1 Tablet(s) Oral every 4 hours PRN Moderate Pain (4 - 6)  oxyCODONE    IR 10 milliGRAM(s) Oral every 4 hours PRN severe pain or dyspnea    Review of Systems:  · Negative General Symptoms	no chills	  · General Symptoms	fever	  · Negative Skin Symptoms	no rash; no itching	  · Negative Ophthalmologic Symptoms	no diplopia; no photophobia	  · Ophthalmologic Comments	(+) dry eyes	  · Negative ENMT Symptoms	no dysphagia	  · Negative Respiratory and Thorax Symptoms	no wheezing; no dyspnea; no cough	  · Negative Cardiovascular Symptoms	no chest pain; no palpitations	  · Negative Gastrointestinal Symptoms	no nausea; no vomiting	  · Negative General Genitourinary Symptoms	no dysuria	  · Musculoskeletal Symptoms	muscle weakness	  · Negative Neurological Symptoms	no headache; no loss of consciousness	  · Neurological Symptoms	weakness	  · Negative Psychiatric Symptoms	no suicidal ideation	  · Psychiatric Symptoms	depression	  · Negative Hematology Symptoms	no gum bleeding; no nose bleeding	  · Negative Allergic Reactions	no urticaria	  · Additional ROS	ALL other ROS are NEGATIVE.	    Physical Exam:  · Constitutional	detailed exam	  · Constitutional Details	no distress	  · Eyes	detailed exam	  · Eyes Details	PERRL; EOMI	  · ENMT	detailed exam	  · ENMT Details	mouth	  · Mouth	dry	  · Neck	detailed exam	  · Neck Details	supple; no JVD; trach in situ	  · Back	detailed exam	  · Back Details	normal shape	  · Respiratory	detailed exam	  · Respiratory Details	airway patent; breath sounds equal; good air movement	  · Cardiovascular	detailed exam	  · Cardiovascular Details	regular rate and rhythm	  · Gastrointestinal	detailed exam	  · GI Normal	soft; nontender; bowel sounds normal	  · GI Abnormal	distended	  · Gastrointestinal Comments	LLQ ostomy with normal appearing stool, epigastric position G tube in situ.	  · Genitourinary	detailed exam	  · Genitourinary Details Male	normal external genitalia	  · Genitourinary Comments	texas cath in place	  · Extremities	detailed exam	  · Extremities Details	no cyanosis	  · Vascular	Equal and normal pulses (carotid, femoral, dorsalis pedis)	  · Neurological	detailed exam	  · Neurological Details	alert and oriented x 3	  · Motor	minimal movement of UE's - NO movement of LE's, contractures of hands B/L	  · Sensory	grossly intact	  · Skin	detailed exam	  · Skin Details	warm and dry	  · Musculoskeletal	detailed exam	  · Musculoskeletal Details	quadriplegia	  · Psychiatric	detailed exam	  · Psychiatric Details	depressed; flat affect	      DVT Prophylaxis: Lovenox    Advanced Directives:  DNR  Discussed with: patient - MOLST completed by Pall care team.    Visit Information: SSQ    ** Time is exclusive of billed procedures and/or teaching and/or routine family updates.

## 2019-05-05 NOTE — PROGRESS NOTE ADULT - ASSESSMENT
Pt is a 62 y/o M PMhx of HTN, DM, chronic resp failure/vent dependent s/p trach and PEG, transverse myelitis (as result of ? tetanus toxoid injection), functional quadripegia, s/p  colostomy - most recently at  6/2018 due to sepsis of urinary source (CAUTi present on admission), before that was in  hospital 4/2018 with PNA Acinetobacter baumannii) and 3/2018 with UTI (PSAE muti resistant but S to zosyn). Patient was admitted 4/24 due to fever and leukocytosis.  Already being treated for C difficile colitis and 'osteomyelitis' from sacral decubitus at NH (IV Vanco IV Cefepime PO Vanco), Picc line was placed at [facility] on 4/12. History per medical record as patient unable to provide history.  1. 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 other issues: per medicine

## 2019-05-05 NOTE — PROGRESS NOTE ADULT - SUBJECTIVE AND OBJECTIVE BOX
Patient is a 63y old  Male who presents with a chief complaint of suspected sepsis (25 Apr 2019 09:55)    Date of service: 05-05-19 @ 15:12    Patient lying in bed; nonverbal        ROS unable to obtain secondary to patient medical condition     MEDICATIONS  (STANDING):  ALPRAZolam 0.5 milliGRAM(s) Oral every 8 hours  artificial  tears Solution 1 Drop(s) Both EYES every 4 hours  ascorbic acid 500 milliGRAM(s) Oral daily  aspirin  chewable 81 milliGRAM(s) Oral daily  cefepime  Injectable. 1000 milliGRAM(s) IV Push every 12 hours  chlorhexidine 0.12% Liquid 5 milliLiter(s) Oral Mucosa two times a day  dextrose 5%. 1000 milliLiter(s) (50 mL/Hr) IV Continuous <Continuous>  dextrose 50% Injectable 12.5 Gram(s) IV Push once  dextrose 50% Injectable 25 Gram(s) IV Push once  dextrose 50% Injectable 25 Gram(s) IV Push once  enoxaparin Injectable 40 milliGRAM(s) SubCutaneous daily  fentaNYL   Patch  50 MICROgram(s)/Hr 1 Patch Transdermal every 72 hours  ferrous    sulfate Liquid 300 milliGRAM(s) Enteral Tube <User Schedule>  gabapentin 300 milliGRAM(s) Oral two times a day  insulin glargine Injectable (LANTUS) 6 Unit(s) SubCutaneous at bedtime  insulin lispro (HumaLOG) corrective regimen sliding scale   SubCutaneous every 6 hours  lactobacillus acidophilus 1 Tablet(s) Oral two times a day  lidocaine   Patch 2 Patch Transdermal daily  loratadine 10 milliGRAM(s) Oral daily  montelukast 10 milliGRAM(s) Oral at bedtime  multivitamin/minerals 1 Tablet(s) Oral daily  pantoprazole  Injectable 40 milliGRAM(s) IV Push daily  sertraline 50 milliGRAM(s) Oral daily  simethicone 80 milliGRAM(s) Chew every 6 hours  sodium chloride 0.9%. 1000 milliLiter(s) (100 mL/Hr) IV Continuous <Continuous>  tigecycline IVPB 50 milliGRAM(s) IV Intermittent every 12 hours  vancomycin    Solution 125 milliGRAM(s) Oral every 6 hours    MEDICATIONS  (PRN):  acetaminophen   Tablet .. 650 milliGRAM(s) Oral every 6 hours PRN Temp greater or equal to 38C (100.4F), Moderate Pain (4 - 6)  cyclobenzaprine 10 milliGRAM(s) Oral three times a day PRN Muscle Spasm  dextrose 40% Gel 15 Gram(s) Oral once PRN Blood Glucose LESS THAN 70 milliGRAM(s)/deciliter  glucagon  Injectable 1 milliGRAM(s) IntraMuscular once PRN Glucose LESS THAN 70 milligrams/deciliter  ondansetron Injectable 4 milliGRAM(s) IV Push every 6 hours PRN Nausea  oxyCODONE    5 mG/acetaminophen 325 mG 1 Tablet(s) Oral every 4 hours PRN Moderate Pain (4 - 6)  oxyCODONE    IR 10 milliGRAM(s) Oral every 4 hours PRN severe pain or dyspnea      Vital Signs Last 24 Hrs  T(C): 36.8 (05 May 2019 14:04), Max: 37.7 (04 May 2019 23:00)  T(F): 98.3 (05 May 2019 14:04), Max: 99.8 (04 May 2019 23:00)  HR: 91 (05 May 2019 14:35) (82 - 105)  BP: 86/57 (05 May 2019 13:00) (79/59 - 110/68)  BP(mean): 63 (05 May 2019 13:00) (55 - 77)  RR: 20 (05 May 2019 13:00) (16 - 22)  SpO2: 99% (05 May 2019 14:35) (90% - 100%)    Physical Exam:    PE:    Constitutional: frail looking  HEENT: NC/AT  Neck: trach  Respiratory: respiratory effort normal scattered coarse breath sounds  Cardiovascular: S1S2 regular, no murmurs  Abdomen: soft, not tender, ostomy in place, peg  Musculoskeletal: no muscle tenderness, no joint swelling or tenderness  Extremities: no pedal edema  Neurological/ Psychiatric: on ventilator  Skin: no rashes; no palpable lesions    Labs: all available labs reviewed                     Labs:                      Labs:                        7.8    17.35 )-----------( 481      ( 05 May 2019 06:58 )             27.4     05-05    133<L>  |  102  |  21  ----------------------------<  156<H>  4.6   |  27  |  0.55    Ca    7.6<L>      05 May 2019 06:58  Phos  3.1     05-05  Mg     2.0     05-05             Cultures:               < from: MR Pelvis Bony Only w/wo IV Cont (05.01.19 @ 18:52) >    EXAM:  MR PELVIS BONY ONLY WAW IC                            PROCEDURE DATE:  05/01/2019          INTERPRETATION:  MRI OF THE BONY PELVIS.    CLINICAL INFORMATION: Sacral decubitus ulcer. Rule out osteomyelitis.  TECHNIQUE: Multiplanar MR imaging was obtained of the bony pelvis with   and without the administration of intravenous contrast. 10 cc of Gadavist   were administered intravenously.    FINDINGS:    Decubitus ulcer overlies the lower sacrum/coccyx posteriorly. There are   erosive changes with edema and enhancement of the coccyx and S5 sacral   segment, consistent with osteomyelitis. Edema and enhancement is   visualized within the inferior aspect of the S4 segment with mild   decreased T1 marrow signal, consistent with early osteomyelitis. There is   surrounding soft tissue edema and enhancement within the presacral region   anterior to S3 and S4. No discrete fluid collection is visualized.   Heterotopic ossification is visualized within the region of the left   gluteus and piriformis musculature with mild piriformis edema. Partially   imaged visceral pelvic contents are unremarkable.    IMPRESSION:    Sacral decubitus ulcer with osteomyelitis of the S5 vertebral body   segment and of the coccyx as well as early osteomyelitis of the inferior   aspect of the S4 vertebral body segment.     < end of copied text >                < from: MR Abdomen No Cont (04.30.19 @ 18:35) >    EXAM:  MR ABDOMEN                            PROCEDURE DATE:  04/30/2019          INTERPRETATION:  MR ABDOMEN    HISTORY:  elevated LFTs    TECHNIQUE: Multiplanar T1-weighted, T2-weighted, and diffusion weighted   sequences were obtained of the abdomen, without contrast.  3D heavily   T2-weighted thin-section MRCP was also performed.    Field Strength: 1.5T    Contrast: None    .    COMPARISON: CT 4/25/2019.    FINDINGS:    LOWER CHEST: Bibasilar consolidation.    LIVER: Normal.  BILE DUCTS: 8 mm common bile duct with normal distal tapering. No filling   defect. No intrahepatic dilatation.  GALLBLADDER: Multiple small stones and sludge without distention, wall   thickening, or pericholecystic inflammation. Cystic duct patency is   observed.Low and medial insertion of the cystic duct.  SPLEEN: Normal.  PANCREAS: Diffuse atrophy.  ADRENALS: Normal.  KIDNEYS/URETERS: No hydronephrosis.    VISUALIZED PORTIONS:    BOWEL: Percutaneous gastrostomy tube.  PERITONEUM: No ascites.  VESSELS:  Normal caliber aorta.  RETROPERITONEUM: No adenopathy.    ABDOMINAL WALL: Normal.  BONES: No aggressive lesion.    IMPRESSION:    No choledocholithiasis or biliary dilatation.    < end of copied text >            < from: US Abdomen Complete (04.24.19 @ 21:40) >  IMPRESSION:     Distended gallbladder with sludge and gallbladder wall thickening/edema   which can be seen in the setting of acute cholecystitis. HIDA scan should   be considered for further evaluation.    < end of copied text >    < from: Xray Chest 1 View-PORTABLE IMMEDIATE (04.24.19 @ 20:39) >  IMPRESSION:   There is a patchy left midlung airspace opacity. No pleural effusion or   pneumothorax. Cardiomediastinal silhouette is unremarkable. Tracheostomy   is visualized. Old right clavicular fracture.    < end of copied text >

## 2019-05-06 LAB
ABO RH CONFIRMATION: SIGNIFICANT CHANGE UP
ADD ON TEST-SPECIMEN IN LAB: SIGNIFICANT CHANGE UP
ALBUMIN SERPL ELPH-MCNC: 1.1 G/DL — LOW (ref 3.3–5)
ALP SERPL-CCNC: 275 U/L — HIGH (ref 40–120)
ALT FLD-CCNC: 21 U/L — SIGNIFICANT CHANGE UP (ref 12–78)
ANION GAP SERPL CALC-SCNC: 4 MMOL/L — LOW (ref 5–17)
AST SERPL-CCNC: 37 U/L — SIGNIFICANT CHANGE UP (ref 15–37)
BILIRUB DIRECT SERPL-MCNC: <0.1 MG/DL — SIGNIFICANT CHANGE UP (ref 0–0.2)
BILIRUB INDIRECT FLD-MCNC: >0.2 MG/DL — SIGNIFICANT CHANGE UP (ref 0.2–1)
BILIRUB SERPL-MCNC: 0.3 MG/DL — SIGNIFICANT CHANGE UP (ref 0.2–1.2)
BLD GP AB SCN SERPL QL: SIGNIFICANT CHANGE UP
BUN SERPL-MCNC: 20 MG/DL — SIGNIFICANT CHANGE UP (ref 7–23)
CALCIUM SERPL-MCNC: 7.3 MG/DL — LOW (ref 8.5–10.1)
CHLORIDE SERPL-SCNC: 109 MMOL/L — HIGH (ref 96–108)
CO2 SERPL-SCNC: 26 MMOL/L — SIGNIFICANT CHANGE UP (ref 22–31)
CREAT SERPL-MCNC: 0.43 MG/DL — LOW (ref 0.5–1.3)
GLUCOSE BLDC GLUCOMTR-MCNC: 125 MG/DL — HIGH (ref 70–99)
GLUCOSE BLDC GLUCOMTR-MCNC: 145 MG/DL — HIGH (ref 70–99)
GLUCOSE BLDC GLUCOMTR-MCNC: 157 MG/DL — HIGH (ref 70–99)
GLUCOSE BLDC GLUCOMTR-MCNC: 158 MG/DL — HIGH (ref 70–99)
GLUCOSE SERPL-MCNC: 154 MG/DL — HIGH (ref 70–99)
HCT VFR BLD CALC: 25.2 % — LOW (ref 39–50)
HGB BLD-MCNC: 7.1 G/DL — LOW (ref 13–17)
MAGNESIUM SERPL-MCNC: 2 MG/DL — SIGNIFICANT CHANGE UP (ref 1.6–2.6)
MCHC RBC-ENTMCNC: 24.7 PG — LOW (ref 27–34)
MCHC RBC-ENTMCNC: 28.2 GM/DL — LOW (ref 32–36)
MCV RBC AUTO: 87.5 FL — SIGNIFICANT CHANGE UP (ref 80–100)
NRBC # BLD: 0 /100 WBCS — SIGNIFICANT CHANGE UP (ref 0–0)
PHOSPHATE SERPL-MCNC: 2.5 MG/DL — SIGNIFICANT CHANGE UP (ref 2.5–4.5)
PLATELET # BLD AUTO: 390 K/UL — SIGNIFICANT CHANGE UP (ref 150–400)
POTASSIUM SERPL-MCNC: 4.7 MMOL/L — SIGNIFICANT CHANGE UP (ref 3.5–5.3)
POTASSIUM SERPL-SCNC: 4.7 MMOL/L — SIGNIFICANT CHANGE UP (ref 3.5–5.3)
PROT SERPL-MCNC: 5 GM/DL — LOW (ref 6–8.3)
RBC # BLD: 2.88 M/UL — LOW (ref 4.2–5.8)
RBC # FLD: 20.4 % — HIGH (ref 10.3–14.5)
SODIUM SERPL-SCNC: 139 MMOL/L — SIGNIFICANT CHANGE UP (ref 135–145)
TYPE + AB SCN PNL BLD: SIGNIFICANT CHANGE UP
WBC # BLD: 10.21 K/UL — SIGNIFICANT CHANGE UP (ref 3.8–10.5)
WBC # FLD AUTO: 10.21 K/UL — SIGNIFICANT CHANGE UP (ref 3.8–10.5)

## 2019-05-06 RX ORDER — FENTANYL CITRATE 50 UG/ML
1 INJECTION INTRAVENOUS
Qty: 0 | Refills: 0 | Status: DISCONTINUED | OUTPATIENT
Start: 2019-05-06 | End: 2019-05-07

## 2019-05-06 RX ADMIN — CEFEPIME 1000 MILLIGRAM(S): 1 INJECTION, POWDER, FOR SOLUTION INTRAMUSCULAR; INTRAVENOUS at 05:45

## 2019-05-06 RX ADMIN — OXYCODONE HYDROCHLORIDE 10 MILLIGRAM(S): 5 TABLET ORAL at 10:30

## 2019-05-06 RX ADMIN — LORATADINE 10 MILLIGRAM(S): 10 TABLET ORAL at 13:29

## 2019-05-06 RX ADMIN — CEFEPIME 1000 MILLIGRAM(S): 1 INJECTION, POWDER, FOR SOLUTION INTRAMUSCULAR; INTRAVENOUS at 17:40

## 2019-05-06 RX ADMIN — TIGECYCLINE 105 MILLIGRAM(S): 50 INJECTION, POWDER, LYOPHILIZED, FOR SOLUTION INTRAVENOUS at 05:45

## 2019-05-06 RX ADMIN — Medication 650 MILLIGRAM(S): at 00:00

## 2019-05-06 RX ADMIN — Medication 125 MILLIGRAM(S): at 05:46

## 2019-05-06 RX ADMIN — Medication 125 MILLIGRAM(S): at 17:44

## 2019-05-06 RX ADMIN — SIMETHICONE 80 MILLIGRAM(S): 80 TABLET, CHEWABLE ORAL at 17:44

## 2019-05-06 RX ADMIN — SODIUM CHLORIDE 100 MILLILITER(S): 9 INJECTION INTRAMUSCULAR; INTRAVENOUS; SUBCUTANEOUS at 06:52

## 2019-05-06 RX ADMIN — TIGECYCLINE 105 MILLIGRAM(S): 50 INJECTION, POWDER, LYOPHILIZED, FOR SOLUTION INTRAVENOUS at 17:41

## 2019-05-06 RX ADMIN — ENOXAPARIN SODIUM 40 MILLIGRAM(S): 100 INJECTION SUBCUTANEOUS at 13:27

## 2019-05-06 RX ADMIN — SIMETHICONE 80 MILLIGRAM(S): 80 TABLET, CHEWABLE ORAL at 05:46

## 2019-05-06 RX ADMIN — OXYCODONE HYDROCHLORIDE 10 MILLIGRAM(S): 5 TABLET ORAL at 09:57

## 2019-05-06 RX ADMIN — Medication 1 DROP(S): at 05:45

## 2019-05-06 RX ADMIN — OXYCODONE HYDROCHLORIDE 10 MILLIGRAM(S): 5 TABLET ORAL at 23:33

## 2019-05-06 RX ADMIN — Medication 1 DROP(S): at 17:43

## 2019-05-06 RX ADMIN — Medication 300 MILLIGRAM(S): at 09:56

## 2019-05-06 RX ADMIN — Medication 500 MILLIGRAM(S): at 13:22

## 2019-05-06 RX ADMIN — Medication 0.25 MILLIGRAM(S): at 13:23

## 2019-05-06 RX ADMIN — Medication 125 MILLIGRAM(S): at 23:03

## 2019-05-06 RX ADMIN — MONTELUKAST 10 MILLIGRAM(S): 4 TABLET, CHEWABLE ORAL at 23:03

## 2019-05-06 RX ADMIN — Medication 1 TABLET(S): at 13:29

## 2019-05-06 RX ADMIN — GABAPENTIN 300 MILLIGRAM(S): 400 CAPSULE ORAL at 05:46

## 2019-05-06 RX ADMIN — Medication 0.5 MILLIGRAM(S): at 05:45

## 2019-05-06 RX ADMIN — Medication 1 DROP(S): at 13:27

## 2019-05-06 RX ADMIN — FENTANYL CITRATE 1 PATCH: 50 INJECTION INTRAVENOUS at 19:02

## 2019-05-06 RX ADMIN — Medication 1 DROP(S): at 23:04

## 2019-05-06 RX ADMIN — CHLORHEXIDINE GLUCONATE 5 MILLILITER(S): 213 SOLUTION TOPICAL at 05:46

## 2019-05-06 RX ADMIN — LIDOCAINE 2 PATCH: 4 CREAM TOPICAL at 19:04

## 2019-05-06 RX ADMIN — GABAPENTIN 300 MILLIGRAM(S): 400 CAPSULE ORAL at 17:43

## 2019-05-06 RX ADMIN — PANTOPRAZOLE SODIUM 40 MILLIGRAM(S): 20 TABLET, DELAYED RELEASE ORAL at 13:29

## 2019-05-06 RX ADMIN — Medication 81 MILLIGRAM(S): at 13:27

## 2019-05-06 RX ADMIN — SIMETHICONE 80 MILLIGRAM(S): 80 TABLET, CHEWABLE ORAL at 13:30

## 2019-05-06 RX ADMIN — LIDOCAINE 2 PATCH: 4 CREAM TOPICAL at 13:29

## 2019-05-06 RX ADMIN — OXYCODONE HYDROCHLORIDE 10 MILLIGRAM(S): 5 TABLET ORAL at 23:03

## 2019-05-06 RX ADMIN — Medication 125 MILLIGRAM(S): at 13:30

## 2019-05-06 RX ADMIN — Medication 1 TABLET(S): at 17:43

## 2019-05-06 RX ADMIN — OXYCODONE HYDROCHLORIDE 10 MILLIGRAM(S): 5 TABLET ORAL at 14:37

## 2019-05-06 RX ADMIN — CHLORHEXIDINE GLUCONATE 5 MILLILITER(S): 213 SOLUTION TOPICAL at 17:43

## 2019-05-06 RX ADMIN — Medication 2: at 06:12

## 2019-05-06 RX ADMIN — Medication 0.5 MILLIGRAM(S): at 23:03

## 2019-05-06 RX ADMIN — SIMETHICONE 80 MILLIGRAM(S): 80 TABLET, CHEWABLE ORAL at 23:03

## 2019-05-06 RX ADMIN — INSULIN GLARGINE 6 UNIT(S): 100 INJECTION, SOLUTION SUBCUTANEOUS at 23:03

## 2019-05-06 RX ADMIN — Medication 1 TABLET(S): at 05:46

## 2019-05-06 RX ADMIN — SERTRALINE 50 MILLIGRAM(S): 25 TABLET, FILM COATED ORAL at 13:24

## 2019-05-06 NOTE — PROGRESS NOTE ADULT - SUBJECTIVE AND OBJECTIVE BOX
Patient is a 63y old  Male who presents with a chief complaint of suspected sepsis (25 Apr 2019 09:55)    Date of service: 05-06-19 @ 16:01    Patient lying in bed; has back pain; afebrile          ROS unable to obtain secondary to patient medical condition     MEDICATIONS  (STANDING):  ALPRAZolam 0.5 milliGRAM(s) Oral every 8 hours  artificial  tears Solution 1 Drop(s) Both EYES every 4 hours  ascorbic acid 500 milliGRAM(s) Oral daily  aspirin  chewable 81 milliGRAM(s) Oral daily  cefepime  Injectable. 1000 milliGRAM(s) IV Push every 12 hours  chlorhexidine 0.12% Liquid 5 milliLiter(s) Oral Mucosa two times a day  dextrose 5%. 1000 milliLiter(s) (50 mL/Hr) IV Continuous <Continuous>  dextrose 50% Injectable 12.5 Gram(s) IV Push once  dextrose 50% Injectable 25 Gram(s) IV Push once  dextrose 50% Injectable 25 Gram(s) IV Push once  enoxaparin Injectable 40 milliGRAM(s) SubCutaneous daily  fentaNYL   Patch  75 MICROgram(s)/Hr 1 Patch Transdermal every 72 hours  ferrous    sulfate Liquid 300 milliGRAM(s) Enteral Tube <User Schedule>  gabapentin 300 milliGRAM(s) Oral two times a day  insulin glargine Injectable (LANTUS) 6 Unit(s) SubCutaneous at bedtime  insulin lispro (HumaLOG) corrective regimen sliding scale   SubCutaneous every 6 hours  lactobacillus acidophilus 1 Tablet(s) Oral two times a day  lidocaine   Patch 2 Patch Transdermal daily  loratadine 10 milliGRAM(s) Oral daily  montelukast 10 milliGRAM(s) Oral at bedtime  multivitamin/minerals 1 Tablet(s) Oral daily  pantoprazole  Injectable 40 milliGRAM(s) IV Push daily  sertraline 50 milliGRAM(s) Oral daily  simethicone 80 milliGRAM(s) Chew every 6 hours  sodium chloride 0.9%. 1000 milliLiter(s) (100 mL/Hr) IV Continuous <Continuous>  tigecycline IVPB 50 milliGRAM(s) IV Intermittent every 12 hours  vancomycin    Solution 125 milliGRAM(s) Oral every 6 hours    MEDICATIONS  (PRN):  acetaminophen   Tablet .. 650 milliGRAM(s) Oral every 6 hours PRN Temp greater or equal to 38C (100.4F), Moderate Pain (4 - 6)  cyclobenzaprine 10 milliGRAM(s) Oral three times a day PRN Muscle Spasm  dextrose 40% Gel 15 Gram(s) Oral once PRN Blood Glucose LESS THAN 70 milliGRAM(s)/deciliter  glucagon  Injectable 1 milliGRAM(s) IntraMuscular once PRN Glucose LESS THAN 70 milligrams/deciliter  ondansetron Injectable 4 milliGRAM(s) IV Push every 6 hours PRN Nausea  oxyCODONE    5 mG/acetaminophen 325 mG 1 Tablet(s) Oral every 4 hours PRN Moderate Pain (4 - 6)  oxyCODONE    IR 10 milliGRAM(s) Oral every 4 hours PRN severe pain or dyspnea      Vital Signs Last 24 Hrs  T(C): 36.4 (06 May 2019 15:23), Max: 36.4 (06 May 2019 15:23)  T(F): 97.5 (06 May 2019 15:23), Max: 97.5 (06 May 2019 15:23)  HR: 84 (06 May 2019 15:00) (71 - 92)  BP: 115/60 (06 May 2019 15:00) (82/49 - 115/60)  BP(mean): 68 (06 May 2019 15:00) (57 - 77)  RR: 21 (06 May 2019 15:00) (10 - 21)  SpO2: 100% (06 May 2019 15:00) (98% - 100%)    Physical Exam:          PE:    Constitutional: frail looking  HEENT: NC/AT  Neck: trach  Respiratory: respiratory effort normal scattered coarse breath sounds  Cardiovascular: S1S2 regular, no murmurs  Abdomen: soft, not tender, ostomy in place, peg  Musculoskeletal: no muscle tenderness, no joint swelling or tenderness  Extremities: no pedal edema  Neurological/ Psychiatric: on ventilator  Skin: no rashes; no palpable lesions    Labs: all available labs reviewed                  Labs:                        7.1    10.21 )-----------( 390      ( 06 May 2019 06:29 )             25.2     05-06    139  |  109<H>  |  20  ----------------------------<  154<H>  4.7   |  26  |  0.43<L>    Ca    7.3<L>      06 May 2019 06:29  Phos  2.5     05-06  Mg     2.0     05-06    TPro  5.0<L>  /  Alb  1.1<L>  /  TBili  0.3  /  DBili  <0.1  /  AST  37  /  ALT  21  /  AlkPhos  275<H>  05-06           Cultures:               < from: MR Pelvis Bony Only w/wo IV Cont (05.01.19 @ 18:52) >    EXAM:  MR PELVIS BONY ONLY WAW IC                            PROCEDURE DATE:  05/01/2019          INTERPRETATION:  MRI OF THE BONY PELVIS.    CLINICAL INFORMATION: Sacral decubitus ulcer. Rule out osteomyelitis.  TECHNIQUE: Multiplanar MR imaging was obtained of the bony pelvis with   and without the administration of intravenous contrast. 10 cc of Gadavist   were administered intravenously.    FINDINGS:    Decubitus ulcer overlies the lower sacrum/coccyx posteriorly. There are   erosive changes with edema and enhancement of the coccyx and S5 sacral   segment, consistent with osteomyelitis. Edema and enhancement is   visualized within the inferior aspect of the S4 segment with mild   decreased T1 marrow signal, consistent with early osteomyelitis. There is   surrounding soft tissue edema and enhancement within the presacral region   anterior to S3 and S4. No discrete fluid collection is visualized.   Heterotopic ossification is visualized within the region of the left   gluteus and piriformis musculature with mild piriformis edema. Partially   imaged visceral pelvic contents are unremarkable.    IMPRESSION:    Sacral decubitus ulcer with osteomyelitis of the S5 vertebral body   segment and of the coccyx as well as early osteomyelitis of the inferior   aspect of the S4 vertebral body segment.     < end of copied text >                < from: MR Abdomen No Cont (04.30.19 @ 18:35) >    EXAM:  MR ABDOMEN                            PROCEDURE DATE:  04/30/2019          INTERPRETATION:  MR ABDOMEN    HISTORY:  elevated LFTs    TECHNIQUE: Multiplanar T1-weighted, T2-weighted, and diffusion weighted   sequences were obtained of the abdomen, without contrast.  3D heavily   T2-weighted thin-section MRCP was also performed.    Field Strength: 1.5T    Contrast: None    .    COMPARISON: CT 4/25/2019.    FINDINGS:    LOWER CHEST: Bibasilar consolidation.    LIVER: Normal.  BILE DUCTS: 8 mm common bile duct with normal distal tapering. No filling   defect. No intrahepatic dilatation.  GALLBLADDER: Multiple small stones and sludge without distention, wall   thickening, or pericholecystic inflammation. Cystic duct patency is   observed.Low and medial insertion of the cystic duct.  SPLEEN: Normal.  PANCREAS: Diffuse atrophy.  ADRENALS: Normal.  KIDNEYS/URETERS: No hydronephrosis.    VISUALIZED PORTIONS:    BOWEL: Percutaneous gastrostomy tube.  PERITONEUM: No ascites.  VESSELS:  Normal caliber aorta.  RETROPERITONEUM: No adenopathy.    ABDOMINAL WALL: Normal.  BONES: No aggressive lesion.    IMPRESSION:    No choledocholithiasis or biliary dilatation.    < end of copied text >            < from: US Abdomen Complete (04.24.19 @ 21:40) >  IMPRESSION:     Distended gallbladder with sludge and gallbladder wall thickening/edema   which can be seen in the setting of acute cholecystitis. HIDA scan should   be considered for further evaluation.    < end of copied text >    < from: Xray Chest 1 View-PORTABLE IMMEDIATE (04.24.19 @ 20:39) >  IMPRESSION:   There is a patchy left midlung airspace opacity. No pleural effusion or   pneumothorax. Cardiomediastinal silhouette is unremarkable. Tracheostomy   is visualized. Old right clavicular fracture.    < end of copied text >

## 2019-05-06 NOTE — PROGRESS NOTE ADULT - ASSESSMENT
Pt is a 62 y/o M PMhx of HTN, DM, chronic resp failure/vent dependent s/p trach and PEG, transverse myelitis (as result of ? tetanus toxoid injection), functional quadripegia, s/p  colostomy - most recently at  6/2018 due to sepsis of urinary source (CAUTi present on admission), before that was in  hospital 4/2018 with PNA Acinetobacter baumannii) and 3/2018 with UTI (PSAE muti resistant but S to zosyn). Patient was admitted 4/24 due to fever and leukocytosis.  Already being treated for C difficile colitis and 'osteomyelitis' from sacral decubitus at NH (IV Vanco IV Cefepime PO Vanco), Picc line was placed at [facility] on 4/12. History per medical record as patient unable to provide history.  1. 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 other issues: per medicine

## 2019-05-06 NOTE — PROGRESS NOTE ADULT - ASSESSMENT
62yo M suffering from severe sepsis - source uncertain and several potential sites:  Possible cholecystitis given elevated LFT's, distention of GB and sludge, though may just be cholestasis due to sepsis at a different site  Possible line infection (CLABSi) present on admission due to RUE PICC line - removed in ED to eliminate as source.  C difficile being treated at NH since ? 4/15 - don't know if had (+) stool toxin  Unstageable sacral decubitus [id number] present on admission - possible osteomyelitis already being treated at NH  UA negative so urine not likely to be source  CXR ? possible LEFT infiltrate, but no sputum or respiratory symptoms so unlikely source    hyponatremia - improved  leukocytosis - improving  transaminitis as noted able ? cholecystitis/cholangitis vs cholestasis due to sepsis - improving  anemia - due to chronic disease - no overt bleeding.    underlying hx of HTN, DM, chronic resp failure/vent dependent s/p trach and PEG, transverse myelitis (as result of ? tetanus toxoid injection),   functional quadriplegia s/p (? diverting) colostomy  hypotensive episode 5/5 - ? dehydration and hypovolemia - resolved  MR 5/1 confirmed sacral osteomyelitis.  palliative care meeting 5/1 resulted in patient self-determination of DNR status        Plan at this time is for continued care in CCU  HOB 30  GI and DVT prophylaxis as appropriate  continue IV abx with plan for transfer to NH with PICC line once bed available  - ID input appreciated  Surgical and GI input appreciated.   T&S - transfuse 1u PRBC  vent support  TF's   Glycemic control  wound care   supportive care    DNR    Wife Barbara is [phone].

## 2019-05-06 NOTE — PROGRESS NOTE ADULT - ASSESSMENT
63y old Male coming from [facility] with hx of HTN, DM, chronic resp failure/vent dependent s/p trach and PEG, transverse myelitis (as result of ? tetanus toxoid injection), functional quadriplegia s/p  colostomy - most recently at  2018 due to sepsis of urinary source (CAUTi present on admission), before that was in  hospital 2018 with PNA Acinetobacter baumannii) and 3/2018 with UTI (PSAE muti resistant but S to zosyn). Patient now admitted  due to fever and leukocytosis.  Of note pt was being treated for C difficile colitis and 'osteomyelitis' from sacral decubitus at NH (IV Vanco IV Cefepime PO Vanco), Picc line was placed at [facility] on  and removed on this admission as possible source. Found to have elevated LFTs, persistently elevated Alk Phos. Imaging including CT showed no issue with GB, but US and HIDA showing evidence of cholecystitis vs. cholangitis. Palliative Care consulted to assist with establishing GOC.     1) Pain  - chronic pain as per NH records  - of note, pt was on fentanyl patch 50mcg and oxycodone 20mg standing at NH  - however, pt came in with fevers due to sepsis, justifying removal of patches to prevent bolus dosing during fevers (more absorption at subq level due to vasodilation)  - now on percocet 1 tab q6h prn   - c/w oxy IR 10mg q4h prn severe pain (to minimize Tylenol use in setting of elevated LFTs)  - c/w fentanyl 50mcg patch (baseline dose)  - recommend use of flexeril when able also, as pain seems to be mostly musculoskeletal and described often as an ache.   - In setting of quadriplegia, this is commonplace   - c/w lidocaine patches    2) Sepsis: Cholecystitis vs. Cholangitis + osteo  - ID, GI, and surgery notes appreciated  - concern for GB vs common bile duct pathology  - imaging originally suggesting cholecystitis, but not completely clear and management options differed significantly without further clarification  - considerations include cheli vs. perc drain vs. ERCP  - MRCP completed  showing no abnormality with either GB or ducts, thus both GI and surgical team agree to pursue conservative measures and not any procedures  - ID commenting that pt likely has osteo and MRI was done  confirming this  - remains on IV abx as a result, likely placement of PICC prior to dc  - surgical notes appreciated- no need for surgical intervention for osteo    3) Debility  - PPS<40%, dependent for all ADLs  - neurological disorder with unclear etiology, thus difficult to discern how it will truly progress  - dietary notes appreciated- severe protein calorie malnutrition noted     4) Prognosis  - likely overall poor  - in setting of neurological condition that has made pt completely debilitated, multiple hospitalizations for infection, skin breakdown w/ osteomyelitis, PPS<40%, albumin 1.7 pt would be at high risk for further complications. He is not a candidate for hospice given his ventilator dependence, but would be a candidate for compassionate wean when/if ever ready for this, which was discussed with pt. He confirmed he is not ready for this, though grateful to know it is an option given he does not feel he has QOL.     5) GOC/Advanced Directives  - pt seems to have capacity for decision making, noting that he would like to be apart of conversations that require this  - HCP on chart namin) wife Courtney Amin [phone] and 2) Stephen Amin [phone]  - MOLST on chart ): no limits REVIEWED AND VOIDED--> New MOLST created: DNR, limited interventions, DNI, c/w feeding tube, trial of IVF, determine use of abx, no dilaysis  - [facility] meeting held - pt was thoughtful about how his life has changed and while not ready to transition fully to comfort focus, he was able to set some limits. Plan is for medical stabilization and return to [facility], awaiting bed. * See GO note for further details.    Thank you for including us in Mr. Amin's care. Will continue to follow with you.    Tenzin Garcia MD  Palliative Care Attending

## 2019-05-06 NOTE — PROGRESS NOTE ADULT - SUBJECTIVE AND OBJECTIVE BOX
62yo M admitted 4/24 due to fever and leukocytosis.  Already being treated for C difficile colitis and 'osteomyelitis' from sacral decubitus at NH (IV Vanco IV Cefepime PO Vanco).  In ED fever 104* F and WBC 19k.  Hemodynamics reasonable.  Pt with elevated LFT's - US in ED with distended GB and sludge - pt refused CT scan - seen by surgery team - No acute intervention at this time.     I spoke with patient in detail and with his wife Barbara via telephone [phone] - issues and plans discussed in detail and all questions answered.    Pt with hx of HTN, DM, chronic resp failure/vent dependent s/p trach and PEG, transverse myelitis (as result of ? tetanus toxoid injection), functional quadripegia, s/p (? diverting) colostomy - most recently at  6/2018 due to sepsis of urinary source (CAUTi present on admission), before that was in [facility] 4/2018 with PNA Acinetobacter baumannii) and 3/2018 with UTI (PSAE muti resistant but S to zosyn).    Pt no longer has indwelling catheter, but did have PICC line placed approx 1 week ago.    A copy of his MOLST form was sent from NH and this was confirmed with patient and a new form documented.    Pt is AA and O x 3 NAD NRD and answers questions appropriately via head nod.    CT showed relatively normal GB--Sono showed distended GB and HIDA was +.  LFTS are up.  Pt febrile and was on Tigecycline until 4/29.  Pt is followed by general surgery    4/29:  Tm 102.8  WBC 24K.       4/30:  tigecycline resumed.  GI eval appreciated - MRCP ordered.    5/1: above d/w Dr Maddox who had been caring for the patient - MRCP negative.  D/w Dr Garza.  Explained issues and plans to patient and all questions answered.  Will also speak with wife in same regard.  Addendum: spoke with pt and wife in detail about issues and plans.  D/w Dr Austin as well.  MRI ordered to assess for osteomyelitis.    5/2: MRI (+) for osteomyelitis.  Discussed with Dr Campos from surgery - No role of debridement at present.  D/w ID - will consider PICC and regimen for return to [facility].    5/5: no new issues, no overnight events - awaiting open bed at [facility].  Episode of low BP - possible dehydration as free water stopped yesterday due to hyponatremia.      5/6: no new issues, no overnight events - awaiting open bed at [facility]. Improvement in leukocytosis noted.  Hyponatremia also improved.  Remains anemic.    Allergies    No Known Allergies        ICU Vital Signs Last 24 Hrs  T(C): 35.9 (06 May 2019 05:32), Max: 36.8 (05 May 2019 14:04)  T(F): 96.7 (06 May 2019 05:32), Max: 98.3 (05 May 2019 14:04)  HR: 84 (06 May 2019 09:32) (71 - 93)  BP: 93/59 (06 May 2019 06:00) (82/49 - 108/67)  BP(mean): 67 (06 May 2019 06:00) (57 - 77)  ABP: --  ABP(mean): --  RR: 16 (06 May 2019 06:00) (10 - 21)  SpO2: 100% (06 May 2019 09:32) (90% - 100%)      Mode: AC/ CMV (Assist Control/ Continuous Mandatory Ventilation)  RR (machine): 14  TV (machine): 500  FiO2: 30  PEEP: 5  ITime: 1  MAP: 12  PIP: 30      I&O's Summary    05 May 2019 07:01  -  06 May 2019 07:00  --------------------------------------------------------  IN: 4993 mL / OUT: 1475 mL / NET: 3518 mL                              7.1    10.21 )-----------( 390      ( 06 May 2019 06:29 )             25.2       05-06    139  |  109<H>  |  20  ----------------------------<  154<H>  4.7   |  26  |  0.43<L>    Ca    7.3<L>      06 May 2019 06:29  Phos  2.5     05-06  Mg     2.0     05-06        CAPILLARY BLOOD GLUCOSE      POCT Blood Glucose.: 157 mg/dL (06 May 2019 09:55)  POCT Blood Glucose.: 158 mg/dL (06 May 2019 05:59)  POCT Blood Glucose.: 159 mg/dL (05 May 2019 23:09)  POCT Blood Glucose.: 172 mg/dL (05 May 2019 17:32)  POCT Blood Glucose.: 199 mg/dL (05 May 2019 12:06)      MEDICATIONS  (STANDING):  ALPRAZolam 0.5 milliGRAM(s) Oral every 8 hours  artificial  tears Solution 1 Drop(s) Both EYES every 4 hours  ascorbic acid 500 milliGRAM(s) Oral daily  aspirin  chewable 81 milliGRAM(s) Oral daily  cefepime  Injectable. 1000 milliGRAM(s) IV Push every 12 hours  chlorhexidine 0.12% Liquid 5 milliLiter(s) Oral Mucosa two times a day  dextrose 5%. 1000 milliLiter(s) (50 mL/Hr) IV Continuous <Continuous>  dextrose 50% Injectable 12.5 Gram(s) IV Push once  dextrose 50% Injectable 25 Gram(s) IV Push once  dextrose 50% Injectable 25 Gram(s) IV Push once  enoxaparin Injectable 40 milliGRAM(s) SubCutaneous daily  fentaNYL   Patch  50 MICROgram(s)/Hr 1 Patch Transdermal every 72 hours  ferrous    sulfate Liquid 300 milliGRAM(s) Enteral Tube <User Schedule>  gabapentin 300 milliGRAM(s) Oral two times a day  insulin glargine Injectable (LANTUS) 6 Unit(s) SubCutaneous at bedtime  insulin lispro (HumaLOG) corrective regimen sliding scale   SubCutaneous every 6 hours  lactobacillus acidophilus 1 Tablet(s) Oral two times a day  lidocaine   Patch 2 Patch Transdermal daily  loratadine 10 milliGRAM(s) Oral daily  montelukast 10 milliGRAM(s) Oral at bedtime  multivitamin/minerals 1 Tablet(s) Oral daily  pantoprazole  Injectable 40 milliGRAM(s) IV Push daily  sertraline 50 milliGRAM(s) Oral daily  simethicone 80 milliGRAM(s) Chew every 6 hours  sodium chloride 0.9%. 1000 milliLiter(s) (100 mL/Hr) IV Continuous <Continuous>  tigecycline IVPB 50 milliGRAM(s) IV Intermittent every 12 hours  vancomycin    Solution 125 milliGRAM(s) Oral every 6 hours    MEDICATIONS  (PRN):  acetaminophen   Tablet .. 650 milliGRAM(s) Oral every 6 hours PRN Temp greater or equal to 38C (100.4F), Moderate Pain (4 - 6)  cyclobenzaprine 10 milliGRAM(s) Oral three times a day PRN Muscle Spasm  dextrose 40% Gel 15 Gram(s) Oral once PRN Blood Glucose LESS THAN 70 milliGRAM(s)/deciliter  glucagon  Injectable 1 milliGRAM(s) IntraMuscular once PRN Glucose LESS THAN 70 milligrams/deciliter  ondansetron Injectable 4 milliGRAM(s) IV Push every 6 hours PRN Nausea  oxyCODONE    5 mG/acetaminophen 325 mG 1 Tablet(s) Oral every 4 hours PRN Moderate Pain (4 - 6)  oxyCODONE    IR 10 milliGRAM(s) Oral every 4 hours PRN severe pain or dyspnea      Review of Systems:  · Negative General Symptoms	no chills	  · General Symptoms	fever	  · Negative Skin Symptoms	no rash; no itching	  · Negative Ophthalmologic Symptoms	no diplopia; no photophobia	  · Ophthalmologic Comments	(+) dry eyes	  · Negative ENMT Symptoms	no dysphagia	  · Negative Respiratory and Thorax Symptoms	no wheezing; no dyspnea; no cough	  · Negative Cardiovascular Symptoms	no chest pain; no palpitations	  · Negative Gastrointestinal Symptoms	no nausea; no vomiting	  · Negative General Genitourinary Symptoms	no dysuria	  · Musculoskeletal Symptoms	muscle weakness	  · Negative Neurological Symptoms	no headache; no loss of consciousness	  · Neurological Symptoms	weakness	  · Negative Psychiatric Symptoms	no suicidal ideation	  · Psychiatric Symptoms	depression	  · Negative Hematology Symptoms	no gum bleeding; no nose bleeding	  · Negative Allergic Reactions	no urticaria	  · Additional ROS	ALL other ROS are NEGATIVE.	    Physical Exam:  · Constitutional	detailed exam	  · Constitutional Details	no distress	  · Eyes	detailed exam	  · Eyes Details	PERRL; EOMI	  · ENMT	detailed exam	  · ENMT Details	mouth	  · Mouth	dry	  · Neck	detailed exam	  · Neck Details	supple; no JVD; trach in situ	  · Back	detailed exam	  · Back Details	normal shape	  · Respiratory	detailed exam	  · Respiratory Details	airway patent; breath sounds equal; good air movement	  · Cardiovascular	detailed exam	  · Cardiovascular Details	regular rate and rhythm	  · Gastrointestinal	detailed exam	  · GI Normal	soft; nontender; bowel sounds normal	  · GI Abnormal	distended	  · Gastrointestinal Comments	LLQ ostomy with normal appearing stool, epigastric position G tube in situ.	  · Genitourinary	detailed exam	  · Genitourinary Details Male	normal external genitalia	  · Genitourinary Comments	texas cath in place	  · Extremities	detailed exam	  · Extremities Details	no cyanosis	  · Vascular	Equal and normal pulses (carotid, femoral, dorsalis pedis)	  · Neurological	detailed exam	  · Neurological Details	alert and oriented x 3	  · Motor	minimal movement of UE's - NO movement of LE's, contractures of hands B/L	  · Sensory	grossly intact	  · Skin	detailed exam	  · Skin Details	warm and dry	  · Musculoskeletal	detailed exam	  · Musculoskeletal Details	quadriplegia	  · Psychiatric	detailed exam	  · Psychiatric Details	depressed; flat affect	      DVT Prophylaxis: Lovenox    Advanced Directives:  DNR  Discussed with: patient - MOLST completed by Pall care team.    Visit Information: SSQ    ** Time is exclusive of billed procedures and/or teaching and/or routine family updates.

## 2019-05-07 ENCOUNTER — TRANSCRIPTION ENCOUNTER (OUTPATIENT)
Age: 64
End: 2019-05-07

## 2019-05-07 VITALS
SYSTOLIC BLOOD PRESSURE: 102 MMHG | RESPIRATION RATE: 20 BRPM | OXYGEN SATURATION: 98 % | HEART RATE: 81 BPM | DIASTOLIC BLOOD PRESSURE: 65 MMHG

## 2019-05-07 LAB
ANION GAP SERPL CALC-SCNC: 7 MMOL/L — SIGNIFICANT CHANGE UP (ref 5–17)
BUN SERPL-MCNC: 18 MG/DL — SIGNIFICANT CHANGE UP (ref 7–23)
CALCIUM SERPL-MCNC: 7.1 MG/DL — LOW (ref 8.5–10.1)
CHLORIDE SERPL-SCNC: 108 MMOL/L — SIGNIFICANT CHANGE UP (ref 96–108)
CO2 SERPL-SCNC: 23 MMOL/L — SIGNIFICANT CHANGE UP (ref 22–31)
CREAT SERPL-MCNC: 0.46 MG/DL — LOW (ref 0.5–1.3)
GLUCOSE BLDC GLUCOMTR-MCNC: 139 MG/DL — HIGH (ref 70–99)
GLUCOSE BLDC GLUCOMTR-MCNC: 157 MG/DL — HIGH (ref 70–99)
GLUCOSE SERPL-MCNC: 138 MG/DL — HIGH (ref 70–99)
HCT VFR BLD CALC: 28.6 % — LOW (ref 39–50)
HGB BLD-MCNC: 8.4 G/DL — LOW (ref 13–17)
MAGNESIUM SERPL-MCNC: 1.9 MG/DL — SIGNIFICANT CHANGE UP (ref 1.6–2.6)
MCHC RBC-ENTMCNC: 25.6 PG — LOW (ref 27–34)
MCHC RBC-ENTMCNC: 29.4 GM/DL — LOW (ref 32–36)
MCV RBC AUTO: 87.2 FL — SIGNIFICANT CHANGE UP (ref 80–100)
NRBC # BLD: 0 /100 WBCS — SIGNIFICANT CHANGE UP (ref 0–0)
PHOSPHATE SERPL-MCNC: 2.4 MG/DL — LOW (ref 2.5–4.5)
PLATELET # BLD AUTO: 455 K/UL — HIGH (ref 150–400)
POTASSIUM SERPL-MCNC: 4.6 MMOL/L — SIGNIFICANT CHANGE UP (ref 3.5–5.3)
POTASSIUM SERPL-SCNC: 4.6 MMOL/L — SIGNIFICANT CHANGE UP (ref 3.5–5.3)
RBC # BLD: 3.28 M/UL — LOW (ref 4.2–5.8)
RBC # FLD: 19.8 % — HIGH (ref 10.3–14.5)
SODIUM SERPL-SCNC: 138 MMOL/L — SIGNIFICANT CHANGE UP (ref 135–145)
WBC # BLD: 10.2 K/UL — SIGNIFICANT CHANGE UP (ref 3.8–10.5)
WBC # FLD AUTO: 10.2 K/UL — SIGNIFICANT CHANGE UP (ref 3.8–10.5)

## 2019-05-07 PROCEDURE — 71045 X-RAY EXAM CHEST 1 VIEW: CPT | Mod: 26

## 2019-05-07 RX ORDER — INSULIN LISPRO 100/ML
0 VIAL (ML) SUBCUTANEOUS
Qty: 0 | Refills: 0 | COMMUNITY
Start: 2019-05-07

## 2019-05-07 RX ORDER — CHLORHEXIDINE GLUCONATE 213 G/1000ML
5 SOLUTION TOPICAL
Qty: 0 | Refills: 0 | COMMUNITY
Start: 2019-05-07

## 2019-05-07 RX ORDER — OXYCODONE HYDROCHLORIDE 5 MG/1
1 TABLET ORAL
Qty: 0 | Refills: 0 | DISCHARGE
Start: 2019-05-07

## 2019-05-07 RX ORDER — SODIUM,POTASSIUM PHOSPHATES 278-250MG
1 POWDER IN PACKET (EA) ORAL EVERY 6 HOURS
Qty: 0 | Refills: 0 | Status: DISCONTINUED | OUTPATIENT
Start: 2019-05-07 | End: 2019-05-07

## 2019-05-07 RX ORDER — FENTANYL CITRATE 50 UG/ML
1 INJECTION INTRAVENOUS
Qty: 0 | Refills: 0 | DISCHARGE
Start: 2019-05-07

## 2019-05-07 RX ORDER — FERROUS SULFATE 325(65) MG
7.5 TABLET ORAL
Qty: 0 | Refills: 0 | COMMUNITY

## 2019-05-07 RX ORDER — CEFEPIME 1 G/1
1 INJECTION, POWDER, FOR SOLUTION INTRAMUSCULAR; INTRAVENOUS
Qty: 0 | Refills: 0 | DISCHARGE
Start: 2019-05-07 | End: 2019-06-13

## 2019-05-07 RX ORDER — CEFEPIME 1 G/1
1 INJECTION, POWDER, FOR SOLUTION INTRAMUSCULAR; INTRAVENOUS
Qty: 0 | Refills: 0 | COMMUNITY
Start: 2019-05-07

## 2019-05-07 RX ORDER — TIGECYCLINE 50 MG/5ML
50 INJECTION, POWDER, LYOPHILIZED, FOR SOLUTION INTRAVENOUS
Qty: 0 | Refills: 0 | COMMUNITY
Start: 2019-05-07

## 2019-05-07 RX ORDER — METFORMIN HYDROCHLORIDE 850 MG/1
1 TABLET ORAL
Qty: 0 | Refills: 0 | COMMUNITY

## 2019-05-07 RX ORDER — FENTANYL CITRATE 50 UG/ML
1 INJECTION INTRAVENOUS
Qty: 0 | Refills: 0 | COMMUNITY

## 2019-05-07 RX ORDER — TIGECYCLINE 50 MG/5ML
50 INJECTION, POWDER, LYOPHILIZED, FOR SOLUTION INTRAVENOUS
Qty: 0 | Refills: 0 | DISCHARGE
Start: 2019-05-07 | End: 2019-06-05

## 2019-05-07 RX ORDER — LIDOCAINE 4 G/100G
2 CREAM TOPICAL
Qty: 0 | Refills: 0 | DISCHARGE
Start: 2019-05-07

## 2019-05-07 RX ORDER — ALPRAZOLAM 0.25 MG
1 TABLET ORAL
Qty: 0 | Refills: 0 | COMMUNITY

## 2019-05-07 RX ORDER — ENOXAPARIN SODIUM 100 MG/ML
40 INJECTION SUBCUTANEOUS
Qty: 0 | Refills: 0 | DISCHARGE
Start: 2019-05-07

## 2019-05-07 RX ORDER — ALPRAZOLAM 0.25 MG
1 TABLET ORAL
Qty: 0 | Refills: 0 | COMMUNITY
Start: 2019-05-07

## 2019-05-07 RX ADMIN — GABAPENTIN 300 MILLIGRAM(S): 400 CAPSULE ORAL at 06:01

## 2019-05-07 RX ADMIN — Medication 500 MILLIGRAM(S): at 13:58

## 2019-05-07 RX ADMIN — Medication 1 TABLET(S): at 18:10

## 2019-05-07 RX ADMIN — Medication 0.5 MILLIGRAM(S): at 13:56

## 2019-05-07 RX ADMIN — Medication 1 DROP(S): at 18:12

## 2019-05-07 RX ADMIN — Medication 81 MILLIGRAM(S): at 13:58

## 2019-05-07 RX ADMIN — SODIUM CHLORIDE 100 MILLILITER(S): 9 INJECTION INTRAMUSCULAR; INTRAVENOUS; SUBCUTANEOUS at 06:00

## 2019-05-07 RX ADMIN — LIDOCAINE 2 PATCH: 4 CREAM TOPICAL at 01:15

## 2019-05-07 RX ADMIN — TIGECYCLINE 105 MILLIGRAM(S): 50 INJECTION, POWDER, LYOPHILIZED, FOR SOLUTION INTRAVENOUS at 17:39

## 2019-05-07 RX ADMIN — SIMETHICONE 80 MILLIGRAM(S): 80 TABLET, CHEWABLE ORAL at 06:01

## 2019-05-07 RX ADMIN — CHLORHEXIDINE GLUCONATE 5 MILLILITER(S): 213 SOLUTION TOPICAL at 06:01

## 2019-05-07 RX ADMIN — LORATADINE 10 MILLIGRAM(S): 10 TABLET ORAL at 13:59

## 2019-05-07 RX ADMIN — Medication 1 TABLET(S): at 13:58

## 2019-05-07 RX ADMIN — GABAPENTIN 300 MILLIGRAM(S): 400 CAPSULE ORAL at 17:38

## 2019-05-07 RX ADMIN — TIGECYCLINE 105 MILLIGRAM(S): 50 INJECTION, POWDER, LYOPHILIZED, FOR SOLUTION INTRAVENOUS at 06:00

## 2019-05-07 RX ADMIN — SIMETHICONE 80 MILLIGRAM(S): 80 TABLET, CHEWABLE ORAL at 17:38

## 2019-05-07 RX ADMIN — Medication 1 DROP(S): at 09:02

## 2019-05-07 RX ADMIN — Medication 125 MILLIGRAM(S): at 06:01

## 2019-05-07 RX ADMIN — OXYCODONE HYDROCHLORIDE 10 MILLIGRAM(S): 5 TABLET ORAL at 06:01

## 2019-05-07 RX ADMIN — SERTRALINE 50 MILLIGRAM(S): 25 TABLET, FILM COATED ORAL at 13:58

## 2019-05-07 RX ADMIN — OXYCODONE HYDROCHLORIDE 10 MILLIGRAM(S): 5 TABLET ORAL at 06:30

## 2019-05-07 RX ADMIN — Medication 1 PACKET(S): at 15:49

## 2019-05-07 RX ADMIN — Medication 125 MILLIGRAM(S): at 17:38

## 2019-05-07 RX ADMIN — FENTANYL CITRATE 1 PATCH: 50 INJECTION INTRAVENOUS at 19:10

## 2019-05-07 RX ADMIN — Medication 1 TABLET(S): at 06:01

## 2019-05-07 RX ADMIN — SIMETHICONE 80 MILLIGRAM(S): 80 TABLET, CHEWABLE ORAL at 13:58

## 2019-05-07 RX ADMIN — PANTOPRAZOLE SODIUM 40 MILLIGRAM(S): 20 TABLET, DELAYED RELEASE ORAL at 13:59

## 2019-05-07 RX ADMIN — CEFEPIME 1000 MILLIGRAM(S): 1 INJECTION, POWDER, FOR SOLUTION INTRAMUSCULAR; INTRAVENOUS at 06:01

## 2019-05-07 RX ADMIN — CHLORHEXIDINE GLUCONATE 5 MILLILITER(S): 213 SOLUTION TOPICAL at 17:39

## 2019-05-07 RX ADMIN — Medication 2 MILLIGRAM(S): at 15:59

## 2019-05-07 RX ADMIN — Medication 125 MILLIGRAM(S): at 13:57

## 2019-05-07 RX ADMIN — OXYCODONE HYDROCHLORIDE 10 MILLIGRAM(S): 5 TABLET ORAL at 17:39

## 2019-05-07 RX ADMIN — Medication 0.5 MILLIGRAM(S): at 06:01

## 2019-05-07 RX ADMIN — Medication 1 PACKET(S): at 17:38

## 2019-05-07 RX ADMIN — Medication 2: at 18:10

## 2019-05-07 RX ADMIN — Medication 1 DROP(S): at 06:00

## 2019-05-07 RX ADMIN — CEFEPIME 1000 MILLIGRAM(S): 1 INJECTION, POWDER, FOR SOLUTION INTRAMUSCULAR; INTRAVENOUS at 17:39

## 2019-05-07 NOTE — PROGRESS NOTE ADULT - ASSESSMENT
63y old Male coming from [facility] with hx of HTN, DM, chronic resp failure/vent dependent s/p trach and PEG, transverse myelitis (as result of ? tetanus toxoid injection), functional quadriplegia s/p  colostomy - most recently at  2018 due to sepsis of urinary source (CAUTi present on admission), before that was in  hospital 2018 with PNA Acinetobacter baumannii) and 3/2018 with UTI (PSAE muti resistant but S to zosyn). Patient now admitted  due to fever and leukocytosis.  Of note pt was being treated for C difficile colitis and 'osteomyelitis' from sacral decubitus at NH (IV Vanco IV Cefepime PO Vanco), Picc line was placed at [facility] on  and removed on this admission as possible source. Found to have elevated LFTs, persistently elevated Alk Phos. Imaging including CT showed no issue with GB, but US and HIDA showing evidence of cholecystitis vs. cholangitis. Palliative Care consulted to assist with establishing GOC.     1) Pain  - chronic pain as per NH records  - of note, pt was on fentanyl patch 50mcg and oxycodone 20mg standing at NH  - however, pt came in with fevers due to sepsis, justifying removal of patches to prevent bolus dosing during fevers (more absorption at subq level due to vasodilation). No further fevers recently  - now on percocet 1 tab q6h prn   - c/w oxy IR 10mg q4h prn severe pain   - c/w fentanyl 75mcg patch (inc yesterday, should be kicking in for full effect today, which seems to be the case).  - recommend use of flexeril when able also, as pain seems to be mostly musculoskeletal and described often as an ache.   - In setting of quadriplegia, this is commonplace   - c/w lidocaine patches    2) Sepsis: Cholecystitis vs. Cholangitis + osteo  - ID, GI, and surgery notes appreciated  - concern for GB vs common bile duct pathology  - imaging originally suggesting cholecystitis, but not completely clear and management options differed significantly without further clarification  - considerations include cheli vs. perc drain vs. ERCP  - MRCP completed  showing no abnormality with either GB or ducts, thus both GI and surgical team agree to pursue conservative measures and not any procedures  - ID commenting that pt likely has osteo and MRI was done  confirming this  - remains on IV abx as a result, plan for placement of PICC prior to dc  - surgical notes appreciated- no need for surgical intervention for osteo    3) Debility  - PPS<40%, dependent for all ADLs  - neurological disorder with unclear etiology, thus difficult to discern how it will truly progress  - dietary notes appreciated- severe protein calorie malnutrition noted     4) Prognosis  - likely overall poor  - in setting of neurological condition that has made pt completely debilitated, multiple hospitalizations for infection, skin breakdown w/ osteomyelitis, PPS<40%, albumin 1.7 pt would be at high risk for further complications. He is not a candidate for hospice given his ventilator dependence, but would be a candidate for compassionate wean when/if ever ready for this, which was discussed with pt. He confirmed he is not ready for this, though grateful to know it is an option given he does not feel he has QOL.     5) GOC/Advanced Directives  - pt seems to have capacity for decision making, noting that he would like to be apart of conversations that require this  - HCP on chart namin) wife Courtney Amin [phone] and 2) Stephen Amin [phone]  - MOLST on chart ): no limits REVIEWED AND VOIDED--> New MOLST created: DNR, limited interventions, DNI, c/w feeding tube, trial of IVF, determine use of abx, no dilaysis  - GOC meeting held - pt was thoughtful about how his life has changed and while not ready to transition fully to comfort focus, he was able to set some limits. Plan is for medical stabilization and return to [facility], awaiting bed. * See GOC note for further details.    *Given sx managed and GOC clear, awaiting discharge, will sign off*    Thank you for including us in Mr. Amin's care.     Tenzin Garcia MD  Palliative Care Attending

## 2019-05-07 NOTE — PROGRESS NOTE ADULT - SUBJECTIVE AND OBJECTIVE BOX
HPI: Pt seen and examined this afternoon in follow up for sx and GoC. Pt somewhat confused, only complaining of being hot. After adjustment of blankets, he felt better. He denied pain or any other discomfort.     Wife asked to speak to us, thus called her. She noted questions about comfort options such as palliative care at [facility]. Explained that pt is already getting what would be considered palliative interventions, taking time to differentiate this from hospice, which she acknowledged confusing it with. Explained that pt would not be eligible for hospice care while vent dependent, appreciating how difficult such a decision would be to make. She recalled and vehemently noted that he was not prepared to pursue compassionate wean, noting also that he still wants to come back to the hospital if anything goes awry. Given this, she surmised that their current plan, which pt chose himself, would have to be acceptable for now. Reassured and empowered her by reminding her that he could change his mind about readiness for comfort at any time, reminding her that he at least was thoughtfully putting in some decisions c/w this such as DNR order, which she again expressed gladness for. Thus, she is content with current plan to return to [facility] when they have a bed, adhering to the pt's wishes, and knowing that the option for comfort focus remains in the future. Likewise, let her know that should they ever return, needing help with sx or these difficult decisions that we remain available to them.     PAIN: denies    DYSPNEA: denies      ROS:    Anxiety- tx with xanax  All other systems reviewed and negative      PHYSICAL EXAM:    Vital Signs Last 24 Hrs  T(C): 37.3 (07 May 2019 14:14), Max: 37.6 (07 May 2019 06:26)  T(F): 99.1 (07 May 2019 14:14), Max: 99.6 (07 May 2019 06:26)  HR: 102 (07 May 2019 14:07) (84 - 102)  BP: 101/61 (07 May 2019 14:07) (83/56 - 119/73)  BP(mean): 70 (07 May 2019 14:07) (63 - 84)  RR: 14 (07 May 2019 14:07) (14 - 23)  SpO2: 100% (07 May 2019 14:07) (95% - 100%)  Daily     Daily     PPSV2: 30  %    General: Middle aged male, trach, able to mouth words, NAD  HEENT: trach in place, dry mmm  Lungs: dec at bases bl  Cardiac: + s1 s2 rrr  GI: soft nt nd +bs, PEG and colostomy present with some stool  : condom catheter  Ext: moves shoulders, otherwise functionally quadriplegic  Neuro: limited by above    LABS:                        8.4    10.20 )-----------( 455      ( 07 May 2019 06:42 )             28.6     05-07    138  |  108  |  18  ----------------------------<  138<H>  4.6   |  23  |  0.46<L>    Ca    7.1<L>      07 May 2019 06:42  Phos  2.4     05-07  Mg     1.9     05-07    TPro  5.0<L>  /  Alb  1.1<L>  /  TBili  0.3  /  DBili  <0.1  /  AST  37  /  ALT  21  /  AlkPhos  275<H>  05-06      Albumin: Albumin, Serum: 1.1 g/dL (05-06 @ 06:29)      Allergies    No Known Allergies    Intolerances      MEDICATIONS  (STANDING):  ALPRAZolam 0.5 milliGRAM(s) Oral every 8 hours  artificial  tears Solution 1 Drop(s) Both EYES every 4 hours  ascorbic acid 500 milliGRAM(s) Oral daily  aspirin  chewable 81 milliGRAM(s) Oral daily  cefepime  Injectable. 1000 milliGRAM(s) IV Push every 12 hours  chlorhexidine 0.12% Liquid 5 milliLiter(s) Oral Mucosa two times a day  dextrose 5%. 1000 milliLiter(s) (50 mL/Hr) IV Continuous <Continuous>  dextrose 50% Injectable 12.5 Gram(s) IV Push once  dextrose 50% Injectable 25 Gram(s) IV Push once  dextrose 50% Injectable 25 Gram(s) IV Push once  enoxaparin Injectable 40 milliGRAM(s) SubCutaneous daily  fentaNYL   Patch  75 MICROgram(s)/Hr 1 Patch Transdermal every 72 hours  ferrous    sulfate Liquid 300 milliGRAM(s) Enteral Tube <User Schedule>  gabapentin 300 milliGRAM(s) Oral two times a day  insulin glargine Injectable (LANTUS) 6 Unit(s) SubCutaneous at bedtime  insulin lispro (HumaLOG) corrective regimen sliding scale   SubCutaneous every 6 hours  lactobacillus acidophilus 1 Tablet(s) Oral two times a day  lidocaine   Patch 2 Patch Transdermal daily  loratadine 10 milliGRAM(s) Oral daily  montelukast 10 milliGRAM(s) Oral at bedtime  multivitamin/minerals 1 Tablet(s) Oral daily  pantoprazole  Injectable 40 milliGRAM(s) IV Push daily  sertraline 50 milliGRAM(s) Oral daily  simethicone 80 milliGRAM(s) Chew every 6 hours  sodium chloride 0.9%. 1000 milliLiter(s) (100 mL/Hr) IV Continuous <Continuous>  tigecycline IVPB 50 milliGRAM(s) IV Intermittent every 12 hours  vancomycin    Solution 125 milliGRAM(s) Oral every 6 hours    MEDICATIONS  (PRN):  acetaminophen   Tablet .. 650 milliGRAM(s) Oral every 6 hours PRN Temp greater or equal to 38C (100.4F), Moderate Pain (4 - 6)  cyclobenzaprine 10 milliGRAM(s) Oral three times a day PRN Muscle Spasm  dextrose 40% Gel 15 Gram(s) Oral once PRN Blood Glucose LESS THAN 70 milliGRAM(s)/deciliter  glucagon  Injectable 1 milliGRAM(s) IntraMuscular once PRN Glucose LESS THAN 70 milligrams/deciliter  ondansetron Injectable 4 milliGRAM(s) IV Push every 6 hours PRN Nausea  oxyCODONE    5 mG/acetaminophen 325 mG 1 Tablet(s) Oral every 4 hours PRN Moderate Pain (4 - 6)  oxyCODONE    IR 10 milliGRAM(s) Oral every 4 hours PRN severe pain or dyspnea      RADIOLOGY:

## 2019-05-07 NOTE — DISCHARGE NOTE NURSING/CASE MANAGEMENT/SOCIAL WORK - NSDCPNDISPN_GEN_ALL_CORE
Side effects of pain management treatment/Safe use, storage and disposal of opioids when prescribed/Activities of daily living, including home environment that might     exacerbate pain or reduce effectiveness of the pain management plan of care as well as strategies to address these issues/Education provided on the pain management plan of care

## 2019-05-07 NOTE — PROGRESS NOTE ADULT - PROVIDER SPECIALTY LIST ADULT
Critical Care
Gastroenterology
Gastroenterology
Infectious Disease
Palliative Care
Surgery
Critical Care
Infectious Disease
Gastroenterology
Infectious Disease
Surgery
Infectious Disease
Critical Care
Critical Care

## 2019-05-07 NOTE — PROGRESS NOTE ADULT - REASON FOR ADMISSION
suspected sepsis
suspected sepsis, fevers
suspected sepsis

## 2019-05-07 NOTE — DISCHARGE NOTE PROVIDER - NSDCCPCAREPLAN_GEN_ALL_CORE_FT
PRINCIPAL DISCHARGE DIAGNOSIS  Diagnosis: Severe sepsis  Assessment and Plan of Treatment: continue antibiotics      SECONDARY DISCHARGE DIAGNOSES  Diagnosis: Osteomyelitis  Assessment and Plan of Treatment:     Diagnosis: Cholecystitis  Assessment and Plan of Treatment:     Diagnosis: Hypovolemia due to dehydration  Assessment and Plan of Treatment:     Diagnosis: Hypotension, unspecified  Assessment and Plan of Treatment:     Diagnosis: Chronic anemia  Assessment and Plan of Treatment:     Diagnosis: Severe protein-calorie malnutrition  Assessment and Plan of Treatment:     Diagnosis: Functional quadriplegia  Assessment and Plan of Treatment:     Diagnosis: Chronic respiratory failure, unsp w hypoxia or hypercapnia  Assessment and Plan of Treatment:

## 2019-05-07 NOTE — PROGRESS NOTE ADULT - SUBJECTIVE AND OBJECTIVE BOX
62yo M admitted 4/24 due to fever and leukocytosis.  Already being treated for C difficile colitis and 'osteomyelitis' from sacral decubitus at NH (IV Vanco IV Cefepime PO Vanco).  In ED fever 104* F and WBC 19k.  Hemodynamics reasonable.  Pt with elevated LFT's - US in ED with distended GB and sludge - pt refused CT scan - seen by surgery team - No acute intervention at this time.     I spoke with patient in detail and with his wife Barbara via telephone [phone] - issues and plans discussed in detail and all questions answered.    Pt with hx of HTN, DM, chronic resp failure/vent dependent s/p trach and PEG, transverse myelitis (as result of ? tetanus toxoid injection), functional quadripegia, s/p (? diverting) colostomy - most recently at  6/2018 due to sepsis of urinary source (CAUTi present on admission), before that was in [facility] 4/2018 with PNA Acinetobacter baumannii) and 3/2018 with UTI (PSAE muti resistant but S to zosyn).    Pt no longer has indwelling catheter, but did have PICC line placed approx 1 week ago.    A copy of his MOLST form was sent from NH and this was confirmed with patient and a new form documented.    Pt is AA and O x 3 NAD NRD and answers questions appropriately via head nod.    CT showed relatively normal GB--Sono showed distended GB and HIDA was +.  LFTS are up.  Pt febrile and was on Tigecycline until 4/29.  Pt is followed by general surgery    4/29:  Tm 102.8  WBC 24K.       4/30:  tigecycline resumed.  GI eval appreciated - MRCP ordered.    5/1: above d/w Dr Maddox who had been caring for the patient - MRCP negative.  D/w Dr Garza.  Explained issues and plans to patient and all questions answered.  Will also speak with wife in same regard.  Addendum: spoke with pt and wife in detail about issues and plans.  D/w Dr Austin as well.  MRI ordered to assess for osteomyelitis.    5/2: MRI (+) for osteomyelitis.  Discussed with Dr Campos from surgery - No role of debridement at present.  D/w ID - will consider PICC and regimen for return to [facility].    5/5: no new issues, no overnight events - awaiting open bed at [facility].  Episode of low BP - possible dehydration as free water stopped yesterday due to hyponatremia.      5/6: no new issues, no overnight events - awaiting open bed at [facility]. Improvement in leukocytosis noted.  Hyponatremia also improved.  Remains anemic.    5/7: no new issues, no overnight events - awaiting open bed at [facility].    Allergies    No Known Allergies        ICU Vital Signs Last 24 Hrs  T(C): 37.3 (07 May 2019 14:14), Max: 37.6 (07 May 2019 06:26)  T(F): 99.1 (07 May 2019 14:14), Max: 99.6 (07 May 2019 06:26)  HR: 102 (07 May 2019 14:07) (84 - 102)  BP: 101/61 (07 May 2019 14:07) (83/56 - 119/73)  BP(mean): 70 (07 May 2019 14:07) (63 - 84)  ABP: --  ABP(mean): --  RR: 14 (07 May 2019 14:07) (14 - 23)  SpO2: 100% (07 May 2019 14:07) (95% - 100%)      Mode: AC/ CMV (Assist Control/ Continuous Mandatory Ventilation)  RR (machine): 14  TV (machine): 500  FiO2: 30  PEEP: 5  ITime: 1  PIP: 27      I&O's Summary    06 May 2019 07:01  -  07 May 2019 07:00  --------------------------------------------------------  IN: 3950 mL / OUT: 2350 mL / NET: 1600 mL    07 May 2019 07:01  -  07 May 2019 14:49  --------------------------------------------------------  IN: 240 mL / OUT: 0 mL / NET: 240 mL                              8.4    10.20 )-----------( 455      ( 07 May 2019 06:42 )             28.6       05-07    138  |  108  |  18  ----------------------------<  138<H>  4.6   |  23  |  0.46<L>    Ca    7.1<L>      07 May 2019 06:42  Phos  2.4     05-07  Mg     1.9     05-07    TPro  5.0<L>  /  Alb  1.1<L>  /  TBili  0.3  /  DBili  <0.1  /  AST  37  /  ALT  21  /  AlkPhos  275<H>  05-06      CAPILLARY BLOOD GLUCOSE      POCT Blood Glucose.: 139 mg/dL (07 May 2019 05:58)  POCT Blood Glucose.: 145 mg/dL (06 May 2019 23:02)  POCT Blood Glucose.: 125 mg/dL (06 May 2019 16:11)      LIVER FUNCTIONS - ( 06 May 2019 06:29 )  Alb: 1.1 g/dL / Pro: 5.0 gm/dL / ALK PHOS: 275 U/L / ALT: 21 U/L / AST: 37 U/L / GGT: x               MEDICATIONS  (STANDING):  ALPRAZolam 0.5 milliGRAM(s) Oral every 8 hours  artificial  tears Solution 1 Drop(s) Both EYES every 4 hours  ascorbic acid 500 milliGRAM(s) Oral daily  aspirin  chewable 81 milliGRAM(s) Oral daily  cefepime  Injectable. 1000 milliGRAM(s) IV Push every 12 hours  chlorhexidine 0.12% Liquid 5 milliLiter(s) Oral Mucosa two times a day  dextrose 5%. 1000 milliLiter(s) (50 mL/Hr) IV Continuous <Continuous>  dextrose 50% Injectable 12.5 Gram(s) IV Push once  dextrose 50% Injectable 25 Gram(s) IV Push once  dextrose 50% Injectable 25 Gram(s) IV Push once  enoxaparin Injectable 40 milliGRAM(s) SubCutaneous daily  fentaNYL   Patch  75 MICROgram(s)/Hr 1 Patch Transdermal every 72 hours  ferrous    sulfate Liquid 300 milliGRAM(s) Enteral Tube <User Schedule>  gabapentin 300 milliGRAM(s) Oral two times a day  insulin glargine Injectable (LANTUS) 6 Unit(s) SubCutaneous at bedtime  insulin lispro (HumaLOG) corrective regimen sliding scale   SubCutaneous every 6 hours  lactobacillus acidophilus 1 Tablet(s) Oral two times a day  lidocaine   Patch 2 Patch Transdermal daily  loratadine 10 milliGRAM(s) Oral daily  montelukast 10 milliGRAM(s) Oral at bedtime  multivitamin/minerals 1 Tablet(s) Oral daily  pantoprazole  Injectable 40 milliGRAM(s) IV Push daily  sertraline 50 milliGRAM(s) Oral daily  simethicone 80 milliGRAM(s) Chew every 6 hours  sodium chloride 0.9%. 1000 milliLiter(s) (100 mL/Hr) IV Continuous <Continuous>  tigecycline IVPB 50 milliGRAM(s) IV Intermittent every 12 hours  vancomycin    Solution 125 milliGRAM(s) Oral every 6 hours    MEDICATIONS  (PRN):  acetaminophen   Tablet .. 650 milliGRAM(s) Oral every 6 hours PRN Temp greater or equal to 38C (100.4F), Moderate Pain (4 - 6)  cyclobenzaprine 10 milliGRAM(s) Oral three times a day PRN Muscle Spasm  dextrose 40% Gel 15 Gram(s) Oral once PRN Blood Glucose LESS THAN 70 milliGRAM(s)/deciliter  glucagon  Injectable 1 milliGRAM(s) IntraMuscular once PRN Glucose LESS THAN 70 milligrams/deciliter  ondansetron Injectable 4 milliGRAM(s) IV Push every 6 hours PRN Nausea  oxyCODONE    5 mG/acetaminophen 325 mG 1 Tablet(s) Oral every 4 hours PRN Moderate Pain (4 - 6)  oxyCODONE    IR 10 milliGRAM(s) Oral every 4 hours PRN severe pain or dyspnea      Review of Systems:  · Negative General Symptoms	no chills	  · General Symptoms	fever	  · Negative Skin Symptoms	no rash; no itching	  · Negative Ophthalmologic Symptoms	no diplopia; no photophobia	  · Ophthalmologic Comments	(+) dry eyes	  · Negative ENMT Symptoms	no dysphagia	  · Negative Respiratory and Thorax Symptoms	no wheezing; no dyspnea; no cough	  · Negative Cardiovascular Symptoms	no chest pain; no palpitations	  · Negative Gastrointestinal Symptoms	no nausea; no vomiting	  · Negative General Genitourinary Symptoms	no dysuria	  · Musculoskeletal Symptoms	muscle weakness	  · Negative Neurological Symptoms	no headache; no loss of consciousness	  · Neurological Symptoms	weakness	  · Negative Psychiatric Symptoms	no suicidal ideation	  · Psychiatric Symptoms	depression	  · Negative Hematology Symptoms	no gum bleeding; no nose bleeding	  · Negative Allergic Reactions	no urticaria	  · Additional ROS	ALL other ROS are NEGATIVE.	    Physical Exam:  · Constitutional	detailed exam	  · Constitutional Details	no distress	  · Eyes	detailed exam	  · Eyes Details	PERRL; EOMI	  · ENMT	detailed exam	  · ENMT Details	mouth	  · Mouth	dry	  · Neck	detailed exam	  · Neck Details	supple; no JVD; trach in situ	  · Back	detailed exam	  · Back Details	normal shape	  · Respiratory	detailed exam	  · Respiratory Details	airway patent; breath sounds equal; good air movement	  · Cardiovascular	detailed exam	  · Cardiovascular Details	regular rate and rhythm	  · Gastrointestinal	detailed exam	  · GI Normal	soft; nontender; bowel sounds normal	  · GI Abnormal	distended	  · Gastrointestinal Comments	LLQ ostomy with normal appearing stool, epigastric position G tube in situ.	  · Genitourinary	detailed exam	  · Genitourinary Details Male	normal external genitalia	  · Genitourinary Comments	texas cath in place	  · Extremities	detailed exam	  · Extremities Details	no cyanosis	  · Vascular	Equal and normal pulses (carotid, femoral, dorsalis pedis)	  · Neurological	detailed exam	  · Neurological Details	alert and oriented x 3	  · Motor	minimal movement of UE's - NO movement of LE's, contractures of hands B/L	  · Sensory	grossly intact	  · Skin	detailed exam	  · Skin Details	warm and dry	  · Musculoskeletal	detailed exam	  · Musculoskeletal Details	quadriplegia	  · Psychiatric	detailed exam	  · Psychiatric Details	depressed; flat affect	      DVT Prophylaxis: Lovenox    Advanced Directives:  DNR  Discussed with: patient - MOLST completed by Pall care team.    Visit Information: SSQ    ** Time is exclusive of billed procedures and/or teaching and/or routine family updates.

## 2019-05-07 NOTE — DISCHARGE NOTE PROVIDER - HOSPITAL COURSE
62yo M admitted 4/24 due to fever and leukocytosis.  Already being treated for C difficile colitis and 'osteomyelitis' from sacral decubitus at NH (IV Vanco IV Cefepime PO Vanco).    In ED fever 104* F and WBC 19k.  Hemodynamics reasonable.  Pt with elevated LFT's - US in ED with distended GB and sludge - pt refused CT scan - seen by surgery team - No acute intervention indicated.         Pt with hx of HTN, DM, chronic resp failure/vent dependent s/p trach and PEG, transverse myelitis (as result of ? tetanus toxoid injection), functional quadriplegia, s/p (? diverting) colostomy - most recently at  6/2018 due to sepsis of urinary source (CAUTi present on admission), before that was in  hospital 4/2018 with PNA Acinetobacter baumannii) and 3/2018 with UTI (PSAE muti resistant but S to zosyn).        Pt no longer has indwelling catheter, but did have PICC line placed approx 1 week ago.        A copy of his MOLST form was sent from NH and this was confirmed with patient and a new form documented.        CT with relatively normal GB--Sono showed distended GB and HIDA was +.  LFTS are up.  Pt febrile and was on Tigecycline until 4/29.          4/30:  tigecycline resumed.  GI eval appreciated - MRCP ordered.        5/1: MRCP negative.          5/2: MRI (+) for osteomyelitis.  Discussed with Dr Campos from surgery - No role of debridement.        5/6: no new issues, no overnight events - [facility] Improvement in leukocytosis noted.  Hyponatremia improved.          5/7: no new issues, no overnight events - hemodynamics reasonable and PICC placed.  [facility]        Will need to complete 6 weeks of Tigecycline (29 days left)  and 6 weeks of Cefepime (37 days left).        CXR reviewed RUE PICC with tip in SVC as appropriate for use.        Note that pt has designated himself DNR at this time.        Discharge diagnoses include:        severe sepsis     likely due to sacral osteomyelitis    and probably due to gallstone induced cholecystitis but passed the stone    versus cholestasis due to sepsis at a different site    Possible line infection (CLABSi) present on admission due to RUE PICC line     C difficile    Unstageable sacral decubitus [id number] present on admission (+) osteomyelitis on MRI    hypotensive episode 5/5 - dehydration and hypovolemia     hyponatremia     leukocytosis     transaminitis     anemia     hx of HTN, DM, chronic resp failure/vent dependent s/p trach and PEG, transverse myelitis (as result of ? tetanus toxoid injection),     functional quadriplegia s/p colostomy 62yo M admitted 4/24 due to fever and leukocytosis.  Already being treated for C difficile colitis and 'osteomyelitis' from sacral decubitus at NH (IV Vanco IV Cefepime PO Vanco).    In ED fever 104* F and WBC 19k.  Hemodynamics reasonable.  Pt with elevated LFT's - US in ED with distended GB and sludge - pt refused CT scan - seen by surgery team - No acute intervention indicated.         Pt with hx of HTN, DM, chronic resp failure/vent dependent s/p trach and PEG, transverse myelitis (as result of ? tetanus toxoid injection), functional quadriplegia, s/p (? diverting) colostomy - most recently at  6/2018 due to sepsis of urinary source (CAUTi present on admission), before that was in  hospital 4/2018 with PNA Acinetobacter baumannii) and 3/2018 with UTI (PSAE muti resistant but S to zosyn).        Pt no longer has indwelling catheter, but did have PICC line placed approx 1 week ago.        A copy of his MOLST form was sent from NH and this was confirmed with patient and a new form documented.        CT with relatively normal GB--Sono showed distended GB and HIDA was +.  LFTS are up.  Pt febrile and was on Tigecycline until 4/29.          4/30:  tigecycline resumed.  GI eval appreciated - MRCP ordered.        5/1: MRCP negative.          5/2: MRI (+) for osteomyelitis.  Discussed with Dr Campos from surgery - No role of debridement.        5/6: no new issues, no overnight events - awaiting open bed at [facility]. Improvement in leukocytosis noted.  Hyponatremia improved.          5/7: no new issues, no overnight events - hemodynamics reasonable and PICC placed.  Bed available at [facility].        Will need to complete 6 weeks of Tigecycline (29 days left)  and 6 weeks of Cefepime (37 days left).        CXR reviewed RUE PICC with tip in SVC as appropriate for use.        Note that pt has designated himself DNR at this time.        Discharge diagnoses include:        severe sepsis     likely due to sacral osteomyelitis    and probably due to gallstone induced cholecystitis but passed the stone    versus cholestasis due to sepsis at a different site    Possible line infection (CLABSi) present on admission due to RUE PICC line     C difficile    Unstageable sacral decubitus [id number] present on admission (+) osteomyelitis on MRI    hypotensive episode 5/5 - dehydration and hypovolemia     hyponatremia     leukocytosis     transaminitis     anemia     hx of HTN, DM, chronic resp failure/vent dependent s/p trach and PEG, transverse myelitis (as result of ? tetanus toxoid injection),     functional quadriplegia s/p colostomy            TIME FOR THIS DISCHARGE and CARE RENDERED >30 minutes

## 2019-05-07 NOTE — DISCHARGE NOTE NURSING/CASE MANAGEMENT/SOCIAL WORK - NSDCDPATPORTLINK_GEN_ALL_CORE
You can access the [facility] Patient Portal, offered by [facility], by registering with the following website: [email or web address]

## 2019-05-07 NOTE — DISCHARGE NOTE PROVIDER - CARE PROVIDER_API CALL
Francisco Shen)  Internal Medicine  [address], [address]  [place] [address]  Phone: [phone]  Fax: [phone]  Follow Up Time:

## 2019-05-07 NOTE — ADVANCED PRACTICE NURSE CONSULT - ASSESSMENT
Rec.d order to place PICC line for long-term IV abx. Reviewed chart, labwork, explained all risks and benefits and obtained consent for PICC placement from wife (health care proxy). Pt. A&Ox3 and verified pt.’s identification, name, , and correct procedure. Inserted Navilyst PICC 4FS w/PASV technology via ultrasound guidance to ­right basilic, number of insertion attempts: 1, cut to 37 cm. length, brisk blood return, flushes well w/20 ml. NS. Site prepped with CHG, Draped in sterile fashion using strict aseptic technique maintained throughout procedure, performed hand hygiene, all team members maintained full barrier precautions, 1% lidocaine used subdermally, Minimal blood loss. Site covered with sterile dressing and dated. No complications. Endcap placed. Pt. tolerated well. All sharps and guidewires accounted for. CXR confirms placement, no pneumothorax. Report given to district nurse. Lot #[id number].

## 2019-05-07 NOTE — PROGRESS NOTE ADULT - ASSESSMENT
64yo M suffering from severe sepsis - source uncertain and several potential sites:  Possible cholecystitis given elevated LFT's, distention of GB and sludge, though may just be cholestasis due to sepsis at a different site  Possible line infection (CLABSi) present on admission due to RUE PICC line - removed in ED to eliminate as source.  C difficile being treated at NH since ? 4/15 - don't know if had (+) stool toxin  Unstageable sacral decubitus [id number] present on admission - possible osteomyelitis already being treated at NH  UA negative so urine not likely to be source  CXR ? possible LEFT infiltrate, but no sputum or respiratory symptoms so unlikely source    hyponatremia - improved  leukocytosis - improving  transaminitis as noted able ? cholecystitis/cholangitis vs cholestasis due to sepsis - improving  anemia - due to chronic disease - no overt bleeding.    underlying hx of HTN, DM, chronic resp failure/vent dependent s/p trach and PEG, transverse myelitis (as result of ? tetanus toxoid injection),   functional quadriplegia s/p (? diverting) colostomy  hypotensive episode 5/5 - ? dehydration and hypovolemia - resolved  MR 5/1 confirmed sacral osteomyelitis.  palliative care meeting 5/1 resulted in patient self-determination of DNR status        Plan at this time is for continued care in CCU  HOB 30  GI and DVT prophylaxis as appropriate  continue IV abx with plan for transfer to NH with PICC line once bed available  - ID input appreciated  Surgical and GI input appreciated.   T&S - transfuse 1u PRBC  vent support  TF's   Glycemic control  wound care   supportive care    DNR    Wife Barbara is [phone].

## 2019-05-08 ENCOUNTER — INPATIENT (INPATIENT)
Facility: HOSPITAL | Age: 64
LOS: 15 days | Discharge: NURSING, REHAB OR LONG-TERM CARE | End: 2019-05-24
Attending: INTERNAL MEDICINE | Admitting: INTERNAL MEDICINE
Payer: MEDICARE

## 2019-05-08 VITALS — HEIGHT: 69 IN | WEIGHT: 171.96 LBS

## 2019-05-08 DIAGNOSIS — Z98.890 OTHER SPECIFIED POSTPROCEDURAL STATES: Chronic | ICD-10-CM

## 2019-05-08 DIAGNOSIS — Z93.3 COLOSTOMY STATUS: Chronic | ICD-10-CM

## 2019-05-08 DIAGNOSIS — Z93.1 GASTROSTOMY STATUS: Chronic | ICD-10-CM

## 2019-05-08 PROBLEM — N39.0 URINARY TRACT INFECTION, SITE NOT SPECIFIED: Chronic | Status: ACTIVE | Noted: 2019-04-25

## 2019-05-08 PROBLEM — J18.9 PNEUMONIA, UNSPECIFIED ORGANISM: Chronic | Status: ACTIVE | Noted: 2019-04-25

## 2019-05-08 PROBLEM — Z86.69 PERSONAL HISTORY OF OTHER DISEASES OF THE NERVOUS SYSTEM AND SENSE ORGANS: Chronic | Status: ACTIVE | Noted: 2019-04-25

## 2019-05-08 LAB
ADD ON TEST-SPECIMEN IN LAB: SIGNIFICANT CHANGE UP
ALBUMIN SERPL ELPH-MCNC: 1.2 G/DL — LOW (ref 3.3–5)
ALP SERPL-CCNC: 217 U/L — HIGH (ref 40–120)
ALT FLD-CCNC: 23 U/L — SIGNIFICANT CHANGE UP (ref 12–78)
ANION GAP SERPL CALC-SCNC: 8 MMOL/L — SIGNIFICANT CHANGE UP (ref 5–17)
ANISOCYTOSIS BLD QL: SLIGHT — SIGNIFICANT CHANGE UP
APPEARANCE UR: CLEAR — SIGNIFICANT CHANGE UP
APTT BLD: 39.8 SEC — HIGH (ref 27.5–36.3)
AST SERPL-CCNC: 21 U/L — SIGNIFICANT CHANGE UP (ref 15–37)
BACTERIA # UR AUTO: ABNORMAL
BASOPHILS # BLD AUTO: 0 K/UL — SIGNIFICANT CHANGE UP (ref 0–0.2)
BASOPHILS # BLD AUTO: 0.05 K/UL — SIGNIFICANT CHANGE UP (ref 0–0.2)
BASOPHILS NFR BLD AUTO: 0 % — SIGNIFICANT CHANGE UP (ref 0–2)
BASOPHILS NFR BLD AUTO: 0.2 % — SIGNIFICANT CHANGE UP (ref 0–2)
BILIRUB SERPL-MCNC: 0.3 MG/DL — SIGNIFICANT CHANGE UP (ref 0.2–1.2)
BILIRUB UR-MCNC: NEGATIVE — SIGNIFICANT CHANGE UP
BUN SERPL-MCNC: 16 MG/DL — SIGNIFICANT CHANGE UP (ref 7–23)
C DIFF BY PCR RESULT: SIGNIFICANT CHANGE UP
C DIFF TOX GENS STL QL NAA+PROBE: SIGNIFICANT CHANGE UP
CALCIUM SERPL-MCNC: 7.4 MG/DL — LOW (ref 8.5–10.1)
CHLORIDE SERPL-SCNC: 104 MMOL/L — SIGNIFICANT CHANGE UP (ref 96–108)
CO2 SERPL-SCNC: 21 MMOL/L — LOW (ref 22–31)
COLOR SPEC: YELLOW — SIGNIFICANT CHANGE UP
COMMENT - URINE: SIGNIFICANT CHANGE UP
CREAT SERPL-MCNC: 0.47 MG/DL — LOW (ref 0.5–1.3)
DIFF PNL FLD: NEGATIVE — SIGNIFICANT CHANGE UP
EOSINOPHIL # BLD AUTO: 0 K/UL — SIGNIFICANT CHANGE UP (ref 0–0.5)
EOSINOPHIL # BLD AUTO: 0 K/UL — SIGNIFICANT CHANGE UP (ref 0–0.5)
EOSINOPHIL NFR BLD AUTO: 0 % — SIGNIFICANT CHANGE UP (ref 0–6)
EOSINOPHIL NFR BLD AUTO: 0 % — SIGNIFICANT CHANGE UP (ref 0–6)
EPI CELLS # UR: SIGNIFICANT CHANGE UP
GLUCOSE SERPL-MCNC: 209 MG/DL — HIGH (ref 70–99)
GLUCOSE UR QL: 50 MG/DL
GRAN CASTS # UR COMP ASSIST: ABNORMAL /LPF
HCT VFR BLD CALC: 36 % — LOW (ref 39–50)
HCT VFR BLD CALC: 39.7 % — SIGNIFICANT CHANGE UP (ref 39–50)
HGB BLD-MCNC: 10.4 G/DL — LOW (ref 13–17)
HGB BLD-MCNC: 11.6 G/DL — LOW (ref 13–17)
HYALINE CASTS # UR AUTO: ABNORMAL /LPF
HYPOCHROMIA BLD QL: SLIGHT — SIGNIFICANT CHANGE UP
IMM GRANULOCYTES NFR BLD AUTO: 1.1 % — SIGNIFICANT CHANGE UP (ref 0–1.5)
INR BLD: 1.23 RATIO — HIGH (ref 0.88–1.16)
KETONES UR-MCNC: NEGATIVE — SIGNIFICANT CHANGE UP
LACTATE SERPL-SCNC: 13.7 MMOL/L — CRITICAL HIGH (ref 0.7–2)
LACTATE SERPL-SCNC: 2.5 MMOL/L — HIGH (ref 0.7–2)
LACTATE SERPL-SCNC: 3.4 MMOL/L — HIGH (ref 0.7–2)
LEUKOCYTE ESTERASE UR-ACNC: ABNORMAL
LIDOCAIN IGE QN: 2850 U/L — HIGH (ref 73–393)
LYMPHOCYTES # BLD AUTO: 0.48 K/UL — LOW (ref 1–3.3)
LYMPHOCYTES # BLD AUTO: 0.63 K/UL — LOW (ref 1–3.3)
LYMPHOCYTES # BLD AUTO: 1.6 % — LOW (ref 13–44)
LYMPHOCYTES # BLD AUTO: 2 % — LOW (ref 13–44)
MANUAL SMEAR VERIFICATION: SIGNIFICANT CHANGE UP
MCHC RBC-ENTMCNC: 25.7 PG — LOW (ref 27–34)
MCHC RBC-ENTMCNC: 26 PG — LOW (ref 27–34)
MCHC RBC-ENTMCNC: 28.9 GM/DL — LOW (ref 32–36)
MCHC RBC-ENTMCNC: 29.2 GM/DL — LOW (ref 32–36)
MCV RBC AUTO: 89 FL — SIGNIFICANT CHANGE UP (ref 80–100)
MCV RBC AUTO: 89.1 FL — SIGNIFICANT CHANGE UP (ref 80–100)
MONOCYTES # BLD AUTO: 1.24 K/UL — HIGH (ref 0–0.9)
MONOCYTES # BLD AUTO: 2.52 K/UL — HIGH (ref 0–0.9)
MONOCYTES NFR BLD AUTO: 4.1 % — SIGNIFICANT CHANGE UP (ref 2–14)
MONOCYTES NFR BLD AUTO: 8 % — SIGNIFICANT CHANGE UP (ref 2–14)
NEUTROPHILS # BLD AUTO: 28.23 K/UL — HIGH (ref 1.8–7.4)
NEUTROPHILS # BLD AUTO: 28.33 K/UL — HIGH (ref 1.8–7.4)
NEUTROPHILS NFR BLD AUTO: 87 % — HIGH (ref 43–77)
NEUTROPHILS NFR BLD AUTO: 93 % — HIGH (ref 43–77)
NEUTS BAND # BLD: 3 % — SIGNIFICANT CHANGE UP (ref 0–8)
NITRITE UR-MCNC: NEGATIVE — SIGNIFICANT CHANGE UP
NRBC # BLD: 0 /100 WBCS — SIGNIFICANT CHANGE UP (ref 0–0)
NRBC # BLD: 0 /100 — SIGNIFICANT CHANGE UP (ref 0–0)
NRBC # BLD: SIGNIFICANT CHANGE UP /100 WBCS (ref 0–0)
PH UR: 5 — SIGNIFICANT CHANGE UP (ref 5–8)
PLAT MORPH BLD: NORMAL — SIGNIFICANT CHANGE UP
PLATELET # BLD AUTO: 539 K/UL — HIGH (ref 150–400)
PLATELET # BLD AUTO: 567 K/UL — HIGH (ref 150–400)
PLATELET COUNT - ESTIMATE: ABNORMAL
POIKILOCYTOSIS BLD QL AUTO: SLIGHT — SIGNIFICANT CHANGE UP
POTASSIUM SERPL-MCNC: 5.2 MMOL/L — SIGNIFICANT CHANGE UP (ref 3.5–5.3)
POTASSIUM SERPL-SCNC: 5.2 MMOL/L — SIGNIFICANT CHANGE UP (ref 3.5–5.3)
PROT SERPL-MCNC: 5.6 GM/DL — LOW (ref 6–8.3)
PROT UR-MCNC: 15 MG/DL
PROTHROM AB SERPL-ACNC: 13.7 SEC — HIGH (ref 10–12.9)
RBC # BLD: 4.04 M/UL — LOW (ref 4.2–5.8)
RBC # BLD: 4.46 M/UL — SIGNIFICANT CHANGE UP (ref 4.2–5.8)
RBC # FLD: 19.2 % — HIGH (ref 10.3–14.5)
RBC # FLD: 19.4 % — HIGH (ref 10.3–14.5)
RBC BLD AUTO: ABNORMAL
RBC CASTS # UR COMP ASSIST: SIGNIFICANT CHANGE UP /HPF (ref 0–4)
SODIUM SERPL-SCNC: 133 MMOL/L — LOW (ref 135–145)
SP GR SPEC: 1.02 — SIGNIFICANT CHANGE UP (ref 1.01–1.02)
URATE CRY FLD QL MICRO: ABNORMAL
UROBILINOGEN FLD QL: NEGATIVE MG/DL — SIGNIFICANT CHANGE UP
WBC # BLD: 30.34 K/UL — HIGH (ref 3.8–10.5)
WBC # BLD: 31.48 K/UL — HIGH (ref 3.8–10.5)
WBC # FLD AUTO: 30.34 K/UL — HIGH (ref 3.8–10.5)
WBC # FLD AUTO: 31.48 K/UL — HIGH (ref 3.8–10.5)
WBC UR QL: SIGNIFICANT CHANGE UP

## 2019-05-08 PROCEDURE — 93010 ELECTROCARDIOGRAM REPORT: CPT

## 2019-05-08 PROCEDURE — 71045 X-RAY EXAM CHEST 1 VIEW: CPT | Mod: 26

## 2019-05-08 PROCEDURE — 99285 EMERGENCY DEPT VISIT HI MDM: CPT

## 2019-05-08 PROCEDURE — 74176 CT ABD & PELVIS W/O CONTRAST: CPT | Mod: 26

## 2019-05-08 PROCEDURE — 70450 CT HEAD/BRAIN W/O DYE: CPT | Mod: 26

## 2019-05-08 PROCEDURE — 71250 CT THORAX DX C-: CPT | Mod: 26

## 2019-05-08 RX ORDER — CHLORHEXIDINE GLUCONATE 213 G/1000ML
15 SOLUTION TOPICAL
Qty: 0 | Refills: 0 | Status: DISCONTINUED | OUTPATIENT
Start: 2019-05-08 | End: 2019-05-24

## 2019-05-08 RX ORDER — LIDOCAINE 4 G/100G
1 CREAM TOPICAL DAILY
Qty: 0 | Refills: 0 | Status: DISCONTINUED | OUTPATIENT
Start: 2019-05-08 | End: 2019-05-24

## 2019-05-08 RX ORDER — ACETAMINOPHEN 500 MG
650 TABLET ORAL EVERY 6 HOURS
Qty: 0 | Refills: 0 | Status: DISCONTINUED | OUTPATIENT
Start: 2019-05-08 | End: 2019-05-24

## 2019-05-08 RX ORDER — TIGECYCLINE 50 MG/5ML
50 INJECTION, POWDER, LYOPHILIZED, FOR SOLUTION INTRAVENOUS EVERY 12 HOURS
Qty: 0 | Refills: 0 | Status: DISCONTINUED | OUTPATIENT
Start: 2019-05-08 | End: 2019-05-19

## 2019-05-08 RX ORDER — GABAPENTIN 400 MG/1
300 CAPSULE ORAL
Qty: 0 | Refills: 0 | Status: DISCONTINUED | OUTPATIENT
Start: 2019-05-08 | End: 2019-05-24

## 2019-05-08 RX ORDER — CEFEPIME 1 G/1
1000 INJECTION, POWDER, FOR SOLUTION INTRAMUSCULAR; INTRAVENOUS EVERY 12 HOURS
Qty: 0 | Refills: 0 | Status: DISCONTINUED | OUTPATIENT
Start: 2019-05-08 | End: 2019-05-16

## 2019-05-08 RX ORDER — GABAPENTIN 400 MG/1
1 CAPSULE ORAL
Qty: 0 | Refills: 0 | COMMUNITY

## 2019-05-08 RX ORDER — LORATADINE 10 MG/1
1 TABLET ORAL
Qty: 0 | Refills: 0 | COMMUNITY

## 2019-05-08 RX ORDER — SODIUM CHLORIDE 9 MG/ML
1000 INJECTION, SOLUTION INTRAVENOUS
Qty: 0 | Refills: 0 | Status: DISCONTINUED | OUTPATIENT
Start: 2019-05-08 | End: 2019-05-10

## 2019-05-08 RX ORDER — FENTANYL CITRATE 50 UG/ML
1 INJECTION INTRAVENOUS
Qty: 0 | Refills: 0 | Status: DISCONTINUED | OUTPATIENT
Start: 2019-05-08 | End: 2019-05-14

## 2019-05-08 RX ORDER — VANCOMYCIN HCL 1 G
125 VIAL (EA) INTRAVENOUS EVERY 6 HOURS
Qty: 0 | Refills: 0 | Status: DISCONTINUED | OUTPATIENT
Start: 2019-05-08 | End: 2019-05-24

## 2019-05-08 RX ORDER — ASPIRIN/CALCIUM CARB/MAGNESIUM 324 MG
1 TABLET ORAL
Qty: 0 | Refills: 0 | COMMUNITY

## 2019-05-08 RX ORDER — ENOXAPARIN SODIUM 100 MG/ML
40 INJECTION SUBCUTANEOUS DAILY
Qty: 0 | Refills: 0 | Status: DISCONTINUED | OUTPATIENT
Start: 2019-05-08 | End: 2019-05-24

## 2019-05-08 RX ORDER — CEFEPIME 1 G/1
2000 INJECTION, POWDER, FOR SOLUTION INTRAMUSCULAR; INTRAVENOUS ONCE
Qty: 0 | Refills: 0 | Status: COMPLETED | OUTPATIENT
Start: 2019-05-08 | End: 2019-05-08

## 2019-05-08 RX ORDER — VANCOMYCIN HCL 1 G
1000 VIAL (EA) INTRAVENOUS ONCE
Qty: 0 | Refills: 0 | Status: COMPLETED | OUTPATIENT
Start: 2019-05-08 | End: 2019-05-08

## 2019-05-08 RX ORDER — SODIUM CHLORIDE 9 MG/ML
2400 INJECTION, SOLUTION INTRAVENOUS ONCE
Qty: 0 | Refills: 0 | Status: COMPLETED | OUTPATIENT
Start: 2019-05-08 | End: 2019-05-08

## 2019-05-08 RX ORDER — FAMOTIDINE 10 MG/ML
20 INJECTION INTRAVENOUS
Qty: 0 | Refills: 0 | Status: DISCONTINUED | OUTPATIENT
Start: 2019-05-08 | End: 2019-05-24

## 2019-05-08 RX ORDER — ASPIRIN/CALCIUM CARB/MAGNESIUM 324 MG
81 TABLET ORAL DAILY
Qty: 0 | Refills: 0 | Status: DISCONTINUED | OUTPATIENT
Start: 2019-05-08 | End: 2019-05-24

## 2019-05-08 RX ORDER — ASCORBIC ACID 60 MG
500 TABLET,CHEWABLE ORAL DAILY
Qty: 0 | Refills: 0 | Status: DISCONTINUED | OUTPATIENT
Start: 2019-05-08 | End: 2019-05-24

## 2019-05-08 RX ORDER — CEFEPIME 1 G/1
1000 INJECTION, POWDER, FOR SOLUTION INTRAMUSCULAR; INTRAVENOUS EVERY 12 HOURS
Qty: 0 | Refills: 0 | Status: DISCONTINUED | OUTPATIENT
Start: 2019-05-08 | End: 2019-05-08

## 2019-05-08 RX ORDER — OXYCODONE AND ACETAMINOPHEN 5; 325 MG/1; MG/1
1 TABLET ORAL EVERY 4 HOURS
Qty: 0 | Refills: 0 | Status: DISCONTINUED | OUTPATIENT
Start: 2019-05-08 | End: 2019-05-11

## 2019-05-08 RX ORDER — MULTIVIT-MIN/FERROUS GLUCONATE 9 MG/15 ML
1 LIQUID (ML) ORAL DAILY
Qty: 0 | Refills: 0 | Status: DISCONTINUED | OUTPATIENT
Start: 2019-05-08 | End: 2019-05-24

## 2019-05-08 RX ORDER — SERTRALINE 25 MG/1
50 TABLET, FILM COATED ORAL DAILY
Qty: 0 | Refills: 0 | Status: DISCONTINUED | OUTPATIENT
Start: 2019-05-08 | End: 2019-05-24

## 2019-05-08 RX ADMIN — SODIUM CHLORIDE 125 MILLILITER(S): 9 INJECTION, SOLUTION INTRAVENOUS at 22:00

## 2019-05-08 RX ADMIN — CEFEPIME 2000 MILLIGRAM(S): 1 INJECTION, POWDER, FOR SOLUTION INTRAMUSCULAR; INTRAVENOUS at 13:00

## 2019-05-08 RX ADMIN — SODIUM CHLORIDE 2400 MILLILITER(S): 9 INJECTION, SOLUTION INTRAVENOUS at 14:15

## 2019-05-08 RX ADMIN — FENTANYL CITRATE 1 PATCH: 50 INJECTION INTRAVENOUS at 23:58

## 2019-05-08 RX ADMIN — CEFEPIME 100 MILLIGRAM(S): 1 INJECTION, POWDER, FOR SOLUTION INTRAMUSCULAR; INTRAVENOUS at 12:33

## 2019-05-08 RX ADMIN — SODIUM CHLORIDE 2400 MILLILITER(S): 9 INJECTION, SOLUTION INTRAVENOUS at 12:33

## 2019-05-08 NOTE — SEPSIS NOTE - NSSUBJFT_GEN_A_CORE
Patient lactate went from 13 to 3.  with fluid,  improved, bp ok  afebrile, ct possible pancreatitis

## 2019-05-08 NOTE — ED ADULT NURSE NOTE - NSIMPLEMENTINTERV_GEN_ALL_ED
Implemented All Fall with Harm Risk Interventions:  [place] to call system. Call bell, personal items and telephone within reach. Instruct patient to call for assistance. Room bathroom lighting operational. Non-slip footwear when patient is off stretcher. Physically safe environment: no spills, clutter or unnecessary equipment. Stretcher in lowest position, wheels locked, appropriate side rails in place. Provide visual cue, wrist band, yellow gown, etc. Monitor gait and stability. Monitor for mental status changes and reorient to person, place, and time. Review medications for side effects contributing to fall risk. Reinforce activity limits and safety measures with patient and family. Provide visual clues: red socks.

## 2019-05-08 NOTE — ED PROVIDER NOTE - CARDIAC, MLM
Normal rate, regular rhythm.  Heart sounds S1, S2.  No murmurs, rubs or gallops. 2+left radial pulse

## 2019-05-08 NOTE — ED ADULT NURSE REASSESSMENT NOTE - NS ED NURSE REASSESS COMMENT FT1
Pt able to mouth answers to questioning, pt admits to being in pain at cuff site, pt repositioned for comfort, trach site clean/dry, krillex placed in palms for finger contractures, wet to dry dressing was applied to sacral wound as per MD Alan, awaiting dispo

## 2019-05-08 NOTE — ED PROVIDER NOTE - OBJECTIVE STATEMENT
64 y/o male with a PMHx of HTN, quadriplegia, transverse myelitis, DM presents to the ED sent from [facility] for decreased responsiveness today. As per nursing home staff, pt last known A&O x3 last night and this morning was found altered. Pt was admitted on 4/24 for sepsis, fever and leukocytosis and discharged on 5/7. Prior to admission, pt was already being treated for C-diff and osteomyelitis secondary to sacral decubital ulcer with IV Vanco and IV Cefepime. Currently still being treated for C-diff. Pt with G-tube, colostomy bag, and trach in place. On ventilator at baseline. BGM was 261, as per EMS. NKDA. Pt is poor historian, unable to obtain full hx.

## 2019-05-08 NOTE — ED PROVIDER NOTE - PROGRESS NOTE DETAILS
Tori Alan: ICU states pt is back to baseline. Tori LUA for Dr. Alan: ICU states pt is back to baseline.  Pt evaluated by Dr. Maddox at bedside who states that patient is back to baseline and [facility].  He is receiving broad spectrum abx via his PICC line.  Memo Alan,  Informed of significantly increasing lactate.  Will send repeat sample and contact ICU.  Memo Alan, DO

## 2019-05-08 NOTE — ED ADULT NURSE REASSESSMENT NOTE - NS ED NURSE REASSESS COMMENT FT1
Incontinence care provided, pt in no resp distress, offers no complaints at this time, awaiting repeat bloodwork and dispo, pt's wife at bedside and aware of plan of care, will continue to monitor

## 2019-05-08 NOTE — ED PROVIDER NOTE - SKIN, MLM
Skin normal color for race, warm, dry. No evidence of rash. +diffuse edema in upper and lower extremities, 4+ pitting edema bilateral LE +ecchymosis of right chest wall +PICC line in right arm +stage 4 sacral decubital ulcer

## 2019-05-08 NOTE — H&P ADULT - NSHPPHYSICALEXAM_GEN_ALL_CORE
General - comfortable no distress  HEENT -pERLLA  neck s/p trach  lungs clear  cv - rrr no murmur  chest wall , echymoses to right chest  abdomen colostomy function, abdomen soft, ? slight ruq discomfort  back stage 4 decubitus  lungs ankle ulcer

## 2019-05-08 NOTE — ED ADULT NURSE REASSESSMENT NOTE - NS ED NURSE REASSESS COMMENT FT1
Pt resting comfortably on stretcher, tolerating vent well, offers no complaints, denies pain, pt showing NSR on monitor, awaiting ICU eval and dispo, will continue to monitor

## 2019-05-08 NOTE — ED ADULT NURSE NOTE - OBJECTIVE STATEMENT
Pt with recent discharge from hospital and sent back for "unresponsiveness", pt arrives nonverbal and unresponsive to verbal stimuli, pt unable to follow commands at this time, MD at bedside, ALEXANDRO, pt with pitting edema to B/L arms and legs, pt with 50mcg fentanyl patch to right chest wall, pt with PICC line in RUE with pos blood return, pt with stage 4 wound to sacrum and stage 2 healing wound to right outer ankle

## 2019-05-08 NOTE — PHARMACOTHERAPY INTERVENTION NOTE - COMMENTS
med history complete, reviewed medications with patient and confirmed with doctor first med profile med history complete, reviewed medications with patient med list from assisted living

## 2019-05-08 NOTE — PROGRESS NOTE ADULT - SUBJECTIVE AND OBJECTIVE BOX
I was asked to follow up a patient evaluated earlier by Intensivist. He was scheduled for DC bacl to his SNF.    Covering ER doctor would like a reevaluation of patient for possible admission. I spoke to Dr. Mcconnell and told him I am in the unit with a critical patient and an Intensivist will come to evaluate patient as soon as possible. He is agreeable.

## 2019-05-08 NOTE — H&P ADULT - HISTORY OF PRESENT ILLNESS
Pt well known to cc staff as pt was just DC to NH from CCU on 5/7.  64 y/o male chronic vent--s/p PEG for prob transversemyelitis--DC from  on 5/7 after being admitted with biliary sepsis and sacral OM. wbc was 10K   PICC was placed and tx to NH on PO vanco/ tigecycline/cefepime.  He presents today with unresponsiveness and pinpoint pupils.  In ED, he was given 2400ml LR, IV vanco/cefepime and narcan.  Temp 99.2   WBC 30K. Lactate 2.5 then inc lactate  gave fluid and improved  On my exam, he is back to baseline mentation, cty showed evidence of pancreatitis. lipase elevated

## 2019-05-08 NOTE — ED ADULT TRIAGE NOTE - CHIEF COMPLAINT QUOTE
Pt from [facility], noted to be "unresponsive" this morning at facility, usually oriented per report and last seen normal last night.    Pt minimally responsive on arrival.  Chronic vent, tracheostomy, DM.  Respiratory therapist present on arrival.  CRE isolation initiated.

## 2019-05-08 NOTE — H&P ADULT - NSHPREVIEWOFSYSTEMS_GEN_ALL_CORE
Patient comfortable, slightl nausea,   has no sensation below shoulders.  not complaining of abdominal pain  no increased secretions,  no change in ostomy output

## 2019-05-08 NOTE — ED PROVIDER NOTE - CARE PLAN
Principal Discharge DX:	Clostridium difficile infection Principal Discharge DX:	Clostridium difficile infection  Secondary Diagnosis:	Leukocytosis, unspecified type  Secondary Diagnosis:	Lactic acid blood increased

## 2019-05-08 NOTE — H&P ADULT - NSHPLABSRESULTS_GEN_ALL_CORE
CBC Full  -  ( 08 May 2019 15:57 )  WBC Count : 31.48 K/uL  RBC Count : 4.04 M/uL  Hemoglobin : 10.4 g/dL  Hematocrit : 36.0 %  Platelet Count - Automated : 539 K/uL  Mean Cell Volume : 89.1 fl  Mean Cell Hemoglobin : 25.7 pg  Mean Cell Hemoglobin Concentration : 28.9 gm/dL  Auto Neutrophil # : 28.33 K/uL  Auto Lymphocyte # : 0.63 K/uL  Auto Monocyte # : 2.52 K/uL  Auto Eosinophil # : 0.00 K/uL  Auto Basophil # : 0.00 K/uL  Auto Neutrophil % : 87.0 %  Auto Lymphocyte % : 2.0 %  Auto Monocyte % : 8.0 %  Auto Eosinophil % : 0.0 %  Auto Basophil % : 0.0 %      05-08    133<L>  |  104  |  16  ----------------------------<  209<H>  5.2   |  21<L>  |  0.47<L>    Ca    7.4<L>      08 May 2019 13:38  Phos  2.4     05-07  Mg     1.9     05-07    TPro  5.6<L>  /  Alb  1.2<L>  /  TBili  0.3  /  DBili  x   /  AST  21  /  ALT  23  /  AlkPhos  217<H>  05-08

## 2019-05-08 NOTE — ED PROVIDER NOTE - ENMT, MLM
Airway patent, Nasal mucosa clear. Mouth with normal mucosa. Throat has no vesicles, no oropharyngeal exudates and uvula is midline. +trach in place, easy to bag

## 2019-05-08 NOTE — ED ADULT NURSE NOTE - CHIEF COMPLAINT QUOTE
Pt from [facility], noted to be "unresponsive" this morning at facility, usually oriented per report and last seen normal last night.    Pt minimally responsive on arrival.  Chronic vent, tracheostomy, DM.  Respiratory therapist present on arrival.  CRE isolation initiated.

## 2019-05-08 NOTE — ED PROVIDER NOTE - GASTROINTESTINAL, MLM
Abdomen soft, non-tender, no guarding. +G-tube in place, no induration or erythema around the tube +colostomy bag with some liquid brown stool

## 2019-05-08 NOTE — ED PROVIDER NOTE - PMH
Essential hypertension    H/O quadriplegia    Paralysis    Pneumonia    Transverse myelitis    UTI (urinary tract infection)

## 2019-05-08 NOTE — CONSULT NOTE ADULT - SUBJECTIVE AND OBJECTIVE BOX
Asked by dr Valles to see pt in ED at 1330.  Pt seen and examined in ED at 1335    CC:  AMS    HPI:    Pt well known to cc staff as pt was just DC to NH from CCU on .  62 y/o male chronic vent--s/p PEG for prob transversemyelitis--DC from  on  after being admitted with biliary sepsis and sacral OM.  PICC was placed and tx to NH on PO vanco/ tigecycline/cefepime.  He presents today with unresponsiveness and pinpoint pupils.  In ED, he was given 2400ml LR, IV vanco/cefepime and narcan.  No fever.  WBC 30K. Lactate 2.5 HCT no acute findings.  CT chest/A/P with B/L opacifications.    On my exam, he is back to baseline mentation, recognizes me and follows simple commands    Above d/w dr lopez    PMH:  As above.     PSH:  As above.     FH: Non Contributory other than those listed in HPI    Social History:  NC    MEDICATIONS  (STANDING):  vancomycin  IVPB 1000 milliGRAM(s) IV Intermittent once    MEDICATIONS  (PRN):      Allergies: NKDA    ROS:  SEE BELOW    Height (cm): 175.26 ( @ 11:04)  Weight (kg): 78 ( @ 11:04)  BMI (kg/m2): 25.4 ( @ 11:04)    ICU Vital Signs Last 24 Hrs  T(C): 37.3 (08 May 2019 11:07), Max: 37.3 (07 May 2019 14:14)  T(F): 99.2 (08 May 2019 11:07), Max: 99.2 (08 May 2019 11:07)  HR: 113 (08 May 2019 13:00) (81 - 128)  BP: 127/76 (08 May 2019 13:00) (90/71 - 141/100)  BP(mean): 72 (07 May 2019 19:00) (62 - 76)  ABP: --  ABP(mean): --  RR: 29 (08 May 2019 11:07) (14 - 29)  SpO2: 100% (08 May 2019 13:47) (97% - 100%)      Mode: AC/ CMV (Assist Control/ Continuous Mandatory Ventilation)  RR (machine): 14  TV (machine): 500  FiO2: 40  PEEP: 5  ITime: 1  PIP: 26      I&O's Summary      Physical Exam:  SEE BELOW                          11.6   30.34 )-----------( 567      ( 08 May 2019 12:10 )             39.7       05-07    138  |  108  |  18  ----------------------------<  138<H>  4.6   |  23  |  0.46<L>    Ca    7.1<L>      07 May 2019 06:42  Phos  2.4     -  Mg     1.9     -                  Urinalysis Basic - ( 08 May 2019 12:10 )    Color: Yellow / Appearance: Clear / S.025 / pH: x  Gluc: x / Ketone: Negative  / Bili: Negative / Urobili: Negative mg/dL   Blood: x / Protein: 15 mg/dL / Nitrite: Negative   Leuk Esterase: Trace / RBC: 0-2 /HPF / WBC 3-5   Sq Epi: x / Non Sq Epi: Few / Bacteria: Few        DVT Prophylaxis:                                                            Contraindication:     Advanced Directives:    Discussed with:    Visit Information:  Time spent excluding procedure:      ** Time is exclusive of billed procedures and/or teaching and/or routine family updates.

## 2019-05-08 NOTE — H&P ADULT - ASSESSMENT
Patient on chronic vent from tranverse myelitis  with inc wbc, was 10 day before, with inc lactate to 13, possible septic shock  ct shows pancreatitis, hemodynamically stable,  received fluid, bp is better  will continue abx. po vancok, cefepime, tigrec, for ostoemyelitis  sent repeat cultures,  may need to consider reconsider drainage of gb. will consult ID and GI

## 2019-05-09 LAB
ALBUMIN SERPL ELPH-MCNC: 1.2 G/DL — LOW (ref 3.3–5)
ALP SERPL-CCNC: 205 U/L — HIGH (ref 40–120)
ALT FLD-CCNC: 15 U/L — SIGNIFICANT CHANGE UP (ref 12–78)
ANION GAP SERPL CALC-SCNC: 6 MMOL/L — SIGNIFICANT CHANGE UP (ref 5–17)
AST SERPL-CCNC: 22 U/L — SIGNIFICANT CHANGE UP (ref 15–37)
BILIRUB SERPL-MCNC: 0.4 MG/DL — SIGNIFICANT CHANGE UP (ref 0.2–1.2)
BUN SERPL-MCNC: 13 MG/DL — SIGNIFICANT CHANGE UP (ref 7–23)
CALCIUM SERPL-MCNC: 7.9 MG/DL — LOW (ref 8.5–10.1)
CHLORIDE SERPL-SCNC: 101 MMOL/L — SIGNIFICANT CHANGE UP (ref 96–108)
CO2 SERPL-SCNC: 28 MMOL/L — SIGNIFICANT CHANGE UP (ref 22–31)
CREAT SERPL-MCNC: 0.48 MG/DL — LOW (ref 0.5–1.3)
CULTURE RESULTS: SIGNIFICANT CHANGE UP
GLUCOSE SERPL-MCNC: 190 MG/DL — HIGH (ref 70–99)
HCT VFR BLD CALC: 35 % — LOW (ref 39–50)
HGB BLD-MCNC: 10.4 G/DL — LOW (ref 13–17)
LACTATE SERPL-SCNC: 1.9 MMOL/L — SIGNIFICANT CHANGE UP (ref 0.7–2)
LIDOCAIN IGE QN: 1163 U/L — HIGH (ref 73–393)
MCHC RBC-ENTMCNC: 25.8 PG — LOW (ref 27–34)
MCHC RBC-ENTMCNC: 29.7 GM/DL — LOW (ref 32–36)
MCV RBC AUTO: 86.8 FL — SIGNIFICANT CHANGE UP (ref 80–100)
NRBC # BLD: 0 /100 WBCS — SIGNIFICANT CHANGE UP (ref 0–0)
PLATELET # BLD AUTO: 496 K/UL — HIGH (ref 150–400)
POTASSIUM SERPL-MCNC: 4.7 MMOL/L — SIGNIFICANT CHANGE UP (ref 3.5–5.3)
POTASSIUM SERPL-SCNC: 4.7 MMOL/L — SIGNIFICANT CHANGE UP (ref 3.5–5.3)
PROT SERPL-MCNC: 5.7 GM/DL — LOW (ref 6–8.3)
RBC # BLD: 4.03 M/UL — LOW (ref 4.2–5.8)
RBC # FLD: 19.8 % — HIGH (ref 10.3–14.5)
SODIUM SERPL-SCNC: 135 MMOL/L — SIGNIFICANT CHANGE UP (ref 135–145)
SPECIMEN SOURCE: SIGNIFICANT CHANGE UP
TRIGL SERPL-MCNC: 100 MG/DL — SIGNIFICANT CHANGE UP (ref 10–149)
WBC # BLD: 27.58 K/UL — HIGH (ref 3.8–10.5)
WBC # FLD AUTO: 27.58 K/UL — HIGH (ref 3.8–10.5)

## 2019-05-09 RX ADMIN — SODIUM CHLORIDE 125 MILLILITER(S): 9 INJECTION, SOLUTION INTRAVENOUS at 22:38

## 2019-05-09 RX ADMIN — ENOXAPARIN SODIUM 40 MILLIGRAM(S): 100 INJECTION SUBCUTANEOUS at 12:14

## 2019-05-09 RX ADMIN — GABAPENTIN 300 MILLIGRAM(S): 400 CAPSULE ORAL at 18:08

## 2019-05-09 RX ADMIN — Medication 500 MILLIGRAM(S): at 12:14

## 2019-05-09 RX ADMIN — LIDOCAINE 1 PATCH: 4 CREAM TOPICAL at 12:10

## 2019-05-09 RX ADMIN — Medication 125 MILLIGRAM(S): at 12:14

## 2019-05-09 RX ADMIN — SODIUM CHLORIDE 125 MILLILITER(S): 9 INJECTION, SOLUTION INTRAVENOUS at 14:04

## 2019-05-09 RX ADMIN — TIGECYCLINE 105 MILLIGRAM(S): 50 INJECTION, POWDER, LYOPHILIZED, FOR SOLUTION INTRAVENOUS at 18:07

## 2019-05-09 RX ADMIN — FENTANYL CITRATE 1 PATCH: 50 INJECTION INTRAVENOUS at 18:54

## 2019-05-09 RX ADMIN — Medication 125 MILLIGRAM(S): at 18:07

## 2019-05-09 RX ADMIN — SODIUM CHLORIDE 125 MILLILITER(S): 9 INJECTION, SOLUTION INTRAVENOUS at 05:33

## 2019-05-09 RX ADMIN — SERTRALINE 50 MILLIGRAM(S): 25 TABLET, FILM COATED ORAL at 12:14

## 2019-05-09 RX ADMIN — Medication 125 MILLIGRAM(S): at 23:35

## 2019-05-09 RX ADMIN — CHLORHEXIDINE GLUCONATE 15 MILLILITER(S): 213 SOLUTION TOPICAL at 05:32

## 2019-05-09 RX ADMIN — CEFEPIME 1000 MILLIGRAM(S): 1 INJECTION, POWDER, FOR SOLUTION INTRAMUSCULAR; INTRAVENOUS at 19:41

## 2019-05-09 RX ADMIN — CEFEPIME 1000 MILLIGRAM(S): 1 INJECTION, POWDER, FOR SOLUTION INTRAMUSCULAR; INTRAVENOUS at 06:24

## 2019-05-09 RX ADMIN — LIDOCAINE 1 PATCH: 4 CREAM TOPICAL at 23:35

## 2019-05-09 RX ADMIN — TIGECYCLINE 105 MILLIGRAM(S): 50 INJECTION, POWDER, LYOPHILIZED, FOR SOLUTION INTRAVENOUS at 05:31

## 2019-05-09 RX ADMIN — FAMOTIDINE 20 MILLIGRAM(S): 10 INJECTION INTRAVENOUS at 18:08

## 2019-05-09 RX ADMIN — Medication 1 TABLET(S): at 12:14

## 2019-05-09 RX ADMIN — Medication 81 MILLIGRAM(S): at 12:14

## 2019-05-09 RX ADMIN — LIDOCAINE 1 PATCH: 4 CREAM TOPICAL at 18:55

## 2019-05-09 RX ADMIN — CHLORHEXIDINE GLUCONATE 15 MILLILITER(S): 213 SOLUTION TOPICAL at 18:09

## 2019-05-09 NOTE — DIETITIAN INITIAL EVALUATION ADULT. - PERTINENT LABORATORY DATA
05-09 Na135 mmol/L Glu 190 mg/dL<H> K+ 4.7 mmol/L Cr  0.48 mg/dL<L> BUN 13 mg/dL Phos n/a   Alb 1.2 g/dL<L> PAB n/a

## 2019-05-09 NOTE — PROGRESS NOTE ADULT - SUBJECTIVE AND OBJECTIVE BOX
CC: On Vent    64 y/o male chronic vent--s/p PEG for prob transversemyelitis--DC from  on  after being admitted with biliary sepsis and sacral OM. wbc was 10K PICC was placed and tx to NH on PO vanco/ tigecycline/cefepime.  He presents today with unresponsiveness and pinpoint pupils.  In ED, he was given 2400ml LR, IV vanco/cefepime and narcan.  Temp 99.2   WBC 30K. Lactate 2.5 then inc lactate gave fluid and improved.     - Patient seen and examined. Chart reviewed. Events noted. On arrival he is awake and alert. Remains on the vent.       PAST MEDICAL & SURGICAL HISTORY:  Pneumonia  UTI (urinary tract infection)  H/O quadriplegia  Essential hypertension  Paralysis  Transverse myelitis  S/P colostomy  PEG (percutaneous endoscopic gastrostomy) status  History of tracheostomy      FAMILY HISTORY:  No pertinent family history in first degree relatives      Social Hx:    Allergies    No Known Allergies    Intolerances        63y    Height (cm): 175.26 ( @ 11:04)  Weight (kg): 78 ( @ 11:04)  BMI (kg/m2): 25.4 ( @ 11:04)    ICU Vital Signs Last 24 Hrs  T(C): 36.6 (09 May 2019 04:00), Max: 37.3 (08 May 2019 11:07)  T(F): 97.9 (09 May 2019 04:00), Max: 99.2 (08 May 2019 11:07)  HR: 101 (09 May 2019 07:00) (90 - 128)  BP: 127/77 (09 May 2019 07:00) (110/50 - 141/100)  BP(mean): 88 (09 May 2019 07:00) (70 - 92)  ABP: --  ABP(mean): --  RR: 16 (09 May 2019 07:00) (16 - 29)  SpO2: 100% (09 May 2019 07:00) (98% - 100%)      Mode: AC/ CMV (Assist Control/ Continuous Mandatory Ventilation)  RR (machine): 14  TV (machine): 500  FiO2: 40  PEEP: 5  ITime: 1  MAP: 10  PIP: 28      I&O's Summary    08 May 2019 07:01  -  09 May 2019 07:00  --------------------------------------------------------  IN: 1084 mL / OUT: 400 mL / NET: 684 mL                              10.4   27.58 )-----------( 496      ( 09 May 2019 06:54 )             35.0           135  |  101  |  13  ----------------------------<  190<H>  4.7   |  28  |  0.48<L>    Ca    7.9<L>      09 May 2019 06:54    TPro  5.7<L>  /  Alb  1.2<L>  /  TBili  0.4  /  DBili  x   /  AST  22  /  ALT  15  /  AlkPhos  205<H>        CAPILLARY BLOOD GLUCOSE      POCT Blood Glucose.: 289 mg/dL (08 May 2019 11:06)      LIVER FUNCTIONS - ( 09 May 2019 06:54 )  Alb: 1.2 g/dL / Pro: 5.7 gm/dL / ALK PHOS: 205 U/L / ALT: 15 U/L / AST: 22 U/L / GGT: x                   PT/INR - ( 08 May 2019 12:10 )   PT: 13.7 sec;   INR: 1.23 ratio         PTT - ( 08 May 2019 12:10 )  PTT:39.8 sec        Urinalysis Basic - ( 08 May 2019 12:10 )    Color: Yellow / Appearance: Clear / S.025 / pH: x  Gluc: x / Ketone: Negative  / Bili: Negative / Urobili: Negative mg/dL   Blood: x / Protein: 15 mg/dL / Nitrite: Negative   Leuk Esterase: Trace / RBC: 0-2 /HPF / WBC 3-5   Sq Epi: x / Non Sq Epi: Few / Bacteria: Few        MEDICATIONS  (STANDING):  ascorbic acid 500 milliGRAM(s) Oral daily  aspirin  chewable 81 milliGRAM(s) Oral daily  cefepime  Injectable. 1000 milliGRAM(s) IV Push every 12 hours  chlorhexidine 0.12% Liquid 15 milliLiter(s) Oral Mucosa two times a day  enoxaparin Injectable 40 milliGRAM(s) SubCutaneous daily  famotidine    Tablet 20 milliGRAM(s) Oral two times a day  fentaNYL   Patch  75 MICROgram(s)/Hr 1 Patch Transdermal every 72 hours  gabapentin 300 milliGRAM(s) Oral two times a day  lactated ringers. 1000 milliLiter(s) (125 mL/Hr) IV Continuous <Continuous>  lidocaine   Patch 1 Patch Transdermal daily  multivitamin/minerals 1 Tablet(s) Oral daily  sertraline 50 milliGRAM(s) Oral daily  tigecycline IVPB 50 milliGRAM(s) IV Intermittent every 12 hours  vancomycin    Solution 125 milliGRAM(s) Oral every 6 hours    MEDICATIONS  (PRN):  acetaminophen   Tablet .. 650 milliGRAM(s) Oral every 6 hours PRN Temp greater or equal to 38C (100.4F), Moderate Pain (4 - 6)  oxyCODONE    5 mG/acetaminophen 325 mG 1 Tablet(s) Oral every 4 hours PRN Moderate Pain (4 - 6)            Advanced Directives:  Discussed with:    Visit Information:    30-min CC Time is exclusive of billed procedures and/or teaching and/or routine family updates.

## 2019-05-09 NOTE — DIETITIAN INITIAL EVALUATION ADULT. - ORAL INTAKE PTA
n/a/TF via PEG: Glucerna 1.5 @ 1100mL total volume per day plus prosource BID: 1730Kcal and 114g protein

## 2019-05-09 NOTE — CONSULT NOTE ADULT - SUBJECTIVE AND OBJECTIVE BOX
HPI:  Pt is a 64 y/o M PMhx of HTN, DM, chronic resp failure/vent dependent s/p trach and PEG, transverse myelitis (as result of ? tetanus toxoid injection), functional quadripegia, s/p  colostomy - most recently at  6/2018 due to sepsis of urinary source (CAUTi present on admission), before that was in  hospital 4/2018 with PNA Acinetobacter baumannii) and 3/2018 with UTI (PSAE muti resistant but S to zosyn), then admitted to  on 4/24 for evaluation and fever and leukocytosis at which time it was felt either he had line sepsis as he arrived with picc line versus acute cholecystitis versus sepsis due to sacral osteomyelitis. During that hospitalization the [facility] placed picc line was removed, the patient had imaging was found to have sacral osteomyelitis and was started on tigecycline, cefepime and po vancomycin to treat the osteo and CDiff, with discharge to [facility] on 5/7. Patient now admitted to icu on 5/8 from [facility] for evaluation of unresponsiveness. Was given narcan to which he became more responsive, however, lab work revealed elevated wbc, lactate and patient admitted. History per medical record as patient unable to provide history.       MEDICATIONS  (STANDING):  ascorbic acid 500 milliGRAM(s) Oral daily  aspirin  chewable 81 milliGRAM(s) Oral daily  cefepime  Injectable. 1000 milliGRAM(s) IV Push every 12 hours  chlorhexidine 0.12% Liquid 15 milliLiter(s) Oral Mucosa two times a day  enoxaparin Injectable 40 milliGRAM(s) SubCutaneous daily  famotidine    Tablet 20 milliGRAM(s) Oral two times a day  fentaNYL   Patch  75 MICROgram(s)/Hr 1 Patch Transdermal every 72 hours  gabapentin 300 milliGRAM(s) Oral two times a day  lactated ringers. 1000 milliLiter(s) (125 mL/Hr) IV Continuous <Continuous>  lidocaine   Patch 1 Patch Transdermal daily  multivitamin/minerals 1 Tablet(s) Oral daily  sertraline 50 milliGRAM(s) Oral daily  tigecycline IVPB 50 milliGRAM(s) IV Intermittent every 12 hours  vancomycin    Solution 125 milliGRAM(s) Oral every 6 hours    MEDICATIONS  (PRN):  acetaminophen   Tablet .. 650 milliGRAM(s) Oral every 6 hours PRN Temp greater or equal to 38C (100.4F), Moderate Pain (4 - 6)  oxyCODONE    5 mG/acetaminophen 325 mG 1 Tablet(s) Oral every 4 hours PRN Moderate Pain (4 - 6)      Vital Signs Last 24 Hrs  T(C): 36.6 (09 May 2019 04:00), Max: 37.3 (08 May 2019 20:41)  T(F): 97.9 (09 May 2019 04:00), Max: 99.2 (08 May 2019 20:41)  HR: 98 (09 May 2019 13:00) (90 - 113)  BP: 123/80 (09 May 2019 13:00) (110/50 - 143/82)  BP(mean): 90 (09 May 2019 13:00) (70 - 97)  RR: 16 (09 May 2019 13:00) (14 - 20)  SpO2: 100% (09 May 2019 13:00) (98% - 100%)                Allergies  No Known Allergies    Intolerances      Social: no smoking, no alcohol, no illegal drugs; no recent travel, no exposure to TB  FAMILY HISTORY:  No pertinent family history in first degree relatives     no history of premature cardiovascular disease in first degree relatives    ROS: unobtainable due to patient's condition      PE:    Constitutional: frail looking  HEENT: NC/AT  Neck: trach in place  Respiratory: respiratory effort normal scattered coarse breath sounds  Cardiovascular: S1S2 regular, no murmurs  Abdomen: soft, not tender, ostomy in place  Musculoskeletal: no muscle tenderness, no joint swelling or tenderness  Extremities: no pedal edema  Neurological/ Psychiatric: on ventilator  Skin: no rashes; no palpable lesions    Labs: all available labs reviewed                           Labs:                        10.4   27.58 )-----------( 496      ( 09 May 2019 06:54 )             35.0     05-09    135  |  101  |  13  ----------------------------<  190<H>  4.7   |  28  |  0.48<L>    Ca    7.9<L>      09 May 2019 06:54    TPro  5.7<L>  /  Alb  1.2<L>  /  TBili  0.4  /  DBili  x   /  AST  22  /  ALT  15  /  AlkPhos  205<H>  05-09           Cultures:                             < from: CT Abdomen and Pelvis No Cont (05.08.19 @ 12:11) >  IMPRESSION:   CHEST: Extensive bilateral airspace disease suspicious for pneumonia.    ABDOMEN/PELVIS: Peripancreatic inflammatory change suspicious for   pancreatitis.  Marked fluid-filled distention of the stomach. Aspiration precautions are   recommended.    < end of copied text >  CODE STATUS: FULL RESCUSITATION

## 2019-05-09 NOTE — CONSULT NOTE ADULT - SUBJECTIVE AND OBJECTIVE BOX
HPI:  Pt well known to cc staff as pt was just DC to NH from CCU on 5/7.  62 y/o male chronic vent--s/p PEG for prob transversemyelitis--DC from  on 5/7 after being admitted with biliary sepsis and sacral OM. wbc was 10K   PICC was placed and tx to NH on PO vanco/ tigecycline/cefepime.  He presents today with unresponsiveness and pinpoint pupils.  In ED, he was given 2400ml LR, IV vanco/cefepime and narcan.  Temp 99.2   WBC 30K. Lactate 2.5 then inc lactate  gave fluid and improved  On my exam, he is back to baseline mentation, cty showed evidence of pancreatitis. lipase elevated (08 May 2019 19:57)  ------------------------  Denies pain    PAST MEDICAL & SURGICAL HISTORY:  Pneumonia  UTI (urinary tract infection)  H/O quadriplegia  Essential hypertension  Paralysis  Transverse myelitis  S/P colostomy  PEG (percutaneous endoscopic gastrostomy) status  History of tracheostomy      Home Medications:  acetaminophen 325 mg oral tablet: 2 tab(s) by gastrostomy tube every 8 hours, As Needed (08 May 2019 18:44)  ALPRAZolam 0.5 mg oral tablet: 1 tab(s) orally every 8 hours (08 May 2019 18:44)  Artificial Tears ophthalmic solution: 1 drop(s) to each affected eye every 4 hours while awake, As Needed (08 May 2019 18:44)  aspirin 81 mg oral tablet: 1 tab(s) by gastrostomy tube once a day (08 May 2019 18:44)  cefepime 1 g intravenous injection: 1 gram(s) intravenous every 12 hours    to complete (6wks total) on June 13, 2019   (08 May 2019 18:44)  chlorhexidine 0.12% mucous membrane liquid: 5 milliliter(s) mucous membrane 2 times a day (08 May 2019 18:44)  Claritin 10 mg oral tablet: 1 tab(s) orally once a day (08 May 2019 18:44)  cyclobenzaprine 10 mg oral tablet: 1 tab(s) orally 3 times a day, As Needed (08 May 2019 18:44)  enoxaparin: 40 unit(s) subcutaneously once a day (08 May 2019 18:44)  fentaNYL 75 mcg/hr transdermal film, extended release: 1 patch transdermal every 72 hours (08 May 2019 18:44)  gabapentin 300 mg oral capsule: 1 cap(s) orally 2 times a day (08 May 2019 18:44)  Glucophage 500 mg oral tablet: 1 tab(s) orally once a day (08 May 2019 18:44)  lactobacillus acidophilus oral capsule: 1 cap(s) by gastrostomy tube 2 times a day (08 May 2019 18:44)  Lantus 100 units/mL subcutaneous solution: 6 unit(s) subcutaneous once a day (at bedtime) (08 May 2019 18:44)  lidocaine 5% topical film: Apply topically to affected area once a day (08 May 2019 18:44)  montelukast 10 mg oral tablet: 1 tab(s) orally once a day (08 May 2019 18:44)  oxyCODONE 10 mg oral tablet: 1 tab(s) orally every 4 hours, As needed, severe pain or dyspnea (08 May 2019 18:44)  oxycodone-acetaminophen 5 mg-325 mg oral tablet: 1 tab(s) orally every 4 hours, As needed, Moderate Pain (4 - 6) (08 May 2019 18:44)  raNITIdine 300 mg oral tablet: 1 tab(s) orally once a day (at bedtime) (08 May 2019 18:44)  simethicone 80 mg oral tablet, chewable: 1 tab(s) by gastrostomy tube 4 times a day (after meals and at bedtime) (08 May 2019 18:44)  Therapeutic Multiple Vitamins with Minerals oral tablet: 1 tab(s) by gastrostomy tube once a day (08 May 2019 18:44)  tigecycline 50 mg intravenous injection: 50 milligram(s) intravenous every 12 hours  (to complete 6 wks total on June 5, 2019) (08 May 2019 18:44)  vancomycin 50 mg/mL oral liquid: 125 milligram(s) orally every 6 hours (08 May 2019 18:44)  Vitamin C 500 mg oral tablet: 1 tab(s) by gastrostomy tube once a day (08 May 2019 18:44)  Zoloft 50 mg oral tablet: 1 tab(s) orally once a day (08 May 2019 18:44)      MEDICATIONS  (STANDING):  ascorbic acid 500 milliGRAM(s) Oral daily  aspirin  chewable 81 milliGRAM(s) Oral daily  cefepime  Injectable. 1000 milliGRAM(s) IV Push every 12 hours  chlorhexidine 0.12% Liquid 15 milliLiter(s) Oral Mucosa two times a day  enoxaparin Injectable 40 milliGRAM(s) SubCutaneous daily  famotidine    Tablet 20 milliGRAM(s) Oral two times a day  fentaNYL   Patch  75 MICROgram(s)/Hr 1 Patch Transdermal every 72 hours  gabapentin 300 milliGRAM(s) Oral two times a day  lactated ringers. 1000 milliLiter(s) (125 mL/Hr) IV Continuous <Continuous>  lidocaine   Patch 1 Patch Transdermal daily  multivitamin/minerals 1 Tablet(s) Oral daily  sertraline 50 milliGRAM(s) Oral daily  tigecycline IVPB 50 milliGRAM(s) IV Intermittent every 12 hours  vancomycin    Solution 125 milliGRAM(s) Oral every 6 hours    MEDICATIONS  (PRN):  acetaminophen   Tablet .. 650 milliGRAM(s) Oral every 6 hours PRN Temp greater or equal to 38C (100.4F), Moderate Pain (4 - 6)  oxyCODONE    5 mG/acetaminophen 325 mG 1 Tablet(s) Oral every 4 hours PRN Moderate Pain (4 - 6)      Allergies    No Known Allergies    Intolerances        SOCIAL HISTORY:    FAMILY HISTORY:  No pertinent family history in first degree relatives      ROS  As above  Otherwise unremarkable    Vital Signs Last 24 Hrs  T(C): 36.6 (09 May 2019 04:00), Max: 37.3 (08 May 2019 11:07)  T(F): 97.9 (09 May 2019 04:00), Max: 99.2 (08 May 2019 11:07)  HR: 101 (09 May 2019 07:00) (90 - 128)  BP: 127/77 (09 May 2019 07:00) (110/50 - 141/100)  BP(mean): 88 (09 May 2019 07:00) (70 - 92)  RR: 16 (09 May 2019 07:00) (16 - 29)  SpO2: 100% (09 May 2019 07:00) (98% - 100%)    Constitutional: NAD, trach/PEG  Respiratory: CTAB  Cardiovascular: S1 and S2, RRR  Gastrointestinal: BS+, soft, NT/ND  Psychiatric: Normal mood, normal affect  Skin: No rashes    LABS:                        10.4   27.58 )-----------( 496      ( 09 May 2019 06:54 )             35.0     05-09    135  |  101  |  13  ----------------------------<  190<H>  4.7   |  28  |  0.48<L>    Ca    7.9<L>      09 May 2019 06:54    TPro  5.7<L>  /  Alb  1.2<L>  /  TBili  0.4  /  DBili  x   /  AST  22  /  ALT  15  /  AlkPhos  205<H>  05-09    PT/INR - ( 08 May 2019 12:10 )   PT: 13.7 sec;   INR: 1.23 ratio         PTT - ( 08 May 2019 12:10 )  PTT:39.8 sec  LIVER FUNCTIONS - ( 09 May 2019 06:54 )  Alb: 1.2 g/dL / Pro: 5.7 gm/dL / ALK PHOS: 205 U/L / ALT: 15 U/L / AST: 22 U/L / GGT: x             RADIOLOGY & ADDITIONAL STUDIES:

## 2019-05-09 NOTE — CONSULT NOTE ADULT - ASSESSMENT
Imp:  Picture is unclear as pancereatitis seems mild by imaging, no imaging evidence of cholecystitis and labs don't suggest cholangitis.  Thus, unclear that sepsis is biliary/gallstone in origin    Rec:  For now, defer repeating HIDA/MRCP etc.  Cont abx, supportive care  NPO for today but can use PEG for meds  Follow LFTs.
Pt is a 62 y/o M PMhx of HTN, DM, chronic resp failure/vent dependent s/p trach and PEG, transverse myelitis (as result of ? tetanus toxoid injection), functional quadripegia, s/p  colostomy - most recently at  6/2018 due to sepsis of urinary source (CAUTi present on admission), before that was in  hospital 4/2018 with PNA Acinetobacter baumannii) and 3/2018 with UTI (PSAE muti resistant but S to zosyn), then admitted to  on 4/24 for evaluation and fever and leukocytosis at which time it was felt either he had line sepsis as he arrived with picc line versus acute cholecystitis versus sepsis due to sacral osteomyelitis. During that hospitalization the [facility] placed picc line was removed, the patient had imaging was found to have sacral osteomyelitis and was started on tigecycline, cefepime and po vancomycin to treat the osteo and CDiff, with discharge to [facility] on 5/7. Patient now admitted to icu on 5/8 from [facility] for evaluation of unresponsiveness. Was given narcan to which he became more responsive, however, lab work revealed elevated wbc, lactate and patient admitted. History per medical record as patient unable to provide history.   1. Patient admitted with sepsis while on broad spectrum antibiotics, however, may be due to pancreatitis  - follow up cultures   - iv hydration and supportive care   - serial cbc and monitor temperature   - reviewed prior medical records to evaluate for resistant or atypical pathogens   - will continue tigecycline and cefepime as ordered  - to continue po vancomycin to prevent cdiff  - strict isolation  - wound care of sacrum  2. other issues; per medicine
IMP:    64 y/o male chronic vent--s/p PEG--large sacral decub with underlying OM on long term IV abx (tigecycline and cefepime), H/o CDI on PO vanco, transverse myelitis--recently admitted with biliary sepsis and DC to NH on 5/7 presents with AMS which improved with narcan and supportive care  Likely Opioid OD    Suggest:    Pt is back to baseline mentation--would decrease dose of narcotics for now.  Cont with IV abx and OM and possible infiltrates seen on Chest CT  Full vent support  LMWH for DVT prophy  HOB > 30  Aspiration precautions    Pt is DNR--MOLST was completed on last admission    No need for admission  Above d/w dr lopez

## 2019-05-09 NOTE — DIETITIAN INITIAL EVALUATION ADULT. - PERTINENT MEDS FT
MEDICATIONS  (STANDING):  ascorbic acid 500 milliGRAM(s) Oral daily  aspirin  chewable 81 milliGRAM(s) Oral daily  cefepime  Injectable. 1000 milliGRAM(s) IV Push every 12 hours  chlorhexidine 0.12% Liquid 15 milliLiter(s) Oral Mucosa two times a day  enoxaparin Injectable 40 milliGRAM(s) SubCutaneous daily  famotidine    Tablet 20 milliGRAM(s) Oral two times a day  fentaNYL   Patch  75 MICROgram(s)/Hr 1 Patch Transdermal every 72 hours  gabapentin 300 milliGRAM(s) Oral two times a day  lactated ringers. 1000 milliLiter(s) (125 mL/Hr) IV Continuous <Continuous>  lidocaine   Patch 1 Patch Transdermal daily  multivitamin/minerals 1 Tablet(s) Oral daily  sertraline 50 milliGRAM(s) Oral daily  tigecycline IVPB 50 milliGRAM(s) IV Intermittent every 12 hours  vancomycin    Solution 125 milliGRAM(s) Oral every 6 hours    MEDICATIONS  (PRN):  acetaminophen   Tablet .. 650 milliGRAM(s) Oral every 6 hours PRN Temp greater or equal to 38C (100.4F), Moderate Pain (4 - 6)  oxyCODONE    5 mG/acetaminophen 325 mG 1 Tablet(s) Oral every 4 hours PRN Moderate Pain (4 - 6)

## 2019-05-09 NOTE — DIETITIAN INITIAL EVALUATION ADULT. - OTHER INFO
received consult for TF: 64yo male from NH with PMH significant for s/p PEG for pro transversemyelitis, HTN, paralysis, quadriplegia, s/p trach p/w biliary sepsis and sacral OM.  Pt eval'd by GI, possible mild pancreatitis.  Upon visit, pt appears well nourished and overweight.  Pt on TF in NH, meeting estimated nutr needs.  (+) mild generalized edema.  BM (+) loose green stool.  marcos score of 11: stage 4 PU on sacrum and stage 2 PU on Rt ankle.  RN reported pt with clogged PEG: MD to clear.  GI MD reported pt with full stomach, pt to remain NPO 1 more day then TF to be resumed (per GI MD).  RECOMMENDATIONS: 1) when feasible, resume TF with Glucerna 1.5 @ 20mL/hr and titrate TF rate with tolerance to goal rate of 55mL/hr with 2pkts prosource TF (=1895Kcal, 122g protein, and 918mL free water).  2) monitor hydration status; consider free water flushes of 45mL q1hr (=990mL additional free water flushes) 3) monitor NPO length, advancement/tolerance nutr source; keep back of bed > 35 degrees 4) weekly wt checks

## 2019-05-09 NOTE — DIETITIAN INITIAL EVALUATION ADULT. - NS AS NUTRI INTERV ENTERAL NUTRITION
Composition/Concentration/Rate/when feasible, resume TF with Glucerna 1.5 @ 20mL/hr and titrate TF rate with tolerance to goal rate of 55mL/hr with 2pkts prosource TF (=1895Kcal, 122g protein, and 918mL free water)

## 2019-05-10 LAB
ADD ON TEST-SPECIMEN IN LAB: SIGNIFICANT CHANGE UP
ALBUMIN SERPL ELPH-MCNC: 1 G/DL — LOW (ref 3.3–5)
ALP SERPL-CCNC: 171 U/L — HIGH (ref 40–120)
ALT FLD-CCNC: 13 U/L — SIGNIFICANT CHANGE UP (ref 12–78)
ANION GAP SERPL CALC-SCNC: 5 MMOL/L — SIGNIFICANT CHANGE UP (ref 5–17)
AST SERPL-CCNC: 25 U/L — SIGNIFICANT CHANGE UP (ref 15–37)
BILIRUB DIRECT SERPL-MCNC: 0.2 MG/DL — SIGNIFICANT CHANGE UP (ref 0–0.2)
BILIRUB INDIRECT FLD-MCNC: 0.2 MG/DL — SIGNIFICANT CHANGE UP (ref 0.2–1)
BILIRUB SERPL-MCNC: 0.4 MG/DL — SIGNIFICANT CHANGE UP (ref 0.2–1.2)
BUN SERPL-MCNC: 18 MG/DL — SIGNIFICANT CHANGE UP (ref 7–23)
CALCIUM SERPL-MCNC: 7.5 MG/DL — LOW (ref 8.5–10.1)
CHLORIDE SERPL-SCNC: 105 MMOL/L — SIGNIFICANT CHANGE UP (ref 96–108)
CO2 SERPL-SCNC: 25 MMOL/L — SIGNIFICANT CHANGE UP (ref 22–31)
CREAT SERPL-MCNC: 0.31 MG/DL — LOW (ref 0.5–1.3)
GLUCOSE SERPL-MCNC: 137 MG/DL — HIGH (ref 70–99)
HCT VFR BLD CALC: 32.1 % — LOW (ref 39–50)
HGB BLD-MCNC: 9.5 G/DL — LOW (ref 13–17)
LIDOCAIN IGE QN: 322 U/L — SIGNIFICANT CHANGE UP (ref 73–393)
MCHC RBC-ENTMCNC: 26 PG — LOW (ref 27–34)
MCHC RBC-ENTMCNC: 29.6 GM/DL — LOW (ref 32–36)
MCV RBC AUTO: 87.9 FL — SIGNIFICANT CHANGE UP (ref 80–100)
NRBC # BLD: 0 /100 WBCS — SIGNIFICANT CHANGE UP (ref 0–0)
PLATELET # BLD AUTO: 476 K/UL — HIGH (ref 150–400)
POTASSIUM SERPL-MCNC: 5.1 MMOL/L — SIGNIFICANT CHANGE UP (ref 3.5–5.3)
POTASSIUM SERPL-SCNC: 5.1 MMOL/L — SIGNIFICANT CHANGE UP (ref 3.5–5.3)
PROT SERPL-MCNC: 5.2 GM/DL — LOW (ref 6–8.3)
RBC # BLD: 3.65 M/UL — LOW (ref 4.2–5.8)
RBC # FLD: 20.1 % — HIGH (ref 10.3–14.5)
SODIUM SERPL-SCNC: 135 MMOL/L — SIGNIFICANT CHANGE UP (ref 135–145)
WBC # BLD: 33.35 K/UL — HIGH (ref 3.8–10.5)
WBC # FLD AUTO: 33.35 K/UL — HIGH (ref 3.8–10.5)

## 2019-05-10 RX ADMIN — Medication 125 MILLIGRAM(S): at 17:09

## 2019-05-10 RX ADMIN — FAMOTIDINE 20 MILLIGRAM(S): 10 INJECTION INTRAVENOUS at 06:15

## 2019-05-10 RX ADMIN — OXYCODONE AND ACETAMINOPHEN 1 TABLET(S): 5; 325 TABLET ORAL at 13:45

## 2019-05-10 RX ADMIN — Medication 500 MILLIGRAM(S): at 12:15

## 2019-05-10 RX ADMIN — LIDOCAINE 1 PATCH: 4 CREAM TOPICAL at 21:15

## 2019-05-10 RX ADMIN — SODIUM CHLORIDE 125 MILLILITER(S): 9 INJECTION, SOLUTION INTRAVENOUS at 06:44

## 2019-05-10 RX ADMIN — CHLORHEXIDINE GLUCONATE 15 MILLILITER(S): 213 SOLUTION TOPICAL at 17:09

## 2019-05-10 RX ADMIN — FENTANYL CITRATE 1 PATCH: 50 INJECTION INTRAVENOUS at 06:57

## 2019-05-10 RX ADMIN — TIGECYCLINE 105 MILLIGRAM(S): 50 INJECTION, POWDER, LYOPHILIZED, FOR SOLUTION INTRAVENOUS at 17:07

## 2019-05-10 RX ADMIN — CEFEPIME 1000 MILLIGRAM(S): 1 INJECTION, POWDER, FOR SOLUTION INTRAMUSCULAR; INTRAVENOUS at 06:44

## 2019-05-10 RX ADMIN — ENOXAPARIN SODIUM 40 MILLIGRAM(S): 100 INJECTION SUBCUTANEOUS at 12:14

## 2019-05-10 RX ADMIN — OXYCODONE AND ACETAMINOPHEN 1 TABLET(S): 5; 325 TABLET ORAL at 13:23

## 2019-05-10 RX ADMIN — SERTRALINE 50 MILLIGRAM(S): 25 TABLET, FILM COATED ORAL at 12:15

## 2019-05-10 RX ADMIN — Medication 81 MILLIGRAM(S): at 12:15

## 2019-05-10 RX ADMIN — GABAPENTIN 300 MILLIGRAM(S): 400 CAPSULE ORAL at 17:07

## 2019-05-10 RX ADMIN — Medication 1 TABLET(S): at 12:15

## 2019-05-10 RX ADMIN — CHLORHEXIDINE GLUCONATE 15 MILLILITER(S): 213 SOLUTION TOPICAL at 06:15

## 2019-05-10 RX ADMIN — FAMOTIDINE 20 MILLIGRAM(S): 10 INJECTION INTRAVENOUS at 17:07

## 2019-05-10 RX ADMIN — Medication 125 MILLIGRAM(S): at 06:15

## 2019-05-10 RX ADMIN — LIDOCAINE 1 PATCH: 4 CREAM TOPICAL at 12:14

## 2019-05-10 RX ADMIN — CEFEPIME 1000 MILLIGRAM(S): 1 INJECTION, POWDER, FOR SOLUTION INTRAMUSCULAR; INTRAVENOUS at 17:08

## 2019-05-10 RX ADMIN — FENTANYL CITRATE 1 PATCH: 50 INJECTION INTRAVENOUS at 20:00

## 2019-05-10 RX ADMIN — GABAPENTIN 300 MILLIGRAM(S): 400 CAPSULE ORAL at 06:15

## 2019-05-10 RX ADMIN — TIGECYCLINE 105 MILLIGRAM(S): 50 INJECTION, POWDER, LYOPHILIZED, FOR SOLUTION INTRAVENOUS at 06:14

## 2019-05-10 RX ADMIN — Medication 125 MILLIGRAM(S): at 12:14

## 2019-05-10 NOTE — CHART NOTE - NSCHARTNOTEFT_GEN_A_CORE
Clinical Nutrition BRIEF NOTE    *received a re-consult for TF rec's.   *pt continues to be managed for sepsis 2/2 cholangitis/pancreatitis; seen by GI on 5/9.  *pt last seen by RD on 5/9.    RECOMMENDATIONS:  1) when feasible, resume TF with Glucerna 1.5 @ 20mL/hr and titrate with tolerance to goal rate of 55mL/hr with 2pkts prosource TF (=1895Kcal, 122g protein, and 918mL free water)  2) monitor hydration status; consider free water flushes of 45mL q1hr (=990mL additional free water flushes)  3) monitor TF tolerance; keep back of bed >35 degrees      Estimated Energy Needs (25-30 calories/kg):  · Weight  (lbs) 145.5 lb  · Weight (kg) 66 kg  · From (25cal/kg) 1650  To (30cal/kg) 1980 Kcal    Other Calculation:  · Other Calculation  Ht. 69 "     Wt. 86.2 Kg       28 BMI       IBW 66 Kg      Pt is at 118  %  IBW    Estimated Protein Needs (1.8-2.0 gm/kg):  · Weight  (lbs) 145.5  · Weight (kg) 66 kg  · From (1.8 g/kg) 118.8 To (2.0 g/kg) 132 g protein

## 2019-05-10 NOTE — PROGRESS NOTE ADULT - SUBJECTIVE AND OBJECTIVE BOX
Patient is a 63y old  Male who presents with a chief complaint of inc wbc, inc lactate, possible pancreatitis (10 May 2019 15:59)      Subective:  No complaints.    PAST MEDICAL & SURGICAL HISTORY:  Pneumonia  UTI (urinary tract infection)  H/O quadriplegia  Essential hypertension  Paralysis  Transverse myelitis  S/P colostomy  PEG (percutaneous endoscopic gastrostomy) status  History of tracheostomy      MEDICATIONS  (STANDING):  ascorbic acid 500 milliGRAM(s) Oral daily  aspirin  chewable 81 milliGRAM(s) Oral daily  cefepime  Injectable. 1000 milliGRAM(s) IV Push every 12 hours  chlorhexidine 0.12% Liquid 15 milliLiter(s) Oral Mucosa two times a day  enoxaparin Injectable 40 milliGRAM(s) SubCutaneous daily  famotidine    Tablet 20 milliGRAM(s) Oral two times a day  fentaNYL   Patch  75 MICROgram(s)/Hr 1 Patch Transdermal every 72 hours  gabapentin 300 milliGRAM(s) Oral two times a day  lidocaine   Patch 1 Patch Transdermal daily  multivitamin/minerals 1 Tablet(s) Oral daily  sertraline 50 milliGRAM(s) Oral daily  tigecycline IVPB 50 milliGRAM(s) IV Intermittent every 12 hours  vancomycin    Solution 125 milliGRAM(s) Oral every 6 hours    MEDICATIONS  (PRN):  acetaminophen   Tablet .. 650 milliGRAM(s) Oral every 6 hours PRN Temp greater or equal to 38C (100.4F), Moderate Pain (4 - 6)  oxyCODONE    5 mG/acetaminophen 325 mG 1 Tablet(s) Oral every 4 hours PRN Moderate Pain (4 - 6)      REVIEW OF SYSTEMS:    RESPIRATORY: No shortness of breath  CARDIOVASCULAR: No chest pain  All other review of systems is negative unless indicated above.    Vital Signs Last 24 Hrs  T(C): 36.5 (10 May 2019 11:00), Max: 36.9 (09 May 2019 22:00)  T(F): 97.7 (10 May 2019 11:00), Max: 98.5 (09 May 2019 22:00)  HR: 85 (10 May 2019 16:00) (81 - 102)  BP: 135/78 (10 May 2019 16:00) (107/69 - 141/91)  BP(mean): 92 (10 May 2019 16:00) (77 - 103)  RR: 21 (10 May 2019 16:00) (0 - 21)  SpO2: 100% (10 May 2019 16:00) (99% - 100%)    PHYSICAL EXAM:    Constitutional: NAD, well-developed  Respiratory: CTAB, trach  Cardiovascular: S1 and S2, RRR  Gastrointestinal: BS+, soft, NT/ND, PEG  Extremities: No peripheral edema  Psychiatric: Normal mood, normal affect    LABS:                        9.5    33.35 )-----------( 476      ( 10 May 2019 06:53 )             32.1     05-10    135  |  105  |  18  ----------------------------<  137<H>  5.1   |  25  |  0.31<L>    Ca    7.5<L>      10 May 2019 06:53    TPro  5.2<L>  /  Alb  1.0<L>  /  TBili  0.4  /  DBili  0.2  /  AST  25  /  ALT  13  /  AlkPhos  171<H>  05-10      LIVER FUNCTIONS - ( 10 May 2019 06:53 )  Alb: 1.0 g/dL / Pro: 5.2 gm/dL / ALK PHOS: 171 U/L / ALT: 13 U/L / AST: 25 U/L / GGT: x             RADIOLOGY & ADDITIONAL STUDIES:

## 2019-05-10 NOTE — PROGRESS NOTE ADULT - SUBJECTIVE AND OBJECTIVE BOX
CC: On Vent    64 y/o male chronic vent--s/p PEG for prob transversemyelitis--DC from  on  after being admitted with biliary sepsis and sacral OM. wbc was 10K PICC was placed and tx to NH on PO vanco/ tigecycline/cefepime.  He presents today with unresponsiveness and pinpoint pupils.  In ED, he was given 2400ml LR, IV vanco/cefepime and narcan.  Temp 99.2   WBC 30K. Lactate 2.5 then inc lactate gave fluid and improved.     - Patient seen and examined. Chart reviewed. Events noted. On arrival he is awake and alert. Remains on the vent.     5/10 - Exam remains changed. He conveys co complaints this AM.       PAST MEDICAL & SURGICAL HISTORY:  Pneumonia  UTI (urinary tract infection)  H/O quadriplegia  Essential hypertension  Paralysis  Transverse myelitis  S/P colostomy  PEG (percutaneous endoscopic gastrostomy) status  History of tracheostomy      FAMILY HISTORY:  No pertinent family history in first degree relatives      Social Hx:    Allergies    No Known Allergies    Intolerances        63y        ICU Vital Signs Last 24 Hrs  T(C): 36.8 (10 May 2019 03:00), Max: 36.9 (09 May 2019 22:00)  T(F): 98.2 (10 May 2019 03:00), Max: 98.5 (09 May 2019 22:00)  HR: 98 (10 May 2019 06:00) (81 - 102)  BP: 125/78 (10 May 2019 06:00) (107/69 - 143/82)  BP(mean): 90 (10 May 2019 06:00) (77 - 97)  ABP: --  ABP(mean): --  RR: 20 (10 May 2019 06:00) (0 - 20)  SpO2: 99% (10 May 2019 03:00) (99% - 100%)      Mode: AC/ CMV (Assist Control/ Continuous Mandatory Ventilation)  RR (machine): 14  TV (machine): 500  FiO2: 40  PEEP: 5  ITime: 1  PIP: 28      I&O's Summary    09 May 2019 07:01  -  10 May 2019 07:00  --------------------------------------------------------  IN: 3090 mL / OUT: 950 mL / NET: 2140 mL                              10.4   27.58 )-----------( 496      ( 09 May 2019 06:54 )             35.0           135  |  101  |  13  ----------------------------<  190<H>  4.7   |  28  |  0.48<L>    Ca    7.9<L>      09 May 2019 06:54    TPro  5.7<L>  /  Alb  1.2<L>  /  TBili  0.4  /  DBili  x   /  AST  22  /  ALT  15  /  AlkPhos  205<H>        CAPILLARY BLOOD GLUCOSE          LIVER FUNCTIONS - ( 09 May 2019 06:54 )  Alb: 1.2 g/dL / Pro: 5.7 gm/dL / ALK PHOS: 205 U/L / ALT: 15 U/L / AST: 22 U/L / GGT: x                   PT/INR - ( 08 May 2019 12:10 )   PT: 13.7 sec;   INR: 1.23 ratio         PTT - ( 08 May 2019 12:10 )  PTT:39.8 sec        Urinalysis Basic - ( 08 May 2019 12:10 )    Color: Yellow / Appearance: Clear / S.025 / pH: x  Gluc: x / Ketone: Negative  / Bili: Negative / Urobili: Negative mg/dL   Blood: x / Protein: 15 mg/dL / Nitrite: Negative   Leuk Esterase: Trace / RBC: 0-2 /HPF / WBC 3-5   Sq Epi: x / Non Sq Epi: Few / Bacteria: Few        MEDICATIONS  (STANDING):  ascorbic acid 500 milliGRAM(s) Oral daily  aspirin  chewable 81 milliGRAM(s) Oral daily  cefepime  Injectable. 1000 milliGRAM(s) IV Push every 12 hours  chlorhexidine 0.12% Liquid 15 milliLiter(s) Oral Mucosa two times a day  enoxaparin Injectable 40 milliGRAM(s) SubCutaneous daily  famotidine    Tablet 20 milliGRAM(s) Oral two times a day  fentaNYL   Patch  75 MICROgram(s)/Hr 1 Patch Transdermal every 72 hours  gabapentin 300 milliGRAM(s) Oral two times a day  lactated ringers. 1000 milliLiter(s) (125 mL/Hr) IV Continuous <Continuous>  lidocaine   Patch 1 Patch Transdermal daily  multivitamin/minerals 1 Tablet(s) Oral daily  sertraline 50 milliGRAM(s) Oral daily  tigecycline IVPB 50 milliGRAM(s) IV Intermittent every 12 hours  vancomycin    Solution 125 milliGRAM(s) Oral every 6 hours    MEDICATIONS  (PRN):  acetaminophen   Tablet .. 650 milliGRAM(s) Oral every 6 hours PRN Temp greater or equal to 38C (100.4F), Moderate Pain (4 - 6)  oxyCODONE    5 mG/acetaminophen 325 mG 1 Tablet(s) Oral every 4 hours PRN Moderate Pain (4 - 6)              Advanced Directives:  Discussed with:    Visit Information:    45-min CC Time is exclusive of billed procedures and/or teaching and/or routine family updates.

## 2019-05-10 NOTE — ADVANCED PRACTICE NURSE CONSULT - ASSESSMENT
Called to pt. bedside to remove PICC line and place midline catheter for IV access as per MD orders. Pt. trached, lethargic but arousable. Inserted Arrow Extended Dwell Midline catheter 20g 8 cm length via ultrasound guidance to left brachial x 1 attempt using sterile technique, brisk blood return, flushes well w/20 ml. NS. Biopatch, statlock and endcap placed. Pt. tolerated well. No CXR needed for midline. Report given to district nurse. Lot #[id number].

## 2019-05-10 NOTE — PROGRESS NOTE ADULT - SUBJECTIVE AND OBJECTIVE BOX
Date of service: 05-10-19 @ 11:06    Patient lying in bed; afebrile, no loose stools per rn        ROS: unable to obtain secondary to patient medical condition     MEDICATIONS  (STANDING):  ascorbic acid 500 milliGRAM(s) Oral daily  aspirin  chewable 81 milliGRAM(s) Oral daily  cefepime  Injectable. 1000 milliGRAM(s) IV Push every 12 hours  chlorhexidine 0.12% Liquid 15 milliLiter(s) Oral Mucosa two times a day  enoxaparin Injectable 40 milliGRAM(s) SubCutaneous daily  famotidine    Tablet 20 milliGRAM(s) Oral two times a day  fentaNYL   Patch  75 MICROgram(s)/Hr 1 Patch Transdermal every 72 hours  gabapentin 300 milliGRAM(s) Oral two times a day  lactated ringers. 1000 milliLiter(s) (125 mL/Hr) IV Continuous <Continuous>  lidocaine   Patch 1 Patch Transdermal daily  multivitamin/minerals 1 Tablet(s) Oral daily  sertraline 50 milliGRAM(s) Oral daily  tigecycline IVPB 50 milliGRAM(s) IV Intermittent every 12 hours  vancomycin    Solution 125 milliGRAM(s) Oral every 6 hours    MEDICATIONS  (PRN):  acetaminophen   Tablet .. 650 milliGRAM(s) Oral every 6 hours PRN Temp greater or equal to 38C (100.4F), Moderate Pain (4 - 6)  oxyCODONE    5 mG/acetaminophen 325 mG 1 Tablet(s) Oral every 4 hours PRN Moderate Pain (4 - 6)      Vital Signs Last 24 Hrs  T(C): 36.8 (10 May 2019 07:00), Max: 36.9 (09 May 2019 22:00)  T(F): 98.2 (10 May 2019 07:00), Max: 98.5 (09 May 2019 22:00)  HR: 97 (10 May 2019 10:00) (81 - 101)  BP: 126/80 (10 May 2019 10:00) (107/69 - 138/80)  BP(mean): 92 (10 May 2019 10:00) (77 - 95)  RR: 19 (10 May 2019 10:00) (0 - 20)  SpO2: 100% (10 May 2019 10:00) (99% - 100%)    Physical Exam:                  Constitutional: frail looking  HEENT: NC/AT  Neck: trach in place  Respiratory: respiratory effort normal scattered coarse breath sounds  Cardiovascular: S1S2 regular, no murmurs  Abdomen: soft, not tender, ostomy in place  Musculoskeletal: no muscle tenderness, no joint swelling or tenderness  Extremities: no pedal edema  Neurological/ Psychiatric: on ventilator  Skin: no rashes; no palpable lesions    Labs: all available labs reviewed                   Labs:                        9.5    33.35 )-----------( 476      ( 10 May 2019 06:53 )             32.1     05-10    135  |  105  |  18  ----------------------------<  137<H>  5.1   |  25  |  0.31<L>    Ca    7.5<L>      10 May 2019 06:53    TPro  5.2<L>  /  Alb  1.0<L>  /  TBili  0.4  /  DBili  0.2  /  AST  25  /  ALT  13  /  AlkPhos  171<H>  05-10           Cultures:       Culture - Urine (collected 05-08-19 @ 12:10)  Source: .Urine Clean Catch (Midstream)  Final Report (05-09-19 @ 21:09):    <10,000 CFU/mL Normal Urogenital Sophy    Culture - Blood (collected 05-08-19 @ 12:10)  Source: .Blood Blood-Peripheral  Preliminary Report (05-09-19 @ 19:01):    No growth to date.    Culture - Blood (collected 05-08-19 @ 12:10)  Source: .Blood Blood-Peripheral  Preliminary Report (05-09-19 @ 19:01):    No growth to date.                        < from: CT Abdomen and Pelvis No Cont (05.08.19 @ 12:11) >  IMPRESSION:   CHEST: Extensive bilateral airspace disease suspicious for pneumonia.    ABDOMEN/PELVIS: Peripancreatic inflammatory change suspicious for   pancreatitis.  Marked fluid-filled distention of the stomach. Aspiration precautions are   recommended.    < end of copied text >  CODE STATUS: FULL RESCUSITATION

## 2019-05-11 DIAGNOSIS — E87.1 HYPO-OSMOLALITY AND HYPONATREMIA: ICD-10-CM

## 2019-05-11 DIAGNOSIS — Z66 DO NOT RESUSCITATE: ICD-10-CM

## 2019-05-11 DIAGNOSIS — R65.20 SEVERE SEPSIS WITHOUT SEPTIC SHOCK: ICD-10-CM

## 2019-05-11 DIAGNOSIS — Z93.0 TRACHEOSTOMY STATUS: ICD-10-CM

## 2019-05-11 DIAGNOSIS — I10 ESSENTIAL (PRIMARY) HYPERTENSION: ICD-10-CM

## 2019-05-11 DIAGNOSIS — A41.9 SEPSIS, UNSPECIFIED ORGANISM: ICD-10-CM

## 2019-05-11 DIAGNOSIS — E11.9 TYPE 2 DIABETES MELLITUS WITHOUT COMPLICATIONS: ICD-10-CM

## 2019-05-11 DIAGNOSIS — L89.154 PRESSURE ULCER OF SACRAL REGION, STAGE 4: ICD-10-CM

## 2019-05-11 DIAGNOSIS — E86.0 DEHYDRATION: ICD-10-CM

## 2019-05-11 DIAGNOSIS — E43 UNSPECIFIED SEVERE PROTEIN-CALORIE MALNUTRITION: ICD-10-CM

## 2019-05-11 DIAGNOSIS — M86.9 OSTEOMYELITIS, UNSPECIFIED: ICD-10-CM

## 2019-05-11 DIAGNOSIS — Z93.3 COLOSTOMY STATUS: ICD-10-CM

## 2019-05-11 DIAGNOSIS — R53.2 FUNCTIONAL QUADRIPLEGIA: ICD-10-CM

## 2019-05-11 DIAGNOSIS — J96.10 CHRONIC RESPIRATORY FAILURE, UNSPECIFIED WHETHER WITH HYPOXIA OR HYPERCAPNIA: ICD-10-CM

## 2019-05-11 DIAGNOSIS — D63.8 ANEMIA IN OTHER CHRONIC DISEASES CLASSIFIED ELSEWHERE: ICD-10-CM

## 2019-05-11 DIAGNOSIS — K83.09 OTHER CHOLANGITIS: ICD-10-CM

## 2019-05-11 DIAGNOSIS — Z93.1 GASTROSTOMY STATUS: ICD-10-CM

## 2019-05-11 RX ADMIN — OXYCODONE AND ACETAMINOPHEN 1 TABLET(S): 5; 325 TABLET ORAL at 12:29

## 2019-05-11 RX ADMIN — LIDOCAINE 1 PATCH: 4 CREAM TOPICAL at 11:38

## 2019-05-11 RX ADMIN — GABAPENTIN 300 MILLIGRAM(S): 400 CAPSULE ORAL at 05:37

## 2019-05-11 RX ADMIN — FAMOTIDINE 20 MILLIGRAM(S): 10 INJECTION INTRAVENOUS at 17:25

## 2019-05-11 RX ADMIN — FENTANYL CITRATE 1 PATCH: 50 INJECTION INTRAVENOUS at 07:28

## 2019-05-11 RX ADMIN — FENTANYL CITRATE 1 PATCH: 50 INJECTION INTRAVENOUS at 23:43

## 2019-05-11 RX ADMIN — Medication 125 MILLIGRAM(S): at 05:37

## 2019-05-11 RX ADMIN — CHLORHEXIDINE GLUCONATE 15 MILLILITER(S): 213 SOLUTION TOPICAL at 17:25

## 2019-05-11 RX ADMIN — LIDOCAINE 1 PATCH: 4 CREAM TOPICAL at 23:43

## 2019-05-11 RX ADMIN — Medication 125 MILLIGRAM(S): at 17:25

## 2019-05-11 RX ADMIN — GABAPENTIN 300 MILLIGRAM(S): 400 CAPSULE ORAL at 17:25

## 2019-05-11 RX ADMIN — Medication 125 MILLIGRAM(S): at 00:17

## 2019-05-11 RX ADMIN — CHLORHEXIDINE GLUCONATE 15 MILLILITER(S): 213 SOLUTION TOPICAL at 05:37

## 2019-05-11 RX ADMIN — Medication 125 MILLIGRAM(S): at 23:30

## 2019-05-11 RX ADMIN — OXYCODONE AND ACETAMINOPHEN 1 TABLET(S): 5; 325 TABLET ORAL at 13:15

## 2019-05-11 RX ADMIN — TIGECYCLINE 105 MILLIGRAM(S): 50 INJECTION, POWDER, LYOPHILIZED, FOR SOLUTION INTRAVENOUS at 17:24

## 2019-05-11 RX ADMIN — Medication 125 MILLIGRAM(S): at 11:37

## 2019-05-11 RX ADMIN — LIDOCAINE 1 PATCH: 4 CREAM TOPICAL at 19:30

## 2019-05-11 RX ADMIN — FAMOTIDINE 20 MILLIGRAM(S): 10 INJECTION INTRAVENOUS at 05:37

## 2019-05-11 RX ADMIN — OXYCODONE AND ACETAMINOPHEN 1 TABLET(S): 5; 325 TABLET ORAL at 04:00

## 2019-05-11 RX ADMIN — CEFEPIME 1000 MILLIGRAM(S): 1 INJECTION, POWDER, FOR SOLUTION INTRAMUSCULAR; INTRAVENOUS at 17:24

## 2019-05-11 RX ADMIN — Medication 500 MILLIGRAM(S): at 11:37

## 2019-05-11 RX ADMIN — LIDOCAINE 1 PATCH: 4 CREAM TOPICAL at 00:21

## 2019-05-11 RX ADMIN — Medication 81 MILLIGRAM(S): at 11:37

## 2019-05-11 RX ADMIN — TIGECYCLINE 105 MILLIGRAM(S): 50 INJECTION, POWDER, LYOPHILIZED, FOR SOLUTION INTRAVENOUS at 05:38

## 2019-05-11 RX ADMIN — FENTANYL CITRATE 1 PATCH: 50 INJECTION INTRAVENOUS at 19:30

## 2019-05-11 RX ADMIN — CEFEPIME 1000 MILLIGRAM(S): 1 INJECTION, POWDER, FOR SOLUTION INTRAMUSCULAR; INTRAVENOUS at 05:38

## 2019-05-11 RX ADMIN — OXYCODONE AND ACETAMINOPHEN 1 TABLET(S): 5; 325 TABLET ORAL at 03:23

## 2019-05-11 RX ADMIN — Medication 1 TABLET(S): at 11:37

## 2019-05-11 RX ADMIN — ENOXAPARIN SODIUM 40 MILLIGRAM(S): 100 INJECTION SUBCUTANEOUS at 11:38

## 2019-05-11 RX ADMIN — SERTRALINE 50 MILLIGRAM(S): 25 TABLET, FILM COATED ORAL at 11:37

## 2019-05-11 NOTE — PROGRESS NOTE ADULT - SUBJECTIVE AND OBJECTIVE BOX
CC: On Vent    64 y/o male chronic vent--s/p PEG for prob transversemyelitis--DC from  on 5/7 after being admitted with biliary sepsis and sacral OM. wbc was 10K PICC was placed and tx to NH on PO vanco/ tigecycline/cefepime.  He presents today with unresponsiveness and pinpoint pupils.  In ED, he was given 2400ml LR, IV vanco/cefepime and narcan.  Temp 99.2   WBC 30K. Lactate 2.5 then inc lactate gave fluid and improved.    5/9 - Patient seen and examined. Chart reviewed. Events noted. On arrival he is awake and alert. Remains on the vent.     5/10 - Exam remains changed. He conveys co complaints this AM.     5/11 - Overnight events noted. He remains on the vent. Tolerating TF.      PAST MEDICAL & SURGICAL HISTORY:  Pneumonia  UTI (urinary tract infection)  H/O quadriplegia  Essential hypertension  Paralysis  Transverse myelitis  S/P colostomy  PEG (percutaneous endoscopic gastrostomy) status  History of tracheostomy      FAMILY HISTORY:  No pertinent family history in first degree relatives      Social Hx:    Allergies    No Known Allergies    Intolerances        63y        ICU Vital Signs Last 24 Hrs  T(C): 36.4 (11 May 2019 06:00), Max: 37.1 (11 May 2019 02:00)  T(F): 97.6 (11 May 2019 06:00), Max: 98.7 (11 May 2019 02:00)  HR: 108 (11 May 2019 11:00) (79 - 112)  BP: 130/75 (11 May 2019 11:00) (122/76 - 151/85)  BP(mean): 89 (11 May 2019 11:00) (86 - 103)  ABP: --  ABP(mean): --  RR: 0 (11 May 2019 11:00) (0 - 22)  SpO2: 100% (11 May 2019 11:00) (100% - 100%)      Mode: AC/ CMV (Assist Control/ Continuous Mandatory Ventilation)  RR (machine): 14  TV (machine): 500  FiO2: 40  PEEP: 5  ITime: 1  PIP: 29      I&O's Summary    10 May 2019 07:01  -  11 May 2019 07:00  --------------------------------------------------------  IN: 2314 mL / OUT: 525 mL / NET: 1789 mL                              9.5    33.35 )-----------( 476      ( 10 May 2019 06:53 )             32.1       05-10    135  |  105  |  18  ----------------------------<  137<H>  5.1   |  25  |  0.31<L>    Ca    7.5<L>      10 May 2019 06:53    TPro  5.2<L>  /  Alb  1.0<L>  /  TBili  0.4  /  DBili  0.2  /  AST  25  /  ALT  13  /  AlkPhos  171<H>  05-10      CAPILLARY BLOOD GLUCOSE          LIVER FUNCTIONS - ( 10 May 2019 06:53 )  Alb: 1.0 g/dL / Pro: 5.2 gm/dL / ALK PHOS: 171 U/L / ALT: 13 U/L / AST: 25 U/L / GGT: x                               MEDICATIONS  (STANDING):  ascorbic acid 500 milliGRAM(s) Oral daily  aspirin  chewable 81 milliGRAM(s) Oral daily  cefepime  Injectable. 1000 milliGRAM(s) IV Push every 12 hours  chlorhexidine 0.12% Liquid 15 milliLiter(s) Oral Mucosa two times a day  enoxaparin Injectable 40 milliGRAM(s) SubCutaneous daily  famotidine    Tablet 20 milliGRAM(s) Oral two times a day  fentaNYL   Patch  75 MICROgram(s)/Hr 1 Patch Transdermal every 72 hours  gabapentin 300 milliGRAM(s) Oral two times a day  lidocaine   Patch 1 Patch Transdermal daily  multivitamin/minerals 1 Tablet(s) Oral daily  sertraline 50 milliGRAM(s) Oral daily  tigecycline IVPB 50 milliGRAM(s) IV Intermittent every 12 hours  vancomycin    Solution 125 milliGRAM(s) Oral every 6 hours    MEDICATIONS  (PRN):  acetaminophen   Tablet .. 650 milliGRAM(s) Oral every 6 hours PRN Temp greater or equal to 38C (100.4F), Moderate Pain (4 - 6)  oxyCODONE    5 mG/acetaminophen 325 mG 1 Tablet(s) Oral every 4 hours PRN Moderate Pain (4 - 6)          Advanced Directives:  Discussed with:    Visit Information:    30-min CC Time is exclusive of billed procedures and/or teaching and/or routine family updates.

## 2019-05-12 LAB
ANION GAP SERPL CALC-SCNC: 6 MMOL/L — SIGNIFICANT CHANGE UP (ref 5–17)
BUN SERPL-MCNC: 18 MG/DL — SIGNIFICANT CHANGE UP (ref 7–23)
CALCIUM SERPL-MCNC: 7.5 MG/DL — LOW (ref 8.5–10.1)
CHLORIDE SERPL-SCNC: 101 MMOL/L — SIGNIFICANT CHANGE UP (ref 96–108)
CO2 SERPL-SCNC: 26 MMOL/L — SIGNIFICANT CHANGE UP (ref 22–31)
CREAT SERPL-MCNC: 0.27 MG/DL — LOW (ref 0.5–1.3)
GLUCOSE SERPL-MCNC: 149 MG/DL — HIGH (ref 70–99)
HCT VFR BLD CALC: 31.7 % — LOW (ref 39–50)
HGB BLD-MCNC: 9.4 G/DL — LOW (ref 13–17)
MCHC RBC-ENTMCNC: 26 PG — LOW (ref 27–34)
MCHC RBC-ENTMCNC: 29.7 GM/DL — LOW (ref 32–36)
MCV RBC AUTO: 87.6 FL — SIGNIFICANT CHANGE UP (ref 80–100)
NRBC # BLD: 0 /100 WBCS — SIGNIFICANT CHANGE UP (ref 0–0)
PLATELET # BLD AUTO: 475 K/UL — HIGH (ref 150–400)
POTASSIUM SERPL-MCNC: 4.3 MMOL/L — SIGNIFICANT CHANGE UP (ref 3.5–5.3)
POTASSIUM SERPL-SCNC: 4.3 MMOL/L — SIGNIFICANT CHANGE UP (ref 3.5–5.3)
RBC # BLD: 3.62 M/UL — LOW (ref 4.2–5.8)
RBC # FLD: 19.5 % — HIGH (ref 10.3–14.5)
SODIUM SERPL-SCNC: 133 MMOL/L — LOW (ref 135–145)
WBC # BLD: 27.15 K/UL — HIGH (ref 3.8–10.5)
WBC # FLD AUTO: 27.15 K/UL — HIGH (ref 3.8–10.5)

## 2019-05-12 RX ORDER — ALPRAZOLAM 0.25 MG
0.5 TABLET ORAL EVERY 8 HOURS
Refills: 0 | Status: DISCONTINUED | OUTPATIENT
Start: 2019-05-12 | End: 2019-05-12

## 2019-05-12 RX ORDER — ALPRAZOLAM 0.25 MG
0.5 TABLET ORAL EVERY 8 HOURS
Refills: 0 | Status: DISCONTINUED | OUTPATIENT
Start: 2019-05-12 | End: 2019-05-19

## 2019-05-12 RX ADMIN — CEFEPIME 1000 MILLIGRAM(S): 1 INJECTION, POWDER, FOR SOLUTION INTRAMUSCULAR; INTRAVENOUS at 17:24

## 2019-05-12 RX ADMIN — SERTRALINE 50 MILLIGRAM(S): 25 TABLET, FILM COATED ORAL at 11:26

## 2019-05-12 RX ADMIN — Medication 1 TABLET(S): at 11:26

## 2019-05-12 RX ADMIN — FENTANYL CITRATE 1 PATCH: 50 INJECTION INTRAVENOUS at 19:00

## 2019-05-12 RX ADMIN — CHLORHEXIDINE GLUCONATE 15 MILLILITER(S): 213 SOLUTION TOPICAL at 17:25

## 2019-05-12 RX ADMIN — TIGECYCLINE 105 MILLIGRAM(S): 50 INJECTION, POWDER, LYOPHILIZED, FOR SOLUTION INTRAVENOUS at 17:24

## 2019-05-12 RX ADMIN — Medication 81 MILLIGRAM(S): at 11:26

## 2019-05-12 RX ADMIN — LIDOCAINE 1 PATCH: 4 CREAM TOPICAL at 23:00

## 2019-05-12 RX ADMIN — Medication 125 MILLIGRAM(S): at 17:25

## 2019-05-12 RX ADMIN — Medication 0.5 MILLIGRAM(S): at 08:27

## 2019-05-12 RX ADMIN — CHLORHEXIDINE GLUCONATE 15 MILLILITER(S): 213 SOLUTION TOPICAL at 05:01

## 2019-05-12 RX ADMIN — FAMOTIDINE 20 MILLIGRAM(S): 10 INJECTION INTRAVENOUS at 17:25

## 2019-05-12 RX ADMIN — Medication 500 MILLIGRAM(S): at 11:26

## 2019-05-12 RX ADMIN — GABAPENTIN 300 MILLIGRAM(S): 400 CAPSULE ORAL at 05:00

## 2019-05-12 RX ADMIN — FAMOTIDINE 20 MILLIGRAM(S): 10 INJECTION INTRAVENOUS at 05:00

## 2019-05-12 RX ADMIN — LIDOCAINE 1 PATCH: 4 CREAM TOPICAL at 11:26

## 2019-05-12 RX ADMIN — ENOXAPARIN SODIUM 40 MILLIGRAM(S): 100 INJECTION SUBCUTANEOUS at 11:26

## 2019-05-12 RX ADMIN — CEFEPIME 1000 MILLIGRAM(S): 1 INJECTION, POWDER, FOR SOLUTION INTRAMUSCULAR; INTRAVENOUS at 05:00

## 2019-05-12 RX ADMIN — Medication 125 MILLIGRAM(S): at 05:00

## 2019-05-12 RX ADMIN — GABAPENTIN 300 MILLIGRAM(S): 400 CAPSULE ORAL at 17:25

## 2019-05-12 RX ADMIN — FENTANYL CITRATE 1 PATCH: 50 INJECTION INTRAVENOUS at 08:18

## 2019-05-12 RX ADMIN — Medication 125 MILLIGRAM(S): at 11:26

## 2019-05-12 RX ADMIN — LIDOCAINE 1 PATCH: 4 CREAM TOPICAL at 19:00

## 2019-05-12 RX ADMIN — TIGECYCLINE 105 MILLIGRAM(S): 50 INJECTION, POWDER, LYOPHILIZED, FOR SOLUTION INTRAVENOUS at 05:00

## 2019-05-12 NOTE — PROGRESS NOTE ADULT - SUBJECTIVE AND OBJECTIVE BOX
CC: On Vent    62 y/o male chronic vent--s/p PEG for prob transversemyelitis--DC from  on 5/7 after being admitted with biliary sepsis and sacral OM. wbc was 10K PICC was placed and tx to NH on PO vanco/ tigecycline/cefepime.  He presents today with unresponsiveness and pinpoint pupils.  In ED, he was given 2400ml LR, IV vanco/cefepime and narcan.  Temp 99.2   WBC 30K. Lactate 2.5 then inc lactate gave fluid and improved.    5/9 - Patient seen and examined. Chart reviewed. Events noted. On arrival he is awake and alert. Remains on the vent.     5/10 - Exam remains changed. He conveys co complaints this AM.     5/11 - Overnight events noted. He remains on the vent. Tolerating TF.    5/12 - No new events noted. Tolerating TF.    PAST MEDICAL & SURGICAL HISTORY:  Pneumonia  UTI (urinary tract infection)  H/O quadriplegia  Essential hypertension  Paralysis  Transverse myelitis  S/P colostomy  PEG (percutaneous endoscopic gastrostomy) status  History of tracheostomy      FAMILY HISTORY:  No pertinent family history in first degree relatives      Social Hx:    Allergies    No Known Allergies    Intolerances        63y        ICU Vital Signs Last 24 Hrs  T(C): 36.6 (12 May 2019 04:00), Max: 37.2 (11 May 2019 18:00)  T(F): 97.8 (12 May 2019 04:00), Max: 99 (11 May 2019 18:00)  HR: 100 (12 May 2019 08:00) (93 - 108)  BP: 141/85 (12 May 2019 08:00) (112/65 - 151/80)  BP(mean): 96 (12 May 2019 08:00) (75 - 96)  ABP: --  ABP(mean): --  RR: 23 (12 May 2019 08:00) (0 - 23)  SpO2: 98% (12 May 2019 08:00) (98% - 100%)      Mode: AC/ CMV (Assist Control/ Continuous Mandatory Ventilation)  RR (machine): 14  TV (machine): 500  FiO2: 40  PEEP: 5  ITime: 1  PIP: 28      I&O's Summary    11 May 2019 07:01  -  12 May 2019 07:00  --------------------------------------------------------  IN: 2279 mL / OUT: 950 mL / NET: 1329 mL                              9.4    27.15 )-----------( 475      ( 12 May 2019 06:37 )             31.7       05-12    133<L>  |  101  |  18  ----------------------------<  149<H>  4.3   |  26  |  0.27<L>    Ca    7.5<L>      12 May 2019 06:37        CAPILLARY BLOOD GLUCOSE                                MEDICATIONS  (STANDING):  ALPRAZolam 0.5 milliGRAM(s) Oral every 8 hours  ascorbic acid 500 milliGRAM(s) Oral daily  aspirin  chewable 81 milliGRAM(s) Oral daily  cefepime  Injectable. 1000 milliGRAM(s) IV Push every 12 hours  chlorhexidine 0.12% Liquid 15 milliLiter(s) Oral Mucosa two times a day  enoxaparin Injectable 40 milliGRAM(s) SubCutaneous daily  famotidine    Tablet 20 milliGRAM(s) Oral two times a day  fentaNYL   Patch  75 MICROgram(s)/Hr 1 Patch Transdermal every 72 hours  gabapentin 300 milliGRAM(s) Oral two times a day  lidocaine   Patch 1 Patch Transdermal daily  multivitamin/minerals 1 Tablet(s) Oral daily  sertraline 50 milliGRAM(s) Oral daily  tigecycline IVPB 50 milliGRAM(s) IV Intermittent every 12 hours  vancomycin    Solution 125 milliGRAM(s) Oral every 6 hours    MEDICATIONS  (PRN):  acetaminophen   Tablet .. 650 milliGRAM(s) Oral every 6 hours PRN Temp greater or equal to 38C (100.4F), Moderate Pain (4 - 6)  oxyCODONE    5 mG/acetaminophen 325 mG 1 Tablet(s) Oral every 4 hours PRN Moderate Pain (4 - 6)          DVT Prophylaxis:    Advanced Directives:  Discussed with:    Visit Information:    30-min CC Time is exclusive of billed procedures and/or teaching and/or routine family updates.

## 2019-05-13 LAB
ANION GAP SERPL CALC-SCNC: 6 MMOL/L — SIGNIFICANT CHANGE UP (ref 5–17)
BUN SERPL-MCNC: 19 MG/DL — SIGNIFICANT CHANGE UP (ref 7–23)
CALCIUM SERPL-MCNC: 7.4 MG/DL — LOW (ref 8.5–10.1)
CHLORIDE SERPL-SCNC: 104 MMOL/L — SIGNIFICANT CHANGE UP (ref 96–108)
CO2 SERPL-SCNC: 28 MMOL/L — SIGNIFICANT CHANGE UP (ref 22–31)
CREAT SERPL-MCNC: 0.29 MG/DL — LOW (ref 0.5–1.3)
CULTURE RESULTS: SIGNIFICANT CHANGE UP
CULTURE RESULTS: SIGNIFICANT CHANGE UP
GLUCOSE SERPL-MCNC: 191 MG/DL — HIGH (ref 70–99)
HCT VFR BLD CALC: 35.1 % — LOW (ref 39–50)
HGB BLD-MCNC: 10.2 G/DL — LOW (ref 13–17)
MCHC RBC-ENTMCNC: 25.5 PG — LOW (ref 27–34)
MCHC RBC-ENTMCNC: 29.1 GM/DL — LOW (ref 32–36)
MCV RBC AUTO: 87.8 FL — SIGNIFICANT CHANGE UP (ref 80–100)
NRBC # BLD: 0 /100 WBCS — SIGNIFICANT CHANGE UP (ref 0–0)
PLATELET # BLD AUTO: 511 K/UL — HIGH (ref 150–400)
POTASSIUM SERPL-MCNC: 4.5 MMOL/L — SIGNIFICANT CHANGE UP (ref 3.5–5.3)
POTASSIUM SERPL-SCNC: 4.5 MMOL/L — SIGNIFICANT CHANGE UP (ref 3.5–5.3)
RBC # BLD: 4 M/UL — LOW (ref 4.2–5.8)
RBC # FLD: 19.7 % — HIGH (ref 10.3–14.5)
SODIUM SERPL-SCNC: 138 MMOL/L — SIGNIFICANT CHANGE UP (ref 135–145)
SPECIMEN SOURCE: SIGNIFICANT CHANGE UP
SPECIMEN SOURCE: SIGNIFICANT CHANGE UP
WBC # BLD: 35.1 K/UL — HIGH (ref 3.8–10.5)
WBC # FLD AUTO: 35.1 K/UL — HIGH (ref 3.8–10.5)

## 2019-05-13 RX ORDER — METOPROLOL TARTRATE 50 MG
25 TABLET ORAL
Refills: 0 | Status: DISCONTINUED | OUTPATIENT
Start: 2019-05-13 | End: 2019-05-24

## 2019-05-13 RX ADMIN — CEFEPIME 1000 MILLIGRAM(S): 1 INJECTION, POWDER, FOR SOLUTION INTRAMUSCULAR; INTRAVENOUS at 17:32

## 2019-05-13 RX ADMIN — LIDOCAINE 1 PATCH: 4 CREAM TOPICAL at 23:10

## 2019-05-13 RX ADMIN — Medication 0.5 MILLIGRAM(S): at 19:31

## 2019-05-13 RX ADMIN — Medication 25 MILLIGRAM(S): at 14:49

## 2019-05-13 RX ADMIN — CHLORHEXIDINE GLUCONATE 15 MILLILITER(S): 213 SOLUTION TOPICAL at 17:33

## 2019-05-13 RX ADMIN — Medication 125 MILLIGRAM(S): at 17:32

## 2019-05-13 RX ADMIN — Medication 125 MILLIGRAM(S): at 23:02

## 2019-05-13 RX ADMIN — TIGECYCLINE 105 MILLIGRAM(S): 50 INJECTION, POWDER, LYOPHILIZED, FOR SOLUTION INTRAVENOUS at 06:08

## 2019-05-13 RX ADMIN — LIDOCAINE 1 PATCH: 4 CREAM TOPICAL at 18:35

## 2019-05-13 RX ADMIN — LIDOCAINE 1 PATCH: 4 CREAM TOPICAL at 11:49

## 2019-05-13 RX ADMIN — Medication 125 MILLIGRAM(S): at 06:11

## 2019-05-13 RX ADMIN — Medication 125 MILLIGRAM(S): at 00:20

## 2019-05-13 RX ADMIN — Medication 0.5 MILLIGRAM(S): at 00:20

## 2019-05-13 RX ADMIN — Medication 125 MILLIGRAM(S): at 11:50

## 2019-05-13 RX ADMIN — FENTANYL CITRATE 1 PATCH: 50 INJECTION INTRAVENOUS at 18:33

## 2019-05-13 RX ADMIN — CHLORHEXIDINE GLUCONATE 15 MILLILITER(S): 213 SOLUTION TOPICAL at 06:08

## 2019-05-13 RX ADMIN — CEFEPIME 1000 MILLIGRAM(S): 1 INJECTION, POWDER, FOR SOLUTION INTRAMUSCULAR; INTRAVENOUS at 06:09

## 2019-05-13 RX ADMIN — GABAPENTIN 300 MILLIGRAM(S): 400 CAPSULE ORAL at 06:08

## 2019-05-13 RX ADMIN — TIGECYCLINE 105 MILLIGRAM(S): 50 INJECTION, POWDER, LYOPHILIZED, FOR SOLUTION INTRAVENOUS at 18:24

## 2019-05-13 RX ADMIN — Medication 81 MILLIGRAM(S): at 11:50

## 2019-05-13 RX ADMIN — ENOXAPARIN SODIUM 40 MILLIGRAM(S): 100 INJECTION SUBCUTANEOUS at 11:50

## 2019-05-13 RX ADMIN — Medication 1 TABLET(S): at 11:49

## 2019-05-13 RX ADMIN — SERTRALINE 50 MILLIGRAM(S): 25 TABLET, FILM COATED ORAL at 11:49

## 2019-05-13 RX ADMIN — GABAPENTIN 300 MILLIGRAM(S): 400 CAPSULE ORAL at 17:33

## 2019-05-13 RX ADMIN — FENTANYL CITRATE 1 PATCH: 50 INJECTION INTRAVENOUS at 06:09

## 2019-05-13 RX ADMIN — Medication 500 MILLIGRAM(S): at 11:50

## 2019-05-13 RX ADMIN — FAMOTIDINE 20 MILLIGRAM(S): 10 INJECTION INTRAVENOUS at 06:08

## 2019-05-13 RX ADMIN — FAMOTIDINE 20 MILLIGRAM(S): 10 INJECTION INTRAVENOUS at 17:33

## 2019-05-13 RX ADMIN — Medication 0.5 MILLIGRAM(S): at 11:50

## 2019-05-13 NOTE — PROGRESS NOTE ADULT - SUBJECTIVE AND OBJECTIVE BOX
CC: On Vent    64 y/o male chronic vent--s/p PEG for prob transversemyelitis--DC from  on 5/7 after being admitted with biliary sepsis and sacral OM. wbc was 10K PICC was placed and tx to NH on PO vanco/ tigecycline/cefepime.  He presents today with unresponsiveness and pinpoint pupils.  In ED, he was given 2400ml LR, IV vanco/cefepime and narcan.  Temp 99.2   WBC 30K. Lactate 2.5 then inc lactate gave fluid and improved.    5/9 - Patient seen and examined. Chart reviewed. Events noted. On arrival he is awake and alert. Remains on the vent.     5/10 - Exam remains changed. He conveys co complaints this AM.     5/11 - Overnight events noted. He remains on the vent. Tolerating TF.    5/12 - No new events noted. Tolerating TF.    5/13 - Events noted. No temps overnight. He is tolerating TF.      PAST MEDICAL & SURGICAL HISTORY:  Pneumonia  UTI (urinary tract infection)  H/O quadriplegia  Essential hypertension  Paralysis  Transverse myelitis  S/P colostomy  PEG (percutaneous endoscopic gastrostomy) status  History of tracheostomy      FAMILY HISTORY:  No pertinent family history in first degree relatives      Social Hx:    Allergies    No Known Allergies    Intolerances        63y        ICU Vital Signs Last 24 Hrs  T(C): 36.3 (13 May 2019 08:00), Max: 38 (12 May 2019 18:00)  T(F): 97.4 (13 May 2019 08:00), Max: 100.4 (12 May 2019 18:00)  HR: 117 (13 May 2019 08:00) (98 - 117)  BP: 157/90 (13 May 2019 08:00) (103/65 - 160/91)  BP(mean): 106 (13 May 2019 08:00) (74 - 108)  ABP: --  ABP(mean): --  RR: 26 (13 May 2019 08:00) (16 - 26)  SpO2: 93% (13 May 2019 08:00) (93% - 100%)      Mode: AC/ CMV (Assist Control/ Continuous Mandatory Ventilation)  RR (machine): 14  TV (machine): 500  FiO2: 40  PEEP: 5  ITime: 1  PIP: 28      I&O's Summary    12 May 2019 07:01  -  13 May 2019 07:00  --------------------------------------------------------  IN: 2024 mL / OUT: 1670 mL / NET: 354 mL                              10.2   35.10 )-----------( 511      ( 13 May 2019 06:45 )             35.1       05-13    138  |  104  |  19  ----------------------------<  191<H>  4.5   |  28  |  0.29<L>    Ca    7.4<L>      13 May 2019 06:45        CAPILLARY BLOOD GLUCOSE                                MEDICATIONS  (STANDING):  ascorbic acid 500 milliGRAM(s) Oral daily  aspirin  chewable 81 milliGRAM(s) Oral daily  cefepime  Injectable. 1000 milliGRAM(s) IV Push every 12 hours  chlorhexidine 0.12% Liquid 15 milliLiter(s) Oral Mucosa two times a day  enoxaparin Injectable 40 milliGRAM(s) SubCutaneous daily  famotidine    Tablet 20 milliGRAM(s) Oral two times a day  fentaNYL   Patch  75 MICROgram(s)/Hr 1 Patch Transdermal every 72 hours  gabapentin 300 milliGRAM(s) Oral two times a day  lidocaine   Patch 1 Patch Transdermal daily  multivitamin/minerals 1 Tablet(s) Oral daily  sertraline 50 milliGRAM(s) Oral daily  tigecycline IVPB 50 milliGRAM(s) IV Intermittent every 12 hours  vancomycin    Solution 125 milliGRAM(s) Oral every 6 hours    MEDICATIONS  (PRN):  acetaminophen   Tablet .. 650 milliGRAM(s) Oral every 6 hours PRN Temp greater or equal to 38C (100.4F), Moderate Pain (4 - 6)  ALPRAZolam 0.5 milliGRAM(s) Oral every 8 hours PRN anxiety  oxyCODONE    5 mG/acetaminophen 325 mG 1 Tablet(s) Oral every 4 hours PRN Moderate Pain (4 - 6)              Advanced Directives:  Discussed with:    Visit Information:    30-min CC  Time is exclusive of billed procedures and/or teaching and/or routine family updates.

## 2019-05-13 NOTE — PROGRESS NOTE ADULT - SUBJECTIVE AND OBJECTIVE BOX
Date of service: 05-13-19 @ 10:18    Patient lying in bed; intermittently awake    Afebrile today but was febrile yesterday evening        ROS: unable to obtain secondary to patient medical condition     MEDICATIONS  (STANDING):  ascorbic acid 500 milliGRAM(s) Oral daily  aspirin  chewable 81 milliGRAM(s) Oral daily  cefepime  Injectable. 1000 milliGRAM(s) IV Push every 12 hours  chlorhexidine 0.12% Liquid 15 milliLiter(s) Oral Mucosa two times a day  enoxaparin Injectable 40 milliGRAM(s) SubCutaneous daily  famotidine    Tablet 20 milliGRAM(s) Oral two times a day  fentaNYL   Patch  75 MICROgram(s)/Hr 1 Patch Transdermal every 72 hours  gabapentin 300 milliGRAM(s) Oral two times a day  lidocaine   Patch 1 Patch Transdermal daily  multivitamin/minerals 1 Tablet(s) Oral daily  sertraline 50 milliGRAM(s) Oral daily  tigecycline IVPB 50 milliGRAM(s) IV Intermittent every 12 hours  vancomycin    Solution 125 milliGRAM(s) Oral every 6 hours    MEDICATIONS  (PRN):  acetaminophen   Tablet .. 650 milliGRAM(s) Oral every 6 hours PRN Temp greater or equal to 38C (100.4F), Moderate Pain (4 - 6)  ALPRAZolam 0.5 milliGRAM(s) Oral every 8 hours PRN anxiety  oxyCODONE    5 mG/acetaminophen 325 mG 1 Tablet(s) Oral every 4 hours PRN Moderate Pain (4 - 6)      Vital Signs Last 24 Hrs  T(C): 36.3 (13 May 2019 08:00), Max: 38 (12 May 2019 18:00)  T(F): 97.4 (13 May 2019 08:00), Max: 100.4 (12 May 2019 18:00)  HR: 122 (13 May 2019 09:00) (98 - 122)  BP: 152/92 (13 May 2019 09:00) (103/65 - 160/91)  BP(mean): 108 (13 May 2019 09:00) (74 - 108)  RR: 26 (13 May 2019 09:00) (17 - 26)  SpO2: 94% (13 May 2019 09:00) (93% - 100%)    Physical Exam:                  Constitutional: frail looking  HEENT: NC/AT  Neck: trach in place  Respiratory: respiratory effort normal scattered coarse breath sounds  Cardiovascular: S1S2 regular, no murmurs  Abdomen: soft, not tender, ostomy in place  Musculoskeletal: no muscle tenderness, no joint swelling or tenderness  Extremities: no pedal edema  Neurological/ Psychiatric: on ventilator  Skin: no rashes; no palpable lesions    Labs: all available labs reviewed                  Labs:                        10.2   35.10 )-----------( 511      ( 13 May 2019 06:45 )             35.1     05-13    138  |  104  |  19  ----------------------------<  191<H>  4.5   |  28  |  0.29<L>    Ca    7.4<L>      13 May 2019 06:45             Cultures:       Culture - Urine (collected 05-08-19 @ 12:10)  Source: .Urine Clean Catch (Midstream)  Final Report (05-09-19 @ 21:09):    <10,000 CFU/mL Normal Urogenital Sophy    Culture - Blood (collected 05-08-19 @ 12:10)  Source: .Blood Blood-Peripheral  Preliminary Report (05-09-19 @ 19:01):    No growth to date.    Culture - Blood (collected 05-08-19 @ 12:10)  Source: .Blood Blood-Peripheral  Preliminary Report (05-09-19 @ 19:01):    No growth to date.                          < from: CT Abdomen and Pelvis No Cont (05.08.19 @ 12:11) >  IMPRESSION:   CHEST: Extensive bilateral airspace disease suspicious for pneumonia.    ABDOMEN/PELVIS: Peripancreatic inflammatory change suspicious for   pancreatitis.  Marked fluid-filled distention of the stomach. Aspiration precautions are   recommended.    < end of copied text >  CODE STATUS: FULL RESCUSITATION

## 2019-05-14 LAB
ANION GAP SERPL CALC-SCNC: 6 MMOL/L — SIGNIFICANT CHANGE UP (ref 5–17)
BUN SERPL-MCNC: 18 MG/DL — SIGNIFICANT CHANGE UP (ref 7–23)
CALCIUM SERPL-MCNC: 7.5 MG/DL — LOW (ref 8.5–10.1)
CHLORIDE SERPL-SCNC: 103 MMOL/L — SIGNIFICANT CHANGE UP (ref 96–108)
CO2 SERPL-SCNC: 26 MMOL/L — SIGNIFICANT CHANGE UP (ref 22–31)
CREAT SERPL-MCNC: 0.34 MG/DL — LOW (ref 0.5–1.3)
GLUCOSE SERPL-MCNC: 192 MG/DL — HIGH (ref 70–99)
HCT VFR BLD CALC: 31.5 % — LOW (ref 39–50)
HGB BLD-MCNC: 9.1 G/DL — LOW (ref 13–17)
MCHC RBC-ENTMCNC: 25.3 PG — LOW (ref 27–34)
MCHC RBC-ENTMCNC: 28.9 GM/DL — LOW (ref 32–36)
MCV RBC AUTO: 87.7 FL — SIGNIFICANT CHANGE UP (ref 80–100)
PLATELET # BLD AUTO: 454 K/UL — HIGH (ref 150–400)
POTASSIUM SERPL-MCNC: 4.8 MMOL/L — SIGNIFICANT CHANGE UP (ref 3.5–5.3)
POTASSIUM SERPL-SCNC: 4.8 MMOL/L — SIGNIFICANT CHANGE UP (ref 3.5–5.3)
RBC # BLD: 3.59 M/UL — LOW (ref 4.2–5.8)
RBC # FLD: 19.9 % — HIGH (ref 10.3–14.5)
SODIUM SERPL-SCNC: 135 MMOL/L — SIGNIFICANT CHANGE UP (ref 135–145)
WBC # BLD: 27.51 K/UL — HIGH (ref 3.8–10.5)
WBC # FLD AUTO: 27.51 K/UL — HIGH (ref 3.8–10.5)

## 2019-05-14 RX ADMIN — TIGECYCLINE 105 MILLIGRAM(S): 50 INJECTION, POWDER, LYOPHILIZED, FOR SOLUTION INTRAVENOUS at 17:50

## 2019-05-14 RX ADMIN — Medication 0.5 MILLIGRAM(S): at 22:29

## 2019-05-14 RX ADMIN — Medication 125 MILLIGRAM(S): at 23:32

## 2019-05-14 RX ADMIN — Medication 0.5 MILLIGRAM(S): at 07:25

## 2019-05-14 RX ADMIN — Medication 125 MILLIGRAM(S): at 11:48

## 2019-05-14 RX ADMIN — ENOXAPARIN SODIUM 40 MILLIGRAM(S): 100 INJECTION SUBCUTANEOUS at 11:47

## 2019-05-14 RX ADMIN — Medication 125 MILLIGRAM(S): at 05:46

## 2019-05-14 RX ADMIN — Medication 81 MILLIGRAM(S): at 11:47

## 2019-05-14 RX ADMIN — TIGECYCLINE 105 MILLIGRAM(S): 50 INJECTION, POWDER, LYOPHILIZED, FOR SOLUTION INTRAVENOUS at 05:46

## 2019-05-14 RX ADMIN — CHLORHEXIDINE GLUCONATE 15 MILLILITER(S): 213 SOLUTION TOPICAL at 05:46

## 2019-05-14 RX ADMIN — Medication 25 MILLIGRAM(S): at 17:50

## 2019-05-14 RX ADMIN — LIDOCAINE 1 PATCH: 4 CREAM TOPICAL at 23:11

## 2019-05-14 RX ADMIN — LIDOCAINE 1 PATCH: 4 CREAM TOPICAL at 11:49

## 2019-05-14 RX ADMIN — FAMOTIDINE 20 MILLIGRAM(S): 10 INJECTION INTRAVENOUS at 17:50

## 2019-05-14 RX ADMIN — Medication 500 MILLIGRAM(S): at 11:47

## 2019-05-14 RX ADMIN — SERTRALINE 50 MILLIGRAM(S): 25 TABLET, FILM COATED ORAL at 11:48

## 2019-05-14 RX ADMIN — FAMOTIDINE 20 MILLIGRAM(S): 10 INJECTION INTRAVENOUS at 05:47

## 2019-05-14 RX ADMIN — LIDOCAINE 1 PATCH: 4 CREAM TOPICAL at 21:01

## 2019-05-14 RX ADMIN — GABAPENTIN 300 MILLIGRAM(S): 400 CAPSULE ORAL at 17:50

## 2019-05-14 RX ADMIN — CEFEPIME 1000 MILLIGRAM(S): 1 INJECTION, POWDER, FOR SOLUTION INTRAMUSCULAR; INTRAVENOUS at 05:46

## 2019-05-14 RX ADMIN — Medication 1 TABLET(S): at 11:48

## 2019-05-14 RX ADMIN — FENTANYL CITRATE 1 PATCH: 50 INJECTION INTRAVENOUS at 23:11

## 2019-05-14 RX ADMIN — CHLORHEXIDINE GLUCONATE 15 MILLILITER(S): 213 SOLUTION TOPICAL at 17:51

## 2019-05-14 RX ADMIN — Medication 125 MILLIGRAM(S): at 17:51

## 2019-05-14 RX ADMIN — Medication 25 MILLIGRAM(S): at 05:47

## 2019-05-14 RX ADMIN — CEFEPIME 1000 MILLIGRAM(S): 1 INJECTION, POWDER, FOR SOLUTION INTRAMUSCULAR; INTRAVENOUS at 17:50

## 2019-05-14 RX ADMIN — GABAPENTIN 300 MILLIGRAM(S): 400 CAPSULE ORAL at 05:46

## 2019-05-14 RX ADMIN — FENTANYL CITRATE 1 PATCH: 50 INJECTION INTRAVENOUS at 21:00

## 2019-05-14 RX ADMIN — FENTANYL CITRATE 1 PATCH: 50 INJECTION INTRAVENOUS at 07:26

## 2019-05-14 NOTE — PROGRESS NOTE ADULT - SUBJECTIVE AND OBJECTIVE BOX
Date of service: 05-14-19 @ 12:42      Patient awake, lying in bed  On ventilatory support    ROS unable to obtain secondary to patient medical condition     MEDICATIONS  (STANDING):  ascorbic acid 500 milliGRAM(s) Oral daily  aspirin  chewable 81 milliGRAM(s) Oral daily  cefepime  Injectable. 1000 milliGRAM(s) IV Push every 12 hours  chlorhexidine 0.12% Liquid 15 milliLiter(s) Oral Mucosa two times a day  enoxaparin Injectable 40 milliGRAM(s) SubCutaneous daily  famotidine    Tablet 20 milliGRAM(s) Oral two times a day  fentaNYL   Patch  75 MICROgram(s)/Hr 1 Patch Transdermal every 72 hours  gabapentin 300 milliGRAM(s) Oral two times a day  lidocaine   Patch 1 Patch Transdermal daily  metoprolol tartrate 25 milliGRAM(s) Oral two times a day  multivitamin/minerals 1 Tablet(s) Oral daily  sertraline 50 milliGRAM(s) Oral daily  tigecycline IVPB 50 milliGRAM(s) IV Intermittent every 12 hours  vancomycin    Solution 125 milliGRAM(s) Oral every 6 hours    MEDICATIONS  (PRN):  acetaminophen   Tablet .. 650 milliGRAM(s) Oral every 6 hours PRN Temp greater or equal to 38C (100.4F), Moderate Pain (4 - 6)  ALPRAZolam 0.5 milliGRAM(s) Oral every 8 hours PRN anxiety  oxyCODONE    5 mG/acetaminophen 325 mG 1 Tablet(s) Oral every 4 hours PRN Moderate Pain (4 - 6)      Vital Signs Last 24 Hrs  T(C): 37 (14 May 2019 12:00), Max: 37.7 (14 May 2019 00:00)  T(F): 98.6 (14 May 2019 12:00), Max: 99.9 (14 May 2019 00:00)  HR: 104 (14 May 2019 12:00) (81 - 118)  BP: 154/85 (14 May 2019 12:00) (105/72 - 158/83)  BP(mean): 101 (14 May 2019 12:00) (72 - 105)  RR: 24 (14 May 2019 07:00) (20 - 25)  SpO2: 94% (14 May 2019 12:00) (94% - 100%)    Physical Exam:                  Constitutional: frail looking  HEENT: NC/AT  Neck: trach in place  Respiratory: respiratory effort normal scattered coarse breath sounds  Cardiovascular: S1S2 regular, no murmurs  Abdomen: soft, not tender, ostomy in place  Musculoskeletal: no muscle tenderness, no joint swelling or tenderness  Extremities: no pedal edema  Neurological/ Psychiatric: on ventilator  Skin: no rashes; no palpable lesions    Labs: all available labs reviewed              Labs:                        9.1    27.51 )-----------( 454      ( 14 May 2019 06:39 )             31.5     05-14    135  |  103  |  18  ----------------------------<  192<H>  4.8   |  26  |  0.34<L>    Ca    7.5<L>      14 May 2019 06:39             Cultures:       Culture - Urine (collected 05-08-19 @ 12:10)  Source: .Urine Clean Catch (Midstream)  Final Report (05-09-19 @ 21:09):    <10,000 CFU/mL Normal Urogenital Sophy    Culture - Blood (collected 05-08-19 @ 12:10)  Source: .Blood Blood-Peripheral  Final Report (05-13-19 @ 19:01):    No growth at 5 days.    Culture - Blood (collected 05-08-19 @ 12:10)  Source: .Blood Blood-Peripheral  Final Report (05-13-19 @ 19:01):    No growth at 5 days.                          < from: CT Abdomen and Pelvis No Cont (05.08.19 @ 12:11) >  IMPRESSION:   CHEST: Extensive bilateral airspace disease suspicious for pneumonia.    ABDOMEN/PELVIS: Peripancreatic inflammatory change suspicious for   pancreatitis.  Marked fluid-filled distention of the stomach. Aspiration precautions are   recommended.    < end of copied text >  CODE STATUS: FULL RESCUSITATION

## 2019-05-14 NOTE — PROGRESS NOTE ADULT - SUBJECTIVE AND OBJECTIVE BOX
Events Overnight: no fevers, wbc dec to 27 thousand    HPI:    62 y/o male chronic vent--s/p PEG for prob transversemyelitis--DC from  on 5/7 after being admitted with biliary sepsis and sacral OM. wbc was 10K PICC was placed and tx to NH on PO vanco/ tigecycline/cefepime.  He presented today with unresponsiveness and pinpoint pupils.  In ED, he was given 2400ml LR, IV vanco/cefepime and narcan.  Temp 99.2   WBC 30K. Lactate 2.5    now afebrile neurologically back to baseline , tolerating tube feeds, but still with persistent leukocytosis      PMH:       as above    ROS cant obtain secondary to trach         MEDICATIONS  (STANDING):  ascorbic acid 500 milliGRAM(s) Oral daily  aspirin  chewable 81 milliGRAM(s) Oral daily  cefepime  Injectable. 1000 milliGRAM(s) IV Push every 12 hours  chlorhexidine 0.12% Liquid 15 milliLiter(s) Oral Mucosa two times a day  enoxaparin Injectable 40 milliGRAM(s) SubCutaneous daily  famotidine    Tablet 20 milliGRAM(s) Oral two times a day  fentaNYL   Patch  75 MICROgram(s)/Hr 1 Patch Transdermal every 72 hours  gabapentin 300 milliGRAM(s) Oral two times a day  lidocaine   Patch 1 Patch Transdermal daily  metoprolol tartrate 25 milliGRAM(s) Oral two times a day  multivitamin/minerals 1 Tablet(s) Oral daily  sertraline 50 milliGRAM(s) Oral daily  tigecycline IVPB 50 milliGRAM(s) IV Intermittent every 12 hours  vancomycin    Solution 125 milliGRAM(s) Oral every 6 hours    MEDICATIONS  (PRN):  acetaminophen   Tablet .. 650 milliGRAM(s) Oral every 6 hours PRN Temp greater or equal to 38C (100.4F), Moderate Pain (4 - 6)  ALPRAZolam 0.5 milliGRAM(s) Oral every 8 hours PRN anxiety  oxyCODONE    5 mG/acetaminophen 325 mG 1 Tablet(s) Oral every 4 hours PRN Moderate Pain (4 - 6)      ICU Vital Signs Last 24 Hrs  T(C): 37 (14 May 2019 12:00), Max: 37.7 (14 May 2019 00:00)  T(F): 98.6 (14 May 2019 12:00), Max: 99.9 (14 May 2019 00:00)  HR: 104 (14 May 2019 12:00) (81 - 115)  BP: 154/85 (14 May 2019 12:00) (105/72 - 158/83)  BP(mean): 101 (14 May 2019 12:00) (72 - 102)  ABP: --  ABP(mean): --  RR: 24 (14 May 2019 07:00) (20 - 25)  SpO2: 94% (14 May 2019 12:00) (94% - 100%)      Mode: AC/ CMV (Assist Control/ Continuous Mandatory Ventilation)  RR (machine): 14  TV (machine): 500  FiO2: 40  PEEP: 5  ITime: 1  PIP: 30      I&O's Summary    13 May 2019 07:01  -  14 May 2019 07:00  --------------------------------------------------------  IN: 1819 mL / OUT: 2325 mL / NET: -506 mL      Physical Exam:     General - confortable no distress     HEENT nc/at     NEck s/p trach     neuro - alert, awake, quadiparesis     lungs - lungs     abdomen soft , tolerating feeds     back sacral decubitus      ext - 2 plus edema                             9.1    27.51 )-----------( 454      ( 14 May 2019 06:39 )             31.5       05-14    135  |  103  |  18  ----------------------------<  192<H>  4.8   |  26  |  0.34<L>    Ca    7.5<L>      14 May 2019 06:39    DVT Prophylaxis:  lovenox                                                                Advanced Directives: full code

## 2019-05-15 LAB
ANION GAP SERPL CALC-SCNC: 3 MMOL/L — LOW (ref 5–17)
BUN SERPL-MCNC: 16 MG/DL — SIGNIFICANT CHANGE UP (ref 7–23)
CALCIUM SERPL-MCNC: 7.6 MG/DL — LOW (ref 8.5–10.1)
CHLORIDE SERPL-SCNC: 100 MMOL/L — SIGNIFICANT CHANGE UP (ref 96–108)
CO2 SERPL-SCNC: 31 MMOL/L — SIGNIFICANT CHANGE UP (ref 22–31)
CREAT SERPL-MCNC: 0.31 MG/DL — LOW (ref 0.5–1.3)
GLUCOSE SERPL-MCNC: 225 MG/DL — HIGH (ref 70–99)
HCT VFR BLD CALC: 31.5 % — LOW (ref 39–50)
HGB BLD-MCNC: 9.2 G/DL — LOW (ref 13–17)
MCHC RBC-ENTMCNC: 25.5 PG — LOW (ref 27–34)
MCHC RBC-ENTMCNC: 29.2 GM/DL — LOW (ref 32–36)
MCV RBC AUTO: 87.3 FL — SIGNIFICANT CHANGE UP (ref 80–100)
PLATELET # BLD AUTO: 447 K/UL — HIGH (ref 150–400)
POTASSIUM SERPL-MCNC: 4.6 MMOL/L — SIGNIFICANT CHANGE UP (ref 3.5–5.3)
POTASSIUM SERPL-SCNC: 4.6 MMOL/L — SIGNIFICANT CHANGE UP (ref 3.5–5.3)
RBC # BLD: 3.61 M/UL — LOW (ref 4.2–5.8)
RBC # FLD: 19.7 % — HIGH (ref 10.3–14.5)
SODIUM SERPL-SCNC: 134 MMOL/L — LOW (ref 135–145)
WBC # BLD: 27.14 K/UL — HIGH (ref 3.8–10.5)
WBC # FLD AUTO: 27.14 K/UL — HIGH (ref 3.8–10.5)

## 2019-05-15 PROCEDURE — 71260 CT THORAX DX C+: CPT | Mod: 26

## 2019-05-15 PROCEDURE — 74177 CT ABD & PELVIS W/CONTRAST: CPT | Mod: 26

## 2019-05-15 RX ORDER — FENTANYL CITRATE 50 UG/ML
1 INJECTION INTRAVENOUS
Refills: 0 | Status: DISCONTINUED | OUTPATIENT
Start: 2019-05-15 | End: 2019-05-21

## 2019-05-15 RX ADMIN — GABAPENTIN 300 MILLIGRAM(S): 400 CAPSULE ORAL at 18:28

## 2019-05-15 RX ADMIN — Medication 500 MILLIGRAM(S): at 12:09

## 2019-05-15 RX ADMIN — GABAPENTIN 300 MILLIGRAM(S): 400 CAPSULE ORAL at 05:12

## 2019-05-15 RX ADMIN — Medication 125 MILLIGRAM(S): at 18:31

## 2019-05-15 RX ADMIN — FAMOTIDINE 20 MILLIGRAM(S): 10 INJECTION INTRAVENOUS at 05:12

## 2019-05-15 RX ADMIN — SERTRALINE 50 MILLIGRAM(S): 25 TABLET, FILM COATED ORAL at 12:09

## 2019-05-15 RX ADMIN — Medication 125 MILLIGRAM(S): at 12:09

## 2019-05-15 RX ADMIN — TIGECYCLINE 105 MILLIGRAM(S): 50 INJECTION, POWDER, LYOPHILIZED, FOR SOLUTION INTRAVENOUS at 05:12

## 2019-05-15 RX ADMIN — CEFEPIME 1000 MILLIGRAM(S): 1 INJECTION, POWDER, FOR SOLUTION INTRAMUSCULAR; INTRAVENOUS at 05:12

## 2019-05-15 RX ADMIN — Medication 81 MILLIGRAM(S): at 12:09

## 2019-05-15 RX ADMIN — Medication 125 MILLIGRAM(S): at 05:12

## 2019-05-15 RX ADMIN — ENOXAPARIN SODIUM 40 MILLIGRAM(S): 100 INJECTION SUBCUTANEOUS at 12:08

## 2019-05-15 RX ADMIN — CHLORHEXIDINE GLUCONATE 15 MILLILITER(S): 213 SOLUTION TOPICAL at 18:30

## 2019-05-15 RX ADMIN — TIGECYCLINE 105 MILLIGRAM(S): 50 INJECTION, POWDER, LYOPHILIZED, FOR SOLUTION INTRAVENOUS at 19:02

## 2019-05-15 RX ADMIN — CHLORHEXIDINE GLUCONATE 15 MILLILITER(S): 213 SOLUTION TOPICAL at 05:13

## 2019-05-15 RX ADMIN — FENTANYL CITRATE 1 PATCH: 50 INJECTION INTRAVENOUS at 07:27

## 2019-05-15 RX ADMIN — Medication 25 MILLIGRAM(S): at 18:30

## 2019-05-15 RX ADMIN — CEFEPIME 1000 MILLIGRAM(S): 1 INJECTION, POWDER, FOR SOLUTION INTRAMUSCULAR; INTRAVENOUS at 18:27

## 2019-05-15 RX ADMIN — Medication 1 TABLET(S): at 12:09

## 2019-05-15 RX ADMIN — LIDOCAINE 1 PATCH: 4 CREAM TOPICAL at 12:09

## 2019-05-15 RX ADMIN — FAMOTIDINE 20 MILLIGRAM(S): 10 INJECTION INTRAVENOUS at 18:31

## 2019-05-15 RX ADMIN — Medication 25 MILLIGRAM(S): at 05:12

## 2019-05-15 NOTE — CHART NOTE - NSCHARTNOTEFT_GEN_A_CORE
Assessment:   *pt continues to be managed in ICU; admitted for pancreatitis.  c/w elevated WBC, pending further investigation.  *per flowsheet, pt has received an average of ~1153mL/day of formula over the past three days.  which with prosource provided ~1810Kcal, 117g protein, and 875mL free water.  Pt also ordered for 25mL/hr additional free water flushes.  Pt is meeting ~100% of estimated calorie and protein needs.  Pt not meeting estimated fluid needs, rec'd change free water flushes to 45mL q1hr (=990mL additional free water flushes)  *not wt is fluctuating; continue to monitor closely with biweekly wt checks.      Recommendations:  1) c/w Glucerna 1.5 @ 55mL/hr with 2pkts prosource TF  2) monitor TF tolerance, keep back of bed >35 degrees  3) monitor hydration status; adjust free water flushes prn; consider free water flushes of [id number]  4) daily wt checks  5) monitor lytes/mins; replete/correct prn        Diet Prescription: Diet, NPO with Tube Feed:   Tube Feeding Modality: Gastrostomy  Glucerna 1.5 Beau (GLUCERNA1.5)  Total Volume for 24 Hours (mL): 1320  Continuous  Starting Tube Feed Rate {mL per Hour}: 20  Increase Tube Feed Rate by (mL): 10     Every 4 hours  Until Goal Tube Feed Rate (mL per Hour): 55  Tube Feed Duration (in Hours): 24  Tube Feed Start Time: 00:00  No Carb Prosource TF     Qty per Day:  2 (05-10-19 @ 11:17)      Wt Hx:  87.9Kg (5/12)  Weight (kg): 78 (05-08-19 @ 11:04), 104.3 (04-24-19 @ 19:27)          Estimated Needs:   [x] no change since previous assessment  Estimated Energy Needs (25-30 calories/kg):  · Weight  (lbs) 145.5 lb  · Weight (kg) 66 kg  · From (25cal/kg) 1650 To (30cal/kg) 1980 Kcal    Other Calculation:  · Other Calculation  Ht. 69 "     Wt. 86.2 Kg       28 BMI       IBW 66 Kg      Pt is at 118  %  IBW    Estimated Protein Needs (1.8-2.0 gm/kg):  · Weight  (lbs) 145.5  · Weight (kg) 66 kg  · From (1.8 g/kg) 118.8 To (2.0 g/kg) 132 g protein    Nutrition Diagnostic #1:  · Nutrition Diagnostic Terminology #1: Nutrient  · Nutrient: Increased nutrient needs (specify)  · Etiology: increased demand for protein due to wound healing  · Signs/Symptoms: stage 4 PU  · Nutrition Intervention: Enteral Nutrition  · Enteral Nutrition: Composition; Concentration; Rate; when feasible, resume TF with Glucerna 1.5 @ 20mL/hr and titrate TF rate with tolerance to goal rate of 55mL/hr with 2pkts prosource TF (=1895Kcal, 122g protein, and 918mL free water)  · Goal/Expected Outcome: pt resume TF and meet >80% of estimated nutr needs      Nutrition Diagnosis is [x] ongoing  [ ] resolved [ ] not applicable         New Nutrition Diagnosis: [x] not applicable         Pertinent Medications: MEDICATIONS  (STANDING):  ascorbic acid 500 milliGRAM(s) Oral daily  aspirin  chewable 81 milliGRAM(s) Oral daily  cefepime  Injectable. 1000 milliGRAM(s) IV Push every 12 hours  chlorhexidine 0.12% Liquid 15 milliLiter(s) Oral Mucosa two times a day  enoxaparin Injectable 40 milliGRAM(s) SubCutaneous daily  famotidine    Tablet 20 milliGRAM(s) Oral two times a day  gabapentin 300 milliGRAM(s) Oral two times a day  lidocaine   Patch 1 Patch Transdermal daily  metoprolol tartrate 25 milliGRAM(s) Oral two times a day  multivitamin/minerals 1 Tablet(s) Oral daily  sertraline 50 milliGRAM(s) Oral daily  tigecycline IVPB 50 milliGRAM(s) IV Intermittent every 12 hours  vancomycin    Solution 125 milliGRAM(s) Oral every 6 hours    MEDICATIONS  (PRN):  acetaminophen   Tablet .. 650 milliGRAM(s) Oral every 6 hours PRN Temp greater or equal to 38C (100.4F), Moderate Pain (4 - 6)  ALPRAZolam 0.5 milliGRAM(s) Oral every 8 hours PRN anxiety  oxyCODONE    5 mG/acetaminophen 325 mG 1 Tablet(s) Oral every 4 hours PRN Moderate Pain (4 - 6)    Pertinent Labs: 05-15 Na134 mmol/L<L> Glu 225 mg/dL<H> K+ 4.6 mmol/L Cr  0.31 mg/dL<L> BUN 16 mg/dL 05-10 Alb 1.0 g/dL<L> 04-25 [id number] 6.6 %<H> 05-09 Chol --    LDL --    HDL --    Trig 100 mg/dL      Skin: marcos score = 11  PU:   stage 4: sacrum  stage 2: Rt ankle    Monitoring and Evaluation:   [x] PO intake/Nutr support infusion [ x ] Tolerance to Nutr [ x ] weights [ x ] labs[ x ] follow up per protocol  [ ] other:

## 2019-05-15 NOTE — PROGRESS NOTE ADULT - SUBJECTIVE AND OBJECTIVE BOX
Date of service: 05-15-19 @ 10:49    Patient lying in bed; on ventilatory support        ROS unable to obtain secondary to patient medical condition     MEDICATIONS  (STANDING):  ascorbic acid 500 milliGRAM(s) Oral daily  aspirin  chewable 81 milliGRAM(s) Oral daily  cefepime  Injectable. 1000 milliGRAM(s) IV Push every 12 hours  chlorhexidine 0.12% Liquid 15 milliLiter(s) Oral Mucosa two times a day  enoxaparin Injectable 40 milliGRAM(s) SubCutaneous daily  famotidine    Tablet 20 milliGRAM(s) Oral two times a day  gabapentin 300 milliGRAM(s) Oral two times a day  lidocaine   Patch 1 Patch Transdermal daily  metoprolol tartrate 25 milliGRAM(s) Oral two times a day  multivitamin/minerals 1 Tablet(s) Oral daily  sertraline 50 milliGRAM(s) Oral daily  tigecycline IVPB 50 milliGRAM(s) IV Intermittent every 12 hours  vancomycin    Solution 125 milliGRAM(s) Oral every 6 hours    MEDICATIONS  (PRN):  acetaminophen   Tablet .. 650 milliGRAM(s) Oral every 6 hours PRN Temp greater or equal to 38C (100.4F), Moderate Pain (4 - 6)  ALPRAZolam 0.5 milliGRAM(s) Oral every 8 hours PRN anxiety  oxyCODONE    5 mG/acetaminophen 325 mG 1 Tablet(s) Oral every 4 hours PRN Moderate Pain (4 - 6)      Vital Signs Last 24 Hrs  T(C): 37.9 (15 May 2019 04:00), Max: 37.9 (15 May 2019 04:00)  T(F): 100.3 (15 May 2019 04:00), Max: 100.3 (15 May 2019 04:00)  HR: 92 (15 May 2019 08:00) (83 - 110)  BP: 134/71 (15 May 2019 08:00) (99/67 - 158/83)  BP(mean): 86 (15 May 2019 08:00) (74 - 113)  RR: 22 (15 May 2019 08:00) (18 - 27)  SpO2: 99% (15 May 2019 08:00) (94% - 99%)    Physical Exam:                Constitutional: frail looking  HEENT: NC/AT  Neck: trach in place  Respiratory: respiratory effort normal scattered coarse breath sounds  Cardiovascular: S1S2 regular, no murmurs  Abdomen: soft, not tender, ostomy in place  Musculoskeletal: no muscle tenderness, no joint swelling or tenderness  Extremities: no pedal edema  Neurological/ Psychiatric: on ventilator  Skin: no rashes; no palpable lesions    Labs: all available labs reviewed              Labs:                        Labs:                        9.2    27.14 )-----------( 447      ( 15 May 2019 06:13 )             31.5     05-15    134<L>  |  100  |  16  ----------------------------<  225<H>  4.6   |  31  |  0.31<L>    Ca    7.6<L>      15 May 2019 06:13             Cultures:       Culture - Urine (collected 05-08-19 @ 12:10)  Source: .Urine Clean Catch (Midstream)  Final Report (05-09-19 @ 21:09):    <10,000 CFU/mL Normal Urogenital Sophy    Culture - Blood (collected 05-08-19 @ 12:10)  Source: .Blood Blood-Peripheral  Final Report (05-13-19 @ 19:01):    No growth at 5 days.    Culture - Blood (collected 05-08-19 @ 12:10)  Source: .Blood Blood-Peripheral  Final Report (05-13-19 @ 19:01):    No growth at 5 days.                          < from: CT Abdomen and Pelvis No Cont (05.08.19 @ 12:11) >  IMPRESSION:   CHEST: Extensive bilateral airspace disease suspicious for pneumonia.    ABDOMEN/PELVIS: Peripancreatic inflammatory change suspicious for   pancreatitis.  Marked fluid-filled distention of the stomach. Aspiration precautions are   recommended.    < end of copied text >  CODE STATUS: FULL RESCUSITATION

## 2019-05-15 NOTE — PROGRESS NOTE ADULT - SUBJECTIVE AND OBJECTIVE BOX
Events Overnight: temp to 100.3 wbc, 27k    HPI:   62 y/o male chronic vent--s/p PEG for prob transversemyelitis--DC from  on 5/7 after being admitted with biliary sepsis and sacral OM. wbc was 10K PICC was placed and tx to NH on PO vanco/ tigecycline/cefepime.  He presented  with unresponsiveness and pinpoint pupils.  In ED, he was given 2400ml LR, IV vanco/cefepime and narcan.  Temp 99.2   WBC 30K. Lactate 2.5   ct showed pancreatitis  c. dif negative,    toleratiing tf still with persistent leukocytosis    PMH:       as above  ROS cant obtain secondary to trach     MEDICATIONS  (STANDING):  ascorbic acid 500 milliGRAM(s) Oral daily  aspirin  chewable 81 milliGRAM(s) Oral daily  cefepime  Injectable. 1000 milliGRAM(s) IV Push every 12 hours  chlorhexidine 0.12% Liquid 15 milliLiter(s) Oral Mucosa two times a day  enoxaparin Injectable 40 milliGRAM(s) SubCutaneous daily  famotidine    Tablet 20 milliGRAM(s) Oral two times a day  gabapentin 300 milliGRAM(s) Oral two times a day  lidocaine   Patch 1 Patch Transdermal daily  metoprolol tartrate 25 milliGRAM(s) Oral two times a day  multivitamin/minerals 1 Tablet(s) Oral daily  sertraline 50 milliGRAM(s) Oral daily  tigecycline IVPB 50 milliGRAM(s) IV Intermittent every 12 hours  vancomycin    Solution 125 milliGRAM(s) Oral every 6 hours    MEDICATIONS  (PRN):  acetaminophen   Tablet .. 650 milliGRAM(s) Oral every 6 hours PRN Temp greater or equal to 38C (100.4F), Moderate Pain (4 - 6)  ALPRAZolam 0.5 milliGRAM(s) Oral every 8 hours PRN anxiety  oxyCODONE    5 mG/acetaminophen 325 mG 1 Tablet(s) Oral every 4 hours PRN Moderate Pain (4 - 6)    General - comfortable , lethargic  Neuro - quadriparesis, lethargic nods to some questions  Neck s/p trach  cv rrr  lungs clear  abdomen s/p peg, slightly distended soft, s/p colostomy  ext in splints  stage 4 decubitus        ICU Vital Signs Last 24 Hrs  T(C): 37.9 (15 May 2019 04:00), Max: 37.9 (15 May 2019 04:00)  T(F): 100.3 (15 May 2019 04:00), Max: 100.3 (15 May 2019 04:00)  HR: 92 (15 May 2019 08:00) (83 - 110)  BP: 134/71 (15 May 2019 08:00) (99/67 - 154/85)  BP(mean): 86 (15 May 2019 08:00) (74 - 113)  ABP: --  ABP(mean): --  RR: 22 (15 May 2019 08:00) (18 - 27)  SpO2: 99% (15 May 2019 08:00) (94% - 99%)      Mode: AC/ CMV (Assist Control/ Continuous Mandatory Ventilation)  RR (machine): 14  TV (machine): 500  FiO2: 40  PEEP: 5  ITime: 1  PIP: 27      I&O's Summary    14 May 2019 07:01  -  15 May 2019 07:00  --------------------------------------------------------  IN: 1798 mL / OUT: 1925 mL / NET: -127 mL                            9.2    27.14 )-----------( 447      ( 15 May 2019 06:13 )             31.5       05-15    134<L>  |  100  |  16  ----------------------------<  225<H>  4.6   |  31  |  0.31<L>    Ca    7.6<L>      15 May 2019 06:13        DVT Prophylaxis:   lovenox                                                              Advanced Directives: Full code

## 2019-05-16 LAB
ANION GAP SERPL CALC-SCNC: 4 MMOL/L — LOW (ref 5–17)
BUN SERPL-MCNC: 17 MG/DL — SIGNIFICANT CHANGE UP (ref 7–23)
CALCIUM SERPL-MCNC: 7.9 MG/DL — LOW (ref 8.5–10.1)
CHLORIDE SERPL-SCNC: 100 MMOL/L — SIGNIFICANT CHANGE UP (ref 96–108)
CO2 SERPL-SCNC: 31 MMOL/L — SIGNIFICANT CHANGE UP (ref 22–31)
CREAT SERPL-MCNC: 0.27 MG/DL — LOW (ref 0.5–1.3)
GLUCOSE SERPL-MCNC: 246 MG/DL — HIGH (ref 70–99)
GRAM STN FLD: SIGNIFICANT CHANGE UP
HCT VFR BLD CALC: 31.4 % — LOW (ref 39–50)
HGB BLD-MCNC: 9.2 G/DL — LOW (ref 13–17)
MCHC RBC-ENTMCNC: 25.2 PG — LOW (ref 27–34)
MCHC RBC-ENTMCNC: 29.3 GM/DL — LOW (ref 32–36)
MCV RBC AUTO: 86 FL — SIGNIFICANT CHANGE UP (ref 80–100)
PLATELET # BLD AUTO: 437 K/UL — HIGH (ref 150–400)
POTASSIUM SERPL-MCNC: 4.2 MMOL/L — SIGNIFICANT CHANGE UP (ref 3.5–5.3)
POTASSIUM SERPL-SCNC: 4.2 MMOL/L — SIGNIFICANT CHANGE UP (ref 3.5–5.3)
RBC # BLD: 3.65 M/UL — LOW (ref 4.2–5.8)
RBC # FLD: 19.3 % — HIGH (ref 10.3–14.5)
SODIUM SERPL-SCNC: 135 MMOL/L — SIGNIFICANT CHANGE UP (ref 135–145)
SPECIMEN SOURCE: SIGNIFICANT CHANGE UP
WBC # BLD: 26.81 K/UL — HIGH (ref 3.8–10.5)
WBC # FLD AUTO: 26.81 K/UL — HIGH (ref 3.8–10.5)

## 2019-05-16 RX ORDER — MEROPENEM 1 G/30ML
1000 INJECTION INTRAVENOUS EVERY 8 HOURS
Refills: 0 | Status: DISCONTINUED | OUTPATIENT
Start: 2019-05-16 | End: 2019-05-24

## 2019-05-16 RX ADMIN — Medication 500 MILLIGRAM(S): at 13:30

## 2019-05-16 RX ADMIN — TIGECYCLINE 105 MILLIGRAM(S): 50 INJECTION, POWDER, LYOPHILIZED, FOR SOLUTION INTRAVENOUS at 18:51

## 2019-05-16 RX ADMIN — ENOXAPARIN SODIUM 40 MILLIGRAM(S): 100 INJECTION SUBCUTANEOUS at 13:30

## 2019-05-16 RX ADMIN — CHLORHEXIDINE GLUCONATE 15 MILLILITER(S): 213 SOLUTION TOPICAL at 06:25

## 2019-05-16 RX ADMIN — SERTRALINE 50 MILLIGRAM(S): 25 TABLET, FILM COATED ORAL at 13:30

## 2019-05-16 RX ADMIN — MEROPENEM 100 MILLIGRAM(S): 1 INJECTION INTRAVENOUS at 21:48

## 2019-05-16 RX ADMIN — Medication 81 MILLIGRAM(S): at 13:30

## 2019-05-16 RX ADMIN — CEFEPIME 1000 MILLIGRAM(S): 1 INJECTION, POWDER, FOR SOLUTION INTRAMUSCULAR; INTRAVENOUS at 05:52

## 2019-05-16 RX ADMIN — TIGECYCLINE 105 MILLIGRAM(S): 50 INJECTION, POWDER, LYOPHILIZED, FOR SOLUTION INTRAVENOUS at 05:52

## 2019-05-16 RX ADMIN — Medication 650 MILLIGRAM(S): at 22:07

## 2019-05-16 RX ADMIN — Medication 125 MILLIGRAM(S): at 13:30

## 2019-05-16 RX ADMIN — Medication 125 MILLIGRAM(S): at 23:27

## 2019-05-16 RX ADMIN — Medication 125 MILLIGRAM(S): at 00:30

## 2019-05-16 RX ADMIN — CHLORHEXIDINE GLUCONATE 15 MILLILITER(S): 213 SOLUTION TOPICAL at 18:51

## 2019-05-16 RX ADMIN — GABAPENTIN 300 MILLIGRAM(S): 400 CAPSULE ORAL at 05:52

## 2019-05-16 RX ADMIN — FAMOTIDINE 20 MILLIGRAM(S): 10 INJECTION INTRAVENOUS at 05:52

## 2019-05-16 RX ADMIN — FAMOTIDINE 20 MILLIGRAM(S): 10 INJECTION INTRAVENOUS at 18:50

## 2019-05-16 RX ADMIN — Medication 650 MILLIGRAM(S): at 23:27

## 2019-05-16 RX ADMIN — Medication 25 MILLIGRAM(S): at 05:52

## 2019-05-16 RX ADMIN — Medication 1 TABLET(S): at 13:30

## 2019-05-16 RX ADMIN — Medication 125 MILLIGRAM(S): at 18:50

## 2019-05-16 RX ADMIN — Medication 125 MILLIGRAM(S): at 05:52

## 2019-05-16 RX ADMIN — Medication 25 MILLIGRAM(S): at 18:50

## 2019-05-16 RX ADMIN — MEROPENEM 100 MILLIGRAM(S): 1 INJECTION INTRAVENOUS at 15:25

## 2019-05-16 RX ADMIN — GABAPENTIN 300 MILLIGRAM(S): 400 CAPSULE ORAL at 18:53

## 2019-05-16 NOTE — PROGRESS NOTE ADULT - SUBJECTIVE AND OBJECTIVE BOX
Events Overnight: more awake this am, no complaints    HPI:     2 y/o male chronic vent--s/p PEG for prob transversemyelitis--DC from  on 5/7 after being admitted with biliary sepsis and sacral OM. wbc was 10K PICC was placed and tx to NH on PO vanco/ tigecycline/cefepime.  He presented  with unresponsiveness and pinpoint pupils.  In ED, he was given 2400ml LR, IV vanco/cefepime and narcan.  Temp 99.2   WBC 30K. Lactate 2.5   ct showed pancreatitis  c. dif negative,    toleratiing tf still with persistent leukocytosis, f/u ct shows only bibasilar infiltrates, repeat sputum culture sent    PMH:       as above    ROS cant obtain secondary to trach, no complaints       MEDICATIONS  (STANDING):  ascorbic acid 500 milliGRAM(s) Oral daily  aspirin  chewable 81 milliGRAM(s) Oral daily  chlorhexidine 0.12% Liquid 15 milliLiter(s) Oral Mucosa two times a day  enoxaparin Injectable 40 milliGRAM(s) SubCutaneous daily  famotidine    Tablet 20 milliGRAM(s) Oral two times a day  fentaNYL   Patch  75 MICROgram(s)/Hr 1 Patch Transdermal every 72 hours  gabapentin 300 milliGRAM(s) Oral two times a day  lidocaine   Patch 1 Patch Transdermal daily  meropenem  IVPB 1000 milliGRAM(s) IV Intermittent every 8 hours  metoprolol tartrate 25 milliGRAM(s) Oral two times a day  multivitamin/minerals 1 Tablet(s) Oral daily  sertraline 50 milliGRAM(s) Oral daily  tigecycline IVPB 50 milliGRAM(s) IV Intermittent every 12 hours  vancomycin    Solution 125 milliGRAM(s) Oral every 6 hours    MEDICATIONS  (PRN):  acetaminophen   Tablet .. 650 milliGRAM(s) Oral every 6 hours PRN Temp greater or equal to 38C (100.4F), Moderate Pain (4 - 6)  ALPRAZolam 0.5 milliGRAM(s) Oral every 8 hours PRN anxiety       General - weak, lethargic   Neuro - quadriparesis, responding, mouthing answers to questions   neck s/p trach   lungs bilateral bs, minimal secreion   cv rrr    abdomen soft , non tender, ostomy functioning, tolerating tube feeds    back decubitus, packed    ext bilateral edema             ICU Vital Signs Last 24 Hrs  T(C): 35.8 (16 May 2019 10:00), Max: 37.5 (16 May 2019 04:00)  T(F): 96.4 (16 May 2019 10:00), Max: 99.5 (16 May 2019 04:00)  HR: 107 (16 May 2019 10:00) (90 - 110)  BP: 143/76 (16 May 2019 10:00) (106/61 - 154/75)  BP(mean): 91 (16 May 2019 10:00) (72 - 99)  ABP: --  ABP(mean): --  RR: 22 (16 May 2019 10:00) (18 - 26)  SpO2: 95% (16 May 2019 10:00) (93% - 100%)      Mode: AC/ CMV (Assist Control/ Continuous Mandatory Ventilation)  RR (machine): 14  TV (machine): 500  FiO2: 40  PEEP: 5  ITime: 1  PIP: 34      I&O's Summary    15 May 2019 07:01  -  16 May 2019 07:00  --------------------------------------------------------  IN: 1547 mL / OUT: 3130 mL / NET: -1583 mL                            9.2    26.81 )-----------( 437      ( 16 May 2019 06:45 )             31.4       05-16    135  |  100  |  17  ----------------------------<  246<H>  4.2   |  31  |  0.27<L>    Ca    7.9<L>      16 May 2019 06:45        DVT Prophylaxis:  heaprin subq

## 2019-05-16 NOTE — PROGRESS NOTE ADULT - SUBJECTIVE AND OBJECTIVE BOX
Date of service: 05-16-19 @ 10:42    Patient lying in bed; on ventilatory support      ROS unable to obtain secondary to patient medical condition     MEDICATIONS  (STANDING):  ascorbic acid 500 milliGRAM(s) Oral daily  aspirin  chewable 81 milliGRAM(s) Oral daily  chlorhexidine 0.12% Liquid 15 milliLiter(s) Oral Mucosa two times a day  enoxaparin Injectable 40 milliGRAM(s) SubCutaneous daily  famotidine    Tablet 20 milliGRAM(s) Oral two times a day  fentaNYL   Patch  75 MICROgram(s)/Hr 1 Patch Transdermal every 72 hours  gabapentin 300 milliGRAM(s) Oral two times a day  lidocaine   Patch 1 Patch Transdermal daily  meropenem  IVPB 1000 milliGRAM(s) IV Intermittent every 8 hours  metoprolol tartrate 25 milliGRAM(s) Oral two times a day  multivitamin/minerals 1 Tablet(s) Oral daily  sertraline 50 milliGRAM(s) Oral daily  tigecycline IVPB 50 milliGRAM(s) IV Intermittent every 12 hours  vancomycin    Solution 125 milliGRAM(s) Oral every 6 hours    MEDICATIONS  (PRN):  acetaminophen   Tablet .. 650 milliGRAM(s) Oral every 6 hours PRN Temp greater or equal to 38C (100.4F), Moderate Pain (4 - 6)  ALPRAZolam 0.5 milliGRAM(s) Oral every 8 hours PRN anxiety      Vital Signs Last 24 Hrs  T(C): 35.8 (16 May 2019 10:00), Max: 37.5 (16 May 2019 04:00)  T(F): 96.4 (16 May 2019 10:00), Max: 99.5 (16 May 2019 04:00)  HR: 107 (16 May 2019 10:00) (90 - 110)  BP: 143/76 (16 May 2019 10:00) (99/60 - 154/75)  BP(mean): 91 (16 May 2019 10:00) (69 - 99)  RR: 22 (16 May 2019 10:00) (18 - 26)  SpO2: 95% (16 May 2019 10:00) (93% - 100%)    Physical Exam:              Constitutional: frail looking  HEENT: NC/AT  Neck: trach in place  Respiratory: respiratory effort normal scattered coarse breath sounds  Cardiovascular: S1S2 regular, no murmurs  Abdomen: soft, not tender, ostomy in place  Musculoskeletal: no muscle tenderness, no joint swelling or tenderness  Extremities: no pedal edema  Neurological/ Psychiatric: on ventilator  Skin: no rashes; no palpable lesions    Labs: all available labs reviewed              Labs:               Labs:                        9.2    26.81 )-----------( 437      ( 16 May 2019 06:45 )             31.4     05-16    135  |  100  |  17  ----------------------------<  246<H>  4.2   |  31  |  0.27<L>    Ca    7.9<L>      16 May 2019 06:45             Cultures:           < from: CT Chest w/ IV Cont (05.15.19 @ 14:10) >    EXAM:  CT ABDOMEN AND PELVIS OC IC                          EXAM:  CT CHEST IC                            PROCEDURE DATE:  05/15/2019          INTERPRETATION:  CLINICAL INFORMATION: Unexplained leukocytosis.   Pancreatitis.    COMPARISON: May 08, 2019    PROCEDURE:   CT of the Chest, Abdomen and Pelvis was performed with intravenous   contrast.   Intravenous contrast: 90 ml Omnipaque 350. 10 ml discarded.  Oral contrast: Positive contrast was administered.  Sagittal and coronal reformats were performed.    FINDINGS:    CHEST:     LUNGS AND LARGE AIRWAYS: Diffuse bilateral reticulonodular (tree-in-bud)   opacities and numerous scattered larger nodular opacities throughout both   lungs, not significantly changed. Bilateral lower lobe consolidations,   also unchanged. Tracheostomy tube in situ.  PLEURA: No pleural effusion.  VESSELS: Normal caliber aorta. Coronary artery calcifications.  HEART: Heart size is normal. No pericardial effusion.  MEDIASTINUM AND ANAMARIA: No lymphadenopathy.  CHESTWALL AND LOWER NECK: Within normal limits.    ABDOMEN AND PELVIS:    LIVER: Within normal limits.  BILE DUCTS: Normal caliber.  GALLBLADDER: Multiple layering gallstones.  SPLEEN: Within normal limits.  PANCREAS: Mild peripancreatic inflammatory changes, not significantly   changed. No discrete fluid collection or evidence of necrosis.  ADRENALS: Within normal limits.  KIDNEYS/URETERS: Within normal limits.    BLADDER: Within normal limits.  REPRODUCTIVE ORGANS: Prostate within normal limits.    BOWEL: No bowel obstruction. Appendix normal. Percutaneous gastrostomy   tube with tip in the stomach. Status post left lower quadrant loop   colostomy.  PERITONEUM: Trace ascites. No free intraperitoneal air.      VESSELS:  Within normal limits.  RETROPERITONEUM: No lymphadenopathy.    ABDOMINAL WALL: Within normal limits.  BONES: Unchanged sacral decubitus and left hip periarticular heterotopic   bone formation.    IMPRESSION:     Chest: Unchanged severe multifocal pneumonia.  Abdomen/pelvis: Unchanged mild pancreatitis.    < end of copied text >                  < from: CT Abdomen and Pelvis No Cont (05.08.19 @ 12:11) >  IMPRESSION:   CHEST: Extensive bilateral airspace disease suspicious for pneumonia.    ABDOMEN/PELVIS: Peripancreatic inflammatory change suspicious for   pancreatitis.  Marked fluid-filled distention of the stomach. Aspiration precautions are   recommended.    < end of copied text >  CODE STATUS: FULL RESCUSITATION

## 2019-05-17 LAB
ANION GAP SERPL CALC-SCNC: 4 MMOL/L — LOW (ref 5–17)
BUN SERPL-MCNC: 20 MG/DL — SIGNIFICANT CHANGE UP (ref 7–23)
CALCIUM SERPL-MCNC: 8 MG/DL — LOW (ref 8.5–10.1)
CHLORIDE SERPL-SCNC: 101 MMOL/L — SIGNIFICANT CHANGE UP (ref 96–108)
CO2 SERPL-SCNC: 32 MMOL/L — HIGH (ref 22–31)
CREAT SERPL-MCNC: 0.33 MG/DL — LOW (ref 0.5–1.3)
GLUCOSE SERPL-MCNC: 255 MG/DL — HIGH (ref 70–99)
HCT VFR BLD CALC: 28.3 % — LOW (ref 39–50)
HGB BLD-MCNC: 8.3 G/DL — LOW (ref 13–17)
MCHC RBC-ENTMCNC: 25.2 PG — LOW (ref 27–34)
MCHC RBC-ENTMCNC: 29.3 GM/DL — LOW (ref 32–36)
MCV RBC AUTO: 85.8 FL — SIGNIFICANT CHANGE UP (ref 80–100)
PLATELET # BLD AUTO: 419 K/UL — HIGH (ref 150–400)
POTASSIUM SERPL-MCNC: 3.9 MMOL/L — SIGNIFICANT CHANGE UP (ref 3.5–5.3)
POTASSIUM SERPL-SCNC: 3.9 MMOL/L — SIGNIFICANT CHANGE UP (ref 3.5–5.3)
RBC # BLD: 3.3 M/UL — LOW (ref 4.2–5.8)
RBC # FLD: 19.5 % — HIGH (ref 10.3–14.5)
SODIUM SERPL-SCNC: 137 MMOL/L — SIGNIFICANT CHANGE UP (ref 135–145)
WBC # BLD: 23.02 K/UL — HIGH (ref 3.8–10.5)
WBC # FLD AUTO: 23.02 K/UL — HIGH (ref 3.8–10.5)

## 2019-05-17 RX ADMIN — MEROPENEM 100 MILLIGRAM(S): 1 INJECTION INTRAVENOUS at 14:50

## 2019-05-17 RX ADMIN — Medication 125 MILLIGRAM(S): at 05:41

## 2019-05-17 RX ADMIN — Medication 500 MILLIGRAM(S): at 12:03

## 2019-05-17 RX ADMIN — GABAPENTIN 300 MILLIGRAM(S): 400 CAPSULE ORAL at 17:28

## 2019-05-17 RX ADMIN — Medication 81 MILLIGRAM(S): at 12:03

## 2019-05-17 RX ADMIN — SERTRALINE 50 MILLIGRAM(S): 25 TABLET, FILM COATED ORAL at 12:03

## 2019-05-17 RX ADMIN — MEROPENEM 100 MILLIGRAM(S): 1 INJECTION INTRAVENOUS at 05:41

## 2019-05-17 RX ADMIN — Medication 125 MILLIGRAM(S): at 17:29

## 2019-05-17 RX ADMIN — MEROPENEM 100 MILLIGRAM(S): 1 INJECTION INTRAVENOUS at 22:46

## 2019-05-17 RX ADMIN — FAMOTIDINE 20 MILLIGRAM(S): 10 INJECTION INTRAVENOUS at 05:41

## 2019-05-17 RX ADMIN — TIGECYCLINE 105 MILLIGRAM(S): 50 INJECTION, POWDER, LYOPHILIZED, FOR SOLUTION INTRAVENOUS at 06:26

## 2019-05-17 RX ADMIN — Medication 125 MILLIGRAM(S): at 12:03

## 2019-05-17 RX ADMIN — FAMOTIDINE 20 MILLIGRAM(S): 10 INJECTION INTRAVENOUS at 17:28

## 2019-05-17 RX ADMIN — CHLORHEXIDINE GLUCONATE 15 MILLILITER(S): 213 SOLUTION TOPICAL at 17:28

## 2019-05-17 RX ADMIN — ENOXAPARIN SODIUM 40 MILLIGRAM(S): 100 INJECTION SUBCUTANEOUS at 12:03

## 2019-05-17 RX ADMIN — TIGECYCLINE 105 MILLIGRAM(S): 50 INJECTION, POWDER, LYOPHILIZED, FOR SOLUTION INTRAVENOUS at 17:27

## 2019-05-17 RX ADMIN — Medication 25 MILLIGRAM(S): at 05:41

## 2019-05-17 RX ADMIN — CHLORHEXIDINE GLUCONATE 15 MILLILITER(S): 213 SOLUTION TOPICAL at 05:41

## 2019-05-17 RX ADMIN — Medication 25 MILLIGRAM(S): at 17:28

## 2019-05-17 RX ADMIN — Medication 1 TABLET(S): at 12:03

## 2019-05-17 RX ADMIN — GABAPENTIN 300 MILLIGRAM(S): 400 CAPSULE ORAL at 05:41

## 2019-05-17 NOTE — PROGRESS NOTE ADULT - SUBJECTIVE AND OBJECTIVE BOX
Date of service: 05-17-19 @ 10:34    Patient remains on ventilatory support  Has had fever        ROS: unable to obtain secondary to patient medical condition     MEDICATIONS  (STANDING):  ascorbic acid 500 milliGRAM(s) Oral daily  aspirin  chewable 81 milliGRAM(s) Oral daily  chlorhexidine 0.12% Liquid 15 milliLiter(s) Oral Mucosa two times a day  enoxaparin Injectable 40 milliGRAM(s) SubCutaneous daily  famotidine    Tablet 20 milliGRAM(s) Oral two times a day  fentaNYL   Patch  75 MICROgram(s)/Hr 1 Patch Transdermal every 72 hours  gabapentin 300 milliGRAM(s) Oral two times a day  lidocaine   Patch 1 Patch Transdermal daily  meropenem  IVPB 1000 milliGRAM(s) IV Intermittent every 8 hours  metoprolol tartrate 25 milliGRAM(s) Oral two times a day  multivitamin/minerals 1 Tablet(s) Oral daily  sertraline 50 milliGRAM(s) Oral daily  tigecycline IVPB 50 milliGRAM(s) IV Intermittent every 12 hours  vancomycin    Solution 125 milliGRAM(s) Oral every 6 hours    MEDICATIONS  (PRN):  acetaminophen   Tablet .. 650 milliGRAM(s) Oral every 6 hours PRN Temp greater or equal to 38C (100.4F), Moderate Pain (4 - 6)  ALPRAZolam 0.5 milliGRAM(s) Oral every 8 hours PRN anxiety      Vital Signs Last 24 Hrs  T(C): 37 (17 May 2019 10:00), Max: 38.6 (16 May 2019 22:00)  T(F): 98.6 (17 May 2019 10:00), Max: 101.5 (16 May 2019 22:00)  HR: 86 (17 May 2019 10:00) (79 - 129)  BP: 119/62 (17 May 2019 10:00) (92/58 - 158/72)  BP(mean): 77 (17 May 2019 10:00) (65 - 105)  RR: 20 (17 May 2019 10:00) (20 - 32)  SpO2: 100% (17 May 2019 10:00) (93% - 100%)    Physical Exam:                Constitutional: frail looking  HEENT: NC/AT  Neck: trach in place  Respiratory: respiratory effort normal scattered coarse breath sounds  Cardiovascular: S1S2 regular, no murmurs  Abdomen: soft, not tender, ostomy in place  Musculoskeletal: no muscle tenderness, no joint swelling or tenderness  Extremities: no pedal edema  Neurological/ Psychiatric: on ventilator  Skin: no rashes; no palpable lesions    Labs: all available labs reviewed                  Labs:                        8.3    23.02 )-----------( 419      ( 17 May 2019 06:18 )             28.3     05-17    137  |  101  |  20  ----------------------------<  255<H>  3.9   |  32<H>  |  0.33<L>    Ca    8.0<L>      17 May 2019 06:18             Cultures:       Culture - Sputum (collected 05-15-19 @ 16:45)  Source: .Sputum Sputum  Gram Stain (05-16-19 @ 14:03):    Numerous polymorphonuclear leukocytes seen per low power field    No squamous epithelial cells seen per low power field    Moderate Gram Negative Rods seen per oil power field  Preliminary Report (05-17-19 @ 09:44):    Numerous Proteus mirabilis            < from: CT Chest w/ IV Cont (05.15.19 @ 14:10) >    EXAM:  CT ABDOMEN AND PELVIS OC IC                          EXAM:  CT CHEST IC                            PROCEDURE DATE:  05/15/2019          INTERPRETATION:  CLINICAL INFORMATION: Unexplained leukocytosis.   Pancreatitis.    COMPARISON: May 08, 2019    PROCEDURE:   CT of the Chest, Abdomen and Pelvis was performed with intravenous   contrast.   Intravenous contrast: 90 ml Omnipaque 350. 10 ml discarded.  Oral contrast: Positive contrast was administered.  Sagittal and coronal reformats were performed.    FINDINGS:    CHEST:     LUNGS AND LARGE AIRWAYS: Diffuse bilateral reticulonodular (tree-in-bud)   opacities and numerous scattered larger nodular opacities throughout both   lungs, not significantly changed. Bilateral lower lobe consolidations,   also unchanged. Tracheostomy tube in situ.  PLEURA: No pleural effusion.  VESSELS: Normal caliber aorta. Coronary artery calcifications.  HEART: Heart size is normal. No pericardial effusion.  MEDIASTINUM AND ANAMARIA: No lymphadenopathy.  CHESTWALL AND LOWER NECK: Within normal limits.    ABDOMEN AND PELVIS:    LIVER: Within normal limits.  BILE DUCTS: Normal caliber.  GALLBLADDER: Multiple layering gallstones.  SPLEEN: Within normal limits.  PANCREAS: Mild peripancreatic inflammatory changes, not significantly   changed. No discrete fluid collection or evidence of necrosis.  ADRENALS: Within normal limits.  KIDNEYS/URETERS: Within normal limits.    BLADDER: Within normal limits.  REPRODUCTIVE ORGANS: Prostate within normal limits.    BOWEL: No bowel obstruction. Appendix normal. Percutaneous gastrostomy   tube with tip in the stomach. Status post left lower quadrant loop   colostomy.  PERITONEUM: Trace ascites. No free intraperitoneal air.      VESSELS:  Within normal limits.  RETROPERITONEUM: No lymphadenopathy.    ABDOMINAL WALL: Within normal limits.  BONES: Unchanged sacral decubitus and left hip periarticular heterotopic   bone formation.    IMPRESSION:     Chest: Unchanged severe multifocal pneumonia.  Abdomen/pelvis: Unchanged mild pancreatitis.    < end of copied text >                  < from: CT Abdomen and Pelvis No Cont (05.08.19 @ 12:11) >  IMPRESSION:   CHEST: Extensive bilateral airspace disease suspicious for pneumonia.    ABDOMEN/PELVIS: Peripancreatic inflammatory change suspicious for   pancreatitis.  Marked fluid-filled distention of the stomach. Aspiration precautions are   recommended.    < end of copied text >  CODE STATUS: FULL RESCUSITATION

## 2019-05-17 NOTE — PROGRESS NOTE ADULT - SUBJECTIVE AND OBJECTIVE BOX
Events Overnight:  low grade fever, wbc better at 23k, sputum culture grew proteus    HPI:     74 y/o male chronic vent--s/p PEG for prob transversemyelitis--DC from  on 5/7 after being admitted with biliary sepsis and sacral OM. wbc was 10K PICC was placed and tx to NH on PO vanco/ tigecycline/cefepime.  He     presented  with unresponsiveness and pinpoint pupils.  In ED, he was given 2400ml LR, IV vanco/cefepime and narcan.  Temp 99.2   WBC 30K. Lactate 2.5   ct showed pancreatitis   c. dif negative,    toleratiing tf still with persistent leukocytosis slowly decresing, f/u ct shows only bibasilar infiltrates, repeat sputum culture sent abx. changed to tigrecyclin, meropenem    PMH:       as above    ROS cant obtain secondary to trach, no complaints        MEDICATIONS  (STANDING):  ascorbic acid 500 milliGRAM(s) Oral daily  aspirin  chewable 81 milliGRAM(s) Oral daily  chlorhexidine 0.12% Liquid 15 milliLiter(s) Oral Mucosa two times a day  enoxaparin Injectable 40 milliGRAM(s) SubCutaneous daily  famotidine    Tablet 20 milliGRAM(s) Oral two times a day  fentaNYL   Patch  75 MICROgram(s)/Hr 1 Patch Transdermal every 72 hours  gabapentin 300 milliGRAM(s) Oral two times a day  lidocaine   Patch 1 Patch Transdermal daily  meropenem  IVPB 1000 milliGRAM(s) IV Intermittent every 8 hours  metoprolol tartrate 25 milliGRAM(s) Oral two times a day  multivitamin/minerals 1 Tablet(s) Oral daily  sertraline 50 milliGRAM(s) Oral daily  tigecycline IVPB 50 milliGRAM(s) IV Intermittent every 12 hours  vancomycin    Solution 125 milliGRAM(s) Oral every 6 hours    MEDICATIONS  (PRN):  acetaminophen   Tablet .. 650 milliGRAM(s) Oral every 6 hours PRN Temp greater or equal to 38C (100.4F), Moderate Pain (4 - 6)  ALPRAZolam 0.5 milliGRAM(s) Oral every 8 hours PRN anxiety    General - lethargic, confused at times  HEENT - nc/at  neck - trached  cv rrr  abdomen - tolerating tube feeds, soft  back approx 5cm deep decub,exudate but no signficiant purulence  ext in splints    ICU Vital Signs Last 24 Hrs  T(C): 37 (17 May 2019 10:00), Max: 38.6 (16 May 2019 22:00)  T(F): 98.6 (17 May 2019 10:00), Max: 101.5 (16 May 2019 22:00)  HR: 86 (17 May 2019 10:00) (79 - 129)  BP: 119/62 (17 May 2019 10:00) (92/58 - 158/72)  BP(mean): 77 (17 May 2019 10:00) (65 - 105)  ABP: --  ABP(mean): --  RR: 20 (17 May 2019 10:00) (20 - 32)  SpO2: 100% (17 May 2019 10:00) (93% - 100%)    Mode: AC/ CMV (Assist Control/ Continuous Mandatory Ventilation)  RR (machine): 14  TV (machine): 500  FiO2: 40  PEEP: 5  ITime: 1  MAP: 14  PIP: 28      I&O's Summary    16 May 2019 07:01  -  17 May 2019 07:00  --------------------------------------------------------  IN: 892 mL / OUT: 830 mL / NET: 62 mL                          8.3    23.02 )-----------( 419      ( 17 May 2019 06:18 )             28.3     05-17    137  |  101  |  20  ----------------------------<  255<H>  3.9   |  32<H>  |  0.33<L>    Ca    8.0<L>      17 May 2019 06:18      DVT Prophylaxis:     lovenox                                                            Advanced Directives: Full code

## 2019-05-18 LAB
-  AMIKACIN: SIGNIFICANT CHANGE UP
-  AMOXICILLIN/CLAVULANIC ACID: SIGNIFICANT CHANGE UP
-  AMPICILLIN/SULBACTAM: SIGNIFICANT CHANGE UP
-  AMPICILLIN: SIGNIFICANT CHANGE UP
-  AZTREONAM: SIGNIFICANT CHANGE UP
-  CEFAZOLIN: SIGNIFICANT CHANGE UP
-  CEFEPIME: SIGNIFICANT CHANGE UP
-  CEFOXITIN: SIGNIFICANT CHANGE UP
-  CEFTRIAXONE: SIGNIFICANT CHANGE UP
-  CIPROFLOXACIN: SIGNIFICANT CHANGE UP
-  ERTAPENEM: SIGNIFICANT CHANGE UP
-  GENTAMICIN: SIGNIFICANT CHANGE UP
-  LEVOFLOXACIN: SIGNIFICANT CHANGE UP
-  MEROPENEM: SIGNIFICANT CHANGE UP
-  PIPERACILLIN/TAZOBACTAM: SIGNIFICANT CHANGE UP
-  TOBRAMYCIN: SIGNIFICANT CHANGE UP
-  TRIMETHOPRIM/SULFAMETHOXAZOLE: SIGNIFICANT CHANGE UP
CULTURE RESULTS: SIGNIFICANT CHANGE UP
HCT VFR BLD CALC: 29.8 % — LOW (ref 39–50)
HGB BLD-MCNC: 8.8 G/DL — LOW (ref 13–17)
MCHC RBC-ENTMCNC: 25.3 PG — LOW (ref 27–34)
MCHC RBC-ENTMCNC: 29.5 GM/DL — LOW (ref 32–36)
MCV RBC AUTO: 85.6 FL — SIGNIFICANT CHANGE UP (ref 80–100)
METHOD TYPE: SIGNIFICANT CHANGE UP
ORGANISM # SPEC MICROSCOPIC CNT: SIGNIFICANT CHANGE UP
ORGANISM # SPEC MICROSCOPIC CNT: SIGNIFICANT CHANGE UP
PLATELET # BLD AUTO: 427 K/UL — HIGH (ref 150–400)
RBC # BLD: 3.48 M/UL — LOW (ref 4.2–5.8)
RBC # FLD: 19.8 % — HIGH (ref 10.3–14.5)
SPECIMEN SOURCE: SIGNIFICANT CHANGE UP
WBC # BLD: 26.84 K/UL — HIGH (ref 3.8–10.5)
WBC # FLD AUTO: 26.84 K/UL — HIGH (ref 3.8–10.5)

## 2019-05-18 RX ADMIN — FAMOTIDINE 20 MILLIGRAM(S): 10 INJECTION INTRAVENOUS at 06:24

## 2019-05-18 RX ADMIN — MEROPENEM 100 MILLIGRAM(S): 1 INJECTION INTRAVENOUS at 13:09

## 2019-05-18 RX ADMIN — SERTRALINE 50 MILLIGRAM(S): 25 TABLET, FILM COATED ORAL at 11:44

## 2019-05-18 RX ADMIN — GABAPENTIN 300 MILLIGRAM(S): 400 CAPSULE ORAL at 06:21

## 2019-05-18 RX ADMIN — Medication 125 MILLIGRAM(S): at 00:08

## 2019-05-18 RX ADMIN — Medication 25 MILLIGRAM(S): at 06:21

## 2019-05-18 RX ADMIN — Medication 125 MILLIGRAM(S): at 17:13

## 2019-05-18 RX ADMIN — CHLORHEXIDINE GLUCONATE 15 MILLILITER(S): 213 SOLUTION TOPICAL at 17:13

## 2019-05-18 RX ADMIN — TIGECYCLINE 105 MILLIGRAM(S): 50 INJECTION, POWDER, LYOPHILIZED, FOR SOLUTION INTRAVENOUS at 06:21

## 2019-05-18 RX ADMIN — Medication 81 MILLIGRAM(S): at 11:42

## 2019-05-18 RX ADMIN — TIGECYCLINE 105 MILLIGRAM(S): 50 INJECTION, POWDER, LYOPHILIZED, FOR SOLUTION INTRAVENOUS at 17:13

## 2019-05-18 RX ADMIN — LIDOCAINE 1 PATCH: 4 CREAM TOPICAL at 11:43

## 2019-05-18 RX ADMIN — FAMOTIDINE 20 MILLIGRAM(S): 10 INJECTION INTRAVENOUS at 17:13

## 2019-05-18 RX ADMIN — Medication 25 MILLIGRAM(S): at 17:13

## 2019-05-18 RX ADMIN — Medication 125 MILLIGRAM(S): at 11:43

## 2019-05-18 RX ADMIN — Medication 1 TABLET(S): at 11:43

## 2019-05-18 RX ADMIN — Medication 125 MILLIGRAM(S): at 06:22

## 2019-05-18 RX ADMIN — ENOXAPARIN SODIUM 40 MILLIGRAM(S): 100 INJECTION SUBCUTANEOUS at 11:42

## 2019-05-18 RX ADMIN — CHLORHEXIDINE GLUCONATE 15 MILLILITER(S): 213 SOLUTION TOPICAL at 06:23

## 2019-05-18 RX ADMIN — Medication 500 MILLIGRAM(S): at 11:42

## 2019-05-18 RX ADMIN — Medication 0.5 MILLIGRAM(S): at 13:19

## 2019-05-18 RX ADMIN — MEROPENEM 100 MILLIGRAM(S): 1 INJECTION INTRAVENOUS at 06:21

## 2019-05-18 RX ADMIN — MEROPENEM 100 MILLIGRAM(S): 1 INJECTION INTRAVENOUS at 22:41

## 2019-05-18 RX ADMIN — LIDOCAINE 1 PATCH: 4 CREAM TOPICAL at 23:00

## 2019-05-18 RX ADMIN — LIDOCAINE 1 PATCH: 4 CREAM TOPICAL at 19:28

## 2019-05-18 RX ADMIN — GABAPENTIN 300 MILLIGRAM(S): 400 CAPSULE ORAL at 17:13

## 2019-05-18 NOTE — PROGRESS NOTE ADULT - SUBJECTIVE AND OBJECTIVE BOX
Date of service: 05-18-19 @ 13:17      Patient lying in bed; on ventilatory support    ROS unable to obtain secondary to patient medical condition   MEDICATIONS  (STANDING):  ascorbic acid 500 milliGRAM(s) Oral daily  aspirin  chewable 81 milliGRAM(s) Oral daily  chlorhexidine 0.12% Liquid 15 milliLiter(s) Oral Mucosa two times a day  enoxaparin Injectable 40 milliGRAM(s) SubCutaneous daily  famotidine    Tablet 20 milliGRAM(s) Oral two times a day  fentaNYL   Patch  75 MICROgram(s)/Hr 1 Patch Transdermal every 72 hours  gabapentin 300 milliGRAM(s) Oral two times a day  lidocaine   Patch 1 Patch Transdermal daily  meropenem  IVPB 1000 milliGRAM(s) IV Intermittent every 8 hours  metoprolol tartrate 25 milliGRAM(s) Oral two times a day  multivitamin/minerals 1 Tablet(s) Oral daily  sertraline 50 milliGRAM(s) Oral daily  tigecycline IVPB 50 milliGRAM(s) IV Intermittent every 12 hours  vancomycin    Solution 125 milliGRAM(s) Oral every 6 hours    MEDICATIONS  (PRN):  acetaminophen   Tablet .. 650 milliGRAM(s) Oral every 6 hours PRN Temp greater or equal to 38C (100.4F), Moderate Pain (4 - 6)  ALPRAZolam 0.5 milliGRAM(s) Oral every 8 hours PRN anxiety      Vital Signs Last 24 Hrs  T(C): 37.2 (18 May 2019 13:00), Max: 37.7 (18 May 2019 00:00)  T(F): 99 (18 May 2019 13:00), Max: 99.8 (18 May 2019 00:00)  HR: 107 (18 May 2019 13:00) (85 - 110)  BP: 117/91 (18 May 2019 13:00) (110/70 - 163/91)  BP(mean): 97 (18 May 2019 13:00) (80 - 108)  RR: 27 (18 May 2019 13:00) (7 - 30)  SpO2: 95% (18 May 2019 13:00) (95% - 100%)    Physical Exam:          Constitutional: frail looking  HEENT: NC/AT  Neck: trach in place  Respiratory: respiratory effort normal scattered coarse breath sounds  Cardiovascular: S1S2 regular, no murmurs  Abdomen: soft, not tender, ostomy in place  Musculoskeletal: no muscle tenderness, no joint swelling or tenderness  Extremities: no pedal edema  Neurological/ Psychiatric: on ventilator  Skin: no rashes; no palpable lesions    Labs: all available labs reviewed                  Labs:                        Labs:                        8.8    26.84 )-----------( 427      ( 18 May 2019 06:48 )             29.8     05-17    137  |  101  |  20  ----------------------------<  255<H>  3.9   |  32<H>  |  0.33<L>    Ca    8.0<L>      17 May 2019 06:18             Cultures:       Culture - Sputum (collected 05-15-19 @ 16:45)  Source: .Sputum Sputum  Gram Stain (05-16-19 @ 14:03):    Numerous polymorphonuclear leukocytes seen per low power field    No squamous epithelial cells seen per low power field    Moderate Gram Negative Rods seen per oil power field  Final Report (05-18-19 @ 12:35):    Numerous Proteus mirabilis ESBL    No routine respiratory jocelnyn Isolated  Organism: Proteus mirabilis ESBL (05-18-19 @ 12:35)  Organism: Proteus mirabilis ESBL (05-18-19 @ 12:35)      -  Amikacin: S <=8      -  Amoxicillin/Clavulanic Acid: S <=8/4      -  Ampicillin: R >16 These ampicillin results predict results for amoxicillin      -  Ampicillin/Sulbactam: R 8/4      -  Aztreonam: R >16      -  Cefazolin: R >16      -  Cefepime: R >16      -  Cefoxitin: S 8      -  Ceftriaxone: R >32 Enterobacter, Citrobacter, and Serratia may develop resistance during prolonged therapy      -  Ciprofloxacin: I 2      -  Ertapenem: S <=0.5      -  Gentamicin: S 4      -  Levofloxacin: S <=1      -  Meropenem: S <=1      -  Piperacillin/Tazobactam: R <=8      -  Tobramycin: S 4      -  Trimethoprim/Sulfamethoxazole: R >2/38      Method Type: AMIE            < from: CT Chest w/ IV Cont (05.15.19 @ 14:10) >    EXAM:  CT ABDOMEN AND PELVIS OC IC                          EXAM:  CT CHEST IC                            PROCEDURE DATE:  05/15/2019          INTERPRETATION:  CLINICAL INFORMATION: Unexplained leukocytosis.   Pancreatitis.    COMPARISON: May 08, 2019    PROCEDURE:   CT of the Chest, Abdomen and Pelvis was performed with intravenous   contrast.   Intravenous contrast: 90 ml Omnipaque 350. 10 ml discarded.  Oral contrast: Positive contrast was administered.  Sagittal and coronal reformats were performed.    FINDINGS:    CHEST:     LUNGS AND LARGE AIRWAYS: Diffuse bilateral reticulonodular (tree-in-bud)   opacities and numerous scattered larger nodular opacities throughout both   lungs, not significantly changed. Bilateral lower lobe consolidations,   also unchanged. Tracheostomy tube in situ.  PLEURA: No pleural effusion.  VESSELS: Normal caliber aorta. Coronary artery calcifications.  HEART: Heart size is normal. No pericardial effusion.  MEDIASTINUM AND ANAMARIA: No lymphadenopathy.  CHESTWALL AND LOWER NECK: Within normal limits.    ABDOMEN AND PELVIS:    LIVER: Within normal limits.  BILE DUCTS: Normal caliber.  GALLBLADDER: Multiple layering gallstones.  SPLEEN: Within normal limits.  PANCREAS: Mild peripancreatic inflammatory changes, not significantly   changed. No discrete fluid collection or evidence of necrosis.  ADRENALS: Within normal limits.  KIDNEYS/URETERS: Within normal limits.    BLADDER: Within normal limits.  REPRODUCTIVE ORGANS: Prostate within normal limits.    BOWEL: No bowel obstruction. Appendix normal. Percutaneous gastrostomy   tube with tip in the stomach. Status post left lower quadrant loop   colostomy.  PERITONEUM: Trace ascites. No free intraperitoneal air.      VESSELS:  Within normal limits.  RETROPERITONEUM: No lymphadenopathy.    ABDOMINAL WALL: Within normal limits.  BONES: Unchanged sacral decubitus and left hip periarticular heterotopic   bone formation.    IMPRESSION:     Chest: Unchanged severe multifocal pneumonia.  Abdomen/pelvis: Unchanged mild pancreatitis.    < end of copied text >                  < from: CT Abdomen and Pelvis No Cont (05.08.19 @ 12:11) >  IMPRESSION:   CHEST: Extensive bilateral airspace disease suspicious for pneumonia.    ABDOMEN/PELVIS: Peripancreatic inflammatory change suspicious for   pancreatitis.  Marked fluid-filled distention of the stomach. Aspiration precautions are   recommended.    < end of copied text >  CODE STATUS: FULL RESCUSITATION

## 2019-05-18 NOTE — PROGRESS NOTE ADULT - SUBJECTIVE AND OBJECTIVE BOX
Events Overnight: wbc slightly inc    HPI:     72 y/o male chronic vent--s/p PEG for prob transversemyelitis--DC from  on 5/7 after being admitted with biliary sepsis and sacral OM. wbc was 10K PICC was placed and tx to NH on PO vanco/ tigecycline/cefepime.  He     presented  with unresponsiveness and pinpoint pupils.     ct showed pancreatitis  c. dif negative,    toleratiing tf still with persistent leukocytosis  , f/u ct shows only bibasilar infiltrates, repeat sputum culture sent abx. changed to tigrecyclin, meropenem  sputum with proteus    PMH:       as above    ROS difficult to obtain secndary to trach       MEDICATIONS  (STANDING):  ascorbic acid 500 milliGRAM(s) Oral daily  aspirin  chewable 81 milliGRAM(s) Oral daily  chlorhexidine 0.12% Liquid 15 milliLiter(s) Oral Mucosa two times a day  enoxaparin Injectable 40 milliGRAM(s) SubCutaneous daily  famotidine    Tablet 20 milliGRAM(s) Oral two times a day  fentaNYL   Patch  75 MICROgram(s)/Hr 1 Patch Transdermal every 72 hours  gabapentin 300 milliGRAM(s) Oral two times a day  lidocaine   Patch 1 Patch Transdermal daily  meropenem  IVPB 1000 milliGRAM(s) IV Intermittent every 8 hours  metoprolol tartrate 25 milliGRAM(s) Oral two times a day  multivitamin/minerals 1 Tablet(s) Oral daily  sertraline 50 milliGRAM(s) Oral daily  tigecycline IVPB 50 milliGRAM(s) IV Intermittent every 12 hours  vancomycin    Solution 125 milliGRAM(s) Oral every 6 hours    MEDICATIONS  (PRN):  acetaminophen   Tablet .. 650 milliGRAM(s) Oral every 6 hours PRN Temp greater or equal to 38C (100.4F), Moderate Pain (4 - 6)  ALPRAZolam 0.5 milliGRAM(s) Oral every 8 hours PRN anxiety      ICU Vital Signs Last 24 Hrs  T(C): 37.4 (18 May 2019 08:00), Max: 37.7 (18 May 2019 00:00)  T(F): 99.4 (18 May 2019 08:00), Max: 99.8 (18 May 2019 00:00)  HR: 98 (18 May 2019 08:00) (85 - 110)  BP: 110/70 (18 May 2019 08:00) (92/54 - 163/91)  BP(mean): 80 (18 May 2019 08:00) (63 - 108)  ABP: --  ABP(mean): --  RR: 23 (18 May 2019 08:00) (7 - 30)  SpO2: 98% (18 May 2019 08:00) (97% - 100%)      Mode: AC/ CMV (Assist Control/ Continuous Mandatory Ventilation)  RR (machine): 14  TV (machine): 500  FiO2: 40  PEEP: 5  ITime: 1  PIP: 30      I&O's Summary    17 May 2019 07:01  -  18 May 2019 07:00  --------------------------------------------------------  IN: 1962 mL / OUT: 2025 mL / NET: -63 mL      Physical Exam:       General awake, lethargic       HEENT - s/p trach        Lungs - clear        cv rrr       abdomen - soft, non tender       back sacral decub  approx 5 cm.                              8.8    26.84 )-----------( 427      ( 18 May 2019 06:48 )             29.8       05-17    137  |  101  |  20  ----------------------------<  255<H>  3.9   |  32<H>  |  0.33<L>    Ca    8.0<L>      17 May 2019 06:18      DVT Prophylaxis:   Lovenox

## 2019-05-19 LAB
ANION GAP SERPL CALC-SCNC: 4 MMOL/L — LOW (ref 5–17)
BUN SERPL-MCNC: 20 MG/DL — SIGNIFICANT CHANGE UP (ref 7–23)
CALCIUM SERPL-MCNC: 8.1 MG/DL — LOW (ref 8.5–10.1)
CHLORIDE SERPL-SCNC: 104 MMOL/L — SIGNIFICANT CHANGE UP (ref 96–108)
CO2 SERPL-SCNC: 30 MMOL/L — SIGNIFICANT CHANGE UP (ref 22–31)
CREAT SERPL-MCNC: 0.37 MG/DL — LOW (ref 0.5–1.3)
GLUCOSE SERPL-MCNC: 303 MG/DL — HIGH (ref 70–99)
HCT VFR BLD CALC: 29.6 % — LOW (ref 39–50)
HGB BLD-MCNC: 8.8 G/DL — LOW (ref 13–17)
MCHC RBC-ENTMCNC: 25.5 PG — LOW (ref 27–34)
MCHC RBC-ENTMCNC: 29.7 GM/DL — LOW (ref 32–36)
MCV RBC AUTO: 85.8 FL — SIGNIFICANT CHANGE UP (ref 80–100)
PLATELET # BLD AUTO: 403 K/UL — HIGH (ref 150–400)
POTASSIUM SERPL-MCNC: 4.2 MMOL/L — SIGNIFICANT CHANGE UP (ref 3.5–5.3)
POTASSIUM SERPL-SCNC: 4.2 MMOL/L — SIGNIFICANT CHANGE UP (ref 3.5–5.3)
RBC # BLD: 3.45 M/UL — LOW (ref 4.2–5.8)
RBC # FLD: 19.9 % — HIGH (ref 10.3–14.5)
SODIUM SERPL-SCNC: 138 MMOL/L — SIGNIFICANT CHANGE UP (ref 135–145)
WBC # BLD: 22.01 K/UL — HIGH (ref 3.8–10.5)
WBC # FLD AUTO: 22.01 K/UL — HIGH (ref 3.8–10.5)

## 2019-05-19 RX ADMIN — MEROPENEM 100 MILLIGRAM(S): 1 INJECTION INTRAVENOUS at 06:16

## 2019-05-19 RX ADMIN — Medication 125 MILLIGRAM(S): at 11:35

## 2019-05-19 RX ADMIN — Medication 25 MILLIGRAM(S): at 18:12

## 2019-05-19 RX ADMIN — MEROPENEM 100 MILLIGRAM(S): 1 INJECTION INTRAVENOUS at 21:14

## 2019-05-19 RX ADMIN — FAMOTIDINE 20 MILLIGRAM(S): 10 INJECTION INTRAVENOUS at 18:11

## 2019-05-19 RX ADMIN — LIDOCAINE 1 PATCH: 4 CREAM TOPICAL at 13:06

## 2019-05-19 RX ADMIN — FAMOTIDINE 20 MILLIGRAM(S): 10 INJECTION INTRAVENOUS at 06:16

## 2019-05-19 RX ADMIN — LIDOCAINE 1 PATCH: 4 CREAM TOPICAL at 21:27

## 2019-05-19 RX ADMIN — Medication 81 MILLIGRAM(S): at 11:34

## 2019-05-19 RX ADMIN — CHLORHEXIDINE GLUCONATE 15 MILLILITER(S): 213 SOLUTION TOPICAL at 18:12

## 2019-05-19 RX ADMIN — Medication 500 MILLIGRAM(S): at 11:34

## 2019-05-19 RX ADMIN — GABAPENTIN 300 MILLIGRAM(S): 400 CAPSULE ORAL at 06:14

## 2019-05-19 RX ADMIN — TIGECYCLINE 105 MILLIGRAM(S): 50 INJECTION, POWDER, LYOPHILIZED, FOR SOLUTION INTRAVENOUS at 06:14

## 2019-05-19 RX ADMIN — MEROPENEM 100 MILLIGRAM(S): 1 INJECTION INTRAVENOUS at 13:07

## 2019-05-19 RX ADMIN — Medication 1 TABLET(S): at 11:34

## 2019-05-19 RX ADMIN — GABAPENTIN 300 MILLIGRAM(S): 400 CAPSULE ORAL at 18:11

## 2019-05-19 RX ADMIN — ENOXAPARIN SODIUM 40 MILLIGRAM(S): 100 INJECTION SUBCUTANEOUS at 11:35

## 2019-05-19 RX ADMIN — Medication 25 MILLIGRAM(S): at 06:14

## 2019-05-19 RX ADMIN — Medication 125 MILLIGRAM(S): at 00:07

## 2019-05-19 RX ADMIN — CHLORHEXIDINE GLUCONATE 15 MILLILITER(S): 213 SOLUTION TOPICAL at 06:14

## 2019-05-19 RX ADMIN — Medication 125 MILLIGRAM(S): at 06:13

## 2019-05-19 RX ADMIN — Medication 125 MILLIGRAM(S): at 18:12

## 2019-05-19 RX ADMIN — Medication 0.5 MILLIGRAM(S): at 11:35

## 2019-05-19 RX ADMIN — SERTRALINE 50 MILLIGRAM(S): 25 TABLET, FILM COATED ORAL at 11:34

## 2019-05-19 NOTE — PROGRESS NOTE ADULT - SUBJECTIVE AND OBJECTIVE BOX
Events Overnight: wbc dec to 22, afebrile, sputum with esbl, ticarcillin d/c on meropenem for esbl in  sputum    HPI:      74 y/o male chronic vent--s/p PEG for prob transversemyelitis--DC from  on 5/7 after being admitted with biliary sepsis and sacral OM. wbc was 10K PICC was placed and tx to NH on PO vanco/ tigecycline/cefepime.  He     presented  with unresponsiveness and pinpoint pupils.     ct showed pancreatitis  c. dif negative,   toleratiing tf still with persistent leukocytosis  , f/u ct shows only bibasilar infiltrates, repeat sputum culture sent abx. changed to tigrecyclin, meropenem  sputum with proteus    PMH:       as above    MEDICATIONS  (STANDING):  ascorbic acid 500 milliGRAM(s) Oral daily  aspirin  chewable 81 milliGRAM(s) Oral daily  chlorhexidine 0.12% Liquid 15 milliLiter(s) Oral Mucosa two times a day  enoxaparin Injectable 40 milliGRAM(s) SubCutaneous daily  famotidine    Tablet 20 milliGRAM(s) Oral two times a day  fentaNYL   Patch  75 MICROgram(s)/Hr 1 Patch Transdermal every 72 hours  gabapentin 300 milliGRAM(s) Oral two times a day  lidocaine   Patch 1 Patch Transdermal daily  meropenem  IVPB 1000 milliGRAM(s) IV Intermittent every 8 hours  metoprolol tartrate 25 milliGRAM(s) Oral two times a day  multivitamin/minerals 1 Tablet(s) Oral daily  sertraline 50 milliGRAM(s) Oral daily  tigecycline IVPB 50 milliGRAM(s) IV Intermittent every 12 hours  vancomycin    Solution 125 milliGRAM(s) Oral every 6 hours    MEDICATIONS  (PRN):  acetaminophen   Tablet .. 650 milliGRAM(s) Oral every 6 hours PRN Temp greater or equal to 38C (100.4F), Moderate Pain (4 - 6)  ALPRAZolam 0.5 milliGRAM(s) Oral every 8 hours PRN anxiety      ICU Vital Signs Last 24 Hrs  T(C): 37.7 (19 May 2019 08:00), Max: 37.7 (19 May 2019 08:00)  T(F): 99.8 (19 May 2019 08:00), Max: 99.8 (19 May 2019 08:00)  HR: 88 (19 May 2019 11:11) (77 - 110)  BP: 149/81 (19 May 2019 08:00) (114/72 - 171/90)  BP(mean): 96 (19 May 2019 08:00) (68 - 111)  ABP: --  ABP(mean): --  RR: 21 (19 May 2019 08:00) (17 - 27)  SpO2: 99% (19 May 2019 11:11) (95% - 100%)      Mode: AC/ CMV (Assist Control/ Continuous Mandatory Ventilation)  RR (machine): 14  TV (machine): 500  FiO2: 40  PEEP: 5  ITime: 1  PIP: 30      I&O's Summary    18 May 2019 07:01  -  19 May 2019 07:00  --------------------------------------------------------  IN: 1900 mL / OUT: 2000 mL / NET: -100 mL      Physical Exam:     General - comfortable, no distress     HEENT nc/at     neuro - quadriparesis, slightly lethargic     lungs distant bs     cv rrr     abdomen s/p peg, tolerating feeds     back decu clean     ext - in splint                             8.8    22.01 )-----------( 403      ( 19 May 2019 06:54 )             29.6       05-19    138  |  104  |  20  ----------------------------<  303<H>  4.2   |  30  |  0.37<L>    Ca    8.1<L>      19 May 2019 06:54    DVT Prophylaxis:   lovenox                                                               Advanced Directives: full code

## 2019-05-19 NOTE — PROGRESS NOTE ADULT - SUBJECTIVE AND OBJECTIVE BOX
Date of service: 05-18-19 @ 13:17    Date of service: 05-19-19 @ 13:28        Patient lying in bed; afebrile, on ventilatory support    ROS: unable to obtain secondary to patient medical condition     MEDICATIONS  (STANDING):  ascorbic acid 500 milliGRAM(s) Oral daily  aspirin  chewable 81 milliGRAM(s) Oral daily  chlorhexidine 0.12% Liquid 15 milliLiter(s) Oral Mucosa two times a day  enoxaparin Injectable 40 milliGRAM(s) SubCutaneous daily  famotidine    Tablet 20 milliGRAM(s) Oral two times a day  fentaNYL   Patch  75 MICROgram(s)/Hr 1 Patch Transdermal every 72 hours  gabapentin 300 milliGRAM(s) Oral two times a day  lidocaine   Patch 1 Patch Transdermal daily  meropenem  IVPB 1000 milliGRAM(s) IV Intermittent every 8 hours  metoprolol tartrate 25 milliGRAM(s) Oral two times a day  multivitamin/minerals 1 Tablet(s) Oral daily  sertraline 50 milliGRAM(s) Oral daily  vancomycin    Solution 125 milliGRAM(s) Oral every 6 hours    MEDICATIONS  (PRN):  acetaminophen   Tablet .. 650 milliGRAM(s) Oral every 6 hours PRN Temp greater or equal to 38C (100.4F), Moderate Pain (4 - 6)  ALPRAZolam 0.5 milliGRAM(s) Oral every 8 hours PRN anxiety      Vital Signs Last 24 Hrs  T(C): 37.6 (19 May 2019 12:00), Max: 37.9 (19 May 2019 00:00)  T(F): 99.6 (19 May 2019 12:00), Max: 100.3 (19 May 2019 00:00)  HR: 90 (19 May 2019 12:00) (77 - 110)  BP: 143/71 (19 May 2019 12:00) (114/72 - 171/90)  BP(mean): 89 (19 May 2019 12:00) (68 - 111)  RR: 23 (19 May 2019 09:00) (17 - 27)  SpO2: 97% (19 May 2019 12:00) (96% - 100%)    Physical Exam:          Constitutional: frail looking  HEENT: NC/AT  Neck: trach in place  Respiratory: respiratory effort normal scattered coarse breath sounds  Cardiovascular: S1S2 regular, no murmurs  Abdomen: soft, not tender, ostomy in place  Musculoskeletal: no muscle tenderness, no joint swelling or tenderness  Extremities: no pedal edema  Neurological/ Psychiatric: on ventilator  Skin: no rashes; no palpable lesions    Labs: all available labs reviewed                  Labs:                        Labs:                     Labs:                        8.8    22.01 )-----------( 403      ( 19 May 2019 06:54 )             29.6     05-19    138  |  104  |  20  ----------------------------<  303<H>  4.2   |  30  |  0.37<L>    Ca    8.1<L>      19 May 2019 06:54             Cultures:       Culture - Sputum (collected 05-15-19 @ 16:45)  Source: .Sputum Sputum  Gram Stain (05-16-19 @ 14:03):    Numerous polymorphonuclear leukocytes seen per low power field    No squamous epithelial cells seen per low power field    Moderate Gram Negative Rods seen per oil power field  Final Report (05-18-19 @ 12:35):    Numerous Proteus mirabilis ESBL    No routine respiratory jocelynn Isolated  Organism: Proteus mirabilis ESBL (05-18-19 @ 12:35)  Organism: Proteus mirabilis ESBL (05-18-19 @ 12:35)      -  Amikacin: S <=8      -  Amoxicillin/Clavulanic Acid: S <=8/4      -  Ampicillin: R >16 These ampicillin results predict results for amoxicillin      -  Ampicillin/Sulbactam: R 8/4      -  Aztreonam: R >16      -  Cefazolin: R >16      -  Cefepime: R >16      -  Cefoxitin: S 8      -  Ceftriaxone: R >32 Enterobacter, Citrobacter, and Serratia may develop resistance during prolonged therapy      -  Ciprofloxacin: I 2      -  Ertapenem: S <=0.5      -  Gentamicin: S 4      -  Levofloxacin: S <=1      -  Meropenem: S <=1      -  Piperacillin/Tazobactam: R <=8      -  Tobramycin: S 4      -  Trimethoprim/Sulfamethoxazole: R >2/38      Method Type: AMIE            < from: CT Chest w/ IV Cont (05.15.19 @ 14:10) >    EXAM:  CT ABDOMEN AND PELVIS OC IC                          EXAM:  CT CHEST IC                            PROCEDURE DATE:  05/15/2019          INTERPRETATION:  CLINICAL INFORMATION: Unexplained leukocytosis.   Pancreatitis.    COMPARISON: May 08, 2019    PROCEDURE:   CT of the Chest, Abdomen and Pelvis was performed with intravenous   contrast.   Intravenous contrast: 90 ml Omnipaque 350. 10 ml discarded.  Oral contrast: Positive contrast was administered.  Sagittal and coronal reformats were performed.    FINDINGS:    CHEST:     LUNGS AND LARGE AIRWAYS: Diffuse bilateral reticulonodular (tree-in-bud)   opacities and numerous scattered larger nodular opacities throughout both   lungs, not significantly changed. Bilateral lower lobe consolidations,   also unchanged. Tracheostomy tube in situ.  PLEURA: No pleural effusion.  VESSELS: Normal caliber aorta. Coronary artery calcifications.  HEART: Heart size is normal. No pericardial effusion.  MEDIASTINUM AND ANAMARIA: No lymphadenopathy.  CHESTWALL AND LOWER NECK: Within normal limits.    ABDOMEN AND PELVIS:    LIVER: Within normal limits.  BILE DUCTS: Normal caliber.  GALLBLADDER: Multiple layering gallstones.  SPLEEN: Within normal limits.  PANCREAS: Mild peripancreatic inflammatory changes, not significantly   changed. No discrete fluid collection or evidence of necrosis.  ADRENALS: Within normal limits.  KIDNEYS/URETERS: Within normal limits.    BLADDER: Within normal limits.  REPRODUCTIVE ORGANS: Prostate within normal limits.    BOWEL: No bowel obstruction. Appendix normal. Percutaneous gastrostomy   tube with tip in the stomach. Status post left lower quadrant loop   colostomy.  PERITONEUM: Trace ascites. No free intraperitoneal air.      VESSELS:  Within normal limits.  RETROPERITONEUM: No lymphadenopathy.    ABDOMINAL WALL: Within normal limits.  BONES: Unchanged sacral decubitus and left hip periarticular heterotopic   bone formation.    IMPRESSION:     Chest: Unchanged severe multifocal pneumonia.  Abdomen/pelvis: Unchanged mild pancreatitis.    < end of copied text >                  < from: CT Abdomen and Pelvis No Cont (05.08.19 @ 12:11) >  IMPRESSION:   CHEST: Extensive bilateral airspace disease suspicious for pneumonia.    ABDOMEN/PELVIS: Peripancreatic inflammatory change suspicious for   pancreatitis.  Marked fluid-filled distention of the stomach. Aspiration precautions are   recommended.    < end of copied text >  CODE STATUS: FULL RESCUSITATION Date of service: 05-19-19 @ 13:28        Patient lying in bed; afebrile, on ventilatory support    ROS: unable to obtain secondary to patient medical condition     MEDICATIONS  (STANDING):  ascorbic acid 500 milliGRAM(s) Oral daily  aspirin  chewable 81 milliGRAM(s) Oral daily  chlorhexidine 0.12% Liquid 15 milliLiter(s) Oral Mucosa two times a day  enoxaparin Injectable 40 milliGRAM(s) SubCutaneous daily  famotidine    Tablet 20 milliGRAM(s) Oral two times a day  fentaNYL   Patch  75 MICROgram(s)/Hr 1 Patch Transdermal every 72 hours  gabapentin 300 milliGRAM(s) Oral two times a day  lidocaine   Patch 1 Patch Transdermal daily  meropenem  IVPB 1000 milliGRAM(s) IV Intermittent every 8 hours  metoprolol tartrate 25 milliGRAM(s) Oral two times a day  multivitamin/minerals 1 Tablet(s) Oral daily  sertraline 50 milliGRAM(s) Oral daily  vancomycin    Solution 125 milliGRAM(s) Oral every 6 hours    MEDICATIONS  (PRN):  acetaminophen   Tablet .. 650 milliGRAM(s) Oral every 6 hours PRN Temp greater or equal to 38C (100.4F), Moderate Pain (4 - 6)  ALPRAZolam 0.5 milliGRAM(s) Oral every 8 hours PRN anxiety      Vital Signs Last 24 Hrs  T(C): 37.6 (19 May 2019 12:00), Max: 37.9 (19 May 2019 00:00)  T(F): 99.6 (19 May 2019 12:00), Max: 100.3 (19 May 2019 00:00)  HR: 90 (19 May 2019 12:00) (77 - 110)  BP: 143/71 (19 May 2019 12:00) (114/72 - 171/90)  BP(mean): 89 (19 May 2019 12:00) (68 - 111)  RR: 23 (19 May 2019 09:00) (17 - 27)  SpO2: 97% (19 May 2019 12:00) (96% - 100%)    Physical Exam:          Constitutional: frail looking  HEENT: NC/AT  Neck: trach in place  Respiratory: respiratory effort normal scattered coarse breath sounds  Cardiovascular: S1S2 regular, no murmurs  Abdomen: soft, not tender, ostomy in place  Musculoskeletal: no muscle tenderness, no joint swelling or tenderness  Extremities: no pedal edema  Neurological/ Psychiatric: on ventilator  Skin: no rashes; no palpable lesions    Labs: all available labs reviewed                  Labs:                        Labs:                     Labs:                        8.8    22.01 )-----------( 403      ( 19 May 2019 06:54 )             29.6     05-19    138  |  104  |  20  ----------------------------<  303<H>  4.2   |  30  |  0.37<L>    Ca    8.1<L>      19 May 2019 06:54             Cultures:       Culture - Sputum (collected 05-15-19 @ 16:45)  Source: .Sputum Sputum  Gram Stain (05-16-19 @ 14:03):    Numerous polymorphonuclear leukocytes seen per low power field    No squamous epithelial cells seen per low power field    Moderate Gram Negative Rods seen per oil power field  Final Report (05-18-19 @ 12:35):    Numerous Proteus mirabilis ESBL    No routine respiratory jocelynn Isolated  Organism: Proteus mirabilis ESBL (05-18-19 @ 12:35)  Organism: Proteus mirabilis ESBL (05-18-19 @ 12:35)      -  Amikacin: S <=8      -  Amoxicillin/Clavulanic Acid: S <=8/4      -  Ampicillin: R >16 These ampicillin results predict results for amoxicillin      -  Ampicillin/Sulbactam: R 8/4      -  Aztreonam: R >16      -  Cefazolin: R >16      -  Cefepime: R >16      -  Cefoxitin: S 8      -  Ceftriaxone: R >32 Enterobacter, Citrobacter, and Serratia may develop resistance during prolonged therapy      -  Ciprofloxacin: I 2      -  Ertapenem: S <=0.5      -  Gentamicin: S 4      -  Levofloxacin: S <=1      -  Meropenem: S <=1      -  Piperacillin/Tazobactam: R <=8      -  Tobramycin: S 4      -  Trimethoprim/Sulfamethoxazole: R >2/38      Method Type: AMIE            < from: CT Chest w/ IV Cont (05.15.19 @ 14:10) >    EXAM:  CT ABDOMEN AND PELVIS OC IC                          EXAM:  CT CHEST IC                            PROCEDURE DATE:  05/15/2019          INTERPRETATION:  CLINICAL INFORMATION: Unexplained leukocytosis.   Pancreatitis.    COMPARISON: May 08, 2019    PROCEDURE:   CT of the Chest, Abdomen and Pelvis was performed with intravenous   contrast.   Intravenous contrast: 90 ml Omnipaque 350. 10 ml discarded.  Oral contrast: Positive contrast was administered.  Sagittal and coronal reformats were performed.    FINDINGS:    CHEST:     LUNGS AND LARGE AIRWAYS: Diffuse bilateral reticulonodular (tree-in-bud)   opacities and numerous scattered larger nodular opacities throughout both   lungs, not significantly changed. Bilateral lower lobe consolidations,   also unchanged. Tracheostomy tube in situ.  PLEURA: No pleural effusion.  VESSELS: Normal caliber aorta. Coronary artery calcifications.  HEART: Heart size is normal. No pericardial effusion.  MEDIASTINUM AND ANAMARIA: No lymphadenopathy.  CHESTWALL AND LOWER NECK: Within normal limits.    ABDOMEN AND PELVIS:    LIVER: Within normal limits.  BILE DUCTS: Normal caliber.  GALLBLADDER: Multiple layering gallstones.  SPLEEN: Within normal limits.  PANCREAS: Mild peripancreatic inflammatory changes, not significantly   changed. No discrete fluid collection or evidence of necrosis.  ADRENALS: Within normal limits.  KIDNEYS/URETERS: Within normal limits.    BLADDER: Within normal limits.  REPRODUCTIVE ORGANS: Prostate within normal limits.    BOWEL: No bowel obstruction. Appendix normal. Percutaneous gastrostomy   tube with tip in the stomach. Status post left lower quadrant loop   colostomy.  PERITONEUM: Trace ascites. No free intraperitoneal air.      VESSELS:  Within normal limits.  RETROPERITONEUM: No lymphadenopathy.    ABDOMINAL WALL: Within normal limits.  BONES: Unchanged sacral decubitus and left hip periarticular heterotopic   bone formation.    IMPRESSION:     Chest: Unchanged severe multifocal pneumonia.  Abdomen/pelvis: Unchanged mild pancreatitis.    < end of copied text >                  < from: CT Abdomen and Pelvis No Cont (05.08.19 @ 12:11) >  IMPRESSION:   CHEST: Extensive bilateral airspace disease suspicious for pneumonia.    ABDOMEN/PELVIS: Peripancreatic inflammatory change suspicious for   pancreatitis.  Marked fluid-filled distention of the stomach. Aspiration precautions are   recommended.    < end of copied text >  CODE STATUS: FULL RESCUSITATION

## 2019-05-20 LAB
HCT VFR BLD CALC: 28.2 % — LOW (ref 39–50)
HGB BLD-MCNC: 8.3 G/DL — LOW (ref 13–17)
MCHC RBC-ENTMCNC: 25.2 PG — LOW (ref 27–34)
MCHC RBC-ENTMCNC: 29.4 GM/DL — LOW (ref 32–36)
MCV RBC AUTO: 85.7 FL — SIGNIFICANT CHANGE UP (ref 80–100)
PLATELET # BLD AUTO: 387 K/UL — SIGNIFICANT CHANGE UP (ref 150–400)
RBC # BLD: 3.29 M/UL — LOW (ref 4.2–5.8)
RBC # FLD: 19.6 % — HIGH (ref 10.3–14.5)
WBC # BLD: 22.81 K/UL — HIGH (ref 3.8–10.5)
WBC # FLD AUTO: 22.81 K/UL — HIGH (ref 3.8–10.5)

## 2019-05-20 RX ORDER — ALPRAZOLAM 0.25 MG
0.5 TABLET ORAL EVERY 8 HOURS
Refills: 0 | Status: DISCONTINUED | OUTPATIENT
Start: 2019-05-20 | End: 2019-05-24

## 2019-05-20 RX ADMIN — GABAPENTIN 300 MILLIGRAM(S): 400 CAPSULE ORAL at 05:05

## 2019-05-20 RX ADMIN — CHLORHEXIDINE GLUCONATE 15 MILLILITER(S): 213 SOLUTION TOPICAL at 18:15

## 2019-05-20 RX ADMIN — Medication 125 MILLIGRAM(S): at 00:22

## 2019-05-20 RX ADMIN — Medication 125 MILLIGRAM(S): at 23:31

## 2019-05-20 RX ADMIN — FAMOTIDINE 20 MILLIGRAM(S): 10 INJECTION INTRAVENOUS at 18:14

## 2019-05-20 RX ADMIN — Medication 1 TABLET(S): at 12:12

## 2019-05-20 RX ADMIN — LIDOCAINE 1 PATCH: 4 CREAM TOPICAL at 18:15

## 2019-05-20 RX ADMIN — Medication 0.5 MILLIGRAM(S): at 21:09

## 2019-05-20 RX ADMIN — Medication 25 MILLIGRAM(S): at 05:05

## 2019-05-20 RX ADMIN — Medication 25 MILLIGRAM(S): at 18:15

## 2019-05-20 RX ADMIN — FAMOTIDINE 20 MILLIGRAM(S): 10 INJECTION INTRAVENOUS at 05:04

## 2019-05-20 RX ADMIN — LIDOCAINE 1 PATCH: 4 CREAM TOPICAL at 12:12

## 2019-05-20 RX ADMIN — MEROPENEM 100 MILLIGRAM(S): 1 INJECTION INTRAVENOUS at 15:53

## 2019-05-20 RX ADMIN — MEROPENEM 100 MILLIGRAM(S): 1 INJECTION INTRAVENOUS at 05:04

## 2019-05-20 RX ADMIN — ENOXAPARIN SODIUM 40 MILLIGRAM(S): 100 INJECTION SUBCUTANEOUS at 12:12

## 2019-05-20 RX ADMIN — SERTRALINE 50 MILLIGRAM(S): 25 TABLET, FILM COATED ORAL at 12:12

## 2019-05-20 RX ADMIN — Medication 125 MILLIGRAM(S): at 12:11

## 2019-05-20 RX ADMIN — GABAPENTIN 300 MILLIGRAM(S): 400 CAPSULE ORAL at 18:14

## 2019-05-20 RX ADMIN — Medication 500 MILLIGRAM(S): at 12:11

## 2019-05-20 RX ADMIN — LIDOCAINE 1 PATCH: 4 CREAM TOPICAL at 00:23

## 2019-05-20 RX ADMIN — MEROPENEM 100 MILLIGRAM(S): 1 INJECTION INTRAVENOUS at 21:09

## 2019-05-20 RX ADMIN — Medication 81 MILLIGRAM(S): at 12:11

## 2019-05-20 RX ADMIN — Medication 125 MILLIGRAM(S): at 18:14

## 2019-05-20 RX ADMIN — Medication 125 MILLIGRAM(S): at 05:04

## 2019-05-20 RX ADMIN — CHLORHEXIDINE GLUCONATE 15 MILLILITER(S): 213 SOLUTION TOPICAL at 05:05

## 2019-05-20 NOTE — PROGRESS NOTE ADULT - SUBJECTIVE AND OBJECTIVE BOX
Date of service: 05-20-19 @ 12:18    Patient lying in bed; on ventilatory  support  Wants the wires of icu monitor removed      ROS unable to obtain secondary to patient medical condition     MEDICATIONS  (STANDING):  ascorbic acid 500 milliGRAM(s) Oral daily  aspirin  chewable 81 milliGRAM(s) Oral daily  chlorhexidine 0.12% Liquid 15 milliLiter(s) Oral Mucosa two times a day  enoxaparin Injectable 40 milliGRAM(s) SubCutaneous daily  famotidine    Tablet 20 milliGRAM(s) Oral two times a day  fentaNYL   Patch  75 MICROgram(s)/Hr 1 Patch Transdermal every 72 hours  gabapentin 300 milliGRAM(s) Oral two times a day  lidocaine   Patch 1 Patch Transdermal daily  meropenem  IVPB 1000 milliGRAM(s) IV Intermittent every 8 hours  metoprolol tartrate 25 milliGRAM(s) Oral two times a day  multivitamin/minerals 1 Tablet(s) Oral daily  sertraline 50 milliGRAM(s) Oral daily  vancomycin    Solution 125 milliGRAM(s) Oral every 6 hours    MEDICATIONS  (PRN):  acetaminophen   Tablet .. 650 milliGRAM(s) Oral every 6 hours PRN Temp greater or equal to 38C (100.4F), Moderate Pain (4 - 6)      Vital Signs Last 24 Hrs  T(C): 37.7 (20 May 2019 04:00), Max: 37.8 (19 May 2019 20:00)  T(F): 99.9 (20 May 2019 04:00), Max: 100.1 (19 May 2019 20:00)  HR: 89 (20 May 2019 12:00) (79 - 100)  BP: 145/74 (20 May 2019 12:00) (113/76 - 145/74)  BP(mean): 90 (20 May 2019 12:00) (74 - 101)  RR: 23 (20 May 2019 12:00) (18 - 27)  SpO2: 99% (20 May 2019 09:00) (96% - 100%)    Physical Exam:          Constitutional: frail looking  HEENT: NC/AT  Neck: trach in place  Respiratory: respiratory effort normal scattered coarse breath sounds  Cardiovascular: S1S2 regular, no murmurs  Abdomen: soft, not tender, ostomy in place  Musculoskeletal: no muscle tenderness, no joint swelling or tenderness  Extremities: no pedal edema  Neurological/ Psychiatric: on ventilator  Skin: no rashes; no palpable lesions    Labs: all available labs reviewed                  Labs:                        Labs:                     Labs:                        Labs:                        8.3    22.81 )-----------( 387      ( 20 May 2019 07:14 )             28.2     05-19    138  |  104  |  20  ----------------------------<  303<H>  4.2   |  30  |  0.37<L>    Ca    8.1<L>      19 May 2019 06:54             Cultures:       Culture - Sputum (collected 05-15-19 @ 16:45)  Source: .Sputum Sputum  Gram Stain (05-16-19 @ 14:03):    Numerous polymorphonuclear leukocytes seen per low power field    No squamous epithelial cells seen per low power field    Moderate Gram Negative Rods seen per oil power field  Final Report (05-18-19 @ 12:35):    Numerous Proteus mirabilis ESBL    No routine respiratory jocelynn Isolated  Organism: Proteus mirabilis ESBL (05-18-19 @ 12:35)  Organism: Proteus mirabilis ESBL (05-18-19 @ 12:35)      -  Amikacin: S <=8      -  Amoxicillin/Clavulanic Acid: S <=8/4      -  Ampicillin: R >16 These ampicillin results predict results for amoxicillin      -  Ampicillin/Sulbactam: R 8/4      -  Aztreonam: R >16      -  Cefazolin: R >16      -  Cefepime: R >16      -  Cefoxitin: S 8      -  Ceftriaxone: R >32 Enterobacter, Citrobacter, and Serratia may develop resistance during prolonged therapy      -  Ciprofloxacin: I 2      -  Ertapenem: S <=0.5      -  Gentamicin: S 4      -  Levofloxacin: S <=1      -  Meropenem: S <=1      -  Piperacillin/Tazobactam: R <=8      -  Tobramycin: S 4      -  Trimethoprim/Sulfamethoxazole: R >2/38      Method Type: AMIE          < from: CT Chest w/ IV Cont (05.15.19 @ 14:10) >    EXAM:  CT ABDOMEN AND PELVIS OC IC                          EXAM:  CT CHEST IC                            PROCEDURE DATE:  05/15/2019          INTERPRETATION:  CLINICAL INFORMATION: Unexplained leukocytosis.   Pancreatitis.    COMPARISON: May 08, 2019    PROCEDURE:   CT of the Chest, Abdomen and Pelvis was performed with intravenous   contrast.   Intravenous contrast: 90 ml Omnipaque 350. 10 ml discarded.  Oral contrast: Positive contrast was administered.  Sagittal and coronal reformats were performed.    FINDINGS:    CHEST:     LUNGS AND LARGE AIRWAYS: Diffuse bilateral reticulonodular (tree-in-bud)   opacities and numerous scattered larger nodular opacities throughout both   lungs, not significantly changed. Bilateral lower lobe consolidations,   also unchanged. Tracheostomy tube in situ.  PLEURA: No pleural effusion.  VESSELS: Normal caliber aorta. Coronary artery calcifications.  HEART: Heart size is normal. No pericardial effusion.  MEDIASTINUM AND ANAMARIA: No lymphadenopathy.  CHESTWALL AND LOWER NECK: Within normal limits.    ABDOMEN AND PELVIS:    LIVER: Within normal limits.  BILE DUCTS: Normal caliber.  GALLBLADDER: Multiple layering gallstones.  SPLEEN: Within normal limits.  PANCREAS: Mild peripancreatic inflammatory changes, not significantly   changed. No discrete fluid collection or evidence of necrosis.  ADRENALS: Within normal limits.  KIDNEYS/URETERS: Within normal limits.    BLADDER: Within normal limits.  REPRODUCTIVE ORGANS: Prostate within normal limits.    BOWEL: No bowel obstruction. Appendix normal. Percutaneous gastrostomy   tube with tip in the stomach. Status post left lower quadrant loop   colostomy.  PERITONEUM: Trace ascites. No free intraperitoneal air.      VESSELS:  Within normal limits.  RETROPERITONEUM: No lymphadenopathy.    ABDOMINAL WALL: Within normal limits.  BONES: Unchanged sacral decubitus and left hip periarticular heterotopic   bone formation.    IMPRESSION:     Chest: Unchanged severe multifocal pneumonia.  Abdomen/pelvis: Unchanged mild pancreatitis.    < end of copied text >                  < from: CT Abdomen and Pelvis No Cont (05.08.19 @ 12:11) >  IMPRESSION:   CHEST: Extensive bilateral airspace disease suspicious for pneumonia.    ABDOMEN/PELVIS: Peripancreatic inflammatory change suspicious for   pancreatitis.  Marked fluid-filled distention of the stomach. Aspiration precautions are   recommended.    < end of copied text >  CODE STATUS: FULL RESCUSITATION

## 2019-05-20 NOTE — PROGRESS NOTE ADULT - SUBJECTIVE AND OBJECTIVE BOX
Events Overnight:  Patient more awaek, no complaints, remains on vent, tax 100, wbc 22    HPI:     74 y/o male chronic vent--s/p PEG for prob transversemyelitis--DC from  on 5/7 after being admitted with biliary sepsis and sacral OM. wbc was 10K PICC was placed and tx to NH on PO vanco/ tigecycline/cefepime.  He     presented  with unresponsiveness and pinpoint pupils.     ct showed pancreatitis  c. dif negative,   toleratiing tf still with persistent leukocytosis  , f/u ct shows only bibasilar infiltrates, on meropenem esbl in sputum  sputum with proteus    PMH:       as above     ROS cant obtain secondary to trach       MEDICATIONS  (STANDING):  ascorbic acid 500 milliGRAM(s) Oral daily  aspirin  chewable 81 milliGRAM(s) Oral daily  chlorhexidine 0.12% Liquid 15 milliLiter(s) Oral Mucosa two times a day  enoxaparin Injectable 40 milliGRAM(s) SubCutaneous daily  famotidine    Tablet 20 milliGRAM(s) Oral two times a day  fentaNYL   Patch  75 MICROgram(s)/Hr 1 Patch Transdermal every 72 hours  gabapentin 300 milliGRAM(s) Oral two times a day  lidocaine   Patch 1 Patch Transdermal daily  meropenem  IVPB 1000 milliGRAM(s) IV Intermittent every 8 hours  metoprolol tartrate 25 milliGRAM(s) Oral two times a day  multivitamin/minerals 1 Tablet(s) Oral daily  sertraline 50 milliGRAM(s) Oral daily  vancomycin    Solution 125 milliGRAM(s) Oral every 6 hours    MEDICATIONS  (PRN):  acetaminophen   Tablet .. 650 milliGRAM(s) Oral every 6 hours PRN Temp greater or equal to 38C (100.4F), Moderate Pain (4 - 6)        ICU Vital Signs Last 24 Hrs  T(C): 37.7 (20 May 2019 04:00), Max: 37.8 (19 May 2019 20:00)  T(F): 99.9 (20 May 2019 04:00), Max: 100.1 (19 May 2019 20:00)  HR: 92 (20 May 2019 11:00) (79 - 100)  BP: 113/76 (20 May 2019 11:00) (113/76 - 143/71)  BP(mean): 84 (20 May 2019 11:00) (74 - 101)  ABP: --  ABP(mean): --  RR: 23 (20 May 2019 11:00) (18 - 27)  SpO2: 99% (20 May 2019 09:00) (96% - 100%)      Mode: AC/ CMV (Assist Control/ Continuous Mandatory Ventilation)  RR (machine): 14  TV (machine): 500  FiO2: 40  PEEP: 5  ITime: 1  PIP: 34      I&O's Summary    19 May 2019 07:01  -  20 May 2019 07:00  --------------------------------------------------------  IN: 1786 mL / OUT: 2025 mL / NET: -239 mL      Physical Exam:     General more alert today     HEENT nc/at     neck trach     lungs clear distant bs     cv rrr     abdomen - s/p peg soft      ext edema     back 5 cm decubi, exudate but no pus                           8.3    22.81 )-----------( 387      ( 20 May 2019 07:14 )             28.2       05-19    138  |  104  |  20  ----------------------------<  303<H>  4.2   |  30  |  0.37<L>    Ca    8.1<L>      19 May 2019 06:54    DVT Prophylaxis:   Enoxaparin

## 2019-05-20 NOTE — PROGRESS NOTE ADULT - SUBJECTIVE AND OBJECTIVE BOX
GI on call    pt's PEG noted to fall out this evening  I replaced with 18 Fr beltrán catheter as replacement PEG not avail in ICU  OK to use for feeds if needed overnight  Dr. Garza to resume care in AM, will need to replace with replacement PEG  d/w RN at  bedside

## 2019-05-20 NOTE — PROVIDER CONTACT NOTE (OTHER) - ASSESSMENT
Assessed at start of shift, found with PEG removed. Area cleansed and gauze applied. Called GI and assessed. Placed temporary PEG using 18 Syriac Iniguez.  Placement verified using air instilled. Ok to use for feedings/meds per GI. Pt in no acute distress. Spouse(HCP) notified, provided RN with information pt has removed PEG in the past. Ativan reordered for anxiety.

## 2019-05-20 NOTE — CHART NOTE - NSCHARTNOTEFT_GEN_A_CORE
Assessment:   *pending possible d/c back to SNF.  *pt tolerating TF: per pump, running at 55mL/hr.  Per flowsheet, pt has received an average of 1118mL/day of formula over the past three days.  With prosource provided ~1757Kcal, 114g protein, and 849mL free water.  Meeting ~100% of estimated calorie/protein needs.  additional free water flush ordered of 25mL/hr =  550mL free water.  *(+) moderate generalized and severe Lt/Rt arm edema.  (+) colostomy output.      Recommendations:  1) c/w Glucerna 1.5 @ 55mL/hr with 2pkts prosource TF  2) monitor hydration status; adjust free water flushes prn  3) weekly wt checks      Diet Prescription: Diet, NPO with Tube Feed:   Tube Feeding Modality: Gastrostomy  Glucerna 1.5 Beau (GLUCERNA1.5)  Total Volume for 24 Hours (mL): 1320  Continuous  Starting Tube Feed Rate {mL per Hour}: 20  Increase Tube Feed Rate by (mL): 10     Every 4 hours  Until Goal Tube Feed Rate (mL per Hour): 55  Tube Feed Duration (in Hours): 24  Tube Feed Start Time: 00:00  No Carb Prosource TF     Qty per Day:  2 (05-10-19 @ 11:17)      Wt Hx:  82Kg (5/16)  87.9Kg (5/12)  Weight (kg): 78 (05-08-19 @ 11:04), 104.3 (04-24-19 @ 19:27)        Estimated Needs:   [x] no change since previous assessment  Estimated Energy Needs (25-30 calories/kg):  · Weight  (lbs) 145.5 lb  · Weight (kg) 66 kg  · From (25cal/kg) 1650 To (30cal/kg) 1980 Kcal    Other Calculation:  · Other Calculation  Ht. 69 "     Wt. 86.2 Kg       28 BMI       IBW 66 Kg      Pt is at 118  %  IBW    Estimated Protein Needs (1.8-2.0 gm/kg):  · Weight  (lbs) 145.5  · Weight (kg) 66 kg  · From (1.8 g/kg) 118.8 To (2.0 g/kg) 132 g protein    Nutrition Diagnostic #1:  · Nutrition Diagnostic Terminology #1: Nutrient  · Nutrient: Increased nutrient needs (specify)  · Etiology: increased demand for protein due to wound healing  · Signs/Symptoms: stage 4 PU  · Nutrition Intervention: Enteral Nutrition  · Enteral Nutrition: Composition; Concentration; Rate; when feasible, resume TF with Glucerna 1.5 @ 20mL/hr and titrate TF rate with tolerance to goal rate of 55mL/hr with 2pkts prosource TF (=1895Kcal, 122g protein, and 918mL free water)  · Goal/Expected Outcome: pt resume TF and meet >80% of estimated nutr needs      Nutrition Diagnosis is [ ] ongoing  [ ] resolved [ ] not applicable         New Nutrition Diagnosis: [ ] not applicable         Pertinent Medications: MEDICATIONS  (STANDING):  ascorbic acid 500 milliGRAM(s) Oral daily  aspirin  chewable 81 milliGRAM(s) Oral daily  chlorhexidine 0.12% Liquid 15 milliLiter(s) Oral Mucosa two times a day  enoxaparin Injectable 40 milliGRAM(s) SubCutaneous daily  famotidine    Tablet 20 milliGRAM(s) Oral two times a day  fentaNYL   Patch  75 MICROgram(s)/Hr 1 Patch Transdermal every 72 hours  gabapentin 300 milliGRAM(s) Oral two times a day  lidocaine   Patch 1 Patch Transdermal daily  meropenem  IVPB 1000 milliGRAM(s) IV Intermittent every 8 hours  metoprolol tartrate 25 milliGRAM(s) Oral two times a day  multivitamin/minerals 1 Tablet(s) Oral daily  sertraline 50 milliGRAM(s) Oral daily  vancomycin    Solution 125 milliGRAM(s) Oral every 6 hours    MEDICATIONS  (PRN):  acetaminophen   Tablet .. 650 milliGRAM(s) Oral every 6 hours PRN Temp greater or equal to 38C (100.4F), Moderate Pain (4 - 6)    Pertinent Labs: 05-19 Na138 mmol/L Glu 303 mg/dL<H> K+ 4.2 mmol/L Cr  0.37 mg/dL<L> BUN 20 mg/dL 04-25 [id number] 6.6 %<H> 05-09 Chol --    LDL --    HDL --    Trig 100 mg/dL    Skin: marcos score = 11  PU:  stage 4 on sacrum  stage 2: Rt ankle    Monitoring and Evaluation:   [x] PO intake/Nutr support infusion [ x ] Tolerance to Nutr [ x ] weights [ x ] labs[ x ] follow up per protocol  [ ] other:

## 2019-05-21 LAB
ANION GAP SERPL CALC-SCNC: 5 MMOL/L — SIGNIFICANT CHANGE UP (ref 5–17)
BUN SERPL-MCNC: 15 MG/DL — SIGNIFICANT CHANGE UP (ref 7–23)
CALCIUM SERPL-MCNC: 7.9 MG/DL — LOW (ref 8.5–10.1)
CHLORIDE SERPL-SCNC: 104 MMOL/L — SIGNIFICANT CHANGE UP (ref 96–108)
CO2 SERPL-SCNC: 29 MMOL/L — SIGNIFICANT CHANGE UP (ref 22–31)
CREAT SERPL-MCNC: 0.41 MG/DL — LOW (ref 0.5–1.3)
GLUCOSE SERPL-MCNC: 337 MG/DL — HIGH (ref 70–99)
HCT VFR BLD CALC: 29.8 % — LOW (ref 39–50)
HGB BLD-MCNC: 8.7 G/DL — LOW (ref 13–17)
MCHC RBC-ENTMCNC: 25.1 PG — LOW (ref 27–34)
MCHC RBC-ENTMCNC: 29.2 GM/DL — LOW (ref 32–36)
MCV RBC AUTO: 86.1 FL — SIGNIFICANT CHANGE UP (ref 80–100)
PLATELET # BLD AUTO: 371 K/UL — SIGNIFICANT CHANGE UP (ref 150–400)
POTASSIUM SERPL-MCNC: 4 MMOL/L — SIGNIFICANT CHANGE UP (ref 3.5–5.3)
POTASSIUM SERPL-SCNC: 4 MMOL/L — SIGNIFICANT CHANGE UP (ref 3.5–5.3)
RBC # BLD: 3.46 M/UL — LOW (ref 4.2–5.8)
RBC # FLD: 19.3 % — HIGH (ref 10.3–14.5)
SODIUM SERPL-SCNC: 138 MMOL/L — SIGNIFICANT CHANGE UP (ref 135–145)
WBC # BLD: 22.1 K/UL — HIGH (ref 3.8–10.5)
WBC # FLD AUTO: 22.1 K/UL — HIGH (ref 3.8–10.5)

## 2019-05-21 RX ADMIN — Medication 125 MILLIGRAM(S): at 12:52

## 2019-05-21 RX ADMIN — CHLORHEXIDINE GLUCONATE 15 MILLILITER(S): 213 SOLUTION TOPICAL at 05:44

## 2019-05-21 RX ADMIN — FAMOTIDINE 20 MILLIGRAM(S): 10 INJECTION INTRAVENOUS at 05:43

## 2019-05-21 RX ADMIN — LIDOCAINE 1 PATCH: 4 CREAM TOPICAL at 23:29

## 2019-05-21 RX ADMIN — LIDOCAINE 1 PATCH: 4 CREAM TOPICAL at 18:14

## 2019-05-21 RX ADMIN — Medication 1 TABLET(S): at 12:51

## 2019-05-21 RX ADMIN — LIDOCAINE 1 PATCH: 4 CREAM TOPICAL at 00:47

## 2019-05-21 RX ADMIN — GABAPENTIN 300 MILLIGRAM(S): 400 CAPSULE ORAL at 05:43

## 2019-05-21 RX ADMIN — Medication 0.5 MILLIGRAM(S): at 15:09

## 2019-05-21 RX ADMIN — Medication 0.5 MILLIGRAM(S): at 21:06

## 2019-05-21 RX ADMIN — LIDOCAINE 1 PATCH: 4 CREAM TOPICAL at 12:52

## 2019-05-21 RX ADMIN — FENTANYL CITRATE 1 PATCH: 50 INJECTION INTRAVENOUS at 23:42

## 2019-05-21 RX ADMIN — Medication 25 MILLIGRAM(S): at 18:14

## 2019-05-21 RX ADMIN — MEROPENEM 100 MILLIGRAM(S): 1 INJECTION INTRAVENOUS at 15:09

## 2019-05-21 RX ADMIN — CHLORHEXIDINE GLUCONATE 15 MILLILITER(S): 213 SOLUTION TOPICAL at 18:14

## 2019-05-21 RX ADMIN — MEROPENEM 100 MILLIGRAM(S): 1 INJECTION INTRAVENOUS at 21:06

## 2019-05-21 RX ADMIN — Medication 125 MILLIGRAM(S): at 23:28

## 2019-05-21 RX ADMIN — Medication 25 MILLIGRAM(S): at 05:43

## 2019-05-21 RX ADMIN — Medication 125 MILLIGRAM(S): at 05:44

## 2019-05-21 RX ADMIN — Medication 0.5 MILLIGRAM(S): at 05:43

## 2019-05-21 RX ADMIN — SERTRALINE 50 MILLIGRAM(S): 25 TABLET, FILM COATED ORAL at 12:51

## 2019-05-21 RX ADMIN — MEROPENEM 100 MILLIGRAM(S): 1 INJECTION INTRAVENOUS at 05:43

## 2019-05-21 RX ADMIN — GABAPENTIN 300 MILLIGRAM(S): 400 CAPSULE ORAL at 18:14

## 2019-05-21 RX ADMIN — ENOXAPARIN SODIUM 40 MILLIGRAM(S): 100 INJECTION SUBCUTANEOUS at 12:52

## 2019-05-21 RX ADMIN — Medication 81 MILLIGRAM(S): at 12:52

## 2019-05-21 RX ADMIN — FAMOTIDINE 20 MILLIGRAM(S): 10 INJECTION INTRAVENOUS at 18:13

## 2019-05-21 RX ADMIN — Medication 500 MILLIGRAM(S): at 12:51

## 2019-05-21 RX ADMIN — Medication 125 MILLIGRAM(S): at 18:13

## 2019-05-21 NOTE — PROGRESS NOTE ADULT - SUBJECTIVE AND OBJECTIVE BOX
Patient is a 63y old  Male who presents with a chief complaint of inc wbc, inc lactate, possible pancreatitis (20 May 2019 21:10)      Subective:  PEG dislodged last night, replaced with beltrán    PAST MEDICAL & SURGICAL HISTORY:  Pneumonia  UTI (urinary tract infection)  H/O quadriplegia  Essential hypertension  Paralysis  Transverse myelitis  S/P colostomy  PEG (percutaneous endoscopic gastrostomy) status  History of tracheostomy      MEDICATIONS  (STANDING):  ALPRAZolam 0.5 milliGRAM(s) Oral every 8 hours  ascorbic acid 500 milliGRAM(s) Oral daily  aspirin  chewable 81 milliGRAM(s) Oral daily  chlorhexidine 0.12% Liquid 15 milliLiter(s) Oral Mucosa two times a day  enoxaparin Injectable 40 milliGRAM(s) SubCutaneous daily  famotidine    Tablet 20 milliGRAM(s) Oral two times a day  fentaNYL   Patch  75 MICROgram(s)/Hr 1 Patch Transdermal every 72 hours  gabapentin 300 milliGRAM(s) Oral two times a day  lidocaine   Patch 1 Patch Transdermal daily  meropenem  IVPB 1000 milliGRAM(s) IV Intermittent every 8 hours  metoprolol tartrate 25 milliGRAM(s) Oral two times a day  multivitamin/minerals 1 Tablet(s) Oral daily  sertraline 50 milliGRAM(s) Oral daily  vancomycin    Solution 125 milliGRAM(s) Oral every 6 hours    MEDICATIONS  (PRN):  acetaminophen   Tablet .. 650 milliGRAM(s) Oral every 6 hours PRN Temp greater or equal to 38C (100.4F), Moderate Pain (4 - 6)      REVIEW OF SYSTEMS:    RESPIRATORY: No shortness of breath  CARDIOVASCULAR: No chest pain  All other review of systems is negative unless indicated above.    Vital Signs Last 24 Hrs  T(C): 36.2 (21 May 2019 04:00), Max: 37.7 (20 May 2019 18:00)  T(F): 97.1 (21 May 2019 04:00), Max: 99.8 (20 May 2019 18:00)  HR: 84 (21 May 2019 10:00) (70 - 97)  BP: 135/78 (21 May 2019 10:00) (106/64 - 160/134)  BP(mean): 93 (21 May 2019 10:00) (70 - 140)  RR: 19 (21 May 2019 10:00) (16 - 25)  SpO2: 100% (21 May 2019 10:00) (97% - 100%)    PHYSICAL EXAM:    Constitutional: NAD, well-developed  Respiratory: CTAB  Cardiovascular: S1 and S2, RRR  Gastrointestinal: BS+, soft, beltrán in peg site which is otherwise clean  Extremities: No peripheral edema  Psychiatric: Normal mood, normal affect    LABS:                        8.7    22.10 )-----------( 371      ( 21 May 2019 07:09 )             29.8     05-21    138  |  104  |  15  ----------------------------<  337<H>  4.0   |  29  |  0.41<L>    Ca    7.9<L>      21 May 2019 07:09            RADIOLOGY & ADDITIONAL STUDIES:

## 2019-05-21 NOTE — PROGRESS NOTE ADULT - SUBJECTIVE AND OBJECTIVE BOX
CC: On Vent    73y old M chronic vent--s/p PEG for prob transversemyelitis--DC from  on 5/7 after being admitted with biliary sepsis and sacral OM. wbc was 10K PICC was placed and tx to NH on PO vanco/ tigecycline/cefepime.  He presented  with unresponsiveness and pinpoint pupils. CT shoed pancreatitis. C. Dif negative, He has persistent leukocytosis  , f/u ct shows only bibasilar infiltrates, on meropenem esbl in sputum and sputum with proteus.    5/21 - Patient seen ans examined. Chart reviewed. Events noted. His PEG came out and replaced today by GI.      PAST MEDICAL & SURGICAL HISTORY:  Pneumonia  UTI (urinary tract infection)  H/O quadriplegia  Essential hypertension  Paralysis  Transverse myelitis  S/P colostomy  PEG (percutaneous endoscopic gastrostomy) status  History of tracheostomy      FAMILY HISTORY:  No pertinent family history in first degree relatives      Social Hx:    Allergies    No Known Allergies    Intolerances        63y        ICU Vital Signs Last 24 Hrs  T(C): 36.2 (21 May 2019 04:00), Max: 37.7 (20 May 2019 18:00)  T(F): 97.1 (21 May 2019 04:00), Max: 99.8 (20 May 2019 18:00)  HR: 84 (21 May 2019 10:00) (70 - 97)  BP: 135/78 (21 May 2019 10:00) (106/64 - 160/134)  BP(mean): 93 (21 May 2019 10:00) (70 - 140)  ABP: --  ABP(mean): --  RR: 19 (21 May 2019 10:00) (16 - 25)  SpO2: 100% (21 May 2019 10:00) (97% - 100%)      Mode: AC/ CMV (Assist Control/ Continuous Mandatory Ventilation)  RR (machine): 14  TV (machine): 500  FiO2: 40  PEEP: 5  ITime: 1  PIP: 29      I&O's Summary    20 May 2019 07:01  -  21 May 2019 07:00  --------------------------------------------------------  IN: 1707 mL / OUT: 2225 mL / NET: -518 mL                              8.7    22.10 )-----------( 371      ( 21 May 2019 07:09 )             29.8       05-21    138  |  104  |  15  ----------------------------<  337<H>  4.0   |  29  |  0.41<L>    Ca    7.9<L>      21 May 2019 07:09        CAPILLARY BLOOD GLUCOSE                                MEDICATIONS  (STANDING):  ALPRAZolam 0.5 milliGRAM(s) Oral every 8 hours  ascorbic acid 500 milliGRAM(s) Oral daily  aspirin  chewable 81 milliGRAM(s) Oral daily  chlorhexidine 0.12% Liquid 15 milliLiter(s) Oral Mucosa two times a day  enoxaparin Injectable 40 milliGRAM(s) SubCutaneous daily  famotidine    Tablet 20 milliGRAM(s) Oral two times a day  fentaNYL   Patch  75 MICROgram(s)/Hr 1 Patch Transdermal every 72 hours  gabapentin 300 milliGRAM(s) Oral two times a day  lidocaine   Patch 1 Patch Transdermal daily  meropenem  IVPB 1000 milliGRAM(s) IV Intermittent every 8 hours  metoprolol tartrate 25 milliGRAM(s) Oral two times a day  multivitamin/minerals 1 Tablet(s) Oral daily  sertraline 50 milliGRAM(s) Oral daily  vancomycin    Solution 125 milliGRAM(s) Oral every 6 hours    MEDICATIONS  (PRN):  acetaminophen   Tablet .. 650 milliGRAM(s) Oral every 6 hours PRN Temp greater or equal to 38C (100.4F), Moderate Pain (4 - 6)          DVT Prophylaxis:    Advanced Directives:  Discussed with:    Visit Information:    30-min CC Time is exclusive of billed procedures and/or teaching and/or routine family updates.

## 2019-05-22 LAB
ANION GAP SERPL CALC-SCNC: 3 MMOL/L — LOW (ref 5–17)
BUN SERPL-MCNC: 13 MG/DL — SIGNIFICANT CHANGE UP (ref 7–23)
CALCIUM SERPL-MCNC: 7.9 MG/DL — LOW (ref 8.5–10.1)
CHLORIDE SERPL-SCNC: 106 MMOL/L — SIGNIFICANT CHANGE UP (ref 96–108)
CO2 SERPL-SCNC: 28 MMOL/L — SIGNIFICANT CHANGE UP (ref 22–31)
CREAT SERPL-MCNC: 0.36 MG/DL — LOW (ref 0.5–1.3)
GLUCOSE SERPL-MCNC: 273 MG/DL — HIGH (ref 70–99)
HCT VFR BLD CALC: 25.7 % — LOW (ref 39–50)
HCT VFR BLD CALC: 27.6 % — LOW (ref 39–50)
HGB BLD-MCNC: 7.5 G/DL — LOW (ref 13–17)
HGB BLD-MCNC: 8 G/DL — LOW (ref 13–17)
MAGNESIUM SERPL-MCNC: 2 MG/DL — SIGNIFICANT CHANGE UP (ref 1.6–2.6)
MCHC RBC-ENTMCNC: 25.4 PG — LOW (ref 27–34)
MCHC RBC-ENTMCNC: 29 GM/DL — LOW (ref 32–36)
MCV RBC AUTO: 87.6 FL — SIGNIFICANT CHANGE UP (ref 80–100)
OB PNL STL: POSITIVE
PHOSPHATE SERPL-MCNC: 1.5 MG/DL — LOW (ref 2.5–4.5)
PLATELET # BLD AUTO: 373 K/UL — SIGNIFICANT CHANGE UP (ref 150–400)
POTASSIUM SERPL-MCNC: 4.2 MMOL/L — SIGNIFICANT CHANGE UP (ref 3.5–5.3)
POTASSIUM SERPL-SCNC: 4.2 MMOL/L — SIGNIFICANT CHANGE UP (ref 3.5–5.3)
RBC # BLD: 3.15 M/UL — LOW (ref 4.2–5.8)
RBC # FLD: 19.2 % — HIGH (ref 10.3–14.5)
SODIUM SERPL-SCNC: 137 MMOL/L — SIGNIFICANT CHANGE UP (ref 135–145)
WBC # BLD: 17.06 K/UL — HIGH (ref 3.8–10.5)
WBC # FLD AUTO: 17.06 K/UL — HIGH (ref 3.8–10.5)

## 2019-05-22 PROCEDURE — 74019 RADEX ABDOMEN 2 VIEWS: CPT | Mod: 26

## 2019-05-22 RX ORDER — FENTANYL CITRATE 50 UG/ML
1 INJECTION INTRAVENOUS
Refills: 0 | Status: DISCONTINUED | OUTPATIENT
Start: 2019-05-24 | End: 2019-05-24

## 2019-05-22 RX ORDER — POTASSIUM PHOSPHATE, MONOBASIC POTASSIUM PHOSPHATE, DIBASIC 236; 224 MG/ML; MG/ML
15 INJECTION, SOLUTION INTRAVENOUS ONCE
Refills: 0 | Status: COMPLETED | OUTPATIENT
Start: 2019-05-22 | End: 2019-05-22

## 2019-05-22 RX ORDER — FENTANYL CITRATE 50 UG/ML
1 INJECTION INTRAVENOUS
Refills: 0 | Status: DISCONTINUED | OUTPATIENT
Start: 2019-05-22 | End: 2019-05-22

## 2019-05-22 RX ADMIN — Medication 0.5 MILLIGRAM(S): at 21:11

## 2019-05-22 RX ADMIN — FENTANYL CITRATE 1 PATCH: 50 INJECTION INTRAVENOUS at 18:13

## 2019-05-22 RX ADMIN — MEROPENEM 100 MILLIGRAM(S): 1 INJECTION INTRAVENOUS at 14:09

## 2019-05-22 RX ADMIN — SERTRALINE 50 MILLIGRAM(S): 25 TABLET, FILM COATED ORAL at 11:32

## 2019-05-22 RX ADMIN — MEROPENEM 100 MILLIGRAM(S): 1 INJECTION INTRAVENOUS at 21:12

## 2019-05-22 RX ADMIN — Medication 125 MILLIGRAM(S): at 18:13

## 2019-05-22 RX ADMIN — MEROPENEM 100 MILLIGRAM(S): 1 INJECTION INTRAVENOUS at 05:36

## 2019-05-22 RX ADMIN — FAMOTIDINE 20 MILLIGRAM(S): 10 INJECTION INTRAVENOUS at 18:13

## 2019-05-22 RX ADMIN — Medication 1 TABLET(S): at 11:32

## 2019-05-22 RX ADMIN — Medication 0.5 MILLIGRAM(S): at 05:35

## 2019-05-22 RX ADMIN — Medication 25 MILLIGRAM(S): at 18:13

## 2019-05-22 RX ADMIN — GABAPENTIN 300 MILLIGRAM(S): 400 CAPSULE ORAL at 05:35

## 2019-05-22 RX ADMIN — Medication 0.5 MILLIGRAM(S): at 14:53

## 2019-05-22 RX ADMIN — Medication 125 MILLIGRAM(S): at 11:31

## 2019-05-22 RX ADMIN — CHLORHEXIDINE GLUCONATE 15 MILLILITER(S): 213 SOLUTION TOPICAL at 05:35

## 2019-05-22 RX ADMIN — Medication 125 MILLIGRAM(S): at 05:36

## 2019-05-22 RX ADMIN — Medication 25 MILLIGRAM(S): at 05:35

## 2019-05-22 RX ADMIN — GABAPENTIN 300 MILLIGRAM(S): 400 CAPSULE ORAL at 18:13

## 2019-05-22 RX ADMIN — Medication 500 MILLIGRAM(S): at 11:31

## 2019-05-22 RX ADMIN — POTASSIUM PHOSPHATE, MONOBASIC POTASSIUM PHOSPHATE, DIBASIC 63.75 MILLIMOLE(S): 236; 224 INJECTION, SOLUTION INTRAVENOUS at 19:26

## 2019-05-22 RX ADMIN — CHLORHEXIDINE GLUCONATE 15 MILLILITER(S): 213 SOLUTION TOPICAL at 18:13

## 2019-05-22 RX ADMIN — FAMOTIDINE 20 MILLIGRAM(S): 10 INJECTION INTRAVENOUS at 05:35

## 2019-05-22 RX ADMIN — ENOXAPARIN SODIUM 40 MILLIGRAM(S): 100 INJECTION SUBCUTANEOUS at 11:31

## 2019-05-22 RX ADMIN — Medication 125 MILLIGRAM(S): at 23:47

## 2019-05-22 RX ADMIN — Medication 81 MILLIGRAM(S): at 11:31

## 2019-05-22 NOTE — PROGRESS NOTE ADULT - SUBJECTIVE AND OBJECTIVE BOX
Patient is a 63y old  Male who presents with a chief complaint of inc wbc, inc lactate, possible pancreatitis (22 May 2019 11:30)      Subective:  Some blood per colostomy, fresh red, with brown stool    PAST MEDICAL & SURGICAL HISTORY:  Pneumonia  UTI (urinary tract infection)  H/O quadriplegia  Essential hypertension  Paralysis  Transverse myelitis  S/P colostomy  PEG (percutaneous endoscopic gastrostomy) status  History of tracheostomy      MEDICATIONS  (STANDING):  ALPRAZolam 0.5 milliGRAM(s) Oral every 8 hours  ascorbic acid 500 milliGRAM(s) Oral daily  aspirin  chewable 81 milliGRAM(s) Oral daily  chlorhexidine 0.12% Liquid 15 milliLiter(s) Oral Mucosa two times a day  enoxaparin Injectable 40 milliGRAM(s) SubCutaneous daily  famotidine    Tablet 20 milliGRAM(s) Oral two times a day  gabapentin 300 milliGRAM(s) Oral two times a day  lidocaine   Patch 1 Patch Transdermal daily  meropenem  IVPB 1000 milliGRAM(s) IV Intermittent every 8 hours  metoprolol tartrate 25 milliGRAM(s) Oral two times a day  multivitamin/minerals 1 Tablet(s) Oral daily  sertraline 50 milliGRAM(s) Oral daily  vancomycin    Solution 125 milliGRAM(s) Oral every 6 hours    MEDICATIONS  (PRN):  acetaminophen   Tablet .. 650 milliGRAM(s) Oral every 6 hours PRN Temp greater or equal to 38C (100.4F), Moderate Pain (4 - 6)      REVIEW OF SYSTEMS:    RESPIRATORY: No shortness of breath  CARDIOVASCULAR: No chest pain  All other review of systems is negative unless indicated above.    Vital Signs Last 24 Hrs  T(C): 36.1 (22 May 2019 12:00), Max: 36.2 (22 May 2019 00:00)  T(F): 96.9 (22 May 2019 12:00), Max: 97.2 (22 May 2019 00:00)  HR: 93 (22 May 2019 15:00) (76 - 96)  BP: 106/83 (22 May 2019 15:00) (85/66 - 139/86)  BP(mean): 88 (22 May 2019 15:00) (65 - 100)  RR: 21 (22 May 2019 15:00) (17 - 27)  SpO2: 99% (22 May 2019 15:00) (95% - 100%)    PHYSICAL EXAM:    Constitutional: NAD  Respiratory: CTAB  Cardiovascular: S1 and S2, RRR  Gastrointestinal: BS+, soft, NT/ND, Stoma with cut in mucosa with scant fresh blood but stool is brown  Extremities: No peripheral edema  Psychiatric: Normal mood, normal affect    LABS:                        7.5    x     )-----------( x        ( 22 May 2019 13:49 )             25.7     05-22    137  |  106  |  13  ----------------------------<  273<H>  4.2   |  28  |  0.36<L>    Ca    7.9<L>      22 May 2019 06:56  Phos  1.5     05-22  Mg     2.0     05-22            RADIOLOGY & ADDITIONAL STUDIES:

## 2019-05-22 NOTE — PROGRESS NOTE ADULT - SUBJECTIVE AND OBJECTIVE BOX
Date of service: 05-22-19 @ 11:31      Patient lying in bed; on ventilatory support      ROS unable to obtain secondary to patient medical condition     MEDICATIONS  (STANDING):  ALPRAZolam 0.5 milliGRAM(s) Oral every 8 hours  ascorbic acid 500 milliGRAM(s) Oral daily  aspirin  chewable 81 milliGRAM(s) Oral daily  chlorhexidine 0.12% Liquid 15 milliLiter(s) Oral Mucosa two times a day  enoxaparin Injectable 40 milliGRAM(s) SubCutaneous daily  famotidine    Tablet 20 milliGRAM(s) Oral two times a day  fentaNYL   Patch  75 MICROgram(s)/Hr 1 Patch Transdermal every 72 hours  gabapentin 300 milliGRAM(s) Oral two times a day  lidocaine   Patch 1 Patch Transdermal daily  meropenem  IVPB 1000 milliGRAM(s) IV Intermittent every 8 hours  metoprolol tartrate 25 milliGRAM(s) Oral two times a day  multivitamin/minerals 1 Tablet(s) Oral daily  sertraline 50 milliGRAM(s) Oral daily  vancomycin    Solution 125 milliGRAM(s) Oral every 6 hours    MEDICATIONS  (PRN):  acetaminophen   Tablet .. 650 milliGRAM(s) Oral every 6 hours PRN Temp greater or equal to 38C (100.4F), Moderate Pain (4 - 6)      Vital Signs Last 24 Hrs  T(C): 36.2 (22 May 2019 08:00), Max: 36.8 (21 May 2019 14:00)  T(F): 97.1 (22 May 2019 08:00), Max: 98.2 (21 May 2019 14:00)  HR: 87 (22 May 2019 11:07) (76 - 96)  BP: 90/57 (22 May 2019 11:07) (90/57 - 139/86)  BP(mean): 65 (22 May 2019 11:07) (65 - 100)  RR: 22 (22 May 2019 11:07) (17 - 24)  SpO2: 100% (22 May 2019 11:07) (91% - 100%)    Physical Exam:          Constitutional: frail looking  HEENT: NC/AT  Neck: trach in place  Respiratory: respiratory effort normal scattered coarse breath sounds  Cardiovascular: S1S2 regular, no murmurs  Abdomen: soft, not tender, ostomy in place  Musculoskeletal: no muscle tenderness, no joint swelling or tenderness  Extremities: no pedal edema  Neurological/ Psychiatric: on ventilator  Skin: no rashes; no palpable lesions    Labs: all available labs reviewed                  Labs:                        Labs:                     Labs:                        Labs:                        8.3    22.81 )-----------( 387      ( 20 May 2019 07:14 )             28.2     05-19    138  |  104  |  20  ----------------------------<  303<H>  4.2   |  30  |  0.37<L>    Ca    8.1<L>      19 May 2019 06:54      Labs:                        8.0    17.06 )-----------( 373      ( 22 May 2019 06:56 )             27.6     05-22    137  |  106  |  13  ----------------------------<  273<H>  4.2   |  28  |  0.36<L>    Ca    7.9<L>      22 May 2019 06:56  Phos  1.5     05-22  Mg     2.0     05-22             Cultures:       Culture - Sputum (collected 05-15-19 @ 16:45)  Source: .Sputum Sputum  Gram Stain (05-16-19 @ 14:03):    Numerous polymorphonuclear leukocytes seen per low power field    No squamous epithelial cells seen per low power field    Moderate Gram Negative Rods seen per oil power field  Final Report (05-18-19 @ 12:35):    Numerous Proteus mirabilis ESBL    No routine respiratory jocelynn Isolated  Organism: Proteus mirabilis ESBL (05-18-19 @ 12:35)  Organism: Proteus mirabilis ESBL (05-18-19 @ 12:35)      -  Amikacin: S <=8      -  Amoxicillin/Clavulanic Acid: S <=8/4      -  Ampicillin: R >16 These ampicillin results predict results for amoxicillin      -  Ampicillin/Sulbactam: R 8/4      -  Aztreonam: R >16      -  Cefazolin: R >16      -  Cefepime: R >16      -  Cefoxitin: S 8      -  Ceftriaxone: R >32 Enterobacter, Citrobacter, and Serratia may develop resistance during prolonged therapy      -  Ciprofloxacin: I 2      -  Ertapenem: S <=0.5      -  Gentamicin: S 4      -  Levofloxacin: S <=1      -  Meropenem: S <=1      -  Piperacillin/Tazobactam: R <=8      -  Tobramycin: S 4      -  Trimethoprim/Sulfamethoxazole: R >2/38      Method Type: AMIE                 < from: CT Chest w/ IV Cont (05.15.19 @ 14:10) >    EXAM:  CT ABDOMEN AND PELVIS OC IC                          EXAM:  CT CHEST IC                            PROCEDURE DATE:  05/15/2019          INTERPRETATION:  CLINICAL INFORMATION: Unexplained leukocytosis.   Pancreatitis.    COMPARISON: May 08, 2019    PROCEDURE:   CT of the Chest, Abdomen and Pelvis was performed with intravenous   contrast.   Intravenous contrast: 90 ml Omnipaque 350. 10 ml discarded.  Oral contrast: Positive contrast was administered.  Sagittal and coronal reformats were performed.    FINDINGS:    CHEST:     LUNGS AND LARGE AIRWAYS: Diffuse bilateral reticulonodular (tree-in-bud)   opacities and numerous scattered larger nodular opacities throughout both   lungs, not significantly changed. Bilateral lower lobe consolidations,   also unchanged. Tracheostomy tube in situ.  PLEURA: No pleural effusion.  VESSELS: Normal caliber aorta. Coronary artery calcifications.  HEART: Heart size is normal. No pericardial effusion.  MEDIASTINUM AND ANAMARIA: No lymphadenopathy.  CHESTWALL AND LOWER NECK: Within normal limits.    ABDOMEN AND PELVIS:    LIVER: Within normal limits.  BILE DUCTS: Normal caliber.  GALLBLADDER: Multiple layering gallstones.  SPLEEN: Within normal limits.  PANCREAS: Mild peripancreatic inflammatory changes, not significantly   changed. No discrete fluid collection or evidence of necrosis.  ADRENALS: Within normal limits.  KIDNEYS/URETERS: Within normal limits.    BLADDER: Within normal limits.  REPRODUCTIVE ORGANS: Prostate within normal limits.    BOWEL: No bowel obstruction. Appendix normal. Percutaneous gastrostomy   tube with tip in the stomach. Status post left lower quadrant loop   colostomy.  PERITONEUM: Trace ascites. No free intraperitoneal air.      VESSELS:  Within normal limits.  RETROPERITONEUM: No lymphadenopathy.    ABDOMINAL WALL: Within normal limits.  BONES: Unchanged sacral decubitus and left hip periarticular heterotopic   bone formation.    IMPRESSION:     Chest: Unchanged severe multifocal pneumonia.  Abdomen/pelvis: Unchanged mild pancreatitis.    < end of copied text >                  < from: CT Abdomen and Pelvis No Cont (05.08.19 @ 12:11) >  IMPRESSION:   CHEST: Extensive bilateral airspace disease suspicious for pneumonia.    ABDOMEN/PELVIS: Peripancreatic inflammatory change suspicious for   pancreatitis.  Marked fluid-filled distention of the stomach. Aspiration precautions are   recommended.    < end of copied text >  CODE STATUS: FULL RESCUSITATION

## 2019-05-23 LAB
ANION GAP SERPL CALC-SCNC: 5 MMOL/L — SIGNIFICANT CHANGE UP (ref 5–17)
BASE EXCESS BLDA CALC-SCNC: 3.9 MMOL/L — HIGH (ref -2–2)
BLOOD GAS COMMENTS ARTERIAL: SIGNIFICANT CHANGE UP
BUN SERPL-MCNC: 11 MG/DL — SIGNIFICANT CHANGE UP (ref 7–23)
CALCIUM SERPL-MCNC: 7.9 MG/DL — LOW (ref 8.5–10.1)
CHLORIDE SERPL-SCNC: 106 MMOL/L — SIGNIFICANT CHANGE UP (ref 96–108)
CO2 SERPL-SCNC: 26 MMOL/L — SIGNIFICANT CHANGE UP (ref 22–31)
CREAT SERPL-MCNC: 0.28 MG/DL — LOW (ref 0.5–1.3)
GAS PNL BLDA: SIGNIFICANT CHANGE UP
GLUCOSE SERPL-MCNC: 206 MG/DL — HIGH (ref 70–99)
HCO3 BLDA-SCNC: 27 MMOL/L — SIGNIFICANT CHANGE UP (ref 21–29)
HCT VFR BLD CALC: 26.7 % — LOW (ref 39–50)
HGB BLD-MCNC: 7.8 G/DL — LOW (ref 13–17)
MAGNESIUM SERPL-MCNC: 2 MG/DL — SIGNIFICANT CHANGE UP (ref 1.6–2.6)
MCHC RBC-ENTMCNC: 25.5 PG — LOW (ref 27–34)
MCHC RBC-ENTMCNC: 29.2 GM/DL — LOW (ref 32–36)
MCV RBC AUTO: 87.3 FL — SIGNIFICANT CHANGE UP (ref 80–100)
PCO2 BLDA: 35 MMHG — SIGNIFICANT CHANGE UP (ref 32–46)
PH BLDA: 7.5 — HIGH (ref 7.35–7.45)
PHOSPHATE SERPL-MCNC: 2.2 MG/DL — LOW (ref 2.5–4.5)
PLATELET # BLD AUTO: 382 K/UL — SIGNIFICANT CHANGE UP (ref 150–400)
PO2 BLDA: 70 MMHG — LOW (ref 74–108)
POTASSIUM SERPL-MCNC: 4.5 MMOL/L — SIGNIFICANT CHANGE UP (ref 3.5–5.3)
POTASSIUM SERPL-SCNC: 4.5 MMOL/L — SIGNIFICANT CHANGE UP (ref 3.5–5.3)
RBC # BLD: 3.06 M/UL — LOW (ref 4.2–5.8)
RBC # FLD: 19.3 % — HIGH (ref 10.3–14.5)
SAO2 % BLDA: 94 % — SIGNIFICANT CHANGE UP (ref 92–96)
SODIUM SERPL-SCNC: 137 MMOL/L — SIGNIFICANT CHANGE UP (ref 135–145)
WBC # BLD: 14.67 K/UL — HIGH (ref 3.8–10.5)
WBC # FLD AUTO: 14.67 K/UL — HIGH (ref 3.8–10.5)

## 2019-05-23 PROCEDURE — 71045 X-RAY EXAM CHEST 1 VIEW: CPT | Mod: 26

## 2019-05-23 RX ORDER — SODIUM,POTASSIUM PHOSPHATES 278-250MG
1 POWDER IN PACKET (EA) ORAL ONCE
Refills: 0 | Status: COMPLETED | OUTPATIENT
Start: 2019-05-23 | End: 2019-05-23

## 2019-05-23 RX ADMIN — Medication 500 MILLIGRAM(S): at 13:53

## 2019-05-23 RX ADMIN — MEROPENEM 100 MILLIGRAM(S): 1 INJECTION INTRAVENOUS at 13:55

## 2019-05-23 RX ADMIN — Medication 25 MILLIGRAM(S): at 17:41

## 2019-05-23 RX ADMIN — Medication 25 MILLIGRAM(S): at 05:15

## 2019-05-23 RX ADMIN — Medication 125 MILLIGRAM(S): at 05:15

## 2019-05-23 RX ADMIN — MEROPENEM 100 MILLIGRAM(S): 1 INJECTION INTRAVENOUS at 05:15

## 2019-05-23 RX ADMIN — FENTANYL CITRATE 1 PATCH: 50 INJECTION INTRAVENOUS at 17:37

## 2019-05-23 RX ADMIN — SERTRALINE 50 MILLIGRAM(S): 25 TABLET, FILM COATED ORAL at 13:52

## 2019-05-23 RX ADMIN — MEROPENEM 100 MILLIGRAM(S): 1 INJECTION INTRAVENOUS at 21:02

## 2019-05-23 RX ADMIN — FENTANYL CITRATE 1 PATCH: 50 INJECTION INTRAVENOUS at 06:21

## 2019-05-23 RX ADMIN — Medication 125 MILLIGRAM(S): at 13:54

## 2019-05-23 RX ADMIN — GABAPENTIN 300 MILLIGRAM(S): 400 CAPSULE ORAL at 17:42

## 2019-05-23 RX ADMIN — Medication 125 MILLIGRAM(S): at 17:41

## 2019-05-23 RX ADMIN — CHLORHEXIDINE GLUCONATE 15 MILLILITER(S): 213 SOLUTION TOPICAL at 05:15

## 2019-05-23 RX ADMIN — Medication 0.5 MILLIGRAM(S): at 05:15

## 2019-05-23 RX ADMIN — FAMOTIDINE 20 MILLIGRAM(S): 10 INJECTION INTRAVENOUS at 17:41

## 2019-05-23 RX ADMIN — Medication 1 PACKET(S): at 10:40

## 2019-05-23 RX ADMIN — GABAPENTIN 300 MILLIGRAM(S): 400 CAPSULE ORAL at 05:15

## 2019-05-23 RX ADMIN — FAMOTIDINE 20 MILLIGRAM(S): 10 INJECTION INTRAVENOUS at 05:15

## 2019-05-23 RX ADMIN — ENOXAPARIN SODIUM 40 MILLIGRAM(S): 100 INJECTION SUBCUTANEOUS at 13:56

## 2019-05-23 RX ADMIN — Medication 0.5 MILLIGRAM(S): at 21:02

## 2019-05-23 RX ADMIN — Medication 0.5 MILLIGRAM(S): at 13:52

## 2019-05-23 RX ADMIN — CHLORHEXIDINE GLUCONATE 15 MILLILITER(S): 213 SOLUTION TOPICAL at 17:42

## 2019-05-23 RX ADMIN — Medication 81 MILLIGRAM(S): at 13:53

## 2019-05-23 RX ADMIN — Medication 1 TABLET(S): at 13:53

## 2019-05-23 NOTE — PROGRESS NOTE ADULT - SUBJECTIVE AND OBJECTIVE BOX
Patient is a 63y old  Male who presents with a chief complaint of inc wbc, inc lactate, possible pancreatitis (22 May 2019 16:35)      Subective:  No further bleeding. Tolerating tube feeds.    PAST MEDICAL & SURGICAL HISTORY:  Pneumonia  UTI (urinary tract infection)  H/O quadriplegia  Essential hypertension  Paralysis  Transverse myelitis  S/P colostomy  PEG (percutaneous endoscopic gastrostomy) status  History of tracheostomy      MEDICATIONS  (STANDING):  ALPRAZolam 0.5 milliGRAM(s) Oral every 8 hours  ascorbic acid 500 milliGRAM(s) Oral daily  aspirin  chewable 81 milliGRAM(s) Oral daily  chlorhexidine 0.12% Liquid 15 milliLiter(s) Oral Mucosa two times a day  enoxaparin Injectable 40 milliGRAM(s) SubCutaneous daily  famotidine    Tablet 20 milliGRAM(s) Oral two times a day  gabapentin 300 milliGRAM(s) Oral two times a day  lidocaine   Patch 1 Patch Transdermal daily  meropenem  IVPB 1000 milliGRAM(s) IV Intermittent every 8 hours  metoprolol tartrate 25 milliGRAM(s) Oral two times a day  multivitamin/minerals 1 Tablet(s) Oral daily  sertraline 50 milliGRAM(s) Oral daily  vancomycin    Solution 125 milliGRAM(s) Oral every 6 hours    MEDICATIONS  (PRN):  acetaminophen   Tablet .. 650 milliGRAM(s) Oral every 6 hours PRN Temp greater or equal to 38C (100.4F), Moderate Pain (4 - 6)      REVIEW OF SYSTEMS:    RESPIRATORY: No shortness of breath  CARDIOVASCULAR: No chest pain  All other review of systems is negative unless indicated above.    Vital Signs Last 24 Hrs  T(C): 36.2 (23 May 2019 04:00), Max: 36.2 (23 May 2019 04:00)  T(F): 97.1 (23 May 2019 04:00), Max: 97.1 (23 May 2019 04:00)  HR: 84 (23 May 2019 07:00) (76 - 94)  BP: 108/74 (23 May 2019 07:00) (85/66 - 139/97)  BP(mean): 83 (23 May 2019 07:00) (64 - 107)  RR: 20 (23 May 2019 07:00) (16 - 27)  SpO2: 100% (23 May 2019 07:00) (96% - 100%)    PHYSICAL EXAM:    Constitutional: NAD, well-developed  Respiratory: CTAB  Cardiovascular: S1 and S2, RRR  Gastrointestinal: BS+, soft, NT/ND, colostomy with brown stool  Extremities: No peripheral edema  Psychiatric: Normal mood, normal affect    LABS:                        7.8    14.67 )-----------( 382      ( 23 May 2019 06:56 )             26.7     05-23    137  |  106  |  11  ----------------------------<  206<H>  4.5   |  26  |  0.28<L>    Ca    7.9<L>      23 May 2019 06:56  Phos  2.2     05-23  Mg     2.0     05-23            RADIOLOGY & ADDITIONAL STUDIES:

## 2019-05-23 NOTE — ADVANCED PRACTICE NURSE CONSULT - ASSESSMENT
Patient received resting in bed, awake, alert, vented but is able to make needs known. Risks and benefits of midline catheter placement explained to patient. Appears to understand. Time out and hand hygiene performed. Arrow Endurance Extended Dwell Peripheral Catheter System (LOT #[id number]) 20g, 8cm inserted to left cephalic vein via ultrasound guidance. Flushes well with 20cc of NS. Brisk blood return present. End cap placed. Minimal blood loss. No chest x ray needed to verify placement. Report given to district RN.

## 2019-05-23 NOTE — PROGRESS NOTE ADULT - SUBJECTIVE AND OBJECTIVE BOX
CC: On Vent    73y old M chronic vent--s/p PEG for prob transversemyelitis--DC from  on 5/7 after being admitted with biliary sepsis and sacral OM. wbc was 10K PICC was placed and tx to NH on PO vanco/ tigecycline/cefepime.  He presented  with unresponsiveness and pinpoint pupils. CT shoed pancreatitis. C. Dif negative, He has persistent leukocytosis  , f/u ct shows only bibasilar infiltrates, on meropenem esbl in sputum and sputum with proteus.    5/21 - Patient seen ans examined. Chart reviewed. Events noted. His PEG came out and replaced today by GI.    5/22 - No new events overnight. Conveys no complaints.     5/23 - Exam remains unchanged. Tolerating TF.    PAST MEDICAL & SURGICAL HISTORY:  Pneumonia  UTI (urinary tract infection)  H/O quadriplegia  Essential hypertension  Paralysis  Transverse myelitis  S/P colostomy  PEG (percutaneous endoscopic gastrostomy) status  History of tracheostomy      FAMILY HISTORY:  No pertinent family history in first degree relatives      Social Hx:    Allergies    No Known Allergies    Intolerances        63y        ICU Vital Signs Last 24 Hrs  T(C): 36.2 (23 May 2019 04:00), Max: 36.2 (23 May 2019 04:00)  T(F): 97.1 (23 May 2019 04:00), Max: 97.1 (23 May 2019 04:00)  HR: 82 (23 May 2019 09:00) (76 - 94)  BP: 110/77 (23 May 2019 09:00) (85/66 - 139/97)  BP(mean): 83 (23 May 2019 09:00) (64 - 107)  ABP: --  ABP(mean): --  RR: 20 (23 May 2019 09:00) (16 - 27)  SpO2: 100% (23 May 2019 08:00) (96% - 100%)      Mode: AC/ CMV (Assist Control/ Continuous Mandatory Ventilation)  RR (machine): 14  TV (machine): 500  FiO2: 40  PEEP: 5  ITime: 1  MAP: 14  PIP: 30      I&O's Summary    22 May 2019 07:01  -  23 May 2019 07:00  --------------------------------------------------------  IN: 1668 mL / OUT: 2025 mL / NET: -357 mL                              7.8    14.67 )-----------( 382      ( 23 May 2019 06:56 )             26.7       05-23    137  |  106  |  11  ----------------------------<  206<H>  4.5   |  26  |  0.28<L>    Ca    7.9<L>      23 May 2019 06:56  Phos  2.2     05-23  Mg     2.0     05-23        CAPILLARY BLOOD GLUCOSE                                MEDICATIONS  (STANDING):  ALPRAZolam 0.5 milliGRAM(s) Oral every 8 hours  ascorbic acid 500 milliGRAM(s) Oral daily  aspirin  chewable 81 milliGRAM(s) Oral daily  chlorhexidine 0.12% Liquid 15 milliLiter(s) Oral Mucosa two times a day  enoxaparin Injectable 40 milliGRAM(s) SubCutaneous daily  famotidine    Tablet 20 milliGRAM(s) Oral two times a day  gabapentin 300 milliGRAM(s) Oral two times a day  lidocaine   Patch 1 Patch Transdermal daily  meropenem  IVPB 1000 milliGRAM(s) IV Intermittent every 8 hours  metoprolol tartrate 25 milliGRAM(s) Oral two times a day  multivitamin/minerals 1 Tablet(s) Oral daily  potassium phosphate / sodium phosphate powder 1 Packet(s) Oral once  sertraline 50 milliGRAM(s) Oral daily  vancomycin    Solution 125 milliGRAM(s) Oral every 6 hours    MEDICATIONS  (PRN):  acetaminophen   Tablet .. 650 milliGRAM(s) Oral every 6 hours PRN Temp greater or equal to 38C (100.4F), Moderate Pain (4 - 6)          DVT Prophylaxis:    Advanced Directives:  Discussed with:    Visit Information:    30-min CC Time is exclusive of billed procedures and/or teaching and/or routine family updates.

## 2019-05-23 NOTE — PROGRESS NOTE ADULT - SUBJECTIVE AND OBJECTIVE BOX
Date of service: 05-23-19 @ 09:11    Patient lying in bed; afebrile        ROS unable to obtain secondary to patient medical condition     MEDICATIONS  (STANDING):  ALPRAZolam 0.5 milliGRAM(s) Oral every 8 hours  ascorbic acid 500 milliGRAM(s) Oral daily  aspirin  chewable 81 milliGRAM(s) Oral daily  chlorhexidine 0.12% Liquid 15 milliLiter(s) Oral Mucosa two times a day  enoxaparin Injectable 40 milliGRAM(s) SubCutaneous daily  famotidine    Tablet 20 milliGRAM(s) Oral two times a day  gabapentin 300 milliGRAM(s) Oral two times a day  lidocaine   Patch 1 Patch Transdermal daily  meropenem  IVPB 1000 milliGRAM(s) IV Intermittent every 8 hours  metoprolol tartrate 25 milliGRAM(s) Oral two times a day  multivitamin/minerals 1 Tablet(s) Oral daily  potassium phosphate / sodium phosphate powder 1 Packet(s) Oral once  sertraline 50 milliGRAM(s) Oral daily  vancomycin    Solution 125 milliGRAM(s) Oral every 6 hours    MEDICATIONS  (PRN):  acetaminophen   Tablet .. 650 milliGRAM(s) Oral every 6 hours PRN Temp greater or equal to 38C (100.4F), Moderate Pain (4 - 6)      Vital Signs Last 24 Hrs  T(C): 36.2 (23 May 2019 04:00), Max: 36.2 (23 May 2019 04:00)  T(F): 97.1 (23 May 2019 04:00), Max: 97.1 (23 May 2019 04:00)  HR: 86 (23 May 2019 08:00) (76 - 94)  BP: 105/74 (23 May 2019 08:00) (85/66 - 139/97)  BP(mean): 80 (23 May 2019 08:00) (64 - 107)  RR: 22 (23 May 2019 08:00) (16 - 27)  SpO2: 100% (23 May 2019 08:00) (96% - 100%)    Physical Exam:            Constitutional: frail looking  HEENT: NC/AT  Neck: trach in place  Respiratory: respiratory effort normal scattered coarse breath sounds  Cardiovascular: S1S2 regular, no murmurs  Abdomen: soft, not tender, ostomy in place  Musculoskeletal: no muscle tenderness, no joint swelling or tenderness  Extremities: no pedal edema  Neurological/ Psychiatric: on ventilator  Skin: no rashes; no palpable lesions    Labs: all available labs reviewed                  Labs:                        Labs:                        7.8    14.67 )-----------( 382      ( 23 May 2019 06:56 )             26.7     05-23    137  |  106  |  11  ----------------------------<  206<H>  4.5   |  26  |  0.28<L>    Ca    7.9<L>      23 May 2019 06:56  Phos  2.2     05-23  Mg     2.0     05-23             Cultures:                  < from: CT Chest w/ IV Cont (05.15.19 @ 14:10) >    EXAM:  CT ABDOMEN AND PELVIS OC IC                          EXAM:  CT CHEST IC                            PROCEDURE DATE:  05/15/2019          INTERPRETATION:  CLINICAL INFORMATION: Unexplained leukocytosis.   Pancreatitis.    COMPARISON: May 08, 2019    PROCEDURE:   CT of the Chest, Abdomen and Pelvis was performed with intravenous   contrast.   Intravenous contrast: 90 ml Omnipaque 350. 10 ml discarded.  Oral contrast: Positive contrast was administered.  Sagittal and coronal reformats were performed.    FINDINGS:    CHEST:     LUNGS AND LARGE AIRWAYS: Diffuse bilateral reticulonodular (tree-in-bud)   opacities and numerous scattered larger nodular opacities throughout both   lungs, not significantly changed. Bilateral lower lobe consolidations,   also unchanged. Tracheostomy tube in situ.  PLEURA: No pleural effusion.  VESSELS: Normal caliber aorta. Coronary artery calcifications.  HEART: Heart size is normal. No pericardial effusion.  MEDIASTINUM AND ANAMARIA: No lymphadenopathy.  CHESTWALL AND LOWER NECK: Within normal limits.    ABDOMEN AND PELVIS:    LIVER: Within normal limits.  BILE DUCTS: Normal caliber.  GALLBLADDER: Multiple layering gallstones.  SPLEEN: Within normal limits.  PANCREAS: Mild peripancreatic inflammatory changes, not significantly   changed. No discrete fluid collection or evidence of necrosis.  ADRENALS: Within normal limits.  KIDNEYS/URETERS: Within normal limits.    BLADDER: Within normal limits.  REPRODUCTIVE ORGANS: Prostate within normal limits.    BOWEL: No bowel obstruction. Appendix normal. Percutaneous gastrostomy   tube with tip in the stomach. Status post left lower quadrant loop   colostomy.  PERITONEUM: Trace ascites. No free intraperitoneal air.      VESSELS:  Within normal limits.  RETROPERITONEUM: No lymphadenopathy.    ABDOMINAL WALL: Within normal limits.  BONES: Unchanged sacral decubitus and left hip periarticular heterotopic   bone formation.    IMPRESSION:     Chest: Unchanged severe multifocal pneumonia.  Abdomen/pelvis: Unchanged mild pancreatitis.    < end of copied text >                  < from: CT Abdomen and Pelvis No Cont (05.08.19 @ 12:11) >  IMPRESSION:   CHEST: Extensive bilateral airspace disease suspicious for pneumonia.    ABDOMEN/PELVIS: Peripancreatic inflammatory change suspicious for   pancreatitis.  Marked fluid-filled distention of the stomach. Aspiration precautions are   recommended.    < end of copied text >  CODE STATUS: FULL RESCUSITATION

## 2019-05-24 ENCOUNTER — TRANSCRIPTION ENCOUNTER (OUTPATIENT)
Age: 64
End: 2019-05-24

## 2019-05-24 VITALS
RESPIRATION RATE: 19 BRPM | DIASTOLIC BLOOD PRESSURE: 63 MMHG | HEART RATE: 84 BPM | SYSTOLIC BLOOD PRESSURE: 128 MMHG | OXYGEN SATURATION: 100 %

## 2019-05-24 LAB
ANION GAP SERPL CALC-SCNC: 6 MMOL/L — SIGNIFICANT CHANGE UP (ref 5–17)
BUN SERPL-MCNC: 14 MG/DL — SIGNIFICANT CHANGE UP (ref 7–23)
CALCIUM SERPL-MCNC: 8.5 MG/DL — SIGNIFICANT CHANGE UP (ref 8.5–10.1)
CHLORIDE SERPL-SCNC: 106 MMOL/L — SIGNIFICANT CHANGE UP (ref 96–108)
CO2 SERPL-SCNC: 27 MMOL/L — SIGNIFICANT CHANGE UP (ref 22–31)
CREAT SERPL-MCNC: 0.34 MG/DL — LOW (ref 0.5–1.3)
GLUCOSE SERPL-MCNC: 263 MG/DL — HIGH (ref 70–99)
HCT VFR BLD CALC: 27.1 % — LOW (ref 39–50)
HGB BLD-MCNC: 8 G/DL — LOW (ref 13–17)
MAGNESIUM SERPL-MCNC: 2.3 MG/DL — SIGNIFICANT CHANGE UP (ref 1.6–2.6)
MCHC RBC-ENTMCNC: 25.4 PG — LOW (ref 27–34)
MCHC RBC-ENTMCNC: 29.5 GM/DL — LOW (ref 32–36)
MCV RBC AUTO: 86 FL — SIGNIFICANT CHANGE UP (ref 80–100)
PHOSPHATE SERPL-MCNC: 2 MG/DL — LOW (ref 2.5–4.5)
PLATELET # BLD AUTO: 426 K/UL — HIGH (ref 150–400)
POTASSIUM SERPL-MCNC: 4.4 MMOL/L — SIGNIFICANT CHANGE UP (ref 3.5–5.3)
POTASSIUM SERPL-SCNC: 4.4 MMOL/L — SIGNIFICANT CHANGE UP (ref 3.5–5.3)
RBC # BLD: 3.15 M/UL — LOW (ref 4.2–5.8)
RBC # FLD: 19.4 % — HIGH (ref 10.3–14.5)
SODIUM SERPL-SCNC: 139 MMOL/L — SIGNIFICANT CHANGE UP (ref 135–145)
WBC # BLD: 14.19 K/UL — HIGH (ref 3.8–10.5)
WBC # FLD AUTO: 14.19 K/UL — HIGH (ref 3.8–10.5)

## 2019-05-24 RX ORDER — INSULIN GLARGINE 100 [IU]/ML
6 INJECTION, SOLUTION SUBCUTANEOUS
Qty: 0 | Refills: 0 | DISCHARGE

## 2019-05-24 RX ORDER — ACETAMINOPHEN 500 MG
2 TABLET ORAL
Qty: 0 | Refills: 0 | DISCHARGE

## 2019-05-24 RX ORDER — LIDOCAINE 4 G/100G
0 CREAM TOPICAL
Qty: 0 | Refills: 0 | DISCHARGE
Start: 2019-05-24

## 2019-05-24 RX ORDER — MULTIVIT-MIN/FERROUS GLUCONATE 9 MG/15 ML
1 LIQUID (ML) ORAL
Qty: 0 | Refills: 0 | DISCHARGE

## 2019-05-24 RX ORDER — MEROPENEM 1 G/30ML
1000 INJECTION INTRAVENOUS
Qty: 0 | Refills: 0 | DISCHARGE
Start: 2019-05-24

## 2019-05-24 RX ORDER — SERTRALINE 25 MG/1
1 TABLET, FILM COATED ORAL
Qty: 0 | Refills: 0 | DISCHARGE

## 2019-05-24 RX ORDER — SIMETHICONE 80 MG/1
1 TABLET, CHEWABLE ORAL
Qty: 0 | Refills: 0 | DISCHARGE

## 2019-05-24 RX ORDER — LACTOBACILLUS ACIDOPHILUS 100MM CELL
1 CAPSULE ORAL
Qty: 0 | Refills: 0 | DISCHARGE

## 2019-05-24 RX ORDER — FENTANYL CITRATE 50 UG/ML
1 INJECTION INTRAVENOUS
Qty: 0 | Refills: 0 | DISCHARGE
Start: 2019-05-24

## 2019-05-24 RX ORDER — MULTIVIT-MIN/FERROUS GLUCONATE 9 MG/15 ML
1 LIQUID (ML) ORAL
Qty: 0 | Refills: 0 | DISCHARGE
Start: 2019-05-24

## 2019-05-24 RX ORDER — ENOXAPARIN SODIUM 100 MG/ML
40 INJECTION SUBCUTANEOUS
Qty: 0 | Refills: 0 | DISCHARGE
Start: 2019-05-24

## 2019-05-24 RX ORDER — RANITIDINE HYDROCHLORIDE 150 MG/1
1 TABLET, FILM COATED ORAL
Qty: 0 | Refills: 0 | DISCHARGE

## 2019-05-24 RX ORDER — METFORMIN HYDROCHLORIDE 850 MG/1
1 TABLET ORAL
Qty: 0 | Refills: 0 | DISCHARGE

## 2019-05-24 RX ORDER — CYCLOBENZAPRINE HYDROCHLORIDE 10 MG/1
1 TABLET, FILM COATED ORAL
Qty: 0 | Refills: 0 | DISCHARGE

## 2019-05-24 RX ORDER — METOPROLOL TARTRATE 50 MG
1 TABLET ORAL
Qty: 0 | Refills: 0 | DISCHARGE
Start: 2019-05-24

## 2019-05-24 RX ORDER — ENOXAPARIN SODIUM 100 MG/ML
0 INJECTION SUBCUTANEOUS
Qty: 0 | Refills: 0 | DISCHARGE
Start: 2019-05-24

## 2019-05-24 RX ORDER — ASCORBIC ACID 60 MG
1 TABLET,CHEWABLE ORAL
Qty: 0 | Refills: 0 | DISCHARGE

## 2019-05-24 RX ORDER — MONTELUKAST 4 MG/1
1 TABLET, CHEWABLE ORAL
Qty: 0 | Refills: 0 | DISCHARGE

## 2019-05-24 RX ADMIN — CHLORHEXIDINE GLUCONATE 15 MILLILITER(S): 213 SOLUTION TOPICAL at 05:50

## 2019-05-24 RX ADMIN — MEROPENEM 100 MILLIGRAM(S): 1 INJECTION INTRAVENOUS at 13:37

## 2019-05-24 RX ADMIN — Medication 1 TABLET(S): at 11:45

## 2019-05-24 RX ADMIN — LIDOCAINE 1 PATCH: 4 CREAM TOPICAL at 11:47

## 2019-05-24 RX ADMIN — Medication 125 MILLIGRAM(S): at 00:56

## 2019-05-24 RX ADMIN — FAMOTIDINE 20 MILLIGRAM(S): 10 INJECTION INTRAVENOUS at 05:49

## 2019-05-24 RX ADMIN — Medication 125 MILLIGRAM(S): at 11:45

## 2019-05-24 RX ADMIN — GABAPENTIN 300 MILLIGRAM(S): 400 CAPSULE ORAL at 05:49

## 2019-05-24 RX ADMIN — Medication 25 MILLIGRAM(S): at 05:50

## 2019-05-24 RX ADMIN — Medication 500 MILLIGRAM(S): at 11:45

## 2019-05-24 RX ADMIN — Medication 0.5 MILLIGRAM(S): at 13:37

## 2019-05-24 RX ADMIN — Medication 125 MILLIGRAM(S): at 05:50

## 2019-05-24 RX ADMIN — ENOXAPARIN SODIUM 40 MILLIGRAM(S): 100 INJECTION SUBCUTANEOUS at 11:45

## 2019-05-24 RX ADMIN — Medication 81 MILLIGRAM(S): at 11:45

## 2019-05-24 RX ADMIN — SERTRALINE 50 MILLIGRAM(S): 25 TABLET, FILM COATED ORAL at 11:46

## 2019-05-24 RX ADMIN — Medication 0.5 MILLIGRAM(S): at 05:49

## 2019-05-24 RX ADMIN — MEROPENEM 100 MILLIGRAM(S): 1 INJECTION INTRAVENOUS at 05:49

## 2019-05-24 NOTE — PROGRESS NOTE ADULT - SUBJECTIVE AND OBJECTIVE BOX
CC: On Vent    73y old M chronic vent--s/p PEG for prob transversemyelitis--DC from  on 5/7 after being admitted with biliary sepsis and sacral OM. wbc was 10K PICC was placed and tx to NH on PO vanco/ tigecycline/cefepime.  He presented  with unresponsiveness and pinpoint pupils. CT shoed pancreatitis. C. Dif negative, He has persistent leukocytosis  , f/u ct shows only bibasilar infiltrates, on meropenem esbl in sputum and sputum with proteus.    5/21 - Patient seen ans examined. Chart reviewed. Events noted. His PEG came out and replaced today by GI.    5/22 - No new events overnight. Conveys no complaints.     5/23 - Exam remains unchanged. Tolerating TF.    5/25 - Overnight events noted. Remains on the vent and he is tolerating TF. Midline placed yesterday.    PAST MEDICAL & SURGICAL HISTORY:  Pneumonia  UTI (urinary tract infection)  H/O quadriplegia  Essential hypertension  Paralysis  Transverse myelitis  S/P colostomy  PEG (percutaneous endoscopic gastrostomy) status  History of tracheostomy      FAMILY HISTORY:  No pertinent family history in first degree relatives      Social Hx:    Allergies    No Known Allergies    Intolerances        63y        ICU Vital Signs Last 24 Hrs  T(C): 36.8 (24 May 2019 04:00), Max: 36.8 (24 May 2019 04:00)  T(F): 98.3 (24 May 2019 04:00), Max: 98.3 (24 May 2019 04:00)  HR: 93 (24 May 2019 07:00) (82 - 98)  BP: 115/75 (24 May 2019 07:00) (86/62 - 142/55)  BP(mean): 82 (24 May 2019 07:00) (63 - 108)  ABP: --  ABP(mean): --  RR: 23 (24 May 2019 07:00) (18 - 24)  SpO2: 95% (24 May 2019 07:00) (95% - 100%)      Mode: AC/ CMV (Assist Control/ Continuous Mandatory Ventilation)  RR (machine): 14  TV (machine): 500  FiO2: 40  PEEP: 5  ITime: 1  PIP: 32      I&O's Summary    23 May 2019 07:01  -  24 May 2019 07:00  --------------------------------------------------------  IN: 1897 mL / OUT: 3725 mL / NET: -1828 mL                              8.0    14.19 )-----------( 426      ( 24 May 2019 06:01 )             27.1       05-24    139  |  106  |  14  ----------------------------<  263<H>  4.4   |  27  |  0.34<L>    Ca    8.5      24 May 2019 06:01  Phos  2.0     05-24  Mg     2.3     05-24        CAPILLARY BLOOD GLUCOSE                        ABG - ( 23 May 2019 11:23 )  pH, Arterial: 7.50  pH, Blood: x     /  pCO2: 35    /  pO2: 70    / HCO3: 27    / Base Excess: 3.9   /  SaO2: 94                      MEDICATIONS  (STANDING):  ALPRAZolam 0.5 milliGRAM(s) Oral every 8 hours  ascorbic acid 500 milliGRAM(s) Oral daily  aspirin  chewable 81 milliGRAM(s) Oral daily  chlorhexidine 0.12% Liquid 15 milliLiter(s) Oral Mucosa two times a day  enoxaparin Injectable 40 milliGRAM(s) SubCutaneous daily  famotidine    Tablet 20 milliGRAM(s) Oral two times a day  fentaNYL   Patch  75 MICROgram(s)/Hr 1 Patch Transdermal every 72 hours  gabapentin 300 milliGRAM(s) Oral two times a day  lidocaine   Patch 1 Patch Transdermal daily  meropenem  IVPB 1000 milliGRAM(s) IV Intermittent every 8 hours  metoprolol tartrate 25 milliGRAM(s) Oral two times a day  multivitamin/minerals 1 Tablet(s) Oral daily  sertraline 50 milliGRAM(s) Oral daily  vancomycin    Solution 125 milliGRAM(s) Oral every 6 hours    MEDICATIONS  (PRN):  acetaminophen   Tablet .. 650 milliGRAM(s) Oral every 6 hours PRN Temp greater or equal to 38C (100.4F), Moderate Pain (4 - 6)            Advanced Directives:  Discussed with:    Visit Information:    30-min CC Time is exclusive of billed procedures and/or teaching and/or routine family updates.

## 2019-05-24 NOTE — PROGRESS NOTE ADULT - ENMT
detailed exam
No oral lesions; no gross abnormalities
detailed exam

## 2019-05-24 NOTE — PROGRESS NOTE ADULT - SUBJECTIVE AND OBJECTIVE BOX
Date of service: 05-24-19 @ 09:27      Patient lying in bed, on ventilatory support      ROS unable to obtain secondary to patient medical condition     MEDICATIONS  (STANDING):  ALPRAZolam 0.5 milliGRAM(s) Oral every 8 hours  ascorbic acid 500 milliGRAM(s) Oral daily  aspirin  chewable 81 milliGRAM(s) Oral daily  chlorhexidine 0.12% Liquid 15 milliLiter(s) Oral Mucosa two times a day  enoxaparin Injectable 40 milliGRAM(s) SubCutaneous daily  famotidine    Tablet 20 milliGRAM(s) Oral two times a day  fentaNYL   Patch  75 MICROgram(s)/Hr 1 Patch Transdermal every 72 hours  gabapentin 300 milliGRAM(s) Oral two times a day  lidocaine   Patch 1 Patch Transdermal daily  meropenem  IVPB 1000 milliGRAM(s) IV Intermittent every 8 hours  metoprolol tartrate 25 milliGRAM(s) Oral two times a day  multivitamin/minerals 1 Tablet(s) Oral daily  sertraline 50 milliGRAM(s) Oral daily  vancomycin    Solution 125 milliGRAM(s) Oral every 6 hours    MEDICATIONS  (PRN):  acetaminophen   Tablet .. 650 milliGRAM(s) Oral every 6 hours PRN Temp greater or equal to 38C (100.4F), Moderate Pain (4 - 6)      Vital Signs Last 24 Hrs  T(C): 36.8 (24 May 2019 04:00), Max: 36.8 (24 May 2019 04:00)  T(F): 98.3 (24 May 2019 04:00), Max: 98.3 (24 May 2019 04:00)  HR: 89 (24 May 2019 08:00) (82 - 98)  BP: 123/78 (24 May 2019 08:00) (86/62 - 142/55)  BP(mean): 89 (24 May 2019 08:00) (63 - 108)  RR: 20 (24 May 2019 08:00) (18 - 24)  SpO2: 100% (24 May 2019 08:00) (95% - 100%)    Physical Exam:          Constitutional: frail looking  HEENT: NC/AT  Neck: trach in place  Respiratory: respiratory effort normal scattered coarse breath sounds  Cardiovascular: S1S2 regular, no murmurs  Abdomen: soft, not tender, ostomy in place  Musculoskeletal: no muscle tenderness, no joint swelling or tenderness  Extremities: no pedal edema  Neurological/ Psychiatric: on ventilator  Skin: no rashes; no palpable lesions    Labs: all available labs reviewed                  Labs:                        Labs:                        Labs:                        8.0    14.19 )-----------( 426      ( 24 May 2019 06:01 )             27.1     05-24    139  |  106  |  14  ----------------------------<  263<H>  4.4   |  27  |  0.34<L>    Ca    8.5      24 May 2019 06:01  Phos  2.0     05-24  Mg     2.3     05-24             Cultures:                  < from: CT Chest w/ IV Cont (05.15.19 @ 14:10) >    EXAM:  CT ABDOMEN AND PELVIS OC IC                          EXAM:  CT CHEST IC                            PROCEDURE DATE:  05/15/2019          INTERPRETATION:  CLINICAL INFORMATION: Unexplained leukocytosis.   Pancreatitis.    COMPARISON: May 08, 2019    PROCEDURE:   CT of the Chest, Abdomen and Pelvis was performed with intravenous   contrast.   Intravenous contrast: 90 ml Omnipaque 350. 10 ml discarded.  Oral contrast: Positive contrast was administered.  Sagittal and coronal reformats were performed.    FINDINGS:    CHEST:     LUNGS AND LARGE AIRWAYS: Diffuse bilateral reticulonodular (tree-in-bud)   opacities and numerous scattered larger nodular opacities throughout both   lungs, not significantly changed. Bilateral lower lobe consolidations,   also unchanged. Tracheostomy tube in situ.  PLEURA: No pleural effusion.  VESSELS: Normal caliber aorta. Coronary artery calcifications.  HEART: Heart size is normal. No pericardial effusion.  MEDIASTINUM AND ANAMARIA: No lymphadenopathy.  CHESTWALL AND LOWER NECK: Within normal limits.    ABDOMEN AND PELVIS:    LIVER: Within normal limits.  BILE DUCTS: Normal caliber.  GALLBLADDER: Multiple layering gallstones.  SPLEEN: Within normal limits.  PANCREAS: Mild peripancreatic inflammatory changes, not significantly   changed. No discrete fluid collection or evidence of necrosis.  ADRENALS: Within normal limits.  KIDNEYS/URETERS: Within normal limits.    BLADDER: Within normal limits.  REPRODUCTIVE ORGANS: Prostate within normal limits.    BOWEL: No bowel obstruction. Appendix normal. Percutaneous gastrostomy   tube with tip in the stomach. Status post left lower quadrant loop   colostomy.  PERITONEUM: Trace ascites. No free intraperitoneal air.      VESSELS:  Within normal limits.  RETROPERITONEUM: No lymphadenopathy.    ABDOMINAL WALL: Within normal limits.  BONES: Unchanged sacral decubitus and left hip periarticular heterotopic   bone formation.    IMPRESSION:     Chest: Unchanged severe multifocal pneumonia.  Abdomen/pelvis: Unchanged mild pancreatitis.    < end of copied text >                  < from: CT Abdomen and Pelvis No Cont (05.08.19 @ 12:11) >  IMPRESSION:   CHEST: Extensive bilateral airspace disease suspicious for pneumonia.    ABDOMEN/PELVIS: Peripancreatic inflammatory change suspicious for   pancreatitis.  Marked fluid-filled distention of the stomach. Aspiration precautions are   recommended.    < end of copied text >  CODE STATUS: FULL RESCUSITATION

## 2019-05-24 NOTE — PROGRESS NOTE ADULT - GASTROINTESTINAL
Soft, non-tender, no hepatosplenomegaly, normal bowel sounds
detailed exam
Soft, non-tender, no hepatosplenomegaly, normal bowel sounds
detailed exam
detailed exam

## 2019-05-24 NOTE — PROGRESS NOTE ADULT - CARDIOVASCULAR
Regular rate & rhythm, normal S1, S2; no murmurs, gallops or rubs; no S3, S4

## 2019-05-24 NOTE — PROGRESS NOTE ADULT - PROVIDER SPECIALTY LIST ADULT
Critical Care
Gastroenterology
Infectious Disease
Critical Care
Critical Care
Infectious Disease
Infectious Disease

## 2019-05-24 NOTE — PROGRESS NOTE ADULT - GASTROINTESTINAL DETAILS
nontender/no distention/soft/normal
soft/no distention/nontender/normal
soft/normal/nontender/no distention
nontender/normal/soft
normal/soft/nontender

## 2019-05-24 NOTE — DISCHARGE NOTE NURSING/CASE MANAGEMENT/SOCIAL WORK - NSDCDPATPORTLINK_GEN_ALL_CORE
You can access the [employer] [facility] Patient Portal, offered by [facility], by registering with the following website: [email or web address]

## 2019-05-24 NOTE — DISCHARGE NOTE PROVIDER - HOSPITAL COURSE
CC: On Vent        73y old M chronic vent--s/p PEG for prob transversemyelitis--DC from  on 5/7 after being admitted with biliary sepsis and sacral OM. wbc was 10K PICC was placed and tx to NH on PO vanco/ tigecycline/cefepime.  He presented  with unresponsiveness and pinpoint pupils. CT shoed pancreatitis. C. Dif negative, He has persistent leukocytosis  , f/u ct shows only bibasilar infiltrates, on meropenem esbl in sputum and sputum with proteus.        5/21 - Patient seen ans examined. Chart reviewed. Events noted. His PEG came out and replaced today by GI.        5/22 - No new events overnight. Conveys no complaints.         5/23 - Exam remains unchanged. Tolerating TF.        5/25 - Overnight events noted. Remains on the vent and he is tolerating TF. Midline placed yesterday.

## 2019-05-24 NOTE — PROGRESS NOTE ADULT - NEUROLOGICAL
Alert & oriented; no sensory, motor or coordination deficits, normal reflexes
detailed exam
Alert & oriented; no sensory, motor or coordination deficits, normal reflexes
detailed exam
detailed exam

## 2019-05-24 NOTE — DISCHARGE NOTE PROVIDER - NSDCCPCAREPLAN_GEN_ALL_CORE_FT
PRINCIPAL DISCHARGE DIAGNOSIS  Diagnosis: Clostridium difficile infection  Assessment and Plan of Treatment:       SECONDARY DISCHARGE DIAGNOSES  Diagnosis: Lactic acid blood increased  Assessment and Plan of Treatment:     Diagnosis: Leukocytosis, unspecified type  Assessment and Plan of Treatment:

## 2019-05-24 NOTE — PROGRESS NOTE ADULT - ASSESSMENT
63y old M with:  Chronic vent from transverse myelitis  Sepsis from Cholangitis / Pancreatitis  s/p Trach  s/p PEG  Sacral Osteomyelitis  Transverse Myelitis     Plan:  Cont ICU Care  Serial neuro Exams  BP Stable  Cont Mech Vent   Advance TF to goal   Clinically improving  IV Meropenem per ID until 6/5  Will need long term IV access  Monitor renal function  Strict I/O's  DVT prophylaxis - Lovenox  Case discussed with nursing staff with all questions answered in detail.   Will need placement
Patient with quadriparesis  admitted with leukocytosis  aspiration pnemonia with esbl  vs. decub  ticarcillin d/c, on meropenm  wbc slowly dec.  if continues to dec can transfer to [facility] nursing facility
Pt is a 62 y/o M PMhx of HTN, DM, chronic resp failure/vent dependent s/p trach and PEG, transverse myelitis (as result of ? tetanus toxoid injection), functional quadripegia, s/p  colostomy - most recently at  6/2018 due to sepsis of urinary source (CAUTi present on admission), before that was in  hospital 4/2018 with PNA Acinetobacter baumannii) and 3/2018 with UTI (PSAE muti resistant but S to zosyn), then admitted to  on 4/24 for evaluation and fever and leukocytosis at which time it was felt either he had line sepsis as he arrived with picc line versus acute cholecystitis versus sepsis due to sacral osteomyelitis. During that hospitalization the [facility] placed picc line was removed, the patient had imaging was found to have sacral osteomyelitis and was started on tigecycline, cefepime and po vancomycin to treat the osteo and CDiff, with discharge to [facility] on 5/7. Patient now admitted to icu on 5/8 from [facility] for evaluation of unresponsiveness. Was given narcan to which he became more responsive, however, lab work revealed elevated wbc, lactate and patient admitted. History per medical record as patient unable to provide history.   1. Patient admitted with sepsis while on broad spectrum antibiotics, however, may be due to pancreatitis  - follow up cultures --- Proteus ESBL in sputum  - leukocytosis noted while on broad spectrum antibiotics, possibly due to pancreatitis  - iv hydration and supportive care   - serial cbc and monitor temperature   - reviewed prior medical records to evaluate for resistant or atypical pathogens   -  stopped tigecycline   - expect to complete course of antibiotics till 6/5  - day #9 meropenem; would continue meropenem upon discharge to complete course until 6/5  - to continue po vancomycin to prevent cdiff  - strict isolation which will discontinue upon discharge  - wound care of sacrum  - has midline  2. other issues; per medicine
Pt is a 64 y/o M PMhx of HTN, DM, chronic resp failure/vent dependent s/p trach and PEG, transverse myelitis (as result of ? tetanus toxoid injection), functional quadripegia, s/p  colostomy - most recently at  6/2018 due to sepsis of urinary source (CAUTi present on admission), before that was in  hospital 4/2018 with PNA Acinetobacter baumannii) and 3/2018 with UTI (PSAE muti resistant but S to zosyn), then admitted to  on 4/24 for evaluation and fever and leukocytosis at which time it was felt either he had line sepsis as he arrived with picc line versus acute cholecystitis versus sepsis due to sacral osteomyelitis. During that hospitalization the [facility] placed picc line was removed, the patient had imaging was found to have sacral osteomyelitis and was started on tigecycline, cefepime and po vancomycin to treat the osteo and CDiff, with discharge to [facility] on 5/7. Patient now admitted to icu on 5/8 from [facility] for evaluation of unresponsiveness. Was given narcan to which he became more responsive, however, lab work revealed elevated wbc, lactate and patient admitted. History per medical record as patient unable to provide history.   1. Patient admitted with sepsis while on broad spectrum antibiotics, however, may be due to pancreatitis  - follow up cultures --- Proteus ESBL in sputum  - leukocytosis noted while on broad spectrum antibiotics, possibly due to pancreatitis  - iv hydration and supportive care   - serial cbc and monitor temperature   - reviewed prior medical records to evaluate for resistant or atypical pathogens   -  stopped tigecycline   - expect to complete course of antibiotics till 6/5  - day #7 meropenem  - to continue po vancomycin to prevent cdiff  - strict isolation  - wound care of sacrum  - has midline  2. other issues; per medicine
D/W patient who agrees to PEG    Will arrange for monday
Imp:  Bleeding from colostomy trauam/irritation    Rec:  Cont tube feeds  D/C plan
Imp:  Blood from stoma trauma  Doubt actual GI bleed.  High risiduals noted    Rec:  Check abdominal x-ray  No indication at this point for colonoscopy  F/U AM H/H
Imp:  Leukocytosis but no clear source of sepsis  Doubt pancreatitis, clinically    Rec:  Cont abx
Imp:  dislodged PEG    Rec:  Iniguez replaced with 20 Fr balloon replacement PEG  OK to use
Patient with chronic trach secondary to quadripresis,  on vent  persistent leukocytosis  osteo vs. pneumonia  continue abx , wbc slightly better  check repeat sputum culture  continue tube feeds  no weaning trials  ct yesterday showed improved pancreatinint  only mild bibasilar infiltrates
Patient with chronic trach with quadriparesis  admitted with  pancreatitis  inc wbc  persistent leukocytosis  on abx for osteo and proteus in sputum  hopefully to return to SNF soon
Patient with hx. of quadriparesis, admitted pancreatitis and bibasilar pneumonia  f/u ct ok  but still with persistent leukocytosis  possible sources include decubitus,  bibasilar infiltrates , pancreatitis better  on meropenem, to received abx through 6.4  when wbc improves will hopefully be able to return to skilled nursing facility  no weanins  wound care to decubitus
Patient with quadriparesis, s/p trach peg on chronic peg  on long term abx for osteo, tigecycl, cefepime, po vanco  had pancreatitis,  source not clear  ? decub  as wbc has not improved will re ct scan  low grade fever,  continue local wound care
Patient with quadriparesis, with bibasilar pneumonia, sacral osteomyelotiits  on abx.  wbc is decreasing  awaiting final up sputum culture, proteus awaiitng sensitivities  no weanin  hopefully placement early next weeks
Pt is a 62 y/o M PMhx of HTN, DM, chronic resp failure/vent dependent s/p trach and PEG, transverse myelitis (as result of ? tetanus toxoid injection), functional quadripegia, s/p  colostomy - most recently at  6/2018 due to sepsis of urinary source (CAUTi present on admission), before that was in  hospital 4/2018 with PNA Acinetobacter baumannii) and 3/2018 with UTI (PSAE muti resistant but S to zosyn), then admitted to  on 4/24 for evaluation and fever and leukocytosis at which time it was felt either he had line sepsis as he arrived with picc line versus acute cholecystitis versus sepsis due to sacral osteomyelitis. During that hospitalization the [facility] placed picc line was removed, the patient had imaging was found to have sacral osteomyelitis and was started on tigecycline, cefepime and po vancomycin to treat the osteo and CDiff, with discharge to [facility] on 5/7. Patient now admitted to icu on 5/8 from [facility] for evaluation of unresponsiveness. Was given narcan to which he became more responsive, however, lab work revealed elevated wbc, lactate and patient admitted. History per medical record as patient unable to provide history.   1. Patient admitted with sepsis while on broad spectrum antibiotics, however, may be due to pancreatitis  - follow up cultures --- all no growth  - leukocytosis noted while on broad spectrum antibiotics, possibly due to pancreatitis  - iv hydration and supportive care   - serial cbc and monitor temperature   - reviewed prior medical records to evaluate for resistant or atypical pathogens   - will continue tigecycline  as ordered, with expectation to continue until 6/5 which will be 6 weeks total as completed almost 2 weeks of antibiotics on prior hospitalization  - will change cefepime to meropenem to cover for pneumonia on off chance of new resistant bacteria  - to continue po vancomycin to prevent cdiff  - strict isolation  - wound care of sacrum  - has midline  2. other issues; per medicine
Pt is a 62 y/o M PMhx of HTN, DM, chronic resp failure/vent dependent s/p trach and PEG, transverse myelitis (as result of ? tetanus toxoid injection), functional quadripegia, s/p  colostomy - most recently at  6/2018 due to sepsis of urinary source (CAUTi present on admission), before that was in  hospital 4/2018 with [facility]NA Acinetobacter baumannii) and 3/2018 with UTI (PSAE muti resistant but S to zosyn), then admitted to  on 4/24 for evaluation and fever and leukocytosis at which time it was felt either he had line sepsis as he arrived with picc line versus acute cholecystitis versus sepsis due to sacral osteomyelitis. During that hospitalization the [facility] placed picc line was removed, the patient had imaging was found to have sacral osteomyelitis and was started on tigecycline, cefepime and po vancomycin to treat the osteo and CDiff, with discharge to [facility] on 5/7. Patient now admitted to icu on 5/8 from [facility] for evaluation of unresponsiveness. Was given narcan to which he became more responsive, however, lab work revealed elevated wbc, lactate and patient admitted. History per medical record as patient unable to provide history.   1. Patient admitted with sepsis while on broad spectrum antibiotics, however, may be due to pancreatitis  - follow up cultures --- Proteus ESBL in sputum  - leukocytosis noted while on broad spectrum antibiotics, possibly due to pancreatitis  - iv hydration and supportive care   - serial cbc and monitor temperature   - reviewed prior medical records to evaluate for resistant or atypical pathogens   -  stopped tigecycline   - expect to complete course of antibiotics till 6/5  - day #5 meropenem  - to continue po vancomycin to prevent cdiff  - strict isolation  - wound care of sacrum  - has midline  2. other issues; per medicine
Pt is a 62 y/o M PMhx of HTN, DM, chronic resp failure/vent dependent s/p trach and PEG, transverse myelitis (as result of ? tetanus toxoid injection), functional quadripegia, s/p  colostomy - most recently at  6/2018 due to sepsis of urinary source (CAUTi present on admission), before that was in  hospital 4/2018 with [facility]NA Acinetobacter baumannii) and 3/2018 with UTI (PSAE muti resistant but S to zosyn), then admitted to  on 4/24 for evaluation and fever and leukocytosis at which time it was felt either he had line sepsis as he arrived with picc line versus acute cholecystitis versus sepsis due to sacral osteomyelitis. During that hospitalization the [facility] placed picc line was removed, the patient had imaging was found to have sacral osteomyelitis and was started on tigecycline, cefepime and po vancomycin to treat the osteo and CDiff, with discharge to [facility] on 5/7. Patient now admitted to icu on 5/8 from [facility] for evaluation of unresponsiveness. Was given narcan to which he became more responsive, however, lab work revealed elevated wbc, lactate and patient admitted. History per medical record as patient unable to provide history.   1. Patient admitted with sepsis while on broad spectrum antibiotics, however, may be due to pancreatitis  - follow up cultures --- Proteus ESBL in sputum  - leukocytosis noted while on broad spectrum antibiotics, possibly due to pancreatitis  - iv hydration and supportive care   - serial cbc and monitor temperature   - reviewed prior medical records to evaluate for resistant or atypical pathogens   - will continue tigecycline  as ordered, with expectation to continue until 6/5 which will be 6 weeks total as completed almost 2 weeks of antibiotics on prior hospitalization  - day #3 meropenem  - to continue po vancomycin to prevent cdiff  - strict isolation  - wound care of sacrum  - has midline  2. other issues; per medicine
Pt is a 62 y/o M PMhx of HTN, DM, chronic resp failure/vent dependent s/p trach and PEG, transverse myelitis (as result of ? tetanus toxoid injection), functional quadripegia, s/p  colostomy - most recently at  6/2018 due to sepsis of urinary source (CAUTi present on admission), before that was in  hospital 4/2018 with PNA Acinetobacter baumannii) and 3/2018 with UTI (PSAE muti resistant but S to zosyn), then admitted to  on 4/24 for evaluation and fever and leukocytosis at which time it was felt either he had line sepsis as he arrived with picc line versus acute cholecystitis versus sepsis due to sacral osteomyelitis. During that hospitalization the [facility] placed picc line was removed, the patient had imaging was found to have sacral osteomyelitis and was started on tigecycline, cefepime and po vancomycin to treat the osteo and CDiff, with discharge to [facility] on 5/7. Patient now admitted to icu on 5/8 from [facility] for evaluation of unresponsiveness. Was given narcan to which he became more responsive, however, lab work revealed elevated wbc, lactate and patient admitted. History per medical record as patient unable to provide history.   1. Patient admitted with sepsis while on broad spectrum antibiotics, however, may be due to pancreatitis  - follow up cultures --- Proteus ESBL in sputum  - leukocytosis noted while on broad spectrum antibiotics, possibly due to pancreatitis  - iv hydration and supportive care   - serial cbc and monitor temperature   - reviewed prior medical records to evaluate for resistant or atypical pathogens   - will stop tigecycline   - expect to complete course of antibiotics till 6/5  - day #4 meropenem  - to continue po vancomycin to prevent cdiff  - strict isolation  - wound care of sacrum  - has midline  2. other issues; per medicine
Pt is a 62 y/o M PMhx of HTN, DM, chronic resp failure/vent dependent s/p trach and PEG, transverse myelitis (as result of ? tetanus toxoid injection), functional quadripegia, s/p  colostomy - most recently at  6/2018 due to sepsis of urinary source (CAUTi present on admission), before that was in  hospital 4/2018 with PNA Acinetobacter baumannii) and 3/2018 with UTI (PSAE muti resistant but S to zosyn), then admitted to  on 4/24 for evaluation and fever and leukocytosis at which time it was felt either he had line sepsis as he arrived with picc line versus acute cholecystitis versus sepsis due to sacral osteomyelitis. During that hospitalization the [facility] placed picc line was removed, the patient had imaging was found to have sacral osteomyelitis and was started on tigecycline, cefepime and po vancomycin to treat the osteo and CDiff, with discharge to [facility] on 5/7. Patient now admitted to icu on 5/8 from [facility] for evaluation of unresponsiveness. Was given narcan to which he became more responsive, however, lab work revealed elevated wbc, lactate and patient admitted. History per medical record as patient unable to provide history.   1. Patient admitted with sepsis while on broad spectrum antibiotics, however, may be due to pancreatitis  - follow up cultures --- all no growth  - leukocytosis noted while on broad spectrum antibiotics, possibly due to pancreatitis  - iv hydration and supportive care   - serial cbc and monitor temperature   - reviewed prior medical records to evaluate for resistant or atypical pathogens   - will continue tigecycline  as ordered, with expectation to continue until 6/5 which will be 6 weeks total as completed almost 2 weeks of antibiotics on prior hospitalization  - day #2 meropenem  - to continue po vancomycin to prevent cdiff  - strict isolation  - wound care of sacrum  - has midline  2. other issues; per medicine
Pt is a 62 y/o M PMhx of HTN, DM, chronic resp failure/vent dependent s/p trach and PEG, transverse myelitis (as result of ? tetanus toxoid injection), functional quadripegia, s/p  colostomy - most recently at  6/2018 due to sepsis of urinary source (CAUTi present on admission), before that was in  hospital 4/2018 with [facility]NA Acinetobacter baumannii) and 3/2018 with UTI (PSAE muti resistant but S to zosyn), then admitted to  on 4/24 for evaluation and fever and leukocytosis at which time it was felt either he had line sepsis as he arrived with picc line versus acute cholecystitis versus sepsis due to sacral osteomyelitis. During that hospitalization the [facility] placed picc line was removed, the patient had imaging was found to have sacral osteomyelitis and was started on tigecycline, cefepime and po vancomycin to treat the osteo and CDiff, with discharge to [facility] on 5/7. Patient now admitted to icu on 5/8 from [facility] for evaluation of unresponsiveness. Was given narcan to which he became more responsive, however, lab work revealed elevated wbc, lactate and patient admitted. History per medical record as patient unable to provide history.   1. Patient admitted with sepsis while on broad spectrum antibiotics, however, may be due to pancreatitis  - follow up cultures --- all no growth  - leukocytosis noted while on broad spectrum antibiotics, possibly due to pancreatitis  - iv hydration and supportive care   - serial cbc and monitor temperature   - reviewed prior medical records to evaluate for resistant or atypical pathogens   - will continue tigecycline and cefepime as ordered, with expectation to continue until 6/5 which will be 6 weeks total as completed almost 2 weeks of antibiotics on prior hospitalization  - to continue po vancomycin to prevent cdiff  - strict isolation  - wound care of sacrum  - has midline  - to repeat imaging given persistent leukocytosis  2. other issues; per medicine
Pt is a 62 y/o M PMhx of HTN, DM, chronic resp failure/vent dependent s/p trach and PEG, transverse myelitis (as result of ? tetanus toxoid injection), functional quadripegia, s/p  colostomy - most recently at  6/2018 due to sepsis of urinary source (CAUTi present on admission), before that was in  hospital 4/2018 with [facility]NA Acinetobacter baumannii) and 3/2018 with UTI (PSAE muti resistant but S to zosyn), then admitted to  on 4/24 for evaluation and fever and leukocytosis at which time it was felt either he had line sepsis as he arrived with picc line versus acute cholecystitis versus sepsis due to sacral osteomyelitis. During that hospitalization the [facility] placed picc line was removed, the patient had imaging was found to have sacral osteomyelitis and was started on tigecycline, cefepime and po vancomycin to treat the osteo and CDiff, with discharge to [facility] on 5/7. Patient now admitted to icu on 5/8 from [facility] for evaluation of unresponsiveness. Was given narcan to which he became more responsive, however, lab work revealed elevated wbc, lactate and patient admitted. History per medical record as patient unable to provide history.   1. Patient admitted with sepsis while on broad spectrum antibiotics, however, may be due to pancreatitis  - follow up cultures   - iv hydration and supportive care   - serial cbc and monitor temperature   - reviewed prior medical records to evaluate for resistant or atypical pathogens   - will continue tigecycline and cefepime as ordered  - to continue po vancomycin to prevent cdiff  - strict isolation  - wound care of sacrum  - will have picc line withdrawn to convert to a midline  2. other issues; per medicine
Pt is a 64 y/o M PMhx of HTN, DM, chronic resp failure/vent dependent s/p trach and PEG, transverse myelitis (as result of ? tetanus toxoid injection), functional quadripegia, s/p  colostomy - most recently at  6/2018 due to sepsis of urinary source (CAUTi present on admission), before that was in  hospital 4/2018 with [facility]NA Acinetobacter baumannii) and 3/2018 with UTI (PSAE muti resistant but S to zosyn), then admitted to  on 4/24 for evaluation and fever and leukocytosis at which time it was felt either he had line sepsis as he arrived with picc line versus acute cholecystitis versus sepsis due to sacral osteomyelitis. During that hospitalization the [facility] placed picc line was removed, the patient had imaging was found to have sacral osteomyelitis and was started on tigecycline, cefepime and po vancomycin to treat the osteo and CDiff, with discharge to [facility] on 5/7. Patient now admitted to icu on 5/8 from [facility] for evaluation of unresponsiveness. Was given narcan to which he became more responsive, however, lab work revealed elevated wbc, lactate and patient admitted. History per medical record as patient unable to provide history.   1. Patient admitted with sepsis while on broad spectrum antibiotics, however, may be due to pancreatitis  - follow up cultures --- all no growth  - leukocytosis noted while on broad spectrum antibiotics, possibly due to pancreatitis  - iv hydration and supportive care   - serial cbc and monitor temperature   - reviewed prior medical records to evaluate for resistant or atypical pathogens   - will continue tigecycline and cefepime as ordered, with expectation to continue until 6/5 which will be 6 weeks total as completed almost 2 weeks of antibiotics on prior hospitalization  - to continue po vancomycin to prevent cdiff  - strict isolation  - wound care of sacrum  - has midline  2. other issues; per medicine
Pt is a 64 y/o M PMhx of HTN, DM, chronic resp failure/vent dependent s/p trach and PEG, transverse myelitis (as result of ? tetanus toxoid injection), functional quadripegia, s/p  colostomy - most recently at  6/2018 due to sepsis of urinary source (CAUTi present on admission), before that was in  hospital 4/2018 with [facility]NA Acinetobacter baumannii) and 3/2018 with UTI (PSAE muti resistant but S to zosyn), then admitted to  on 4/24 for evaluation and fever and leukocytosis at which time it was felt either he had line sepsis as he arrived with picc line versus acute cholecystitis versus sepsis due to sacral osteomyelitis. During that hospitalization the [facility] placed picc line was removed, the patient had imaging was found to have sacral osteomyelitis and was started on tigecycline, cefepime and po vancomycin to treat the osteo and CDiff, with discharge to [facility] on 5/7. Patient now admitted to icu on 5/8 from [facility] for evaluation of unresponsiveness. Was given narcan to which he became more responsive, however, lab work revealed elevated wbc, lactate and patient admitted. History per medical record as patient unable to provide history.   1. Patient admitted with sepsis while on broad spectrum antibiotics, however, may be due to pancreatitis  - follow up cultures --- Proteus ESBL in sputum  - leukocytosis noted while on broad spectrum antibiotics, possibly due to pancreatitis  - iv hydration and supportive care   - serial cbc and monitor temperature   - reviewed prior medical records to evaluate for resistant or atypical pathogens   -  stopped tigecycline   - expect to complete course of antibiotics till 6/5  - day #8 meropenem; would continue meropenem upon discharge to complete course until 6/5  - to continue po vancomycin to prevent cdiff  - strict isolation which will discontinue upon discharge  - wound care of sacrum  - has midline  2. other issues; per medicine
Pt is a 64 y/o M PMhx of HTN, DM, chronic resp failure/vent dependent s/p trach and PEG, transverse myelitis (as result of ? tetanus toxoid injection), functional quadripegia, s/p  colostomy - most recently at  6/2018 due to sepsis of urinary source (CAUTi present on admission), before that was in  hospital 4/2018 with PNA Acinetobacter baumannii) and 3/2018 with UTI (PSAE muti resistant but S to zosyn), then admitted to  on 4/24 for evaluation and fever and leukocytosis at which time it was felt either he had line sepsis as he arrived with picc line versus acute cholecystitis versus sepsis due to sacral osteomyelitis. During that hospitalization the [facility] placed picc line was removed, the patient had imaging was found to have sacral osteomyelitis and was started on tigecycline, cefepime and po vancomycin to treat the osteo and CDiff, with discharge to [facility] on 5/7. Patient now admitted to icu on 5/8 from [facility] for evaluation of unresponsiveness. Was given narcan to which he became more responsive, however, lab work revealed elevated wbc, lactate and patient admitted. History per medical record as patient unable to provide history.   1. Patient admitted with sepsis while on broad spectrum antibiotics, however, may be due to pancreatitis  - follow up cultures --- all no growth  - leukocytosis noted while on broad spectrum antibiotics, possibly due to pancreatitis  - iv hydration and supportive care   - serial cbc and monitor temperature   - reviewed prior medical records to evaluate for resistant or atypical pathogens   - will continue tigecycline and cefepime as ordered, with expectation to continue until 6/5 which will be 6 weeks total as completed almost 2 weeks of antibiotics on prior hospitalization  - to continue po vancomycin to prevent cdiff  - strict isolation  - wound care of sacrum  - has midline  - to repeat imaging should the wbc remain elevated  2. other issues; per medicine
63y old M with:  Chronic vent from transverse myelitis  Sepsis from Cholangitis / Pancreatitis  s/p Trach  s/p PEG  Sacral Osteomyelitis  Transverse Myelitis     Plan:  Cont ICU Care  Serial neuro Exams  BP Stable  Cont Mech Vent   Advance TF to goal   Possible Biliary Sepsis  Clinically improving  IV Tigecycline / Cefepime per ID  Monitor renal function  Strict I/O's  DVT prophylaxis - Lovenox  Plan discussed with patients wife at bedside regarding patients current status, hospital course, plan of care, and prognosis with all questions answered in detail.  Case discussed with nursing staff with all questions answered in detail.
63y old M with:  Chronic vent from transverse myelitis  Sepsis from Cholangitis / Pancreatitis  s/p Trach  s/p PEG  Sacral Osteomyelitis  Transverse Myelitis     Plan:  Cont ICU Care  Serial neuro Exams  BP Stable  Cont Mech Vent   Advance TF to goal   Clinically improving  IV Meropenem per ID  Monitor renal function  Strict I/O's  DVT prophylaxis - Lovenox  Plan discussed with patients wife at bedside regarding patients current status, hospital course, plan of care, and prognosis with all questions answered in detail.  Case discussed with nursing staff with all questions answered in detail.   Will need placement
63y old M with:  Chronic vent from transverse myelitis  Sepsis from Cholangitis / Pancreatitis  s/p Trach  s/p PEG  Sacral Osteomyelitis  Transverse Myelitis     Plan:  Cont ICU Care  Serial neuro Exams  BP Stable  Cont Mech Vent   Advance TF to goal   Clinically improving  IV Meropenem per ID until 6/5  Will need long term IV access  Monitor renal function  Strict I/O's  DVT prophylaxis - Lovenox  Case discussed with nursing staff with all questions answered in detail.   Will need placement
63y old M with:  Chronic vent from transverse myelitis  Sepsis from Cholangitis / Pancreatitis  s/p Trach  s/p PEG  Sacral Osteomyelitis  Transverse Myelitis     Plan:  Cont ICU Care  Serial neuro Exams  BP Stable  Cont Mech Vent   Advance TF to goal   Possible Biliary Sepsis  Clinically improving  If worsens will need HIDA vs MRCP  IV Tigecycline / Cefepime per ID  Monitor renal function  Strict I/O's  DVT prophylaxis - Lovenox  Plan discussed with patients wife at bedside regarding patients current status, hospital course, plan of care, and prognosis with all questions answered in detail.  Case discussed with nursing staff with all questions answered in detail.
63y old M with:  Chronic vent from transverse myelitis  Sepsis from Cholangitis / Pancreatitis  s/p Trach  s/p PEG  Sacral Osteomyelitis  Transverse Myelitis     Plan:  Cont ICU Care  Serial neuro Exams  BP Stable  Cont Mech Vent   Advance TF to goal   Clinically improving  IV Meropenem per ID until 6/5  s/p long term IV access  Monitor renal function  Strict I/O's  DVT prophylaxis - Lovenox  Case discussed with nursing staff with all questions answered in detail.   Will need placement  Plan discussed with patient at bedside with all questions answered in detail.
63y old M with:  Chronic vent from transverse myelitis  Sepsis from Cholangitis / Pancreatitis  s/p Trach  s/p PEG  Sacral Osteomyelitis  Transverse Myelitis     Plan:  Cont ICU Care  Serial neuro Exams  BP Stable  Cont Mech Vent   Advance TF to goal   Possible Biliary Sepsis  Clinically improving  IV Tigecycline / Cefepime per ID  Monitor renal function  Strict I/O's  DVT prophylaxis - Lovenox  Plan discussed with patients wife at bedside regarding patients current status, hospital course, plan of care, and prognosis with all questions answered in detail.  Case discussed with nursing staff with all questions answered in detail.   Will need placement
63y old M with:  Chronic vent from transverse myelitis  Sepsis from Cholangitis / Pancreatitis  s/p Trach  s/p PEG  Sacral Osteomyelitis  Transverse Myelitis    Plan:  Cont ICU Care  Serial neuro Exams  BP Stable  Cont Mech Vent  Will start TF today  Possible Biliary Serpsis  Clinically improving  If worsens will need HIDA vs MRCP  IV Tigecycline / Cefepime per ID  Monitor renal function  Strict I/O's  DVT prophylaxis - Lovenox  Plan discussed with patients wife at bedside regarding patients current status, hospital course, plan of care, and prognosis with all questions answered in detail.  Case discussed with nursing staff with all questions answered in detail.
63y old M with:  Chronic vent from transverse myelitis  Sepsis from Cholangitis / Pancreatitis  s/p Trach  s/p PEG  Sacral Osteomyelitis  Transverse Myelitis    Plan:  Cont ICU Care  Serial neuro Exams  BP Stable  Cont Mech Vent  NPO / IVF  Possible Biliary Serpsis  Clinically improving  If worsens will need HIDA vs MRCP  IV Tigecycline / Cefepime per ID  Monitor renal function  Strict I/O's  DVT prophylaxis - Lovenox  Plan discussed with nursing staff with all questions answered in detail.

## 2019-05-24 NOTE — PROGRESS NOTE ADULT - RS GEN PE MLT RESP DETAILS PC
diminished breath sounds, R/diminished breath sounds, L
diminished breath sounds, L/diminished breath sounds, R
diminished breath sounds, R/diminished breath sounds, L
diminished breath sounds, L/diminished breath sounds, R
diminished breath sounds, L/diminished breath sounds, R

## 2019-05-24 NOTE — PROGRESS NOTE ADULT - REASON FOR ADMISSION
inc wbc, inc lactate, possible pancreatitis

## 2019-06-06 DIAGNOSIS — G37.3 ACUTE TRANSVERSE MYELITIS IN DEMYELINATING DISEASE OF CENTRAL NERVOUS SYSTEM: ICD-10-CM

## 2019-06-06 DIAGNOSIS — Z16.12 EXTENDED SPECTRUM BETA LACTAMASE (ESBL) RESISTANCE: ICD-10-CM

## 2019-06-06 DIAGNOSIS — K94.21 GASTROSTOMY HEMORRHAGE: ICD-10-CM

## 2019-06-06 DIAGNOSIS — G82.50 QUADRIPLEGIA, UNSPECIFIED: ICD-10-CM

## 2019-06-06 DIAGNOSIS — M86.8X8 OTHER OSTEOMYELITIS, OTHER SITE: ICD-10-CM

## 2019-06-06 DIAGNOSIS — L89.154 PRESSURE ULCER OF SACRAL REGION, STAGE 4: ICD-10-CM

## 2019-06-06 DIAGNOSIS — I10 ESSENTIAL (PRIMARY) HYPERTENSION: ICD-10-CM

## 2019-06-06 DIAGNOSIS — T40.2X1A POISONING BY OTHER OPIOIDS, ACCIDENTAL (UNINTENTIONAL), INITIAL ENCOUNTER: ICD-10-CM

## 2019-06-06 DIAGNOSIS — Z79.82 LONG TERM (CURRENT) USE OF ASPIRIN: ICD-10-CM

## 2019-06-06 DIAGNOSIS — K94.23 GASTROSTOMY MALFUNCTION: ICD-10-CM

## 2019-06-06 DIAGNOSIS — A04.72 ENTEROCOLITIS DUE TO CLOSTRIDIUM DIFFICILE, NOT SPECIFIED AS RECURRENT: ICD-10-CM

## 2019-06-06 DIAGNOSIS — B96.4 PROTEUS (MIRABILIS) (MORGANII) AS THE CAUSE OF DISEASES CLASSIFIED ELSEWHERE: ICD-10-CM

## 2019-06-06 DIAGNOSIS — J69.0 PNEUMONITIS DUE TO INHALATION OF FOOD AND VOMIT: ICD-10-CM

## 2019-06-06 DIAGNOSIS — E87.2 ACIDOSIS: ICD-10-CM

## 2019-06-06 DIAGNOSIS — Z93.0 TRACHEOSTOMY STATUS: ICD-10-CM

## 2019-06-06 DIAGNOSIS — K85.90 ACUTE PANCREATITIS WITHOUT NECROSIS OR INFECTION, UNSPECIFIED: ICD-10-CM

## 2019-06-06 DIAGNOSIS — Z66 DO NOT RESUSCITATE: ICD-10-CM

## 2019-06-06 DIAGNOSIS — K83.09 OTHER CHOLANGITIS: ICD-10-CM

## 2019-06-06 DIAGNOSIS — R41.82 ALTERED MENTAL STATUS, UNSPECIFIED: ICD-10-CM

## 2019-06-06 DIAGNOSIS — L97.309 NON-PRESSURE CHRONIC ULCER OF UNSPECIFIED ANKLE WITH UNSPECIFIED SEVERITY: ICD-10-CM

## 2019-06-06 DIAGNOSIS — Z79.4 LONG TERM (CURRENT) USE OF INSULIN: ICD-10-CM

## 2019-06-06 DIAGNOSIS — A41.9 SEPSIS, UNSPECIFIED ORGANISM: ICD-10-CM

## 2019-06-06 DIAGNOSIS — E11.69 TYPE 2 DIABETES MELLITUS WITH OTHER SPECIFIED COMPLICATION: ICD-10-CM

## 2019-06-06 DIAGNOSIS — F03.90 UNSPECIFIED DEMENTIA WITHOUT BEHAVIORAL DISTURBANCE: ICD-10-CM

## 2019-06-06 DIAGNOSIS — R65.21 SEVERE SEPSIS WITH SEPTIC SHOCK: ICD-10-CM

## 2019-06-06 DIAGNOSIS — E11.622 TYPE 2 DIABETES MELLITUS WITH OTHER SKIN ULCER: ICD-10-CM

## 2019-07-15 NOTE — H&P ADULT - NSHPRISKHEPCSCREEN_GEN_A_CORE
Unable to offer due to clinical condition normal... Well appearing, well nourished, awake, alert, oriented to person, place, time/situation and in no apparent distress.

## 2020-02-24 NOTE — DIETITIAN INITIAL EVALUATION ADULT. - PERTINENT MEDS FT
security/safe/23 MEDICATIONS  (STANDING):  ALPRAZolam 0.5 milliGRAM(s) Oral every 8 hours  artificial  tears Solution 1 Drop(s) Both EYES every 4 hours  ascorbic acid 500 milliGRAM(s) Oral daily  aspirin  chewable 81 milliGRAM(s) Oral daily  cefepime  Injectable. 1000 milliGRAM(s) IV Push every 12 hours  chlorhexidine 0.12% Liquid 5 milliLiter(s) Oral Mucosa two times a day  dextrose 5%. 1000 milliLiter(s) (50 mL/Hr) IV Continuous <Continuous>  dextrose 50% Injectable 12.5 Gram(s) IV Push once  dextrose 50% Injectable 25 Gram(s) IV Push once  dextrose 50% Injectable 25 Gram(s) IV Push once  enoxaparin Injectable 40 milliGRAM(s) SubCutaneous daily  fentaNYL   Patch  50 MICROgram(s)/Hr 1 Patch Transdermal every 72 hours  ferrous    sulfate Liquid 300 milliGRAM(s) Enteral Tube <User Schedule>  gabapentin 300 milliGRAM(s) Oral two times a day  insulin glargine Injectable (LANTUS) 6 Unit(s) SubCutaneous at bedtime  insulin lispro (HumaLOG) corrective regimen sliding scale   SubCutaneous every 6 hours  lactobacillus acidophilus 1 Tablet(s) Oral two times a day  loratadine 10 milliGRAM(s) Oral daily  LORazepam   Injectable 2 milliGRAM(s) IV Push once  montelukast 10 milliGRAM(s) Oral at bedtime  multivitamin/minerals 1 Tablet(s) Oral daily  pantoprazole  Injectable 40 milliGRAM(s) IV Push daily  sertraline 50 milliGRAM(s) Oral daily  simethicone 80 milliGRAM(s) Chew every 6 hours  vancomycin    Solution 125 milliGRAM(s) Oral every 6 hours  vancomycin  IVPB 1500 milliGRAM(s) IV Intermittent every 12 hours    MEDICATIONS  (PRN):  acetaminophen   Tablet .. 650 milliGRAM(s) Oral every 6 hours PRN Temp greater or equal to 38C (100.4F), Moderate Pain (4 - 6)  cyclobenzaprine 10 milliGRAM(s) Oral three times a day PRN Muscle Spasm  dextrose 40% Gel 15 Gram(s) Oral once PRN Blood Glucose LESS THAN 70 milliGRAM(s)/deciliter  glucagon  Injectable 1 milliGRAM(s) IntraMuscular once PRN Glucose LESS THAN 70 milligrams/deciliter

## 2020-06-20 NOTE — PROGRESS NOTE ADULT - ASSESSMENT
[facility] Interdisciplinary Rounds Patient Name: Griselda Cosby  Age: 19 y.o. Room/Bed:  319/01 Primary Diagnosis: MDD (major depressive disorder) Admission Status: Voluntary Readmission within 30 days: no 
Power of  in place: no 
Patient requires a blocked bed: no           
Reason for blocked bed:  
 
Order for blocked bed obtained: no    
 
Sleep hours: 7.5 Morning Labs completed per orders:  no      
Participation in Care/Groups:  yes Medication Compliant?: Yes PRNS (last 24 hours): Sleep Aid Restraints (last 24 hours):  no 
Substance Abuse:  no   
24 hour chart check complete: yes Acute on chronic respiratory failure - On ventilator support via tracheostomy. Pulm consult is following . For eventual weaning at the rehabilitation facility if tolerates.    Pneumonia - Completed the course of antibiotics. Monitoring clinically. Leukocytosis improved.    Transverse myelitis - Prophylactic heparin. On gastrostomy tube feeds. Monitor colostomy output.    Decubitus ulcer - Colostomy and urine catheter to divert from the decubitus site. Wound care. Analgesic medications as needed.    Diabetes - Insulin coverage, close monitoring of blood glucose levels.    Gastroesophageal reflux disease - On pantoprazole. Gastrostomy tube feeds.    Chest discomfort.- Will obtain and EKG and cardiac enzyme.     Supportive care.

## 2021-05-25 NOTE — OCCUPATIONAL THERAPY INITIAL EVALUATION ADULT - LEVEL OF INDEPENDENCE: GROOMING, OT EVAL
Medicare Wellness Visit, Female The best way to improve and maintain good health is to have a healthy lifestyle by eating a well-balanced diet, exercising regularly, limiting alcohol and stopping smoking. Regular visits with your physician or non-physician health care provider also support your good health. Preventive screening tests can find health problems before they become diseases or illnesses. Here is a list of your current Health Maintenance items with a due date: 
Health Maintenance Topic Date Due  
 Hepatitis C Screening  Never done  Foot Exam Q1  Never done  Eye Exam Retinal or Dilated  Never done  DTaP/Tdap/Td series (1 - Tdap) Never done  Shingrix Vaccine Age 50> (1 of 2) Never done  Bone Densitometry (Dexa) Screening  Never done  Medicare Yearly Exam  Never done  A1C test (Diabetic or Prediabetic)  03/25/2022  MICROALBUMIN Q1  03/25/2022  Lipid Screen  03/25/2022  Colorectal Cancer Screening Combo  01/01/2025  Flu Vaccine  Completed  COVID-19 Vaccine  Completed  Pneumococcal 65+ years  Completed Preventive services such as immunizations prevent serious infections. All people over age 72 should have a Pneumovax and a Prevnar-13 shot to prevent potentially life threatening infections with the pneumococcus bacteria, a common cause of pneumonia. These are once in a lifetime unless you and your provider decide differently. All people over 65 should have a yearly influenza vaccine or \"flu\" shot. This does not prevent infection with cold viruses but has been proven to prevent hospitalization and death from influenza. Although Medicare part B \"regular Medicare\" currently only covers tetanus vaccination in the context of an injury, a tetanus vaccine (Tdap or Td) is recommended every 10 years. A new 2 shot shingles vaccine series (Shingrix) is recommended after age 48 even for people who have already received Zostavax (the old vaccine).  It is also not covered by Medicare part B. Note, however, that both the Shingles vaccine and Tdap/Td are generally covered by secondary carriers. Please check your coverage and out of pocket expenses. Consider contacting your local health department because it may stock these vaccines for a reasonable charge. We currently have documentation of the following immunization history for you: 
Immunization History Administered Date(s) Administered  Covid-19, MODERNA, Mrna, Lnp-s, Pf, 100mcg/0.5mL 03/19/2021, 04/16/2021  Influenza Vaccine (>6 mo Afluria QUAD Vial [id number] (0.25 mL) / [id number] (0.5 mL)) 11/15/2017, 10/24/2019  Influenza Vaccine (Mdck Quadrivalent)(>4 Yrs Flucelvax Quad vial [id number]) 10/22/2020  Pneumococcal Conjugate (PCV-13) 03/21/2017  Pneumococcal Polysaccharide (PPSV-23) 11/06/2018 Screening for infection with Hepatitis C is recommended for anyone born between 80 through [address]. The table at the top of this document indicates the status of this and other preventive services. A bone mass density test (DEXA) to screen for osteoporosis or thinning of the bones should be done at least once after age 72 and may be done up to every 2 years as determined by you and your health care provider. The most recent DEXA we have on file for you is: DEXA Results (most recent): No results found for this or any previous visit. Screening for diabetes mellitus with a blood sugar test (glucose) should be done at least every 3 years until age 79. You and your health care provider may decide whether to continue screening after age 79. The most recent blood glucose we have on file for you is:  
Lab Results Component Value Date/Time Glucose 103 (H) 03/25/2021 12:06 PM  
 
 
Fasting sugars >126 on 2 separate occassions are consistent with diabetes. Random sugars >200 on 2 separate occassions are consistent with diabetes Glaucoma is a disease of the eye due to increased ocular pressure that can lead to blindness. People with risk factors for glaucoma ( race, diabetes, family history) should consider screening at least every 2 years by an eye professional.  
 
Cardiovascular screening tests that check for elevated lipids or cholesterol (fatty part of blood) which can lead to heart disease and strokes should be done every 4-6 years through age 79. You and your health care provider may decide whether to continue screening after age 79. The most recent lipid panel we have on file for you is:  
Lab Results Component Value Date/Time Cholesterol, total 153 03/25/2021 12:06 PM  
 HDL Cholesterol 56 03/25/2021 12:06 PM  
 LDL, calculated 83 03/25/2021 12:06 PM  
 VLDL, calculated 14 03/25/2021 12:06 PM  
 Triglyceride 73 03/25/2021 12:06 PM  
 
 
Colorectal cancer screening that evaluates for blood or polyps in your colon for people with average risk should be done yearly as a stool test, every five years as a flexible sigmoidoscope or every 10 years as a colonoscopy up to age 76. You and your health care provider may decide whether to continue screening after age 76. Breast cancer screening with a mammogram is recommended at least once every 2 years  for women age 54-69. You and your health care provider may decide whether to continue screening after age 76. The most recent mammogram we have on file for you is: [facility] Results (most recent): 
Results from Hospital Encounter encounter on 10/30/20 [facility] MAMMO BI SCREENING INCL CAD Narrative Examination: Screening mammogram. 
 
History:  Asymptomatic. Previous benign right breast biopsy. Comparison: 10/29/2019, 10/25/2018, 9/21/2017 Findings: There are scattered areas of fibroglandular density. Underlying 
nodularity of the parenchymal pattern is stable. Postoperative changes are 
stable in the subareolar anterior right breast. No suspicious masses or 
malignant type calcifications are identified.  Computer aided detection (CAD) is used. 
 
Impression Impression: No specific mammographic evidence of malignancy. Next screening 
mammogram is recommended in one year. BI-RADS Category 2: Benign. Screening for cervical cancer with a pap smear is recommended for all women with a cervix until age 72. The frequency of this test is based on the details of her prior pap smear testing. You and your health care provider may decide whether to continue screening after age 72. Your Medicare Wellness Exam is recommended annually. High Cholesterol: Care Instructions Your Care Instructions Cholesterol is a type of fat in your blood. It is needed for many body functions, such as making new cells. Cholesterol is made by your body. It also comes from food you eat. High cholesterol means that you have too much of the fat in your blood. This raises your risk of a heart attack and stroke. LDL and HDL are part of your total cholesterol. LDL is the \"bad\" cholesterol. High LDL can raise your risk for heart disease, heart attack, and stroke. HDL is the \"good\" cholesterol. It helps clear bad cholesterol from the body. High HDL is linked with a lower risk of heart disease, heart attack, and stroke. Your cholesterol levels help your doctor find out your risk for having a heart attack or stroke. You and your doctor can talk about whether you need to lower your risk and what treatment is best for you. A heart-healthy lifestyle along with medicines can help lower your cholesterol and your risk. The way you choose to lower your risk will depend on how high your risk is for heart attack and stroke. It will also depend on how you feel about taking medicines. Follow-up care is a key part of your treatment and safety. Be sure to make and go to all appointments, and call your doctor if you are having problems. It's also a good idea to know your test results and keep a list of the medicines you take. How can you care for yourself at home?  
· Eat a variety of foods every day. Good choices include fruits, vegetables, whole grains (like oatmeal), dried beans and peas, nuts and seeds, soy products (like tofu), and fat-free or low-fat dairy products. · Replace butter, margarine, and hydrogenated or partially hydrogenated oils with olive and canola oils. (Canola oil margarine without trans fat is fine.) · Replace red meat with fish, poultry, and soy protein (like tofu). · Limit processed and packaged foods like chips, crackers, and cookies. · Bake, broil, or steam foods. Don't villanueva them. · Be physically active. Get at least 30 minutes of exercise on most days of the week. Walking is a good choice. You also may want to do other activities, such as running, swimming, cycling, or playing tennis or team sports. · Stay at a healthy weight or lose weight by making the changes in eating and physical activity listed above. Losing just a small amount of weight, even 5 to 10 pounds, can reduce your risk for having a heart attack or stroke. · Do not smoke. When should you call for help? Watch closely for changes in your health, and be sure to contact your doctor if: 
  · You need help making lifestyle changes.  
  · You have questions about your medicine. Where can you learn more? Go to http://www.gray.com/ Enter N663 in the search box to learn more about \"High Cholesterol: Care Instructions. \" Current as of: August 31, 2020               Content Version: 12.8 © 2006-2021 [employer]. Care instructions adapted under license by [employer] (which disclaims liability or warranty for this information). If you have questions about a medical condition or this instruction, always ask your healthcare professional. [place] [id number] any warranty or liability for your use of this information. High Blood Pressure: Care Instructions Overview It's normal for blood pressure to go up and down throughout the day. But if it stays up, you have high blood pressure. Another name for high blood pressure is hypertension. Despite what a lot of people think, high blood pressure usually doesn't cause headaches or make you feel dizzy or lightheaded. It usually has no symptoms. But it does increase your risk of stroke, heart attack, and other problems. You and your doctor will talk about your risks of these problems based on your blood pressure. Your doctor will give you a goal for your blood pressure. Your goal will be based on your health and your age. Lifestyle changes, such as eating healthy and being active, are always important to help lower blood pressure. You might also take medicine to reach your blood pressure goal. 
Follow-up care is a key part of your treatment and safety. Be sure to make and go to all appointments, and call your doctor if you are having problems. It's also a good idea to know your test results and keep a list of the medicines you take. How can you care for yourself at home? Medical treatment · If you stop taking your medicine, your blood pressure will go back up. You may take one or more types of medicine to lower your blood pressure. Be safe with medicines. Take your medicine exactly as prescribed. Call your doctor if you think you are having a problem with your medicine. · Talk to your doctor before you start taking aspirin every day. Aspirin can help certain people lower their risk of a heart attack or stroke. But taking aspirin isn't right for everyone, because it can cause serious bleeding. · See your doctor regularly. You may need to see the doctor more often at first or until your blood pressure comes down. · If you are taking blood pressure medicine, talk to your doctor before you take decongestants or anti-inflammatory medicine, such as ibuprofen. Some of these medicines can raise blood pressure. · Learn how to check your blood pressure at home. Lifestyle changes · Stay at a healthy weight. This is especially important if you put on weight around the waist. Losing even 10 pounds can help you lower your blood pressure. · If your doctor recommends it, get more exercise. Walking is a good choice. Bit by bit, increase the amount you walk every day. Try for at least 30 minutes on most days of the week. You also may want to swim, bike, or do other activities. · Avoid or limit alcohol. Talk to your doctor about whether you can drink any alcohol. · Try to limit how much sodium you eat to less than 2,300 milligrams (mg) a day. Your doctor may ask you to try to eat less than 1,500 mg a day. · Eat plenty of fruits (such as bananas and oranges), vegetables, legumes, whole grains, and low-fat dairy products. · Lower the amount of saturated fat in your diet. Saturated fat is found in animal products such as milk, cheese, and meat. Limiting these foods may help you lose weight and also lower your risk for heart disease. · Do not smoke. Smoking increases your risk for heart attack and stroke. If you need help quitting, talk to your doctor about stop-smoking programs and medicines. These can increase your chances of quitting for good. When should you call for help? Call  911 anytime you think you may need emergency care. This may mean having symptoms that suggest that your blood pressure is causing a serious heart or blood vessel problem. Your blood pressure may be over 180/120. For example, call 911 if: 
  · You have symptoms of a heart attack. These may include: 
? Chest pain or pressure, or a strange feeling in the chest. 
? Sweating. ? Shortness of breath. ? Nausea or vomiting. ? Pain, pressure, or a strange feeling in the back, neck, jaw, or upper belly or in one or both shoulders or arms. ? Lightheadedness or sudden weakness. ? A fast or irregular heartbeat.  
  · You have symptoms of a stroke.  These may include: 
? Sudden numbness, tingling, weakness, or loss of movement in your face, arm, or leg, especially on only one side of your body. ? Sudden vision changes. ? Sudden trouble speaking. ? Sudden confusion or trouble understanding simple statements. ? Sudden problems with walking or balance. ? A sudden, severe headache that is different from past headaches.  
  · You have severe back or belly pain. Do not wait until your blood pressure comes down on its own. Get help right away. Call your doctor now or seek immediate care if: 
  · Your blood pressure is much higher than normal (such as 180/120 or higher), but you don't have symptoms.  
  · You think high blood pressure is causing symptoms, such as: 
? Severe headache. 
? Blurry vision. Watch closely for changes in your health, and be sure to contact your doctor if: 
  · Your blood pressure measures higher than your doctor recommends at least 2 times. That means the top number is higher or the bottom number is higher, or both.  
  · You think you may be having side effects from your blood pressure medicine. Where can you learn more? Go to http://www.gray.com/ Enter P193 in the search box to learn more about \"High Blood Pressure: Care Instructions. \" Current as of: August 31, 2020               Content Version: 12.8 © 5445-7223 [employer]. Care instructions adapted under license by [employer] (which disclaims liability or warranty for this information). If you have questions about a medical condition or this instruction, always ask your healthcare professional. [place] [address] any warranty or liability for your use of this information. Well Visit, Over 72: Care Instructions Overview Well visits can help you stay healthy. Your doctor has checked your overall health and may have suggested ways to take good care of yourself. Your doctor also may have recommended tests.  At home, you can help prevent illness with healthy eating, regular exercise, and other steps. Follow-up care is a key part of your treatment and safety. Be sure to make and go to all appointments, and call your doctor if you are having problems. It's also a good idea to know your test results and keep a list of the medicines you take. How can you care for yourself at home? · Get screening tests that you and your doctor decide on. Screening helps find diseases before any symptoms appear. · Eat healthy foods. Choose fruits, vegetables, whole grains, protein, and low-fat dairy foods. Limit fat, especially saturated fat. Reduce salt in your diet. · Limit alcohol. If you are a man, have no more than 2 drinks a day or 14 drinks a week. If you are a woman, have no more than 1 drink a day or 7 drinks a week. Since alcohol affects older adults differently, you may want to limit alcohol even more. Or you may not want to drink at all. · Get at least 30 minutes of exercise on most days of the week. Walking is a good choice. You also may want to do other activities, such as running, swimming, cycling, or playing tennis or team sports. · Reach and stay at a healthy weight. This will lower your risk for many problems, such as obesity, diabetes, heart disease, and high blood pressure. · Do not smoke. Smoking can make health problems worse. If you need help quitting, talk to your doctor about stop-smoking programs and medicines. These can increase your chances of quitting for good. · Care for your mental health. It is easy to get weighed down by worry and stress. Learn strategies to manage stress, like deep breathing and mindfulness, and stay connected with your family and community. If you find you often feel sad or hopeless, talk with your doctor. Treatment can help. · Talk to your doctor about whether you have any risk factors for sexually transmitted infections (STIs).  You can help prevent STIs if you wait to have sex with a new partner (or partners) until you've each been tested for STIs. It also helps if you use condoms (male or female condoms) and if you limit your sex partners to one person who only has sex with you. Vaccines are available for some STIs. · If you think you may have a problem with alcohol or drug use, talk to your doctor. This includes prescription medicines (such as amphetamines and opioids) and illegal drugs (such as cocaine and methamphetamine). Your doctor can help you figure out what type of treatment is best for you. · Protect your skin from too much sun. When you're outdoors from 10 a.m. to 4 p.m., stay in the shade or cover up with clothing and a hat with a wide brim. Wear sunglasses that block UV rays. Even when it's cloudy, put broad-spectrum sunscreen (SPF 30 or higher) on any exposed skin. · See a dentist one or two times a year for checkups and to have your teeth cleaned. · Wear a seat belt in the car. When should you call for help? Watch closely for changes in your health, and be sure to contact your doctor if you have any problems or symptoms that concern you. Where can you learn more? Go to http://www.gray.com/ Enter Z876 in the search box to learn more about \"Well Visit, Over 65: Care Instructions. \" Current as of: May 27, 2020               Content Version: 12.8 © 5638-5177 [employer]. Care instructions adapted under license by [employer] (which disclaims liability or warranty for this information). If you have questions about a medical condition or this instruction, always ask your healthcare professional. [place] [address] any warranty or liability for your use of this information. Back Pain: Care Instructions Your Care Instructions Back pain has many possible causes. It is often related to problems with muscles and ligaments of the back. It may also be related to problems with the nerves, discs, or bones of the back.  Moving, lifting, standing, sitting, or sleeping in an awkward way can strain the back. Sometimes you don't notice the injury until later. Arthritis is another common cause of back pain. Although it may hurt a lot, back pain usually improves on its own within several weeks. Most people recover in 12 weeks or less. Using good home treatment and being careful not to stress your back can help you feel better sooner. Follow-up care is a key part of your treatment and safety. Be sure to make and go to all appointments, and call your doctor if you are having problems. It's also a good idea to know your test results and keep a list of the medicines you take. How can you care for yourself at home? · Sit or lie in positions that are most comfortable and reduce your pain. Try one of these positions when you lie down: ? Lie on your back with your knees bent and supported by large pillows. ? Lie on the floor with your legs on the seat of a sofa or chair. ? Lie on your side with your knees and hips bent and a pillow between your legs. ? Lie on your stomach if it does not make pain worse. · Do not sit up in bed, and avoid soft couches and twisted positions. Bed rest can help relieve pain at first, but it delays healing. Avoid bed rest after the first day of back pain. · Change positions every 30 minutes. If you must sit for long periods of time, take breaks from sitting. Get up and walk around, or lie in a comfortable position. · Try using a heating pad on a low or medium setting for 15 to 20 minutes every 2 or 3 hours. Try a warm shower in place of one session with the heating pad. · You can also try an ice pack for 10 to 15 minutes every 2 to 3 hours. Put a thin cloth between the ice pack and your skin. · Take pain medicines exactly as directed. ? If the doctor gave you a prescription medicine for pain, take it as prescribed. ? If you are not taking a prescription pain medicine, ask your doctor if you can take an over-the-counter medicine.  
· Take short walks several times a day. You can start with 5 to 10 minutes, 3 or 4 times a day, and work up to longer walks. Walk on level surfaces and avoid hills and stairs until your back is better. · Return to work and other activities as soon as you can. Continued rest without activity is usually not good for your back. · To prevent future back pain, do exercises to stretch and strengthen your back and stomach. Learn how to use good posture, safe lifting techniques, and proper body mechanics. When should you call for help? Call your doctor now or seek immediate medical care if: 
  · You have new or worsening numbness in your legs.  
  · You have new or worsening weakness in your legs. (This could make it hard to stand up.)  
  · You lose control of your bladder or bowels. Watch closely for changes in your health, and be sure to contact your doctor if: 
  · You have a fever, lose weight, or don't feel well.  
  · You do not get better as expected. Where can you learn more? Go to http://www.gray.com/ Enter Z176 in the search box to learn more about \"Back Pain: Care Instructions. \" Current as of: November 16, 2020               Content Version: 12.8 © 4405-6658 [employer]. Care instructions adapted under license by [email or web address] (which disclaims liability or warranty for this information). If you have questions about a medical condition or this instruction, always ask your healthcare professional. [place] [address] any warranty or liability for your use of this information. Low Back Arthritis: Exercises Introduction Here are some examples of typical rehabilitation exercises for your condition. Start each exercise slowly. Ease off the exercise if you start to have pain. Your doctor or physical therapist will tell you when you can start these exercises and which ones will work best for you.  
When you are not being active, find a comfortable position for rest. Some people are comfortable on the floor or a medium-firm bed with a small pillow under their head and another under their knees. Some people prefer to lie on their side with a pillow between their knees. Don't stay in one position for too long. Take short walks (10 to 20 minutes) every 2 to 3 hours. Avoid slopes, hills, and stairs until you feel better. Walk only distances you can manage without pain, especially leg pain. How to do the exercises Pelvic tilt 1. Lie on your back with your knees bent. 2. \"Brace\" your stomachtighten your muscles by pulling in and imagining your belly button moving toward your spine. 3. Press your lower back into the floor. You should feel your hips and pelvis rock back. 4. Hold for 6 seconds while breathing smoothly. 5. Relax and allow your pelvis and hips to rock forward. 6. Repeat 8 to 12 times. Back stretches 1. Get down on your hands and knees on the floor. 2. Relax your head and allow it to droop. Round your back up toward the ceiling until you feel a nice stretch in your upper, middle, and lower back. Hold this stretch for as long as it feels comfortable, or about 15 to 30 seconds. 3. Return to the starting position with a flat back while you are on your hands and knees. 4. Let your back sway by pressing your stomach toward the floor. Lift your buttocks toward the ceiling. 5. Hold this position for 15 to 30 seconds. 6. Repeat 2 to 4 times. Follow-up care is a key part of your treatment and safety. Be sure to make and go to all appointments, and call your doctor if you are having problems. It's also a good idea to know your test results and keep a list of the medicines you take. Where can you learn more? Go to http://www.gray.com/ Enter G930 in the search box to learn more about \"Low Back Arthritis: Exercises. \" Current as of: November 16, 2020               Content Version: 12.8 © 0758-3484 [employer]. Care instructions adapted under license by [employer] (which disclaims liability or warranty for this information). If you have questions about a medical condition or this instruction, always ask your healthcare professional. [address] any warranty or liability for your use of this information. dependent (less than 25% patients effort)

## 2022-08-22 NOTE — ED PROVIDER NOTE - THROAT, MLM
Thank you for the reminder. Please check with the patient to see if he is taking the maximum dose of Contrave. If so, he should stop the Wellbutrin  mg that he is taking separately.   abnormal/expand...

## 2022-10-26 NOTE — PROGRESS NOTE ADULT - NSHPATTENDINGPLANDISCUSS_GEN_ALL_CORE
patient, RN
6 T team
icu staff
patient, RN,
patient, RN
patient, RN,
patient, RN
patient, RN, palliatve RN 
Dr. Schmidt
Dr. Schmidt
Patient
Patient
Patient's wife
patient
Patient
Patient and his wife
mammogram

## 2022-11-19 NOTE — H&P ADULT - PROBLEM SELECTOR PLAN 3
[facility]  
Pending A1c, HISS;  Restart home regimen of Lantus  lantus 15 units sq at bed, sliding scale;

## 2023-09-27 NOTE — PROGRESS NOTE ADULT - SUBJECTIVE AND OBJECTIVE BOX
Patient is a 63y old  Male who presents with a chief complaint of inc wbc, inc lactate, possible pancreatitis (10 May 2019 11:05)      Subective:  s/p trach today    PAST MEDICAL & SURGICAL HISTORY:  Pneumonia  UTI (urinary tract infection)  H/O quadriplegia  Essential hypertension  Paralysis  Transverse myelitis  S/P colostomy  PEG (percutaneous endoscopic gastrostomy) status  History of tracheostomy      MEDICATIONS  (STANDING):  ascorbic acid 500 milliGRAM(s) Oral daily  aspirin  chewable 81 milliGRAM(s) Oral daily  cefepime  Injectable. 1000 milliGRAM(s) IV Push every 12 hours  chlorhexidine 0.12% Liquid 15 milliLiter(s) Oral Mucosa two times a day  enoxaparin Injectable 40 milliGRAM(s) SubCutaneous daily  famotidine    Tablet 20 milliGRAM(s) Oral two times a day  fentaNYL   Patch  75 MICROgram(s)/Hr 1 Patch Transdermal every 72 hours  gabapentin 300 milliGRAM(s) Oral two times a day  lactated ringers. 1000 milliLiter(s) (125 mL/Hr) IV Continuous <Continuous>  lidocaine   Patch 1 Patch Transdermal daily  multivitamin/minerals 1 Tablet(s) Oral daily  sertraline 50 milliGRAM(s) Oral daily  tigecycline IVPB 50 milliGRAM(s) IV Intermittent every 12 hours  vancomycin    Solution 125 milliGRAM(s) Oral every 6 hours    MEDICATIONS  (PRN):  acetaminophen   Tablet .. 650 milliGRAM(s) Oral every 6 hours PRN Temp greater or equal to 38C (100.4F), Moderate Pain (4 - 6)  oxyCODONE    5 mG/acetaminophen 325 mG 1 Tablet(s) Oral every 4 hours PRN Moderate Pain (4 - 6)      REVIEW OF SYSTEMS:    RESPIRATORY: No shortness of breath  CARDIOVASCULAR: No chest pain  All other review of systems is negative unless indicated above.    Vital Signs Last 24 Hrs  T(C): 36.5 (10 May 2019 11:00), Max: 36.9 (09 May 2019 22:00)  T(F): 97.7 (10 May 2019 11:00), Max: 98.5 (09 May 2019 22:00)  HR: 87 (10 May 2019 15:00) (81 - 102)  BP: 141/78 (10 May 2019 15:00) (107/69 - 141/91)  BP(mean): 91 (10 May 2019 15:00) (77 - 103)  RR: 21 (10 May 2019 15:00) (0 - 21)  SpO2: 100% (10 May 2019 14:00) (99% - 100%)    PHYSICAL EXAM:    Constitutional: NAD  Respiratory: CTAB. +trach  Cardiovascular: S1 and S2, RRR  Gastrointestinal: BS+, soft, NT/ND  Extremities: No peripheral edema  Psychiatric: Normal mood, normal affect    LABS:                        9.5    33.35 )-----------( 476      ( 10 May 2019 06:53 )             32.1     05-10    135  |  105  |  18  ----------------------------<  137<H>  5.1   |  25  |  0.31<L>    Ca    7.5<L>      10 May 2019 06:53    TPro  5.2<L>  /  Alb  1.0<L>  /  TBili  0.4  /  DBili  0.2  /  AST  25  /  ALT  13  /  AlkPhos  171<H>  05-10      LIVER FUNCTIONS - ( 10 May 2019 06:53 )  Alb: 1.0 g/dL / Pro: 5.2 gm/dL / ALK PHOS: 171 U/L / ALT: 13 U/L / AST: 25 U/L / GGT: x             RADIOLOGY & ADDITIONAL STUDIES: Patient is a 63y old  Male who presents with a chief complaint of inc wbc, inc lactate, possible pancreatitis (10 May 2019 11:05)      Subective:    PAST MEDICAL & SURGICAL HISTORY:  Pneumonia  UTI (urinary tract infection)  H/O quadriplegia  Essential hypertension  Paralysis  Transverse myelitis  S/P colostomy  PEG (percutaneous endoscopic gastrostomy) status  History of tracheostomy      MEDICATIONS  (STANDING):  ascorbic acid 500 milliGRAM(s) Oral daily  aspirin  chewable 81 milliGRAM(s) Oral daily  cefepime  Injectable. 1000 milliGRAM(s) IV Push every 12 hours  chlorhexidine 0.12% Liquid 15 milliLiter(s) Oral Mucosa two times a day  enoxaparin Injectable 40 milliGRAM(s) SubCutaneous daily  famotidine    Tablet 20 milliGRAM(s) Oral two times a day  fentaNYL   Patch  75 MICROgram(s)/Hr 1 Patch Transdermal every 72 hours  gabapentin 300 milliGRAM(s) Oral two times a day  lactated ringers. 1000 milliLiter(s) (125 mL/Hr) IV Continuous <Continuous>  lidocaine   Patch 1 Patch Transdermal daily  multivitamin/minerals 1 Tablet(s) Oral daily  sertraline 50 milliGRAM(s) Oral daily  tigecycline IVPB 50 milliGRAM(s) IV Intermittent every 12 hours  vancomycin    Solution 125 milliGRAM(s) Oral every 6 hours    MEDICATIONS  (PRN):  acetaminophen   Tablet .. 650 milliGRAM(s) Oral every 6 hours PRN Temp greater or equal to 38C (100.4F), Moderate Pain (4 - 6)  oxyCODONE    5 mG/acetaminophen 325 mG 1 Tablet(s) Oral every 4 hours PRN Moderate Pain (4 - 6)      REVIEW OF SYSTEMS:    RESPIRATORY: No shortness of breath  CARDIOVASCULAR: No chest pain  All other review of systems is negative unless indicated above.    Vital Signs Last 24 Hrs  T(C): 36.5 (10 May 2019 11:00), Max: 36.9 (09 May 2019 22:00)  T(F): 97.7 (10 May 2019 11:00), Max: 98.5 (09 May 2019 22:00)  HR: 87 (10 May 2019 15:00) (81 - 102)  BP: 141/78 (10 May 2019 15:00) (107/69 - 141/91)  BP(mean): 91 (10 May 2019 15:00) (77 - 103)  RR: 21 (10 May 2019 15:00) (0 - 21)  SpO2: 100% (10 May 2019 14:00) (99% - 100%)    PHYSICAL EXAM:    Constitutional: NAD  Respiratory: CTAB.  Cardiovascular: S1 and S2, RRR  Gastrointestinal: BS+, soft, NT/ND  Extremities: No peripheral edema  Psychiatric: Normal mood, normal affect    LABS:                        9.5    33.35 )-----------( 476      ( 10 May 2019 06:53 )             32.1     05-10    135  |  105  |  18  ----------------------------<  137<H>  5.1   |  25  |  0.31<L>    Ca    7.5<L>      10 May 2019 06:53    TPro  5.2<L>  /  Alb  1.0<L>  /  TBili  0.4  /  DBili  0.2  /  AST  25  /  ALT  13  /  AlkPhos  171<H>  05-10      LIVER FUNCTIONS - ( 10 May 2019 06:53 )  Alb: 1.0 g/dL / Pro: 5.2 gm/dL / ALK PHOS: 171 U/L / ALT: 13 U/L / AST: 25 U/L / GGT: x             RADIOLOGY & ADDITIONAL STUDIES: Cibinqo Counseling: I discussed with the patient the risks of Cibinqo therapy including but not limited to common cold, nausea, headache, cold sores, increased blood CPK levels, dizziness, UTIs, fatigue, acne, and vomitting. Live vaccines should be avoided.  This medication has been linked to serious infections; higher rate of mortality; malignancy and lymphoproliferative disorders; major adverse cardiovascular events; thrombosis; thrombocytopenia and lymphopenia; lipid elevations; and retinal detachment.

## 2024-04-16 NOTE — H&P ADULT - DOES THIS PATIENT HAVE A HISTORY OF OR HAS BEEN DX WITH HEART FAILURE?
[phone] (home) [phone] (work)  Additional Information:   no

## 2025-03-17 NOTE — PROVIDER CONTACT NOTE (OTHER) - DATE AND TIME:
Requested Prescriptions     Pending Prescriptions Disp Refills    meloxicam (MOBIC) 7.5 MG tablet [Pharmacy Med Name: MELOXICAM 7.5MG TABLETS] 60 tablet 1     Sig: TAKE 1 TABLET BY MOUTH TWICE DAILY AS NEEDED FOR PAIN     Last OV - 2/13/25  Next OV - none  Last refill - 1/17/25  Last labs - 1/31/25     09-May-2019 22:37
